# Patient Record
Sex: FEMALE | Race: WHITE | NOT HISPANIC OR LATINO | Employment: OTHER | URBAN - METROPOLITAN AREA
[De-identification: names, ages, dates, MRNs, and addresses within clinical notes are randomized per-mention and may not be internally consistent; named-entity substitution may affect disease eponyms.]

---

## 2017-01-02 ENCOUNTER — HOSPITAL ENCOUNTER (OUTPATIENT)
Dept: MAMMOGRAPHY | Facility: CLINIC | Age: 77
Discharge: HOME | End: 2017-01-02
Attending: PEDIATRICS | Admitting: PEDIATRICS
Payer: MEDICARE

## 2017-01-02 DIAGNOSIS — Z12.31 VISIT FOR SCREENING MAMMOGRAM: ICD-10-CM

## 2017-01-02 PROCEDURE — 77063 BREAST TOMOSYNTHESIS BI: CPT

## 2017-01-02 PROCEDURE — G0202 SCR MAMMO BI INCL CAD: HCPCS

## 2017-01-13 ENCOUNTER — CARE COORDINATION (OUTPATIENT)
Dept: CARE COORDINATION | Facility: CLINIC | Age: 77
End: 2017-01-13

## 2017-01-13 DIAGNOSIS — Z76.89 HEALTH CARE HOME: Primary | ICD-10-CM

## 2017-01-13 NOTE — PROGRESS NOTES
Clinic Care Coordination Contact    Chart review for care coordination update status     CCRN had sent email to [facility] on 5/23/16 asking to be notified with discharge plans - CCRN was not notified of home care discharge     Patient lost to follow up - closing care coordination at this time     Sheila Hawkins Care Coordinator RN  [facility]  [phone]

## 2017-03-20 ENCOUNTER — OFFICE VISIT (OUTPATIENT)
Dept: URGENT CARE | Facility: URGENT CARE | Age: 77
End: 2017-03-20
Payer: COMMERCIAL

## 2017-03-20 VITALS
SYSTOLIC BLOOD PRESSURE: 128 MMHG | TEMPERATURE: 98.1 F | DIASTOLIC BLOOD PRESSURE: 80 MMHG | OXYGEN SATURATION: 99 % | HEART RATE: 71 BPM

## 2017-03-20 DIAGNOSIS — R82.90 ABNORMAL URINE FINDINGS: ICD-10-CM

## 2017-03-20 DIAGNOSIS — R30.0 DYSURIA: Primary | ICD-10-CM

## 2017-03-20 LAB
ALBUMIN UR-MCNC: ABNORMAL MG/DL
APPEARANCE UR: ABNORMAL
BACTERIA #/AREA URNS HPF: ABNORMAL /HPF
BILIRUB UR QL STRIP: NEGATIVE
COLOR UR AUTO: YELLOW
GLUCOSE UR STRIP-MCNC: NEGATIVE MG/DL
HGB UR QL STRIP: ABNORMAL
KETONES UR STRIP-MCNC: NEGATIVE MG/DL
LEUKOCYTE ESTERASE UR QL STRIP: ABNORMAL
NITRATE UR QL: NEGATIVE
PH UR STRIP: 5 PH (ref 5–7)
RBC #/AREA URNS AUTO: ABNORMAL /HPF (ref 0–2)
SP GR UR STRIP: 1.02 (ref 1–1.03)
URN SPEC COLLECT METH UR: ABNORMAL
UROBILINOGEN UR STRIP-ACNC: 0.2 EU/DL (ref 0.2–1)
WBC #/AREA URNS AUTO: ABNORMAL /HPF (ref 0–2)

## 2017-03-20 PROCEDURE — 81001 URINALYSIS AUTO W/SCOPE: CPT | Performed by: PHYSICIAN ASSISTANT

## 2017-03-20 PROCEDURE — 99213 OFFICE O/P EST LOW 20 MIN: CPT

## 2017-03-20 PROCEDURE — 87086 URINE CULTURE/COLONY COUNT: CPT | Performed by: FAMILY MEDICINE

## 2017-03-20 PROCEDURE — 87088 URINE BACTERIA CULTURE: CPT | Performed by: FAMILY MEDICINE

## 2017-03-20 PROCEDURE — 87186 SC STD MICRODIL/AGAR DIL: CPT | Performed by: FAMILY MEDICINE

## 2017-03-20 RX ORDER — NITROFURANTOIN 25; 75 MG/1; MG/1
100 CAPSULE ORAL 2 TIMES DAILY
Qty: 14 CAPSULE | Refills: 0 | Status: SHIPPED | OUTPATIENT
Start: 2017-03-20 | End: 2017-04-10

## 2017-03-20 NOTE — MR AVS SNAPSHOT
After Visit Summary   3/20/2017    Gely Keene    MRN: [id number]           Patient Information     Date Of Birth          1940        Visit Information        Provider Department      3/20/2017 5:15 PM Provider, [place] Resendiz [facility]        Today's Diagnoses     Dysuria    -  1       Follow-ups after your visit        Your next 10 appointments already scheduled     May 04, 2017 10:40 AM CDT   Pre-Op physical with Hien Booth MD   [facility]    [address]  [address]  [facility] [address]   [phone]            May 09, 2017   Procedure with Eloy Eric MD   [facility] Services (--)    [address]  [facility] [address] [phone]            May 23, 2017   Pro[facility] with Eloy Eric MD   [facility]    [address]  [facility] [address] [phone]              Who to contact     If you have questions or need follow up information about today's clinic visit or your schedule please contact [facility] URGENT C[facility] directly at [phone].  Normal or non-critical lab and imaging results will be communicated to you by CCM Benchmarkhart, letter or phone within 4 business days after the clinic has received the results. If you do not hear from us within 7 days, please contact the clinic through CCM Benchmarkhart or phone. If you have a critical or abnormal lab result, we will notify you by phone as soon as possible.  Submit refill requests through LabDoor or call your pharmacy and they will forward the refill request to us. Please allow 3 business days for your refill to be completed.          Additional Information About Your Visit        CCM Benchmark[employer] Systems Information     LabDoor lets you send messages to your doctor, view your test results, renew your prescriptions, schedule appointments and more. To sign up, go to  "[email or web address] . Click on \"Log in\" on the left side of the screen, which will take you to the Welcome page. Then click on \"Sign up Now\" on the right side of the page.     You will be asked to enter the access code listed below, as well as some personal information. Please follow the directions to create your username and password.     Your access code is: [email or web address]  Expires: 2017  5:35 PM     Your access code will  in 90 days. If you need help or a new code, please call your [place] clinic or [phone].        Care EveryWhere ID     This is your Care EveryWhere ID. This could be used by other organizations to access your [place] medical records  [id number]        Your Vitals Were     Pulse Temperature Pulse Oximetry             71 98.1  F (36.7  C) (Oral) 99%          Blood Pressure from Last 3 Encounters:   17 128/80   16 112/62   16 110/70    Weight from Last 3 Encounters:   16 131 lb 8 oz (59.6 kg)   16 125 lb (56.7 kg)   16 128 lb 4.8 oz (58.2 kg)              We Performed the Following     UA reflex to Microscopic and Culture          Today's Medication Changes          These changes are accurate as of: 3/20/17  5:35 PM.  If you have any questions, ask your nurse or doctor.               Start taking these medicines.        Dose/Directions    nitrofurantoin (macrocrystal-monohydrate) 100 MG capsule   Commonly known as:  MACROBID   Used for:  Dysuria   Started by:  ProviderEmelina         Dose:  100 mg   Take 1 capsule (100 mg) by mouth 2 times daily   Quantity:  14 capsule   Refills:  0            Where to get your medicines      These medications were sent to [facility] - [address]   [address] [address]     Phone:  [phone]     nitrofurantoin (macrocrystal-monohydrate) 100 MG capsule                Primary Care Provider Office Phone # Fax #    Hien Page " MD Emmy [phone]       [facility] [address] [place] [address]        Thank you!     Thank you for choosing [facility] URGENT CARE  for your care. Our goal is always to provide you with excellent care. Hearing back from our patients is one way we can continue to improve our services. Please take a few minutes to complete the written survey that you may receive in the mail after your visit with us. Thank you!             Your Updated Medication List - Protect others around you: Learn how to safely use, store and throw away your medicines at www.disposemymeds.org.          This list is accurate as of: 3/20/17  5:35 PM.  Always use your most recent med list.                   Brand Name Dispense Instructions for use    ACETAMINOPHEN PO      Take 500 mg by mouth every 4 hours as needed for pain Daily max 3 grams       atorvastatin 20 MG tablet    LIPITOR    90 tablet    Take 1 tablet (20 mg) by mouth daily       CALCIUM CITRATE PLUS/MAGNESIUM Tabs      Take 1 tablet by mouth daily       conjugated estrogens cream    PREMARIN    30 g    Place 0.5 g vaginally three times a week       loperamide 2 MG capsule    IMODIUM    20 capsule    Take 1 capsule (2 mg) by mouth 2 times daily       metoprolol 25 MG tablet    LOPRESSOR    180 tablet    Take 0.5 tablets (12.5 mg) by mouth 2 times daily       nitrofurantoin (macrocrystal-monohydrate) 100 MG capsule    MACROBID    14 capsule    Take 1 capsule (100 mg) by mouth 2 times daily       nystatin 426563 UNIT/GM Powd    MYCOSTATIN    60 g    Apply topically 3 times daily       omeprazole 20 MG CR capsule    priLOSEC    90 capsule    Take 1 capsule (20 mg) by mouth daily

## 2017-03-20 NOTE — NURSING NOTE
"Chief Complaint   Patient presents with     Urgent Care     UTI     frequency, burning x 2 days       Initial /80 (BP Location: Right arm, Patient Position: Chair, Cuff Size: Adult Regular)  Pulse 71  Temp 98.1  F (36.7  C) (Oral)  SpO2 99% Estimated body mass index is 24.44 kg/(m^2) as calculated from the following:    Height as of 11/3/16: 5' 1.5\" (1.562 m).    Weight as of 11/3/16: 131 lb 8 oz (59.6 kg).  Medication Reconciliation: komal Cowan CMA                                3/20/2017 5:31 PM     "

## 2017-03-20 NOTE — PROGRESS NOTES
SUBJECTIVE:   Gely Keene is a 77 year old female who  presents today for a possible UTI. Symptoms of dysuria and frequency have been going on for 2day(s).  Hematuria no.  gradual onset and worseningand moderate.  There is no history of fever, chills, nausea or vomiting.  No history of vaginal or penile discharge. This patient does have a history of urinary tract infections. Patient denies None or vaginal itching     Past Medical History   Diagnosis Date     BCC (basal cell carcinoma) 10/30/2014     Esophageal reflux (GERD) 9/8/2014     HTN (hypertension) 9/8/2014     Hyperlipidemia LDL goal <160 9/8/2014     IFG (impaired fasting glucose) 9/8/2014     PONV (postoperative nausea and vomiting)      Current Outpatient Prescriptions   Medication Sig Dispense Refill     nystatin (MYCOSTATIN) 430163 UNIT/GM POWD Apply topically 3 times daily 60 g 3     metoprolol (LOPRESSOR) 25 MG tablet Take 0.5 tablets (12.5 mg) by mouth 2 times daily 180 tablet 3     atorvastatin (LIPITOR) 20 MG tablet Take 1 tablet (20 mg) by mouth daily 90 tablet 3     omeprazole (PRILOSEC) 20 MG capsule Take 1 capsule (20 mg) by mouth daily 90 capsule 3     ACETAMINOPHEN PO Take 500 mg by mouth every 4 hours as needed for pain Daily max 3 grams       loperamide (IMODIUM) 2 MG capsule Take 1 capsule (2 mg) by mouth 2 times daily 20 capsule      conjugated estrogens (PREMARIN) vaginal cream Place 0.5 g vaginally three times a week 30 g 6     Multiple Minerals-Vitamins (CALCIUM CITRATE PLUS/MAGNESIUM) TABS Take 1 tablet by mouth daily        Social History   Substance Use Topics     Smoking status: Never Smoker     Smokeless tobacco: Never Used     Alcohol use Yes      Comment: socially       ROS:   CONSTITUTIONAL:NEGATIVE for fever, chills, change in weight  INTEGUMENTARY/SKIN: NEGATIVE for worrisome rashes, moles or lesions    OBJECTIVE:  /80 (BP Location: Right arm, Patient Position: Chair, Cuff Size: Adult Regular)  Pulse 71   Temp 98.1  F (36.7  C) (Oral)  SpO2 99%  GENERAL APPEARANCE: healthy, alert and no distress  RESP: lungs clear to auscultation - no rales, rhonchi or wheezes  CV: regular rates and rhythm, normal S1 S2, no murmur noted  ABDOMEN:  soft, nontender, no HSM or masses and bowel sounds normal  BACK: No CVA tenderness  SKIN: no suspicious lesions or rashes    ASSESSMENT:   Lower, uncomplicated urinary tract infection.    PLAN:  As per ordered above  Drink plenty of fluids.  Prevention and treatment of UTI's discussed.Signs and symptoms of pyelonephritis mentioned.  Follow up with primary care physician if not improving

## 2017-03-22 LAB
BACTERIA SPEC CULT: ABNORMAL
MICRO REPORT STATUS: ABNORMAL
MICROORGANISM SPEC CULT: ABNORMAL
SPECIMEN SOURCE: ABNORMAL

## 2017-03-27 ENCOUNTER — OFFICE VISIT (OUTPATIENT)
Dept: UROLOGY | Facility: CLINIC | Age: 77
End: 2017-03-27
Payer: COMMERCIAL

## 2017-03-27 DIAGNOSIS — R30.0 DYSURIA: Primary | ICD-10-CM

## 2017-03-27 LAB
ALBUMIN UR-MCNC: NEGATIVE MG/DL
APPEARANCE UR: CLEAR
BILIRUB UR QL STRIP: NEGATIVE
COLOR UR AUTO: YELLOW
GLUCOSE UR STRIP-MCNC: NEGATIVE MG/DL
HGB UR QL STRIP: NEGATIVE
KETONES UR STRIP-MCNC: ABNORMAL MG/DL
LEUKOCYTE ESTERASE UR QL STRIP: NEGATIVE
NITRATE UR QL: NEGATIVE
PH UR STRIP: 5.5 PH (ref 5–7)
SP GR UR STRIP: 1.01 (ref 1–1.03)
URN SPEC COLLECT METH UR: ABNORMAL
UROBILINOGEN UR STRIP-ACNC: 0.2 EU/DL (ref 0.2–1)

## 2017-03-27 PROCEDURE — 99213 OFFICE O/P EST LOW 20 MIN: CPT | Performed by: UROLOGY

## 2017-03-27 PROCEDURE — 81003 URINALYSIS AUTO W/O SCOPE: CPT | Mod: QW | Performed by: UROLOGY

## 2017-03-27 NOTE — MR AVS SNAPSHOT
After Visit Summary   3/27/2017    Gely Keene    MRN: [id number]           Patient Information     Date Of Birth          1940        Visit Information        Provider Department      3/27/2017 6:30 PM Valentin Da Silva MD [facility]        Today's Diagnoses     Dysuria    -  1       Follow-ups after your visit        Follow-up notes from your care team     Return in about 2 weeks (around 4/10/2017).      Your next 10 appointments already scheduled     Apr 10, 2017  3:00 PM CDT   Return Visit with Valentin Da Silva MD   [facility] ([facility] [place])    [address]  [facility] [id number] [phone]            May 04, 2017 10:40 AM CDT   Pre-Op physical with Hien Booth MD   [facility] ([facility]    [address]  [address]  [facility] [address] [phone]            May 09, 2017   Procedure with Eloy Eric MD   [facility] (--)    [address]  [facility] [address] [phone]            May 23, 2017   Procedure with Eloy Eric MD   [facility] (--)    [address]  [facility] [address] [phone]              Who to contact     If you have questions or need follow up information about today's clinic visit or your schedule please contact [facility] directly at [phone].  Normal or non-critical lab and imaging results will be communicated to you by MyChart, letter or phone within 4 business days after the clinic has received the results. If you do not hear from us within 7 days, please contact the clinic through MyChart or phone. If you have a critical or abnormal lab result, we will notify you by phone as soon as possible.  Submit refill requests  "through [employer] or call your pharmacy and they will forward the refill request to us. Please allow 3 business days for your refill to be completed.          Additional Information About Your Visit        [employer] Information     [employer] lets you send messages to your doctor, view your test results, renew your prescriptions, schedule appointments and more. To sign up, go to [email or web address] . Click on \"Log in\" on the left side of the screen, which will take you to the Welcome page. Then click on \"Sign up Now\" on the right side of the page.     You will be asked to enter the access code listed below, as well as some personal information. Please follow the directions to create your username and password.     Your access code is: [email or web address]  Expires: 2017  5:35 PM     Your access code will  in 90 days. If you need help or a new code, please call your [place] clinic or [phone].        Care EveryWhere ID     This is your Care EveryWhere ID. This could be used by other organizations to access your [place] medical records  [id number]         Blood Pressure from Last 3 Encounters:   17 128/80   16 112/62   16 110/70    Weight from Last 3 Encounters:   16 131 lb 8 oz (59.6 kg)   16 125 lb (56.7 kg)   16 128 lb 4.8 oz (58.2 kg)              We Performed the Following     UA without Microscopic        Primary Care Provider Office Phone # Fax #    Hien Booth [phone]       [facility] [address] [place] [address]        Thank you!     Thank you for choosing [facility] FOR [facility]  for your care. Our goal is always to provide you with excellent care. Hearing back from our patients is one way we can continue to improve our services. Please take a few minutes to complete the written survey that you may receive in the mail after your visit with us. Thank you!             Your Updated " Medication List - Protect others around you: Learn how to safely use, store and throw away your medicines at www.disposemymeds.org.          This list is accurate as of: 3/27/17  6:59 PM.  Always use your most recent med list.                   Brand Name Dispense Instructions for use    ACETAMINOPHEN PO      Take 500 mg by mouth every 4 hours as needed for pain Daily max 3 grams       atorvastatin 20 MG tablet    LIPITOR    90 tablet    Take 1 tablet (20 mg) by mouth daily       CALCIUM CITRATE PLUS/MAGNESIUM Tabs      Take 1 tablet by mouth daily       conjugated estrogens cream    PREMARIN    30 g    Place 0.5 g vaginally three times a week       loperamide 2 MG capsule    IMODIUM    20 capsule    Take 1 capsule (2 mg) by mouth 2 times daily       metoprolol 25 MG tablet    LOPRESSOR    180 tablet    Take 0.5 tablets (12.5 mg) by mouth 2 times daily       nitrofurantoin (macrocrystal-monohydrate) 100 MG capsule    MACROBID    14 capsule    Take 1 capsule (100 mg) by mouth 2 times daily       nystatin [id number] UNIT/GM Powd    MYCOSTATIN    60 g    Apply topically 3 times daily       omeprazole 20 MG CR capsule    priLOSEC    90 capsule    Take 1 capsule (20 mg) by mouth daily

## 2017-03-27 NOTE — PROGRESS NOTES
F/u UTI's, urethral stenosis, atrophic vaginitis    Pt with recent UTI, rx with Macrobid, now better but still with some pressure and urgency; drinks 6 bottles of [place] per day, has iliostomy.    Has been using King Cream intermittently; tends to hold it when she gets catheter such as during recent illness.     Denies dysuria, gross hematuria, hesitancy, intermittent stream. Voids about q 90 minutes during the day, noc x 2.        Current Outpatient Prescriptions   Medication     nitrofurantoin, macrocrystal-monohydrate, (MACROBID) 100 MG capsule     nystatin (MYCOSTATIN) 313004 UNIT/GM POWD     metoprolol (LOPRESSOR) 25 MG tablet     atorvastatin (LIPITOR) 20 MG tablet     omeprazole (PRILOSEC) 20 MG capsule     ACETAMINOPHEN PO     loperamide (IMODIUM) 2 MG capsule     conjugated estrogens (PREMARIN) vaginal cream     Multiple Minerals-Vitamins (CALCIUM CITRATE PLUS/MAGNESIUM) TABS     No current facility-administered medications for this visit.        5/25/16 UC: E coli  8/29/16 UC: E coli  3/20/17 UC: E coli    (Today)  UA: negative      IMP:  1. Recent UTI, apparently resolved  2. Hx urethral stenosis, condition uncertain  3. Fair bit of [place]      PLAN:  1. Discussed situation with patient in detail.  2. Consider some moderation of [place]  3. RTC 2 wks to review progress; consider urethral dilation if still symptomatic  4. 15 minutes spent with patient, more than 50% spent in counseling and coordination of care for UTI/urethral stenosis.  5. Elsa Booth

## 2017-04-10 ENCOUNTER — OFFICE VISIT (OUTPATIENT)
Dept: UROLOGY | Facility: CLINIC | Age: 77
End: 2017-04-10
Payer: COMMERCIAL

## 2017-04-10 DIAGNOSIS — R30.0 DYSURIA: Primary | ICD-10-CM

## 2017-04-10 LAB
ALBUMIN UR-MCNC: NEGATIVE MG/DL
APPEARANCE UR: CLEAR
BILIRUB UR QL STRIP: NEGATIVE
COLOR UR AUTO: YELLOW
GLUCOSE UR STRIP-MCNC: NEGATIVE MG/DL
HGB UR QL STRIP: NEGATIVE
KETONES UR STRIP-MCNC: NEGATIVE MG/DL
LEUKOCYTE ESTERASE UR QL STRIP: ABNORMAL
NITRATE UR QL: NEGATIVE
PH UR STRIP: 6 PH (ref 5–7)
SP GR UR STRIP: 1.01 (ref 1–1.03)
URN SPEC COLLECT METH UR: ABNORMAL
UROBILINOGEN UR STRIP-ACNC: 0.2 EU/DL (ref 0.2–1)

## 2017-04-10 PROCEDURE — 99213 OFFICE O/P EST LOW 20 MIN: CPT | Performed by: UROLOGY

## 2017-04-10 PROCEDURE — 81003 URINALYSIS AUTO W/O SCOPE: CPT | Mod: QW | Performed by: UROLOGY

## 2017-04-10 NOTE — PROGRESS NOTES
"F/u UTI's, urethral stenosis, atrophic vaginitis, ileostomy    \"I'm doing really well\"    Pt reports satisfactory force of stream and denies dysuria, gross hematuria, frequency, intermittent stream, UTI's since last visit; nocturia x 3. Wears pad only for fecal soilage (mucous), not for urinary incont.     Compliant with King Cream.       Current Outpatient Prescriptions   Medication     Atorvastatin Calcium (LIPITOR PO)     nystatin (MYCOSTATIN) 633740 UNIT/GM POWD     metoprolol (LOPRESSOR) 25 MG tablet     omeprazole (PRILOSEC) 20 MG capsule     conjugated estrogens (PREMARIN) vaginal cream     Multiple Minerals-Vitamins (CALCIUM CITRATE PLUS/MAGNESIUM) TABS     ACETAMINOPHEN PO     loperamide (IMODIUM) 2 MG capsule     No current facility-administered medications for this visit.          Results for orders placed or performed in visit on 04/10/17   UA without Microscopic   Result Value Ref Range    Color Urine Yellow     Appearance Urine Clear     Glucose Urine Negative NEG mg/dL    Bilirubin Urine Negative NEG    Ketones Urine Negative NEG mg/dL    Specific Gravity Urine 1.015 1.003 - 1.035    Blood Urine Negative NEG    pH Urine 6.0 5.0 - 7.0 pH    Protein Albumin Urine Negative NEG mg/dL    Urobilinogen Urine 0.2 0.2 - 1.0 EU/dL    Nitrite Urine Negative NEG    Leukocyte Esterase Urine Trace (A) NEG    Source Midstream Urine        IMP:  1. Urethral stenosis and UTI's, stable      PLAN:  1. Discussed situation with patient in detail.  2. Continue King Cream  3. RTC 3 mos ro sooner prn  4. 15 minutes spent with patient, 100% spent in counseling and coordination of care for urethral stenosis/UTI's.  5. Copy C Emmy  "

## 2017-04-10 NOTE — MR AVS SNAPSHOT
After Visit Summary   4/10/2017    Gely Keene    MRN: [id number]           Patient Information     Date Of Birth          1940        Visit Information        Provider Department      4/10/2017 3:00 PM Valentin Da Silva MD [facility]        Today's Diagnoses     Dysuria    -  1       Follow-ups after your visit        Follow-up notes from your care team     Return in about 3 months (around 7/10/2017).      Your next 10 appointments already scheduled     May 04, 2017 10:40 AM CDT   Pre-Op physical with Hien Booth MD   [facility] [place] ([facility])    [address]  [address]  [place] [address] [phone]            May 09, 2017   Procedure with Eloy Eric MD   [facility] (--)    [address]  [place] [address] [phone]            May 23, 2017   Procedure with Eloy Eric MD   [facility] (--)    [address]  [place] [address] [phone]              Who to contact     If you have questions or need follow up information about today's clinic visit or your schedule please contact [facility] directly at [phone].  Normal or non-critical lab and imaging results will be communicated to you by MyChart, letter or phone within 4 business days after the clinic has received the results. If you do not hear from us within 7 days, please contact the clinic through mapp2linkhart or phone. If you have a critical or abnormal lab result, we will notify you by phone as soon as possible.  Submit refill requests through [employer] or call your pharmacy and they will forward the refill request to us. Please allow 3 business days for your refill to be completed.          Additional Information About Your Visit        mapp2linkharHungama [employer]vt[employer] Information     [employer] lets you send messages  "to your doctor, view your test results, renew your prescriptions, schedule appointments and more. To sign up, go to [email or web address] . Click on \"Log in\" on the left side of the screen, which will take you to the Welcome page. Then click on \"Sign up Now\" on the right side of the page.     You will be asked to enter the access code listed below, as well as some personal information. Please follow the directions to create your username and password.     Your access code is: [email or web address]  Expires: 2017  5:35 PM     Your access code will  in 90 days. If you need help or a new code, please call your [place] clinic or [phone].        Care EveryWhere ID     This is your Care EveryWhere ID. This could be used by other organizations to access your [place] medical records  [id number]         Blood Pressure from Last 3 Encounters:   17 128/80   16 112/62   16 110/70    Weight from Last 3 Encounters:   16 131 lb 8 oz (59.6 kg)   16 125 lb (56.7 kg)   16 128 lb 4.8 oz (58.2 kg)              We Performed the Following     UA without Microscopic        Primary Care Provider Office Phone # Fax #    Hien Booth [phone]       [address] [place] [address]        Thank you!     Thank you for choosing [facility] FOR [facility]  for your care. Our goal is always to provide you with excellent care. Hearing back from our patients is one way we can continue to improve our services. Please take a few minutes to complete the written survey that you may receive in the mail after your visit with us. Thank you!             Your Updated Medication List - Protect others around you: Learn how to safely use, store and throw away your medicines at www.disposemymeds.org.          This list is accurate as of: 4/10/17  4:31 PM.  Always use your most recent med list.                   Brand Name Dispense " Instructions for use    ACETAMINOPHEN PO      Take 500 mg by mouth every 4 hours as needed for pain Reported on 4/10/2017       CALCIUM CITRATE PLUS/MAGNESIUM Tabs      Take 1 tablet by mouth daily       conjugated estrogens cream    PREMARIN    30 g    Place 0.5 g vaginally three times a week       LIPITOR PO      Take 20 mg by mouth daily       loperamide 2 MG capsule    IMODIUM    20 capsule    Take 1 capsule (2 mg) by mouth 2 times daily       metoprolol 25 MG tablet    LOPRESSOR    180 tablet    Take 0.5 tablets (12.5 mg) by mouth 2 times daily       nystatin [id number] UNIT/GM Powd    MYCOSTATIN    60 g    Apply topically 3 times daily       omeprazole 20 MG CR capsule    priLOSEC    90 capsule    Take 1 capsule (20 mg) by mouth daily

## 2017-04-28 ENCOUNTER — OFFICE VISIT (OUTPATIENT)
Dept: PEDIATRICS | Facility: CLINIC | Age: 77
End: 2017-04-28
Payer: COMMERCIAL

## 2017-04-28 VITALS
BODY MASS INDEX: 26.48 KG/M2 | HEIGHT: 62 IN | HEART RATE: 71 BPM | DIASTOLIC BLOOD PRESSURE: 60 MMHG | SYSTOLIC BLOOD PRESSURE: 130 MMHG | WEIGHT: 143.9 LBS | OXYGEN SATURATION: 97 % | TEMPERATURE: 97.7 F

## 2017-04-28 DIAGNOSIS — N30.01 ACUTE CYSTITIS WITH HEMATURIA: Primary | ICD-10-CM

## 2017-04-28 LAB
ALBUMIN UR-MCNC: 100 MG/DL
APPEARANCE UR: ABNORMAL
BACTERIA #/AREA URNS HPF: ABNORMAL /HPF
BILIRUB UR QL STRIP: NEGATIVE
COLOR UR AUTO: YELLOW
GLUCOSE UR STRIP-MCNC: NEGATIVE MG/DL
HGB UR QL STRIP: ABNORMAL
KETONES UR STRIP-MCNC: NEGATIVE MG/DL
LEUKOCYTE ESTERASE UR QL STRIP: ABNORMAL
NITRATE UR QL: NEGATIVE
NON-SQ EPI CELLS #/AREA URNS LPF: ABNORMAL /LPF
PH UR STRIP: 5.5 PH (ref 5–7)
RBC #/AREA URNS AUTO: ABNORMAL /HPF (ref 0–2)
SP GR UR STRIP: 1.02 (ref 1–1.03)
URN SPEC COLLECT METH UR: ABNORMAL
UROBILINOGEN UR STRIP-ACNC: 0.2 EU/DL (ref 0.2–1)
WBC #/AREA URNS AUTO: >100 /HPF (ref 0–2)

## 2017-04-28 PROCEDURE — 99213 OFFICE O/P EST LOW 20 MIN: CPT | Performed by: PHYSICIAN ASSISTANT

## 2017-04-28 PROCEDURE — 87086 URINE CULTURE/COLONY COUNT: CPT | Performed by: PHYSICIAN ASSISTANT

## 2017-04-28 PROCEDURE — 87186 SC STD MICRODIL/AGAR DIL: CPT | Performed by: PHYSICIAN ASSISTANT

## 2017-04-28 PROCEDURE — 87088 URINE BACTERIA CULTURE: CPT | Performed by: PHYSICIAN ASSISTANT

## 2017-04-28 PROCEDURE — 81001 URINALYSIS AUTO W/SCOPE: CPT | Performed by: PHYSICIAN ASSISTANT

## 2017-04-28 RX ORDER — CHLORAL HYDRATE 500 MG
CAPSULE ORAL
COMMUNITY
Start: 2005-08-09 | End: 2020-04-28

## 2017-04-28 RX ORDER — CEFDINIR 300 MG/1
300 CAPSULE ORAL 2 TIMES DAILY
Qty: 14 CAPSULE | Refills: 0 | Status: SHIPPED | OUTPATIENT
Start: 2017-04-28 | End: 2017-08-14

## 2017-04-28 NOTE — PROGRESS NOTES
"  SUBJECTIVE:                                                    Gely Keene is a 77 year old female who presents to clinic today for the following health issues:    URINARY TRACT SYMPTOMS     Onset: this morning     Description:   Painful urination (Dysuria): YES  Blood in urine (Hematuria): no   Delay in urine (Hesitency): no  Frequency and urgency  Pressure in the lower abdomen     Intensity: severe    Progression of Symptoms:  worsening    Accompanying Signs & Symptoms:  Fever/chills: no   Flank pain no   Nausea and vomiting: no   Any vaginal symptoms: none  Abdominal/Pelvic Pain: no    History:   History of frequent UTI's: YES  History of kidney stones: no   Sexually Active: no   Possibility of pregnancy: No    Precipitating factors:   None          Therapies Tried and outcome: none     preop next week for cataract surgery      ROS:  ROS otherwise negative    OBJECTIVE:                                                    /60  Pulse 71  Temp 97.7  F (36.5  C) (Oral)  Ht 5' 1.5\" (1.562 m)  Wt 143 lb 14.4 oz (65.3 kg)  SpO2 97%  BMI 26.75 kg/m2  Body mass index is 26.75 kg/(m^2).   GENERAL: alert, no distress  RESP: lungs clear to auscultation - no rales, no rhonchi, no wheezes  CV: regular rates and rhythm, normal S1 S2, no S3 or S4 and no murmur, no click or rub  ABD: no abd tenderness    Diagnostic test results:  Results for orders placed or performed in visit on 04/28/17 (from the past 24 hour(s))   UA reflex to Microscopic and Culture   Result Value Ref Range    Color Urine Yellow     Appearance Urine Cloudy     Glucose Urine Negative NEG mg/dL    Bilirubin Urine Negative NEG    Ketones Urine Negative NEG mg/dL    Specific Gravity Urine 1.020 1.003 - 1.035    Blood Urine Large (A) NEG    pH Urine 5.5 5.0 - 7.0 pH    Protein Albumin Urine 100 (A) NEG mg/dL    Urobilinogen Urine 0.2 0.2 - 1.0 EU/dL    Nitrite Urine Negative NEG    Leukocyte Esterase Urine Moderate (A) NEG    Source " Midstream Urine    Urine Microscopic   Result Value Ref Range    WBC Urine >100 (A) 0 - 2 /HPF    RBC Urine 2-5 (A) 0 - 2 /HPF    Squamous Epithelial /LPF Urine Moderate (A) FEW /LPF    Bacteria Urine Many (A) NEG /HPF        ASSESSMENT/PLAN:                                                    (N30.01) Acute cystitis with hematuria  (primary encounter diagnosis)  Comment: begin antibiotics as directed. Recheck UA in one week to ensure resolution of hematuria.   CrCl 42.   Plan: UA reflex to Microscopic and Culture, Urine         Microscopic, Urine Culture Aerobic Bacterial,         cefdinir (OMNICEF) 300 MG capsule          See Patient Instructions    Lilian Reeves PA-C  [facility] [place]

## 2017-04-28 NOTE — MR AVS SNAPSHOT
After Visit Summary   4/28/2017    Gely Keene    MRN: [id number]           Patient Information     Date Of Birth          1940        Visit Information        Provider Department      4/28/2017 1:30 PM Lilian Reeves PA-C [facility]        Today's Diagnoses     Dysuria    -  1    Nonspecific finding on examination of urine          Care Instructions    Begin antibiotics   Increase fluid intake   Urine culture pending        Follow-ups after your visit        Your next 10 appointments already scheduled     May 04, 2017 10:40 AM CDT   Pre-Op physical with Hien Booth MD   [facility] ([facility]    [address]  [address]  [place] [address] [phone]            May 09, 2017   Procedure with Eloy Eric MD   [facility] (--)    [address]  [place] [address] [phone]            May 23, 2017   Procedure with Eloy Eric MD   [facility] (--)    [address]  [place] [address] [phone]              Who to contact     If you have questions or need follow up information about today's clinic visit or your schedule please contact [facility] directly at [phone].  Normal or non-critical lab and imaging results will be communicated to you by MyChart, letter or phone within 4 business days after the clinic has received the results. If you do not hear from us within 7 days, please contact the clinic through MyChart or phone. If you have a critical or abnormal lab result, we will notify you by phone as soon as possible.  Submit refill requests through [employer] or call your pharmacy and they will forward the refill request to us. Please allow 3 business days for your refill to be completed.          Additional Information About Your Visit        MyChart Information     [facility] lets you  "send messages to your doctor, view your test results, renew your prescriptions, schedule appointments and more. To sign up, go to [email or web address] . Click on \"Log in\" on the left side of the screen, which will take you to the Welcome page. Then click on \"Sign up Now\" on the right side of the page.     You will be asked to enter the access code listed below, as well as some personal information. Please follow the directions to create your username and password.     Your access code is: [email or web address]  Expires: 2017  5:35 PM     Your access code will  in 90 days. If you need help or a new code, please call your [facility] clinic or [phone].        Care EveryWhere ID     This is your Care EveryWhere ID. This could be used by other organizations to access your [facility] medical records  [id number]        Your Vitals Were     Pulse Temperature Height Pulse Oximetry BMI (Body Mass Index)       71 97.7  F (36.5  C) (Oral) 5' 1.5\" (1.562 m) 97% 26.75 kg/m2        Blood Pressure from Last 3 Encounters:   17 130/60   17 128/80   16 112/62    Weight from Last 3 Encounters:   17 143 lb 14.4 oz (65.3 kg)   16 131 lb 8 oz (59.6 kg)   16 125 lb (56.7 kg)              We Performed the Following     UA reflex to Microscopic and Culture     Urine Culture Aerobic Bacterial     Urine Microscopic          Today's Medication Changes          These changes are accurate as of: 17  2:12 PM.  If you have any questions, ask your nurse or doctor.               Start taking these medicines.        Dose/Directions    cefdinir 300 MG capsule   Commonly known as:  OMNICEF   Used for:  Nonspecific finding on examination of urine, Dysuria   Started by:  Lilian Reeves PA-C        Dose:  300 mg   Take 1 capsule (300 mg) by mouth 2 times daily   Quantity:  14 capsule   Refills:  0            Where to get your medicines      These medications were sent to [facility] [place] [facility] - [place] - [address]   [address], [place] [address]     Phone:  [phone]     cefdinir 300 MG capsule                Primary Care Provider Office Phone # Fax #    Hien Booth [phone]       [facility] [address] [place] [address]        Thank you!     Thank you for choosing [facility]  for your care. Our goal is always to provide you with excellent care. Hearing back from our patients is one way we can continue to improve our services. Please take a few minutes to complete the written survey that you may receive in the mail after your visit with us. Thank you!             Your Updated Medication List - Protect others around you: Learn how to safely use, store and throw away your medicines at www.disposemymeds.org.          This list is accurate as of: 4/28/17  2:12 PM.  Always use your most recent med list.                   Brand Name Dispense Instructions for use    ACETAMINOPHEN PO      Take 500 mg by mouth every 4 hours as needed for pain Reported on 4/10/2017       CALCIUM CITRATE PLUS/MAGNESIUM Tabs      Take 1 tablet by mouth daily       cefdinir 300 MG capsule    OMNICEF    14 capsule    Take 1 capsule (300 mg) by mouth 2 times daily       conjugated estrogens cream    PREMARIN    30 g    Place 0.5 g vaginally three times a week       fish oil-omega-3 fatty acids 1000 MG capsule      Indications: PN:       LIPITOR PO      Take 20 mg by mouth daily       loperamide 2 MG capsule    IMODIUM    20 capsule    Take 1 capsule (2 mg) by mouth 2 times daily       metoprolol 25 MG tablet    LOPRESSOR    180 tablet    Take 0.5 tablets (12.5 mg) by mouth 2 times daily       nystatin 980136 UNIT/GM Powd    MYCOSTATIN    60 g    Apply topically 3 times daily       omeprazole 20 MG CR capsule    priLOSEC    90 capsule    Take 1 capsule (20 mg) by mouth daily

## 2017-04-28 NOTE — NURSING NOTE
"Chief Complaint   Patient presents with     UTI       Initial /60  Pulse 71  Temp 97.7  F (36.5  C) (Oral)  Ht 5' 1.5\" (1.562 m)  Wt 143 lb 14.4 oz (65.3 kg)  SpO2 97%  BMI 26.75 kg/m2 Estimated body mass index is 26.75 kg/(m^2) as calculated from the following:    Height as of this encounter: 5' 1.5\" (1.562 m).    Weight as of this encounter: 143 lb 14.4 oz (65.3 kg).  Medication Reconciliation: complete   Megan Urbina MA// April 28, 2017 1:52 PM          "

## 2017-05-04 ENCOUNTER — OFFICE VISIT (OUTPATIENT)
Dept: PEDIATRICS | Facility: CLINIC | Age: 77
End: 2017-05-04
Payer: COMMERCIAL

## 2017-05-04 VITALS
SYSTOLIC BLOOD PRESSURE: 114 MMHG | BODY MASS INDEX: 26.5 KG/M2 | HEART RATE: 73 BPM | TEMPERATURE: 98.1 F | HEIGHT: 62 IN | OXYGEN SATURATION: 98 % | WEIGHT: 144 LBS | DIASTOLIC BLOOD PRESSURE: 70 MMHG

## 2017-05-04 DIAGNOSIS — Z01.818 PREOP GENERAL PHYSICAL EXAM: Primary | ICD-10-CM

## 2017-05-04 DIAGNOSIS — N30.01 ACUTE CYSTITIS WITH HEMATURIA: ICD-10-CM

## 2017-05-04 DIAGNOSIS — H26.9 CATARACT: ICD-10-CM

## 2017-05-04 DIAGNOSIS — B37.31 CANDIDIASIS OF VULVA AND VAGINA: ICD-10-CM

## 2017-05-04 LAB
ALBUMIN UR-MCNC: NEGATIVE MG/DL
APPEARANCE UR: CLEAR
BACTERIA #/AREA URNS HPF: ABNORMAL /HPF
BILIRUB UR QL STRIP: NEGATIVE
COLOR UR AUTO: YELLOW
GLUCOSE UR STRIP-MCNC: NEGATIVE MG/DL
HGB UR QL STRIP: NEGATIVE
KETONES UR STRIP-MCNC: NEGATIVE MG/DL
LEUKOCYTE ESTERASE UR QL STRIP: ABNORMAL
NITRATE UR QL: NEGATIVE
NON-SQ EPI CELLS #/AREA URNS LPF: ABNORMAL /LPF
PH UR STRIP: 5 PH (ref 5–7)
RBC #/AREA URNS AUTO: ABNORMAL /HPF (ref 0–2)
SP GR UR STRIP: 1.01 (ref 1–1.03)
URN SPEC COLLECT METH UR: ABNORMAL
UROBILINOGEN UR STRIP-ACNC: 0.2 EU/DL (ref 0.2–1)
WBC #/AREA URNS AUTO: ABNORMAL /HPF (ref 0–2)

## 2017-05-04 PROCEDURE — 99214 OFFICE O/P EST MOD 30 MIN: CPT | Performed by: PEDIATRICS

## 2017-05-04 PROCEDURE — 81001 URINALYSIS AUTO W/SCOPE: CPT | Performed by: PEDIATRICS

## 2017-05-04 RX ORDER — FLUCONAZOLE 150 MG/1
150 TABLET ORAL ONCE
Qty: 1 TABLET | Refills: 0 | Status: SHIPPED | OUTPATIENT
Start: 2017-05-04 | End: 2017-05-04

## 2017-05-04 NOTE — LETTER
[facility]  [address] [place] [address]  [phone]      May 4, 2017    RE:  Gely Keene                                                                                                                                                       [address] [facility] [address]            To whom it may concern:    Gely Keene is under my professional care.    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   Please feel free to contact me with any questions.      Sincerely,          Hien Booth MD  Internal Medicine - Pediatrics

## 2017-05-04 NOTE — LETTER
[facility]  [address] [address]                  [phone]   May 4, 2017    Gely Keene  [address] [facility] [address]      Dear Gely,     It was wonderful to see you in clinic this week.   You urine tests returned and show great improvement with antibiotics and clearance of the red blood cells that were seen initially.  Great news!     Please contact me with any new questions or concerns.     Take care,     Hien Booth MD   Internal Medicine - Pediatrics         Results for orders placed or performed in visit on 05/04/17   *UA reflex to Microscopic and Culture ([place] and [facility] (except [place] and [place])   Result Value Ref Range    Color Urine Yellow     Appearance Urine Clear     Glucose Urine Negative NEG mg/dL    Bilirubin Urine Negative NEG    Ketones Urine Negative NEG mg/dL    Specific Gravity Urine 1.010 1.003 - 1.035    Blood Urine Negative NEG    pH Urine 5.0 5.0 - 7.0 pH    Protein Albumin Urine Negative NEG mg/dL    Urobilinogen Urine 0.2 0.2 - 1.0 EU/dL    Nitrite Urine Negative NEG    Leukocyte Esterase Urine Trace (A) NEG    Source Midstream Urine    Urine Microscopic   Result Value Ref Range    WBC Urine 2-5 (A) 0 - 2 /HPF    RBC Urine O - 2 0 - 2 /HPF    Squamous Epithelial /LPF Urine Few FEW /LPF    Bacteria Urine Few (A) NEG /HPF

## 2017-05-04 NOTE — PATIENT INSTRUCTIONS
Hold all of your medications the morning of surgery EXCEPT your metoprolol.      Complete your course of antibiotics for your bladder infection    We'll treat your vaginal symptoms with fluconazole - 1 pill one time    Repeat urine sample to make sure the red blood cells are cleared.      Before Your Surgery      Call your surgeon if there is any change in your health. This includes signs of a cold or flu (such as a sore throat, runny nose, cough, rash or fever).    Do not smoke, drink alcohol or take over the counter medicine (unless your surgeon or primary care doctor tells you to) for the 24 hours before and after surgery.    If you take prescribed drugs: Follow your doctor s orders about which medicines to take and which to stop until after surgery.    Eating and drinking prior to surgery: follow the instructions from your surgeon    Take a shower or bath the night before surgery. Use the soap your surgeon gave you to gently clean your skin. If you do not have soap from your surgeon, use your regular soap. Do not shave or scrub the surgery site.  Wear clean pajamas and have clean sheets on your bed.

## 2017-05-04 NOTE — MR AVS SNAPSHOT
After Visit Summary   5/4/2017    Gely Keene    MRN: [id number]           Patient Information     Date Of Birth          1940        Visit Information        Provider Department      5/4/2017 10:40 AM Hien Booth MD [facility]        Today's Diagnoses     Preop general physical exam    -  1    Cataract        Acute cystitis with hematuria        Candidiasis of vulva and vagina          Care Instructions    Hold all of your medications the morning of surgery EXCEPT your metoprolol.      Complete your course of antibiotics for your bladder infection    We'll treat your vaginal symptoms with fluconazole - 1 pill one time    Repeat urine sample to make sure the red blood cells are cleared.      Before Your Surgery      Call your surgeon if there is any change in your health. This includes signs of a cold or flu (such as a sore throat, runny nose, cough, rash or fever).    Do not smoke, drink alcohol or take over the counter medicine (unless your surgeon or primary care doctor tells you to) for the 24 hours before and after surgery.    If you take prescribed drugs: Follow your doctor s orders about which medicines to take and which to stop until after surgery.    Eating and drinking prior to surgery: follow the instructions from your surgeon    Take a shower or bath the night before surgery. Use the soap your surgeon gave you to gently clean your skin. If you do not have soap from your surgeon, use your regular soap. Do not shave or scrub the surgery site.  Wear clean pajamas and have clean sheets on your bed.         Follow-ups after your visit        Your next 10 appointments already scheduled     May 09, 2017   Procedure with Eloy Eric MD   [facility] (--)    [address] [address]   [phone]            May 23, 2017   Procedure with Eloy Eric MD   [facility] (--)  "   [address]  [place] [address] [phone]              Who to contact     If you have questions or need follow up information about today's clinic visit or your schedule please contact [facility] [place] directly at [phone].  Normal or non-critical lab and imaging results will be communicated to you by MyChart, letter or phone within 4 business days after the clinic has received the results. If you do not hear from us within 7 days, please contact the clinic through MyChart or phone. If you have a critical or abnormal lab result, we will notify you by phone as soon as possible.  Submit refill requests through [employer] or call your pharmacy and they will forward the refill request to us. Please allow 3 business days for your refill to be completed.          Additional Information About Your Visit        MyChart Information     [employer] lets you send messages to your doctor, view your test results, renew your prescriptions, schedule appointments and more. To sign up, go to [email or web address] . Click on \"Log in\" on the left side of the screen, which will take you to the Welcome page. Then click on \"Sign up Now\" on the right side of the page.     You will be asked to enter the access code listed below, as well as some personal information. Please follow the directions to create your username and password.     Your access code is: [email or web address]  Expires: 2017  5:35 PM     Your access code will  in 90 days. If you need help or a new code, please call your [facility] clinic or [phone].        Care EveryWhere ID     This is your Care EveryWhere ID. This could be used by other organizations to access your [facility] medical records  [id number]        Your Vitals Were     Pulse Temperature Height Pulse Oximetry BMI (Body Mass Index)       73 98.1  F (36.7  C) (Tympanic) 5' 1.5\" (1.562 m) 98% 26.77 kg/m2        Blood Pressure from Last 3 Encounters:   17 114/70   17 " 130/60   03/20/17 128/80    Weight from Last 3 Encounters:   05/04/17 144 lb (65.3 kg)   04/28/17 143 lb 14.4 oz (65.3 kg)   11/03/16 131 lb 8 oz (59.6 kg)              We Performed the Following     *UA reflex to Microscopic and Culture ([place] and [facility] (except [place] and [place])          Today's Medication Changes          These changes are accurate as of: 5/4/17 11:25 AM.  If you have any questions, ask your nurse or doctor.               Start taking these medicines.        Dose/Directions    fluconazole 150 MG tablet   Commonly known as:  DIFLUCAN   Used for:  Candidiasis of vulva and vagina   Started by:  Hien Booth MD        Dose:  150 mg   Take 1 tablet (150 mg) by mouth once for 1 dose   Quantity:  1 tablet   Refills:  0            Where to get your medicines      These medications were sent to [facility] - [address]   [address]  [address], [place] [address]     Phone:  [phone]     fluconazole 150 MG tablet                Primary Care Provider Office Phone # Fax #    Hien Booth [phone]       [facility] [address] [place] [address]        Thank you!     Thank you for choosing [facility]  for your care. Our goal is always to provide you with excellent care. Hearing back from our patients is one way we can continue to improve our services. Please take a few minutes to complete the written survey that you may receive in the mail after your visit with us. Thank you!             Your Updated Medication List - Protect others around you: Learn how to safely use, store and throw away your medicines at www.disposemymeds.org.          This list is accurate as of: 5/4/17 11:25 AM.  Always use your most recent med list.                   Brand Name Dispense Instructions for use    ACETAMINOPHEN PO      Take 500 mg by mouth every 4 hours as needed for pain  Reported on 4/10/2017       CALCIUM CITRATE PLUS/MAGNESIUM Tabs      Take 1 tablet by mouth daily       cefdinir 300 MG capsule    OMNICEF    14 capsule    Take 1 capsule (300 mg) by mouth 2 times daily       conjugated estrogens cream    PREMARIN    30 g    Place 0.5 g vaginally three times a week       fish oil-omega-3 fatty acids 1000 MG capsule      Indications: PN:       fluconazole 150 MG tablet    DIFLUCAN    1 tablet    Take 1 tablet (150 mg) by mouth once for 1 dose       LIPITOR PO      Take 20 mg by mouth daily       loperamide 2 MG capsule    IMODIUM    20 capsule    Take 1 capsule (2 mg) by mouth 2 times daily       metoprolol 25 MG tablet    LOPRESSOR    180 tablet    Take 0.5 tablets (12.5 mg) by mouth 2 times daily       nystatin 126149 UNIT/GM Powd    MYCOSTATIN    60 g    Apply topically 3 times daily       omeprazole 20 MG CR capsule    priLOSEC    90 capsule    Take 1 capsule (20 mg) by mouth daily

## 2017-05-04 NOTE — NURSING NOTE
"Chief Complaint   Patient presents with     Pre-Op Exam       Initial /70 (BP Location: Right arm, Patient Position: Right side, Cuff Size: Adult Regular)  Pulse 73  Temp 98.1  F (36.7  C) (Tympanic)  Ht 5' 1.5\" (1.562 m)  Wt 144 lb (65.3 kg)  SpO2 98%  BMI 26.77 kg/m2 Estimated body mass index is 26.77 kg/(m^2) as calculated from the following:    Height as of this encounter: 5' 1.5\" (1.562 m).    Weight as of this encounter: 144 lb (65.3 kg).  Medication Reconciliation: complete  "

## 2017-05-04 NOTE — PROGRESS NOTES
[facility]  [address]  [address]  [place] [address]  [phone]  Dept: [phone]    PRE-OP EVALUATION:  Today's date: 2017    Gely Keene (: 1940) presents for pre-operative evaluation assessment as requested by Eloy Mendoza .  She requires evaluation and anesthesia risk assessment prior to undergoing surgery/procedure for treatment of PHACOEMULSIFICATION CLEAR CORNEA WITH STANDARD INTRAOCULAR LENS IMPLANT  .  Proposed procedure: LEFT EYE PHACOEMULSIFICATION CLEAR CORNEA WITH STANDARD INTRAOCULAR LENS IMPLANT (MAC/TOPICAL) AND RIGHT EYE PHACOEMULSIFICATION CLEAR CORNEA WITH STANDARD INTRAOCULAR LENS IMPLANT (MAC/TOPICAL)    Date of Surgery/ Procedure: 17 and 17  Time of Surgery/ Procedure: 1:10 AND 1:30  Hospital/Surgical Facility: [facility]  Fax number for surgical facility: [phone]  Primary Physician: Hien Booth  Type of Anesthesia Anticipated: Combined MAC with Topical     Patient has a Health Care Directive or Living Will:  YES pt will bring copy     1. NO - Do you have a history of heart attack, stroke, stent, bypass or surgery on an artery in the head, neck, heart or legs?  2. YES - DO YOU EVER HAVE ANY PAIN OR DISCOMFORT IN YOUR CHEST? Slight pain very rarely   3. NO - Do you have a history of  Heart Failure?  4. YES - ARE YOUR TROUBLED BY SHORTNESS OF BREATH WHEN WALKING ON THE LEVEL, UP A SLIGHT HILL OR AT NIGHT? SOB walking up stairs   5. NO - Do you currently have a cold, bronchitis or other respiratory infection?  6. NO - Do you have a cough, shortness of breath or wheezing?  7. NO - Do you sometimes get pains in the calves of your legs when you walk?  8. NO - Do you or anyone in your family have previous history of blood clots?  9. NO - Do you or does anyone in your family have a serious bleeding problem such as prolonged bleeding following surgeries or cuts?  10. YES - HAVE YOU EVER HAD  PROBLEMS WITH ANEMIA OR BEEN TOLD TO TAKE IRON PILLS? When pt was pregnant with twins   11. NO - Have you had any abnormal blood loss such as black, tarry or bloody stools, or abnormal vaginal bleeding?  12. NO - Have you ever had a blood transfusion?  13. NO - Have you or any of your relatives ever had problems with anesthesia?  14. NO - Do you have sleep apnea, excessive snoring or daytime drowsiness?  15. NO - Do you have any prosthetic heart valves?  16. NO - Do you have prosthetic joints?  17. NO - Is there any chance that you may be pregnant?      HPI:                                                      Brief HPI related to upcoming procedure: cataracts requiring surgical correction    Versed reaction in the past - vomiting - wanted to make sure this was noted.    Recent UTI - now on omnicef and symptoms are improving.   Having some vaginal irritation.      Walking and goes up and down the stairs without difficulty.  Staying active.   No side effects from her medications.    S/p total colectomy with ostomy in place after severe illness 4/16.  Doing well - current good nutritional status.      Blood pressure has been well controlled with no recent cardiac symptoms.  Tolerating medications well without side effects.    Hyperlipidemia well controlled on statin.      MEDICAL HISTORY:                                                      Patient Active Problem List    Diagnosis Date Noted     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) 11/07/2016     Priority: Medium     Health Care Home 05/23/2016     Priority: Medium              S/p total colectomy on 4/22/16 by Shana Alaniz MD 05/10/2016     Priority: Medium     Restless legs syndrome (RLS) 05/10/2016     Priority: Medium     Anxiety 05/10/2016     Priority: Medium     Gastroesophageal reflux disease, esophagitis presence not specified 11/04/2015     Priority: Medium     Hypertension goal BP (blood pressure) < 140/90 10/30/2014     Priority:  Medium     IFG (impaired fasting glucose) 10/30/2014     Priority: Medium     BCC (basal cell carcinoma) 10/30/2014     Priority: Medium     Hyperlipidemia LDL goal <160 09/08/2014     Priority: Medium      Past Medical History:   Diagnosis Date     BCC (basal cell carcinoma) 10/30/2014     Esophageal reflux (GERD) 9/8/2014     HTN (hypertension) 9/8/2014     Hyperlipidemia LDL goal <160 9/8/2014     IFG (impaired fasting glucose) 9/8/2014     PONV (postoperative nausea and vomiting)      Past Surgical History:   Procedure Laterality Date     APPENDECTOMY  1952     cholecystitis       choledocolithiasis       COLECTOMY WITH COLOSTOMY, COMBINED N/A 4/22/2016    Procedure: COMBINED COLECTOMY WITH COLOSTOMY;  Surgeon: Shana Alaniz MD;  Location:  OR     HYSTERECTOMY  1987     LAPAROSCOPIC CHOLECYSTECTOMY  2008     LAPAROTOMY EXPLORATORY N/A 4/22/2016    Procedure: LAPAROTOMY EXPLORATORY;  Surgeon: Shana Alaniz MD;  Location:  OR     Current Outpatient Prescriptions   Medication Sig Dispense Refill     fluconazole (DIFLUCAN) 150 MG tablet Take 1 tablet (150 mg) by mouth once for 1 dose 1 tablet 0     fish oil-omega-3 fatty acids 1000 MG capsule Indications: PN:         cefdinir (OMNICEF) 300 MG capsule Take 1 capsule (300 mg) by mouth 2 times daily 14 capsule 0     Atorvastatin Calcium (LIPITOR PO) Take 20 mg by mouth daily       nystatin (MYCOSTATIN) 562386 UNIT/GM POWD Apply topically 3 times daily 60 g 3     metoprolol (LOPRESSOR) 25 MG tablet Take 0.5 tablets (12.5 mg) by mouth 2 times daily 180 tablet 3     omeprazole (PRILOSEC) 20 MG capsule Take 1 capsule (20 mg) by mouth daily 90 capsule 3     ACETAMINOPHEN PO Take 500 mg by mouth every 4 hours as needed for pain Reported on 4/10/2017       loperamide (IMODIUM) 2 MG capsule Take 1 capsule (2 mg) by mouth 2 times daily 20 capsule      conjugated estrogens (PREMARIN) vaginal cream Place 0.5 g vaginally three times a week 30 g 6     Multiple  "Minerals-Vitamins (CALCIUM CITRATE PLUS/MAGNESIUM) TABS Take 1 tablet by mouth daily        OTC products: None, except as noted above    Allergies   Allergen Reactions     Bactrim [Sulfamethoxazole W/Trimethoprim] GI Disturbance     Vomiting     Codeine      Metronidazole      GI distubance     Sulfa Drugs      Sulfur      Versed [Midazolam]      vomited      Latex Allergy: NO    Social History   Substance Use Topics     Smoking status: Never Smoker     Smokeless tobacco: Never Used     Alcohol use Yes      Comment: socially     History   Drug Use No       REVIEW OF SYSTEMS:                                                     ROS: 10 point ROS neg other than the symptoms noted above in the HPI.      EXAM:                                                    /70 (BP Location: Right arm, Patient Position: Right side, Cuff Size: Adult Regular)  Pulse 73  Temp 98.1  F (36.7  C) (Tympanic)  Ht 5' 1.5\" (1.562 m)  Wt 144 lb (65.3 kg)  SpO2 98%  BMI 26.77 kg/m2    GENERAL APPEARANCE: healthy, alert and no distress     EYES: EOMI, PERRL     HENT: ear canals and TM's normal and nose and mouth without ulcers or lesions     NECK: no adenopathy, no asymmetry, masses, or scars and thyroid normal to palpation     RESP: lungs clear to auscultation - no rales, rhonchi or wheezes     CV: regular rates and rhythm, normal S1 S2, no S3 or S4 and no murmur, click or rub     ABDOMEN: soft, +BS, non-tender, non-distended, ostomy bag in place     MS: extremities normal- no gross deformities noted, no evidence of inflammation in joints     SKIN: no suspicious lesions or rashes     NEURO: Normal strength and tone, sensory exam grossly normal, mentation intact and speech normal     PSYCH: mentation appears normal. and affect normal/bright    DIAGNOSTICS:                                                    No labs or EKG required for low risk surgery (cataract, skin procedure, breast biopsy, etc)    Recent Labs   Lab Test  10/27/16   " 1019  08/21/16   1250  07/25/16   1057   05/09/16   0545   05/02/16   0530   04/21/16   0600   HGB   --   12.7  11.9   < >   --    < >   --    < >  11.4*   PLT   --   218  201   < >   --    < >   --    < >  215   INR   --    --    --    --   1.18*   --   1.32*   < >  1.15*   NA  136  131*  135   < >  135   < >  136   < >  135   POTASSIUM  4.0  4.1  4.5   < >  4.1   < >  3.5   < >  3.5   CR  1.10*  0.92  0.91   < >  0.69   < >  0.90   < >  1.29*   A1C  5.9   --    --    --    --    --    --    --   6.0    < > = values in this interval not displayed.        IMPRESSION:                                                    Reason for surgery/procedure: cataracts corrective surgery    The proposed surgical procedure is considered LOW risk.    REVISED CARDIAC RISK INDEX  The patient has the following serious cardiovascular risks for perioperative complications such as (MI, PE, VFib and 3  AV Block):  No serious cardiac risks  INTERPRETATION: 0 risks: Class I (very low risk - 0.4% complication rate)    The patient has the following additional risks for perioperative complications:  History of anesthesia reaction to versed.  Multiple drug allergies      ICD-10-CM    1. Preop general physical exam Z01.818    2. Cataract H26.9    3. Acute cystitis with hematuria N30.01 *UA reflex to Microscopic and Culture ([place] [facility] (except [place] and [place])     Urine Microscopic    Currently undergoing treatment for UTI - anticipate complete resolution by time of surgery.  Repeat UA today to evaluate for resolution of microscopic hematuria.   4. Candidiasis of vulva and vagina B37.3 fluconazole (DIFLUCAN) 150 MG tablet       RECOMMENDATIONS:                                                        Cardiovascular Risk  Performs 4 METs exercise without symptoms (Light housework (dusting, washing dishes) and Walk on level ground at 15 minutes per mile (4 miles/hour)) .   Patient is already on a Beta Blocker. Continue  Betablocker therapy after surgery, using Beta blocker order set as necessary for NPO status.      Patient is to hold all medications the morning of surgery except her metoprolol - she has been advised.    APPROVAL GIVEN to proceed with proposed procedure, without further diagnostic evaluation       Signed Electronically by: Hien Booth MD    Copy of this evaluation report is provided to requesting physician.    [place] Preop Guidelines

## 2017-05-08 NOTE — H&P (VIEW-ONLY)
[facility]  [address]  [address]  [place] [address]  [phone]  Dept: [phone]    PRE-OP EVALUATION:  Today's date: 2017    Gely Keene (: 1940) presents for pre-operative evaluation assessment as requested by Eloy Mendoza .  She requires evaluation and anesthesia risk assessment prior to undergoing surgery/procedure for treatment of PHACOEMULSIFICATION CLEAR CORNEA WITH STANDARD INTRAOCULAR LENS IMPLANT  .  Proposed procedure: LEFT EYE PHACOEMULSIFICATION CLEAR CORNEA WITH STANDARD INTRAOCULAR LENS IMPLANT (MAC/TOPICAL) AND RIGHT EYE PHACOEMULSIFICATION CLEAR CORNEA WITH STANDARD INTRAOCULAR LENS IMPLANT (MAC/TOPICAL)    Date of Surgery/ Procedure: 17 and 17  Time of Surgery/ Procedure: 1:10 AND 1:30  Hospital/Surgical Facility: [facility]  Fax number for surgical facility: [phone]  Primary Physician: Hien Booth  Type of Anesthesia Anticipated: Combined MAC with Topical     Patient has a Health Care Directive or Living Will:  YES pt will bring copy     1. NO - Do you have a history of heart attack, stroke, stent, bypass or surgery on an artery in the head, neck, heart or legs?  2. YES - DO YOU EVER HAVE ANY PAIN OR DISCOMFORT IN YOUR CHEST? Slight pain very rarely   3. NO - Do you have a history of  Heart Failure?  4. YES - ARE YOUR TROUBLED BY SHORTNESS OF BREATH WHEN WALKING ON THE LEVEL, UP A SLIGHT HILL OR AT NIGHT? SOB walking up stairs   5. NO - Do you currently have a cold, bronchitis or other respiratory infection?  6. NO - Do you have a cough, shortness of breath or wheezing?  7. NO - Do you sometimes get pains in the calves of your legs when you walk?  8. NO - Do you or anyone in your family have previous history of blood clots?  9. NO - Do you or does anyone in your family have a serious bleeding problem such as prolonged bleeding following surgeries or cuts?  10. YES - HAVE YOU EVER HAD  PROBLEMS WITH ANEMIA OR BEEN TOLD TO TAKE IRON PILLS? When pt was pregnant with twins   11. NO - Have you had any abnormal blood loss such as black, tarry or bloody stools, or abnormal vaginal bleeding?  12. NO - Have you ever had a blood transfusion?  13. NO - Have you or any of your relatives ever had problems with anesthesia?  14. NO - Do you have sleep apnea, excessive snoring or daytime drowsiness?  15. NO - Do you have any prosthetic heart valves?  16. NO - Do you have prosthetic joints?  17. NO - Is there any chance that you may be pregnant?      HPI:                                                      Brief HPI related to upcoming procedure: cataracts requiring surgical correction    Versed reaction in the past - vomiting - wanted to make sure this was noted.    Recent UTI - now on omnicef and symptoms are improving.   Having some vaginal irritation.      Walking and goes up and down the stairs without difficulty.  Staying active.   No side effects from her medications.    S/p total colectomy with ostomy in place after severe illness 4/16.  Doing well - current good nutritional status.      Blood pressure has been well controlled with no recent cardiac symptoms.  Tolerating medications well without side effects.    Hyperlipidemia well controlled on statin.      MEDICAL HISTORY:                                                      Patient Active Problem List    Diagnosis Date Noted     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) 11/07/2016     Priority: Medium     Health Care Home 05/23/2016     Priority: Medium              S/p total colectomy on 4/22/16 by Shana Alaniz MD 05/10/2016     Priority: Medium     Restless legs syndrome (RLS) 05/10/2016     Priority: Medium     Anxiety 05/10/2016     Priority: Medium     Gastroesophageal reflux disease, esophagitis presence not specified 11/04/2015     Priority: Medium     Hypertension goal BP (blood pressure) < 140/90 10/30/2014     Priority:  Medium     IFG (impaired fasting glucose) 10/30/2014     Priority: Medium     BCC (basal cell carcinoma) 10/30/2014     Priority: Medium     Hyperlipidemia LDL goal <160 09/08/2014     Priority: Medium      Past Medical History:   Diagnosis Date     BCC (basal cell carcinoma) 10/30/2014     Esophageal reflux (GERD) 9/8/2014     HTN (hypertension) 9/8/2014     Hyperlipidemia LDL goal <160 9/8/2014     IFG (impaired fasting glucose) 9/8/2014     PONV (postoperative nausea and vomiting)      Past Surgical History:   Procedure Laterality Date     APPENDECTOMY  1952     cholecystitis       choledocolithiasis       COLECTOMY WITH COLOSTOMY, COMBINED N/A 4/22/2016    Procedure: COMBINED COLECTOMY WITH COLOSTOMY;  Surgeon: Shana Alaniz MD;  Location:  OR     HYSTERECTOMY  1987     LAPAROSCOPIC CHOLECYSTECTOMY  2008     LAPAROTOMY EXPLORATORY N/A 4/22/2016    Procedure: LAPAROTOMY EXPLORATORY;  Surgeon: Shana Alaniz MD;  Location:  OR     Current Outpatient Prescriptions   Medication Sig Dispense Refill     fluconazole (DIFLUCAN) 150 MG tablet Take 1 tablet (150 mg) by mouth once for 1 dose 1 tablet 0     fish oil-omega-3 fatty acids 1000 MG capsule Indications: PN:         cefdinir (OMNICEF) 300 MG capsule Take 1 capsule (300 mg) by mouth 2 times daily 14 capsule 0     Atorvastatin Calcium (LIPITOR PO) Take 20 mg by mouth daily       nystatin (MYCOSTATIN) 013768 UNIT/GM POWD Apply topically 3 times daily 60 g 3     metoprolol (LOPRESSOR) 25 MG tablet Take 0.5 tablets (12.5 mg) by mouth 2 times daily 180 tablet 3     omeprazole (PRILOSEC) 20 MG capsule Take 1 capsule (20 mg) by mouth daily 90 capsule 3     ACETAMINOPHEN PO Take 500 mg by mouth every 4 hours as needed for pain Reported on 4/10/2017       loperamide (IMODIUM) 2 MG capsule Take 1 capsule (2 mg) by mouth 2 times daily 20 capsule      conjugated estrogens (PREMARIN) vaginal cream Place 0.5 g vaginally three times a week 30 g 6     Multiple  "Minerals-Vitamins (CALCIUM CITRATE PLUS/MAGNESIUM) TABS Take 1 tablet by mouth daily        OTC products: None, except as noted above    Allergies   Allergen Reactions     Bactrim [Sulfamethoxazole W/Trimethoprim] GI Disturbance     Vomiting     Codeine      Metronidazole      GI distubance     Sulfa Drugs      Sulfur      Versed [Midazolam]      vomited      Latex Allergy: NO    Social History   Substance Use Topics     Smoking status: Never Smoker     Smokeless tobacco: Never Used     Alcohol use Yes      Comment: socially     History   Drug Use No       REVIEW OF SYSTEMS:                                                     ROS: 10 point ROS neg other than the symptoms noted above in the HPI.      EXAM:                                                    /70 (BP Location: Right arm, Patient Position: Right side, Cuff Size: Adult Regular)  Pulse 73  Temp 98.1  F (36.7  C) (Tympanic)  Ht 5' 1.5\" (1.562 m)  Wt 144 lb (65.3 kg)  SpO2 98%  BMI 26.77 kg/m2    GENERAL APPEARANCE: healthy, alert and no distress     EYES: EOMI, PERRL     HENT: ear canals and TM's normal and nose and mouth without ulcers or lesions     NECK: no adenopathy, no asymmetry, masses, or scars and thyroid normal to palpation     RESP: lungs clear to auscultation - no rales, rhonchi or wheezes     CV: regular rates and rhythm, normal S1 S2, no S3 or S4 and no murmur, click or rub     ABDOMEN: soft, +BS, non-tender, non-distended, ostomy bag in place     MS: extremities normal- no gross deformities noted, no evidence of inflammation in joints     SKIN: no suspicious lesions or rashes     NEURO: Normal strength and tone, sensory exam grossly normal, mentation intact and speech normal     PSYCH: mentation appears normal. and affect normal/bright    DIAGNOSTICS:                                                    No labs or EKG required for low risk surgery (cataract, skin procedure, breast biopsy, etc)    Recent Labs   Lab Test  10/27/16   " 1019  08/21/16   1250  07/25/16   1057   05/09/16   0545   05/02/16   0530   04/21/16   0600   HGB   --   12.7  11.9   < >   --    < >   --    < >  11.4*   PLT   --   218  201   < >   --    < >   --    < >  215   INR   --    --    --    --   1.18*   --   1.32*   < >  1.15*   NA  136  131*  135   < >  135   < >  136   < >  135   POTASSIUM  4.0  4.1  4.5   < >  4.1   < >  3.5   < >  3.5   CR  1.10*  0.92  0.91   < >  0.69   < >  0.90   < >  1.29*   A1C  5.9   --    --    --    --    --    --    --   6.0    < > = values in this interval not displayed.        IMPRESSION:                                                    Reason for surgery/procedure: cataracts corrective surgery    The proposed surgical procedure is considered LOW risk.    REVISED CARDIAC RISK INDEX  The patient has the following serious cardiovascular risks for perioperative complications such as (MI, PE, VFib and 3  AV Block):  No serious cardiac risks  INTERPRETATION: 0 risks: Class I (very low risk - 0.4% complication rate)    The patient has the following additional risks for perioperative complications:  History of anesthesia reaction to versed.  Multiple drug allergies      ICD-10-CM    1. Preop general physical exam Z01.818    2. Cataract H26.9    3. Acute cystitis with hematuria N30.01 *UA reflex to Microscopic and Culture ([place] [facility] (except [place] and [place])     Urine Microscopic    Currently undergoing treatment for UTI - anticipate complete resolution by time of surgery.  Repeat UA today to evaluate for resolution of microscopic hematuria.   4. Candidiasis of vulva and vagina B37.3 fluconazole (DIFLUCAN) 150 MG tablet       RECOMMENDATIONS:                                                        Cardiovascular Risk  Performs 4 METs exercise without symptoms (Light housework (dusting, washing dishes) and Walk on level ground at 15 minutes per mile (4 miles/hour)) .   Patient is already on a Beta Blocker. Continue  Betablocker therapy after surgery, using Beta blocker order set as necessary for NPO status.      Patient is to hold all medications the morning of surgery except her metoprolol - she has been advised.    APPROVAL GIVEN to proceed with proposed procedure, without further diagnostic evaluation       Signed Electronically by: Hien Booth MD    Copy of this evaluation report is provided to requesting physician.    [place] Preop Guidelines

## 2017-05-09 ENCOUNTER — ANESTHESIA EVENT (OUTPATIENT)
Dept: SURGERY | Facility: CLINIC | Age: 77
End: 2017-05-09
Payer: MEDICARE

## 2017-05-09 ENCOUNTER — SURGERY (OUTPATIENT)
Age: 77
End: 2017-05-09

## 2017-05-09 ENCOUNTER — ANESTHESIA (OUTPATIENT)
Dept: SURGERY | Facility: CLINIC | Age: 77
End: 2017-05-09
Payer: MEDICARE

## 2017-05-09 ENCOUNTER — HOSPITAL ENCOUNTER (OUTPATIENT)
Facility: CLINIC | Age: 77
Discharge: HOME | End: 2017-05-09
Attending: OPHTHALMOLOGY | Admitting: OPHTHALMOLOGY
Payer: MEDICARE

## 2017-05-09 VITALS
RESPIRATION RATE: 16 BRPM | BODY MASS INDEX: 26.5 KG/M2 | HEIGHT: 62 IN | SYSTOLIC BLOOD PRESSURE: 153 MMHG | OXYGEN SATURATION: 98 % | DIASTOLIC BLOOD PRESSURE: 69 MMHG | TEMPERATURE: 97.6 F | WEIGHT: 144 LBS | HEART RATE: 67 BPM

## 2017-05-09 PROCEDURE — 71000028 ZZH EYE RECOVERY PHASE 2 EACH 15 MINS: Performed by: OPHTHALMOLOGY

## 2017-05-09 PROCEDURE — 27210794 ZZH OR GENERAL SUPPLY STERILE: Performed by: OPHTHALMOLOGY

## 2017-05-09 PROCEDURE — 40000170 ZZH STATISTIC PRE-PROCEDURE ASSESSMENT II: Performed by: OPHTHALMOLOGY

## 2017-05-09 PROCEDURE — 25000128 H RX IP 250 OP 636: Performed by: NURSE ANESTHETIST, CERTIFIED REGISTERED

## 2017-05-09 PROCEDURE — 37000008 ZZH ANESTHESIA TECHNICAL FEE, 1ST 30 MIN: Performed by: OPHTHALMOLOGY

## 2017-05-09 PROCEDURE — 25000125 ZZHC RX 250: Performed by: NURSE ANESTHETIST, CERTIFIED REGISTERED

## 2017-05-09 PROCEDURE — 25800025 ZZH RX 258: Performed by: NURSE ANESTHETIST, CERTIFIED REGISTERED

## 2017-05-09 PROCEDURE — 25000125 ZZHC RX 250: Performed by: ANESTHESIOLOGY

## 2017-05-09 PROCEDURE — V2632 POST CHMBR INTRAOCULAR LENS: HCPCS | Performed by: OPHTHALMOLOGY

## 2017-05-09 PROCEDURE — 25800025 ZZH RX 258: Performed by: ANESTHESIOLOGY

## 2017-05-09 PROCEDURE — 25000125 ZZHC RX 250: Performed by: OPHTHALMOLOGY

## 2017-05-09 PROCEDURE — 36000101 ZZH EYE SURGERY LEVEL 3 1ST 30 MIN: Performed by: OPHTHALMOLOGY

## 2017-05-09 DEVICE — EYE IMP IOL AMO PCL TECNIS ZCB00 20.5: Type: IMPLANTABLE DEVICE | Site: EYE | Status: FUNCTIONAL

## 2017-05-09 RX ORDER — PHENYLEPHRINE HYDROCHLORIDE 25 MG/ML
1 SOLUTION/ DROPS OPHTHALMIC
Status: COMPLETED | OUTPATIENT
Start: 2017-05-09 | End: 2017-05-09

## 2017-05-09 RX ORDER — PROPARACAINE HYDROCHLORIDE 5 MG/ML
1 SOLUTION/ DROPS OPHTHALMIC ONCE
Status: COMPLETED | OUTPATIENT
Start: 2017-05-09 | End: 2017-05-09

## 2017-05-09 RX ORDER — MOXIFLOXACIN 5 MG/ML
1 SOLUTION/ DROPS OPHTHALMIC
Status: COMPLETED | OUTPATIENT
Start: 2017-05-09 | End: 2017-05-09

## 2017-05-09 RX ORDER — BALANCED SALT SOLUTION 6.4; .75; .48; .3; 3.9; 1.7 MG/ML; MG/ML; MG/ML; MG/ML; MG/ML; MG/ML
SOLUTION OPHTHALMIC PRN
Status: DISCONTINUED | OUTPATIENT
Start: 2017-05-09 | End: 2017-05-09 | Stop reason: HOSPADM

## 2017-05-09 RX ORDER — SODIUM CHLORIDE, SODIUM LACTATE, POTASSIUM CHLORIDE, CALCIUM CHLORIDE 600; 310; 30; 20 MG/100ML; MG/100ML; MG/100ML; MG/100ML
500 INJECTION, SOLUTION INTRAVENOUS CONTINUOUS
Status: DISCONTINUED | OUTPATIENT
Start: 2017-05-09 | End: 2017-05-09 | Stop reason: HOSPADM

## 2017-05-09 RX ORDER — LIDOCAINE HYDROCHLORIDE 10 MG/ML
INJECTION, SOLUTION EPIDURAL; INFILTRATION; INTRACAUDAL; PERINEURAL PRN
Status: DISCONTINUED | OUTPATIENT
Start: 2017-05-09 | End: 2017-05-09 | Stop reason: HOSPADM

## 2017-05-09 RX ORDER — PREDNISOLONE ACETATE 1 %
SUSPENSION, DROPS(FINAL DOSAGE FORM)(ML) OPHTHALMIC (EYE) PRN
Status: DISCONTINUED | OUTPATIENT
Start: 2017-05-09 | End: 2017-05-09 | Stop reason: HOSPADM

## 2017-05-09 RX ORDER — PROPOFOL 10 MG/ML
INJECTION, EMULSION INTRAVENOUS PRN
Status: DISCONTINUED | OUTPATIENT
Start: 2017-05-09 | End: 2017-05-09

## 2017-05-09 RX ORDER — ONDANSETRON 2 MG/ML
INJECTION INTRAMUSCULAR; INTRAVENOUS PRN
Status: DISCONTINUED | OUTPATIENT
Start: 2017-05-09 | End: 2017-05-09

## 2017-05-09 RX ORDER — SODIUM CHLORIDE, SODIUM LACTATE, POTASSIUM CHLORIDE, CALCIUM CHLORIDE 600; 310; 30; 20 MG/100ML; MG/100ML; MG/100ML; MG/100ML
INJECTION, SOLUTION INTRAVENOUS CONTINUOUS PRN
Status: DISCONTINUED | OUTPATIENT
Start: 2017-05-09 | End: 2017-05-09

## 2017-05-09 RX ORDER — TROPICAMIDE 10 MG/ML
1 SOLUTION/ DROPS OPHTHALMIC
Status: COMPLETED | OUTPATIENT
Start: 2017-05-09 | End: 2017-05-09

## 2017-05-09 RX ORDER — DICLOFENAC SODIUM 1 MG/ML
1 SOLUTION/ DROPS OPHTHALMIC
Status: COMPLETED | OUTPATIENT
Start: 2017-05-09 | End: 2017-05-09

## 2017-05-09 RX ORDER — PROPARACAINE HYDROCHLORIDE 5 MG/ML
1 SOLUTION/ DROPS OPHTHALMIC ONCE
Status: DISCONTINUED | OUTPATIENT
Start: 2017-05-09 | End: 2017-05-09 | Stop reason: HOSPADM

## 2017-05-09 RX ADMIN — BALANCED SALT SOLUTION 15 ML: 6.4; .75; .48; .3; 3.9; 1.7 SOLUTION OPHTHALMIC at 13:16

## 2017-05-09 RX ADMIN — DICLOFENAC SODIUM 1 DROP: 1 SOLUTION/ DROPS OPHTHALMIC at 11:51

## 2017-05-09 RX ADMIN — LIDOCAINE HYDROCHLORIDE 1 ML: 10 INJECTION, SOLUTION EPIDURAL; INFILTRATION; INTRACAUDAL; PERINEURAL at 13:16

## 2017-05-09 RX ADMIN — LIDOCAINE HYDROCHLORIDE 1 ML: 10 INJECTION, SOLUTION EPIDURAL; INFILTRATION; INTRACAUDAL; PERINEURAL at 12:12

## 2017-05-09 RX ADMIN — EPINEPHRINE 500 ML: 1 INJECTION INTRAMUSCULAR; INTRAVENOUS; SUBCUTANEOUS at 13:16

## 2017-05-09 RX ADMIN — SODIUM CHLORIDE, POTASSIUM CHLORIDE, SODIUM LACTATE AND CALCIUM CHLORIDE: 600; 310; 30; 20 INJECTION, SOLUTION INTRAVENOUS at 13:05

## 2017-05-09 RX ADMIN — MOXIFLOXACIN HYDROCHLORIDE 1 DROP: 5 SOLUTION/ DROPS OPHTHALMIC at 12:03

## 2017-05-09 RX ADMIN — PHENYLEPHRINE HYDROCHLORIDE 1 DROP: 2.5 SOLUTION/ DROPS OPHTHALMIC at 12:03

## 2017-05-09 RX ADMIN — ONDANSETRON 4 MG: 2 INJECTION INTRAMUSCULAR; INTRAVENOUS at 13:23

## 2017-05-09 RX ADMIN — SODIUM CHLORIDE, POTASSIUM CHLORIDE, SODIUM LACTATE AND CALCIUM CHLORIDE 500 ML: 600; 310; 30; 20 INJECTION, SOLUTION INTRAVENOUS at 12:11

## 2017-05-09 RX ADMIN — DICLOFENAC SODIUM 1 DROP: 1 SOLUTION/ DROPS OPHTHALMIC at 12:03

## 2017-05-09 RX ADMIN — TROPICAMIDE 1 DROP: 10 SOLUTION/ DROPS OPHTHALMIC at 12:03

## 2017-05-09 RX ADMIN — DICLOFENAC SODIUM 1 DROP: 1 SOLUTION/ DROPS OPHTHALMIC at 11:56

## 2017-05-09 RX ADMIN — Medication 1 APPLICATOR: at 13:17

## 2017-05-09 RX ADMIN — MOXIFLOXACIN HYDROCHLORIDE 1 DROP: 5 SOLUTION/ DROPS OPHTHALMIC at 11:56

## 2017-05-09 RX ADMIN — TROPICAMIDE 1 DROP: 10 SOLUTION/ DROPS OPHTHALMIC at 11:51

## 2017-05-09 RX ADMIN — PHENYLEPHRINE HYDROCHLORIDE 1 DROP: 2.5 SOLUTION/ DROPS OPHTHALMIC at 11:51

## 2017-05-09 RX ADMIN — PHENYLEPHRINE HYDROCHLORIDE 1 DROP: 2.5 SOLUTION/ DROPS OPHTHALMIC at 11:56

## 2017-05-09 RX ADMIN — PROPARACAINE HYDROCHLORIDE 1 DROP: 5 SOLUTION/ DROPS OPHTHALMIC at 11:51

## 2017-05-09 RX ADMIN — PROPOFOL 20 MG: 10 INJECTION, EMULSION INTRAVENOUS at 13:09

## 2017-05-09 RX ADMIN — MOXIFLOXACIN HYDROCHLORIDE 1 DROP: 5 SOLUTION/ DROPS OPHTHALMIC at 11:51

## 2017-05-09 RX ADMIN — LIDOCAINE HYDROCHLORIDE 2 DROP: 35 GEL OPHTHALMIC at 13:16

## 2017-05-09 RX ADMIN — Medication 1 DROP: at 13:25

## 2017-05-09 RX ADMIN — PROPOFOL 10 MG: 10 INJECTION, EMULSION INTRAVENOUS at 13:14

## 2017-05-09 RX ADMIN — TROPICAMIDE 1 DROP: 10 SOLUTION/ DROPS OPHTHALMIC at 11:56

## 2017-05-09 NOTE — IP AVS SNAPSHOT
[facility]    [address]    [place] [address]    Phone:  [phone]    Fax:  [phone]                                       After Visit Summary   5/9/2017    Gely Keene    MRN: [id number]           After Visit Summary Signature Page     I have received my discharge instructions, and my questions have been answered. I have discussed any challenges I see with this plan with the nurse or doctor.    ..........................................................................................................................................  Patient/Patient Representative Signature      ..........................................................................................................................................  Patient Representative Print Name and Relationship to Patient    ..................................................               ................................................  Date                                            Time    ..........................................................................................................................................  Reviewed by Signature/Title    ...................................................              ..............................................  Date                                                            Time

## 2017-05-09 NOTE — DISCHARGE INSTRUCTIONS
[facility]  Cataract Surgery Discharge Instructions  [facility] MD JOHN Romero MD J. Hasan, MD C. Nichols, MD J. O'Neill, MD S. Schaefer, MD J. Stephens, MD        Start using drops when you arrive at home today    Vigamox (Tan top) - one drop in surgical eye 3 times  per day until gone.    Prednisolone acetate 1.0% (pink  top) - one drop in surgical eye 3 times per day until gone.    Diclofenac (Grey top) - one drop in surgical eye 3 times per day until gone.        Place shield over surgical eye at bedtime for 3 nights.      The eye will feel itchy, scratchy, and vision will be blurred, you may take Tylenol for the scratchy feeling if this is bothersome.      No eye rubbing or swimming for I week.      You may resume all prescription medications as directed by your primary doctor.      Call if increasing pain, progressively worsening vision or worsening redness of surgical eye.      On-call doctor can be reached at [phone].      [facility] Discharge  Instructions  Anesthesia ([facility])   Adult Discharge Instructions    For 24 hours after surgery    1. Get plenty of rest.  Make arrangements to have a responsible adult stay with you for at least 6 hours after you leave the hospital.  2. Do not drive or use heavy equipment for 24 hours.    3. Do not drink alcohol for 24 hours.  4. Do not sign legal documents or make important decisions for 24 hours.  5. Avoid strenuous or risky activities. You may feel lightheaded.  If so, sit for a few minutes before standing.  Have someone help you get up.   6. Conscious sedation patients may resume a regular diet..  7. Any questions of medical nature, call your physician.

## 2017-05-09 NOTE — ANESTHESIA POSTPROCEDURE EVALUATION
Patient: Gely Keene    Procedure(s):  LEFT EYE PHACOEMULSIFICATION CLEAR CORNEA WITH STANDARD INTRAOCULAR LENS IMPLANT  - Wound Class: I-Clean    Diagnosis:LEFT EYE CATARACT  Diagnosis Additional Information: No value filed.    Anesthesia Type:  MAC    Note:  Anesthesia Post Evaluation    Patient location during evaluation: PACU  Patient participation: Able to fully participate in evaluation  Level of consciousness: awake  Pain management: adequate  Airway patency: patent  Cardiovascular status: acceptable  Respiratory status: acceptable  Hydration status: acceptable  PONV: controlled     Anesthetic complications: None          Last vitals:  Vitals:    05/09/17 1212 05/09/17 1335 05/09/17 1348   BP: 142/78 164/66 153/69   Pulse: 67     Resp: 16 16 16   Temp: 36.4  C (97.6  F)     SpO2: 100% 99% 98%         Electronically Signed By: Candice Harris MD  May 9, 2017  2:25 PM

## 2017-05-09 NOTE — IP AVS SNAPSHOT
MRN:[id number]                      After Visit Summary   5/9/2017    Gely Keene    MRN: [id number]           Thank you!     Thank you for choosing [facility] for your care. Our goal is always to provide you with excellent care. Hearing back from our patients is one way we can continue to improve our services. Please take a few minutes to complete the written survey that you may receive in the mail after you visit with us. Thank you!        Patient Information     Date Of Birth          1940        About your hospital stay     You were admitted on:  May 9, 2017 You last received care in the:  [facility]    You were discharged on:  May 9, 2017       Who to Call     For medical emergencies, please call 911.  For non-urgent questions about your medical care, please call your primary care provider or clinic, [phone]  For questions related to your surgery, please call your surgery clinic        Attending Provider     Provider Eloy Mccabe MD Ophthalmology       Primary Care Provider Office Phone # Fax #    Hien Booth [phone]       [facility] [address] [place] [address] [address] [place] [address]        Your next 10 appointments already scheduled     May 23, 2017   Procedure with Eloy Eric MD   [facility] PeriOP Services (--)    [address]  [facility] [id number] [phone]              Further instructions from your care team       [facility]  Cataract Surgery Discharge Instructions  [facility] MD JOHN Romero MD J. Hasan, MD C. Nichols, MD J. O'Neill, MD S. Schaefer, MD J. Stephens, MD        Start using drops when you arrive at home today    Vigamox (Tan top) - one drop in surgical eye 3 times  per day until gone.    Prednisolone acetate 1.0% (pink  top) - one drop in  "surgical eye 3 times per day until gone.    Diclofenac (Grey top) - one drop in surgical eye 3 times per day until gone.        Place shield over surgical eye at bedtime for 3 nights.      The eye will feel itchy, scratchy, and vision will be blurred, you may take Tylenol for the scratchy feeling if this is bothersome.      No eye rubbing or swimming for I week.      You may resume all prescription medications as directed by your primary doctor.      Call if increasing pain, progressively worsening vision or worsening redness of surgical eye.      On-call doctor can be reached at [phone].      [facility] Discharge  Instructions  Anesthesia (Eye Care Center)   Adult Discharge Instructions    For 24 hours after surgery    1. Get plenty of rest.  Make arrangements to have a responsible adult stay with you for at least 6 hours after you leave the hospital.  2. Do not drive or use heavy equipment for 24 hours.    3. Do not drink alcohol for 24 hours.  4. Do not sign legal documents or make important decisions for 24 hours.  5. Avoid strenuous or risky activities. You may feel lightheaded.  If so, sit for a few minutes before standing.  Have someone help you get up.   6. Conscious sedation patients may resume a regular diet..  7. Any questions of medical nature, call your physician.    Pending Results     No orders found from 5/7/2017 to 5/10/2017.            Admission Information     Date & Time Provider Department Dept. Phone    5/9/2017 Eloy Eric MD [facility] [phone]      Your Vitals Were     Blood Pressure Pulse Temperature Respirations Height Weight    164/66 67 97.6  F (36.4  C) (Temporal) 16 1.562 m (5' 1.5\") 65.3 kg (144 lb)    Pulse Oximetry BMI (Body Mass Index)                99% 26.77 kg/m2          MyChart Information     [facility] lets you send messages to your doctor, view your test results, renew your prescriptions, schedule " "appointments and more. To sign up, go to [email or web address] . Click on \"Log in\" on the left side of the screen, which will take you to the Welcome page. Then click on \"Sign up Now\" on the right side of the page.     You will be asked to enter the access code listed below, as well as some personal information. Please follow the directions to create your username and password.     Your access code is: [email or web address]  Expires: 2017  5:35 PM     Your access code will  in 90 days. If you need help or a new code, please call your [place] clinic or [phone].        Care EveryWhere ID     This is your Care EveryWhere ID. This could be used by other organizations to access your [facility] [place] medical records  [id number]           Review of your medicines      CONTINUE these medicines which have NOT CHANGED        Dose / Directions    ACETAMINOPHEN PO        Dose:  500 mg   Take 500 mg by mouth every 4 hours as needed for pain Reported on 4/10/2017   Refills:  0       CALCIUM CITRATE PLUS/MAGNESIUM Tabs        Dose:  1 tablet   Take 1 tablet by mouth daily   Refills:  0       cefdinir 300 MG capsule   Commonly known as:  OMNICEF   Used for:  Acute cystitis with hematuria        Dose:  300 mg   Take 1 capsule (300 mg) by mouth 2 times daily   Quantity:  14 capsule   Refills:  0       conjugated estrogens cream   Commonly known as:  PREMARIN   Used for:  Atrophic vaginitis        Dose:  0.5 g   Place 0.5 g vaginally three times a week   Quantity:  30 g   Refills:  6       fish oil-omega-3 fatty acids 1000 MG capsule        Indications: PN:   Refills:  0       LIPITOR PO        Dose:  20 mg   Take 20 mg by mouth daily   Refills:  0       loperamide 2 MG capsule   Commonly known as:  IMODIUM   Used for:  Colitis        Dose:  2 mg   Take 1 capsule (2 mg) by mouth 2 times daily   Quantity:  20 capsule   Refills:  0       metoprolol 25 MG tablet   Commonly known as:  LOPRESSOR   Used for:  Sinus tachycardia        " Dose:  12.5 mg   Take 0.5 tablets (12.5 mg) by mouth 2 times daily   Quantity:  180 tablet   Refills:  3       nystatin 916586 UNIT/GM Powd   Commonly known as:  MYCOSTATIN   Used for:  Fungal infection        Apply topically 3 times daily   Quantity:  60 g   Refills:  3       omeprazole 20 MG CR capsule   Commonly known as:  priLOSEC   Used for:  Gastroesophageal reflux disease, esophagitis presence not specified        Dose:  20 mg   Take 1 capsule (20 mg) by mouth daily   Quantity:  90 capsule   Refills:  3                Protect others around you: Learn how to safely use, store and throw away your medicines at www.disposemymeds.org.             Medication List: This is a list of all your medications and when to take them. Check marks below indicate your daily home schedule. Keep this list as a reference.      Medications           Morning Afternoon Evening Bedtime As Needed    ACETAMINOPHEN PO   Take 500 mg by mouth every 4 hours as needed for pain Reported on 4/10/2017                                CALCIUM CITRATE PLUS/MAGNESIUM Tabs   Take 1 tablet by mouth daily                                cefdinir 300 MG capsule   Commonly known as:  OMNICEF   Take 1 capsule (300 mg) by mouth 2 times daily                                conjugated estrogens cream   Commonly known as:  PREMARIN   Place 0.5 g vaginally three times a week                                fish oil-omega-3 fatty acids 1000 MG capsule   Indications: PN:                                LIPITOR PO   Take 20 mg by mouth daily                                loperamide 2 MG capsule   Commonly known as:  IMODIUM   Take 1 capsule (2 mg) by mouth 2 times daily                                metoprolol 25 MG tablet   Commonly known as:  LOPRESSOR   Take 0.5 tablets (12.5 mg) by mouth 2 times daily                                nystatin [id number] UNIT/GM Powd   Commonly known as:  MYCOSTATIN   Apply topically 3 times daily                                 omeprazole 20 MG CR capsule   Commonly known as:  priLOSEC   Take 1 capsule (20 mg) by mouth daily

## 2017-05-09 NOTE — ANESTHESIA CARE TRANSFER NOTE
Patient: Gely Keene    Procedure(s):  LEFT EYE PHACOEMULSIFICATION CLEAR CORNEA WITH STANDARD INTRAOCULAR LENS IMPLANT  - Wound Class: I-Clean    Diagnosis: LEFT EYE CATARACT  Diagnosis Additional Information: No value filed.    Anesthesia Type:   MAC     Note:  Airway :Room Air  Patient transferred to:PACU  Comments: Patient awake.   Vital signs stable. Patient transferred to recovery, IV patent. Report given to RN.      Vitals: (Last set prior to Anesthesia Care Transfer)    CRNA VITALS  5/9/2017 1256 - 5/9/2017 1328      5/9/2017             Pulse: 64    Ht Rate: 64    SpO2: 100 %    Resp Rate (set): 10                Electronically Signed By: JUNIOR Ortiz CRNA  May 9, 2017  1:28 PM

## 2017-05-09 NOTE — OP NOTE
PREOPERATIVE DIAGNOSIS: Cataract, Left eye.   POSTOPERATIVE DIAGNOSIS: Cataract, Left eye.   OPERATION: Phacoemulsification with implantation of posterior chamber intraocular lens, Left eye.   ANESTHESIA: Monitored anesthesia care.   INDICATIONS FOR SURGERY: Gely Keene has noted a progressive decline in the vision of her Left eye secondary to a cataract. This has affected her ability to perform routine functions including reading. The patient has progressive cataract changes consistent with her vision and symptoms.     PROCEDURE: Informed consent was obtained from the patient preoperatively with the risks and alternatives reviewed, including the possibility of loss of vision. In the preoperative area, the patient was administered topical anesthetic consisting of 2% Xylocaine jelly. The patient was taken to the operating room. The face was prepped and draped in the usual sterile fashion. Attention was directed to the Left eye. A stab incision was made at the limbus with a 15-degree blade. Viscoat was used to replace aqueous. A keratome was used to make a limbal self-sealing incision 2.5 mm in diameter. A curvilinear capsulorrhexis was performed with the Utrata forceps. Hydrodissection was carried out. The nucleus was removed with the phacoemulsification handpiece in a four-quadrant cracking technique. The cortex was removed with the irrigation and aspiration handpiece. The posterior capsule remained intact. Provisc was used to inflate the capsular bag. A posterior chamber intraocular lens was taken from its case and inspected. It was free of defects and it was folded into the shooter. The lens was then injected into the eye by directing the leading haptic into the capsular bag. The trailing haptic was then placed in the eye with a haptic . The lens centered well. The Provisc was removed from the eye with the I&A handpiece. The eye was inflated with balanced salt solution. The wound was inspected  and found to be watertight. Topical Vigamox and Pred Forte were applied. An eye shield was placed over the eye. The patient tolerated the procedure well and left the operating area in good condition.       Implant Name Type Inv. Item Serial No.  Lot No. LRB No. Used   EYE IMP IOL PAUL PCL TECNIS ZCB00 20.5 Lens/Eye Implant EYE IMP IOL PAUL PCL TECNIS ZCB00 20.5 [id number] [employer]   Left 1       Eloy Eric M.D.

## 2017-05-09 NOTE — ANESTHESIA PREPROCEDURE EVALUATION
Anesthesia Evaluation     . Pt has had prior anesthetic.     History of anesthetic complications   - PONV        ROS/MED HX    ENT/Pulmonary: Comment: Chronic cough     (-) sleep apnea   Neurologic: Comment: RLS      Cardiovascular:     (+) Dyslipidemia, hypertension----. : . . . :. .      (-) MORRISSEY   METS/Exercise Tolerance:     Hematologic:         Musculoskeletal:         GI/Hepatic: Comment: S/p colectomy      (+) GERD Asymptomatic on medication, Inflammatory bowel disease,       Renal/Genitourinary:         Endo: Comment: IFG        Psychiatric:     (+) psychiatric history anxiety      Infectious Disease:         Malignancy:         Other:                     Physical Exam      Airway   Mallampati: II  TM distance: >3 FB  Neck ROM: full    Dental   (+) caps    Cardiovascular   Rhythm and rate: regular      Pulmonary    breath sounds clear to auscultation                    Anesthesia Plan      History & Physical Review  History and physical reviewed and following examination; no interval change.    ASA Status:  2 .        Plan for MAC     No midazolam      Postoperative Care      Consents  Anesthetic plan, risks, benefits and alternatives discussed with:  Patient..                          .  Procedure: Procedure(s):  PHACOEMULSIFICATION CLEAR CORNEA WITH STANDARD INTRAOCULAR LENS IMPLANT  Preop diagnosis: LEFT EYE CATARACT    Allergies   Allergen Reactions     Bactrim [Sulfamethoxazole W/Trimethoprim] GI Disturbance     Vomiting     Codeine      Metronidazole      GI distubance     Sulfa Drugs      Sulfur      Versed [Midazolam]      vomited     Past Medical History:   Diagnosis Date     Anxiety      BCC (basal cell carcinoma of skin)      BCC (basal cell carcinoma) 10/30/2014     Esophageal reflux (GERD) 9/8/2014     GERD (gastroesophageal reflux disease)      HTN (hypertension) 9/8/2014     HTN (hypertension)      Hyperlipidemia      Hyperlipidemia LDL goal <160 9/8/2014     IFG (impaired fasting glucose)  9/8/2014     IFG (impaired fasting glucose)      PONV (postoperative nausea and vomiting)      Past Surgical History:   Procedure Laterality Date     APPENDECTOMY  1952     cholecystitis       choledocolithiasis       COLECTOMY WITH COLOSTOMY, COMBINED N/A 4/22/2016    Procedure: COMBINED COLECTOMY WITH COLOSTOMY;  Surgeon: Shana Alaniz MD;  Location: [facility] OR     HYSTERECTOMY  1987     LAPAROSCOPIC CHOLECYSTECTOMY  2008     LAPAROTOMY EXPLORATORY N/A 4/22/2016    Procedure: LAPAROTOMY EXPLORATORY;  Surgeon: Shana Alaniz MD;  Location: [facility] OR     Prior to Admission medications    Medication Sig Start Date End Date Taking? Authorizing Provider   fish oil-omega-3 fatty acids 1000 MG capsule Indications: PN:   8/9/05   Reported, Patient   cefdinir (OMNICEF) 300 MG capsule Take 1 capsule (300 mg) by mouth 2 times daily 4/28/17   Lilian Reeves PA-C   Atorvastatin Calcium (LIPITOR PO) Take 20 mg by mouth daily    Reported, Patient   nystatin (MYCOSTATIN) 150217 UNIT/GM POWD Apply topically 3 times daily 7/25/16   Hien Booth MD   metoprolol (LOPRESSOR) 25 MG tablet Take 0.5 tablets (12.5 mg) by mouth 2 times daily 6/15/16   Hien Booth MD   omeprazole (PRILOSEC) 20 MG capsule Take 1 capsule (20 mg) by mouth daily 5/25/16   Hien Booth MD   ACETAMINOPHEN PO Take 500 mg by mouth every 4 hours as needed for pain Reported on 4/10/2017    Reported, Patient   loperamide (IMODIUM) 2 MG capsule Take 1 capsule (2 mg) by mouth 2 times daily 5/9/16   Teresa Amos MD   conjugated estrogens (PREMARIN) vaginal cream Place 0.5 g vaginally three times a week 8/26/15   Valentin Da Silva MD   Multiple Minerals-Vitamins (CALCIUM CITRATE PLUS/MAGNESIUM) TABS Take 1 tablet by mouth daily  9/8/14   Hien Booth MD     Current Facility-Administered Medications Ordered in Epic   Medication Dose Route Frequency Last Rate Last Dose     proparacaine (ALCAINE) 0.5 % ophthalmic  solution 1 drop  1 drop Ophthalmic Once         phenylephrine (MYDFRIN /IRMA-SYNEPHRINE) 2.5 % ophthalmic solution 1 drop  1 drop Ophthalmic q5 Min Prior to Surgery         tropicamide (MYDRIACYL) 1 % ophthalmic solution 1 drop  1 drop Ophthalmic q5 Min Prior to Surgery         lidocaine (AKTEN) ophthalmic gel 0.5 mL  0.5 mL Ophthalmic Once         proparacaine (ALCAINE) 0.5 % ophthalmic solution 1 drop  1 drop Ophthalmic Once         moxifloxacin (VIGAMOX) 0.5 % ophthalmic solution 1 drop  1 drop Ophthalmic q5 Min Prior to Surgery         diclofenac (VOLTAREN) 0.1 % ophthalmic solution 1 drop  1 drop Ophthalmic q5 Min Prior to Surgery         povidone-iodine 5 % ophthalmic solution 1 drop  1 drop Ophthalmic Once         lidocaine 1 % 1 mL  1 mL Other Q1H PRN         sodium chloride (PF) 0.9% PF flush 3 mL  3 mL Intracatheter Q8H         lactated ringers infusion  500 mL Intravenous Continuous         No current Epic-ordered outpatient prescriptions on file.     Wt Readings from Last 1 Encounters:   05/04/17 65.3 kg (144 lb)     Temp Readings from Last 1 Encounters:   05/04/17 36.7  C (98.1  F) (Tympanic)     BP Readings from Last 6 Encounters:   05/04/17 114/70   04/28/17 130/60   03/20/17 128/80   11/03/16 112/62   09/16/16 110/70   08/29/16 142/62     Pulse Readings from Last 4 Encounters:   05/04/17 73   04/28/17 71   03/20/17 71   11/03/16 70     Resp Readings from Last 1 Encounters:   09/16/16 14     SpO2 Readings from Last 1 Encounters:   05/04/17 98%     Recent Labs   Lab Test  10/27/16   1019  08/21/16   1250   NA  136  131*   POTASSIUM  4.0  4.1   CHLORIDE  104  95   CO2  24  26   ANIONGAP  8  10   GLC  116*  145*   BUN  16  12   CR  1.10*  0.92   GRICEL  9.0  9.4     Recent Labs   Lab Test  08/21/16   1250  07/25/16   1057   WBC  7.7  5.6   HGB  12.7  11.9   PLT  218  201     Recent Labs   Lab Test  05/09/16   0545  05/02/16   0530   INR  1.18*  1.32*      RECENT LABS:   ECG:   ECHO:   CXR:

## 2017-05-22 NOTE — H&P (VIEW-ONLY)
[facility]  [address]  [address]  [place] [address]  [phone]  Dept: [phone]    PRE-OP EVALUATION:  Today's date: 2017    Gely Keene (: 1940) presents for pre-operative evaluation assessment as requested by Eloy Mendoza .  She requires evaluation and anesthesia risk assessment prior to undergoing surgery/procedure for treatment of PHACOEMULSIFICATION CLEAR CORNEA WITH STANDARD INTRAOCULAR LENS IMPLANT  .  Proposed procedure: LEFT EYE PHACOEMULSIFICATION CLEAR CORNEA WITH STANDARD INTRAOCULAR LENS IMPLANT (MAC/TOPICAL) AND RIGHT EYE PHACOEMULSIFICATION CLEAR CORNEA WITH STANDARD INTRAOCULAR LENS IMPLANT (MAC/TOPICAL)    Date of Surgery/ Procedure: 17 and 17  Time of Surgery/ Procedure: 1:10 AND 1:30  Hospital/Surgical Facility: [facility]  Fax number for surgical facility: [phone]  Primary Physician: Hien Booth  Type of Anesthesia Anticipated: Combined MAC with Topical     Patient has a Health Care Directive or Living Will:  YES pt will bring copy     1. NO - Do you have a history of heart attack, stroke, stent, bypass or surgery on an artery in the head, neck, heart or legs?  2. YES - DO YOU EVER HAVE ANY PAIN OR DISCOMFORT IN YOUR CHEST? Slight pain very rarely   3. NO - Do you have a history of  Heart Failure?  4. YES - ARE YOUR TROUBLED BY SHORTNESS OF BREATH WHEN WALKING ON THE LEVEL, UP A SLIGHT HILL OR AT NIGHT? SOB walking up stairs   5. NO - Do you currently have a cold, bronchitis or other respiratory infection?  6. NO - Do you have a cough, shortness of breath or wheezing?  7. NO - Do you sometimes get pains in the calves of your legs when you walk?  8. NO - Do you or anyone in your family have previous history of blood clots?  9. NO - Do you or does anyone in your family have a serious bleeding problem such as prolonged bleeding following surgeries or cuts?  10. YES - HAVE YOU EVER HAD  PROBLEMS WITH ANEMIA OR BEEN TOLD TO TAKE IRON PILLS? When pt was pregnant with twins   11. NO - Have you had any abnormal blood loss such as black, tarry or bloody stools, or abnormal vaginal bleeding?  12. NO - Have you ever had a blood transfusion?  13. NO - Have you or any of your relatives ever had problems with anesthesia?  14. NO - Do you have sleep apnea, excessive snoring or daytime drowsiness?  15. NO - Do you have any prosthetic heart valves?  16. NO - Do you have prosthetic joints?  17. NO - Is there any chance that you may be pregnant?      HPI:                                                      Brief HPI related to upcoming procedure: cataracts requiring surgical correction    Versed reaction in the past - vomiting - wanted to make sure this was noted.    Recent UTI - now on omnicef and symptoms are improving.   Having some vaginal irritation.      Walking and goes up and down the stairs without difficulty.  Staying active.   No side effects from her medications.    S/p total colectomy with ostomy in place after severe illness 4/16.  Doing well - current good nutritional status.      Blood pressure has been well controlled with no recent cardiac symptoms.  Tolerating medications well without side effects.    Hyperlipidemia well controlled on statin.      MEDICAL HISTORY:                                                      Patient Active Problem List    Diagnosis Date Noted     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) 11/07/2016     Priority: Medium     Health Care Home 05/23/2016     Priority: Medium              S/p total colectomy on 4/22/16 by Shana Alaniz MD 05/10/2016     Priority: Medium     Restless legs syndrome (RLS) 05/10/2016     Priority: Medium     Anxiety 05/10/2016     Priority: Medium     Gastroesophageal reflux disease, esophagitis presence not specified 11/04/2015     Priority: Medium     Hypertension goal BP (blood pressure) < 140/90 10/30/2014     Priority:  Medium     IFG (impaired fasting glucose) 10/30/2014     Priority: Medium     BCC (basal cell carcinoma) 10/30/2014     Priority: Medium     Hyperlipidemia LDL goal <160 09/08/2014     Priority: Medium      Past Medical History:   Diagnosis Date     BCC (basal cell carcinoma) 10/30/2014     Esophageal reflux (GERD) 9/8/2014     HTN (hypertension) 9/8/2014     Hyperlipidemia LDL goal <160 9/8/2014     IFG (impaired fasting glucose) 9/8/2014     PONV (postoperative nausea and vomiting)      Past Surgical History:   Procedure Laterality Date     APPENDECTOMY  1952     cholecystitis       choledocolithiasis       COLECTOMY WITH COLOSTOMY, COMBINED N/A 4/22/2016    Procedure: COMBINED COLECTOMY WITH COLOSTOMY;  Surgeon: Shana Alaniz MD;  Location:  OR     HYSTERECTOMY  1987     LAPAROSCOPIC CHOLECYSTECTOMY  2008     LAPAROTOMY EXPLORATORY N/A 4/22/2016    Procedure: LAPAROTOMY EXPLORATORY;  Surgeon: Shana Alaniz MD;  Location:  OR     Current Outpatient Prescriptions   Medication Sig Dispense Refill     fluconazole (DIFLUCAN) 150 MG tablet Take 1 tablet (150 mg) by mouth once for 1 dose 1 tablet 0     fish oil-omega-3 fatty acids 1000 MG capsule Indications: PN:         cefdinir (OMNICEF) 300 MG capsule Take 1 capsule (300 mg) by mouth 2 times daily 14 capsule 0     Atorvastatin Calcium (LIPITOR PO) Take 20 mg by mouth daily       nystatin (MYCOSTATIN) 294907 UNIT/GM POWD Apply topically 3 times daily 60 g 3     metoprolol (LOPRESSOR) 25 MG tablet Take 0.5 tablets (12.5 mg) by mouth 2 times daily 180 tablet 3     omeprazole (PRILOSEC) 20 MG capsule Take 1 capsule (20 mg) by mouth daily 90 capsule 3     ACETAMINOPHEN PO Take 500 mg by mouth every 4 hours as needed for pain Reported on 4/10/2017       loperamide (IMODIUM) 2 MG capsule Take 1 capsule (2 mg) by mouth 2 times daily 20 capsule      conjugated estrogens (PREMARIN) vaginal cream Place 0.5 g vaginally three times a week 30 g 6     Multiple  "Minerals-Vitamins (CALCIUM CITRATE PLUS/MAGNESIUM) TABS Take 1 tablet by mouth daily        OTC products: None, except as noted above    Allergies   Allergen Reactions     Bactrim [Sulfamethoxazole W/Trimethoprim] GI Disturbance     Vomiting     Codeine      Metronidazole      GI distubance     Sulfa Drugs      Sulfur      Versed [Midazolam]      vomited      Latex Allergy: NO    Social History   Substance Use Topics     Smoking status: Never Smoker     Smokeless tobacco: Never Used     Alcohol use Yes      Comment: socially     History   Drug Use No       REVIEW OF SYSTEMS:                                                     ROS: 10 point ROS neg other than the symptoms noted above in the HPI.      EXAM:                                                    /70 (BP Location: Right arm, Patient Position: Right side, Cuff Size: Adult Regular)  Pulse 73  Temp 98.1  F (36.7  C) (Tympanic)  Ht 5' 1.5\" (1.562 m)  Wt 144 lb (65.3 kg)  SpO2 98%  BMI 26.77 kg/m2    GENERAL APPEARANCE: healthy, alert and no distress     EYES: EOMI, PERRL     HENT: ear canals and TM's normal and nose and mouth without ulcers or lesions     NECK: no adenopathy, no asymmetry, masses, or scars and thyroid normal to palpation     RESP: lungs clear to auscultation - no rales, rhonchi or wheezes     CV: regular rates and rhythm, normal S1 S2, no S3 or S4 and no murmur, click or rub     ABDOMEN: soft, +BS, non-tender, non-distended, ostomy bag in place     MS: extremities normal- no gross deformities noted, no evidence of inflammation in joints     SKIN: no suspicious lesions or rashes     NEURO: Normal strength and tone, sensory exam grossly normal, mentation intact and speech normal     PSYCH: mentation appears normal. and affect normal/bright    DIAGNOSTICS:                                                    No labs or EKG required for low risk surgery (cataract, skin procedure, breast biopsy, etc)    Recent Labs   Lab Test  10/27/16   " 1019  08/21/16   1250  07/25/16   1057   05/09/16   0545   05/02/16   0530   04/21/16   0600   HGB   --   12.7  11.9   < >   --    < >   --    < >  11.4*   PLT   --   218  201   < >   --    < >   --    < >  215   INR   --    --    --    --   1.18*   --   1.32*   < >  1.15*   NA  136  131*  135   < >  135   < >  136   < >  135   POTASSIUM  4.0  4.1  4.5   < >  4.1   < >  3.5   < >  3.5   CR  1.10*  0.92  0.91   < >  0.69   < >  0.90   < >  1.29*   A1C  5.9   --    --    --    --    --    --    --   6.0    < > = values in this interval not displayed.        IMPRESSION:                                                    Reason for surgery/procedure: cataracts corrective surgery    The proposed surgical procedure is considered LOW risk.    REVISED CARDIAC RISK INDEX  The patient has the following serious cardiovascular risks for perioperative complications such as (MI, PE, VFib and 3  AV Block):  No serious cardiac risks  INTERPRETATION: 0 risks: Class I (very low risk - 0.4% complication rate)    The patient has the following additional risks for perioperative complications:  History of anesthesia reaction to versed.  Multiple drug allergies      ICD-10-CM    1. Preop general physical exam Z01.818    2. Cataract H26.9    3. Acute cystitis with hematuria N30.01 *UA reflex to Microscopic and Culture ([place] [facility] (except [place] and [place])     Urine Microscopic    Currently undergoing treatment for UTI - anticipate complete resolution by time of surgery.  Repeat UA today to evaluate for resolution of microscopic hematuria.   4. Candidiasis of vulva and vagina B37.3 fluconazole (DIFLUCAN) 150 MG tablet       RECOMMENDATIONS:                                                        Cardiovascular Risk  Performs 4 METs exercise without symptoms (Light housework (dusting, washing dishes) and Walk on level ground at 15 minutes per mile (4 miles/hour)) .   Patient is already on a Beta Blocker. Continue  Betablocker therapy after surgery, using Beta blocker order set as necessary for NPO status.      Patient is to hold all medications the morning of surgery except her metoprolol - she has been advised.    APPROVAL GIVEN to proceed with proposed procedure, without further diagnostic evaluation       Signed Electronically by: Hien Booth MD    Copy of this evaluation report is provided to requesting physician.    [facility] Preop Guidelines

## 2017-05-23 ENCOUNTER — ANESTHESIA EVENT (OUTPATIENT)
Dept: SURGERY | Facility: CLINIC | Age: 77
End: 2017-05-23
Payer: MEDICARE

## 2017-05-23 ENCOUNTER — HOSPITAL ENCOUNTER (OUTPATIENT)
Facility: CLINIC | Age: 77
Discharge: HOME | End: 2017-05-23
Attending: OPHTHALMOLOGY | Admitting: OPHTHALMOLOGY
Payer: MEDICARE

## 2017-05-23 ENCOUNTER — ANESTHESIA (OUTPATIENT)
Dept: SURGERY | Facility: CLINIC | Age: 77
End: 2017-05-23
Payer: MEDICARE

## 2017-05-23 ENCOUNTER — SURGERY (OUTPATIENT)
Age: 77
End: 2017-05-23

## 2017-05-23 VITALS
RESPIRATION RATE: 16 BRPM | TEMPERATURE: 97.6 F | DIASTOLIC BLOOD PRESSURE: 48 MMHG | SYSTOLIC BLOOD PRESSURE: 103 MMHG | OXYGEN SATURATION: 98 %

## 2017-05-23 PROCEDURE — 25000128 H RX IP 250 OP 636: Performed by: NURSE ANESTHETIST, CERTIFIED REGISTERED

## 2017-05-23 PROCEDURE — 25000128 H RX IP 250 OP 636: Performed by: OPHTHALMOLOGY

## 2017-05-23 PROCEDURE — 27210794 ZZH OR GENERAL SUPPLY STERILE: Performed by: OPHTHALMOLOGY

## 2017-05-23 PROCEDURE — 36000101 ZZH EYE SURGERY LEVEL 3 1ST 30 MIN: Performed by: OPHTHALMOLOGY

## 2017-05-23 PROCEDURE — 25000125 ZZHC RX 250: Performed by: NURSE ANESTHETIST, CERTIFIED REGISTERED

## 2017-05-23 PROCEDURE — 40000170 ZZH STATISTIC PRE-PROCEDURE ASSESSMENT II: Performed by: OPHTHALMOLOGY

## 2017-05-23 PROCEDURE — 25000128 H RX IP 250 OP 636: Performed by: ANESTHESIOLOGY

## 2017-05-23 PROCEDURE — 37000008 ZZH ANESTHESIA TECHNICAL FEE, 1ST 30 MIN: Performed by: OPHTHALMOLOGY

## 2017-05-23 PROCEDURE — V2632 POST CHMBR INTRAOCULAR LENS: HCPCS | Performed by: OPHTHALMOLOGY

## 2017-05-23 PROCEDURE — 25000125 ZZHC RX 250: Performed by: ANESTHESIOLOGY

## 2017-05-23 PROCEDURE — 25000125 ZZHC RX 250: Performed by: OPHTHALMOLOGY

## 2017-05-23 PROCEDURE — 71000028 ZZH EYE RECOVERY PHASE 2 EACH 15 MINS: Performed by: OPHTHALMOLOGY

## 2017-05-23 DEVICE — EYE IMP IOL AMO PCL TECNIS ZCB00 21.0: Type: IMPLANTABLE DEVICE | Site: EYE | Status: FUNCTIONAL

## 2017-05-23 RX ORDER — PROPARACAINE HYDROCHLORIDE 5 MG/ML
1 SOLUTION/ DROPS OPHTHALMIC ONCE
Status: COMPLETED | OUTPATIENT
Start: 2017-05-23 | End: 2017-05-23

## 2017-05-23 RX ORDER — PREDNISOLONE ACETATE 1 %
SUSPENSION, DROPS(FINAL DOSAGE FORM)(ML) OPHTHALMIC (EYE) PRN
Status: DISCONTINUED | OUTPATIENT
Start: 2017-05-23 | End: 2017-05-23 | Stop reason: HOSPADM

## 2017-05-23 RX ORDER — PROPOFOL 10 MG/ML
INJECTION, EMULSION INTRAVENOUS PRN
Status: DISCONTINUED | OUTPATIENT
Start: 2017-05-23 | End: 2017-05-23

## 2017-05-23 RX ORDER — LIDOCAINE HYDROCHLORIDE 10 MG/ML
INJECTION, SOLUTION EPIDURAL; INFILTRATION; INTRACAUDAL; PERINEURAL PRN
Status: DISCONTINUED | OUTPATIENT
Start: 2017-05-23 | End: 2017-05-23 | Stop reason: HOSPADM

## 2017-05-23 RX ORDER — BALANCED SALT SOLUTION 6.4; .75; .48; .3; 3.9; 1.7 MG/ML; MG/ML; MG/ML; MG/ML; MG/ML; MG/ML
SOLUTION OPHTHALMIC PRN
Status: DISCONTINUED | OUTPATIENT
Start: 2017-05-23 | End: 2017-05-23 | Stop reason: HOSPADM

## 2017-05-23 RX ORDER — TROPICAMIDE 10 MG/ML
1 SOLUTION/ DROPS OPHTHALMIC
Status: COMPLETED | OUTPATIENT
Start: 2017-05-23 | End: 2017-05-23

## 2017-05-23 RX ORDER — MOXIFLOXACIN 5 MG/ML
1 SOLUTION/ DROPS OPHTHALMIC
Status: COMPLETED | OUTPATIENT
Start: 2017-05-23 | End: 2017-05-23

## 2017-05-23 RX ORDER — DICLOFENAC SODIUM 1 MG/ML
1 SOLUTION/ DROPS OPHTHALMIC
Status: COMPLETED | OUTPATIENT
Start: 2017-05-23 | End: 2017-05-23

## 2017-05-23 RX ORDER — ONDANSETRON 2 MG/ML
INJECTION INTRAMUSCULAR; INTRAVENOUS PRN
Status: DISCONTINUED | OUTPATIENT
Start: 2017-05-23 | End: 2017-05-23

## 2017-05-23 RX ORDER — PROPARACAINE HYDROCHLORIDE 5 MG/ML
1 SOLUTION/ DROPS OPHTHALMIC ONCE
Status: DISCONTINUED | OUTPATIENT
Start: 2017-05-23 | End: 2017-05-23 | Stop reason: HOSPADM

## 2017-05-23 RX ORDER — PHENYLEPHRINE HYDROCHLORIDE 25 MG/ML
1 SOLUTION/ DROPS OPHTHALMIC
Status: COMPLETED | OUTPATIENT
Start: 2017-05-23 | End: 2017-05-23

## 2017-05-23 RX ORDER — SODIUM CHLORIDE, SODIUM LACTATE, POTASSIUM CHLORIDE, CALCIUM CHLORIDE 600; 310; 30; 20 MG/100ML; MG/100ML; MG/100ML; MG/100ML
500 INJECTION, SOLUTION INTRAVENOUS CONTINUOUS
Status: DISCONTINUED | OUTPATIENT
Start: 2017-05-23 | End: 2017-05-23 | Stop reason: HOSPADM

## 2017-05-23 RX ADMIN — DICLOFENAC SODIUM 1 DROP: 1 SOLUTION/ DROPS OPHTHALMIC at 12:40

## 2017-05-23 RX ADMIN — Medication 1 APPLICATOR: at 13:33

## 2017-05-23 RX ADMIN — BALANCED SALT SOLUTION 15 ML: 6.4; .75; .48; .3; 3.9; 1.7 SOLUTION OPHTHALMIC at 13:32

## 2017-05-23 RX ADMIN — SODIUM CHLORIDE, POTASSIUM CHLORIDE, SODIUM LACTATE AND CALCIUM CHLORIDE 500 ML: 600; 310; 30; 20 INJECTION, SOLUTION INTRAVENOUS at 12:36

## 2017-05-23 RX ADMIN — PHENYLEPHRINE HYDROCHLORIDE 1 DROP: 2.5 SOLUTION/ DROPS OPHTHALMIC at 12:28

## 2017-05-23 RX ADMIN — MOXIFLOXACIN HYDROCHLORIDE 1 DROP: 5 SOLUTION/ DROPS OPHTHALMIC at 12:34

## 2017-05-23 RX ADMIN — MOXIFLOXACIN HYDROCHLORIDE 1 DROP: 5 SOLUTION/ DROPS OPHTHALMIC at 12:29

## 2017-05-23 RX ADMIN — EPINEPHRINE 500 ML: 1 INJECTION, SOLUTION, CONCENTRATE INTRAVENOUS at 13:32

## 2017-05-23 RX ADMIN — LIDOCAINE HYDROCHLORIDE 0.5 ML: 35 GEL OPHTHALMIC at 13:32

## 2017-05-23 RX ADMIN — LIDOCAINE HYDROCHLORIDE 1 ML: 10 INJECTION, SOLUTION EPIDURAL; INFILTRATION; INTRACAUDAL; PERINEURAL at 12:34

## 2017-05-23 RX ADMIN — MOXIFLOXACIN HYDROCHLORIDE 1 DROP: 5 SOLUTION/ DROPS OPHTHALMIC at 12:40

## 2017-05-23 RX ADMIN — PHENYLEPHRINE HYDROCHLORIDE 1 DROP: 2.5 SOLUTION/ DROPS OPHTHALMIC at 12:40

## 2017-05-23 RX ADMIN — PROPOFOL 20 MG: 10 INJECTION, EMULSION INTRAVENOUS at 13:28

## 2017-05-23 RX ADMIN — DEXMEDETOMIDINE HYDROCHLORIDE 4 MCG: 100 INJECTION, SOLUTION INTRAVENOUS at 13:33

## 2017-05-23 RX ADMIN — Medication 1 DROP: at 13:42

## 2017-05-23 RX ADMIN — PROPOFOL 10 MG: 10 INJECTION, EMULSION INTRAVENOUS at 13:33

## 2017-05-23 RX ADMIN — DICLOFENAC SODIUM 1 DROP: 1 SOLUTION/ DROPS OPHTHALMIC at 12:34

## 2017-05-23 RX ADMIN — LIDOCAINE HYDROCHLORIDE 1 ML: 10 INJECTION, SOLUTION EPIDURAL; INFILTRATION; INTRACAUDAL; PERINEURAL at 13:32

## 2017-05-23 RX ADMIN — DEXMEDETOMIDINE HYDROCHLORIDE 4 MCG: 100 INJECTION, SOLUTION INTRAVENOUS at 13:36

## 2017-05-23 RX ADMIN — TROPICAMIDE 1 DROP: 10 SOLUTION/ DROPS OPHTHALMIC at 12:40

## 2017-05-23 RX ADMIN — DICLOFENAC SODIUM 1 DROP: 1 SOLUTION/ DROPS OPHTHALMIC at 12:29

## 2017-05-23 RX ADMIN — DEXMEDETOMIDINE HYDROCHLORIDE 8 MCG: 100 INJECTION, SOLUTION INTRAVENOUS at 13:28

## 2017-05-23 RX ADMIN — SODIUM CHLORIDE, POTASSIUM CHLORIDE, SODIUM LACTATE AND CALCIUM CHLORIDE 500 ML: 600; 310; 30; 20 INJECTION, SOLUTION INTRAVENOUS at 12:34

## 2017-05-23 RX ADMIN — TROPICAMIDE 1 DROP: 10 SOLUTION/ DROPS OPHTHALMIC at 12:29

## 2017-05-23 RX ADMIN — ONDANSETRON 4 MG: 2 INJECTION INTRAMUSCULAR; INTRAVENOUS at 13:30

## 2017-05-23 RX ADMIN — TROPICAMIDE 1 DROP: 10 SOLUTION/ DROPS OPHTHALMIC at 12:34

## 2017-05-23 RX ADMIN — PROPARACAINE HYDROCHLORIDE 1 DROP: 5 SOLUTION/ DROPS OPHTHALMIC at 12:28

## 2017-05-23 RX ADMIN — PHENYLEPHRINE HYDROCHLORIDE 1 DROP: 2.5 SOLUTION/ DROPS OPHTHALMIC at 12:33

## 2017-05-23 NOTE — IP AVS SNAPSHOT
[facility]    [address]    [place] [address]    Phone:  [phone]    Fax:  [phone]                                       After Visit Summary   5/23/2017    Gely Keene    MRN: [id number]           After Visit Summary Signature Page     I have received my discharge instructions, and my questions have been answered. I have discussed any challenges I see with this plan with the nurse or doctor.    ..........................................................................................................................................  Patient/Patient Representative Signature      ..........................................................................................................................................  Patient Representative Print Name and Relationship to Patient    ..................................................               ................................................  Date                                            Time    ..........................................................................................................................................  Reviewed by Signature/Title    ...................................................              ..............................................  Date                                                            Time

## 2017-05-23 NOTE — IP AVS SNAPSHOT
MRN:[id number]                      After Visit Summary   5/23/2017    Gely Keene    MRN: [id number]           Thank you!     Thank you for choosing [facility] for your care. Our goal is always to provide you with excellent care. Hearing back from our patients is one way we can continue to improve our services. Please take a few minutes to complete the written survey that you may receive in the mail after you visit with us. Thank you!        Patient Information     Date Of Birth          1940        About your hospital stay     You were admitted on:  May 23, 2017 You last received care in the:  [facility]    You were discharged on:  May 23, 2017       Who to Call     For medical emergencies, please call 911.  For non-urgent questions about your medical care, please call your primary care provider or clinic, [phone]  For questions related to your surgery, please call your surgery clinic        Attending Provider     Provider Eloy Mccabe MD Ophthalmology       Primary Care Provider Office Phone # Fax #    Hien Booth [phone]       [place] [facility] [address] [place] [address]        Further instructions from your care team       [facility]  Cataract Surgery Discharge Instructions  [facility] MD JOHN Romero MD J. Hasan, MD C. Nichols, MD J. O'Neill, MD S. Schaefer, MD J. Stephens, MD        Start using drops when you arrive at home today    Vigamox  (Tan top) - one drop in surgical eye 3 times  per day until gone.    Prednisolone acetate 1.0% (pink top) - one drop in surgical eye 3 times per day until gone.    Diclofenac (Grey top) - one drop in surgical eye 3 times per day until gone.      Place shield over surgical eye at bedtime for 3 nights.      The eye will feel itchy, scratchy, and  "vision will be blurred, you may take Tylenol for the scratchy feeling if this is bothersome.      No eye rubbing or swimming for I week.      You may resume all prescription medications as directed by your primary doctor.      Call if increasing pain, progressively worsening vision or worsening redness of surgical eye.      On-call doctor can be reached at [phone].    [facility] Discharge  Instructions  Anesthesia ([facility])   Adult Discharge Instructions    For 24 hours after surgery    1. Get plenty of rest.  Make arrangements to have a responsible adult stay with you for at least 6 hours after you leave the hospital.  2. Do not drive or use heavy equipment for 24 hours.    3. Do not drink alcohol for 24 hours.  4. Do not sign legal documents or make important decisions for 24 hours.  5. Avoid strenuous or risky activities. You may feel lightheaded.  If so, sit for a few minutes before standing.  Have someone help you get up.   6. Conscious sedation patients may resume a regular diet..  7. Any questions of medical nature, call your physician.    Pending Results     No orders found from 5/21/2017 to 5/24/2017.            Admission Information     Date & Time Provider Department Dept. Phone    5/23/2017 Eloy Eric MD [facility] [place] [facility] [phone]      Your Vitals Were     Blood Pressure Temperature Respirations Pulse Oximetry          133/61 97.6  F (36.4  C) (Temporal) 16 100%        MyChart Information     [employer] lets you send messages to your doctor, view your test results, renew your prescriptions, schedule appointments and more. To sign up, go to [email or web address] . Click on \"Log in\" on the left side of the screen, which will take you to the Welcome page. Then click on \"Sign up Now\" on the right side of the page.     You will be asked to enter the access code listed below, as well as some personal information. Please follow the " directions to create your username and password.     Your access code is: [email or web address]  Expires: 2017  5:35 PM     Your access code will  in 90 days. If you need help or a new code, please call your [place] clinic or [phone].        Care EveryWhere ID     This is your Care EveryWhere ID. This could be used by other organizations to access your [place] medical records  [id number]           Review of your medicines      CONTINUE these medicines which have NOT CHANGED        Dose / Directions    ACETAMINOPHEN PO        Dose:  500 mg   Take 500 mg by mouth every 4 hours as needed for pain Reported on 4/10/2017   Refills:  0       CALCIUM CITRATE PLUS/MAGNESIUM Tabs        Dose:  1 tablet   Take 1 tablet by mouth daily   Refills:  0       cefdinir 300 MG capsule   Commonly known as:  OMNICEF   Used for:  Acute cystitis with hematuria        Dose:  300 mg   Take 1 capsule (300 mg) by mouth 2 times daily   Quantity:  14 capsule   Refills:  0       conjugated estrogens cream   Commonly known as:  PREMARIN   Used for:  Atrophic vaginitis        Dose:  0.5 g   Place 0.5 g vaginally three times a week   Quantity:  30 g   Refills:  6       fish oil-omega-3 fatty acids 1000 MG capsule        Indications: PN:   Refills:  0       LIPITOR PO        Dose:  20 mg   Take 20 mg by mouth daily   Refills:  0       loperamide 2 MG capsule   Commonly known as:  IMODIUM   Used for:  Colitis        Dose:  2 mg   Take 1 capsule (2 mg) by mouth 2 times daily   Quantity:  20 capsule   Refills:  0       metoprolol 25 MG tablet   Commonly known as:  LOPRESSOR   Used for:  Sinus tachycardia        Dose:  12.5 mg   Take 0.5 tablets (12.5 mg) by mouth 2 times daily   Quantity:  180 tablet   Refills:  3       nystatin 600764 UNIT/GM Powd   Commonly known as:  MYCOSTATIN   Used for:  Fungal infection        Apply topically 3 times daily   Quantity:  60 g   Refills:  3       omeprazole 20 MG CR capsule   Commonly known as:  priLOSEC    Used for:  Gastroesophageal reflux disease, esophagitis presence not specified        Dose:  20 mg   Take 1 capsule (20 mg) by mouth daily   Quantity:  90 capsule   Refills:  3                Protect others around you: Learn how to safely use, store and throw away your medicines at www.disposemymeds.org.             Medication List: This is a list of all your medications and when to take them. Check marks below indicate your daily home schedule. Keep this list as a reference.      Medications           Morning Afternoon Evening Bedtime As Needed    ACETAMINOPHEN PO   Take 500 mg by mouth every 4 hours as needed for pain Reported on 4/10/2017                                CALCIUM CITRATE PLUS/MAGNESIUM Tabs   Take 1 tablet by mouth daily                                cefdinir 300 MG capsule   Commonly known as:  OMNICEF   Take 1 capsule (300 mg) by mouth 2 times daily                                conjugated estrogens cream   Commonly known as:  PREMARIN   Place 0.5 g vaginally three times a week                                fish oil-omega-3 fatty acids 1000 MG capsule   Indications: PN:                                LIPITOR PO   Take 20 mg by mouth daily                                loperamide 2 MG capsule   Commonly known as:  IMODIUM   Take 1 capsule (2 mg) by mouth 2 times daily                                metoprolol 25 MG tablet   Commonly known as:  LOPRESSOR   Take 0.5 tablets (12.5 mg) by mouth 2 times daily                                nystatin 665262 UNIT/GM Powd   Commonly known as:  MYCOSTATIN   Apply topically 3 times daily                                omeprazole 20 MG CR capsule   Commonly known as:  priLOSEC   Take 1 capsule (20 mg) by mouth daily

## 2017-05-23 NOTE — DISCHARGE INSTRUCTIONS
[facility]  Cataract Surgery Discharge Instructions  [facility] MD JOHN Romero MD J. Hasan, MD C. Nichols, MD J. O'Neill, MD S. Schaefer, MD J. Stephens, MD        Start using drops when you arrive at home today    Vigamox  (Tan top) - one drop in surgical eye 3 times  per day until gone.    Prednisolone acetate 1.0% (pink top) - one drop in surgical eye 3 times per day until gone.    Diclofenac (Grey top) - one drop in surgical eye 3 times per day until gone.      Place shield over surgical eye at bedtime for 3 nights.      The eye will feel itchy, scratchy, and vision will be blurred, you may take Tylenol for the scratchy feeling if this is bothersome.      No eye rubbing or swimming for I week.      You may resume all prescription medications as directed by your primary doctor.      Call if increasing pain, progressively worsening vision or worsening redness of surgical eye.      On-call doctor can be reached at [phone].    [facility] Discharge  Instructions  Anesthesia ([facility])   Adult Discharge Instructions    For 24 hours after surgery    1. Get plenty of rest.  Make arrangements to have a responsible adult stay with you for at least 6 hours after you leave the hospital.  2. Do not drive or use heavy equipment for 24 hours.    3. Do not drink alcohol for 24 hours.  4. Do not sign legal documents or make important decisions for 24 hours.  5. Avoid strenuous or risky activities. You may feel lightheaded.  If so, sit for a few minutes before standing.  Have someone help you get up.   6. Conscious sedation patients may resume a regular diet..  7. Any questions of medical nature, call your physician.

## 2017-05-23 NOTE — ANESTHESIA PREPROCEDURE EVALUATION
EKG  09-MAY-2016 09:58:02 [facility]-R-M/S5 ROUTINE RECORD  Sinus tachycardia  Otherwise normal ECG  When compared with ECG of 25-APR-2016 06:14,  Nonspecific T wave abnormality no longer evident in Inferior leads  Nonspecific T wave abnormality no longer evident in Anterolateral leads  Anesthesia Evaluation     . Pt has had prior anesthetic.     History of anesthetic complications   - PONV        ROS/MED HX    ENT/Pulmonary:       Neurologic: Comment: Restless leg syndrome      Cardiovascular:     (+) Dyslipidemia, hypertension----. : . . . :. .       METS/Exercise Tolerance:     Hematologic:         Musculoskeletal:         GI/Hepatic:     (+) GERD Other GI/Hepatic hx of colectomy      Renal/Genitourinary: Comment: Recent UTI        Endo: Comment: IFG        Psychiatric:     (+) psychiatric history anxiety      Infectious Disease:         Malignancy:   (+) Malignancy History of Other and Skin          Other:                     Physical Exam  Normal systems: cardiovascular and pulmonary    Airway   Mallampati: II  TM distance: >3 FB  Neck ROM: full    Dental   (+) caps    Cardiovascular       Pulmonary                     Anesthesia Plan      History & Physical Review  History and physical reviewed and following examination; no interval change.    ASA Status:  2 .    NPO Status:  > 8 hours    Plan for MAC Reason for MAC:  Procedure to face, neck, head or breast  PONV prophylaxis:  Ondansetron (or other 5HT-3)  Vomiting with versed  Precedex ok      Postoperative Care  Postoperative pain management:  IV analgesics.      Consents  Anesthetic plan, risks, benefits and alternatives discussed with:  Patient..                          .

## 2017-05-23 NOTE — ANESTHESIA CARE TRANSFER NOTE
Patient: Gely Keene    Procedure(s):  RIGHT EYE PHACOEMULSIFICATION CLEAR CORNEA WITH STANDARD INTRAOCULAR LENS IMPLANT  - Wound Class: I-Clean    Diagnosis: RIGHT EYE CATARACT  Diagnosis Additional Information: No value filed.    Anesthesia Type:   MAC     Note:  Airway :Room Air  Patient transferred to:PACU  Comments: [facility] to [facility] recovery room in recliner with armrests up, spontaneous respirations, O2 saturation >92% on RA. All monitors and alarms on and functioning, clinically stable vital signs. Report given to recovery RN and questions answered. Patient alert and following verbal directions.      Vitals: (Last set prior to Anesthesia Care Transfer)    CRNA VITALS  5/23/2017 1321 - 5/23/2017 1351      5/23/2017             Resp Rate (set): 10                Electronically Signed By: JUNIOR Richter CRNA  May 23, 2017  1:51 PM

## 2017-05-23 NOTE — OP NOTE
PREOPERATIVE DIAGNOSIS: Cataract, Right eye.   POSTOPERATIVE DIAGNOSIS: Cataract, Right eye.   OPERATION: Phacoemulsification with implantation of posterior chamber intraocular lens, Right eye.   ANESTHESIA: Monitored anesthesia care.   INDICATIONS FOR SURGERY: Gely Keene has noted a progressive decline in the vision of her Right eye secondary to a cataract. This has affected her ability to perform routine functions including reading. The patient has progressive cataract changes consistent with her vision and symptoms.     PROCEDURE: Informed consent was obtained from the patient preoperatively with the risks and alternatives reviewed, including the possibility of loss of vision. In the preoperative area, the patient was administered topical anesthetic consisting of 2% Xylocaine jelly. The patient was taken to the operating room. The face was prepped and draped in the usual sterile fashion. Attention was directed to the Right eye. A stab incision was made at the limbus with a 15-degree blade. Viscoat was used to replace aqueous. A keratome was used to make a limbal self-sealing incision 2.5 mm in diameter. A curvilinear capsulorrhexis was performed with the Utrata forceps. Hydrodissection was carried out. The nucleus was removed with the phacoemulsification handpiece in a four-quadrant cracking technique. The cortex was removed with the irrigation and aspiration handpiece. The posterior capsule remained intact. Provisc was used to inflate the capsular bag. A posterior chamber intraocular lens was taken from its case and inspected. It was free of defects and it was folded into the shooter. The lens was then injected into the eye by directing the leading haptic into the capsular bag. The trailing haptic was then placed in the eye with a haptic . The lens centered well. The Provisc was removed from the eye with the I&A handpiece. The eye was inflated with balanced salt solution. The wound was  inspected and found to be watertight. Topical Vigamox and Pred Forte were applied. An eye shield was placed over the eye. The patient tolerated the procedure well and left the operating area in good condition.       Implant Name Type Inv. Item Serial No.  Lot No. LRB No. Used   EYE IMP IOL PAUL PCL TECNIS [id number] 21.0 Lens/Eye Implant EYE IMP IOL PAUL PCL TECNIS [id number] 21.0 [id number] [employer]   Right 1       Eloy Eric M.D.

## 2017-05-23 NOTE — ANESTHESIA POSTPROCEDURE EVALUATION
Patient: Gely Keene    Procedure(s):  RIGHT EYE PHACOEMULSIFICATION CLEAR CORNEA WITH STANDARD INTRAOCULAR LENS IMPLANT  - Wound Class: I-Clean    Diagnosis:RIGHT EYE CATARACT  Diagnosis Additional Information: No value filed.    Anesthesia Type:  MAC    Note:  Anesthesia Post Evaluation    Patient location during evaluation: PACU  Patient participation: Able to fully participate in evaluation  Level of consciousness: awake and alert  Pain management: adequate  Airway patency: patent  Cardiovascular status: acceptable  Respiratory status: acceptable  Hydration status: acceptable  PONV: none     Anesthetic complications: None          Last vitals:  Vitals:    05/23/17 1236 05/23/17 1352 05/23/17 1405   BP: 133/61 98/55 103/48   Resp: 16 16 16   Temp: 36.4  C (97.6  F)     SpO2: 100% 96% 98%         Electronically Signed By: Tom Westbrook MD  May 23, 2017  6:04 PM

## 2017-06-22 DIAGNOSIS — K21.9 GASTROESOPHAGEAL REFLUX DISEASE, ESOPHAGITIS PRESENCE NOT SPECIFIED: ICD-10-CM

## 2017-06-22 DIAGNOSIS — E78.5 HYPERLIPIDEMIA LDL GOAL <160: ICD-10-CM

## 2017-06-22 NOTE — TELEPHONE ENCOUNTER
omeprazole (PRILOSEC) 20 MG capsule      Last Written Prescription Date: 5/25/2016  Last Fill Quantity: 90,  # refills: 3   Last Office Visit with [facility], [facility] or [facility] prescribing provider: 5/4/2017    atorvastatin (LIPITOR) 20 MG tablet     Last Written Prescription Date: 5/25/2016  Last Fill Quantity: 90, # refills: 3  Last Office Visit with [facility], [facility] or [facility] prescribing provider: 5/4/2017       Lab Results   Component Value Date    CHOL 175 10/27/2016     Lab Results   Component Value Date    HDL 87 10/27/2016     Lab Results   Component Value Date    LDL 67 10/27/2016     Lab Results   Component Value Date    TRIG 107 10/27/2016     Lab Results   Component Value Date    CHOLHDLRATIO 2.0 10/26/2015

## 2017-06-27 RX ORDER — ATORVASTATIN CALCIUM 20 MG/1
TABLET, FILM COATED ORAL
Qty: 90 TABLET | Refills: 1 | Status: SHIPPED | OUTPATIENT
Start: 2017-06-27 | End: 2017-08-14

## 2017-06-27 NOTE — TELEPHONE ENCOUNTER
Prilosec:  Prescription approved per [facility] Refill Protocol.    Lipitor:  Routing refill request to provider for review/approval because:  Medication is reported/historical, please sign if ok.    Sandie Whitlock, EUGENE  Triage Nurse

## 2017-08-06 ENCOUNTER — HOSPITAL ENCOUNTER (EMERGENCY)
Facility: CLINIC | Age: 77
Discharge: HOME | End: 2017-08-06
Attending: NURSE PRACTITIONER | Admitting: NURSE PRACTITIONER
Payer: MEDICARE

## 2017-08-06 ENCOUNTER — APPOINTMENT (OUTPATIENT)
Dept: CT IMAGING | Facility: CLINIC | Age: 77
End: 2017-08-06
Attending: NURSE PRACTITIONER
Payer: MEDICARE

## 2017-08-06 VITALS
OXYGEN SATURATION: 97 % | HEART RATE: 75 BPM | SYSTOLIC BLOOD PRESSURE: 194 MMHG | DIASTOLIC BLOOD PRESSURE: 83 MMHG | TEMPERATURE: 98 F | RESPIRATION RATE: 18 BRPM

## 2017-08-06 DIAGNOSIS — R10.13 ABDOMINAL PAIN, EPIGASTRIC: ICD-10-CM

## 2017-08-06 DIAGNOSIS — R11.10 VOMITING, INTRACTABILITY OF VOMITING NOT SPECIFIED, PRESENCE OF NAUSEA NOT SPECIFIED, UNSPECIFIED VOMITING TYPE: ICD-10-CM

## 2017-08-06 DIAGNOSIS — Z43.2 ENCOUNTER FOR ATTENTION TO ILEOSTOMY (H): ICD-10-CM

## 2017-08-06 LAB
ALBUMIN UR-MCNC: NEGATIVE MG/DL
ANION GAP SERPL CALCULATED.3IONS-SCNC: 7 MMOL/L (ref 3–14)
APPEARANCE UR: ABNORMAL
BASOPHILS # BLD AUTO: 0.1 10E9/L (ref 0–0.2)
BASOPHILS NFR BLD AUTO: 0.7 %
BILIRUB UR QL STRIP: NEGATIVE
BUN SERPL-MCNC: 16 MG/DL (ref 7–30)
CALCIUM SERPL-MCNC: 8.9 MG/DL (ref 8.5–10.1)
CHLORIDE SERPL-SCNC: 105 MMOL/L (ref 94–109)
CO2 SERPL-SCNC: 25 MMOL/L (ref 20–32)
COLOR UR AUTO: ABNORMAL
CREAT SERPL-MCNC: 1.02 MG/DL (ref 0.52–1.04)
DIFFERENTIAL METHOD BLD: NORMAL
EOSINOPHIL # BLD AUTO: 0 10E9/L (ref 0–0.7)
EOSINOPHIL NFR BLD AUTO: 0.2 %
ERYTHROCYTE [DISTWIDTH] IN BLOOD BY AUTOMATED COUNT: 13 % (ref 10–15)
GFR SERPL CREATININE-BSD FRML MDRD: 52 ML/MIN/1.7M2
GLUCOSE SERPL-MCNC: 137 MG/DL (ref 70–99)
GLUCOSE UR STRIP-MCNC: NEGATIVE MG/DL
HCT VFR BLD AUTO: 38.9 % (ref 35–47)
HGB BLD-MCNC: 13.2 G/DL (ref 11.7–15.7)
HGB UR QL STRIP: NEGATIVE
HYALINE CASTS #/AREA URNS LPF: 1 /LPF (ref 0–2)
IMM GRANULOCYTES # BLD: 0.1 10E9/L (ref 0–0.4)
IMM GRANULOCYTES NFR BLD: 0.5 %
KETONES UR STRIP-MCNC: NEGATIVE MG/DL
LEUKOCYTE ESTERASE UR QL STRIP: NEGATIVE
LYMPHOCYTES # BLD AUTO: 1.8 10E9/L (ref 0.8–5.3)
LYMPHOCYTES NFR BLD AUTO: 19.5 %
MCH RBC QN AUTO: 30.5 PG (ref 26.5–33)
MCHC RBC AUTO-ENTMCNC: 33.9 G/DL (ref 31.5–36.5)
MCV RBC AUTO: 90 FL (ref 78–100)
MONOCYTES # BLD AUTO: 0.6 10E9/L (ref 0–1.3)
MONOCYTES NFR BLD AUTO: 6 %
MUCOUS THREADS #/AREA URNS LPF: PRESENT /LPF
NEUTROPHILS # BLD AUTO: 6.7 10E9/L (ref 1.6–8.3)
NEUTROPHILS NFR BLD AUTO: 73.1 %
NITRATE UR QL: NEGATIVE
NRBC # BLD AUTO: 0 10*3/UL
NRBC BLD AUTO-RTO: 0 /100
PH UR STRIP: 5 PH (ref 5–7)
PLATELET # BLD AUTO: 204 10E9/L (ref 150–450)
POTASSIUM SERPL-SCNC: 3.7 MMOL/L (ref 3.4–5.3)
RBC # BLD AUTO: 4.33 10E12/L (ref 3.8–5.2)
RBC #/AREA URNS AUTO: <1 /HPF (ref 0–2)
SODIUM SERPL-SCNC: 137 MMOL/L (ref 133–144)
SP GR UR STRIP: 1.02 (ref 1–1.03)
SQUAMOUS #/AREA URNS AUTO: <1 /HPF (ref 0–1)
URN SPEC COLLECT METH UR: ABNORMAL
UROBILINOGEN UR STRIP-MCNC: 0 MG/DL (ref 0–2)
WBC # BLD AUTO: 9.2 10E9/L (ref 4–11)
WBC #/AREA URNS AUTO: 0 /HPF (ref 0–2)

## 2017-08-06 PROCEDURE — 25000128 H RX IP 250 OP 636: Performed by: NURSE PRACTITIONER

## 2017-08-06 PROCEDURE — 96374 THER/PROPH/DIAG INJ IV PUSH: CPT | Mod: 59

## 2017-08-06 PROCEDURE — 85025 COMPLETE CBC W/AUTO DIFF WBC: CPT | Performed by: NURSE PRACTITIONER

## 2017-08-06 PROCEDURE — 74177 CT ABD & PELVIS W/CONTRAST: CPT

## 2017-08-06 PROCEDURE — 96361 HYDRATE IV INFUSION ADD-ON: CPT

## 2017-08-06 PROCEDURE — 96375 TX/PRO/DX INJ NEW DRUG ADDON: CPT

## 2017-08-06 PROCEDURE — 99285 EMERGENCY DEPT VISIT HI MDM: CPT | Mod: 25

## 2017-08-06 PROCEDURE — 80048 BASIC METABOLIC PNL TOTAL CA: CPT | Performed by: NURSE PRACTITIONER

## 2017-08-06 PROCEDURE — 81001 URINALYSIS AUTO W/SCOPE: CPT | Performed by: NURSE PRACTITIONER

## 2017-08-06 PROCEDURE — 96376 TX/PRO/DX INJ SAME DRUG ADON: CPT

## 2017-08-06 RX ORDER — HYDROMORPHONE HYDROCHLORIDE 1 MG/ML
0.5 INJECTION, SOLUTION INTRAMUSCULAR; INTRAVENOUS; SUBCUTANEOUS ONCE
Status: COMPLETED | OUTPATIENT
Start: 2017-08-06 | End: 2017-08-06

## 2017-08-06 RX ORDER — IOPAMIDOL 755 MG/ML
500 INJECTION, SOLUTION INTRAVASCULAR ONCE
Status: COMPLETED | OUTPATIENT
Start: 2017-08-06 | End: 2017-08-06

## 2017-08-06 RX ORDER — HYDROMORPHONE HYDROCHLORIDE 1 MG/ML
0.5 INJECTION, SOLUTION INTRAMUSCULAR; INTRAVENOUS; SUBCUTANEOUS
Status: COMPLETED | OUTPATIENT
Start: 2017-08-06 | End: 2017-08-06

## 2017-08-06 RX ORDER — METOCLOPRAMIDE HYDROCHLORIDE 5 MG/ML
10 INJECTION INTRAMUSCULAR; INTRAVENOUS ONCE
Status: COMPLETED | OUTPATIENT
Start: 2017-08-06 | End: 2017-08-06

## 2017-08-06 RX ORDER — ONDANSETRON HYDROCHLORIDE 4 MG/5ML
4 SOLUTION ORAL ONCE
Status: DISCONTINUED | OUTPATIENT
Start: 2017-08-06 | End: 2017-08-07 | Stop reason: HOSPADM

## 2017-08-06 RX ORDER — ONDANSETRON 4 MG/1
4 TABLET, ORALLY DISINTEGRATING ORAL EVERY 8 HOURS PRN
Qty: 10 TABLET | Refills: 0 | Status: SHIPPED | OUTPATIENT
Start: 2017-08-06 | End: 2017-08-09

## 2017-08-06 RX ORDER — SODIUM CHLORIDE 9 MG/ML
1000 INJECTION, SOLUTION INTRAVENOUS CONTINUOUS
Status: DISCONTINUED | OUTPATIENT
Start: 2017-08-06 | End: 2017-08-07 | Stop reason: HOSPADM

## 2017-08-06 RX ORDER — DIPHENHYDRAMINE HYDROCHLORIDE 50 MG/ML
25 INJECTION INTRAMUSCULAR; INTRAVENOUS ONCE
Status: COMPLETED | OUTPATIENT
Start: 2017-08-06 | End: 2017-08-06

## 2017-08-06 RX ADMIN — Medication 0.5 MG: at 22:02

## 2017-08-06 RX ADMIN — METOCLOPRAMIDE 10 MG: 5 INJECTION, SOLUTION INTRAMUSCULAR; INTRAVENOUS at 20:04

## 2017-08-06 RX ADMIN — DIPHENHYDRAMINE HYDROCHLORIDE 25 MG: 50 INJECTION, SOLUTION INTRAMUSCULAR; INTRAVENOUS at 20:01

## 2017-08-06 RX ADMIN — SODIUM CHLORIDE 59 ML: 9 INJECTION, SOLUTION INTRAVENOUS at 20:42

## 2017-08-06 RX ADMIN — IOPAMIDOL 72 ML: 755 INJECTION, SOLUTION INTRAVENOUS at 20:42

## 2017-08-06 RX ADMIN — SODIUM CHLORIDE 1000 ML: 9 INJECTION, SOLUTION INTRAVENOUS at 20:01

## 2017-08-06 RX ADMIN — Medication 0.5 MG: at 20:10

## 2017-08-06 NOTE — ED NOTES
Pt presents to ED with complaints of decreased output from ileostomy. Pt states she has had minimal output since 10 this morning. Pt also states she has upper abdominal pain and vomited once on the way here. ABCs intact. A&Ox3.

## 2017-08-06 NOTE — ED AVS SNAPSHOT
[facility] Emergency Department    [address]    [facility] [address]    Phone:  [phone]    Fax:  [phone]                                       Gely Keene   MRN: [id number]    Department:  [facility] Emergency Department   Date of Visit:  8/6/2017           After Visit Summary Signature Page     I have received my discharge instructions, and my questions have been answered. I have discussed any challenges I see with this plan with the nurse or doctor.    ..........................................................................................................................................  Patient/Patient Representative Signature      ..........................................................................................................................................  Patient Representative Print Name and Relationship to Patient    ..................................................               ................................................  Date                                            Time    ..........................................................................................................................................  Reviewed by Signature/Title    ...................................................              ..............................................  Date                                                            Time

## 2017-08-06 NOTE — ED AVS SNAPSHOT
[facility] Emergency Department    [address] [place] [address]    Phone:  [phone]    Fax:  [phone]                                       Gely Keene   MRN: [id number]    Department:  [facility] Emergency Department   Date of Visit:  8/6/2017           Patient Information     Date Of Birth          1940        Your diagnoses for this visit were:     Abdominal pain, epigastric     Encounter for attention to ileostomy (H)     Vomiting, intractability of vomiting not specified, presence of nausea not specified, unspecified vomiting type        You were seen by Lois Samson APRN CNP.      Follow-up Information     Follow up with Hien Booth MD In 3 days.    Specialties:  Internal Medicine, Pediatrics    Contact information:    [address] [place] [address]  [phone]          Discharge Instructions         *Abdominal Pain, Unknown Cause (Female)    The exact cause of your abdominal (stomach) pain is not certain. This does not mean that this is something to worry about, or the right tests were not done. Everyone likes to know the exact cause of the problem, but sometimes with abdominal pain, there is no clear-cut cause, and this could be a good thing. The good news is that your symptoms can be treated, and you will feel better.   Your condition does not seem serious now; however, sometimes the signs of a serious problem may take more time to appear. For this reason, it is important for you to watch for any new symptoms, problems, or worsening of your condition.  Over the next few days, the abdominal pain may come and go, or be continuous. Other common symptoms can include nausea and vomiting. Sometimes it can be difficult to tell if you feel nauseous, you may just feel bad and not associate that feeling with nausea. Constipation, diarrhea, and a fever may go along with the pain.  The pain may  continue even if treated correctly over the following days. Depending on how things go, sometimes the cause can become clear and may require further or different treatment. Additional evaluations, medications, or tests may be needed.  Home care  Your health care provider may prescribe medications for pain, symptoms, or an infection.  Follow the health care provider's instructions for taking these medications.  General care    Rest until your next exam. No strenuous activities.    Try to find positions that ease discomfort. A small pillow placed on the abdomen may help relieve pain.    Something warm on your abdomen (such as a heating pad) may help, but be careful not to burn yourself.  Diet    Do not force yourself to eat, especially if having cramps, vomiting, or diarrhea.    Water is important so you do not get dehydrated. Soup may also be good. Sports drinks may also help, especially if they are not too acidic. Make sure you don't drink sugary drinks as this can make things worse. Take liquids in small amounts. Do not guzzle them.    Caffeine sometimes makes the pain and cramping worse.    Avoid dairy products if you have vomiting or diarrhea.    Don't eat large amounts at a time. Wait a few minutes between bites.    Eat a diet low in fiber (called a low-residue diet). Foods allowed include refined breads, white rice, fruit and vegetable juices without pulp, tender meats. These foods will pass more easily through the intestine.    Avoid fried or fatty foods, dairy, alcohol and spicy foods until your symptoms go away.  Follow-up care  Follow up with your health care provider as instructed, or if your pain does not begin to improve in the next 24 hours.  When to seek medical care  Seek prompt medical care if any of the following occur:    Pain gets worse or moves to the right lower abdomen    New or worsening vomiting or diarrhea    Swelling of the abdomen    Unable to pass stool for more than three days    New  fever over 101  F (38.3 C), or rising fever    Blood in vomit or bowel movements (dark red or black color)    Jaundice (yellow color of eyes and skin)    Weakness, dizziness    Chest, arm, back, neck or jaw pain    Unexpected vaginal bleeding or missed period  Call 911  Call emergency services if any of the following occur:    Trouble breathing    Confusion    Fainting or loss of consciousness    Rapid heart rate    Seizure    [id number] [facility], [address] [place] [address], [place] [address]. All rights reserved. This information is not intended as a substitute for professional medical care. Always follow your healthcare professional's instructions.          24 Hour Appointment Hotline       To make an appointment at any [facility], call [phone] ([phone]). If you don't have a family doctor or clinic, we will help you find one. [facility] clinics are conveniently located to serve the needs of you and your family.             Review of your medicines      Our records show that you are taking the medicines listed below. If these are incorrect, please call your family doctor or clinic.        Dose / Directions Last dose taken    ACETAMINOPHEN PO   Dose:  500 mg        Take 500 mg by mouth every 4 hours as needed for pain Reported on 4/10/2017   Refills:  0        CALCIUM CITRATE PLUS/MAGNESIUM Tabs   Dose:  1 tablet        Take 1 tablet by mouth daily   Refills:  0        cefdinir 300 MG capsule   Commonly known as:  OMNICEF   Dose:  300 mg   Quantity:  14 capsule        Take 1 capsule (300 mg) by mouth 2 times daily   Refills:  0        conjugated estrogens cream   Commonly known as:  PREMARIN   Dose:  0.5 g   Quantity:  30 g        Place 0.5 g vaginally three times a week   Refills:  6        fish oil-omega-3 fatty acids 1000 MG capsule        Indications: PN:   Refills:  0        * LIPITOR PO   Dose:  20 mg        Take 20 mg by mouth daily   Refills:  0        * atorvastatin 20 MG tablet    Commonly known as:  LIPITOR   Quantity:  90 tablet        TAKE ONE TABLET BY MOUTH EVERY DAY   Refills:  1        loperamide 2 MG capsule   Commonly known as:  IMODIUM   Dose:  2 mg   Quantity:  20 capsule        Take 1 capsule (2 mg) by mouth 2 times daily   Refills:  0        metoprolol 25 MG tablet   Commonly known as:  LOPRESSOR   Dose:  12.5 mg   Quantity:  180 tablet        Take 0.5 tablets (12.5 mg) by mouth 2 times daily   Refills:  3        nystatin 546870 UNIT/GM Powd   Commonly known as:  MYCOSTATIN   Quantity:  60 g        Apply topically 3 times daily   Refills:  3        omeprazole 20 MG CR capsule   Commonly known as:  priLOSEC   Quantity:  90 capsule        TAKE ONE CAPSULE BY MOUTH EVERY DAY   Refills:  3        * Notice:  This list has 2 medication(s) that are the same as other medications prescribed for you. Read the directions carefully, and ask your doctor or other care provider to review them with you.            Procedures and tests performed during your visit     Basic metabolic panel    CBC with platelets differential    CT Abdomen Pelvis w IV contrast only (Trauma/AAA)    UA with Microscopic      Orders Needing Specimen Collection     None      Pending Results     No orders found from 8/4/2017 to 8/7/2017.            Pending Culture Results     No orders found from 8/4/2017 to 8/7/2017.            Pending Results Instructions     If you had any lab results that were not finalized at the time of your Discharge, you can call the ED Lab Result RN at [phone]. You will be contacted by this team for any positive Lab results or changes in treatment. The nurses are available 7 days a week from 10A to 6:30P.  You can leave a message 24 hours per day and they will return your call.        Test Results From Your Hospital Stay        8/6/2017  8:00 PM      Component Results     Component Value Ref Range & Units Status    WBC 9.2 4.0 - 11.0 10e9/L Final    RBC Count 4.33 3.8 - 5.2 10e12/L Final     Hemoglobin 13.2 11.7 - 15.7 g/dL Final    Hematocrit 38.9 35.0 - 47.0 % Final    MCV 90 78 - 100 fl Final    MCH 30.5 26.5 - 33.0 pg Final    MCHC 33.9 31.5 - 36.5 g/dL Final    RDW 13.0 10.0 - 15.0 % Final    Platelet Count 204 150 - 450 10e9/L Final    Diff Method Automated Method  Final    % Neutrophils 73.1 % Final    % Lymphocytes 19.5 % Final    % Monocytes 6.0 % Final    % Eosinophils 0.2 % Final    % Basophils 0.7 % Final    % Immature Granulocytes 0.5 % Final    Nucleated RBCs 0 0 /100 Final    Absolute Neutrophil 6.7 1.6 - 8.3 10e9/L Final    Absolute Lymphocytes 1.8 0.8 - 5.3 10e9/L Final    Absolute Monocytes 0.6 0.0 - 1.3 10e9/L Final    Absolute Eosinophils 0.0 0.0 - 0.7 10e9/L Final    Absolute Basophils 0.1 0.0 - 0.2 10e9/L Final    Abs Immature Granulocytes 0.1 0 - 0.4 10e9/L Final    Absolute Nucleated RBC 0.0  Final         8/6/2017  8:10 PM      Component Results     Component Value Ref Range & Units Status    Sodium 137 133 - 144 mmol/L Final    Potassium 3.7 3.4 - 5.3 mmol/L Final    Chloride 105 94 - 109 mmol/L Final    Carbon Dioxide 25 20 - 32 mmol/L Final    Anion Gap 7 3 - 14 mmol/L Final    Glucose 137 (H) 70 - 99 mg/dL Final    Urea Nitrogen 16 7 - 30 mg/dL Final    Creatinine 1.02 0.52 - 1.04 mg/dL Final    GFR Estimate 52 (L) >60 mL/min/1.7m2 Final    Non  GFR Calc    GFR Estimate If Black 63 >60 mL/min/1.7m2 Final    African American GFR Calc    Calcium 8.9 8.5 - 10.1 mg/dL Final         8/6/2017  8:15 PM      Component Results     Component Value Ref Range & Units Status    Color Urine Jacinda  Final    Appearance Urine Cloudy  Final    Glucose Urine Negative NEG mg/dL Final    Bilirubin Urine Negative NEG Final    Ketones Urine Negative NEG mg/dL Final    Specific Gravity Urine 1.018 1.003 - 1.035 Final    Blood Urine Negative NEG Final    pH Urine 5.0 5.0 - 7.0 pH Final    Protein Albumin Urine Negative NEG mg/dL Final    Urobilinogen mg/dL 0.0 0.0 - 2.0 mg/dL  Final    Nitrite Urine Negative NEG Final    Leukocyte Esterase Urine Negative NEG Final    Source Midstream Urine  Final    WBC Urine 0 0 - 2 /HPF Final    RBC Urine <1 0 - 2 /HPF Final    Squamous Epithelial /HPF Urine <1 0 - 1 /HPF Final    Mucous Urine Present (A) NEG /LPF Final    Hyaline Casts 1 0 - 2 /LPF Final         8/6/2017  8:58 PM      Narrative     CT ABDOMEN AND PELVIS WITH CONTRAST 8/6/2017 8:49 PM     HISTORY: Ileostomy, question bowel obstruction.    COMPARISON: 8/21/2016.    TECHNIQUE: Volumetric helical acquisition of CT images from the lung  bases through the symphysis pubis after the administration of 72 mL  Isovue-370  intravenous contrast. Radiation dose for this scan was  reduced using automated exposure control, adjustment of the mA and/or  kV according to patient size, or iterative reconstruction technique.    FINDINGS: No bowel obstruction demonstrated. There is postsurgical  change of a cholecystectomy. There is some prominence to the biliary  system, although this is commonly seen post cholecystectomy and may  not have clinical significance. The liver, spleen, adrenal glands,  kidneys, and pancreas demonstrate no worrisome focal lesion. No free  fluid. No free air. There are no lymph nodes that are abnormal by size  criteria. No hydronephrosis. The visualized lung bases are  unremarkable. Survey of the visualized bony structures demonstrates no  destructive bony lesions. There are moderate atherosclerotic changes  of the visualized aorta and its branches. There is no evidence of  aortic dissection or aneurysm.        Impression     IMPRESSION: No acute process demonstrated in the abdomen and pelvis.     SANJAY GROSS MD                Clinical Quality Measure: Blood Pressure Screening     Your blood pressure was checked while you were in the emergency department today. The last reading we obtained was  BP: 185/83 . Please read the guidelines below about what these numbers mean and what  "you should do about them.  If your systolic blood pressure (the top number) is less than 120 and your diastolic blood pressure (the bottom number) is less than 80, then your blood pressure is normal. There is nothing more that you need to do about it.  If your systolic blood pressure (the top number) is 120-139 or your diastolic blood pressure (the bottom number) is 80-89, your blood pressure may be higher than it should be. You should have your blood pressure rechecked within a year by a primary care provider.  If your systolic blood pressure (the top number) is 140 or greater or your diastolic blood pressure (the bottom number) is 90 or greater, you may have high blood pressure. High blood pressure is treatable, but if left untreated over time it can put you at risk for heart attack, stroke, or kidney failure. You should have your blood pressure rechecked by a primary care provider within the next 4 weeks.  If your provider in the emergency department today gave you specific instructions to follow-up with your doctor or provider even sooner than that, you should follow that instruction and not wait for up to 4 weeks for your follow-up visit.        Thank you for choosing [facility]       Thank you for choosing [facility] for your care. Our goal is always to provide you with excellent care. Hearing back from our patients is one way we can continue to improve our services. Please take a few minutes to complete the written survey that you may receive in the mail after you visit with us. Thank you!        [employer] Information     [employer] lets you send messages to your doctor, view your test results, renew your prescriptions, schedule appointments and more. To sign up, go to [email or web address] . Click on \"Log in\" on the left side of the screen, which will take you to the Welcome page. Then click on \"Sign up Now\" on the right side of the page.     You will be asked to enter the access code listed below, as well as some " personal information. Please follow the directions to create your username and password.     Your access code is: [email or web address]  Expires: 2017  9:57 PM     Your access code will  in 90 days. If you need help or a new code, please call your [facility] clinic or [phone].        Care EveryWhere ID     This is your Care EveryWhere ID. This could be used by other organizations to access your [facility] medical records  [id number]        Equal Access to Services     [facility] : Ramona ornelaso Solavern, waaxda luqadaha, qaybta kaalmada elizabet, archana wagner . So [facility] [phone].    ATENCIÓN: Si habla español, tiene a greenberg disposición servicios gratuitos de asistencia lingüística. Llame al [phone].    We comply with applicable federal civil rights laws and [place] laws. We do not discriminate on the basis of race, color, national origin, age, disability sex, sexual orientation or gender identity.            After Visit Summary       This is your record. Keep this with you and show to your community pharmacist(s) and doctor(s) at your next visit.

## 2017-08-07 ASSESSMENT — ENCOUNTER SYMPTOMS
CONSTITUTIONAL NEGATIVE: 1
PSYCHIATRIC NEGATIVE: 1
NEUROLOGICAL NEGATIVE: 1
RESPIRATORY NEGATIVE: 1
VOMITING: 1
ABDOMINAL PAIN: 1
MUSCULOSKELETAL NEGATIVE: 1
CARDIOVASCULAR NEGATIVE: 1
NAUSEA: 1

## 2017-08-07 NOTE — DISCHARGE INSTRUCTIONS
*Abdominal Pain, Unknown Cause (Female)    The exact cause of your abdominal (stomach) pain is not certain. This does not mean that this is something to worry about, or the right tests were not done. Everyone likes to know the exact cause of the problem, but sometimes with abdominal pain, there is no clear-cut cause, and this could be a good thing. The good news is that your symptoms can be treated, and you will feel better.   Your condition does not seem serious now; however, sometimes the signs of a serious problem may take more time to appear. For this reason, it is important for you to watch for any new symptoms, problems, or worsening of your condition.  Over the next few days, the abdominal pain may come and go, or be continuous. Other common symptoms can include nausea and vomiting. Sometimes it can be difficult to tell if you feel nauseous, you may just feel bad and not associate that feeling with nausea. Constipation, diarrhea, and a fever may go along with the pain.  The pain may continue even if treated correctly over the following days. Depending on how things go, sometimes the cause can become clear and may require further or different treatment. Additional evaluations, medications, or tests may be needed.  Home care  Your health care provider may prescribe medications for pain, symptoms, or an infection.  Follow the health care provider's instructions for taking these medications.  General care    Rest until your next exam. No strenuous activities.    Try to find positions that ease discomfort. A small pillow placed on the abdomen may help relieve pain.    Something warm on your abdomen (such as a heating pad) may help, but be careful not to burn yourself.  Diet    Do not force yourself to eat, especially if having cramps, vomiting, or diarrhea.    Water is important so you do not get dehydrated. Soup may also be good. Sports drinks may also help, especially if they are not too acidic. Make sure you  don't drink sugary drinks as this can make things worse. Take liquids in small amounts. Do not guzzle them.    Caffeine sometimes makes the pain and cramping worse.    Avoid dairy products if you have vomiting or diarrhea.    Don't eat large amounts at a time. Wait a few minutes between bites.    Eat a diet low in fiber (called a low-residue diet). Foods allowed include refined breads, white rice, fruit and vegetable juices without pulp, tender meats. These foods will pass more easily through the intestine.    Avoid fried or fatty foods, dairy, alcohol and spicy foods until your symptoms go away.  Follow-up care  Follow up with your health care provider as instructed, or if your pain does not begin to improve in the next 24 hours.  When to seek medical care  Seek prompt medical care if any of the following occur:    Pain gets worse or moves to the right lower abdomen    New or worsening vomiting or diarrhea    Swelling of the abdomen    Unable to pass stool for more than three days    New fever over 101  F (38.3 C), or rising fever    Blood in vomit or bowel movements (dark red or black color)    Jaundice (yellow color of eyes and skin)    Weakness, dizziness    Chest, arm, back, neck or jaw pain    Unexpected vaginal bleeding or missed period  Call 911  Call emergency services if any of the following occur:    Trouble breathing    Confusion    Fainting or loss of consciousness    Rapid heart rate    Seizure    [id number] [facility], [address] [place] [address], [place] [address]. All rights reserved. This information is not intended as a substitute for professional medical care. Always follow your healthcare professional's instructions.

## 2017-08-07 NOTE — ED PROVIDER NOTES
History     Chief Complaint:    other      HPI   Gely Keene is a 77 year old female who presents with complaints of decreased output from ileostomy. States she has had minimal output since 10 this morning. Also states she has upper abdominal pain and vomited once on the way here. Denies fever, denies chest pain or SOB.     Allergies:    Allergies   Allergen Reactions     Bactrim [Sulfamethoxazole W/Trimethoprim] GI Disturbance     Vomiting     Codeine      Metronidazole      GI distubance     Sulfa Drugs      Sulfur      Versed [Midazolam]      vomited        Medications:        ondansetron (ZOFRAN ODT) 4 MG ODT tab   omeprazole (PRILOSEC) 20 MG CR capsule   atorvastatin (LIPITOR) 20 MG tablet   fish oil-omega-3 fatty acids 1000 MG capsule   cefdinir (OMNICEF) 300 MG capsule   Atorvastatin Calcium (LIPITOR PO)   nystatin (MYCOSTATIN) 929275 UNIT/GM POWD   metoprolol (LOPRESSOR) 25 MG tablet   ACETAMINOPHEN PO   loperamide (IMODIUM) 2 MG capsule   conjugated estrogens (PREMARIN) vaginal cream   Multiple Minerals-Vitamins (CALCIUM CITRATE PLUS/MAGNESIUM) TABS       Problem List:      Patient Active Problem List    Diagnosis Date Noted     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) 11/07/2016     Priority: Medium     Health Care Home 05/23/2016     Priority: Medium              S/p total colectomy on 4/22/16 by Shana Alaniz MD 05/10/2016     Priority: Medium     Restless legs syndrome (RLS) 05/10/2016     Priority: Medium     Anxiety 05/10/2016     Priority: Medium     Gastroesophageal reflux disease, esophagitis presence not specified 11/04/2015     Priority: Medium     Hypertension goal BP (blood pressure) < 140/90 10/30/2014     Priority: Medium     IFG (impaired fasting glucose) 10/30/2014     Priority: Medium     BCC (basal cell carcinoma) 10/30/2014     Priority: Medium     Hyperlipidemia LDL goal <160 09/08/2014     Priority: Medium        Past Medical History:      Past  Medical History:   Diagnosis Date     Anxiety      BCC (basal cell carcinoma of skin)      Esophageal reflux (GERD) 9/8/2014     HTN (hypertension) 9/8/2014     Hyperlipidemia LDL goal <160 9/8/2014     IFG (impaired fasting glucose) 9/8/2014     PONV (postoperative nausea and vomiting)        Past Surgical History:      Past Surgical History:   Procedure Laterality Date     APPENDECTOMY  1952     cholecystitis       choledocolithiasis       COLECTOMY WITH COLOSTOMY, COMBINED N/A 4/22/2016    Procedure: COMBINED COLECTOMY WITH COLOSTOMY;  Surgeon: Shana Alaniz MD;  Location: [facility]     HYSTERECTOMY  1987     LAPAROSCOPIC CHOLECYSTECTOMY  2008     LAPAROTOMY EXPLORATORY N/A 4/22/2016    Procedure: LAPAROTOMY EXPLORATORY;  Surgeon: Shana Alaniz MD;  Location: [facility] OR     PHACOEMULSIFICATION CLEAR CORNEA WITH STANDARD INTRAOCULAR LENS IMPLANT Left 5/9/2017    Procedure: PHACOEMULSIFICATION CLEAR CORNEA WITH STANDARD INTRAOCULAR LENS IMPLANT;  LEFT EYE PHACOEMULSIFICATION CLEAR CORNEA WITH STANDARD INTRAOCULAR LENS IMPLANT ;  Surgeon: Eloy Eric MD;  Location: [facility]     PHACOEMULSIFICATION CLEAR CORNEA WITH STANDARD INTRAOCULAR LENS IMPLANT Right 5/23/2017    Procedure: PHACOEMULSIFICATION CLEAR CORNEA WITH STANDARD INTRAOCULAR LENS IMPLANT;  RIGHT EYE PHACOEMULSIFICATION CLEAR CORNEA WITH STANDARD INTRAOCULAR LENS IMPLANT ;  Surgeon: Eloy Eric MD;  Location: [facility]       Family History:      Family History   Problem Relation Age of Onset     Breast Cancer Daughter      Breast Cancer Mother      Breast Cancer Sister        Social History:    Marital Status:   [2]  Social History   Substance Use Topics     Smoking status: Never Smoker     Smokeless tobacco: Never Used     Alcohol use Yes      Comment: socially        Review of Systems   Constitutional: Negative.    HENT: Negative.    Respiratory: Negative.    Cardiovascular: Negative.    Gastrointestinal: Positive for  abdominal pain, nausea and vomiting.        Ileostomy bag.   Musculoskeletal: Negative.    Skin: Negative.    Neurological: Negative.    Psychiatric/Behavioral: Negative.          Physical Exam   First Vitals:  BP: (!) 216/97  Pulse: 75  Heart Rate: 75  Temp: 98  F (36.7  C)  Resp: 18  SpO2: 97 %      Physical Exam    General: Appears stated age  HENT: Atraumatic.   Eyes: No scleral injection, conjunctivitis, or drainage.    Neck: Supple with normal ROM.   Cardio: Regular rate and rhythm, no murmurs, rubs, or gallops  Pulmonary/Chest: Clear to ausculation bilaterally.    Abdomen: Ileostomy bag with moderate amount of green/brwon stool, no visible blood, normal bowel sounds  Musculoskeletal: No pedal edema, normal gait.    Lymph: No anterior or posterior lymphadenopathy.   Neuro: Alert and oriented, no focal deficits noted.   Skin: Normal color and temperature, no rashes to visible exposed skin.   Psych: Mood and affect normal.        Emergency Department Course     Imaging:  No acute process demonstrated in the abdomen and pelvis.    Emergency Department Course:    ED Course       Impression & Plan      Medical Decision Making:    Gely Pisano female presents with complaints of decreased output from ileostomy, abdominal pain and vomiting. Based clinical presentation and exam findings a CT was performed to rule out bowel obstruction. CT no acute findings. Laboratory studies are unremarkable except for elevated glucose at 137.  Patient was medicated with improved symptoms able to tolerate oral fluids. The urinalysis is negative for infection. There is stool in the ileostomy.  There is no clear etiology for patient's pain. there is no indication for further work up or admission. Patient is stable to discharge home provided return precautions.      Diagnosis:    ICD-10-CM    1. Abdominal pain, epigastric R10.13 CBC with platelets differential     Basic metabolic panel     UA with Microscopic   2. Encounter for attention  to ileostomy (H) Z43.2    3. Vomiting, intractability of vomiting not specified, presence of nausea not specified, unspecified vomiting type R11.10        Disposition:  discharged to home    Discharge Medications:  Discharge Medication List as of 8/6/2017  9:57 PM            Lois Samson  8/6/2017   [facility] EMERGENCY DEPARTMENT       Lois Samson, APRN [place]  08/07/17 0208

## 2017-08-08 ENCOUNTER — OFFICE VISIT (OUTPATIENT)
Dept: PEDIATRICS | Facility: CLINIC | Age: 77
End: 2017-08-08
Payer: COMMERCIAL

## 2017-08-08 VITALS
WEIGHT: 148 LBS | DIASTOLIC BLOOD PRESSURE: 70 MMHG | OXYGEN SATURATION: 97 % | TEMPERATURE: 98.1 F | BODY MASS INDEX: 27.51 KG/M2 | SYSTOLIC BLOOD PRESSURE: 118 MMHG | HEART RATE: 73 BPM

## 2017-08-08 DIAGNOSIS — Z93.2 STATUS POST ILEOSTOMY (H): ICD-10-CM

## 2017-08-08 DIAGNOSIS — R11.0 NAUSEA: Primary | ICD-10-CM

## 2017-08-08 PROCEDURE — 99213 OFFICE O/P EST LOW 20 MIN: CPT | Performed by: INTERNAL MEDICINE

## 2017-08-08 NOTE — MR AVS SNAPSHOT
"              After Visit Summary   2017    Gely Keene    MRN: [id number]           Patient Information     Date Of Birth          1940        Visit Information        Provider Department      2017 1:20 PM Schuyler Ortiz MD [facility]        Care Instructions    INSTRUCTIONS FOR TODAY:     next follow-up with Dr Booth in 6 months     Dr Ortiz            Follow-ups after your visit        Who to contact     If you have questions or need follow up information about today's clinic visit or your schedule please contact [facility] directly at [phone].  Normal or non-critical lab and imaging results will be communicated to you by MyChart, letter or phone within 4 business days after the clinic has received the results. If you do not hear from us within 7 days, please contact the clinic through [employer] or phone. If you have a critical or abnormal lab result, we will notify you by phone as soon as possible.  Submit refill requests through [employer] or call your pharmacy and they will forward the refill request to us. Please allow 3 business days for your refill to be completed.          Additional Information About Your Visit        MyChart Information     [employer] lets you send messages to your doctor, view your test results, renew your prescriptions, schedule appointments and more. To sign up, go to [email or web address] . Click on \"Log in\" on the left side of the screen, which will take you to the Welcome page. Then click on \"Sign up Now\" on the right side of the page.     You will be asked to enter the access code listed below, as well as some personal information. Please follow the directions to create your username and password.     Your access code is: [email or web address]  Expires: 2017  9:57 PM     Your access code will  in 90 days. If you need help or a new code, please call your [facility] or [phone].        Care EveryWhere ID     This is your " Care EveryWhere ID. This could be used by other organizations to access your [facility] medical records  [id number]        Your Vitals Were     Pulse Temperature Pulse Oximetry BMI (Body Mass Index)          73 98.1  F (36.7  C) (Oral) 97% 27.51 kg/m2         Blood Pressure from Last 3 Encounters:   08/08/17 118/70   08/06/17 194/83   05/23/17 103/48    Weight from Last 3 Encounters:   08/08/17 148 lb (67.1 kg)   05/09/17 144 lb (65.3 kg)   05/04/17 144 lb (65.3 kg)              Today, you had the following     No orders found for display       Primary Care Provider Office Phone # Fax #    Hien Booth [phone]       [facility] [address] [place] [address] [address] [place] [address]        Equal Access to Services     [facility]: Hadii aad ku hadashhanna Solavern, waaxda luqadaha, qaybta kaalmada karlyyasheila, archana wagner . So [facility] [phone].    ATENCIÓN: Si habla español, tiene a greenberg disposición servicios gratuitos de asistencia lingüística. Llame al [phone].    We comply with applicable federal civil rights laws and [place] laws. We do not discriminate on the basis of race, color, national origin, age, disability sex, sexual orientation or gender identity.            Thank you!     Thank you for choosing [facility]  for your care. Our goal is always to provide you with excellent care. Hearing back from our patients is one way we can continue to improve our services. Please take a few minutes to complete the written survey that you may receive in the mail after your visit with us. Thank you!             Your Updated Medication List - Protect others around you: Learn how to safely use, store and throw away your medicines at www.disposemymeds.org.          This list is accurate as of: 8/8/17  1:32 PM.  Always use your most recent med list.                   Brand Name Dispense Instructions for use Diagnosis    ACETAMINOPHEN PO      Take 500 mg  by mouth every 4 hours as needed for pain Reported on 4/10/2017        CALCIUM CITRATE PLUS/MAGNESIUM Tabs      Take 1 tablet by mouth daily        cefdinir 300 MG capsule    OMNICEF    14 capsule    Take 1 capsule (300 mg) by mouth 2 times daily    Acute cystitis with hematuria       conjugated estrogens cream    PREMARIN    30 g    Place 0.5 g vaginally three times a week    Atrophic vaginitis       fish oil-omega-3 fatty acids 1000 MG capsule      Indications: PN:        * LIPITOR PO      Take 20 mg by mouth daily        * atorvastatin 20 MG tablet    LIPITOR    90 tablet    TAKE ONE TABLET BY MOUTH EVERY DAY    Hyperlipidemia LDL goal <160       loperamide 2 MG capsule    IMODIUM    20 capsule    Take 1 capsule (2 mg) by mouth 2 times daily    Colitis       metoprolol 25 MG tablet    LOPRESSOR    180 tablet    Take 0.5 tablets (12.5 mg) by mouth 2 times daily    Sinus tachycardia       nystatin 061452 UNIT/GM Powd    MYCOSTATIN    60 g    Apply topically 3 times daily    Fungal infection       omeprazole 20 MG CR capsule    priLOSEC    90 capsule    TAKE ONE CAPSULE BY MOUTH EVERY DAY    Gastroesophageal reflux disease, esophagitis presence not specified       ondansetron 4 MG ODT tab    ZOFRAN ODT    10 tablet    Take 1 tablet (4 mg) by mouth every 8 hours as needed for nausea        * Notice:  This list has 2 medication(s) that are the same as other medications prescribed for you. Read the directions carefully, and ask your doctor or other care provider to review them with you.

## 2017-08-08 NOTE — PROGRESS NOTES
SUBJECTIVE:                                                    Gely Keene is a 77 year old female who presents to clinic today for the following health issues:      ED/UC Followup:    Facility:  [facility]  Date of visit: 8/6/17  Reason for visit: abdominal pain  Current Status:        77-year-old woman with a history of prior colectomy and ileostomy presents for follow-up of recent emergency room visit for abdominal discomfort and decreased output from ostomy.  Patient did have some associated nausea and vomiting prompting her to go to the emergency room.  Emergency room course-CT scan was obtained to rule out obstruction-negative.  Labwork was unremarkable apart from elevated nonfasting blood sugar, u/a negative, normal CBC.  Patient was treated with IV fluids and antiemetics and was tolerating PO at discharge.   since discharge, patient has had no further nausea abdominal pain or difficulty with the ostomy.      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)     Past Surgical History:   Procedure Laterality Date     APPENDECTOMY  1952     cholecystitis       choledocolithiasis       COLECTOMY WITH COLOSTOMY, COMBINED N/A 4/22/2016    Procedure: COMBINED COLECTOMY WITH COLOSTOMY;  Surgeon: Shana Alaniz MD;  Location: [facility] OR     HYSTERECTOMY  1987     LAPAROSCOPIC CHOLECYSTECTOMY  2008     LAPAROTOMY EXPLORATORY N/A 4/22/2016    Procedure: LAPAROTOMY EXPLORATORY;  Surgeon: Shana Alaniz MD;  Location: [facility] OR     PHACOEMULSIFICATION CLEAR CORNEA WITH STANDARD INTRAOCULAR LENS IMPLANT Left  5/9/2017    Procedure: PHACOEMULSIFICATION CLEAR CORNEA WITH STANDARD INTRAOCULAR LENS IMPLANT;  LEFT EYE PHACOEMULSIFICATION CLEAR CORNEA WITH STANDARD INTRAOCULAR LENS IMPLANT ;  Surgeon: Eloy Eric MD;  Location: [facility]     PHACOEMULSIFICATION CLEAR CORNEA WITH STANDARD INTRAOCULAR LENS IMPLANT Right 5/23/2017    Procedure: PHACOEMULSIFICATION CLEAR CORNEA WITH STANDARD INTRAOCULAR LENS IMPLANT;  RIGHT EYE PHACOEMULSIFICATION CLEAR CORNEA WITH STANDARD INTRAOCULAR LENS IMPLANT ;  Surgeon: Eloy Eric MD;  Location: [facility]       Social History   Substance Use Topics     Smoking status: Never Smoker     Smokeless tobacco: Never Used     Alcohol use Yes      Comment: socially     Family History   Problem Relation Age of Onset     Breast Cancer Daughter      Breast Cancer Mother      Breast Cancer Sister          Current Outpatient Prescriptions   Medication Sig Dispense Refill     omeprazole (PRILOSEC) 20 MG CR capsule TAKE ONE CAPSULE BY MOUTH EVERY DAY 90 capsule 3     atorvastatin (LIPITOR) 20 MG tablet TAKE ONE TABLET BY MOUTH EVERY DAY 90 tablet 1     fish oil-omega-3 fatty acids 1000 MG capsule Indications: PN:         cefdinir (OMNICEF) 300 MG capsule Take 1 capsule (300 mg) by mouth 2 times daily 14 capsule 0     Atorvastatin Calcium (LIPITOR PO) Take 20 mg by mouth daily       nystatin (MYCOSTATIN) 610439 UNIT/GM POWD Apply topically 3 times daily 60 g 3     metoprolol (LOPRESSOR) 25 MG tablet Take 0.5 tablets (12.5 mg) by mouth 2 times daily 180 tablet 3     ACETAMINOPHEN PO Take 500 mg by mouth every 4 hours as needed for pain Reported on 4/10/2017       loperamide (IMODIUM) 2 MG capsule Take 1 capsule (2 mg) by mouth 2 times daily 20 capsule      conjugated estrogens (PREMARIN) vaginal cream Place 0.5 g vaginally three times a week 30 g 6     Multiple Minerals-Vitamins (CALCIUM CITRATE PLUS/MAGNESIUM) TABS Take 1 tablet by mouth daily            OBJECTIVE:                                                     /70 (Cuff Size: Adult Regular)  Pulse 73  Temp 98.1  F (36.7  C) (Oral)  Wt 148 lb (67.1 kg)  SpO2 97%  BMI 27.51 kg/m2 Body mass index is 27.51 kg/(m^2).  GENERAL:  alert,  no distress  NECK: no tenderness, no adenopathy  RESP: lungs clear to auscultation - no rales, no rhonchi, no wheezes  CV: regular rates and rhythm, normal S1 S2, no S3 or S4 and no murmur, no click or rub -  ABDOMEN: soft, no tenderness, no  hepatosplenomegaly, no masses, normal bowel sounds     Ileostomy site normal  MS: extremities- no edema     ASSESSMENT/PLAN:                                                        ICD-10-CM    1. Nausea R11.0    2. Status post ileostomy (H) Z93.2       Emergency room evaluation for decreased ostomy output nausea and transient abdominal pain reviewed.  Symptoms have resolved at this point/  Unclear etiology-no evidence of obstruction; possible gastroenteritis although short course is atypical.        Patient does admit to some frustration with the ostomy (which is relatively new)  over the past several months and some anxiety associated with cares.  Discussed routine ostomy cares and dietary guidelines.    Schuyler Ortiz MD  [facility]

## 2017-08-08 NOTE — NURSING NOTE
"Chief Complaint   Patient presents with     Er F/u       Initial /70 (Cuff Size: Adult Regular)  Pulse 73  Temp 98.1  F (36.7  C) (Oral)  Wt 148 lb (67.1 kg)  SpO2 97%  BMI 27.51 kg/m2 Estimated body mass index is 27.51 kg/(m^2) as calculated from the following:    Height as of 5/9/17: 5' 1.5\" (1.562 m).    Weight as of this encounter: 148 lb (67.1 kg).  Medication Reconciliation: komal Childs CMA    "

## 2017-08-14 ENCOUNTER — HOSPITAL ENCOUNTER (INPATIENT)
Facility: CLINIC | Age: 77
LOS: 1 days | Discharge: HOME | DRG: 445 | End: 2017-08-15
Attending: EMERGENCY MEDICINE | Admitting: INTERNAL MEDICINE
Payer: MEDICARE

## 2017-08-14 ENCOUNTER — APPOINTMENT (OUTPATIENT)
Dept: ULTRASOUND IMAGING | Facility: CLINIC | Age: 77
DRG: 445 | End: 2017-08-14
Attending: EMERGENCY MEDICINE
Payer: MEDICARE

## 2017-08-14 ENCOUNTER — TELEPHONE (OUTPATIENT)
Dept: PEDIATRICS | Facility: CLINIC | Age: 77
End: 2017-08-14

## 2017-08-14 ENCOUNTER — APPOINTMENT (OUTPATIENT)
Dept: CT IMAGING | Facility: CLINIC | Age: 77
DRG: 445 | End: 2017-08-14
Attending: EMERGENCY MEDICINE
Payer: MEDICARE

## 2017-08-14 DIAGNOSIS — K83.09 CHOLANGITIS (H): ICD-10-CM

## 2017-08-14 LAB
ALBUMIN SERPL-MCNC: 3.6 G/DL (ref 3.4–5)
ALBUMIN UR-MCNC: NEGATIVE MG/DL
ALP SERPL-CCNC: 272 U/L (ref 40–150)
ALT SERPL W P-5'-P-CCNC: 365 U/L (ref 0–50)
ANION GAP SERPL CALCULATED.3IONS-SCNC: 7 MMOL/L (ref 3–14)
APPEARANCE UR: ABNORMAL
AST SERPL W P-5'-P-CCNC: 432 U/L (ref 0–45)
BACTERIA #/AREA URNS HPF: ABNORMAL /HPF
BASOPHILS # BLD AUTO: 0 10E9/L (ref 0–0.2)
BASOPHILS NFR BLD AUTO: 0.8 %
BILIRUB SERPL-MCNC: 2.3 MG/DL (ref 0.2–1.3)
BILIRUB UR QL STRIP: NEGATIVE
BUN SERPL-MCNC: 14 MG/DL (ref 7–30)
CALCIUM SERPL-MCNC: 9.4 MG/DL (ref 8.5–10.1)
CHLORIDE SERPL-SCNC: 103 MMOL/L (ref 94–109)
CO2 SERPL-SCNC: 25 MMOL/L (ref 20–32)
COLOR UR AUTO: ABNORMAL
CREAT SERPL-MCNC: 1.13 MG/DL (ref 0.52–1.04)
DIFFERENTIAL METHOD BLD: NORMAL
EOSINOPHIL # BLD AUTO: 0 10E9/L (ref 0–0.7)
EOSINOPHIL NFR BLD AUTO: 0.8 %
ERYTHROCYTE [DISTWIDTH] IN BLOOD BY AUTOMATED COUNT: 13 % (ref 10–15)
GFR SERPL CREATININE-BSD FRML MDRD: 46 ML/MIN/1.7M2
GLUCOSE SERPL-MCNC: 130 MG/DL (ref 70–99)
GLUCOSE UR STRIP-MCNC: NEGATIVE MG/DL
HCT VFR BLD AUTO: 38.4 % (ref 35–47)
HGB BLD-MCNC: 13 G/DL (ref 11.7–15.7)
HGB UR QL STRIP: NEGATIVE
IMM GRANULOCYTES # BLD: 0 10E9/L (ref 0–0.4)
IMM GRANULOCYTES NFR BLD: 0.2 %
KETONES UR STRIP-MCNC: NEGATIVE MG/DL
LACTATE BLD-SCNC: 0.9 MMOL/L (ref 0.7–2.1)
LEUKOCYTE ESTERASE UR QL STRIP: ABNORMAL
LIPASE SERPL-CCNC: 257 U/L (ref 73–393)
LYMPHOCYTES # BLD AUTO: 0.8 10E9/L (ref 0.8–5.3)
LYMPHOCYTES NFR BLD AUTO: 17.1 %
MCH RBC QN AUTO: 30.2 PG (ref 26.5–33)
MCHC RBC AUTO-ENTMCNC: 33.9 G/DL (ref 31.5–36.5)
MCV RBC AUTO: 89 FL (ref 78–100)
MONOCYTES # BLD AUTO: 0.6 10E9/L (ref 0–1.3)
MONOCYTES NFR BLD AUTO: 11.8 %
MUCOUS THREADS #/AREA URNS LPF: PRESENT /LPF
NEUTROPHILS # BLD AUTO: 3.4 10E9/L (ref 1.6–8.3)
NEUTROPHILS NFR BLD AUTO: 69.3 %
NITRATE UR QL: NEGATIVE
NRBC # BLD AUTO: 0 10*3/UL
NRBC BLD AUTO-RTO: 0 /100
PH UR STRIP: 5 PH (ref 5–7)
PLATELET # BLD AUTO: 184 10E9/L (ref 150–450)
POTASSIUM SERPL-SCNC: 4.2 MMOL/L (ref 3.4–5.3)
PROT SERPL-MCNC: 7.6 G/DL (ref 6.8–8.8)
RBC # BLD AUTO: 4.3 10E12/L (ref 3.8–5.2)
RBC #/AREA URNS AUTO: 3 /HPF (ref 0–2)
SODIUM SERPL-SCNC: 135 MMOL/L (ref 133–144)
SP GR UR STRIP: 1.01 (ref 1–1.03)
SQUAMOUS #/AREA URNS AUTO: 8 /HPF (ref 0–1)
URN SPEC COLLECT METH UR: ABNORMAL
UROBILINOGEN UR STRIP-MCNC: 0 MG/DL (ref 0–2)
WBC # BLD AUTO: 4.9 10E9/L (ref 4–11)
WBC #/AREA URNS AUTO: 33 /HPF (ref 0–2)

## 2017-08-14 PROCEDURE — 96375 TX/PRO/DX INJ NEW DRUG ADDON: CPT

## 2017-08-14 PROCEDURE — 85025 COMPLETE CBC W/AUTO DIFF WBC: CPT | Performed by: EMERGENCY MEDICINE

## 2017-08-14 PROCEDURE — 81001 URINALYSIS AUTO W/SCOPE: CPT | Performed by: EMERGENCY MEDICINE

## 2017-08-14 PROCEDURE — 74176 CT ABD & PELVIS W/O CONTRAST: CPT

## 2017-08-14 PROCEDURE — 99285 EMERGENCY DEPT VISIT HI MDM: CPT | Mod: 25

## 2017-08-14 PROCEDURE — 96376 TX/PRO/DX INJ SAME DRUG ADON: CPT

## 2017-08-14 PROCEDURE — 83690 ASSAY OF LIPASE: CPT | Performed by: EMERGENCY MEDICINE

## 2017-08-14 PROCEDURE — 83605 ASSAY OF LACTIC ACID: CPT | Performed by: EMERGENCY MEDICINE

## 2017-08-14 PROCEDURE — 96365 THER/PROPH/DIAG IV INF INIT: CPT | Mod: 59

## 2017-08-14 PROCEDURE — 76705 ECHO EXAM OF ABDOMEN: CPT

## 2017-08-14 PROCEDURE — 25000128 H RX IP 250 OP 636: Performed by: EMERGENCY MEDICINE

## 2017-08-14 PROCEDURE — 80053 COMPREHEN METABOLIC PANEL: CPT | Performed by: EMERGENCY MEDICINE

## 2017-08-14 RX ORDER — LIDOCAINE 40 MG/G
CREAM TOPICAL
Status: DISCONTINUED | OUTPATIENT
Start: 2017-08-14 | End: 2017-08-15

## 2017-08-14 RX ORDER — MORPHINE SULFATE 4 MG/ML
4 INJECTION, SOLUTION INTRAMUSCULAR; INTRAVENOUS
Status: DISCONTINUED | OUTPATIENT
Start: 2017-08-14 | End: 2017-08-15

## 2017-08-14 RX ORDER — ONDANSETRON 2 MG/ML
4 INJECTION INTRAMUSCULAR; INTRAVENOUS EVERY 30 MIN PRN
Status: DISCONTINUED | OUTPATIENT
Start: 2017-08-14 | End: 2017-08-15

## 2017-08-14 RX ORDER — NYSTATIN 100000 [USP'U]/G
POWDER TOPICAL PRN
COMMUNITY
End: 2019-01-28

## 2017-08-14 RX ORDER — FENTANYL CITRATE 50 UG/ML
25 INJECTION, SOLUTION INTRAMUSCULAR; INTRAVENOUS
Status: DISCONTINUED | OUTPATIENT
Start: 2017-08-14 | End: 2017-08-15

## 2017-08-14 RX ORDER — MORPHINE SULFATE 2 MG/ML
2 INJECTION, SOLUTION INTRAMUSCULAR; INTRAVENOUS
Status: COMPLETED | OUTPATIENT
Start: 2017-08-14 | End: 2017-08-14

## 2017-08-14 RX ORDER — ONDANSETRON 2 MG/ML
4 INJECTION INTRAMUSCULAR; INTRAVENOUS ONCE
Status: COMPLETED | OUTPATIENT
Start: 2017-08-14 | End: 2017-08-14

## 2017-08-14 RX ADMIN — TAZOBACTAM SODIUM AND PIPERACILLIN SODIUM 3.38 G: 375; 3 INJECTION, SOLUTION INTRAVENOUS at 23:00

## 2017-08-14 RX ADMIN — MORPHINE SULFATE 4 MG: 4 INJECTION, SOLUTION INTRAMUSCULAR; INTRAVENOUS at 19:39

## 2017-08-14 RX ADMIN — MORPHINE SULFATE 4 MG: 4 INJECTION, SOLUTION INTRAMUSCULAR; INTRAVENOUS at 21:29

## 2017-08-14 RX ADMIN — MORPHINE SULFATE 2 MG: 2 INJECTION, SOLUTION INTRAMUSCULAR; INTRAVENOUS at 19:09

## 2017-08-14 RX ADMIN — ONDANSETRON 4 MG: 2 INJECTION INTRAMUSCULAR; INTRAVENOUS at 19:09

## 2017-08-14 RX ADMIN — ONDANSETRON 4 MG: 2 INJECTION INTRAMUSCULAR; INTRAVENOUS at 21:32

## 2017-08-14 ASSESSMENT — ENCOUNTER SYMPTOMS
DYSURIA: 0
FEVER: 0
NAUSEA: 0
APPETITE CHANGE: 1
ABDOMINAL PAIN: 1
CONSTIPATION: 0
VOMITING: 0
DIARRHEA: 0
HEMATURIA: 0
BLOOD IN STOOL: 0
BACK PAIN: 1
CHILLS: 0

## 2017-08-14 NOTE — ED PROVIDER NOTES
History     Chief Complaint:  Abdominal Pain    HPI   Gely Keene is a 77 year old female, with a history of cholecystitis, GERD, cholecystectomy, appendectomy, and choledocholithiasis, who presents with her  to the ED for evaluation of abdominal pain. The patient reports her abdominal pain began last Saturday. The patient visited the ED on Sunday where her CT was unremarkable. The patient reports her pain is across the top of her abdomen and radiates to her mid back. The patient also notes having less of an appetite. The patient denies any fevers, chills, nausea, vomiting, diarrhea, constipation, blood in stool, dysuria, or hematuria. Of note, the patient reports her ileostomy has been fine since the last ED visit.       CT Abdomen Pelvis w IV Contrast, 8/6/2017  IMPRESSION: No acute process demonstrated in the abdomen and pelvis. As read by radiology     Allergies:  Bactrim  Codeine  Metronidazole  Sulfa drugs  Sulfur  Versed    Medications:    omeprazole (PRILOSEC) 20 MG CR capsule   atorvastatin (LIPITOR) 20 MG tablet   fish oil-omega-3 fatty acids 1000 MG capsule   cefdinir (OMNICEF) 300 MG capsule   Atorvastatin Calcium (LIPITOR PO)   nystatin (MYCOSTATIN) 238783 UNIT/GM POWD   metoprolol (LOPRESSOR) 25 MG tablet   ACETAMINOPHEN PO   loperamide (IMODIUM) 2 MG capsule   conjugated estrogens (PREMARIN) vaginal cream   Multiple Minerals-Vitamins (CALCIUM CITRATE PLUS/MAGNESIUM) TABS     Past Medical History:    Cholecystitis  Anxiety  BCC  GERD  Hypertension  Hyperlipidemia  RLS  Intestinal ostomy    Past Surgical History:    Bilateral intraocular lens implant   Cholecystectomy  Hysterectomy  Colectomy w/ colostomy   Appendectomy  Choledocholithiasis    Family History:    Breast cancer    Social History:  Smoking status: Never smoker  Alcohol use: Socially  Presents to ED with   Marital Status:   [2]     Review of Systems   Constitutional: Positive for appetite change. Negative  for chills and fever.   Gastrointestinal: Positive for abdominal pain. Negative for blood in stool, constipation, diarrhea, nausea and vomiting.   Genitourinary: Negative for dysuria and hematuria.   Musculoskeletal: Positive for back pain.   All other systems reviewed and are negative.    Physical Exam     Patient Vitals for the past 24 hrs:   BP Temp Temp src Pulse Heart Rate Resp SpO2   08/14/17 2345 (!) 203/76 - - - - - 94 %   08/14/17 2300 (!) 201/79 - - - - - 95 %   08/14/17 2245 (!) 201/83 - - - - - 96 %   08/14/17 2230 (!) 207/92 - - - - - 96 %   08/14/17 2130 (!) 216/102 - - - - - 100 %   08/14/17 2115 (!) 207/87 - - - - - 98 %   08/14/17 2100 (!) 202/87 - - - - - 99 %   08/14/17 1930 (!) 208/87 - - - - - 100 %   08/14/17 1816 173/81 100.1  F (37.8  C) Temporal 86 - 16 100 %      Physical Exam  Constitutional:  Appears well-developed and well-nourished. Alert. Uncomfortable, conversant. Non toxic.  HENT:   Head: Atraumatic.   Nose: Nose normal.  Mouth/Throat: Oral mucosa is clear and moist. no trismus. Pharynx normal. Tonsils symmetric. No tonsillar enlargement, erythema, or exudate.  Eyes: Conjunctivae normal. EOM normal. Pupils equal, round, and reactive to light. No scleral icterus.   Neck: Normal range of motion. Neck supple. No tracheal deviation present.   Cardiovascular: Normal rate, regular rhythm. No gallop. No friction rub. No murmur heard. Symmetric radial artery pulses   Pulmonary/Chest: Effort normal. No stridor. No respiratory distress. No wheezes. No rales. No rhonchi . No tenderness.   Abdominal: Soft. Bowel sounds normal. No distension. No mass. marked tenderness across upper abdomen, Max epigastric and right upper quadrant. Has pain that radiates through to her back but no specific CD8 tenderness.. No rebound. No guarding.   Musculoskeletal: Normal range of motion. No edema. No tenderness. No deformity   Lymph: No cervical adenopathy.   Neurological: Alert and oriented to person, place,  and time. Normal strength. CN II-VII intact. No sensory deficit. GCS eye subscore is 4. GCS verbal subscore is 5. GCS motor subscore is 6. Normal coordination   Skin: Skin is warm and dry. No rash noted. No pallor. Normal capillary refill. no evidence for shingles on exam. No jaundice so he has had  Psychiatric:  Normal mood. Normal affect.     Emergency Department Course     Imaging:  Radiographic findings were communicated with the patient who voiced understanding of the findings.    US Abdomen Limited  IMPRESSION: Extrahepatic biliary dilatation status post  Cholecystectomy. As read by Radiology.    CT Abdomen Pelvis w/o Contrast  IMPRESSION: No acute abnormality. No bowel obstruction or  Inflammation. As read by Radiology.    Laboratory:  Lactic acid: 0.9  Lipase: 257  UA: Leukocyte esterase moderate, WBC 33(H), RBC 3(H), Bacteria few, Squamous epithelial 8(H), Mucous present  CBC: o/w WNL (WBC 4.9, HGB 13.0, )   CMP: Glucose 130(H), GFR estimate 46(L), Bilirubin total 2.3(H), Alkphos 272(H), (H), (H), Creatinine 1.13(H), o/w WNL    Interventions:  1909: Zofran 4mg IV  1909: Morphine 2mg IV  1939: Morphine 4mg IV  2129: Morphine 4mg IV  2132: Zofran 4mg IV    Emergency Department Course:  Past medical records, nursing notes, and vitals reviewed.  1849: I performed an exam of the patient and obtained history, as documented above.  IV inserted and blood drawn.    The patient was sent for an abdomen ultrasound and abdomen pelvis CT while in the emergency department, findings above.    2112: I rechecked the patient. Explained findings to patient.    2220: I spoke to Dr. Juan wise of the hospitalist service who accepts the patient for admission.     2245: I rechecked the patient. Explained findings to patient.    Findings and plan explained to the Patient who consents to admission. Discussed the patient with Dr. Juan wise, who will admit the patient to a inpatient bed for further monitoring,  evaluation, and treatment.     Impression & Plan      Medical Decision Making:  Gely Keene is a 77 year old female with a complex past medical history who presents to your today with upper abdominal pain wrapping and abandoned like pattern around her upper abdomen and right upper quadrant. She has a complex past surgical history with a total colectomy last year, distant history of cholecystectomy, appendectomy, hysterectomy. She's been experiencing pain since last weekend when she was seen in the ER with negative laboratory workup and CT imaging for obstruction.     Differential here initially included retained, bile duct stone versus pancreatitis; gastritis ; peptic ulcer disease; partial small ball obstruction; ileitis; perforation; abscess. Lipase is normal but LFTs including transaminases and bilirubin are elevated. Right upper quadrant shows no direct evidence of retained common bile looks stone, but she does have a dilated common bile duct. I discussed this with the radiologist. He feels that this degree of dedication can be seen in a normal post cholecystectomy patient. He says is unchanged from last week's CT scan. However she was symptomatic with this illness at the time of the scan. He compared imaging from last year's CT scan and he does think the doctors dialing up from 9 mm to 13 mm since then. Also some nonspecific findings around the hepatic triad. 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 Will start on antibiotics now. We discussed the potential risk of recurrent C diff, however since she is status post colectomy that seems to be. Risk of untreated cholangitis clearly  outweighs the risk of c diff.    The patient had several sold of vomiting here in the ER. Concern for partial SBO. fortunately CT scan is negative.    She has hypertensive here in the ER. I suspect that's due to her acute pain. I don't think his  indication for emergent blood pressure treatment here.    Critical Care time:  none    Diagnosis:    ICD-10-CM   1. Cholangitis K83.0     Disposition: Patient admitted to a inpatient bed by Dr. Juan Trotter  8/14/2017   [facility] EMERGENCY DEPARTMENT    I, Ashely Trotter, am serving as a scribe at 6:49 PM on 8/14/2017 to document services personally performed by Olegario Barraza MD based on my observations and the provider's statements to me.        Olegario Barraza MD  08/15/17 0049

## 2017-08-14 NOTE — TELEPHONE ENCOUNTER
Patient calling for recurrence of mid upper abdominal pain described as sharp and constant.  Pain has been increasing since the weekend.  Pain radiates into her back .  Rates pain as a 9 on a scale of 1/10.  Has had normal discharge from Ileostomy.  No known fever.  No chills.  No vomiting.  Denies chest pain or shortness of breath.  Decreased appetite-ate scrambled eggs, Ensure today.  Unable to get into GI until next Monday.  Tried to call GI today, but no response.  Advised patient that she will need to go to the ER due to the degree of pain she is having.   will drive her.  Patient in agreement.  TURNER Reis RN

## 2017-08-14 NOTE — ED NOTES
Patient presents to the ED with intermittent pain to abdomen and back. Seen on 6th for similar symptoms with no abnormal findings at that time. Patient states symptoms have gotten worse since then. Has ileostomy but reports normal output.

## 2017-08-14 NOTE — IP AVS SNAPSHOT
"` ` Patient Information     Patient Name Sex     Gely Keene ([id number]) Female 1940       Room Bed    [address] [place] [address]      Patient Demographics     Address Phone E-mail Address    [address] [address] [phone] (Home) *Preferred*  NONE (Work)  [phone] (Mobile) [email or web address]      Patient Ethnicity & Race     Ethnic Group Patient Race    American White      Emergency Contact(s)     Name Relation Home Work Mobile    ALONDRA KEENE \"JEREL\" Spouse [phone] NONE [phone]    Diana Saavedra Daughter [phone] none [phone]      Documents on File        Status Date Received Description       Documents for the Patient    Affiliate Privacy placeholder   phase3    Consent for Services - Hospital/Clinic Received () 14     Consent for EHR Access Received 14     Privacy Notice - [place] Received 14     Insurance Card Received 17 Medicare    External Medication Information Consent Accepted 14     Patient ID Received () 14     [facility] Specified Other       Insurance Card Received () 14 HP    Insurance Card Received () 10/26/15 hp    Consent for Services - Hospital/Clinic Received () 10/26/15     Insurance Card Received () 11/02/15 [facility]    Consent for Services/Privacy Notice - Hospital/Clinic Received 17     Consent for Services/Privacy Notice - Hospital/Clinic Received () 16     Patient ID Received 17 mn dl ex 20    Consent for Services - Geriatrics Received 16     HIM JARAD Authorization  16 Patient - 2016    HIM JARAD Authorization  16     HIM JARAD Authorization  16 PHANI 16-19    Patient ID  ()      Insurance Card Received () 17     Insurance Card Received 17 HP    Consent for Services/Privacy Notice - Hospital/Clinic  (Deleted)      Consent for Services/Privacy Notice - " Hospital/Clinic  (Deleted)         Documents for the Encounter    CMS IM for Patient Signature         Admission Information     Attending Provider Admitting Provider Admission Type Admission Date/Time    Shoaib Preston, DO Shoaib Preston, DO Emergency 08/14/17  1817    Discharge Date Hospital Service Auth/Cert Status Service Area     General Medicine [facility]    Unit Room/Bed Admission Status        5 MEDICAL SURGICAL 0549/0549-01 Admission (Confirmed)       Admission     Complaint    Cholangitis      Hospital Account     Name Acct ID Class Status Primary Coverage    Gely Keene [id number] Inpatient Open MEDICARE - MEDICARE FOR HB SUPPLEMENT            Guarantor Account (for Hospital Account #[id number])     Name Relation to Pt Service Area Active? Acct Type    Gely Keene  FCS Yes Personal/Family    Address Phone          [address] [address] [phone](H)  NONE(O)              Coverage Information (for Hospital Account #[id number])     1. MEDICARE/MEDICARE FOR HB SUPPLEMENT     F/O Payor/Plan Precert #    MEDICARE/MEDICARE FOR HB SUPPLEMENT     Subscriber Subscriber #    Yecenia Gely L [id number]    Address Phone    ATT[employer] CLAIMS  [address]  [place] [address] [phone]          2. HE[employer]ERS/HEALTH[employer]ERS FREEDOM PLAN     F/O Payor/Plan Precert #    HE[employer]NERS/HEALTH[employer]ERS FREEDOM PLAN     Subscriber Subscriber Razia Quintanillatrent WANG [id number]    Address Phone    [address]  [place] [address] [phone]

## 2017-08-15 ENCOUNTER — ANESTHESIA EVENT (OUTPATIENT)
Dept: SURGERY | Facility: CLINIC | Age: 77
End: 2017-08-15
Payer: COMMERCIAL

## 2017-08-15 ENCOUNTER — HOSPITAL ENCOUNTER (INPATIENT)
Facility: CLINIC | Age: 77
LOS: 2 days | Discharge: HOME | DRG: 445 | End: 2017-08-17
Attending: INTERNAL MEDICINE | Admitting: INTERNAL MEDICINE
Payer: MEDICARE

## 2017-08-15 ENCOUNTER — APPOINTMENT (OUTPATIENT)
Dept: GENERAL RADIOLOGY | Facility: CLINIC | Age: 77
End: 2017-08-15
Attending: INTERNAL MEDICINE
Payer: COMMERCIAL

## 2017-08-15 ENCOUNTER — ANESTHESIA (OUTPATIENT)
Dept: SURGERY | Facility: CLINIC | Age: 77
End: 2017-08-15
Payer: COMMERCIAL

## 2017-08-15 ENCOUNTER — SURGERY (OUTPATIENT)
Age: 77
End: 2017-08-15

## 2017-08-15 ENCOUNTER — HOSPITAL ENCOUNTER (OUTPATIENT)
Facility: CLINIC | Age: 77
Discharge: OTHER INSTITUTION | End: 2017-08-15
Attending: INTERNAL MEDICINE | Admitting: INTERNAL MEDICINE
Payer: COMMERCIAL

## 2017-08-15 VITALS
HEIGHT: 62 IN | OXYGEN SATURATION: 95 % | WEIGHT: 143.3 LBS | RESPIRATION RATE: 16 BRPM | TEMPERATURE: 98.8 F | DIASTOLIC BLOOD PRESSURE: 47 MMHG | BODY MASS INDEX: 26.37 KG/M2 | HEART RATE: 78 BPM | SYSTOLIC BLOOD PRESSURE: 114 MMHG

## 2017-08-15 VITALS
DIASTOLIC BLOOD PRESSURE: 65 MMHG | OXYGEN SATURATION: 100 % | SYSTOLIC BLOOD PRESSURE: 146 MMHG | TEMPERATURE: 98.1 F | RESPIRATION RATE: 17 BRPM

## 2017-08-15 DIAGNOSIS — K83.09 CHOLANGITIS (H): Primary | ICD-10-CM

## 2017-08-15 LAB
ALBUMIN SERPL-MCNC: 3 G/DL (ref 3.4–5)
ALP SERPL-CCNC: 254 U/L (ref 40–150)
ALT SERPL W P-5'-P-CCNC: 313 U/L (ref 0–50)
ANION GAP SERPL CALCULATED.3IONS-SCNC: 5 MMOL/L (ref 3–14)
AST SERPL W P-5'-P-CCNC: 270 U/L (ref 0–45)
BILIRUB SERPL-MCNC: 4.1 MG/DL (ref 0.2–1.3)
BUN SERPL-MCNC: 17 MG/DL (ref 7–30)
CALCIUM SERPL-MCNC: 8.4 MG/DL (ref 8.5–10.1)
CHLORIDE SERPL-SCNC: 105 MMOL/L (ref 94–109)
CO2 SERPL-SCNC: 26 MMOL/L (ref 20–32)
CREAT SERPL-MCNC: 1.41 MG/DL (ref 0.52–1.04)
ERCP: NORMAL
ERYTHROCYTE [DISTWIDTH] IN BLOOD BY AUTOMATED COUNT: 13.4 % (ref 10–15)
GFR SERPL CREATININE-BSD FRML MDRD: 36 ML/MIN/1.7M2
GLUCOSE SERPL-MCNC: 136 MG/DL (ref 70–99)
HCT VFR BLD AUTO: 35.3 % (ref 35–47)
HGB BLD-MCNC: 12 G/DL (ref 11.7–15.7)
INR PPP: 1.05 (ref 0.86–1.14)
MCH RBC QN AUTO: 30.8 PG (ref 26.5–33)
MCHC RBC AUTO-ENTMCNC: 34 G/DL (ref 31.5–36.5)
MCV RBC AUTO: 91 FL (ref 78–100)
PLATELET # BLD AUTO: 166 10E9/L (ref 150–450)
POTASSIUM SERPL-SCNC: 4.2 MMOL/L (ref 3.4–5.3)
PROT SERPL-MCNC: 6.6 G/DL (ref 6.8–8.8)
RBC # BLD AUTO: 3.89 10E12/L (ref 3.8–5.2)
SODIUM SERPL-SCNC: 136 MMOL/L (ref 133–144)
WBC # BLD AUTO: 17.3 10E9/L (ref 4–11)

## 2017-08-15 PROCEDURE — 25000566 ZZH SEVOFLURANE, EA 15 MIN: Performed by: INTERNAL MEDICINE

## 2017-08-15 PROCEDURE — 27210501 ZZH INTERCOMPANY SERVICE, SUPPLY 272 OPNP

## 2017-08-15 PROCEDURE — 25000125 ZZHC RX 250: Performed by: INTERNAL MEDICINE

## 2017-08-15 PROCEDURE — C1887 CATHETER, GUIDING: HCPCS | Performed by: INTERNAL MEDICINE

## 2017-08-15 PROCEDURE — 71000013 ZZH RECOVERY PHASE 1 LEVEL 1 EA ADDTL HR: Performed by: INTERNAL MEDICINE

## 2017-08-15 PROCEDURE — 92000033 ZZH INTERCOMPANY SERVICE, OTHER 920 OPNP

## 2017-08-15 PROCEDURE — 40000170 ZZH STATISTIC PRE-PROCEDURE ASSESSMENT II: Performed by: INTERNAL MEDICINE

## 2017-08-15 PROCEDURE — 36000125 ZZH INTERCOMPANY SERVICE, OPERATIVE 360 OPNP

## 2017-08-15 PROCEDURE — 40000277 XR SURGERY CARM FLUORO LESS THAN 5 MIN W STILLS: Mod: TC

## 2017-08-15 PROCEDURE — 37000008 ZZH ANESTHESIA TECHNICAL FEE, 1ST 30 MIN: Performed by: INTERNAL MEDICINE

## 2017-08-15 PROCEDURE — 37000009 ZZH ANESTHESIA TECHNICAL FEE, EACH ADDTL 15 MIN: Performed by: INTERNAL MEDICINE

## 2017-08-15 PROCEDURE — 25000128 H RX IP 250 OP 636: Performed by: NURSE ANESTHETIST, CERTIFIED REGISTERED

## 2017-08-15 PROCEDURE — 12000000 ZZH R&B MED SURG/OB

## 2017-08-15 PROCEDURE — C1769 GUIDE WIRE: HCPCS | Performed by: INTERNAL MEDICINE

## 2017-08-15 PROCEDURE — 36000058 ZZH SURGERY LEVEL 3 EA 15 ADDTL MIN: Performed by: INTERNAL MEDICINE

## 2017-08-15 PROCEDURE — 36415 COLL VENOUS BLD VENIPUNCTURE: CPT | Performed by: INTERNAL MEDICINE

## 2017-08-15 PROCEDURE — 25000128 H RX IP 250 OP 636: Performed by: ANESTHESIOLOGY

## 2017-08-15 PROCEDURE — 85610 PROTHROMBIN TIME: CPT | Performed by: INTERNAL MEDICINE

## 2017-08-15 PROCEDURE — 25000132 ZZH RX MED GY IP 250 OP 250 PS 637: Mod: GY | Performed by: INTERNAL MEDICINE

## 2017-08-15 PROCEDURE — 80053 COMPREHEN METABOLIC PANEL: CPT | Performed by: INTERNAL MEDICINE

## 2017-08-15 PROCEDURE — 85027 COMPLETE CBC AUTOMATED: CPT | Performed by: INTERNAL MEDICINE

## 2017-08-15 PROCEDURE — 25000128 H RX IP 250 OP 636: Performed by: INTERNAL MEDICINE

## 2017-08-15 PROCEDURE — 99223 1ST HOSP IP/OBS HIGH 75: CPT | Mod: AI | Performed by: INTERNAL MEDICINE

## 2017-08-15 PROCEDURE — 27210286 ZZH BALLOON ADDITIONAL: Performed by: INTERNAL MEDICINE

## 2017-08-15 PROCEDURE — 25000109 ZZH INTERCOMPANY SERVICE, DRUGS 250 OPNP

## 2017-08-15 PROCEDURE — 36000022 ZZH INTERCOMPANY SERVICE, AMB SURG 360 OPNP

## 2017-08-15 PROCEDURE — 25000125 ZZHC RX 250: Performed by: NURSE ANESTHETIST, CERTIFIED REGISTERED

## 2017-08-15 PROCEDURE — 25000128 H RX IP 250 OP 636: Performed by: EMERGENCY MEDICINE

## 2017-08-15 PROCEDURE — 0FC98ZZ EXTIRPATION OF MATTER FROM COMMON BILE DUCT, VIA NATURAL OR ARTIFICIAL OPENING ENDOSCOPIC: ICD-10-PCS | Performed by: INTERNAL MEDICINE

## 2017-08-15 PROCEDURE — 36000056 ZZH SURGERY LEVEL 3 1ST 30 MIN: Performed by: INTERNAL MEDICINE

## 2017-08-15 PROCEDURE — 71000012 ZZH RECOVERY PHASE 1 LEVEL 1 FIRST HR: Performed by: INTERNAL MEDICINE

## 2017-08-15 RX ORDER — HYDRALAZINE HYDROCHLORIDE 20 MG/ML
10 INJECTION INTRAMUSCULAR; INTRAVENOUS EVERY 4 HOURS PRN
Status: CANCELLED | OUTPATIENT
Start: 2017-08-15

## 2017-08-15 RX ORDER — SODIUM CHLORIDE 9 MG/ML
INJECTION, SOLUTION INTRAVENOUS CONTINUOUS
Status: DISCONTINUED | OUTPATIENT
Start: 2017-08-15 | End: 2017-08-17

## 2017-08-15 RX ORDER — SODIUM CHLORIDE 9 MG/ML
INJECTION, SOLUTION INTRAVENOUS CONTINUOUS
Status: DISCONTINUED | OUTPATIENT
Start: 2017-08-15 | End: 2017-08-15 | Stop reason: HOSPADM

## 2017-08-15 RX ORDER — INDOMETHACIN 50 MG/1
100 SUPPOSITORY RECTAL
Status: DISCONTINUED | OUTPATIENT
Start: 2017-08-15 | End: 2017-08-15 | Stop reason: HOSPADM

## 2017-08-15 RX ORDER — PROCHLORPERAZINE MALEATE 5 MG
5 TABLET ORAL EVERY 6 HOURS PRN
Status: DISCONTINUED | OUTPATIENT
Start: 2017-08-15 | End: 2017-08-15 | Stop reason: HOSPADM

## 2017-08-15 RX ORDER — NALOXONE HYDROCHLORIDE 0.4 MG/ML
.1-.4 INJECTION, SOLUTION INTRAMUSCULAR; INTRAVENOUS; SUBCUTANEOUS
Status: CANCELLED | OUTPATIENT
Start: 2017-08-15

## 2017-08-15 RX ORDER — PROCHLORPERAZINE 25 MG
12.5 SUPPOSITORY, RECTAL RECTAL EVERY 12 HOURS PRN
Status: DISCONTINUED | OUTPATIENT
Start: 2017-08-15 | End: 2017-08-15 | Stop reason: HOSPADM

## 2017-08-15 RX ORDER — LANOLIN ALCOHOL/MO/W.PET/CERES
3 CREAM (GRAM) TOPICAL
Status: DISCONTINUED | OUTPATIENT
Start: 2017-08-15 | End: 2017-08-17 | Stop reason: HOSPADM

## 2017-08-15 RX ORDER — PROPOFOL 10 MG/ML
INJECTION, EMULSION INTRAVENOUS PRN
Status: DISCONTINUED | OUTPATIENT
Start: 2017-08-15 | End: 2017-08-15

## 2017-08-15 RX ORDER — NALOXONE HYDROCHLORIDE 0.4 MG/ML
.1-.4 INJECTION, SOLUTION INTRAMUSCULAR; INTRAVENOUS; SUBCUTANEOUS
Status: CANCELLED | OUTPATIENT
Start: 2017-08-15 | End: 2017-08-16

## 2017-08-15 RX ORDER — FENTANYL CITRATE 50 UG/ML
INJECTION, SOLUTION INTRAMUSCULAR; INTRAVENOUS PRN
Status: DISCONTINUED | OUTPATIENT
Start: 2017-08-15 | End: 2017-08-15

## 2017-08-15 RX ORDER — ONDANSETRON 4 MG/1
4 TABLET, ORALLY DISINTEGRATING ORAL EVERY 30 MIN PRN
Status: DISCONTINUED | OUTPATIENT
Start: 2017-08-15 | End: 2017-08-15 | Stop reason: HOSPADM

## 2017-08-15 RX ORDER — ONDANSETRON 2 MG/ML
4 INJECTION INTRAMUSCULAR; INTRAVENOUS EVERY 6 HOURS PRN
Status: CANCELLED | OUTPATIENT
Start: 2017-08-15

## 2017-08-15 RX ORDER — LIDOCAINE 40 MG/G
CREAM TOPICAL
Status: CANCELLED | OUTPATIENT
Start: 2017-08-15

## 2017-08-15 RX ORDER — MORPHINE SULFATE 2 MG/ML
2-4 INJECTION, SOLUTION INTRAMUSCULAR; INTRAVENOUS
Status: CANCELLED | OUTPATIENT
Start: 2017-08-15

## 2017-08-15 RX ORDER — MORPHINE SULFATE 2 MG/ML
2-4 INJECTION, SOLUTION INTRAMUSCULAR; INTRAVENOUS
Status: DISCONTINUED | OUTPATIENT
Start: 2017-08-15 | End: 2017-08-15 | Stop reason: HOSPADM

## 2017-08-15 RX ORDER — LIDOCAINE 40 MG/G
CREAM TOPICAL
Status: DISCONTINUED | OUTPATIENT
Start: 2017-08-15 | End: 2017-08-15 | Stop reason: HOSPADM

## 2017-08-15 RX ORDER — METOPROLOL TARTRATE 1 MG/ML
5 INJECTION, SOLUTION INTRAVENOUS EVERY 6 HOURS
Status: CANCELLED | OUTPATIENT
Start: 2017-08-15

## 2017-08-15 RX ORDER — LORAZEPAM 2 MG/ML
0.5 INJECTION INTRAMUSCULAR EVERY 4 HOURS PRN
Status: DISCONTINUED | OUTPATIENT
Start: 2017-08-15 | End: 2017-08-17 | Stop reason: HOSPADM

## 2017-08-15 RX ORDER — ONDANSETRON 4 MG/1
4 TABLET, ORALLY DISINTEGRATING ORAL EVERY 6 HOURS PRN
Status: CANCELLED | OUTPATIENT
Start: 2017-08-15

## 2017-08-15 RX ORDER — PROCHLORPERAZINE 25 MG
12.5 SUPPOSITORY, RECTAL RECTAL EVERY 12 HOURS PRN
Status: CANCELLED | OUTPATIENT
Start: 2017-08-15

## 2017-08-15 RX ORDER — NALOXONE HYDROCHLORIDE 0.4 MG/ML
.1-.4 INJECTION, SOLUTION INTRAMUSCULAR; INTRAVENOUS; SUBCUTANEOUS
Status: DISCONTINUED | OUTPATIENT
Start: 2017-08-15 | End: 2017-08-17 | Stop reason: HOSPADM

## 2017-08-15 RX ORDER — METOPROLOL TARTRATE 1 MG/ML
5 INJECTION, SOLUTION INTRAVENOUS EVERY 6 HOURS
Status: DISCONTINUED | OUTPATIENT
Start: 2017-08-15 | End: 2017-08-15 | Stop reason: HOSPADM

## 2017-08-15 RX ORDER — MORPHINE SULFATE 2 MG/ML
2-4 INJECTION, SOLUTION INTRAMUSCULAR; INTRAVENOUS
Status: DISCONTINUED | OUTPATIENT
Start: 2017-08-15 | End: 2017-08-17 | Stop reason: HOSPADM

## 2017-08-15 RX ORDER — NALOXONE HYDROCHLORIDE 0.4 MG/ML
.1-.4 INJECTION, SOLUTION INTRAMUSCULAR; INTRAVENOUS; SUBCUTANEOUS
Status: DISCONTINUED | OUTPATIENT
Start: 2017-08-15 | End: 2017-08-15 | Stop reason: HOSPADM

## 2017-08-15 RX ORDER — ONDANSETRON 2 MG/ML
INJECTION INTRAMUSCULAR; INTRAVENOUS PRN
Status: DISCONTINUED | OUTPATIENT
Start: 2017-08-15 | End: 2017-08-15

## 2017-08-15 RX ORDER — ACETAMINOPHEN 10 MG/ML
1000 INJECTION, SOLUTION INTRAVENOUS EVERY 6 HOURS PRN
Status: CANCELLED | OUTPATIENT
Start: 2017-08-15

## 2017-08-15 RX ORDER — DEXAMETHASONE SODIUM PHOSPHATE 4 MG/ML
INJECTION, SOLUTION INTRA-ARTICULAR; INTRALESIONAL; INTRAMUSCULAR; INTRAVENOUS; SOFT TISSUE PRN
Status: DISCONTINUED | OUTPATIENT
Start: 2017-08-15 | End: 2017-08-15

## 2017-08-15 RX ORDER — LIDOCAINE HYDROCHLORIDE 20 MG/ML
INJECTION, SOLUTION INFILTRATION; PERINEURAL PRN
Status: DISCONTINUED | OUTPATIENT
Start: 2017-08-15 | End: 2017-08-15

## 2017-08-15 RX ORDER — PROCHLORPERAZINE MALEATE 5 MG
5 TABLET ORAL EVERY 6 HOURS PRN
Status: DISCONTINUED | OUTPATIENT
Start: 2017-08-15 | End: 2017-08-17 | Stop reason: HOSPADM

## 2017-08-15 RX ORDER — SODIUM CHLORIDE 9 MG/ML
INJECTION, SOLUTION INTRAVENOUS CONTINUOUS
Status: CANCELLED | OUTPATIENT
Start: 2017-08-15

## 2017-08-15 RX ORDER — ONDANSETRON 4 MG/1
4 TABLET, ORALLY DISINTEGRATING ORAL EVERY 6 HOURS PRN
Status: DISCONTINUED | OUTPATIENT
Start: 2017-08-15 | End: 2017-08-17 | Stop reason: HOSPADM

## 2017-08-15 RX ORDER — ONDANSETRON 2 MG/ML
4 INJECTION INTRAMUSCULAR; INTRAVENOUS EVERY 30 MIN PRN
Status: DISCONTINUED | OUTPATIENT
Start: 2017-08-15 | End: 2017-08-15 | Stop reason: HOSPADM

## 2017-08-15 RX ORDER — INDOMETHACIN 50 MG/1
100 SUPPOSITORY RECTAL
Status: CANCELLED | OUTPATIENT
Start: 2017-08-15

## 2017-08-15 RX ORDER — LORAZEPAM 2 MG/ML
0.5 INJECTION INTRAMUSCULAR EVERY 4 HOURS PRN
Status: DISCONTINUED | OUTPATIENT
Start: 2017-08-15 | End: 2017-08-15 | Stop reason: HOSPADM

## 2017-08-15 RX ORDER — DIMENHYDRINATE 50 MG/ML
12.5 INJECTION, SOLUTION INTRAMUSCULAR; INTRAVENOUS
Status: DISCONTINUED | OUTPATIENT
Start: 2017-08-15 | End: 2017-08-15 | Stop reason: HOSPADM

## 2017-08-15 RX ORDER — PROCHLORPERAZINE MALEATE 5 MG
5 TABLET ORAL EVERY 6 HOURS PRN
Status: CANCELLED | OUTPATIENT
Start: 2017-08-15

## 2017-08-15 RX ORDER — PROCHLORPERAZINE 25 MG
12.5 SUPPOSITORY, RECTAL RECTAL EVERY 12 HOURS PRN
Status: DISCONTINUED | OUTPATIENT
Start: 2017-08-15 | End: 2017-08-17 | Stop reason: HOSPADM

## 2017-08-15 RX ORDER — DIPHENHYDRAMINE HYDROCHLORIDE 50 MG/ML
INJECTION INTRAMUSCULAR; INTRAVENOUS PRN
Status: DISCONTINUED | OUTPATIENT
Start: 2017-08-15 | End: 2017-08-15

## 2017-08-15 RX ORDER — ONDANSETRON 2 MG/ML
4 INJECTION INTRAMUSCULAR; INTRAVENOUS EVERY 6 HOURS PRN
Status: DISCONTINUED | OUTPATIENT
Start: 2017-08-15 | End: 2017-08-17 | Stop reason: HOSPADM

## 2017-08-15 RX ORDER — HYDRALAZINE HYDROCHLORIDE 20 MG/ML
10 INJECTION INTRAMUSCULAR; INTRAVENOUS EVERY 4 HOURS PRN
Status: DISCONTINUED | OUTPATIENT
Start: 2017-08-15 | End: 2017-08-15 | Stop reason: HOSPADM

## 2017-08-15 RX ORDER — FLUMAZENIL 0.1 MG/ML
0.2 INJECTION, SOLUTION INTRAVENOUS
Status: CANCELLED | OUTPATIENT
Start: 2017-08-15 | End: 2017-08-16

## 2017-08-15 RX ORDER — ONDANSETRON 2 MG/ML
4 INJECTION INTRAMUSCULAR; INTRAVENOUS EVERY 6 HOURS PRN
Status: DISCONTINUED | OUTPATIENT
Start: 2017-08-15 | End: 2017-08-15 | Stop reason: HOSPADM

## 2017-08-15 RX ORDER — ACETAMINOPHEN 10 MG/ML
1000 INJECTION, SOLUTION INTRAVENOUS EVERY 6 HOURS PRN
Status: DISCONTINUED | OUTPATIENT
Start: 2017-08-15 | End: 2017-08-17 | Stop reason: HOSPADM

## 2017-08-15 RX ORDER — SODIUM CHLORIDE, SODIUM LACTATE, POTASSIUM CHLORIDE, CALCIUM CHLORIDE 600; 310; 30; 20 MG/100ML; MG/100ML; MG/100ML; MG/100ML
INJECTION, SOLUTION INTRAVENOUS CONTINUOUS
Status: DISCONTINUED | OUTPATIENT
Start: 2017-08-15 | End: 2017-08-15 | Stop reason: HOSPADM

## 2017-08-15 RX ORDER — LORAZEPAM 2 MG/ML
0.5 INJECTION INTRAMUSCULAR EVERY 4 HOURS PRN
Status: CANCELLED | OUTPATIENT
Start: 2017-08-15

## 2017-08-15 RX ORDER — MEPERIDINE HYDROCHLORIDE 25 MG/ML
12.5 INJECTION INTRAMUSCULAR; INTRAVENOUS; SUBCUTANEOUS EVERY 5 MIN PRN
Status: DISCONTINUED | OUTPATIENT
Start: 2017-08-15 | End: 2017-08-15 | Stop reason: HOSPADM

## 2017-08-15 RX ORDER — ACETAMINOPHEN 10 MG/ML
1000 INJECTION, SOLUTION INTRAVENOUS EVERY 6 HOURS PRN
Status: DISCONTINUED | OUTPATIENT
Start: 2017-08-15 | End: 2017-08-15 | Stop reason: HOSPADM

## 2017-08-15 RX ORDER — METOPROLOL TARTRATE 1 MG/ML
5 INJECTION, SOLUTION INTRAVENOUS EVERY 6 HOURS
Status: DISCONTINUED | OUTPATIENT
Start: 2017-08-15 | End: 2017-08-15

## 2017-08-15 RX ORDER — ONDANSETRON 4 MG/1
4 TABLET, ORALLY DISINTEGRATING ORAL EVERY 6 HOURS PRN
Status: DISCONTINUED | OUTPATIENT
Start: 2017-08-15 | End: 2017-08-15 | Stop reason: HOSPADM

## 2017-08-15 RX ORDER — HYDRALAZINE HYDROCHLORIDE 20 MG/ML
10 INJECTION INTRAMUSCULAR; INTRAVENOUS EVERY 4 HOURS PRN
Status: DISCONTINUED | OUTPATIENT
Start: 2017-08-15 | End: 2017-08-17 | Stop reason: HOSPADM

## 2017-08-15 RX ORDER — FENTANYL CITRATE 0.05 MG/ML
25-50 INJECTION, SOLUTION INTRAMUSCULAR; INTRAVENOUS
Status: DISCONTINUED | OUTPATIENT
Start: 2017-08-15 | End: 2017-08-15 | Stop reason: HOSPADM

## 2017-08-15 RX ADMIN — VANCOMYCIN 125 MG: KIT at 12:23

## 2017-08-15 RX ADMIN — LIDOCAINE HYDROCHLORIDE 60 MG: 20 INJECTION, SOLUTION INFILTRATION; PERINEURAL at 15:09

## 2017-08-15 RX ADMIN — PROPOFOL 50 MG: 10 INJECTION, EMULSION INTRAVENOUS at 15:24

## 2017-08-15 RX ADMIN — LORAZEPAM 0.5 MG: 2 INJECTION INTRAMUSCULAR; INTRAVENOUS at 00:33

## 2017-08-15 RX ADMIN — TAZOBACTAM SODIUM AND PIPERACILLIN SODIUM 2.25 G: 250; 2 INJECTION, SOLUTION INTRAVENOUS at 12:23

## 2017-08-15 RX ADMIN — SODIUM CHLORIDE: 9 INJECTION, SOLUTION INTRAVENOUS at 11:00

## 2017-08-15 RX ADMIN — SODIUM CHLORIDE, POTASSIUM CHLORIDE, SODIUM LACTATE AND CALCIUM CHLORIDE: 600; 310; 30; 20 INJECTION, SOLUTION INTRAVENOUS at 15:41

## 2017-08-15 RX ADMIN — METOPROLOL TARTRATE 12.5 MG: 25 TABLET, FILM COATED ORAL at 20:44

## 2017-08-15 RX ADMIN — FENTANYL CITRATE 50 MCG: 50 INJECTION, SOLUTION INTRAMUSCULAR; INTRAVENOUS at 15:32

## 2017-08-15 RX ADMIN — SODIUM CHLORIDE, POTASSIUM CHLORIDE, SODIUM LACTATE AND CALCIUM CHLORIDE: 600; 310; 30; 20 INJECTION, SOLUTION INTRAVENOUS at 14:36

## 2017-08-15 RX ADMIN — PROCHLORPERAZINE EDISYLATE 5 MG: 5 INJECTION INTRAMUSCULAR; INTRAVENOUS at 00:04

## 2017-08-15 RX ADMIN — SODIUM CHLORIDE: 9 INJECTION, SOLUTION INTRAVENOUS at 00:37

## 2017-08-15 RX ADMIN — PANTOPRAZOLE SODIUM 40 MG: 40 INJECTION, POWDER, FOR SOLUTION INTRAVENOUS at 09:24

## 2017-08-15 RX ADMIN — TAZOBACTAM SODIUM AND PIPERACILLIN SODIUM 2.25 G: 250; 2 INJECTION, SOLUTION INTRAVENOUS at 05:20

## 2017-08-15 RX ADMIN — DIPHENHYDRAMINE HYDROCHLORIDE 12.5 MG: 50 INJECTION, SOLUTION INTRAMUSCULAR; INTRAVENOUS at 15:21

## 2017-08-15 RX ADMIN — FENTANYL CITRATE 50 MCG: 50 INJECTION, SOLUTION INTRAMUSCULAR; INTRAVENOUS at 15:20

## 2017-08-15 RX ADMIN — PROPOFOL 100 MG: 10 INJECTION, EMULSION INTRAVENOUS at 15:09

## 2017-08-15 RX ADMIN — VANCOMYCIN 125 MG: KIT at 19:21

## 2017-08-15 RX ADMIN — TAZOBACTAM SODIUM AND PIPERACILLIN SODIUM 2.25 G: 250; 2 INJECTION, SOLUTION INTRAVENOUS at 19:17

## 2017-08-15 RX ADMIN — VANCOMYCIN 125 MG: KIT at 21:54

## 2017-08-15 RX ADMIN — VANCOMYCIN 125 MG: KIT at 00:42

## 2017-08-15 RX ADMIN — ACETAMINOPHEN 1000 MG: 10 INJECTION, SOLUTION INTRAVENOUS at 05:02

## 2017-08-15 RX ADMIN — SUCCINYLCHOLINE CHLORIDE 60 MG: 20 INJECTION, SOLUTION INTRAMUSCULAR; INTRAVENOUS at 15:09

## 2017-08-15 RX ADMIN — VANCOMYCIN 125 MG: KIT at 09:25

## 2017-08-15 RX ADMIN — DEXAMETHASONE SODIUM PHOSPHATE 4 MG: 4 INJECTION, SOLUTION INTRA-ARTICULAR; INTRALESIONAL; INTRAMUSCULAR; INTRAVENOUS; SOFT TISSUE at 15:20

## 2017-08-15 RX ADMIN — ONDANSETRON 4 MG: 2 INJECTION INTRAMUSCULAR; INTRAVENOUS at 15:20

## 2017-08-15 RX ADMIN — METOPROLOL TARTRATE 5 MG: 5 INJECTION INTRAVENOUS at 00:51

## 2017-08-15 RX ADMIN — METOPROLOL TARTRATE 5 MG: 5 INJECTION INTRAVENOUS at 13:19

## 2017-08-15 RX ADMIN — SODIUM CHLORIDE: 9 INJECTION, SOLUTION INTRAVENOUS at 19:17

## 2017-08-15 RX ADMIN — FENTANYL CITRATE 25 MCG: 50 INJECTION, SOLUTION INTRAMUSCULAR; INTRAVENOUS at 00:04

## 2017-08-15 ASSESSMENT — ENCOUNTER SYMPTOMS
SEIZURES: 0
DYSRHYTHMIAS: 0

## 2017-08-15 ASSESSMENT — LIFESTYLE VARIABLES: TOBACCO_USE: 0

## 2017-08-15 ASSESSMENT — COPD QUESTIONNAIRES: COPD: 0

## 2017-08-15 NOTE — IP AVS SNAPSHOT
"[facility]NN[facility] PACU: [phone]                                              INTERAGENCY TRANSFER FORM - PHYSICIAN ORDERS   8/15/2017                    Hospital Admission Date: 8/15/2017  SYLVESTER CARVER   : 1940  Sex: Female        Attending Provider: Keturah Bergeron MD     Allergies:  Bactrim [Sulfamethoxazole W/trimethoprim], Codeine, Metronidazole, Sulfa Drugs, Sulfur, Versed [Midazolam]    Infection:  None   Service:  SURGERY    Ht:  1.562 m (5' 1.5\")   Wt:  67.1 kg (148 lb)   Admission Wt:  --    BMI:  27.51 kg/m 2   BSA:  1.71 m 2            Patient PCP Information     Provider PCP Type    Hien Booth MD General      Hospital Problems as of 8/15/2017     None      Non-Hospital Problems as of 8/15/2017              Priority Class Noted    Hyperlipidemia LDL goal <160 Medium  2014    Hypertension goal BP (blood pressure) < 140/90 Medium  10/30/2014    IFG (impaired fasting glucose) Medium  10/30/2014    BCC (basal cell carcinoma) Medium  10/30/2014    Gastroesophageal reflux disease, esophagitis presence not specified Medium  2015    S/p total colectomy on 16 by Shana Alaniz MD Medium  5/10/2016    Restless legs syndrome (RLS) Medium  5/10/2016    Anxiety Medium  5/10/2016    Health Care Home Medium  2016    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) Medium  2016    Cholangitis Medium  8/15/2017      Code Status History     Date Active Date Inactive Code Status Order ID Comments User Context    8/15/2017 12:14 AM 8/15/2017  4:37 PM Full Code [id number]  Shoaib Preston DO Inpatient    2016  9:13 AM 8/15/2017 12:14 AM Full Code [id number]  Teresa Amos MD Outpatient    2016  3:20 PM 2016  9:13 AM Full Code [id number]  Daisy Ceballos PA-C Inpatient    3/28/2016  4:24 PM 2016  6:45 PM Full Code [id number]  Teresa Amos MD Inpatient         Medication Review      CONTINUE these medications which may " have CHANGED, or have new prescriptions. If we are uncertain of the size of tablets/capsules you have at home, strength may be listed as something that might have changed.        Dose / Directions Comments    conjugated estrogens cream   Commonly known as:  PREMARIN   This may have changed:  additional instructions   Used for:  Atrophic vaginitis        Dose:  0.5 g   Place 0.5 g vaginally three times a week   Quantity:  30 g   Refills:  6          CONTINUE these medications which have NOT CHANGED        Dose / Directions Comments    ACETAMINOPHEN PO        Dose:  500 mg   Take 500 mg by mouth every 4 hours as needed for pain Reported on 4/10/2017   Refills:  0        BIOTIN PO        Dose:  1 tablet   Take 1 tablet by mouth daily dose unknown   Refills:  0        CALCIUM CITRATE PLUS/MAGNESIUM Tabs        Dose:  1 tablet   Take 1 tablet by mouth daily   Refills:  0        fish oil-omega-3 fatty acids 1000 MG capsule        Take 1 capsule daily.  Indications: PN:   Refills:  0        LIPITOR PO        Dose:  20 mg   Take 20 mg by mouth every evening   Refills:  0        MELATONIN PO        Dose:  3 mg   Take 3 mg by mouth nightly as needed   Refills:  0        metoprolol 25 MG tablet   Commonly known as:  LOPRESSOR   Used for:  Sinus tachycardia        Dose:  12.5 mg   Take 0.5 tablets (12.5 mg) by mouth 2 times daily   Quantity:  180 tablet   Refills:  3        nystatin [id number] UNIT/GM Powd   Commonly known as:  MYCOSTATIN        Apply topically as needed   Refills:  0        omeprazole 20 MG CR capsule   Commonly known as:  priLOSEC   Used for:  Gastroesophageal reflux disease, esophagitis presence not specified        TAKE ONE CAPSULE BY MOUTH EVERY DAY   Quantity:  90 capsule   Refills:  3        VITAMIN B 12 PO        Take by mouth daily Dose unknown   Refills:  0                After Care     Discharge Instructions       Resume pre procedure diet       Discharge Instructions       Restart home medications.        Discharge Instructions       Resume pre procedure diet       Discharge Instructions       Restart home medications.

## 2017-08-15 NOTE — H&P
[facility]  Hospitalist Admission Note    Name: Gely Keene      MRN: [id number]  YOB: 1940    Age: 77 year old  Date of admission: 8/14/2017  Primary care provider: Hien Booth            Assessment and Plan:   Gely Keene is a 77 year old female with a history of HTN, GERD, and severe C diff requiring colectomy who presents with abdominal pain.    1. Suspected Cholangitis.  With fever, abdominal pain, elevated LFT's, and dilated bile duct.  Continue IV Zosyn.  NPO at 02:00.  GI consult.  Pain meds PRN.  Continuous IV fluids.  Monitor LFT's.      2.  HTN.  Start IV Metoprolol 5 mg every 6 hours while NPO.  IV Hydralazine PRN.    3.  History of severe C diff requiring colectomy.  Start oral Vanco 125 mg/QID.  With previous colectomy, cannot get colitis, though ? If C diff infection could still be possible.  Hopefully, GI also comment in morning if continued oral Vanco could be discontinued.    4.  GERD.  IV Protonix while NPO.    5.  Chronic kidney disease.  Creatinine appears to be near baseline.  Continue IV fluids.  Avoid nephrotoxins.  Monitor.    6.  Hyperlipidemia.  Hold statin while NPO and with elevated LFT's.    Code status: Full  Admit to Inpatient  Prophylaxis: PCD's.            Chief Complaint:   Abdominal pain.         History of Present Illness:   Gely Keene is a 77 year old female who presents with abdominal pain.  Pain first started a little more than a week ago.  Has been progressively worsening.  Has also had nausea and vomiting.  Fevers and chills at times.  Nothing at home was making symptoms better.  Meds given in ER have helped pain a little.  Not much help to nausea so far.  Feels weak.  No change to ostomy output.  No other complaints.          Past Medical History:     Past Medical History:   Diagnosis Date     Anxiety      BCC (basal cell carcinoma of skin)      Esophageal reflux (GERD) 9/8/2014     HTN  (hypertension) 9/8/2014     Hyperlipidemia LDL goal <160 9/8/2014     IFG (impaired fasting glucose) 9/8/2014     PONV (postoperative nausea and vomiting)    Severe C diff colitis.          Past Surgical History:     Past Surgical History:   Procedure Laterality Date     APPENDECTOMY  1952     cholecystitis       choledocolithiasis       COLECTOMY WITH COLOSTOMY, COMBINED N/A 4/22/2016    Procedure: COMBINED COLECTOMY WITH COLOSTOMY;  Surgeon: Shana Alaniz MD;  Location: [facility] OR     HYSTERECTOMY  1987     LAPAROSCOPIC CHOLECYSTECTOMY  2008     LAPAROTOMY EXPLORATORY N/A 4/22/2016    Procedure: LAPAROTOMY EXPLORATORY;  Surgeon: Shana Alaniz MD;  Location: [facility] OR     PHACOEMULSIFICATION CLEAR CORNEA WITH STANDARD INTRAOCULAR LENS IMPLANT Left 5/9/2017    Procedure: PHACOEMULSIFICATION CLEAR CORNEA WITH STANDARD INTRAOCULAR LENS IMPLANT;  LEFT EYE PHACOEMULSIFICATION CLEAR CORNEA WITH STANDARD INTRAOCULAR LENS IMPLANT ;  Surgeon: Eloy Eric MD;  Location: [facility]     PHACOEMULSIFICATION CLEAR CORNEA WITH STANDARD INTRAOCULAR LENS IMPLANT Right 5/23/2017    Procedure: PHACOEMULSIFICATION CLEAR CORNEA WITH STANDARD INTRAOCULAR LENS IMPLANT;  RIGHT EYE PHACOEMULSIFICATION CLEAR CORNEA WITH STANDARD INTRAOCULAR LENS IMPLANT ;  Surgeon: Eloy Eric MD;  Location: [facility]             Social History:     Social History   Substance Use Topics     Smoking status: Never Smoker     Smokeless tobacco: Never Used     Alcohol use Yes      Comment: socially             Family History:   The family history was fully reviewed and non-contributory in this case.         Allergies:     Allergies   Allergen Reactions     Bactrim [Sulfamethoxazole W/Trimethoprim] GI Disturbance     Vomiting     Codeine      Metronidazole      GI distubance     Sulfa Drugs      Sulfur      Versed [Midazolam]      vomited             Medications:     Prior to Admission medications    Medication Sig Last Dose Taking? Auth  Provider   nystatin (MYCOSTATIN) [id number] UNIT/GM POWD Apply topically as needed Past Week at Unknown time Yes Unknown, Entered By History   MELATONIN PO Take 3 mg by mouth nightly as needed  at prn Yes Unknown, Entered By History   BIOTIN PO Take 1 tablet by mouth daily dose unknown 8/14/2017 at am Yes Unknown, Entered By History   Cyanocobalamin (VITAMIN B 12 PO) Take by mouth daily Dose unknown 8/14/2017 at am Yes Unknown, Entered By History   omeprazole (PRILOSEC) 20 MG CR capsule TAKE ONE CAPSULE BY MOUTH EVERY DAY 8/14/2017 at am Yes Hien Booth MD   fish oil-omega-3 fatty acids 1000 MG capsule Take 1 capsule daily.  Indications: PN: 8/14/2017 at am Yes Reported, Patient   Atorvastatin Calcium (LIPITOR PO) Take 20 mg by mouth every evening  8/13/2017 at pm Yes Reported, Patient   metoprolol (LOPRESSOR) 25 MG tablet Take 0.5 tablets (12.5 mg) by mouth 2 times daily 8/14/2017 at am Yes Hien Booth MD   ACETAMINOPHEN PO Take 500 mg by mouth every 4 hours as needed for pain Reported on 4/10/2017 8/14/2017 at am Yes Reported, Patient   conjugated estrogens (PREMARIN) vaginal cream Place 0.5 g vaginally three times a week  Patient taking differently: Place 0.5 g vaginally three times a week (M,W,F) Past Week at Unknown time Yes Valentin Da Silva MD   Multiple Minerals-Vitamins (CALCIUM CITRATE PLUS/MAGNESIUM) TABS Take 1 tablet by mouth daily  8/14/2017 at am Yes Hien Booth MD             Review of Systems:   A Comprehensive greater than 10 system review of systems was carried out.  Pertinent positives and negatives are noted above.  Otherwise negative for contributory information.           Physical Exam:   Blood pressure 184/74, pulse 86, temperature 100.1  F (37.8  C), temperature source Temporal, resp. rate 16, SpO2 94 %, not currently breastfeeding.  Wt Readings from Last 1 Encounters:   08/08/17 67.1 kg (148 lb)     Exam:  GENERAL: No apparent distress. Awake, alert, and fully  oriented.  HEENT: Normocephalic, atraumatic. Extraocular movements intact.  CARDIOVASCULAR: Regular rate and rhythm without murmurs or rubs. No S3.  PULMONARY: Clear to auscultation bilaterally.  ABDOMINAL: Ostomy present.  Mildly tender in right upper quadrant.  Soft, non-distended. Bowel sounds normoactive.   EXTREMITIES: No cyanosis or clubbing. No appreciable edema.  NEUROLOGICAL: CN 2-12 grossly intact, no focal neurological deficits.  DERMATOLOGICAL: No rash, ulcer, bruising, nor jaundice.          Data:       Laboratory:    Recent Labs  Lab 08/14/17  1845   WBC 4.9   HGB 13.0   HCT 38.4   MCV 89          Recent Labs  Lab 08/14/17  1845      POTASSIUM 4.2   CHLORIDE 103   CO2 25   ANIONGAP 7   *   BUN 14   CR 1.13*   GFRESTIMATED 46*   GFRESTBLACK 56*   GRICEL 9.4     No results for input(s): CULT in the last 168 hours.    Imaging:  Recent Results (from the past 24 hour(s))   US Abdomen Limited    Narrative    RIGHT UPPER QUADRANT ULTRASOUND 8/14/2017 9:59 PM    HISTORY: Right upper quadrant pain.    COMPARISON: None.    FINDINGS:    Gallbladder: Cholecystectomy.    Bile ducts: Dilated extrahepatic common bile duct measuring 1.3 cm  diameter. No intrahepatic biliary dilatation.    Liver: Normal.     Pancreas: Gas obscures the head and tail. Neck and body appear normal.    Right kidney: Normal.       Impression    IMPRESSION: Extrahepatic biliary dilatation status post  cholecystectomy.    ADAN PHILIP MD   CT Abdomen Pelvis w/o Contrast    Narrative    CT ABDOMEN PELVIS W/O CONTRAST  8/14/2017 11:12 PM      HISTORY: Abdominal pain and vomiting.    TECHNIQUE: CT abdomen and pelvis without intravenous contrast.  Radiation dose for this scan was reduced using automated exposure  control, adjustment of the mA and/or kV according to patient size, or  iterative reconstruction technique.     COMPARISON: 8/6/2017.    FINDINGS:  Abdomen: There is minimal dependent atelectasis at the lung  bases.  There are coronary artery atherosclerotic calcifications. The heart  size is normal. Evaluation of the solid abdominal organs is limited by  the lack of intravenous contrast. The liver, spleen, pancreas, adrenal  glands and kidneys are normal in appearance. The gallbladder is  absent. There is no abdominal or pelvic lymph node enlargement. There  is atherosclerotic calcification of the aorta and its branches. No  aneurysm.    Pelvis: There is a right lower quadrant ostomy. There is no bowel  obstruction or inflammation. No free intraperitoneal gas or fluid.  Multiple postoperative changes in the pelvis. Degenerative disease in  the spine.      Impression    IMPRESSION: No acute abnormality. No bowel obstruction or  inflammation.

## 2017-08-15 NOTE — ANESTHESIA CARE TRANSFER NOTE
Patient: Gely Keene    Procedure(s):  ENDOSCOPIC RETROGRADE CHOLANGIOPANCREATOGRAPHY, SPHINCTEROTOMY. STONE EXTRACTION - Wound Class: II-Clean Contaminated   - Wound Class: II-Clean Contaminated    Diagnosis: COMMON BILE DUCT STONE.  Diagnosis Additional Information: No value filed.    Anesthesia Type:   General, ETT, RSI     Note:  Airway :Face Mask  Patient transferred to:PACU  Comments: Uneventful transport to PACU   Report to RN Ene Mcghee  Exchanging well; color pink/natl  Pt responds appropriately to simple command  Pt comfortable  IV patent  Lips/teeth/dentition as preop status  Questions answered  /63  HR 96  T - calibrating  RR 16  Sat 100%      Vitals: (Last set prior to Anesthesia Care Transfer)    CRNA VITALS  8/15/2017 1518 - 8/15/2017 1554      8/15/2017             Pulse: 85    SpO2: 100 %    Resp Rate (set): 10                Electronically Signed By: JUNIOR PERKINS CRNA  August 15, 2017  3:54 PM

## 2017-08-15 NOTE — PLAN OF CARE
Problem: Cholecystitis/Cholecystectomy (Adult)  Goal: Signs and Symptoms of Listed Potential Problems Will be Absent or Manageable (Cholecystitis/Cholecystectomy)  Signs and symptoms of listed potential problems will be absent or manageable by discharge/transition of care (reference Cholecystitis/Cholecystectomy (Adult) CPG).   Outcome: No Change  Tmax 100.4, BP elevated. Scheduled metoprolol given for BP with improvement, prn tylenol IV given. C/O nausea with emesis upon arrival, prn ativan given with rest after administration. NPO with IVF infusing at 100 ml/hr. Ileostomy in place, intact, small amount of creamy output; pt independent with management. CT completed in ED, results pending. Plan for GI cx in AM. Bed alarms in use, pt not attempting to exit bed. Alert and oriented, able to make needs known. Continue to monitor.

## 2017-08-15 NOTE — IP AVS SNAPSHOT
"` [place]           [facility] PACU: [phone]                                              INTERAGENCY TRANSFER FORM - NURSING   8/15/2017                    Hospital Admission Date: 8/15/2017  SYLVESTER CARVER   : 1940  Sex: Female        Attending Provider: Keturah Bergeron MD     Allergies:  Bactrim [Sulfamethoxazole W/trimethoprim], Codeine, Metronidazole, Sulfa Drugs, Sulfur, Versed [Midazolam]    Infection:  None   Service:  SURGERY    Ht:  1.562 m (5' 1.5\")   Wt:  67.1 kg (148 lb)   Admission Wt:  --    BMI:  27.51 kg/m 2   BSA:  1.71 m 2            Patient PCP Information     Provider PCP Type    Hien Booth MD General      Current Code Status     Date Active Code Status Order ID Comments User Context       Prior      Code Status History     Date Active Date Inactive Code Status Order ID Comments User Context    8/15/2017 12:14 AM 8/15/2017  4:37 PM Full Code [id number]  Shoaib Preston DO Inpatient    2016  9:13 AM 8/15/2017 12:14 AM Full Code [id number]  Teresa Amos MD Outpatient    2016  3:20 PM 2016  9:13 AM Full Code [id number]  Daisy Ceballos PA-C Inpatient    3/28/2016  4:24 PM 2016  6:45 PM Full Code [id number]  Teresa Amos MD Inpatient      Advance Directives        Does patient have a scanned Advance Directive/ACP document in EPIC?           Yes        Hospital Problems as of 8/15/2017     None      Non-Hospital Problems as of 8/15/2017              Priority Class Noted    Hyperlipidemia LDL goal <160 Medium  2014    Hypertension goal BP (blood pressure) < 140/90 Medium  10/30/2014    IFG (impaired fasting glucose) Medium  10/30/2014    BCC (basal cell carcinoma) Medium  10/30/2014    Gastroesophageal reflux disease, esophagitis presence not specified Medium  2015    S/p total colectomy on 16 by Shana Alaniz MD Medium  5/10/2016    Restless legs syndrome (RLS) Medium  5/10/2016    Anxiety Medium  5/10/2016    " "Health Care Home Medium  5/23/2016    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) Medium  11/7/2016    Cholangitis Medium  8/15/2017      Immunizations     Name Date      Influenza (IIV3) 09/04/14     Influenza Vaccine IM 3yrs+ 4 Valent IIV4 09/14/16     Pneumococcal (PCV 13) 11/03/16     Pneumococcal 23 valent 09/18/06     Tdap (Adacel,Boostrix) 10/24/11     Zoster vaccine, live 11/09/12          END      ASSESSMENT     Discharge Profile Flowsheet     EXPECTED DISCHARGE     Patient's communication style  spoken language (English or Bilingual) 08/14/17 1814    Expected Discharge Date  -- (TBD ) 08/15/17 0754   FINAL RESOURCES      DISCHARGE NEEDS ASSESSMENT     Resources List  Home Care 05/23/16 1249    Concerns To Be Addressed  no discharge needs identified 05/06/16 1413   PAS Number  [id number] (05/06/16) 05/06/16 1413    Equipment Currently Used at Home  colostomy/ostomy supplies 05/23/16 1249   SKIN      Transportation Available  car;family or friend will provide 05/09/16 1747   Inspection of bony prominences  Full 08/15/17 1539    Equipment Used at Home  none 03/29/16 1603   Skin WDL  WDL (except ostomy in place) 08/15/17 1700    GASTROINTESTINAL (ADULT,PEDIATRIC,OB)     Skin Color/Characteristics  without discoloration 08/15/17 1700    GI WDL  ex;bowel sounds 08/15/17 1700   Skin Temperature  warm 08/15/17 1700    All Quadrants Bowel Sounds  not audible 08/15/17 1700   Skin Integrity  drain/device(s) 08/15/17 1700    Last Bowel Movement  08/15/17 08/15/17 0917   SAFETY      Passing flatus  no 08/15/17 1700   Safety WDL  WDL 08/15/17 1700    COMMUNICATION ASSESSMENT                        Assessment WDL (Within Defined Limits) Definitions           Safety WDL     Effective: 09/28/15    Row Information: <b>WDL Definition:</b> Bed in low position, wheels locked; call light in reach; upper side rails up x 2; ID band on<br> <font color=\"gray\"><i>Item=AS safety wdl>>List=AS safety " "wdl>>Version=F14</i></font>      Skin WDL     Effective: 09/28/15    Row Information: <b>WDL Definition:</b> Warm; dry; intact; elastic; without discoloration; pressure points without redness<br> <font color=\"gray\"><i>Item=AS skin wdl>>List=AS skin wdl>>Version=F14</i></font>      Vitals     Vital Signs Flowsheet     VITAL SIGNS     Preferred Pain Scale  number (Numeric Rating Pain Scale) 08/15/17 0409    Temp  98.1  F (36.7  C) 08/15/17 1654   Response to Interventions  Decrease in pain 08/15/17 0239    Temp src  -- (3M) 08/15/17 1556   HEIGHT AND WEIGHT      Resp  17 08/15/17 1654   Height  1.575 m (5' 2\") 08/15/17 0044    Pulse  78 08/15/17 1315   Height Method  Stated 08/15/17 0044    Heart Rate  70 08/15/17 1654   Weight  65 kg (143 lb 4.8 oz) 08/15/17 0044    Pulse/Heart Rate Source  Monitor 08/15/17 0826   Weight Method  Standing scale 08/15/17 0044    BP  146/65 08/15/17 1654   POSITIONING      OXYGEN THERAPY     Head of Bed (HOB)  HOB at 30 degrees 08/15/17 1700    SpO2  100 % 08/15/17 1654   Body Position  independently positioning 08/15/17 0917    O2 Device  Nasal cannula 08/15/17 1605   DAILY CARE      Oxygen Delivery  3 LPM 08/15/17 1605   Activity Type  bedrest 08/15/17 1556    PAIN/COMFORT     Activity Level of Assistance  assistance, stand-by 08/15/17 0917    Patient Currently in Pain  denies 08/15/17 1654                 Patient Lines/Drains/Airways Status    Active LINES/DRAINS/AIRWAYS     Name: Placement date: Placement time: Site: Days: Last dressing change:    Ileostomy       RLQ   12773     Peripheral IV 08/14/17 Right Upper forearm 08/14/17   1848   Upper forearm   less than 1             Patient Lines/Drains/Airways Status    Active PICC/CVC     None            Intake/Output Detail Report     Date Intake     Output   Net    Shift P.O. I.V. IV Piggyback Total Stool Blood Total       Day 08/14/17 0700 - 08/14/17 1459 -- -- -- -- -- -- -- 0    Radha 08/14/17 1500 - 08/14/17 1609 -- -- -- -- -- -- " -- 0    Noc 08/14/17 2300 - 08/15/17 0659 0 485 -- 485 -- -- -- 485    Day 08/15/17 0700 - 08/15/17 1459 -- -- -- -- -- -- -- 0    Radha 08/15/17 1500 - 08/15/17 2259 -- 1000 -- 1000 -- 0 -- 1000      Case Management/Discharge Planning     Case Management/Discharge Planning Flowsheet     LIVING ENVIRONMENT     Concerns To Be Addressed  no discharge needs identified 05/06/16 1413    Lives With  spouse 08/15/17 0211   DISCHARGE PLANNING      Living Arrangements  house 04/24/16 1518   Transportation Available  car;family or friend will provide 05/09/16 1747    COPING/STRESS     Equipment Used at Home  none 03/29/16 1603    Major Change/Loss/Stressor  none 08/15/17 0211   FINAL RESOURCES      EXPECTED DISCHARGE     Equipment Currently Used at Home  colostomy/ostomy supplies 05/23/16 1249    Expected Discharge Date  -- (TBD ) 08/15/17 0754   Resources List  Home Care 05/23/16 1249    ASSESSMENT/CONCERNS TO BE ADDRESSED     PAS Number  [id number] (05/06/16) 05/06/16 1413

## 2017-08-15 NOTE — ED NOTES
[facility]  ED Nurse Handoff Report    Gely Keene is a 77 year old female   ED Chief complaint: Abdominal Pain  . ED Diagnosis:   Final diagnoses:   Cholangitis     Allergies:   Allergies   Allergen Reactions     Bactrim [Sulfamethoxazole W/Trimethoprim] GI Disturbance     Vomiting     Codeine      Metronidazole      GI distubance     Sulfa Drugs      Sulfur      Versed [Midazolam]      vomited       Code Status: Full Code  Activity level - Baseline/Home:  Independent. Activity Level - Current:   Stand with Assist. Lift room needed: No. Bariatric: No   Needed: No   Isolation: No. Infection: Not Applicable.     Vital Signs:   Vitals:    08/14/17 2215 08/14/17 2230 08/14/17 2245 08/14/17 2300   BP: 200/83 (!) 207/92 (!) 201/83 (!) 201/79   Pulse:       Resp:       Temp:       TempSrc:       SpO2: 96% 96% 96% 95%       Cardiac Rhythm:  ,      Pain level: 0-10 Pain Scale: 9  Patient confused: No. Patient Falls Risk: Yes.   Elimination Status: Has voided   Patient Report - Initial Complaint: abd pain. Focused Assessment: abd pain   Tests Performed: lab, scan. Abnormal Results:   Labs Ordered and Resulted from Time of ED Arrival Up to the Time of Departure from the ED   COMPREHENSIVE METABOLIC PANEL - Abnormal; Notable for the following:        Result Value    Glucose 130 (*)     Creatinine 1.13 (*)     GFR Estimate 46 (*)     GFR Estimate If Black 56 (*)     Bilirubin Total 2.3 (*)     Alkaline Phosphatase 272 (*)      (*)      (*)     All other components within normal limits   ROUTINE UA WITH MICROSCOPIC - Abnormal; Notable for the following:     Leukocyte Esterase Urine Moderate (*)     WBC Urine 33 (*)     RBC Urine 3 (*)     Bacteria Urine Few (*)     Squamous Epithelial /HPF Urine 8 (*)     Mucous Urine Present (*)     All other components within normal limits   CBC WITH PLATELETS DIFFERENTIAL   LIPASE   LACTIC ACID WHOLE BLOOD   PERIPHERAL IV CATHETER     .    Treatments provided: below  Family Comments: na  OBS brochure/video discussed/provided to patient:  N/A  ED Medications:   Medications   lidocaine 1 % 1 mL (not administered)   lidocaine (LMX4) kit (not administered)   sodium chloride (PF) 0.9% PF flush 3 mL (not administered)   sodium chloride (PF) 0.9% PF flush 3 mL (not administered)   ondansetron (ZOFRAN) injection 4 mg (4 mg Intravenous Given 8/14/17 1909)   morphine (PF) injection 4 mg (4 mg Intravenous Given 8/14/17 2129)   iohexol (OMNIPAQUE) solution 25 mL (not administered)   iohexol (OMNIPAQUE) solution 25 mL (not administered)   piperacillin-tazobactam (ZOSYN) infusion 3.375 g (3.375 g Intravenous New Bag 8/14/17 2300)   iohexol (OMNIPAQUE) solution 25 mL (not administered)   morphine (PF) injection 2 mg (2 mg Intravenous Given 8/14/17 1909)   ondansetron (ZOFRAN) injection 4 mg (4 mg Intravenous Given 8/14/17 2132)     Drips infusing:  No  For the majority of the shift this patient was Green. Interventions performed were na.     Severe Sepsis OR Septic Shock Diagnosis Present: No      ED Nurse Name/Phone Number: Clark Harris,   11:04 PM    RECEIVING UNIT ED HANDOFF REVIEW    Above ED Nurse Handoff Report was reviewed: Yes  Reviewed by: Gricelda Vergara on August 14, 2017 at 11:56 PM

## 2017-08-15 NOTE — IP AVS SNAPSHOT
MRN:[id number]                      After Visit Summary   8/15[birth year]2017    Gely Keene    MRN: [id number]           Thank you!     Thank you for choosing [facility] for your care. Our goal is always to provide you with excellent care. Hearing back from our patients is one way we can continue to improve our services. Please take a few minutes to complete the written survey that you may receive in the mail after you visit with us. Thank you!        Patient Information     Date Of Birth          1940        About your hospital stay     You were admitted on:  August 15, 2017 You last received care in the:  [facility] PACU    You were discharged on:  August 15, 2017       Who to Call     For medical emergencies, please call 911.  For non-urgent questions about your medical care, please call your primary care provider or clinic, [phone]  For questions related to your surgery, please call your surgery clinic        Attending Provider     Provider Specialty    Keturah Bergeron MD Gastroenterology       Primary Care Provider Office Phone # Fax #    Hien Booth [phone]      After Care Instructions     Discharge Instructions       Resume pre procedure diet            Discharge Instructions       Restart home medications.            Discharge Instructions       Resume pre procedure diet            Discharge Instructions       Restart home medications.                  Pending Results     No orders found from 8/13/2017 to 8/16/2017.            Admission Information     Date & Time Provider Department Dept. Phone    8/15/2017 Keturah Bergeron MD [facility] PACU [phone]      Your Vitals Were     Blood Pressure Temperature Respirations Pulse Oximetry          146/65 98.1  F (36.7  C) 17 100%        MyChart Information     [employer] lets you send messages to your doctor, view your test results, renew your prescriptions, schedule  "appointments and more. To sign up, go to [email or web address] . Click on \"Log in\" on the left side of the screen, which will take you to the Welcome page. Then click on \"Sign up Now\" on the right side of the page.     You will be asked to enter the access code listed below, as well as some personal information. Please follow the directions to create your username and password.     Your access code is: [email or web address]  Expires: 2017  9:57 PM     Your access code will  in 90 days. If you need help or a new code, please call your [facility] clinic or [phone].        Care EveryWhere ID     This is your Care EveryWhere ID. This could be used by other organizations to access your [facility] medical records  [id number]        Equal Access to Services     DANIEL MONTOYA : Ramona Pineda, nikos terrell, howie mcneal, archana wagner . So [facility] [phone].    ATENCIÓN: Si habla español, tiene a greenberg disposición servicios gratuitos de asistencia lingüística. Domingo al [phone].    We comply with applicable federal civil rights laws and [place] laws. We do not discriminate on the basis of race, color, national origin, age, disability sex, sexual orientation or gender identity.               Review of your medicines      CONTINUE these medicines which may have CHANGED, or have new prescriptions. If we are uncertain of the size of tablets/capsules you have at home, strength may be listed as something that might have changed.        Dose / Directions    conjugated estrogens cream   Commonly known as:  PREMARIN   This may have changed:  additional instructions   Used for:  Atrophic vaginitis        Dose:  0.5 g   Place 0.5 g vaginally three times a week   Quantity:  30 g   Refills:  6         CONTINUE these medicines which have NOT CHANGED        Dose / Directions    ACETAMINOPHEN PO        Dose:  500 mg   Take 500 mg by mouth every 4 hours as needed for pain " Reported on 4/10/2017   Refills:  0       BIOTIN PO        Dose:  1 tablet   Take 1 tablet by mouth daily dose unknown   Refills:  0       CALCIUM CITRATE PLUS/MAGNESIUM Tabs        Dose:  1 tablet   Take 1 tablet by mouth daily   Refills:  0       fish oil-omega-3 fatty acids 1000 MG capsule        Take 1 capsule daily.  Indications: PN:   Refills:  0       LIPITOR PO        Dose:  20 mg   Take 20 mg by mouth every evening   Refills:  0       MELATONIN PO        Dose:  3 mg   Take 3 mg by mouth nightly as needed   Refills:  0       metoprolol 25 MG tablet   Commonly known as:  LOPRESSOR   Used for:  Sinus tachycardia        Dose:  12.5 mg   Take 0.5 tablets (12.5 mg) by mouth 2 times daily   Quantity:  180 tablet   Refills:  3       nystatin 300508 UNIT/GM Powd   Commonly known as:  MYCOSTATIN        Apply topically as needed   Refills:  0       omeprazole 20 MG CR capsule   Commonly known as:  priLOSEC   Used for:  Gastroesophageal reflux disease, esophagitis presence not specified        TAKE ONE CAPSULE BY MOUTH EVERY DAY   Quantity:  90 capsule   Refills:  3       VITAMIN B 12 PO        Take by mouth daily Dose unknown   Refills:  0                Protect others around you: Learn how to safely use, store and throw away your medicines at www.disposemymeds.org.             Medication List: This is a list of all your medications and when to take them. Check marks below indicate your daily home schedule. Keep this list as a reference.      Medications           Morning Afternoon Evening Bedtime As Needed    ACETAMINOPHEN PO   Take 500 mg by mouth every 4 hours as needed for pain Reported on 4/10/2017                                BIOTIN PO   Take 1 tablet by mouth daily dose unknown                                CALCIUM CITRATE PLUS/MAGNESIUM Tabs   Take 1 tablet by mouth daily                                conjugated estrogens cream   Commonly known as:  PREMARIN   Place 0.5 g vaginally three times a week                                 fish oil-omega-3 fatty acids 1000 MG capsule   Take 1 capsule daily.  Indications: PN:                                LIPITOR PO   Take 20 mg by mouth every evening                                MELATONIN PO   Take 3 mg by mouth nightly as needed                                metoprolol 25 MG tablet   Commonly known as:  LOPRESSOR   Take 0.5 tablets (12.5 mg) by mouth 2 times daily                                nystatin 027217 UNIT/GM Powd   Commonly known as:  MYCOSTATIN   Apply topically as needed                                omeprazole 20 MG CR capsule   Commonly known as:  priLOSEC   TAKE ONE CAPSULE BY MOUTH EVERY DAY                                VITAMIN B 12 PO   Take by mouth daily Dose unknown

## 2017-08-15 NOTE — ANESTHESIA POSTPROCEDURE EVALUATION
Patient: Gely Keene    Procedure(s):  ENDOSCOPIC RETROGRADE CHOLANGIOPANCREATOGRAPHY, SPHINCTEROTOMY. STONE EXTRACTION - Wound Class: II-Clean Contaminated   - Wound Class: II-Clean Contaminated    Diagnosis:COMMON BILE DUCT STONE.  Diagnosis Additional Information: No value filed.    Anesthesia Type:  General, ETT, RSI    Note:  Anesthesia Post Evaluation    Patient location during evaluation: PACU  Patient participation: Able to fully participate in evaluation  Level of consciousness: awake  Pain management: adequate  Airway patency: patent  Cardiovascular status: acceptable  Respiratory status: acceptable  Hydration status: acceptable  PONV: controlled     Anesthetic complications: None          Last vitals:  Vitals:    08/15/17 1630 08/15/17 1640 08/15/17 1650   BP: 141/67 142/65 146/65   Resp: (!) 0 10 17   Temp: 36.6  C (97.9  F) 36.6  C (97.9  F) 36.7  C (98.1  F)   SpO2: 100% 100% 100%         Electronically Signed By: Candice Harris MD  August 15, 2017  5:05 PM

## 2017-08-15 NOTE — ED NOTES
Pt back in room. Pain at 7.stated will wait for meds just prior to going to room. Advised pt to let me know if pain gets worse and I will get her pain meds. Sooner.

## 2017-08-15 NOTE — IP AVS SNAPSHOT
` [facility] PACU: [phone]            Medication Administration Report for Gely Keene as of 08/15/17 3378   Legend:    Given Hold Not Given Due Canceled Entry Other Actions    Time Time (Time) Time  Time-Action       Inactive    Active    Linked        Medications 08/09/17 08/10/17 08/11/17 08/12/17 08/13/17 08/14/17 08/15/17    dimenhyDRINATE (DRAMAMINE) injection 12.5 mg  Dose: 12.5 mg Freq: ONCE PRN Route: IV  PRN Reason: other  PRN Comment: nausea  Start: 08/15/17 1548   Admin Instructions: Dilute in 10 ml 0.9% Sodium chloride.  Please call anesthesiologist prior to administration.               fentaNYL (PF) (SUBLIMAZE) injection 25-50 mcg  Dose: 25-50 mcg Freq: EVERY 2 MIN PRN Route: IV  PRN Reason: other  PRN Comment: acute pain  Start: 08/15/17 1548   Admin Instructions: MAX cumulative dose = 250 mcg.    Use Fentanyl initially, as a short acting agent for acute pain control.  If insufficient, or a longer acting agent is needed, begin Morphine or Hydromorphone if ordered.               HYDROmorphone (DILAUDID) injection 0.3-0.5 mg  Dose: 0.3-0.5 mg Freq: EVERY 5 MIN PRN Route: IV  PRN Reason: other  PRN Comment: acute pain.  May administer if Respiratory Rate is greater than 10  Start: 08/15/17 1548   Admin Instructions: If fentanyl is also ordered, use HYDROmorphone if pain control insufficient with fentanyl or a longer acting agent is needed.   Max cumulative dose = 2 mg               indomethacin (INDOCIN) 50 MG Suppository 100 mg  Dose: 100 mg Freq: ONCE PRN Route: RE  PRN Reason: other  PRN Comment: for pain prophylaxis  Start: 08/15/17 1415   Admin Instructions: To be ordered to be available for procedure.               indomethacin (INDOCIN) 50 MG Suppository 100 mg  Dose: 100 mg Freq: ONCE PRN Route: RE  PRN Reason: other  PRN Comment: for pain prophylaxis  Start: 08/15/17 1415   Admin Instructions: To be ordered to be available for procedure.               lactated  "ringers infusion  Rate: 100 mL/hr Freq: CONTINUOUS Route: IV  Start: 08/15/17 1600   Admin Instructions: Continue until IV catheter is weaned           [ ] 1600           lactated ringers infusion  Rate: 25 mL/hr Freq: CONTINUOUS Route: IV  Start: 08/15/17 1445   Admin Instructions: IF patient NOT on dialysis.           1436 ( )-New Bag       1503 ( )-Anesthesia Volume Adjustment       1541 ( )-New Bag           lidocaine (LMX4) cream  Freq: EVERY 1 HOUR PRN Route: Top  PRN Reason: mild pain  PRN Comment: with VAD insertion or accessing implanted port,  Start: 08/15/17 1415   Admin Instructions: Do NOT give if patient has a history of allergy to any local anesthetic or any \"amparo\" product.   Apply 30 min prior to VAD insertion or port access.  MAX Dose:  2.5 gm (  of 5 gm tube)               lidocaine (LMX4) cream  Freq: EVERY 1 HOUR PRN Route: Top  PRN Reason: mild pain  PRN Comment: with VAD insertion or accessing implanted port,  Start: 08/15/17 1415   Admin Instructions: Do NOT give if patient has a history of allergy to any local anesthetic or any \"amparo\" product.   Apply 30 min prior to VAD insertion or port access.  MAX Dose:  2.5 gm (  of 5 gm tube)               lidocaine 1 % 1 mL  Dose: 1 mL Freq: EVERY 1 HOUR PRN Route: OTHER  PRN Comment: mild pain with VAD insertion or accessing implanted port.  Start: 08/15/17 1415   Admin Instructions: Do NOT give if patient has a history of allergy to any local anesthetic or any \"amparo\" product. MAX dose 1 mL subcutaneous OR intradermal in divided doses.               lidocaine 1 % 1 mL  Dose: 1 mL Freq: EVERY 1 HOUR PRN Route: OTHER  PRN Comment: mild pain with VAD insertion or accessing implanted port.  Start: 08/15/17 1415   Admin Instructions: Do NOT give if patient has a history of allergy to any local anesthetic or any \"amparo\" product. MAX dose 1 mL subcutaneous OR intradermal in divided doses.               May continue current IV fluids if patient has IV " fluids infusing.  Freq: CONTINUOUS PRN Route: XX  Start: 08/15/17 1415              meperidine (DEMEROL) injection 12.5 mg  Dose: 12.5 mg Freq: EVERY 5 MIN PRN Route: IV  PRN Reasons: chills,rigors,post anesthesia shivering  PRN Comment: post anesthesia shivering if Respiratory Rate greater than 10; please call anesthesiologist prior to administration  Start: 08/15/17 1548              ondansetron (ZOFRAN-ODT) ODT tab 4 mg  Dose: 4 mg Freq: EVERY 30 MIN PRN Route: PO  PRN Reason: nausea  Start: 08/15/17 1548   Admin Instructions: MAX total dose = 8 mg, including OR dosing. If not resolved in 15 minutes, then go to step 2 (Prochlorperazine if ordered).              Or  ondansetron (ZOFRAN) injection 4 mg  Dose: 4 mg Freq: EVERY 30 MIN PRN Route: IV  PRN Reason: nausea  Start: 08/15/17 1548   Admin Instructions: MAX total dose = 8 mg, including OR dosing. If not resolved in 15 minutes, then go to step 2 (Prochlorperazine if ordered).  Irritant.               prochlorperazine (COMPAZINE) injection 5 mg  Dose: 5 mg Freq: EVERY 6 HOURS PRN Route: IV  PRN Reasons: nausea,vomiting  Start: 08/15/17 1548   Admin Instructions: This is Step 2 of the nausea and vomiting protocol.   If nausea not resolved in 15 minutes, give Metoclopramide if ordered (step 3 of nausea and vomiting protocol)                 sodium chloride (PF) 0.9% PF flush 3 mL  Dose: 3 mL Freq: EVERY 1 MIN PRN Route: IV  PRN Reason: line flush  PRN Comment: after medication administration  Start: 08/15/17 1415   Admin Instructions: For peripheral IV flush post IV meds               sodium chloride (PF) 0.9% PF flush 3 mL  Dose: 3 mL Freq: EVERY 8 HOURS Route: IV  Start: 08/15/17 1430   Admin Instructions: And Q1H PRN, to lock peripheral IV dormant line.             [ ] 1430       [ ] 2230           sodium chloride (PF) 0.9% PF flush 3 mL  Dose: 3 mL Freq: EVERY 1 HOUR PRN Route: IV  PRN Reasons: line flush,post meds or blood draw  Start: 08/15/17 1415   Admin  Instructions: for peripheral IV flush post IV meds               sodium chloride (PF) 0.9% PF flush 3 mL  Dose: 3 mL Freq: EVERY 1 MIN PRN Route: IV  PRN Reason: line flush  PRN Comment: after medication administration  Start: 08/15/17 1415   Admin Instructions: For peripheral IV flush post IV meds               sodium chloride (PF) 0.9% PF flush 3 mL  Dose: 3 mL Freq: EVERY 8 HOURS Route: IV  Start: 08/15/17 1430   Admin Instructions: And Q1H PRN, to lock peripheral IV dormant line.             [ ] 1430       [ ] 2230           sodium chloride (PF) 0.9% PF flush 3 mL  Dose: 3 mL Freq: EVERY 1 HOUR PRN Route: IV  PRN Reasons: line flush,post meds or blood draw  Start: 08/15/17 1415   Admin Instructions: for peripheral IV flush post IV meds            Medications 08/09/17 08/10/17 08/11/17 08/12/17 08/13/17 08/14/17 08/15/17

## 2017-08-15 NOTE — CONSULTS
GASTROENTEROLOGY CONSULTATION      Gely Keene  [address] [facility] [address]  77 year old female     Admission Date/Time: 8/14/2017  Primary Care Provider: Hien Booth     We were asked to see the patient in consultation by Dr. Preston for evaluation of elevated LFTs.    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 Patient reports she initially began experiencing epigastric pain 10 days ago. She went to the ER and labs, CT scan were unremarkable, other than mild ALT elevation 52.. The pain did subside to a dull ache but 2 days ago it recurred with radiation to her back. She couldn't get comfortable trying to sleep and called Dr. Alaniz's office (Colorectal surgery). She was instructed to eat bland foods, but her pain persisted prompting her to return to the ER. She has experienced associated nausea but no vomiting. She denies fevers, chills, jaundice, scleral icterus, dark urine, or light colored stools. She does not smoke and consumes alcohol occasionally.     Labwork on admission showed elevated LFTs (Alk phos 272, , ), total bilirubin (2) with normal WBC, platelets, HGB, and lipase. Creatinine with mild elevation 1.13. RUQ US showed dilated CBD 1.3cm but otherwise unremarkable. Noncontrast CT did not show any significant findings. She was noted to have low grade fever on admission, 100.1F.    Patient had cholecystectomy in 2008. Postoperative MRCP positive for choledocholithiasis leading to ERCP with sphincterotomy and stone extraction at that time.      PAST MEDICAL HISTORY:  Patient Active Problem List    Diagnosis Date Noted     Cholangitis 08/15/2017     Priority: Medium     Altered bowel elimination due to  intestinal ostomy (H) 11/07/2016     Priority: Medium     Health Care Home 05/23/2016     Priority: Medium              S/p total colectomy on 4/22/16 by Shana Alaniz MD 05/10/2016     Priority: Medium     Restless legs syndrome (RLS) 05/10/2016     Priority: Medium     Anxiety 05/10/2016     Priority: Medium     Gastroesophageal reflux disease, esophagitis presence not specified 11/04/2015     Priority: Medium     Hypertension goal BP (blood pressure) < 140/90 10/30/2014     Priority: Medium     IFG (impaired fasting glucose) 10/30/2014     Priority: Medium     BCC (basal cell carcinoma) 10/30/2014     Priority: Medium     Hyperlipidemia LDL goal <160 09/08/2014     Priority: Medium          ROS: A comprehensive ten point review of systems was negative aside from those in mentioned in the HPI.       MEDICATIONS:      Prior to Admission medications    Medication Sig Last Dose Taking? Auth Provider   nystatin (MYCOSTATIN) 276006 UNIT/GM POWD Apply topically as needed Past Week at Unknown time Yes Unknown, Entered By History   MELATONIN PO Take 3 mg by mouth nightly as needed  at prn Yes Unknown, Entered By History   BIOTIN PO Take 1 tablet by mouth daily dose unknown 8/14/2017 at am Yes Unknown, Entered By History   Cyanocobalamin (VITAMIN B 12 PO) Take by mouth daily Dose unknown 8/14/2017 at am Yes Unknown, Entered By History   omeprazole (PRILOSEC) 20 MG CR capsule TAKE ONE CAPSULE BY MOUTH EVERY DAY 8/14/2017 at am Yes Hien Booth MD   fish oil-omega-3 fatty acids 1000 MG capsule Take 1 capsule daily.  Indications: PN: 8/14/2017 at am Yes Reported, Patient   Atorvastatin Calcium (LIPITOR PO) Take 20 mg by mouth every evening  8/13/2017 at pm Yes Reported, Patient   metoprolol (LOPRESSOR) 25 MG tablet Take 0.5 tablets (12.5 mg) by mouth 2 times daily 8/14/2017 at am Yes Hien Booth MD   ACETAMINOPHEN PO Take 500 mg by mouth every 4 hours as needed for pain Reported on 4/10/2017  "8/14/2017 at am Yes Reported, Patient   conjugated estrogens (PREMARIN) vaginal cream Place 0.5 g vaginally three times a week  Patient taking differently: Place 0.5 g vaginally three times a week (M,W,F) Past Week at Unknown time Yes Valentin Da Silva MD   Multiple Minerals-Vitamins (CALCIUM CITRATE PLUS/MAGNESIUM) TABS Take 1 tablet by mouth daily  8/14/2017 at am Yes Hien Booth MD        ALLERGIES:   Allergies   Allergen Reactions     Bactrim [Sulfamethoxazole W/Trimethoprim] GI Disturbance     Vomiting     Codeine      Metronidazole      GI distubance     Sulfa Drugs      Sulfur      Versed [Midazolam]      vomited        SOCIAL HISTORY:  Social History   Substance Use Topics     Smoking status: Never Smoker     Smokeless tobacco: Never Used     Alcohol use Yes      Comment: socially        FAMILY HISTORY:  Family History   Problem Relation Age of Onset     Breast Cancer Daughter      Breast Cancer Mother      Breast Cancer Sister         PHYSICAL EXAM:   /52 (BP Location: Left arm)  Pulse 86  Temp 98.8  F (37.1  C) (Oral)  Resp 16  Ht 1.575 m (5' 2\")  Wt 65 kg (143 lb 4.8 oz)  SpO2 95%  BMI 26.21 kg/m2     PHYSICAL EXAM:  General: alert, oriented, NAD  SKIN: no suspicious lesions, rashes, jaundice, or spider angiomas  HEAD: Normocephalic. No masses, lesions, tenderness or abnormalities  NECK: Neck supple. No adenopathy. Thyroid symmetric, normal size.  EYES: No scleral icterus  ENT: ENT exam normal, no neck nodes or sinus tenderness  RESPIRATORY: negative, Good diaphragmatic excursion. Lungs clear  CARDIOVASCULAR: negative, PMI normal. No lifts, heaves, or thrills. RRR. No murmurs, clicks gallops or rub  GASTROINTESTINAL: +BS, soft, minimally tender epigastrium, ND, no HSM, no masses/guarding/rebound  JOINT/EXTREMITIES: extremities normal- no gross deformities noted, gait normal and normal muscle tone  NEURO: Reflexes grossly normal and symmetric. Sensation grossly WNL.  PSYCH: no " abnormal anxiety/depression  LYMPH: No anterior cervical, posterior cervical, or supraclavicular adenopathy     LABS:  I reviewed the patient's new clinical lab test results.   Recent Labs   Lab Test  08/14/17 1845 08/06/17 1902 08/21/16   1250   05/09/16   0545   05/02/16   0530   04/25/16   0610   WBC  4.9  9.2  7.7   < >   --    < >   --    < >  17.3*   HGB  13.0  13.2  12.7   < >   --    < >   --    < >  10.1*   MCV  89  90  83   < >   --    < >   --    < >  86   PLT  184  204  218   < >   --    < >   --    < >  91*   INR   --    --    --    --   1.18*   --   1.32*   --   1.27*    < > = values in this interval not displayed.     Recent Labs   Lab Test  08/14/17   1845  08/06/17   1902  10/27/16   1019   NA  135  137  136   POTASSIUM  4.2  3.7  4.0   CHLORIDE  103  105  104   CO2  25  25  24   BUN  14  16  16   ANIONGAP  7  7  8   GRICEL  9.4  8.9  9.0     Recent Labs   Lab Test  08/14/17 2043 08/14/17 1845 08/06/17   1954  05/04/17   1114   10/27/16   1019   05/25/16   1152   ALBUMIN   --   3.6   --    --    --   3.7   --   3.2*   BILITOTAL   --   2.3*   --    --    --   1.2   --   0.6   ALT   --   365*   --    --    --   52*   --   30   AST   --   432*   --    --    --   44   --   34   ALKPHOS   --   272*   --    --    --   94   --   89   PROTEIN  Negative   --   Negative  Negative   < >   --    < >   --    LIPASE   --   257   --    --    --    --    --    --     < > = values in this interval not displayed.        IMAGING  I personally reviewed the patient's new imaging results.    RIGHT UPPER QUADRANT ULTRASOUND 8/14/2017 9:59 PM     HISTORY: Right upper quadrant pain.     COMPARISON: None.     FINDINGS:    Gallbladder: Cholecystectomy.     Bile ducts: Dilated extrahepatic common bile duct measuring 1.3 cm  diameter. No intrahepatic biliary dilatation.     Liver: Normal.      Pancreas: Gas obscures the head and tail. Neck and body appear normal.     Right kidney: Normal.          IMPRESSION:  Extrahepatic biliary dilatation status post  cholecystectomy.     CT ABDOMEN PELVIS W/O CONTRAST  8/14/2017 11:12 PM       HISTORY: Abdominal pain and vomiting.     TECHNIQUE: CT abdomen and pelvis without intravenous contrast.  Radiation dose for this scan was reduced using automated exposure  control, adjustment of the mA and/or kV according to patient size, or  iterative reconstruction technique.      COMPARISON: 8/6/2017.     FINDINGS:  Abdomen: There is minimal dependent atelectasis at the lung bases.  There are coronary artery atherosclerotic calcifications. The heart  size is normal. Evaluation of the solid abdominal organs is limited by  the lack of intravenous contrast. The liver, spleen, pancreas, adrenal  glands and kidneys are normal in appearance. The gallbladder is  absent. There is no abdominal or pelvic lymph node enlargement. There  is atherosclerotic calcification of the aorta and its branches. No  aneurysm.     Pelvis: There is a right lower quadrant ostomy. There is no bowel  obstruction or inflammation. No free intraperitoneal gas or fluid.  Multiple postoperative changes in the pelvis. Degenerative disease in  the spine.         IMPRESSION: No acute abnormality. No bowel obstruction or  inflammation.       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 Labwork notable for elevated LFTs, total bilirubin and RUQ US showing dilated CBD 1.3cm making choledocholithiasis the most likely etiology with possible developing cholangitis. She has been placed on IV Zosyn and prophylactic vancomycin given her history of C diff. Patient needs ERCP to clear bile duct and prevent worsening  infection. This procedure is not available at [facility] today. It is medically necessary to transfer patient to [facility] for the procedure. She will return following the procedure. Patient agreeable with plan. Discussed with nursing staff, hospitalist team, Dr. Shahid, as well as Dr. Bergeron (biliary staff). Will need INR checked as ordered prior to procedure.     In regards to prior C diff infection, it is much less likely to develop C diff infection post colectomy, but possible according to pudmed search (mainly in IBD patients). I was not able to locate specific guidelines for prophylactic antbiotics in this regard. Can consider ID opinion. For now, can continue vanco as ordered. Will defer ongoing treatment to hospital team.       Thank you for asking us to participate in the care of this patient.    Janelle Patel PA-C  [facility]

## 2017-08-15 NOTE — ANESTHESIA PREPROCEDURE EVALUATION
Procedure: Procedure(s):  ENDOSCOPIC RETROGRADE CHOLANGIOPANCREATOGRAM  Preop diagnosis: COMMON BILE DUCT STONE.    Allergies   Allergen Reactions     Bactrim [Sulfamethoxazole W/Trimethoprim] GI Disturbance     Vomiting     Codeine      Metronidazole      GI distubance     Sulfa Drugs      Sulfur      Versed [Midazolam]      vomited     Past Medical History:   Diagnosis Date     Anxiety      BCC (basal cell carcinoma of skin)      Esophageal reflux (GERD) 9/8/2014     HTN (hypertension) 9/8/2014     Hyperlipidemia LDL goal <160 9/8/2014     IFG (impaired fasting glucose) 9/8/2014     PONV (postoperative nausea and vomiting)      Past Surgical History:   Procedure Laterality Date     APPENDECTOMY  1952     cholecystitis       choledocolithiasis       COLECTOMY WITH COLOSTOMY, COMBINED N/A 4/22/2016    Procedure: COMBINED COLECTOMY WITH COLOSTOMY;  Surgeon: Shana Alaniz MD;  Location: [facility] OR     HYSTERECTOMY  1987     LAPAROSCOPIC CHOLECYSTECTOMY  2008     LAPAROTOMY EXPLORATORY N/A 4/22/2016    Procedure: LAPAROTOMY EXPLORATORY;  Surgeon: Shana Alaniz MD;  Location: [facility] OR     PHACOEMULSIFICATION CLEAR CORNEA WITH STANDARD INTRAOCULAR LENS IMPLANT Left 5/9/2017    Procedure: PHACOEMULSIFICATION CLEAR CORNEA WITH STANDARD INTRAOCULAR LENS IMPLANT;  LEFT EYE PHACOEMULSIFICATION CLEAR CORNEA WITH STANDARD INTRAOCULAR LENS IMPLANT ;  Surgeon: Eloy Eric MD;  Location: [facility]     PHACOEMULSIFICATION CLEAR CORNEA WITH STANDARD INTRAOCULAR LENS IMPLANT Right 5/23/2017    Procedure: PHACOEMULSIFICATION CLEAR CORNEA WITH STANDARD INTRAOCULAR LENS IMPLANT;  RIGHT EYE PHACOEMULSIFICATION CLEAR CORNEA WITH STANDARD INTRAOCULAR LENS IMPLANT ;  Surgeon: Eloy Eric MD;  Location: [facility]     Prior to Admission medications    Medication Sig Start Date End Date Taking? Authorizing Provider   nystatin (MYCOSTATIN) 955316 UNIT/GM POWD Apply topically as needed    Unknown, Entered By History    MELATONIN PO Take 3 mg by mouth nightly as needed    Unknown, Entered By History   BIOTIN PO Take 1 tablet by mouth daily dose unknown    Unknown, Entered By History   Cyanocobalamin (VITAMIN B 12 PO) Take by mouth daily Dose unknown    Unknown, Entered By History   omeprazole (PRILOSEC) 20 MG CR capsule TAKE ONE CAPSULE BY MOUTH EVERY DAY 6/27/17   Hien Booth MD   fish oil-omega-3 fatty acids 1000 MG capsule Take 1 capsule daily.  Indications: PN: 8/9/05   Reported, Patient   Atorvastatin Calcium (LIPITOR PO) Take 20 mg by mouth every evening     Reported, Patient   metoprolol (LOPRESSOR) 25 MG tablet Take 0.5 tablets (12.5 mg) by mouth 2 times daily 6/15/16   Hien Booth MD   ACETAMINOPHEN PO Take 500 mg by mouth every 4 hours as needed for pain Reported on 4/10/2017    Reported, Patient   conjugated estrogens (PREMARIN) vaginal cream Place 0.5 g vaginally three times a week  Patient taking differently: Place 0.5 g vaginally three times a week (M,W,F) 8/26/15   Valentin Da Silva MD   Multiple Minerals-Vitamins (CALCIUM CITRATE PLUS/MAGNESIUM) TABS Take 1 tablet by mouth daily  9/8/14   Hien Booth MD     Current Facility-Administered Medications Ordered in Epic   Medication Dose Route Frequency Last Rate Last Dose     LORazepam (ATIVAN) injection 0.5 mg  0.5 mg Intravenous Q4H PRN   0.5 mg at 08/15/17 0033     piperacillin-tazobactam (ZOSYN) infusion 2.25 g  2.25 g Intravenous Q6H 100 mL/hr at 08/15/17 1223 2.25 g at 08/15/17 1223     vancomycin (FIRST-VANCOMYCIN) solution 125 mg  125 mg Oral 4x Daily   125 mg at 08/15/17 1223     acetaminophen (OFIRMEV) infusion 1,000 mg  1,000 mg Intravenous Q6H  mL/hr at 08/15/17 0502 1,000 mg at 08/15/17 0502     naloxone (NARCAN) injection 0.1-0.4 mg  0.1-0.4 mg Intravenous Q2 Min PRN         0.9% sodium chloride infusion   Intravenous Continuous 100 mL/hr at 08/15/17 1100       morphine (PF) injection 2-4 mg  2-4 mg Intravenous Q2H  PRN         ondansetron (ZOFRAN-ODT) ODT tab 4 mg  4 mg Oral Q6H PRN        Or     ondansetron (ZOFRAN) injection 4 mg  4 mg Intravenous Q6H PRN         prochlorperazine (COMPAZINE) injection 5 mg  5 mg Intravenous Q6H PRN        Or     prochlorperazine (COMPAZINE) tablet 5 mg  5 mg Oral Q6H PRN        Or     prochlorperazine (COMPAZINE) Suppository 12.5 mg  12.5 mg Rectal Q12H PRN         metoprolol (LOPRESSOR) injection 5 mg  5 mg Intravenous Q6H   5 mg at 08/15/17 1319     hydrALAZINE (APRESOLINE) injection 10 mg  10 mg Intravenous Q4H PRN         pantoprazole (PROTONIX) 40 mg IV push injection  40 mg Intravenous QAM AC   40 mg at 08/15/17 0924     lidocaine 1 % 1 mL  1 mL Other Q1H PRN         lidocaine (LMX4) cream   Topical Q1H PRN         sodium chloride (PF) 0.9% PF flush 3 mL  3 mL Intravenous Q1H PRN         sodium chloride (PF) 0.9% PF flush 3 mL  3 mL Intravenous Q8H         sodium chloride (PF) 0.9% PF flush 3 mL  3 mL Intravenous q1 min prn         indomethacin (INDOCIN) 50 MG Suppository 100 mg  100 mg Rectal Once PRN         lidocaine (LMX4) cream   Topical Q1H PRN         lidocaine 1 % 1 mL  1 mL Other Q1H PRN         sodium chloride (PF) 0.9% PF flush 3 mL  3 mL Intravenous Q1H PRN         sodium chloride (PF) 0.9% PF flush 3 mL  3 mL Intravenous Q8H         indomethacin (INDOCIN) 50 MG Suppository 100 mg  100 mg Rectal Once PRN         May continue current IV fluids if patient has IV fluids infusing.   Does not apply Continuous PRN         sodium chloride (PF) 0.9% PF flush 3 mL  3 mL Intravenous q1 min prn         No current Epic-ordered outpatient prescriptions on file.     Wt Readings from Last 1 Encounters:   08/15/17 65 kg (143 lb 4.8 oz)     Temp Readings from Last 1 Encounters:   08/15/17 37.1  C (98.8  F) (Oral)     BP Readings from Last 6 Encounters:   08/15/17 114/47   08/08/17 118/70   08/06/17 194/83   05/23/17 103/48   05/09/17 153/69   05/04/17 114/70     Pulse Readings from Last 4  Encounters:   08/15/17 78   08/08/17 73   08/06/17 75   05/09/17 67     Resp Readings from Last 1 Encounters:   08/15/17 16     SpO2 Readings from Last 1 Encounters:   08/15/17 95%     Recent Labs   Lab Test  08/15/17   0918  08/14/17   1845   NA  136  135   POTASSIUM  4.2  4.2   CHLORIDE  105  103   CO2  26  25   ANIONGAP  5  7   GLC  136*  130*   BUN  17  14   CR  1.41*  1.13*   GRICEL  8.4*  9.4     Recent Labs   Lab Test  08/15/17   0918  08/14/17   1845   AST  270*  432*   ALT  313*  365*     Recent Labs   Lab Test  08/15/17   0918  08/14/17   1845   WBC  17.3*  4.9   HGB  12.0  13.0   PLT  166  184     Recent Labs   Lab Test  08/15/17   0918  05/09/16   0545   INR  1.05  1.18*      Recent Labs   Lab Test  04/25/16   1045  04/25/16   0610  04/22/16   0256   TROPI  0.039  0.028  0.042     RECENT LABS:     ECG: Sinus tachy, no acute abnormalities    ECHO: 2008  Interpretation Summary  Left ventricular systolic function is normal. The visual ejection fraction is   estimated at >70%. No regional wall motion abnormalities noted. The   transmitral spectral Doppler flow pattern is suggestive of impaired LV   relaxation. There is mild (1+) mitral regurgitation. No previous study is   available for comparison.    Nuc Stress Test:    II. Myocardial Perfusion Scintigraphy   1.  Myocardial perfusion using single isotope technique demonstrated   normal myocardial perfusion.   2.  Gating demonstrated normal wall motion.   3.  The ejection fraction was normal and calculated at 72%.     Anesthesia Evaluation     . Pt has had prior anesthetic.     History of anesthetic complications   - PONV        ROS/MED HX    ENT/Pulmonary:      (-) tobacco use, asthma, COPD, sleep apnea and recent URI   Neurologic: Comment: Restless leg syndrome     (-) seizures and CVA   Cardiovascular:     (+) Dyslipidemia, hypertension----. : . . . :. .      (-) angina, CAD, arrhythmias, irregular heartbeat/palpitations, valvular problems/murmurs and angina    METS/Exercise Tolerance:     Hematologic:         Musculoskeletal:         GI/Hepatic: Comment: Gallstones     (+) GERD Asymptomatic on medication, Other GI/Hepatic hx of colectomy     (-) liver disease   Renal/Genitourinary:     (+) chronic renal disease,       Endo: Comment: IFG     (-) Type II DM, thyroid disease and chronic steroid usage   Psychiatric:     (+) psychiatric history anxiety      Infectious Disease:         Malignancy:   (+) Malignancy History of Other and Skin          Other:                     Physical Exam  Normal systems: cardiovascular and pulmonary    Airway   Mallampati: II  TM distance: >3 FB  Neck ROM: full    Dental   (+) caps    Cardiovascular   Rhythm and rate: regular and normal  (-) no murmur    Pulmonary    breath sounds clear to auscultation(-) no wheezes                        Anesthesia Plan      History & Physical Review  History and physical reviewed and following examination; no interval change.    ASA Status:  2 emergent.    NPO Status:  > 8 hours    Plan for General, ETT and RSI with Propofol and Intravenous induction. Maintenance will be Balanced.    PONV prophylaxis:  Ondansetron (or other 5HT-3), Dexamethasone or Solumedrol and Other (See comment) (Propofol infusion)  Vomiting with versed      Postoperative Care  Postoperative pain management:  IV analgesics and Multi-modal analgesia.      Consents  Anesthetic plan, risks, benefits and alternatives discussed with:  Patient..

## 2017-08-15 NOTE — PLAN OF CARE
Problem: Goal Outcome Summary  Goal: Goal Outcome Summary  Outcome: Improving  Pt had no c/o nausea or pain today, up with SBA, independent with ileostomy management, seen by GI and sent to [facility] at 1300 for ERCP today, plan to return back to [facility] after procedure, patient  and daughter aware

## 2017-08-15 NOTE — PHARMACY-ADMISSION MEDICATION HISTORY
Readmit to [facility] from [facility] after procedure. Med rec completed on 8/14/17 upon admission to [facility]    Prior to Admission medications    Medication Sig Last Dose Taking? Auth Provider   nystatin (MYCOSTATIN) [id number] UNIT/GM POWD Apply topically as needed  Yes Unknown, Entered By History   MELATONIN PO Take 3 mg by mouth nightly as needed  Yes Unknown, Entered By History   BIOTIN PO Take 1 tablet by mouth daily dose unknown  Yes Unknown, Entered By History   Cyanocobalamin (VITAMIN B 12 PO) Take by mouth daily Dose unknown  Yes Unknown, Entered By History   omeprazole (PRILOSEC) 20 MG CR capsule TAKE ONE CAPSULE BY MOUTH EVERY DAY  Yes Hien Booth MD   fish oil-omega-3 fatty acids 1000 MG capsule Take 1 capsule daily.  Indications: PN:  Yes Reported, Patient   Atorvastatin Calcium (LIPITOR PO) Take 20 mg by mouth every evening   Yes Reported, Patient   metoprolol (LOPRESSOR) 25 MG tablet Take 0.5 tablets (12.5 mg) by mouth 2 times daily  Yes Hien Booth MD   ACETAMINOPHEN PO Take 500 mg by mouth every 4 hours as needed for pain Reported on 4/10/2017  Yes Reported, Patient   conjugated estrogens (PREMARIN) vaginal cream Place 0.5 g vaginally three times a week  Patient taking differently: Place 0.5 g vaginally three times a week (M,W,F)  Yes Valentin Da Silva MD   Multiple Minerals-Vitamins (CALCIUM CITRATE PLUS/MAGNESIUM) TABS Take 1 tablet by mouth daily   Yes Hien Booth MD

## 2017-08-15 NOTE — IP AVS SNAPSHOT
MRN:[id number]                      After Visit Summary   8/15[birth year]2017    Gely Keene    MRN: [id number]           Thank you!     Thank you for choosing [facility] for your care. Our goal is always to provide you with excellent care. Hearing back from our patients is one way we can continue to improve our services. Please take a few minutes to complete the written survey that you may receive in the mail after you visit. If you would like to speak to someone directly about your visit please contact Patient Relations at [phone]. Thank you!          Patient Information     Date Of Birth          1940        About your hospital stay     You were admitted on:  August 15, 2017 You last received care in the:  [address] [facility]    You were discharged on:  August 17, 2017       Who to Call     For medical emergencies, please call 911.  For non-urgent questions about your medical care, please call your primary care provider or clinic, [phone]          Attending Provider     Provider Specialty    Shoaib Preston,  Internal Medicine    Boone Shahid MD Internal Medicine       Primary Care Provider Office Phone # Fax #    Hien Booth [phone]      After Care Instructions     Activity       Your activity upon discharge: activity as tolerated and no driving while on analgesics            Diet       Follow this diet upon discharge: Orders Placed This Encounter      Advance Diet as Tolerated: Regular Diet Adult                  Follow-up Appointments     Follow-up and recommended labs and tests        Follow up with primary care provider, Hien Booth, within 7 days to evaluate medication change, to evaluate after surgery and for hospital follow- up.  The following labs/tests are recommended: LFTs in 1-2 weeks.  Monitor stool output, if profuse watery stool and/or fever will need to be re-evaluated.  The  "patient was provided with a limited supply of oral narcotic medication, as it was indicated by their medical condition.  The patient was instructed not to take the pain medication above the prescribed dose and frequency due to the potential for addiction, respiratory depression, and even death. The patient expressed understanding of these instructions and verbally contracted not to misuse the medication.                  Pending Results     No orders found from 2017 to 2017.            Statement of Approval     Ordered          17 1004  I have reviewed and agree with all the recommendations and orders detailed in this document.  EFFECTIVE NOW     Approved and electronically signed by:  Kedar Chaney DO             Admission Information     Date & Time Provider Department Dept. Phone    8/15/2017 Boone Shahid MD [place] [address] Medical Surgical [phone]      Your Vitals Were     Blood Pressure Temperature Respirations Height Weight Pulse Oximetry    163/71 (BP Location: Left arm) 98  F (36.7  C) (Oral) 16 1.575 m (5' 2\") 66.2 kg (146 lb) 96%    BMI (Body Mass Index)                   26.7 kg/m2           Appboy Information     Appboy lets you send messages to your doctor, view your test results, renew your prescriptions, schedule appointments and more. To sign up, go to [email or web address] . Click on \"Log in\" on the left side of the screen, which will take you to the Welcome page. Then click on \"Sign up Now\" on the right side of the page.     You will be asked to enter the access code listed below, as well as some personal information. Please follow the directions to create your username and password.     Your access code is: [email or web address]  Expires: 2017  9:57 PM     Your access code will  in 90 days. If you need help or a new code, please call your [facility] or [phone].        Care EveryWhere ID     This is your Care EveryWhere ID. This could be " used by other organizations to access your [facility] medical records  [id number]        Equal Access to Services     DANIEL MONTOYA : Hadii gemini Pineda, nikos terrell, claritashakila de santiagodungsheila mcneal, archana doradominajenae hoyos. So [facility] [phone].    ATENCIÓN: Si habla español, tiene a greenberg disposición servicios gratuitos de asistencia lingüística. Llame al [phone].    We comply with applicable federal civil rights laws and [place] laws. We do not discriminate on the basis of race, color, national origin, age, disability sex, sexual orientation or gender identity.               Review of your medicines      START taking        Dose / Directions    amoxicillin-clavulanate 875-125 MG per tablet   Commonly known as:  AUGMENTIN        Dose:  1 tablet   Take 1 tablet by mouth 2 times daily for 8 days   Quantity:  16 tablet   Refills:  0       oxyCODONE 5 MG IR tablet   Commonly known as:  ROXICODONE        Dose:  5 mg   Take 1 tablet (5 mg) by mouth every 6 hours as needed for pain maximum 4 tablet(s) per day   Quantity:  18 tablet   Refills:  0       vancomycin 50 MG/ML Soln   Indication:  Clostridium difficile Bacteria        Dose:  125 mg   Take 2.5 mLs (125 mg) by mouth 2 times daily for 11 days   Quantity:  55 mL   Refills:  0         CONTINUE these medicines which may have CHANGED, or have new prescriptions. If we are uncertain of the size of tablets/capsules you have at home, strength may be listed as something that might have changed.        Dose / Directions    conjugated estrogens cream   Commonly known as:  PREMARIN   This may have changed:  additional instructions   Used for:  Atrophic vaginitis        Dose:  0.5 g   Place 0.5 g vaginally three times a week   Quantity:  30 g   Refills:  6         CONTINUE these medicines which have NOT CHANGED        Dose / Directions    ACETAMINOPHEN PO        Dose:  500 mg   Take 500 mg by mouth every 4 hours as needed for pain Reported on  4/10/2017   Refills:  0       BIOTIN PO        Dose:  1 tablet   Take 1 tablet by mouth daily dose unknown   Refills:  0       CALCIUM CITRATE PLUS/MAGNESIUM Tabs        Dose:  1 tablet   Take 1 tablet by mouth daily   Refills:  0       fish oil-omega-3 fatty acids 1000 MG capsule        Take 1 capsule daily.  Indications: PN:   Refills:  0       LIPITOR PO        Dose:  20 mg   Take 20 mg by mouth every evening   Refills:  0       MELATONIN PO        Dose:  3 mg   Take 3 mg by mouth nightly as needed   Refills:  0       metoprolol 25 MG tablet   Commonly known as:  LOPRESSOR   Used for:  Sinus tachycardia        Dose:  12.5 mg   Take 0.5 tablets (12.5 mg) by mouth 2 times daily   Quantity:  180 tablet   Refills:  3       nystatin 643526 UNIT/GM Powd   Commonly known as:  MYCOSTATIN        Apply topically as needed   Refills:  0       omeprazole 20 MG CR capsule   Commonly known as:  priLOSEC   Used for:  Gastroesophageal reflux disease, esophagitis presence not specified        TAKE ONE CAPSULE BY MOUTH EVERY DAY   Quantity:  90 capsule   Refills:  3       VITAMIN B 12 PO        Take by mouth daily Dose unknown   Refills:  0            Where to get your medicines      These medications were sent to [facility] [place], [place] - [address] [address] [address]     Phone:  [phone]     amoxicillin-clavulanate 875-125 MG per tablet    vancomycin 50 MG/ML Soln         Some of these will need a paper prescription and others can be bought over the counter. Ask your nurse if you have questions.     Bring a paper prescription for each of these medications     oxyCODONE 5 MG IR tablet                Protect others around you: Learn how to safely use, store and throw away your medicines at www.disposemymeds.org.             Medication List: This is a list of all your medications and when to take them. Check marks below indicate your daily home schedule. Keep this list  as a reference.      Medications           Morning Afternoon Evening Bedtime As Needed    ACETAMINOPHEN PO   Take 500 mg by mouth every 4 hours as needed for pain Reported on 4/10/2017   Next Dose Due:  Take as needed                                    amoxicillin-clavulanate 875-125 MG per tablet   Commonly known as:  AUGMENTIN   Take 1 tablet by mouth 2 times daily for 8 days   Next Dose Due:  Start Friday 8/18 in the morning                                       BIOTIN PO   Take 1 tablet by mouth daily dose unknown   Next Dose Due:  Not taken during this admission.                                    CALCIUM CITRATE PLUS/MAGNESIUM Tabs   Take 1 tablet by mouth daily   Next Dose Due:  Not taken during this admission.                                    conjugated estrogens cream   Commonly known as:  PREMARIN   Place 0.5 g vaginally three times a week   Next Dose Due:  Not taken during this admission.                                    fish oil-omega-3 fatty acids 1000 MG capsule   Take 1 capsule daily.  Indications: PN:   Next Dose Due:  Not taken during this admission.                                    LIPITOR PO   Take 20 mg by mouth every evening   Next Dose Due:  Not taken during this admission.                                    MELATONIN PO   Take 3 mg by mouth nightly as needed   Last time this was given:  3 mg on 8/16/2017  9:54 PM   Next Dose Due:  Tonight as needed                                    metoprolol 25 MG tablet   Commonly known as:  LOPRESSOR   Take 0.5 tablets (12.5 mg) by mouth 2 times daily   Last time this was given:  12.5 mg on 8/17/2017 10:03 AM   Next Dose Due:  Tonight at 9:00pm                                   nystatin 821953 UNIT/GM Powd   Commonly known as:  MYCOSTATIN   Apply topically as needed   Next Dose Due:  Not taken during this admission.                                    omeprazole 20 MG CR capsule   Commonly known as:  priLOSEC   TAKE ONE CAPSULE BY MOUTH EVERY DAY    Next Dose Due:  Not taken during this admission.                                    oxyCODONE 5 MG IR tablet   Commonly known as:  ROXICODONE   Take 1 tablet (5 mg) by mouth every 6 hours as needed for pain maximum 4 tablet(s) per day   Next Dose Due:  As needed                                    vancomycin 50 MG/ML Soln   Take 2.5 mLs (125 mg) by mouth 2 times daily for 11 days   Last time this was given:  125 mg on 8/17/2017  1:34 PM   Next Dose Due:  Start tomorrow 8/18 in the AM                                    VITAMIN B 12 PO   Take by mouth daily Dose unknown   Next Dose Due:  Not taken during this admission.                                              More Information      About C. diff Infection  For Patients, Family and Visitors  What is C. diff (clostridium difficile)?  C. diff are germs (bacteria). These germs can live in the guts of healthy people. Antibiotic medicine can change the balance of germs in the gut, causing C. diff infection and diarrhea (dye-jitendra-VASU-luiz).  You can also get C. diff infection during a hospital stay, after surgery, or if you have a weak immune system or IBD (inflammatory bowel disease).  What are the symptoms?  If you have these symptoms, your doctor will ask for a stool (poop) sample for testing:    Diarrhea (loose, watery stools)    Belly pain, tenderness and cramping    Fever  How does it spread?  C. diff leaves your body through your stool. You can get infected when :  1. You touch a surface that has this germ, then  2. You touch food or something else that you put in your mouth.  Here's how you, your family and visitors can help prevent the infection from spreading:     Wash hands often, especially in the hospital, after using the bathroom and before you eat.    Use antibiotics only when you need them. Don't ask for them if your doctor says you have a virus.    If you take antibiotics, follow the directions. Finish all the pills, even if you feel better.    If you have  C. diff infection, try to use a separate restroom until you are well.    At home, clean countertops, sinks, faucets, bathroom doorknobs and toilets often. Use warm water with soap or cleaning products with bleach. (Don't use pure bleach. It's too strong.)    In the hospital, your care team should wear gloves and gowns. They should clean their hands before touching you and before leaving the room. If they don't, please remind them.  Hand washing  For this illness, soap and water works better than hand .    Wash hands with warm water and plenty of soap. Wash for 15 to 20 seconds.    Clean under nails, between fingers and up the wrists.    Rinse hands, letting water run down your fingers.    Dry hands well. Use a paper towel to turn off the faucet and open the door.   How is it treated?  Your doctor may change your antibiotics and give you medicine for diarrhea. Don't take other anti-diarrhea medicines. They will make things worse.  You may get extra fluids through an IV (small tube in the arm or hand).  Sometimes, the infection will come back. If you have symptoms, please call your doctor.   For informational purposes only. Not to replace the advice of your health care provider. Copyright   2014 [facility]. All rights reserved. [employer] 696383 - REV 05/16.

## 2017-08-15 NOTE — ED NOTES
I approched family about trying to help the pt be less anxious about situation due to pt also being very anxious and upset of situation.

## 2017-08-15 NOTE — IP AVS SNAPSHOT
"` `       Patient Information     Patient Name Sex     Gely Carver ([id number]) Female 1940       Room Bed    PACU Pool Room OR Beds      Patient Demographics     Address Phone E-mail Address    [address] [facility] [address] [phone] (Home) *Preferred*  NONE (Work)  [phone] (Mobile) [email or web address]      Patient Ethnicity & Race     Ethnic Group Patient Race    American White      PCP and Center    Primary Care Provider  Phone Center     Hien Booth [phone] [address] [facility] [place] [address]        Emergency Contact(s)     Name Relation Home Work Mobile    ALONDRA CARVER \"JEREL\" Spouse [phone] NONE [phone]    Diana Saavedra Daughter [phone] none [phone]      Documents on File        Status Date Received Description       Documents for the Patient    Affiliate Privacy placeholder   phase3    Consent for Services - Hospital/Clinic Received () 14     Consent for EHR Access Received 14     Privacy Notice - High View Received 14     Insurance Card Received 17 Medicare    External Medication Information Consent Accepted 14     Patient ID Received () 14     [facility] Specified Other       Insurance Card Received () 14 HP    Insurance Card Received () 10/26/15     Consent for Services - Hospital/Clinic Received () 10/26/15     Insurance Card Received () 11/02/15 [facility]    Consent for Services/Privacy Notice - Hospital/Clinic Received 17     Consent for Services/Privacy Notice - Hospital/Clinic Received () 16     Patient ID Received 17 mn dl ex 20    Consent for Services - Geriatrics Received 16     HIM JARAD Authorization  16 Patient - 2016    HIM JARAD Authorization  16     HIM JARAD Authorization  16 PHANI 16-19    Patient ID  ()      Insurance Card Received () " 01/02/17     Insurance Card Received 03/20/17     Consent for Services/Privacy Notice - Hospital/Clinic  (Deleted)      Consent for Services/Privacy Notice - Hospital/Clinic  (Deleted)         Documents for the Encounter    CMS IM for Patient Signature         Admission Information     Attending Provider Admitting Provider Admission Type Admission Date/Time    Keturah Bergeron MD Barancin, Courtney Ellen, MD Elective 08/15/17  1432    Discharge Date Hospital Service Auth/Cert Status Service Area     Surgery Incomplete [facility] SERVICES    Unit Room/Bed Admission Status        PACU PACU Pool Room/OR Beds Admission (Confirmed)       Admission     Complaint    COMMON BILE DUCT STONE.      Hospital Account     Name Acct ID Class Status Primary Coverage    GladisGely freitas [id number] Surgery Admit Open MEDICARE - MEDICARE FOR HB SUPPLEMENT            Guarantor Account (for Hospital Account #[id number])     Name Relation to Pt Service Area Active? Acct Type    Gely Keene  FCS Yes Personal/Family    Address Phone          [address] [address] [phone](H)  NONE(O)              Coverage Information (for Hospital Account #[id number])     1. MEDICARE/MEDICARE FOR HB SUPPLEMENT     F/O Payor/Plan Precert #    MEDICARE/MEDICARE FOR HB SUPPLEMENT     Subscriber Subscriber #    LilianathanGely layton [id number]    Address Phone    ATTN CLAIMS  [address]  [place] [address] [phone]          2. HEALTHPARTNERS/HEALTHPARTNERS FREEDOM PLAN     F/O Payor/Plan Precert #    HEALTHPARTNERS/HEALTHPARTNERS FREEDOM PLAN     Subscriber Subscriber Kristin    Gely Keene [id number]    Address Phone    [address]  [place] [address] [phone]

## 2017-08-15 NOTE — PHARMACY-ADMISSION MEDICATION HISTORY
Admission medication history interview status for this patient is complete. See [facility] admission navigator for allergy information, prior to admission medications and immunization status.     Medication history interview source(s):Patient and family  Medication history resources (including written lists, pill bottles, clinic record):Written list    Changes made to PTA medication list:  Added: vitamin b 12, melatonin, biotin  Deleted: imodium  Changed: nystatin to PRN from TID    Actions taken by pharmacist (provider contacted, etc):None     Additional medication history information:None    Medication reconciliation/reorder completed by provider prior to medication history? No    Do you take OTC medications (eg tylenol, ibuprofen, fish oil, eye/ear drops, etc)? Y(Y/N)    For patients on insulin therapy: N (Y/N)    Time spent in this activity: 15 min    Prior to Admission medications    Medication Sig Last Dose Taking? Auth Provider   nystatin (MYCOSTATIN) 329055 UNIT/GM POWD Apply topically as needed Past Week at Unknown time Yes Unknown, Entered By History   MELATONIN PO Take 3 mg by mouth nightly as needed  at prn Yes Unknown, Entered By History   BIOTIN PO Take 1 tablet by mouth daily dose unknown 8/14/2017 at am Yes Unknown, Entered By History   Cyanocobalamin (VITAMIN B 12 PO) Take by mouth daily Dose unknown 8/14/2017 at am Yes Unknown, Entered By History   omeprazole (PRILOSEC) 20 MG CR capsule TAKE ONE CAPSULE BY MOUTH EVERY DAY 8/14/2017 at am Yes Hien Booth MD   fish oil-omega-3 fatty acids 1000 MG capsule Take 1 capsule daily.  Indications: PN: 8/14/2017 at am Yes Reported, Patient   Atorvastatin Calcium (LIPITOR PO) Take 20 mg by mouth every evening  8/13/2017 at pm Yes Reported, Patient   metoprolol (LOPRESSOR) 25 MG tablet Take 0.5 tablets (12.5 mg) by mouth 2 times daily 8/14/2017 at am Yes Hien Booth MD   ACETAMINOPHEN PO Take 500 mg by mouth every 4 hours as needed for pain  Reported on 4/10/2017 8/14/2017 at am Yes Reported, Patient   Multiple Minerals-Vitamins (CALCIUM CITRATE PLUS/MAGNESIUM) TABS Take 1 tablet by mouth daily  8/14/2017 at am Yes Hien Booth MD   conjugated estrogens (PREMARIN) vaginal cream Place 0.5 g vaginally three times a week  Patient taking differently: Place 0.5 g vaginally three times a week (M,W,F)   Valentin Da Silva MD

## 2017-08-16 LAB
ALBUMIN SERPL-MCNC: 2.3 G/DL (ref 3.4–5)
ALP SERPL-CCNC: 197 U/L (ref 40–150)
ALT SERPL W P-5'-P-CCNC: 201 U/L (ref 0–50)
ANION GAP SERPL CALCULATED.3IONS-SCNC: 7 MMOL/L (ref 3–14)
AST SERPL W P-5'-P-CCNC: 151 U/L (ref 0–45)
BILIRUB SERPL-MCNC: 3.8 MG/DL (ref 0.2–1.3)
BUN SERPL-MCNC: 14 MG/DL (ref 7–30)
CALCIUM SERPL-MCNC: 7.9 MG/DL (ref 8.5–10.1)
CHLORIDE SERPL-SCNC: 109 MMOL/L (ref 94–109)
CO2 SERPL-SCNC: 24 MMOL/L (ref 20–32)
CREAT SERPL-MCNC: 1.11 MG/DL (ref 0.52–1.04)
ERYTHROCYTE [DISTWIDTH] IN BLOOD BY AUTOMATED COUNT: 13.3 % (ref 10–15)
GFR SERPL CREATININE-BSD FRML MDRD: 47 ML/MIN/1.7M2
GLUCOSE SERPL-MCNC: 120 MG/DL (ref 70–99)
HCT VFR BLD AUTO: 30.5 % (ref 35–47)
HGB BLD-MCNC: 10.1 G/DL (ref 11.7–15.7)
LIPASE SERPL-CCNC: 123 U/L (ref 73–393)
MCH RBC QN AUTO: 30.1 PG (ref 26.5–33)
MCHC RBC AUTO-ENTMCNC: 33.1 G/DL (ref 31.5–36.5)
MCV RBC AUTO: 91 FL (ref 78–100)
PLATELET # BLD AUTO: 152 10E9/L (ref 150–450)
POTASSIUM SERPL-SCNC: 3.8 MMOL/L (ref 3.4–5.3)
PROT SERPL-MCNC: 5.7 G/DL (ref 6.8–8.8)
RBC # BLD AUTO: 3.35 10E12/L (ref 3.8–5.2)
SODIUM SERPL-SCNC: 140 MMOL/L (ref 133–144)
WBC # BLD AUTO: 9.1 10E9/L (ref 4–11)

## 2017-08-16 PROCEDURE — 99232 SBSQ HOSP IP/OBS MODERATE 35: CPT | Performed by: INTERNAL MEDICINE

## 2017-08-16 RX ORDER — PANTOPRAZOLE SODIUM 40 MG/1
40 TABLET, DELAYED RELEASE ORAL EVERY MORNING
Status: DISCONTINUED | OUTPATIENT
Start: 2017-08-17 | End: 2017-08-17 | Stop reason: HOSPADM

## 2017-08-16 RX ADMIN — MELATONIN TAB 3 MG 3 MG: 3 TAB at 21:54

## 2017-08-16 RX ADMIN — VANCOMYCIN 125 MG: KIT at 08:53

## 2017-08-16 RX ADMIN — VANCOMYCIN 125 MG: KIT at 18:31

## 2017-08-16 RX ADMIN — VANCOMYCIN 125 MG: KIT at 14:06

## 2017-08-16 RX ADMIN — METOPROLOL TARTRATE 12.5 MG: 25 TABLET, FILM COATED ORAL at 08:53

## 2017-08-16 RX ADMIN — METOPROLOL TARTRATE 12.5 MG: 25 TABLET, FILM COATED ORAL at 21:19

## 2017-08-16 RX ADMIN — ACETAMINOPHEN 1000 MG: 10 INJECTION, SOLUTION INTRAVENOUS at 21:20

## 2017-08-16 RX ADMIN — TAZOBACTAM SODIUM AND PIPERACILLIN SODIUM 2.25 G: 250; 2 INJECTION, SOLUTION INTRAVENOUS at 11:35

## 2017-08-16 RX ADMIN — SODIUM CHLORIDE: 9 INJECTION, SOLUTION INTRAVENOUS at 14:06

## 2017-08-16 RX ADMIN — TAZOBACTAM SODIUM AND PIPERACILLIN SODIUM 2.25 G: 250; 2 INJECTION, SOLUTION INTRAVENOUS at 00:54

## 2017-08-16 RX ADMIN — SODIUM CHLORIDE: 9 INJECTION, SOLUTION INTRAVENOUS at 03:45

## 2017-08-16 RX ADMIN — TAZOBACTAM SODIUM AND PIPERACILLIN SODIUM 2.25 G: 250; 2 INJECTION, SOLUTION INTRAVENOUS at 06:19

## 2017-08-16 RX ADMIN — VANCOMYCIN 125 MG: KIT at 21:52

## 2017-08-16 RX ADMIN — TAZOBACTAM SODIUM AND PIPERACILLIN SODIUM 2.25 G: 250; 2 INJECTION, SOLUTION INTRAVENOUS at 18:32

## 2017-08-16 RX ADMIN — PANTOPRAZOLE SODIUM 40 MG: 40 INJECTION, POWDER, FOR SOLUTION INTRAVENOUS at 08:53

## 2017-08-16 NOTE — PROGRESS NOTES
"GASTROENTEROLOGY PROGRESS NOTE     SUBJECTIVE:  Abdominal pain resolved. No nausea, vomiting, fevers, chills. Tolerating clears.     OBJECTIVE:  /61 (BP Location: Left arm)  Temp 97.8  F (36.6  C) (Oral)  Resp 16  Ht 1.575 m (5' 2\")  Wt 66.2 kg (146 lb)  SpO2 96%  BMI 26.7 kg/m2  Temp (24hrs), Av.2  F (36.8  C), Min:97.5  F (36.4  C), Max:98.9  F (37.2  C)    Patient Vitals for the past 72 hrs:   Weight   08/15/17 1753 66.2 kg (146 lb)       Intake/Output Summary (Last 24 hours) at 17 09  Last data filed at 17 0621   Gross per 24 hour   Intake             2455 ml   Output                1 ml   Net             2454 ml        PHYSICAL EXAM  Gen: alert, oriented, NAD  CV: RRR  Lungs: clear  Abd: soft, NT/ND, +pink stoma RLQ, +BS           Additional Comments:  ROS, FH, SH: See initial GI consult for details.     I have reviewed the patient's new clinical lab results:     Recent Labs   Lab Test  17   0701  08/15/17   0918  17   0545   16   0530   WBC  9.1  17.3*  4.9   < >   --    < >   --    HGB  10.1*  12.0  13.0   < >   --    < >   --    MCV  91  91  89   < >   --    < >   --    PLT  152  166  184   < >   --    < >   --    INR   --   1.05   --    --   1.18*   --   1.32*    < > = values in this interval not displayed.     Recent Labs   Lab Test  17   0701  08/15/17   0917   1845   POTASSIUM  3.8  4.2  4.2   CHLORIDE  109  105  103   CO2  24  26  25   BUN  14  17  14   ANIONGAP  7  5  7     Recent Labs   Lab Test  17   0701  08/15/17   0918  17   2043  17   1845  17   1954  17   1114   ALBUMIN  2.3*  3.0*   --   3.6   --    --    BILITOTAL  3.8*  4.1*   --   2.3*   --    --    ALT  201*  313*   --   365*   --    --    AST  151*  270*   --   432*   --    --    PROTEIN   --    --   Negative   --   Negative  Negative   LIPASE  123   --    --   257   --    --         Assessment:  77 year old female with PMH " significant for diverticulitis complicated by 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 Patient had noted fever, leukocytosis, elevated LFTs and total bilirubin with dilated CBD on US consistent with choledocholithiasis and associated cholangitis.     1. Choledocholithiasis/Cholangitis. ERCP yesterday showed stenotic distal CBD likely secondary to prior sphicterotomy therefore sphincterotomy performed, stone extracted with noted pus. Duct cleared. LFTs, bilirubin and WBC all improving today. WBC normal (17.3-->9.1). Afebrile. On IV Unasyn. Clinically much improved today.    2. 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 No concern for active C diff infection at this time. Placed on prophylactic vancomycin.     Plan:  1. Ok to advance diet for lunch if continues to tolerate clears this am.   2. Dr. Bergeron recommended oral antibiotics x 7 - 10 days 2/2 noted cholangitis.   3. Can consider discharge later today vs tomorrow from GI perspective with follow up LFTs with PCP in 1-2 weeks.     Janelle Patel PA-C  [facility]

## 2017-08-16 NOTE — PLAN OF CARE
"Problem: Goal Outcome Summary  Goal: Goal Outcome Summary  VS /62  Temp 98.2  F (36.8  C) (Oral)  Resp 16  Ht 1.575 m (5' 2\")  Wt 66.2 kg (146 lb)  SpO2 95%  BMI 26.7 kg/m2  Lung sounds Clear  O2 Room air  GI/ voiding adequate amounts. BS hypoactive.  Tolerating clear liquid diet  IVF NS at 100ml/hr  Activity up with SBA  Pain Denies  Patient/family centered care Plan of care discussed with patient.  Discharge plan TBD      "

## 2017-08-16 NOTE — PROGRESS NOTES
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 She was started on IV antibiotics and admitted for further care.  Overnight her WBC janae to 17K and bilirubin janae to 4.1.  She underwent ERCP with finding of choledocholithiasis and stone removal at [facility] on 8/15/17 and then was transferred back to [facility].  Since that time her abdominal pain has improved and bilirubin/LFTs/WBC trended down.  She now is starting clear liquids with the hope of gradually advancing her diet.     1. Cholangitis due to choledocholithiasis.  With fever, abdominal pain, elevated LFT's, and dilated bile duct now s/p ERCP with stone removal.  Continue IV Zosyn, advance diet slowly today.  --GI consult appreciated  --Pain meds PRN.    --Continuous IV fluids again today though reduce to 75 cc/hr.   -- Monitor LFT's.    --Continue zosyn while here, anticipate 7-10 day total course with transition to PO abx upon discharge.       2.  HTN.  back on home metoprolol with fair control.     3.  History of severe C diff requiring colectomy.  Started on oral Vanco 125 mg/QID for now prophylactically.  With previous colectomy much lower risk for c. Dif though there are case reports of this happening.    --continue oral vanco while here prophylacticaly, ?change to probiotic upon discharge.     4.  GERD.  IV Protonix     5.  Chronic kidney disease.  Creatinine appears to be near baseline.   Avoid nephrotoxins.  Monitor.     6.  Hyperlipidemia.  Hold statin pending  "LFT's.     Code status: Full  Discharge Dispo/Date: hopefully home tomorrow if tolerating diet, LFTs improving further and clinically stable.        Interval History (Subjective):      S/p ERCP at [facility] w/ stone removal  Advanced to clears  Pain essentially resolved  LFTs improving  No new issues otherwise  Reducing IVF                  Physical Exam:      Last Vital Signs:  /61 (BP Location: Left arm)  Temp 97.8  F (36.6  C) (Oral)  Resp 16  Ht 1.575 m (5' 2\")  Wt 66.2 kg (146 lb)  SpO2 96%  BMI 26.7 kg/m2      Intake/Output Summary (Last 24 hours) at 08/16/17 1026  Last data filed at 08/16/17 0621   Gross per 24 hour   Intake             2455 ml   Output                1 ml   Net             2454 ml       General: Alert, awake, no acute distress.  HEENT: NC/AT, eyes anicteric, external occular movements intact, face symmetric.  Dentition WNL, MM moist.  Cardiac: RRR, S1, S2.  No murmurs appreciated.  Pulmonary: Normal chest rise, normal work of breathing.  Lungs CTA BL  Abdomen: soft, non-tender, non-distended.  Bowel Sounds Present.  No guarding.  Extremities: no deformities.  Warm, well perfused.  Skin: no rashes or lesions noted.  Warm and Dry.  Neuro: No focal deficits noted.  Speech clear.  Coordination and strength grossly normal.  Psych: Appropriate affect.         Medications:      All current medications were reviewed with changes reflected in problem list.         Data:      All new lab and imaging data was reviewed.   Labs:    Recent Labs  Lab 08/16/17  0701      POTASSIUM 3.8   CHLORIDE 109   CO2 24   ANIONGAP 7   *   BUN 14   CR 1.11*   GFRESTIMATED 47*   GFRESTBLACK 57*   GRICEL 7.9*       Recent Labs  Lab 08/16/17  0701   WBC 9.1   HGB 10.1*   HCT 30.5*   MCV 91          Recent Labs  Lab 08/16/17  0701 08/15/17  0918 08/14/17  1845   * 270* 432*   * 313* 365*   ALKPHOS 197* 254* 272*   BILITOTAL 3.8* 4.1* 2.3*       Recent Labs  Lab 08/15/17  0918   INR 1.05 "      Imaging:   Recent Results (from the past 48 hour(s))   US Abdomen Limited    Narrative    RIGHT UPPER QUADRANT ULTRASOUND 8/14/2017 9:59 PM    HISTORY: Right upper quadrant pain.    COMPARISON: None.    FINDINGS:    Gallbladder: Cholecystectomy.    Bile ducts: Dilated extrahepatic common bile duct measuring 1.3 cm  diameter. No intrahepatic biliary dilatation.    Liver: Normal.     Pancreas: Gas obscures the head and tail. Neck and body appear normal.    Right kidney: Normal.       Impression    IMPRESSION: Extrahepatic biliary dilatation status post  cholecystectomy.    ADAN PHILIP MD   CT Abdomen Pelvis w/o Contrast    Narrative    CT ABDOMEN PELVIS W/O CONTRAST  8/14/2017 11:12 PM      HISTORY: Abdominal pain and vomiting.    TECHNIQUE: CT abdomen and pelvis without intravenous contrast.  Radiation dose for this scan was reduced using automated exposure  control, adjustment of the mA and/or kV according to patient size, or  iterative reconstruction technique.     COMPARISON: 8/6/2017.    FINDINGS:  Abdomen: There is minimal dependent atelectasis at the lung bases.  There are coronary artery atherosclerotic calcifications. The heart  size is normal. Evaluation of the solid abdominal organs is limited by  the lack of intravenous contrast. The liver, spleen, pancreas, adrenal  glands and kidneys are normal in appearance. The gallbladder is  absent. There is no abdominal or pelvic lymph node enlargement. There  is atherosclerotic calcification of the aorta and its branches. No  aneurysm.    Pelvis: There is a right lower quadrant ostomy. There is no bowel  obstruction or inflammation. No free intraperitoneal gas or fluid.  Multiple postoperative changes in the pelvis. Degenerative disease in  the spine.      Impression    IMPRESSION: No acute abnormality. No bowel obstruction or  inflammation.    GIOVANNI GUZMAN MD   XR Surgery ANNMARIE L/T 5 Min Fluoro w Stills    Narrative    This exam was marked as  non-reportable because it will not be read by a   radiologist or a [place] non-radiologist provider.                     Boone Shahid MD.

## 2017-08-16 NOTE — PLAN OF CARE
Problem: Goal Outcome Summary  Goal: Goal Outcome Summary  Outcome: No Change  Pt remains hospitalized for: Cholangitis   Ambulatory Status:  Pt up ind.  Orientation: a/o  VS:  VSS-tachy at times   Pain:  denies  Resp: LS clear.  GI:  Good appetite, on clear liquid diet-advanced to full liquid for dinner. +BS. Stool and gas in ostomy this shift.  :  Voiding without difficulty   Tx:  PO Vanco-hx of c-diff, Zosyn   Labs:  Creat 1.11, , , Lipase 123, WBC 9.1  Consults:  GI   Disposition:  Possible d/c tomorrow

## 2017-08-16 NOTE — PROGRESS NOTES
Infection Prevention:    Enteric precautions discontinued, standard precautions sufficient. Please contact Infection Prevention with any questions/concerns at *[phone].    Cassandra Jean-Baptiste, ICP

## 2017-08-16 NOTE — PROGRESS NOTES
Pt remains admitted for cholangitis. Pt had ERCP performed at [facility] during the shift. Found some gallstones which were removed. Pt reported no pain or nausea after procedure. Incision dressing clean dry and intact.Tolerating clear liquids. Has ileostomy, pt cleans and empties herself. On IV zosyn, PO vanco. O2 2L. A/O x24. Up independent. Will continue to monitor.

## 2017-08-17 VITALS
DIASTOLIC BLOOD PRESSURE: 71 MMHG | SYSTOLIC BLOOD PRESSURE: 163 MMHG | RESPIRATION RATE: 16 BRPM | HEIGHT: 62 IN | BODY MASS INDEX: 26.87 KG/M2 | OXYGEN SATURATION: 96 % | WEIGHT: 146 LBS | TEMPERATURE: 98 F

## 2017-08-17 LAB
ALBUMIN SERPL-MCNC: 2.4 G/DL (ref 3.4–5)
ALP SERPL-CCNC: 191 U/L (ref 40–150)
ALT SERPL W P-5'-P-CCNC: 187 U/L (ref 0–50)
ANION GAP SERPL CALCULATED.3IONS-SCNC: 10 MMOL/L (ref 3–14)
AST SERPL W P-5'-P-CCNC: 151 U/L (ref 0–45)
BASOPHILS # BLD AUTO: 0 10E9/L (ref 0–0.2)
BASOPHILS NFR BLD AUTO: 0.6 %
BILIRUB SERPL-MCNC: 2.4 MG/DL (ref 0.2–1.3)
BUN SERPL-MCNC: 12 MG/DL (ref 7–30)
CALCIUM SERPL-MCNC: 7.9 MG/DL (ref 8.5–10.1)
CHLORIDE SERPL-SCNC: 110 MMOL/L (ref 94–109)
CO2 SERPL-SCNC: 21 MMOL/L (ref 20–32)
CREAT SERPL-MCNC: 1.13 MG/DL (ref 0.52–1.04)
DIFFERENTIAL METHOD BLD: ABNORMAL
EOSINOPHIL # BLD AUTO: 0.1 10E9/L (ref 0–0.7)
EOSINOPHIL NFR BLD AUTO: 1.4 %
ERYTHROCYTE [DISTWIDTH] IN BLOOD BY AUTOMATED COUNT: 13.4 % (ref 10–15)
GFR SERPL CREATININE-BSD FRML MDRD: 46 ML/MIN/1.7M2
GLUCOSE SERPL-MCNC: 92 MG/DL (ref 70–99)
HCT VFR BLD AUTO: 29.5 % (ref 35–47)
HGB BLD-MCNC: 9.8 G/DL (ref 11.7–15.7)
IMM GRANULOCYTES # BLD: 0 10E9/L (ref 0–0.4)
IMM GRANULOCYTES NFR BLD: 0.5 %
LIPASE SERPL-CCNC: 211 U/L (ref 73–393)
LYMPHOCYTES # BLD AUTO: 1.3 10E9/L (ref 0.8–5.3)
LYMPHOCYTES NFR BLD AUTO: 19.8 %
MCH RBC QN AUTO: 30.2 PG (ref 26.5–33)
MCHC RBC AUTO-ENTMCNC: 33.2 G/DL (ref 31.5–36.5)
MCV RBC AUTO: 91 FL (ref 78–100)
MONOCYTES # BLD AUTO: 0.6 10E9/L (ref 0–1.3)
MONOCYTES NFR BLD AUTO: 9.8 %
NEUTROPHILS # BLD AUTO: 4.3 10E9/L (ref 1.6–8.3)
NEUTROPHILS NFR BLD AUTO: 67.9 %
NRBC # BLD AUTO: 0 10*3/UL
NRBC BLD AUTO-RTO: 0 /100
PLATELET # BLD AUTO: 146 10E9/L (ref 150–450)
POTASSIUM SERPL-SCNC: 3.4 MMOL/L (ref 3.4–5.3)
PROT SERPL-MCNC: 5.5 G/DL (ref 6.8–8.8)
RBC # BLD AUTO: 3.25 10E12/L (ref 3.8–5.2)
SODIUM SERPL-SCNC: 141 MMOL/L (ref 133–144)
WBC # BLD AUTO: 6.4 10E9/L (ref 4–11)

## 2017-08-17 PROCEDURE — 99239 HOSP IP/OBS DSCHRG MGMT >30: CPT | Performed by: HOSPITALIST

## 2017-08-17 PROCEDURE — 85025 COMPLETE CBC W/AUTO DIFF WBC: CPT | Performed by: INTERNAL MEDICINE

## 2017-08-17 PROCEDURE — 36415 COLL VENOUS BLD VENIPUNCTURE: CPT | Performed by: INTERNAL MEDICINE

## 2017-08-17 PROCEDURE — 80053 COMPREHEN METABOLIC PANEL: CPT | Performed by: INTERNAL MEDICINE

## 2017-08-17 PROCEDURE — 83690 ASSAY OF LIPASE: CPT | Performed by: INTERNAL MEDICINE

## 2017-08-17 RX ORDER — OXYCODONE HYDROCHLORIDE 5 MG/1
5 TABLET ORAL EVERY 6 HOURS PRN
Qty: 18 TABLET | Refills: 0 | Status: SHIPPED | OUTPATIENT
Start: 2017-08-17 | End: 2017-08-29

## 2017-08-17 RX ADMIN — VANCOMYCIN 125 MG: KIT at 13:34

## 2017-08-17 RX ADMIN — PANTOPRAZOLE SODIUM 40 MG: 40 TABLET, DELAYED RELEASE ORAL at 10:02

## 2017-08-17 RX ADMIN — MORPHINE SULFATE 2 MG: 2 INJECTION, SOLUTION INTRAMUSCULAR; INTRAVENOUS at 05:53

## 2017-08-17 RX ADMIN — SODIUM CHLORIDE: 9 INJECTION, SOLUTION INTRAVENOUS at 05:48

## 2017-08-17 RX ADMIN — VANCOMYCIN 125 MG: KIT at 10:06

## 2017-08-17 RX ADMIN — TAZOBACTAM SODIUM AND PIPERACILLIN SODIUM 2.25 G: 250; 2 INJECTION, SOLUTION INTRAVENOUS at 00:12

## 2017-08-17 RX ADMIN — METOPROLOL TARTRATE 12.5 MG: 25 TABLET, FILM COATED ORAL at 10:03

## 2017-08-17 RX ADMIN — TAZOBACTAM SODIUM AND PIPERACILLIN SODIUM 2.25 G: 250; 2 INJECTION, SOLUTION INTRAVENOUS at 13:34

## 2017-08-17 RX ADMIN — TAZOBACTAM SODIUM AND PIPERACILLIN SODIUM 2.25 G: 250; 2 INJECTION, SOLUTION INTRAVENOUS at 05:48

## 2017-08-17 ASSESSMENT — PAIN DESCRIPTION - DESCRIPTORS: DESCRIPTORS: PRESSURE

## 2017-08-17 NOTE — PLAN OF CARE
Problem: Goal Outcome Summary  Goal: Goal Outcome Summary  Outcome: No Change  Pt remains admitted for cholangitis. Pt reported minimal pain during shift, IV tylenol given with relief. Pt reported no nausea with full liquid diet advancement. Ileostomy in place, pt emptied and cleaned herself. On PO vanco and IV zosyn. Up independently. Possible DC home tomorrow. Will continue to monitor.

## 2017-08-17 NOTE — PROGRESS NOTES
[facility] (chart update)    SUMMARY   77 year old female with PMH significant for diverticulitis complicated by severe C diff colitis s/p total colectomy with end ileostomy 4/2016, previous cholecystectomy and postoperative ERCP with sphincterotomy/stone extraction for choledocholithiasis presenting to the hospital on (8/15) with epigastric pain.     She was found to have fever, leukocytosis, elevated LFTs and total bilirubin with dilated CBD on US consistent with choledocholithiasis and associated cholangitis s/p ERCP     1. Choledocholithiasis/Cholangitis : ERCP (8/15) revealing stenotic distal CBD secondary to prior sphicterotomy therefore repeat sphincterotomy completed, stone extracted with noted pus. Duct cleared.   -- LFTs, bilirubin and WBC continue to improve. WBC normal on Unasyn.   -- Dr. Bergeron recommended oral antibiotics x 7 - 10 days 2/2 noted cholangitis.   -- Follow up LFTs with PCP in 1-2 weeks.      2. History of C diff s/p total colectomy end ileostomy 4/2016 : No concern for active C diff infection at this time. Placed on prophylactic vancomycin while receiving IV antibiotics. No clear evidence for benefit of probiotics in this situation although would not cause harm if patient is in favor.       Sulma Wilkins PA-C   [facility]

## 2017-08-17 NOTE — DISCHARGE SUMMARY
Hospitalist Discharge Summary    Gely Keene MRN# [id number]   YOB: 1940 Age: 77 year old     Date of Admission:  8/15/2017  Date of Discharge:  8/17/2017  Admitting Physician:  Boone Shahid MD  Discharge Physician:  Kedar Chaney  Discharging Service:  Hospitalist     Primary Provider: Hien Booth          Discharge Diagnosis:   Cholangitis  Choledocholithiasis  HTN  Hx severe C.diff requiring colectomy  CKD  HLP             Discharge Disposition:   Discharged to home           Allergies:   Allergies   Allergen Reactions     Bactrim [Sulfamethoxazole W/Trimethoprim] GI Disturbance     Vomiting     Codeine      Metronidazole      GI distubance     Sulfa Drugs      Sulfur      Versed [Midazolam]      vomited              Discharge Medications:   Current Discharge Medication List      START taking these medications    Details   vancomycin (FIRST-VANCOMYCIN) 50 MG/ML SOLN Take 2.5 mLs (125 mg) by mouth 2 times daily for 11 days  Qty: 55 mL, Refills: 0    Associated Diagnoses: Cholangitis      amoxicillin-clavulanate (AUGMENTIN) 875-125 MG per tablet Take 1 tablet by mouth 2 times daily for 8 days  Qty: 16 tablet, Refills: 0    Associated Diagnoses: Cholangitis      oxyCODONE (ROXICODONE) 5 MG IR tablet Take 1 tablet (5 mg) by mouth every 6 hours as needed for pain maximum 4 tablet(s) per day  Qty: 18 tablet, Refills: 0    Associated Diagnoses: Cholangitis         CONTINUE these medications which have NOT CHANGED    Details   nystatin (MYCOSTATIN) 181273 UNIT/GM POWD Apply topically as needed      MELATONIN PO Take 3 mg by mouth nightly as needed      BIOTIN PO Take 1 tablet by mouth daily dose unknown      Cyanocobalamin (VITAMIN B 12 PO) Take by mouth daily Dose unknown      omeprazole (PRILOSEC) 20 MG CR capsule TAKE ONE CAPSULE BY MOUTH EVERY DAY  Qty: 90 capsule, Refills: 3    Associated Diagnoses: Gastroesophageal reflux disease, esophagitis presence not  "specified      fish oil-omega-3 fatty acids 1000 MG capsule Take 1 capsule daily.  Indications: PN:      Atorvastatin Calcium (LIPITOR PO) Take 20 mg by mouth every evening       metoprolol (LOPRESSOR) 25 MG tablet Take 0.5 tablets (12.5 mg) by mouth 2 times daily  Qty: 180 tablet, Refills: 3    Associated Diagnoses: Sinus tachycardia      ACETAMINOPHEN PO Take 500 mg by mouth every 4 hours as needed for pain Reported on 4/10/2017      conjugated estrogens (PREMARIN) vaginal cream Place 0.5 g vaginally three times a week  Qty: 30 g, Refills: 6    Associated Diagnoses: Atrophic vaginitis      Multiple Minerals-Vitamins (CALCIUM CITRATE PLUS/MAGNESIUM) TABS Take 1 tablet by mouth daily                     Condition on Discharge:   Discharge condition: Stable   Discharge vitals: Blood pressure 163/71, temperature 98  F (36.7  C), temperature source Oral, resp. rate 16, height 1.575 m (5' 2\"), weight 66.2 kg (146 lb), SpO2 96 %, not currently breastfeeding.   Code status on discharge: Full Code      BASIC PHYSICAL EXAMINATION:  GENERAL: No apparent distress.  CARDIOVASCULAR: Regular rate and rhythm without murmurs.  PULMONARY: Clear to auscultation bilaterally.   GASTROINTESTINAL: Abdomen soft, non-tender.  EXTREMITIES: No edema, pulses intact.  NEUROLOGIC: No focal deficits.          History of Illness:   See detailed admission note for full details.               Procedures excluding imaging which is summarized below:   See EMR           Consultations:   None          Significant Results:   Results for orders placed or performed during the hospital encounter of 08/15/17   XR Surgery ANNMARIE L/T 5 Min Fluoro w Stills    Narrative    This exam was marked as non-reportable because it will not be read by a   radiologist or a [place] non-radiologist provider.                            Pending Results:   Unresulted Labs Ordered in the Past 30 Days of this Admission     No orders found from 6/16/2017 to 8/16/2017.             "            Discharge Instructions and Follow-Up:   Discharge instructions and follow-up:   Discharge Procedure Orders  Follow-up and recommended labs and tests    Order Comments: Follow up with primary care provider, Hien Booth, within 7 days to evaluate medication change, to evaluate after surgery and for hospital follow- up.  The following labs/tests are recommended: LFTs in 1-2 weeks.  Monitor stool output, if profuse watery stool and/or fever will need to be re-evaluated.  The patient was provided with a limited supply of oral narcotic medication, as it was indicated by their medical condition.  The patient was instructed not to take the pain medication above the prescribed dose and frequency due to the potential for addiction, respiratory depression, and even death. The patient expressed understanding of these instructions and verbally contracted not to misuse the medication.     Activity   Order Comments: Your activity upon discharge: activity as tolerated and no driving while on analgesics   Order Specific Question Answer Comments   Is discharge order? Yes      Full Code     Diet   Order Comments: Follow this diet upon discharge: Orders Placed This Encounter     Advance Diet as Tolerated: Regular Diet Adult   Order Specific Question Answer Comments   Is discharge order? Yes                Hospital Course:   Gely Keene is a 77 year old female with a history of HTN, GERD, and severe C.diff requiring colectomy currently w/ ileostomy who presented 8/14/17 with epigastric and RUQ abdominal pain found to have elevated bilirubin, fever and dilatated CBD (by ultrasound, not CT) compatible with early cholangitis. She was started on IV antibiotics and admitted for further care. Overnight her WBC janae to 17K and bilirubin janae to 4.1. She underwent ERCP revealing stenotic distal CBD secondary to prior sphicterotomy therefore repeat sphincterotomy completed, stone extracted with noted pus. Duct  subsequently cleared. Since that time her abdominal pain has improved and bilirubin/LFTs/WBC have trended down. She is now tolerating diet and ambulating. She will complete 8 more days of Augmentin (2 days of zosyn already) per GI recommendations. Although she has had previous colectomy, she will remain on ppx dosing of PO vancomycin to prevent recurrent C.diff infection. To note, she has mild epigastric pain today but is tolerating diet and ostomy output is normal. She may have a mild post intervention ileus. I advised cautious advancement of diet and judicious use of narcotics. Lipase 211 today so post ERCP pancreatitis is unlikely.    The patient was seen, examined, and counseled on this day. The hospitalization and plan of care was reviewed with the patient and daughter Diana extensively. All questions were addressed and the patient agreed to follow-up as noted above.      Total time spent in face to face contact with the patient and coordinating discharge was:  40 Minutes    Kedar Chaney DO, MPH  [facility] Hospitalist  [address] [place] [address]  Pager: [phone]  08/17/2017

## 2017-08-17 NOTE — PROGRESS NOTES
Pt to D/C to home.  Pt provided with d/c instructions, including new medications, when medications were last given, and when to take them again.  Pt also informed to f/u with Dr. Booth within 7 days.  Pt verbalized understanding of all d/c and f/u instructions.  All questions were answered at this time.  Copy of paperwork sent with pt.  Medication( Augmentin and Oxycodone, Daughter to  Vanco from Rx when available)sent with pt.   to provide transport.  All personal belongings sent with pt.     Flu Vaccine: Recieved PTA  Pneumovax: Recieved PTA

## 2017-08-17 NOTE — PLAN OF CARE
Problem: Goal Outcome Summary  Goal: Goal Outcome Summary  Outcome: No Change  Pt states did not sleep well, denied pain until this AM stating pressure feeling upper abdomen and difficulty breathing. Pt assisted to sit upright in bed and morphine given with some relief. Independent with ostomy cares.

## 2017-08-17 NOTE — PLAN OF CARE
Problem: Goal Outcome Summary  Goal: Goal Outcome Summary  Outcome: No Change  Pt remains hospitalized for: Cholangitis   Ambulatory Status:  Pt up ind.  Orientation: a/o  VS:  VSS   Pain:  pressure pain in the upper abd.-declines any interventions  Resp: LS clear.  GI:  Good appetite-regular diet. +BS. Stool and gas in ostomy this shift.  :  Voiding without difficulty   Tx:  PO Vanco-hx of c-diff, Zosyn   Labs:  Creat 1.13, ,   Consults:  GI   Disposition:  d/c this afternoon

## 2017-08-18 ENCOUNTER — TELEPHONE (OUTPATIENT)
Dept: PEDIATRICS | Facility: CLINIC | Age: 77
End: 2017-08-18

## 2017-08-18 NOTE — TELEPHONE ENCOUNTER
"Hospital/TCU/ED for chronic condition Discharge Protocol    \"Hi, my name is Ashely Monreal, a registered nurse, and I am calling from [facility].  I am calling to follow up and see how things are going for you after your recent emergency visit/hospital/TCU stay.\"    Tell me how you are doing now that you are home?\" Doing well.       Discharge Instructions    \"Let's review your discharge instructions.  What is/are the follow-up recommendations?  Pt. Response: w/ PCP    \"Has an appointment with your primary care provider been scheduled?\"   Scheduled on 8/29/17    \"When you see the provider, I would recommend that you bring your medications with you.\"    Medications    \"Tell me what changed about your medicines when you discharged?\"    Changes to chronic meds?    0-1    \"What questions do you have about your medications?\"    None     New diagnoses of heart failure, COPD, diabetes, or MI?    No                  Medication reconciliation completed? Yes  Was MTM referral placed (*Make sure to put transitions as reason for referral)?   No    Call Summary    \"What questions or concerns do you have about your recent visit and your follow-up care?\"     none    \"If you have questions or things don't continue to improve, we encourage you contact us through the main clinic number (give number).  Even if the clinic is not open, triage nurses are available 24/7 to help you.     We would like you to know that our clinic has extended hours (provide information).  We also have urgent care (provide details on closest location and hours/contact info)\"      \"Thank you for your time and take care!\"         "

## 2017-08-18 NOTE — TELEPHONE ENCOUNTER
Please contact patient for In-patient follow up.  [phone] (home) NONE (work)    Visit date: 8/17/17  Diagnosis listed:Cholangitis  Number of visits in past 12 months:2

## 2017-08-29 ENCOUNTER — OFFICE VISIT (OUTPATIENT)
Dept: PEDIATRICS | Facility: CLINIC | Age: 77
End: 2017-08-29
Payer: COMMERCIAL

## 2017-08-29 VITALS
SYSTOLIC BLOOD PRESSURE: 110 MMHG | BODY MASS INDEX: 26.13 KG/M2 | DIASTOLIC BLOOD PRESSURE: 70 MMHG | TEMPERATURE: 98.7 F | OXYGEN SATURATION: 97 % | HEIGHT: 62 IN | HEART RATE: 70 BPM | WEIGHT: 142 LBS

## 2017-08-29 DIAGNOSIS — R00.0 SINUS TACHYCARDIA: ICD-10-CM

## 2017-08-29 DIAGNOSIS — E78.5 HYPERLIPIDEMIA LDL GOAL <160: ICD-10-CM

## 2017-08-29 DIAGNOSIS — K83.09 CHOLANGITIS (H): Primary | ICD-10-CM

## 2017-08-29 DIAGNOSIS — I10 HYPERTENSION GOAL BP (BLOOD PRESSURE) < 140/90: ICD-10-CM

## 2017-08-29 DIAGNOSIS — N95.2 ATROPHIC VAGINITIS: ICD-10-CM

## 2017-08-29 DIAGNOSIS — N39.0 RECURRENT UTI: ICD-10-CM

## 2017-08-29 DIAGNOSIS — Z93.2 S/P ILEOSTOMY (H): ICD-10-CM

## 2017-08-29 PROCEDURE — 99495 TRANSJ CARE MGMT MOD F2F 14D: CPT | Performed by: PEDIATRICS

## 2017-08-29 RX ORDER — METOPROLOL TARTRATE 25 MG/1
12.5 TABLET, FILM COATED ORAL 2 TIMES DAILY
Qty: 180 TABLET | Refills: 3 | Status: SHIPPED | OUTPATIENT
Start: 2017-08-29 | End: 2017-11-06

## 2017-08-29 RX ORDER — ATORVASTATIN CALCIUM 20 MG/1
20 TABLET, FILM COATED ORAL DAILY
Qty: 90 TABLET | Refills: 3 | Status: SHIPPED | OUTPATIENT
Start: 2017-08-29 | End: 2018-11-07

## 2017-08-29 NOTE — MR AVS SNAPSHOT
After Visit Summary   8/29/2017    Gely Keene    MRN: [id number]           Patient Information     Date Of Birth          1940        Visit Information        Provider Department      8/29/2017 11:00 AM Hien Booth MD [facility]        Today's Diagnoses     Cholangitis    -  1    Recurrent UTI        Urethral stenosis        Atrophic vaginitis        Hypertension goal BP (blood pressure) < 140/90        S/P ileostomy (H)        Sinus tachycardia        Hyperlipidemia LDL goal <160          Care Instructions    Call for appt with Dr Abbasi when needed - # below    Finish your antibiotics     Medication refills sent to your pharmacy          Follow-ups after your visit        Your next 10 appointments already scheduled     Oct 31, 2017 11:00 AM CDT   LAB with EA LAB   [facility] ([facility])    [address] [place] [address]  [address]  [facility] [address] [phone]           Patient must bring picture ID. Patient should be prepared to give a urine specimen  Please do not eat 10-12 hours before your appointment if you are coming in fasting for labs on lipids, cholesterol, or glucose (sugar). Pregnant women should follow their Care Team instructions. Water with medications is okay. Do not drink coffee or other fluids. If you have concerns about taking  your medications, please ask at office or if scheduling via [employer], send a message by clicking on Secure Messaging, Message Your Care Team.            Nov 06, 2017 11:00 AM CST   PHYSICAL with Hien Booth MD   [facility] ([facility])    [address] [place] [address]  [address]  [facility] [address] [phone]              Who to contact     If you have questions or need follow up information about today's clinic visit or your schedule please contact [facility] directly at [phone].  Normal or non-critical lab and imaging results will  "be communicated to you by MyChart, letter or phone within 4 business days after the clinic has received the results. If you do not hear from us within 7 days, please contact the clinic through [facility] or phone. If you have a critical or abnormal lab result, we will notify you by phone as soon as possible.  Submit refill requests through [facility] or call your pharmacy and they will forward the refill request to us. Please allow 3 business days for your refill to be completed.          Additional Information About Your Visit        [facility] Information     [facility] lets you send messages to your doctor, view your test results, renew your prescriptions, schedule appointments and more. To sign up, go to [email or web address] . Click on \"Log in\" on the left side of the screen, which will take you to the Welcome page. Then click on \"Sign up Now\" on the right side of the page.     You will be asked to enter the access code listed below, as well as some personal information. Please follow the directions to create your username and password.     Your access code is: [email or web address]  Expires: 2017  9:57 PM     Your access code will  in 90 days. If you need help or a new code, please call your [facility] clinic or [phone].        Care EveryWhere ID     This is your Care EveryWhere ID. This could be used by other organizations to access your [facility] medical records  [id number]        Your Vitals Were     Pulse Temperature Height Pulse Oximetry BMI (Body Mass Index)       70 98.7  F (37.1  C) (Tympanic) 5' 2\" (1.575 m) 97% 25.97 kg/m2        Blood Pressure from Last 3 Encounters:   17 110/70   17 163/71   08/15/17 146/65    Weight from Last 3 Encounters:   17 142 lb (64.4 kg)   08/15/17 146 lb (66.2 kg)   08/15/17 143 lb 4.8 oz (65 kg)              Today, you had the following     No orders found for display         Today's Medication Changes          These changes are accurate as of: 17 11:41 " AM.  If you have any questions, ask your nurse or doctor.               These medicines have changed or have updated prescriptions.        Dose/Directions    conjugated estrogens cream   Commonly known as:  PREMARIN   This may have changed:  additional instructions   Used for:  Atrophic vaginitis        Dose:  0.5 g   Start taking on:  8/30/2017   Place 0.5 g vaginally three times a week   Quantity:  30 g   Refills:  6       * LIPITOR PO   This may have changed:  Another medication with the same name was added. Make sure you understand how and when to take each.   Changed by:  Valentin Da Silva MD        Dose:  20 mg   Take 20 mg by mouth every evening   Refills:  0       * atorvastatin 20 MG tablet   Commonly known as:  LIPITOR   This may have changed:  You were already taking a medication with the same name, and this prescription was added. Make sure you understand how and when to take each.   Used for:  Hyperlipidemia LDL goal <160   Changed by:  Hien Booth MD        Dose:  20 mg   Take 1 tablet (20 mg) by mouth daily   Quantity:  90 tablet   Refills:  3       * Notice:  This list has 2 medication(s) that are the same as other medications prescribed for you. Read the directions carefully, and ask your doctor or other care provider to review them with you.         Where to get your medicines      These medications were sent to [facility] - [place], [place] - [address]  [address], [place] [address]     Phone:  [phone]     atorvastatin 20 MG tablet    conjugated estrogens cream    metoprolol 25 MG tablet                Primary Care Provider Office Phone # Fax #    Hien Booth [phone] [address] [place] [address]        Equal Access to Services     [facility]: Ramona Pineda, waaxda lumariyaadaha, qaybta jonnathanalarchana alas. So [facility]  [phone].    ATENCIÓN: Si dorie bradley, tiene a greenberg disposición servicios gratuitos de asistencia lingüística. Domingo eisenberg [phone].    We comply with applicable federal civil rights laws and [place] laws. We do not discriminate on the basis of race, color, national origin, age, disability sex, sexual orientation or gender identity.            Thank you!     Thank you for choosing [facility]  for your care. Our goal is always to provide you with excellent care. Hearing back from our patients is one way we can continue to improve our services. Please take a few minutes to complete the written survey that you may receive in the mail after your visit with us. Thank you!             Your Updated Medication List - Protect others around you: Learn how to safely use, store and throw away your medicines at www.disposemymeds.org.          This list is accurate as of: 8/29/17 11:41 AM.  Always use your most recent med list.                   Brand Name Dispense Instructions for use Diagnosis    ACETAMINOPHEN PO      Take 500 mg by mouth every 4 hours as needed for pain Reported on 4/10/2017        BIOTIN PO      Take 1 tablet by mouth daily dose unknown        CALCIUM CITRATE PLUS/MAGNESIUM Tabs      Take 1 tablet by mouth daily        conjugated estrogens cream   Start taking on:  8/30/2017    PREMARIN    30 g    Place 0.5 g vaginally three times a week    Atrophic vaginitis       fish oil-omega-3 fatty acids 1000 MG capsule      Take 1 capsule daily.  Indications: PN:        * LIPITOR PO      Take 20 mg by mouth every evening        * atorvastatin 20 MG tablet    LIPITOR    90 tablet    Take 1 tablet (20 mg) by mouth daily    Hyperlipidemia LDL goal <160       MELATONIN PO      Take 3 mg by mouth nightly as needed        metoprolol 25 MG tablet    LOPRESSOR    180 tablet    Take 0.5 tablets (12.5 mg) by mouth 2 times daily    Sinus tachycardia       nystatin [id number] UNIT/GM Powd    MYCOSTATIN     Apply  topically as needed        omeprazole 20 MG CR capsule    priLOSEC    90 capsule    TAKE ONE CAPSULE BY MOUTH EVERY DAY    Gastroesophageal reflux disease, esophagitis presence not specified       VITAMIN B 12 PO      Take by mouth daily Dose unknown        * Notice:  This list has 2 medication(s) that are the same as other medications prescribed for you. Read the directions carefully, and ask your doctor or other care provider to review them with you.

## 2017-08-29 NOTE — PROGRESS NOTES
SUBJECTIVE:   Gely Keene is a 77 year old female who presents to clinic today for the following health issues:          Hospital Follow-up Visit:    Hospital/Nursing Home/IP Rehab Facility: [facility]  Date of Admission: 08/15/17  Date of Discharge: 08/17/17  Reason(s) for Admission: Cholangitis             Problems taking medications regularly:  None       Medication changes since discharge: see med list        Problems adhering to non-medication therapy:  None    Summary of hospitalization:  [facility] discharge summary reviewed  Diagnostic Tests/Treatments reviewed.  Follow up needed: me today  Other Healthcare Providers Involved in Patient s Care: PCP, urology long term           Update since discharge: improved.     Post Discharge Medication Reconciliation: discharge medications reconciled, continue medications without change.  Plan of care communicated with patient     Coding guidelines for this visit:  Type of Medical   Decision Making Face-to-Face Visit       within 7 Days of discharge Face-to-Face Visit        within 14 days of discharge   Moderate Complexity [id number]   High Complexity [id number]            Cholangitis - severe pain resolved - just finished her augmentin and is continuing on vancomycin to prevent recurrence of C diff.  No fevers.  Feels fatigued, but recovering.    Recurrent UTI, atrophic vaginitis - needs new urology referral as Dr Da Silva has moved to [facility] - is doing well with premarin cream - symptoms well controlled.    HTN - well controlled on current regimen.   Had very high blood pressures in the ER - now back to normal.    C diff history - s/p colectomy for severe c diff - no current symptoms of recurrence.    Ileostomy - no changes - stooling well.    Has had cataracts surgery very successfully without any concerns since our last visit.      Problem list and histories reviewed & adjusted, as indicated.  Additional history: as  "documented    Reviewed and updated as needed this visit by clinical staff  Tobacco  Allergies  Med Hx  Surg Hx  Fam Hx  Soc Hx      Reviewed and updated as needed this visit by Provider         ROS:  Constitutional, vision, cardiovascular, pulmonary, gi and gu systems are negative, except as otherwise noted.      OBJECTIVE:   /70 (BP Location: Right arm, Patient Position: Right side, Cuff Size: Adult Regular)  Pulse 70  Temp 98.7  F (37.1  C) (Tympanic)  Ht 5' 2\" (1.575 m)  Wt 142 lb (64.4 kg)  SpO2 97%  BMI 25.97 kg/m2  Body mass index is 25.97 kg/(m^2).  GENERAL: alert and no distress  RESP: lungs clear to auscultation - no rales, rhonchi or wheezes  CV: regular rate and rhythm, normal S1 S2, no S3 or S4, no murmur, click or rub, no peripheral edema and peripheral pulses strong  ABDOMEN: soft, nontender, pink ostomy with liquid stool in bag  NEURO: Normal strength and tone, mentation intact and speech normal  PSYCH: mentation appears normal, affect normal/bright    Diagnostic Test Results:  Reviewed from hospital stay, repeat pending    ASSESSMENT/PLAN:          ICD-10-CM    1. Cholangitis K83.0 Comprehensive metabolic panel     **CBC with platelets     Has completed antibiotics and has had resolution of symptoms - will recheck lab work for trends - watching LFTs and hemoglobin.   Patient completing course of oral vanco to prevent recurrent c diff after broad spectrum antibiotics   2. Recurrent UTI N39.0 UROLOGY ADULT REFERRAL  Needs new specialist after Dr Da Silva moved to a different location - would like to see Dr Abbasi   3. Urethral stenosis N35.9 UROLOGY ADULT REFERRAL   4. Atrophic vaginitis N95.2 conjugated estrogens (PREMARIN) cream     UROLOGY ADULT REFERRAL  Well controlled, continue current medications     5. Hypertension goal BP (blood pressure) < 140/90 I10 Well controlled, continue current medications     6. S/P ileostomy (H) Z93.2 Doing well - follow   7. Sinus tachycardia R00.0 " metoprolol (LOPRESSOR) 25 MG tablet  Well controlled, continue current medications     8. Hyperlipidemia LDL goal <160 E78.5 atorvastatin (LIPITOR) 20 MG tablet  Well controlled, continue current medications             Hien Booth MD  [facility]

## 2017-08-29 NOTE — NURSING NOTE
"Chief Complaint   Patient presents with     Hospital F/U       Initial /70 (BP Location: Right arm, Patient Position: Right side, Cuff Size: Adult Regular)  Pulse 70  Temp 98.7  F (37.1  C) (Tympanic)  Ht 5' 2\" (1.575 m)  Wt 142 lb (64.4 kg)  SpO2 97%  BMI 25.97 kg/m2 Estimated body mass index is 25.97 kg/(m^2) as calculated from the following:    Height as of this encounter: 5' 2\" (1.575 m).    Weight as of this encounter: 142 lb (64.4 kg).  Medication Reconciliation: complete  "

## 2017-08-29 NOTE — PATIENT INSTRUCTIONS
Call for appt with Dr Abbasi when needed - # below    Finish your antibiotics     Medication refills sent to your pharmacy

## 2017-09-06 DIAGNOSIS — K83.09 CHOLANGITIS (H): ICD-10-CM

## 2017-09-06 LAB
ERYTHROCYTE [DISTWIDTH] IN BLOOD BY AUTOMATED COUNT: 13.6 % (ref 10–15)
HCT VFR BLD AUTO: 37.3 % (ref 35–47)
HGB BLD-MCNC: 12.5 G/DL (ref 11.7–15.7)
MCH RBC QN AUTO: 30.9 PG (ref 26.5–33)
MCHC RBC AUTO-ENTMCNC: 33.5 G/DL (ref 31.5–36.5)
MCV RBC AUTO: 92 FL (ref 78–100)
PLATELET # BLD AUTO: 193 10E9/L (ref 150–450)
RBC # BLD AUTO: 4.05 10E12/L (ref 3.8–5.2)
WBC # BLD AUTO: 5.2 10E9/L (ref 4–11)

## 2017-09-06 PROCEDURE — 80053 COMPREHEN METABOLIC PANEL: CPT | Performed by: PEDIATRICS

## 2017-09-06 PROCEDURE — 85027 COMPLETE CBC AUTOMATED: CPT | Performed by: PEDIATRICS

## 2017-09-06 PROCEDURE — 36415 COLL VENOUS BLD VENIPUNCTURE: CPT | Performed by: PEDIATRICS

## 2017-09-07 LAB
ALBUMIN SERPL-MCNC: 3.4 G/DL (ref 3.4–5)
ALP SERPL-CCNC: 121 U/L (ref 40–150)
ALT SERPL W P-5'-P-CCNC: 42 U/L (ref 0–50)
ANION GAP SERPL CALCULATED.3IONS-SCNC: 5 MMOL/L (ref 3–14)
AST SERPL W P-5'-P-CCNC: 43 U/L (ref 0–45)
BILIRUB SERPL-MCNC: 0.9 MG/DL (ref 0.2–1.3)
BUN SERPL-MCNC: 19 MG/DL (ref 7–30)
CALCIUM SERPL-MCNC: 9.2 MG/DL (ref 8.5–10.1)
CHLORIDE SERPL-SCNC: 108 MMOL/L (ref 94–109)
CO2 SERPL-SCNC: 27 MMOL/L (ref 20–32)
CREAT SERPL-MCNC: 1.12 MG/DL (ref 0.52–1.04)
GFR SERPL CREATININE-BSD FRML MDRD: 47 ML/MIN/1.7M2
GLUCOSE SERPL-MCNC: 135 MG/DL (ref 70–99)
POTASSIUM SERPL-SCNC: 4.3 MMOL/L (ref 3.4–5.3)
PROT SERPL-MCNC: 7 G/DL (ref 6.8–8.8)
SODIUM SERPL-SCNC: 140 MMOL/L (ref 133–144)

## 2017-09-14 ENCOUNTER — TELEPHONE (OUTPATIENT)
Dept: PEDIATRICS | Facility: CLINIC | Age: 77
End: 2017-09-14

## 2017-09-14 ENCOUNTER — OFFICE VISIT (OUTPATIENT)
Dept: PEDIATRICS | Facility: CLINIC | Age: 77
End: 2017-09-14
Payer: COMMERCIAL

## 2017-09-14 VITALS
WEIGHT: 143.65 LBS | HEART RATE: 69 BPM | DIASTOLIC BLOOD PRESSURE: 66 MMHG | BODY MASS INDEX: 26.44 KG/M2 | OXYGEN SATURATION: 98 % | HEIGHT: 62 IN | TEMPERATURE: 97.6 F | SYSTOLIC BLOOD PRESSURE: 116 MMHG

## 2017-09-14 DIAGNOSIS — N30.01 ACUTE CYSTITIS WITH HEMATURIA: Primary | ICD-10-CM

## 2017-09-14 DIAGNOSIS — B37.31 CANDIDIASIS OF VULVA AND VAGINA: ICD-10-CM

## 2017-09-14 LAB
ALBUMIN UR-MCNC: >=300 MG/DL
APPEARANCE UR: ABNORMAL
BACTERIA #/AREA URNS HPF: ABNORMAL /HPF
BILIRUB UR QL STRIP: NEGATIVE
COLOR UR AUTO: YELLOW
GLUCOSE UR STRIP-MCNC: NEGATIVE MG/DL
HGB UR QL STRIP: ABNORMAL
KETONES UR STRIP-MCNC: NEGATIVE MG/DL
LEUKOCYTE ESTERASE UR QL STRIP: ABNORMAL
NITRATE UR QL: POSITIVE
NON-SQ EPI CELLS #/AREA URNS LPF: ABNORMAL /LPF
PH UR STRIP: 6 PH (ref 5–7)
RBC #/AREA URNS AUTO: ABNORMAL /HPF
SOURCE: ABNORMAL
SP GR UR STRIP: 1.02 (ref 1–1.03)
SPECIMEN SOURCE: ABNORMAL
UROBILINOGEN UR STRIP-ACNC: 0.2 EU/DL (ref 0.2–1)
WBC #/AREA URNS AUTO: >100 /HPF
WET PREP SPEC: ABNORMAL

## 2017-09-14 PROCEDURE — 87186 SC STD MICRODIL/AGAR DIL: CPT | Performed by: PHYSICIAN ASSISTANT

## 2017-09-14 PROCEDURE — 87088 URINE BACTERIA CULTURE: CPT | Performed by: PHYSICIAN ASSISTANT

## 2017-09-14 PROCEDURE — 99213 OFFICE O/P EST LOW 20 MIN: CPT | Performed by: PHYSICIAN ASSISTANT

## 2017-09-14 PROCEDURE — 87210 SMEAR WET MOUNT SALINE/INK: CPT | Performed by: PHYSICIAN ASSISTANT

## 2017-09-14 PROCEDURE — 87086 URINE CULTURE/COLONY COUNT: CPT | Performed by: PHYSICIAN ASSISTANT

## 2017-09-14 PROCEDURE — 81001 URINALYSIS AUTO W/SCOPE: CPT | Performed by: PHYSICIAN ASSISTANT

## 2017-09-14 RX ORDER — CEFDINIR 300 MG/1
300 CAPSULE ORAL 2 TIMES DAILY
Qty: 14 CAPSULE | Refills: 0 | Status: SHIPPED | OUTPATIENT
Start: 2017-09-14 | End: 2017-09-21

## 2017-09-14 RX ORDER — FLUCONAZOLE 150 MG/1
150 TABLET ORAL ONCE
Qty: 1 TABLET | Refills: 0 | Status: SHIPPED | OUTPATIENT
Start: 2017-09-14 | End: 2017-09-14

## 2017-09-14 NOTE — NURSING NOTE
"Chief Complaint   Patient presents with     UTI       Initial /66 (BP Location: Right arm, Patient Position: Chair, Cuff Size: Adult Regular)  Pulse 69  Temp 97.6  F (36.4  C) (Oral)  Ht 5' 2\" (1.575 m)  Wt 143 lb 10.4 oz (65.2 kg)  SpO2 98%  BMI 26.27 kg/m2 Estimated body mass index is 26.27 kg/(m^2) as calculated from the following:    Height as of this encounter: 5' 2\" (1.575 m).    Weight as of this encounter: 143 lb 10.4 oz (65.2 kg).  Medication Reconciliation: complete   SMA Johann    "

## 2017-09-14 NOTE — PATIENT INSTRUCTIONS
Continue activia  Add daily culturelle      Vaginal Infection: Yeast (Candidiasis)  Yeast infection occurs when yeast in the vagina increase and attacks the vaginal tissues. Yeast is a type of fungus. These infections are often caused by a type of yeast called Candida albicans. Other species of yeast can also cause infections. Factors that may make infection more likely include recent antibiotic use, douching, or increased sex. Yeast infections are more common in women who have diabetes, or are obese or pregnant, or have a weak immune system.  Symptoms of yeast infection    Clumpy or thin, white discharge, which may look like cottage cheese    No odor or minimal odor    Severe vaginal itching or burning    Burning with urination    Swelling, redness of vulva    Pain during sex  Treating yeast infection  Yeast infection is treated with a vaginal antifungal cream. In some cases, antifungal pills are prescribed instead. During treatment:    Finish all of your medicine, even if your symptoms go away.    Apply the cream before going to bed. Lie flat after applying so that it doesn't drip out.    Do not douche or use tampons.    Don't rely on a diaphragm or condoms, since the cream may weaken them.    Avoid intercourse if advised by your healthcare provider.     Should I treat a yeast infection myself?  Discuss with your healthcare provider whether you should use over-the-counter medicines to treat a yeast infection. Self-treatment may depend on whether:    You've had a yeast infection in the past.    You're at risk for STDs.  Call your healthcare provider if symptoms do not go away or come back after treatment.   Date Last Reviewed: 3/1/2017    4041-1739 [employer]. [address] [place] [address], [place] [address]. All rights reserved. This information is not intended as a substitute for professional medical care. Always follow your healthcare professional's instructions.        Understanding Urinary Tract  Infections (UTIs)  Most UTIs are caused by bacteria, although they may also be caused by viruses or fungi. Bacteria from the bowel are the most common source of infection. The infection may start because of any of the following:    Sexual activity. During sex, bacteria can travel from the penis, vagina, or rectum into the urethra.     Bacteria on the skin outside the rectum may travel into the urethra. This is more common in women since the rectum and urethra are closer to each other than in men. Wiping from front to back after using the toilet and keeping the area clean can help prevent germs from getting to the urethra.    Blockage of urine flow through the urinary tract. If urine sits too long, germs may start to grow out of control.      Parts of the urinary tract  The infection can occur in any part of the urinary tract.    The kidneys collect and store urine.    The ureters carry urine from the kidneys to the bladder.    The bladder holds urine until you are ready to let it out.    The urethra carries urine from the bladder out of the body. It is shorter in women, so bacteria can move through it more easily. The urethra is longer in men, so a UTI is less likely to reach the bladder or kidneys in men.  Date Last Reviewed: 1/1/2017 2000-2017 [employer]. [address] [place] [address], [place] [address]. All rights reserved. This information is not intended as a substitute for professional medical care. Always follow your healthcare professional's instructions.

## 2017-09-14 NOTE — MR AVS SNAPSHOT
After Visit Summary   9/14/2017    Gely Keene    MRN: [id number]           Patient Information     Date Of Birth          1940        Visit Information        Provider Department      9/14/2017 1:00 PM Edgar Robledo PA-C [facility]        Today's Diagnoses     Dysuria    -  1    Candidiasis of vulva and vagina        Acute cystitis without hematuria          Care Instructions    Continue activia  Add daily culturelle      Vaginal Infection: Yeast (Candidiasis)  Yeast infection occurs when yeast in the vagina increase and attacks the vaginal tissues. Yeast is a type of fungus. These infections are often caused by a type of yeast called Candida albicans. Other species of yeast can also cause infections. Factors that may make infection more likely include recent antibiotic use, douching, or increased sex. Yeast infections are more common in women who have diabetes, or are obese or pregnant, or have a weak immune system.  Symptoms of yeast infection    Clumpy or thin, white discharge, which may look like cottage cheese    No odor or minimal odor    Severe vaginal itching or burning    Burning with urination    Swelling, redness of vulva    Pain during sex  Treating yeast infection  Yeast infection is treated with a vaginal antifungal cream. In some cases, antifungal pills are prescribed instead. During treatment:    Finish all of your medicine, even if your symptoms go away.    Apply the cream before going to bed. Lie flat after applying so that it doesn't drip out.    Do not douche or use tampons.    Don't rely on a diaphragm or condoms, since the cream may weaken them.    Avoid intercourse if advised by your healthcare provider.     Should I treat a yeast infection myself?  Discuss with your healthcare provider whether you should use over-the-counter medicines to treat a yeast infection. Self-treatment may depend on whether:    You've had a yeast infection in  the past.    You're at risk for STDs.  Call your healthcare provider if symptoms do not go away or come back after treatment.   Date Last Reviewed: 3/1/2017    2707-5963 [employer]. [address] [place] [address]. All rights reserved. This information is not intended as a substitute for professional medical care. Always follow your healthcare professional's instructions.        Understanding Urinary Tract Infections (UTIs)  Most UTIs are caused by bacteria, although they may also be caused by viruses or fungi. Bacteria from the bowel are the most common source of infection. The infection may start because of any of the following:    Sexual activity. During sex, bacteria can travel from the penis, vagina, or rectum into the urethra.     Bacteria on the skin outside the rectum may travel into the urethra. This is more common in women since the rectum and urethra are closer to each other than in men. Wiping from front to back after using the toilet and keeping the area clean can help prevent germs from getting to the urethra.    Blockage of urine flow through the urinary tract. If urine sits too long, germs may start to grow out of control.      Parts of the urinary tract  The infection can occur in any part of the urinary tract.    The kidneys collect and store urine.    The ureters carry urine from the kidneys to the bladder.    The bladder holds urine until you are ready to let it out.    The urethra carries urine from the bladder out of the body. It is shorter in women, so bacteria can move through it more easily. The urethra is longer in men, so a UTI is less likely to reach the bladder or kidneys in men.  Date Last Reviewed: 1/1/2017 2000-2017 [employer]. [address] [place] [address]. All rights reserved. This information is not intended as a substitute for professional medical care. Always follow your healthcare professional's instructions.                 Follow-ups after your visit        Your next 10 appointments already scheduled     Oct 31, 2017 11:00 AM CDT   LAB with EA LAB   [facility] ([facility])    [address] [place] [address]  [address]  [place] [address] [phone]           Patient must bring picture ID. Patient should be prepared to give a urine specimen  Please do not eat 10-12 hours before your appointment if you are coming in fasting for labs on lipids, cholesterol, or glucose (sugar). Pregnant women should follow their Care Team instructions. Water with medications is okay. Do not drink coffee or other fluids. If you have concerns about taking  your medications, please ask at office or if scheduling via [employer], send a message by clicking on Secure Messaging, Message Your Care Team.            Nov 06, 2017 11:00 AM CST   PHYSICAL with Hien Booth MD   [facility] ([facility])    [address] [place] [address]  [address]  [place] [address] [phone]              Who to contact     If you have questions or need follow up information about today's clinic visit or your schedule please contact [facility] directly at [phone].  Normal or non-critical lab and imaging results will be communicated to you by MyChart, letter or phone within 4 business days after the clinic has received the results. If you do not hear from us within 7 days, please contact the clinic through [employer] or phone. If you have a critical or abnormal lab result, we will notify you by phone as soon as possible.  Submit refill requests through [employer] or call your pharmacy and they will forward the refill request to us. Please allow 3 business days for your refill to be completed.          Additional Information About Your Visit        Brandtone Information     [employer] lets you send messages to your doctor, view your test results, renew your prescriptions, schedule appointments and more. To sign up, go  "to [email or web address] . Click on \"Log in\" on the left side of the screen, which will take you to the Welcome page. Then click on \"Sign up Now\" on the right side of the page.     You will be asked to enter the access code listed below, as well as some personal information. Please follow the directions to create your username and password.     Your access code is: [email or web address]  Expires: 2017  9:57 PM     Your access code will  in 90 days. If you need help or a new code, please call your [facility] clinic or [phone].        Care EveryWhere ID     This is your Care EveryWhere ID. This could be used by other organizations to access your [facility] medical records  [id number]        Your Vitals Were     Pulse Temperature Height Pulse Oximetry BMI (Body Mass Index)       69 97.6  F (36.4  C) (Oral) 5' 2\" (1.575 m) 98% 26.27 kg/m2        Blood Pressure from Last 3 Encounters:   17 116/66   17 110/70   17 163/71    Weight from Last 3 Encounters:   17 143 lb 10.4 oz (65.2 kg)   17 142 lb (64.4 kg)   08/15/17 146 lb (66.2 kg)              We Performed the Following     *UA reflex to Microscopic and Culture ([place] [facility] (except [place] and [place])     Urine Culture Aerobic Bacterial     Urine Microscopic     Wet prep          Today's Medication Changes          These changes are accurate as of: 17  1:40 PM.  If you have any questions, ask your nurse or doctor.               Start taking these medicines.        Dose/Directions    cefdinir 300 MG capsule   Commonly known as:  OMNICEF   Used for:  Acute cystitis without hematuria   Started by:  Edgar Robledo PA-C        Dose:  300 mg   Take 1 capsule (300 mg) by mouth 2 times daily for 7 days   Quantity:  14 capsule   Refills:  0       fluconazole 150 MG tablet   Commonly known as:  DIFLUCAN   Used for:  Candidiasis of vulva and vagina   Started by:  Edgar Robledo PA-C        Dose:  " 150 mg   Take 1 tablet (150 mg) by mouth once for 1 dose   Quantity:  1 tablet   Refills:  0            Where to get your medicines      These medications were sent to [facility] - [address]   [address], [place] [address]     Phone:  [phone]     cefdinir 300 MG capsule    fluconazole 150 MG tablet                Primary Care Provider Office Phone # Fax #    Hine Booth [phone]       [address] [place] [address]        Equal Access to Services     [facility]: Hadii aad ku hadasho Soomaali, waaxda luqadaha, qaybta kaalmada adeegyada, waxay noain hayanthony wagner . So [facility] [phone].    ATENCIÓN: Si habla español, tiene a greenberg disposición servicios gratuitos de asistencia lingüística. LlFire[facility] [phone].    We comply with applicable federal civil rights laws and [place] laws. We do not discriminate on the basis of race, color, national origin, age, disability sex, sexual orientation or gender identity.            Thank you!     Thank you for choosing [facility]  for your care. Our goal is always to provide you with excellent care. Hearing back from our patients is one way we can continue to improve our services. Please take a few minutes to complete the written survey that you may receive in the mail after your visit with us. Thank you!             Your Updated Medication List - Protect others around you: Learn how to safely use, store and throw away your medicines at www.disposemymeds.org.          This list is accurate as of: 9/14/17  1:40 PM.  Always use your most recent med list.                   Brand Name Dispense Instructions for use Diagnosis    ACETAMINOPHEN PO      Take 500 mg by mouth every 4 hours as needed for pain Reported on 4/10/2017        BIOTIN PO      Take 1 tablet by mouth daily dose unknown        CALCIUM CITRATE PLUS/MAGNESIUM Tabs      Take 1  tablet by mouth daily        cefdinir 300 MG capsule    OMNICEF    14 capsule    Take 1 capsule (300 mg) by mouth 2 times daily for 7 days    Acute cystitis without hematuria       conjugated estrogens cream    PREMARIN    30 g    Place 0.5 g vaginally three times a week    Atrophic vaginitis       fish oil-omega-3 fatty acids 1000 MG capsule      Take 1 capsule daily.  Indications: PN:        fluconazole 150 MG tablet    DIFLUCAN    1 tablet    Take 1 tablet (150 mg) by mouth once for 1 dose    Candidiasis of vulva and vagina       * LIPITOR PO      Take 20 mg by mouth every evening        * atorvastatin 20 MG tablet    LIPITOR    90 tablet    Take 1 tablet (20 mg) by mouth daily    Hyperlipidemia LDL goal <160       MELATONIN PO      Take 3 mg by mouth nightly as needed        metoprolol 25 MG tablet    LOPRESSOR    180 tablet    Take 0.5 tablets (12.5 mg) by mouth 2 times daily    Sinus tachycardia       nystatin 163143 UNIT/GM Powd    MYCOSTATIN     Apply topically as needed        omeprazole 20 MG CR capsule    priLOSEC    90 capsule    TAKE ONE CAPSULE BY MOUTH EVERY DAY    Gastroesophageal reflux disease, esophagitis presence not specified       VITAMIN B 12 PO      Take by mouth daily Dose unknown        * Notice:  This list has 2 medication(s) that are the same as other medications prescribed for you. Read the directions carefully, and ask your doctor or other care provider to review them with you.

## 2017-09-14 NOTE — PROGRESS NOTES
"  SUBJECTIVE:   Gely Keene is a 77 year old female who presents to clinic today for the following health issues:      URINARY TRACT SYMPTOMS      Duration: yesterday    Description  dysuria, frequency, urgency and nocturia x 4    Intensity:  7/10    Accompanying signs and symptoms:  Fever/chills: no   Flank pain no   Nausea and vomiting: no   Vaginal symptoms: itching and red  Abdominal/Pelvic Pain: no     History  History of frequent UTI's: YES  History of kidney stones: no   Sexually Active: YES  Possibility of pregnancy: No    Precipitating or alleviating factors: None    Therapies tried and outcome: increase fluid intake   Outcome: no relief      ROS:  ROS negative except as listed above      OBJECTIVE:     /66 (BP Location: Right arm, Patient Position: Chair, Cuff Size: Adult Regular)  Pulse 69  Temp 97.6  F (36.4  C) (Oral)  Ht 5' 2\" (1.575 m)  Wt 143 lb 10.4 oz (65.2 kg)  SpO2 98%  BMI 26.27 kg/m2  Body mass index is 26.27 kg/(m^2).  GENERAL: healthy, alert and no distress  RESP: lungs clear to auscultation - no rales, rhonchi or wheezes  CV: regular rate and rhythm  MS: no gross musculoskeletal defects noted, no edema  BACK: no CVA tenderness    Diagnostic Test Results:  Orders placed or performed in visit on 09/14/17     fluconazole (DIFLUCAN) 150 MG tablet     cefdinir (OMNICEF) 300 MG capsule         ASSESSMENT/PLAN:     (N30.01) Acute cystitis with hematuria  (primary encounter diagnosis)  Plan: *UA reflex to Microscopic and Culture ([place] (except [place] and         [place]), Urine Microscopic, Urine Culture         Aerobic Bacterial, cefdinir (OMNICEF) 300 MG         capsule      Follow up with PCP if symptoms worsen or fail to improve  In 2-3 days    (B37.3) Candidiasis of vulva and vagina  Plan: fluconazole (DIFLUCAN) 150 MG tablet, Wet prep  Follow up with PCP if symptoms worsen or fail to improve        [facility]    Patient Instructions     Continue activia  Add daily culturelle      Vaginal Infection: Yeast (Candidiasis)  Yeast infection occurs when yeast in the vagina increase and attacks the vaginal tissues. Yeast is a type of fungus. These infections are often caused by a type of yeast called Candida albicans. Other species of yeast can also cause infections. Factors that may make infection more likely include recent antibiotic use, douching, or increased sex. Yeast infections are more common in women who have diabetes, or are obese or pregnant, or have a weak immune system.  Symptoms of yeast infection    Clumpy or thin, white discharge, which may look like cottage cheese    No odor or minimal odor    Severe vaginal itching or burning    Burning with urination    Swelling, redness of vulva    Pain during sex  Treating yeast infection  Yeast infection is treated with a vaginal antifungal cream. In some cases, antifungal pills are prescribed instead. During treatment:    Finish all of your medicine, even if your symptoms go away.    Apply the cream before going to bed. Lie flat after applying so that it doesn't drip out.    Do not douche or use tampons.    Don't rely on a diaphragm or condoms, since the cream may weaken them.    Avoid intercourse if advised by your healthcare provider.     Should I treat a yeast infection myself?  Discuss with your healthcare provider whether you should use over-the-counter medicines to treat a yeast infection. Self-treatment may depend on whether:    You've had a yeast infection in the past.    You're at risk for STDs.  Call your healthcare provider if symptoms do not go away or come back after treatment.   Date Last Reviewed: 3/1/2017    6532-5460 [employer]. [address] [place] [address], [place] [address]. All rights reserved. This information is not intended as a substitute for professional medical care. Always follow your healthcare professional's  instructions.        Understanding Urinary Tract Infections (UTIs)  Most UTIs are caused by bacteria, although they may also be caused by viruses or fungi. Bacteria from the bowel are the most common source of infection. The infection may start because of any of the following:    Sexual activity. During sex, bacteria can travel from the penis, vagina, or rectum into the urethra.     Bacteria on the skin outside the rectum may travel into the urethra. This is more common in women since the rectum and urethra are closer to each other than in men. Wiping from front to back after using the toilet and keeping the area clean can help prevent germs from getting to the urethra.    Blockage of urine flow through the urinary tract. If urine sits too long, germs may start to grow out of control.      Parts of the urinary tract  The infection can occur in any part of the urinary tract.    The kidneys collect and store urine.    The ureters carry urine from the kidneys to the bladder.    The bladder holds urine until you are ready to let it out.    The urethra carries urine from the bladder out of the body. It is shorter in women, so bacteria can move through it more easily. The urethra is longer in men, so a UTI is less likely to reach the bladder or kidneys in men.  Date Last Reviewed: 1/1/2017 2000-2017 [employer]. [address] [place] [address], [place] [address]. All rights reserved. This information is not intended as a substitute for professional medical care. Always follow your healthcare professional's instructions.

## 2017-09-14 NOTE — TELEPHONE ENCOUNTER
Patient calls.  Reporting painful and frequent urination x 1 day-started overnight.  Denies fevers, blood in urine, flank or low back pain.  Hx of frequent UTIs.    Appointment advised and scheduled for today.  Next 5 appointments (look out 90 days)     Sep 14, 2017  1:00 PM CDT   SHORT with Edgar Robledo PA-C   [facility]    [address] [place] [address]  [address]  [facility] [address]   [phone]            Nov 06, 2017 11:00 AM CST   PHYSICAL with Hien Booth MD   [facility] ([facility])    [address] [place] [address]  [address]  [facility] [address]   [phone]                  Belkis Guzmán RN  Message handled by Nurse Triage.

## 2017-09-14 NOTE — TELEPHONE ENCOUNTER
Patient calling in to speak to Darien Robledo, she was given a script by him and she has some questions for him.    Please callback.    Thanks  Scar CARRERO  Team Coodinator

## 2017-09-17 LAB
BACTERIA SPEC CULT: ABNORMAL
BACTERIA SPEC CULT: ABNORMAL
SPECIMEN SOURCE: ABNORMAL

## 2017-09-28 ENCOUNTER — OFFICE VISIT (OUTPATIENT)
Dept: URGENT CARE | Facility: URGENT CARE | Age: 77
End: 2017-09-28
Payer: COMMERCIAL

## 2017-09-28 VITALS
HEART RATE: 70 BPM | SYSTOLIC BLOOD PRESSURE: 114 MMHG | RESPIRATION RATE: 16 BRPM | TEMPERATURE: 98.1 F | OXYGEN SATURATION: 98 % | DIASTOLIC BLOOD PRESSURE: 62 MMHG | WEIGHT: 143 LBS | BODY MASS INDEX: 26.16 KG/M2

## 2017-09-28 DIAGNOSIS — N89.8 VAGINAL ITCHING: ICD-10-CM

## 2017-09-28 DIAGNOSIS — R30.0 DYSURIA: Primary | ICD-10-CM

## 2017-09-28 DIAGNOSIS — R31.9 URINARY TRACT INFECTION WITH HEMATURIA, SITE UNSPECIFIED: ICD-10-CM

## 2017-09-28 DIAGNOSIS — N39.0 URINARY TRACT INFECTION WITH HEMATURIA, SITE UNSPECIFIED: ICD-10-CM

## 2017-09-28 LAB
ALBUMIN UR-MCNC: 100 MG/DL
APPEARANCE UR: ABNORMAL
BACTERIA #/AREA URNS HPF: ABNORMAL /HPF
BILIRUB UR QL STRIP: NEGATIVE
COLOR UR AUTO: YELLOW
GLUCOSE UR STRIP-MCNC: NEGATIVE MG/DL
HGB UR QL STRIP: ABNORMAL
KETONES UR STRIP-MCNC: NEGATIVE MG/DL
LEUKOCYTE ESTERASE UR QL STRIP: ABNORMAL
NITRATE UR QL: NEGATIVE
PH UR STRIP: 6 PH (ref 5–7)
RBC #/AREA URNS AUTO: ABNORMAL /HPF
SOURCE: ABNORMAL
SP GR UR STRIP: 1.02 (ref 1–1.03)
SPECIMEN SOURCE: NORMAL
UROBILINOGEN UR STRIP-ACNC: 0.2 EU/DL (ref 0.2–1)
WBC #/AREA URNS AUTO: >100 /HPF
WET PREP SPEC: NORMAL

## 2017-09-28 PROCEDURE — 87210 SMEAR WET MOUNT SALINE/INK: CPT | Performed by: PHYSICIAN ASSISTANT

## 2017-09-28 PROCEDURE — 99213 OFFICE O/P EST LOW 20 MIN: CPT | Performed by: PHYSICIAN ASSISTANT

## 2017-09-28 PROCEDURE — 81001 URINALYSIS AUTO W/SCOPE: CPT | Performed by: PHYSICIAN ASSISTANT

## 2017-09-28 PROCEDURE — 87086 URINE CULTURE/COLONY COUNT: CPT | Performed by: PHYSICIAN ASSISTANT

## 2017-09-28 RX ORDER — CEPHALEXIN 500 MG/1
500 CAPSULE ORAL 2 TIMES DAILY
Qty: 14 CAPSULE | Refills: 0 | Status: SHIPPED | OUTPATIENT
Start: 2017-09-28 | End: 2017-10-05

## 2017-09-28 NOTE — NURSING NOTE
"Gely Keene is a 77 year old female.      Chief Complaint   Patient presents with     Urgent Care     Urinary Problem     pt was seen on the 14th for a UTI and Vaginal infection - the sx started again today of dysuria and vaginal itching - she was referred to a uroligist but has not made that appt yet       Initial /62 (BP Location: Right arm, Cuff Size: Adult Large)  Pulse 70  Temp 98.1  F (36.7  C) (Oral)  Resp 16  Wt 143 lb (64.9 kg)  SpO2 98%  BMI 26.16 kg/m2 Estimated body mass index is 26.16 kg/(m^2) as calculated from the following:    Height as of 9/14/17: 5' 2\" (1.575 m).    Weight as of this encounter: 143 lb (64.9 kg).  Medication Reconciliation: complete      Questioned patient about current smoking habits.  Pt. has never smoked.      Jennifer Hebert CMA      "

## 2017-09-28 NOTE — MR AVS SNAPSHOT
After Visit Summary   9/28/2017    Gely Keene    MRN: [id number]           Patient Information     Date Of Birth          1940        Visit Information        Provider Department      9/28/2017 4:45 PM Simi Delvalle PA-C [facility]        Today's Diagnoses     Dysuria    -  1    Vaginal itching        Urinary tract infection with hematuria, site unspecified           Follow-ups after your visit        Your next 10 appointments already scheduled     Oct 31, 2017 11:00 AM CDT   LAB with EA LAB   [facility] ([facility])    [address] [place] [address]  [address]  [facility] [address] [phone]           Patient must bring picture ID. Patient should be prepared to give a urine specimen  Please do not eat 10-12 hours before your appointment if you are coming in fasting for labs on lipids, cholesterol, or glucose (sugar). Pregnant women should follow their Care Team instructions. Water with medications is okay. Do not drink coffee or other fluids. If you have concerns about taking  your medications, please ask at office or if scheduling via [employer], send a message by clicking on Secure Messaging, Message Your Care Team.            Nov 06, 2017 11:00 AM CST   PHYSICAL with Hien Booth MD   [facility] ([facility] [place] [facility])    [address] [place] [address]  [address]  [facility] [address] [phone]              Who to contact     If you have questions or need follow up information about today's clinic visit or your schedule please contact [facility] directly at [phone].  Normal or non-critical lab and imaging results will be communicated to you by MyChart, letter or phone within 4 business days after the clinic has received the results. If you do not hear from us within 7 days, please contact the clinic through MyChart or phone. If you have a critical or abnormal lab result, we will notify  "you by phone as soon as possible.  Submit refill requests through [employer] or call your pharmacy and they will forward the refill request to us. Please allow 3 business days for your refill to be completed.          Additional Information About Your Visit        [employer] Information     [employer] lets you send messages to your doctor, view your test results, renew your prescriptions, schedule appointments and more. To sign up, go to [email or web address] . Click on \"Log in\" on the left side of the screen, which will take you to the Welcome page. Then click on \"Sign up Now\" on the right side of the page.     You will be asked to enter the access code listed below, as well as some personal information. Please follow the directions to create your username and password.     Your access code is: [email or web address]  Expires: 2017  9:57 PM     Your access code will  in 90 days. If you need help or a new code, please call your [place] clinic or [phone].        Care EveryWhere ID     This is your Care EveryWhere ID. This could be used by other organizations to access your [place] medical records  [id number]        Your Vitals Were     Pulse Temperature Respirations Pulse Oximetry BMI (Body Mass Index)       70 98.1  F (36.7  C) (Oral) 16 98% 26.16 kg/m2        Blood Pressure from Last 3 Encounters:   17 114/62   17 116/66   17 110/70    Weight from Last 3 Encounters:   17 143 lb (64.9 kg)   17 143 lb 10.4 oz (65.2 kg)   17 142 lb (64.4 kg)              We Performed the Following     UA reflex to Microscopic and Culture     Urine Culture Aerobic Bacterial     Urine Microscopic     Wet prep          Today's Medication Changes          These changes are accurate as of: 17  6:02 PM.  If you have any questions, ask your nurse or doctor.               Start taking these medicines.        Dose/Directions    cephALEXin 500 MG capsule   Commonly known as:  KEFLEX   Used for:  " Urinary tract infection with hematuria, site unspecified   Started by:  Simi Delvalle PA-C        Dose:  500 mg   Take 1 capsule (500 mg) by mouth 2 times daily for 7 days   Quantity:  14 capsule   Refills:  0            Where to get your medicines      These medications were sent to [facility] - [address]   [address] [place] [address] [address]     Phone:  [phone]     cephALEXin 500 MG capsule                Primary Care Provider Office Phone # Fax #    Hien Booth [phone]       [address] [place] [address]        Equal Access to Services     [facility] AH: Hadii aad ku hadasho Soomaali, waaxda luqadaha, qaybta kaalmada adeegyada, waxluisa wagner . So [facility] [phone].    ATENCIÓN: Si habla español, tiene a greenberg disposición servicios gratuitos de asistencia lingüística. [facility] [phone].    We comply with applicable federal civil rights laws and [place] laws. We do not discriminate on the basis of race, color, national origin, age, disability sex, sexual orientation or gender identity.            Thank you!     Thank you for choosing [facility]  for your care. Our goal is always to provide you with excellent care. Hearing back from our patients is one way we can continue to improve our services. Please take a few minutes to complete the written survey that you may receive in the mail after your visit with us. Thank you!             Your Updated Medication List - Protect others around you: Learn how to safely use, store and throw away your medicines at www.disposemymeds.org.          This list is accurate as of: 9/28/17  6:02 PM.  Always use your most recent med list.                   Brand Name Dispense Instructions for use Diagnosis    ACETAMINOPHEN PO      Take 500 mg by mouth every 4 hours as needed for pain Reported on 4/10/2017        BIOTIN PO       Take 1 tablet by mouth daily dose unknown        CALCIUM CITRATE PLUS/MAGNESIUM Tabs      Take 1 tablet by mouth daily        cephALEXin 500 MG capsule    KEFLEX    14 capsule    Take 1 capsule (500 mg) by mouth 2 times daily for 7 days    Urinary tract infection with hematuria, site unspecified       conjugated estrogens cream    PREMARIN    30 g    Place 0.5 g vaginally three times a week    Atrophic vaginitis       fish oil-omega-3 fatty acids 1000 MG capsule      Take 1 capsule daily.  Indications: PN:        * LIPITOR PO      Take 20 mg by mouth every evening        * atorvastatin 20 MG tablet    LIPITOR    90 tablet    Take 1 tablet (20 mg) by mouth daily    Hyperlipidemia LDL goal <160       MELATONIN PO      Take 3 mg by mouth nightly as needed        metoprolol 25 MG tablet    LOPRESSOR    180 tablet    Take 0.5 tablets (12.5 mg) by mouth 2 times daily    Sinus tachycardia       nystatin [id number] UNIT/GM Powd    MYCOSTATIN     Apply topically as needed        omeprazole 20 MG CR capsule    priLOSEC    90 capsule    TAKE ONE CAPSULE BY MOUTH EVERY DAY    Gastroesophageal reflux disease, esophagitis presence not specified       VITAMIN B 12 PO      Take by mouth daily Dose unknown        * Notice:  This list has 2 medication(s) that are the same as other medications prescribed for you. Read the directions carefully, and ask your doctor or other care provider to review them with you.

## 2017-09-28 NOTE — PROGRESS NOTES
"HPI:  Gely is a 76 yo female who presents for dysuria x today.  Reports \"pressure\" in bladder.  Reports frequent UTI's with most recent on 9/14/17.  She denies flank pain, fever, or abdominal pain.  Reports some vaginal itching.  Is using premarin.  Reports she has a colostomy bag.  Has referral to urology but has not yet made an appt.      Last Urine culture revealed E. Coli and Klebsiella.    ROS:  See HPI      PE:  Vitals & nursing notes reviewed. B/P: 114/62, T: 98.1, P: 70, R: 16  Constitutional:  Alert, well nourished, well-developed, NAD  Lungs:  CTA, no wheezes, rhonchi, or rales  CV:  RRR,  no murmur appreciated  No CVA tenderness BL      ASSESSMENT:    (N39.0,  R31.9) Urinary tract infection with hematuria, site unspecified  Comment: Wet prep negative.  UA micro:  WBC >100  RBC   Plan: Urine Culture Aerobic Bacterial, cephALEXin         (KEFLEX) 500 MG capsule       Increase H2O intake.  Discussed signs & sx of pyelonephritis. Inform eye surgeon that she has UTI before surgery tomorrow.   F/U with PCP if sx persist or worsen.  Make appt with urology as planned.        "

## 2017-09-29 LAB
BACTERIA SPEC CULT: NORMAL
SPECIMEN SOURCE: NORMAL

## 2017-10-11 ENCOUNTER — TELEPHONE (OUTPATIENT)
Dept: PEDIATRICS | Facility: CLINIC | Age: 77
End: 2017-10-11

## 2017-10-11 NOTE — TELEPHONE ENCOUNTER
Patient calling that she continues to have vaginal itching. States it is mostly external. States she has been applying vasaline to the area. Advised this would make yeast worse as it acts as a barrier and would keep the area warm and moist. States she has appointment with urologist for UTI and itching but not until November.  Recommended that she purchase monistat or other antifungal cream in a tube and apply to the external area to help with itching. If this does not help to call back. She will try this.   Teresa Schneider RN

## 2017-10-23 ENCOUNTER — DOCUMENTATION ONLY (OUTPATIENT)
Dept: LAB | Facility: CLINIC | Age: 77
End: 2017-10-23

## 2017-10-23 DIAGNOSIS — I10 HYPERTENSION GOAL BP (BLOOD PRESSURE) < 140/90: Primary | ICD-10-CM

## 2017-10-23 DIAGNOSIS — Z00.00 ENCOUNTER FOR ROUTINE ADULT HEALTH EXAMINATION WITHOUT ABNORMAL FINDINGS: ICD-10-CM

## 2017-10-23 DIAGNOSIS — R31.9 HEMATURIA, UNSPECIFIED TYPE: ICD-10-CM

## 2017-10-23 DIAGNOSIS — E78.5 HYPERLIPIDEMIA LDL GOAL <160: ICD-10-CM

## 2017-10-31 DIAGNOSIS — I10 HYPERTENSION GOAL BP (BLOOD PRESSURE) < 140/90: ICD-10-CM

## 2017-10-31 DIAGNOSIS — E78.5 HYPERLIPIDEMIA LDL GOAL <160: ICD-10-CM

## 2017-10-31 DIAGNOSIS — Z00.00 ENCOUNTER FOR ROUTINE ADULT HEALTH EXAMINATION WITHOUT ABNORMAL FINDINGS: ICD-10-CM

## 2017-10-31 DIAGNOSIS — R31.9 HEMATURIA, UNSPECIFIED TYPE: ICD-10-CM

## 2017-10-31 LAB
ALBUMIN SERPL-MCNC: 3.3 G/DL (ref 3.4–5)
ALBUMIN UR-MCNC: NEGATIVE MG/DL
ALP SERPL-CCNC: 86 U/L (ref 40–150)
ALT SERPL W P-5'-P-CCNC: 27 U/L (ref 0–50)
ANION GAP SERPL CALCULATED.3IONS-SCNC: 9 MMOL/L (ref 3–14)
APPEARANCE UR: CLEAR
AST SERPL W P-5'-P-CCNC: 25 U/L (ref 0–45)
BACTERIA #/AREA URNS HPF: ABNORMAL /HPF
BILIRUB SERPL-MCNC: 1 MG/DL (ref 0.2–1.3)
BILIRUB UR QL STRIP: NEGATIVE
BUN SERPL-MCNC: 14 MG/DL (ref 7–30)
CALCIUM SERPL-MCNC: 8.9 MG/DL (ref 8.5–10.1)
CHLORIDE SERPL-SCNC: 111 MMOL/L (ref 94–109)
CHOLEST SERPL-MCNC: 160 MG/DL
CO2 SERPL-SCNC: 23 MMOL/L (ref 20–32)
COLOR UR AUTO: YELLOW
CREAT SERPL-MCNC: 1.12 MG/DL (ref 0.52–1.04)
ERYTHROCYTE [DISTWIDTH] IN BLOOD BY AUTOMATED COUNT: 13.1 % (ref 10–15)
GFR SERPL CREATININE-BSD FRML MDRD: 47 ML/MIN/1.7M2
GLUCOSE SERPL-MCNC: 126 MG/DL (ref 70–99)
GLUCOSE UR STRIP-MCNC: NEGATIVE MG/DL
HCT VFR BLD AUTO: 37.8 % (ref 35–47)
HDLC SERPL-MCNC: 84 MG/DL
HGB BLD-MCNC: 12.4 G/DL (ref 11.7–15.7)
HGB UR QL STRIP: ABNORMAL
KETONES UR STRIP-MCNC: NEGATIVE MG/DL
LDLC SERPL CALC-MCNC: 53 MG/DL
LEUKOCYTE ESTERASE UR QL STRIP: ABNORMAL
MCH RBC QN AUTO: 30.7 PG (ref 26.5–33)
MCHC RBC AUTO-ENTMCNC: 32.8 G/DL (ref 31.5–36.5)
MCV RBC AUTO: 94 FL (ref 78–100)
NITRATE UR QL: NEGATIVE
NON-SQ EPI CELLS #/AREA URNS LPF: ABNORMAL /LPF
NONHDLC SERPL-MCNC: 76 MG/DL
PH UR STRIP: 5 PH (ref 5–7)
PLATELET # BLD AUTO: 168 10E9/L (ref 150–450)
POTASSIUM SERPL-SCNC: 4.1 MMOL/L (ref 3.4–5.3)
PROT SERPL-MCNC: 7.1 G/DL (ref 6.8–8.8)
RBC # BLD AUTO: 4.04 10E12/L (ref 3.8–5.2)
RBC #/AREA URNS AUTO: ABNORMAL /HPF
SODIUM SERPL-SCNC: 143 MMOL/L (ref 133–144)
SOURCE: ABNORMAL
SP GR UR STRIP: 1.02 (ref 1–1.03)
TRIGL SERPL-MCNC: 116 MG/DL
TSH SERPL DL<=0.005 MIU/L-ACNC: 1.79 MU/L (ref 0.4–4)
UROBILINOGEN UR STRIP-ACNC: 0.2 EU/DL (ref 0.2–1)
WBC # BLD AUTO: 5.5 10E9/L (ref 4–11)
WBC #/AREA URNS AUTO: ABNORMAL /HPF

## 2017-10-31 PROCEDURE — 84443 ASSAY THYROID STIM HORMONE: CPT | Performed by: PEDIATRICS

## 2017-10-31 PROCEDURE — 82043 UR ALBUMIN QUANTITATIVE: CPT | Performed by: PEDIATRICS

## 2017-10-31 PROCEDURE — 36415 COLL VENOUS BLD VENIPUNCTURE: CPT | Performed by: PEDIATRICS

## 2017-10-31 PROCEDURE — 81001 URINALYSIS AUTO W/SCOPE: CPT | Performed by: PEDIATRICS

## 2017-10-31 PROCEDURE — 85027 COMPLETE CBC AUTOMATED: CPT | Performed by: PEDIATRICS

## 2017-10-31 PROCEDURE — 80061 LIPID PANEL: CPT | Performed by: PEDIATRICS

## 2017-10-31 PROCEDURE — 80053 COMPREHEN METABOLIC PANEL: CPT | Performed by: PEDIATRICS

## 2017-11-01 LAB
CREAT UR-MCNC: 275 MG/DL
MICROALBUMIN UR-MCNC: 18 MG/L
MICROALBUMIN/CREAT UR: 6.58 MG/G CR (ref 0–25)

## 2017-11-06 ENCOUNTER — OFFICE VISIT (OUTPATIENT)
Dept: PEDIATRICS | Facility: CLINIC | Age: 77
End: 2017-11-06
Payer: COMMERCIAL

## 2017-11-06 VITALS
OXYGEN SATURATION: 99 % | BODY MASS INDEX: 26.24 KG/M2 | TEMPERATURE: 97.7 F | SYSTOLIC BLOOD PRESSURE: 110 MMHG | HEIGHT: 62 IN | HEART RATE: 68 BPM | DIASTOLIC BLOOD PRESSURE: 70 MMHG | WEIGHT: 142.6 LBS

## 2017-11-06 DIAGNOSIS — N95.2 ATROPHIC VAGINITIS: ICD-10-CM

## 2017-11-06 DIAGNOSIS — Z85.828 HISTORY OF BASAL CELL CARCINOMA: ICD-10-CM

## 2017-11-06 DIAGNOSIS — R00.0 SINUS TACHYCARDIA: ICD-10-CM

## 2017-11-06 DIAGNOSIS — I10 HYPERTENSION GOAL BP (BLOOD PRESSURE) < 140/90: ICD-10-CM

## 2017-11-06 DIAGNOSIS — B37.31 CANDIDIASIS OF VULVA AND VAGINA: ICD-10-CM

## 2017-11-06 DIAGNOSIS — Z12.31 VISIT FOR SCREENING MAMMOGRAM: ICD-10-CM

## 2017-11-06 DIAGNOSIS — K94.19 ALTERED BOWEL ELIMINATION DUE TO INTESTINAL OSTOMY (H): ICD-10-CM

## 2017-11-06 DIAGNOSIS — R73.01 IFG (IMPAIRED FASTING GLUCOSE): ICD-10-CM

## 2017-11-06 DIAGNOSIS — N18.30 CKD (CHRONIC KIDNEY DISEASE) STAGE 3, GFR 30-59 ML/MIN (H): ICD-10-CM

## 2017-11-06 DIAGNOSIS — Z00.00 ROUTINE GENERAL MEDICAL EXAMINATION AT A HEALTH CARE FACILITY: Primary | ICD-10-CM

## 2017-11-06 PROBLEM — K83.09 CHOLANGITIS (H): Status: RESOLVED | Noted: 2017-08-15 | Resolved: 2017-11-06

## 2017-11-06 LAB
SPECIMEN SOURCE: ABNORMAL
WET PREP SPEC: ABNORMAL

## 2017-11-06 PROCEDURE — 99397 PER PM REEVAL EST PAT 65+ YR: CPT | Performed by: PEDIATRICS

## 2017-11-06 PROCEDURE — 99213 OFFICE O/P EST LOW 20 MIN: CPT | Mod: 25 | Performed by: PEDIATRICS

## 2017-11-06 PROCEDURE — 87210 SMEAR WET MOUNT SALINE/INK: CPT | Performed by: PEDIATRICS

## 2017-11-06 RX ORDER — METOPROLOL SUCCINATE 25 MG/1
12.5 TABLET, EXTENDED RELEASE ORAL DAILY
Qty: 45 TABLET | Refills: 3 | Status: SHIPPED | OUTPATIENT
Start: 2017-11-06 | End: 2018-11-07

## 2017-11-06 RX ORDER — FLUCONAZOLE 150 MG/1
150 TABLET ORAL ONCE
Qty: 1 TABLET | Refills: 0 | Status: SHIPPED | OUTPATIENT
Start: 2017-11-06 | End: 2017-11-06

## 2017-11-06 NOTE — PROGRESS NOTES
SUBJECTIVE:   Gely Keene is a 77 year old female who presents for Preventive Visit.      Are you in the first 12 months of your Medicare coverage?  No    Physical   Annual:     Getting at least 3 servings of Calcium per day::  NO    Bi-annual eye exam::  Yes    Dental care twice a year::  Yes    Sleep apnea or symptoms of sleep apnea::  None    Diet::  Regular (no restrictions)    Frequency of exercise::  None    Medication side effects::  Lightheadedness    Additional concerns today::  No      COGNITIVE SCREEN  1) Repeat 3 items (Banana, Sunrise, Chair)    2) Clock draw: ABNORMAL pt chastity 11:50   3) 3 item recall: Recalls 3 objects  Results: abnormal clock, recalled 3 words       Mini-CogTM Copyright S [employer]. Licensed by the author for use in [employer]; reprinted with permission (minoo@[facility]). All rights reserved.          Reviewed and updated as needed this visit by clinical staffTobacco  Allergies  Med Hx  Surg Hx  Fam Hx  Soc Hx        Reviewed and updated as needed this visit by Provider        Social History   Substance Use Topics     Smoking status: Never Smoker     Smokeless tobacco: Never Used     Alcohol use Yes      Comment: socially       The patient does not drink >3 drinks per day nor >7 drinks per week.            Today's PHQ-2 Score: PHQ-2 ( 1999 Pfizer) 11/6/2017   Q1: Little interest or pleasure in doing things 0   Q2: Feeling down, depressed or hopeless 1   PHQ-2 Score 1   Q1: Little interest or pleasure in doing things Not at all   Q2: Feeling down, depressed or hopeless Several days   PHQ-2 Score 1       Do you feel safe in your environment - Yes    Do you have a Health Care Directive?: Yes: Patient states has Advance Directive and will bring in a copy to clinic.    Current providers sharing in care for this patient include:   Patient Care Team:  Hien Booth MD as PCP - General (Internal Medicine)  Shana Alaniz MD as MD (Colon and Rectal  Surgery)  [facility]      Hearing impairment: No    Ability to successfully perform activities of daily living: Yes, no assistance needed     Fall risk:  Fallen 2 or more times in the past year?: Yes  Any fall with injury in the past year?: Yes  Timed Up and Go Test (>13.5 is fall risk; contact physician) : 11      Home safety:  none identified      The following health maintenance items are reviewed in Epic and correct as of today:  Health Maintenance   Topic Date Due     ADVANCE DIRECTIVE PLANNING Q5 YRS  01/15/1993     INFLUENZA VACCINE (SYSTEM ASSIGNED)  09/01/2017     A1C Q1 YR  10/27/2017     FALL RISK ASSESSMENT  11/03/2017     CMP Q1 YR  10/31/2018     LIPID MONITORING Q1 YEAR  10/31/2018     TETANUS IMMUNIZATION (SYSTEM ASSIGNED)  10/24/2021     DEXA SCAN SCREENING (SYSTEM ASSIGNED)  Addressed     PNEUMOCOCCAL  Completed     Recovering from hospital stay for cholangitis this summer - doing well.    Yeast infection earlier this fall - came along with bladder infection.   Still feels like yeast infection is present.    Itching persists.      HTN - has been well controlled.  No new cardiac symptoms.  Does not dizziness intermittently.  Blood pressure has been very well controlled.  No highs.    Urine symptoms - following up with Dr Ayleen hart.  Previously followed with Dr Da Silva.  Recurrent UTIs.  No current UTI symptoms - recent repeat urine with resolution of hematuria.    Dermatology - hx of BCC - has follow up scheduled for full skin exam in the next 2 months with dermatology.    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  Hasn't been exercising and has been eating a lot of sweets recently.      HL - well controlled on statin.  No side effects noted.     Wishes to continue with mammograms.     Premarin is working well for atrophic vaginitis.        Review of Systems   ROS: 10 point ROS neg other than  "the symptoms noted above in the HPI.      OBJECTIVE:   /70 (BP Location: Right arm, Patient Position: Right side, Cuff Size: Adult Regular)  Pulse 68  Temp 97.7  F (36.5  C) (Oral)  Ht 5' 2\" (1.575 m)  Wt 142 lb 9.6 oz (64.7 kg)  SpO2 99%  BMI 26.08 kg/m2 Estimated body mass index is 26.08 kg/(m^2) as calculated from the following:    Height as of this encounter: 5' 2\" (1.575 m).    Weight as of this encounter: 142 lb 9.6 oz (64.7 kg).  Physical Exam  GENERAL: healthy, alert and no distress  EYES: Eyes grossly normal to inspection, PERRL and conjunctivae and sclerae normal  HENT: ear canals and TM's normal, nose and mouth without ulcers or lesions  NECK: no adenopathy, no asymmetry, masses, or scars and thyroid normal to palpation  RESP: lungs clear to auscultation - no rales, rhonchi or wheezes  BREAST: declined today  CV: regular rate and rhythm, normal S1 S2, no S3 or S4, no murmur, click or rub, no peripheral edema and peripheral pulses strong  ABDOMEN: soft, nontender, without hepatosplenomegaly or masses, bowel sounds normal, pink ostomy with yellow liquid stool in bag  MS: no gross musculoskeletal defects noted, no edema  SKIN: solar lentigines over hands, face, upper chest  NEURO: Normal strength and tone, mentation intact and speech normal  PSYCH: mentation appears normal, affect normal/bright    Wet prep - yeast    ASSESSMENT / PLAN:       ICD-10-CM    1. Routine general medical examination at a health care facility Z00.00 Labs reviewed from last week   2. 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 Sinus tachycardia R00.0 metoprolol (TOPROL-XL) 25 MG 24 hr tablet    Notes dizziness with twice daily dosing of lopressor - plan trial of change to toprol xl with dosing only at night.  To alert me if not improving   4. Hypertension goal BP (blood pressure) < 140/90 I10 metoprolol (TOPROL-XL) 25 MG 24 hr tablet  See above regarding " "medication change   5. IFG (impaired fasting glucose) R73.01 Hemoglobin A1c  Elevated blood sugar on most recent labs - discussed lifestyle changes and will recheck before she leaves for the winter   6. CKD (chronic kidney disease) stage 3, GFR 30-59 ml/min N18.3 **Basic metabolic panel FUTURE anytime  Recheck in 2 months   7. Candidiasis of vulva and vagina B37.3 Wet prep     fluconazole (DIFLUCAN) 150 MG tablet     fluconazole (DIFLUCAN) 150 MG tablet    Yeast infections after recent antibiotic courses for UTI - repeat treatment with diflucan     8. Atrophic vaginitis N95.2 conjugated estrogens (PREMARIN) cream  Well controlled, continue current medications     9. History of basal cell carcinoma Z85.828 Continue close dermatology follow up   10. Visit for screening mammogram Z12.31 *MA Screening Digital Bilateral       End of Life Planning:  Patient currently has an advanced directive: Yes.  Practitioner is supportive of decision.    COUNSELING:  Special attention given to:       Regular exercise       Healthy diet/nutrition       Vision screening        Estimated body mass index is 26.08 kg/(m^2) as calculated from the following:    Height as of this encounter: 5' 2\" (1.575 m).    Weight as of this encounter: 142 lb 9.6 oz (64.7 kg).  Weight management plan: Discussed healthy diet and exercise guidelines and patient will follow up in 12 months in clinic to re-evaluate.   reports that she has never smoked. She has never used smokeless tobacco.        Appropriate preventive services were discussed with this patient, including applicable screening as appropriate for cardiovascular disease, diabetes, osteopenia/osteoporosis, and glaucoma.  As appropriate for age/gender, discussed screening for colorectal cancer, prostate cancer, breast cancer, and cervical cancer. Checklist reviewing preventive services available has been given to the patient.    Reviewed patients plan of care and provided an AVS. The Intermediate " Care Plan ( asthma action plan, low back pain action plan, and migraine action plan) for Gely meets the Care Plan requirement. This Care Plan has been established and reviewed with the Patient.    Counseling Resources:  ATP IV Guidelines  Pooled Cohorts Equation Calculator  Breast Cancer Risk Calculator  FRAX Risk Assessment  ICSI Preventive Guidelines  Dietary Guidelines for Americans, 2010  USDA's MyPlate  ASA Prophylaxis  Lung CA Screening    Hien Booth MD  [facility]

## 2017-11-06 NOTE — PATIENT INSTRUCTIONS
Start using your elliptical machine - goal to do it every day - at least 5 minutes to start    Switch your lopressor (short acting metoprolol) to long acting Toprol Xl - use this at night to see if this helps with your dizziness    Wet prep today to look for a repeat yeast infection    Come back for a fasting lab visit before you leave for [place]            Preventive Health Recommendations    Female Ages 65 +    Yearly exam:     See your health care provider every year in order to  o Review health changes.   o Discuss preventive care.    o Review your medicines if your doctor has prescribed any.      You no longer need a yearly Pap test unless you've had an abnormal Pap test in the past 10 years. If you have vaginal symptoms, such as bleeding or discharge, be sure to talk with your provider about a Pap test.      Every 1 to 2 years, have a mammogram.  If you are over 69, talk with your health care provider about whether or not you want to continue having screening mammograms.      Every 10 years, have a colonoscopy. Or, have a yearly FIT test (stool test). These exams will check for colon cancer.       Have a cholesterol test every 5 years, or more often if your doctor advises it.       Have a diabetes test (fasting glucose) every three years. If you are at risk for diabetes, you should have this test more often.       At age 65, have a bone density scan (DEXA) to check for osteoporosis (brittle bone disease).    Shots:    Get a flu shot each year.    Get a tetanus shot every 10 years.    Talk to your doctor about your pneumonia vaccines. There are now two you should receive - Pneumovax (PPSV 23) and Prevnar (PCV 13).    Talk to your doctor about the shingles vaccine.    Talk to your doctor about the hepatitis B vaccine.    Nutrition:     Eat at least 5 servings of fruits and vegetables each day.      Eat whole-grain bread, whole-wheat pasta and brown rice instead of white grains and rice.      Talk to your  provider about Calcium and Vitamin D.     Lifestyle    Exercise at least 150 minutes a week (30 minutes a day, 5 days a week). This will help you control your weight and prevent disease.      Limit alcohol to one drink per day.      No smoking.       Wear sunscreen to prevent skin cancer.       See your dentist twice a year for an exam and cleaning.      See your eye doctor every 1 to 2 years to screen for conditions such as glaucoma, macular degeneration and cataracts.

## 2017-11-06 NOTE — NURSING NOTE
"Chief Complaint   Patient presents with     Wellness Visit       Initial /70 (BP Location: Right arm, Patient Position: Right side, Cuff Size: Adult Regular)  Pulse 68  Temp 97.7  F (36.5  C) (Oral)  Ht 5' 2\" (1.575 m)  Wt 142 lb 9.6 oz (64.7 kg)  SpO2 99%  BMI 26.08 kg/m2 Estimated body mass index is 26.08 kg/(m^2) as calculated from the following:    Height as of this encounter: 5' 2\" (1.575 m).    Weight as of this encounter: 142 lb 9.6 oz (64.7 kg).  Medication Reconciliation: complete  "

## 2017-11-06 NOTE — MR AVS SNAPSHOT
After Visit Summary   11/6/2017    Gely Keene    MRN: [id number]           Patient Information     Date Of Birth          1940        Visit Information        Provider Department      11/6/2017 11:00 AM Hien Booth MD [facility]        Today's Diagnoses     Routine general medical examination at a health care facility    -  1    Atrophic vaginitis        Sinus tachycardia        Hypertension goal BP (blood pressure) < 140/90        IFG (impaired fasting glucose)        CKD (chronic kidney disease) stage 3, GFR 30-59 ml/min        Candidiasis of vulva and vagina        Visit for screening mammogram          Care Instructions    Start using your elliptical machine - goal to do it every day - at least 5 minutes to start    Switch your lopressor (short acting metoprolol) to long acting Toprol Xl - use this at night to see if this helps with your dizziness    Wet prep today to look for a repeat yeast infection    Come back for a fasting lab visit before you leave for [place]            Preventive Health Recommendations    Female Ages 65 +    Yearly exam:     See your health care provider every year in order to  o Review health changes.   o Discuss preventive care.    o Review your medicines if your doctor has prescribed any.      You no longer need a yearly Pap test unless you've had an abnormal Pap test in the past 10 years. If you have vaginal symptoms, such as bleeding or discharge, be sure to talk with your provider about a Pap test.      Every 1 to 2 years, have a mammogram.  If you are over 69, talk with your health care provider about whether or not you want to continue having screening mammograms.      Every 10 years, have a colonoscopy. Or, have a yearly FIT test (stool test). These exams will check for colon cancer.       Have a cholesterol test every 5 years, or more often if your doctor advises it.       Have a diabetes test (fasting glucose) every three  years. If you are at risk for diabetes, you should have this test more often.       At age 65, have a bone density scan (DEXA) to check for osteoporosis (brittle bone disease).    Shots:    Get a flu shot each year.    Get a tetanus shot every 10 years.    Talk to your doctor about your pneumonia vaccines. There are now two you should receive - Pneumovax (PPSV 23) and Prevnar (PCV 13).    Talk to your doctor about the shingles vaccine.    Talk to your doctor about the hepatitis B vaccine.    Nutrition:     Eat at least 5 servings of fruits and vegetables each day.      Eat whole-grain bread, whole-wheat pasta and brown rice instead of white grains and rice.      Talk to your provider about Calcium and Vitamin D.     Lifestyle    Exercise at least 150 minutes a week (30 minutes a day, 5 days a week). This will help you control your weight and prevent disease.      Limit alcohol to one drink per day.      No smoking.       Wear sunscreen to prevent skin cancer.       See your dentist twice a year for an exam and cleaning.      See your eye doctor every 1 to 2 years to screen for conditions such as glaucoma, macular degeneration and cataracts.          Follow-ups after your visit        Your next 10 appointments already scheduled     Nov 15, 2017  8:40 AM CST   New Patient Visit with Nestor Abbasi MD   [facility] ([facility])    [facility] [address]  [address]  [facility] [address] [phone]              Future tests that were ordered for you today     Open Future Orders        Priority Expected Expires Ordered    *MA Screening Digital Bilateral Routine  11/6/2018 11/6/2017    Hemoglobin A1c Routine  3/6/2018 11/6/2017    **Basic metabolic panel FUTURE anytime Routine 11/6/2017 11/6/2018 11/6/2017            Who to contact     If you have questions or need follow up information about today's clinic visit or your schedule please contact  "[facility] directly at [phone].  Normal or non-critical lab and imaging results will be communicated to you by MyChart, letter or phone within 4 business days after the clinic has received the results. If you do not hear from us within 7 days, please contact the clinic through [employer] or phone. If you have a critical or abnormal lab result, we will notify you by phone as soon as possible.  Submit refill requests through [employer] or call your pharmacy and they will forward the refill request to us. Please allow 3 business days for your refill to be completed.          Additional Information About Your Visit        [employer] Information     [employer] lets you send messages to your doctor, view your test results, renew your prescriptions, schedule appointments and more. To sign up, go to [email or web address] . Click on \"Log in\" on the left side of the screen, which will take you to the Welcome page. Then click on \"Sign up Now\" on the right side of the page.     You will be asked to enter the access code listed below, as well as some personal information. Please follow the directions to create your username and password.     Your access code is: [email or web address]  Expires: 2018 11:43 AM     Your access code will  in 90 days. If you need help or a new code, please call your [place] clinic or [phone].        Care EveryWhere ID     This is your Care EveryWhere ID. This could be used by other organizations to access your [place] medical records  [id number]        Your Vitals Were     Pulse Temperature Height Pulse Oximetry BMI (Body Mass Index)       68 97.7  F (36.5  C) (Oral) 5' 2\" (1.575 m) 99% 26.08 kg/m2        Blood Pressure from Last 3 Encounters:   17 110/70   17 114/62   17 116/66    Weight from Last 3 Encounters:   17 142 lb 9.6 oz (64.7 kg)   17 143 lb (64.9 kg)   17 143 lb 10.4 oz (65.2 kg)              We Performed the Following     Wet prep     "      Today's Medication Changes          These changes are accurate as of: 11/6/17 11:43 AM.  If you have any questions, ask your nurse or doctor.               Start taking these medicines.        Dose/Directions    metoprolol 25 MG 24 hr tablet   Commonly known as:  TOPROL-XL   Used for:  Sinus tachycardia, Hypertension goal BP (blood pressure) < 140/90   Started by:  Hien Booth MD        Dose:  12.5 mg   Take 0.5 tablets (12.5 mg) by mouth daily   Quantity:  45 tablet   Refills:  3         Stop taking these medicines if you haven't already. Please contact your care team if you have questions.     metoprolol 25 MG tablet   Commonly known as:  LOPRESSOR   Stopped by:  Hien Booth MD                Where to get your medicines      [facility] [place], [place] - [address]  [address], [place] [address]     Phone:  [phone]     conjugated estrogens cream    metoprolol 25 MG 24 hr tablet                Primary Care Provider Office Phone # Fax #    Hien Booth [phone] [address] [place] [address]        Equal Access to Services     [facility] AH: Hadii aad ku hadasho Soomaali, waaxda luqadaha, qaybta kaalmada adeegyada, archana hoyos. So [facility] [phone].    ATENCIÓN: Si habla español, tiene a greenberg disposición servicios gratuitos de asistencia lingüística. [facility] of [phone].    We comply with applicable federal civil rights laws and [place] laws. We do not discriminate on the basis of race, color, national origin, age, disability, sex, sexual orientation, or gender identity.            Thank you!     Thank you for choosing [facility]  for your care. Our goal is always to provide you with excellent care. Hearing back from our patients is one way we can continue to improve our services. Please take a few minutes to  complete the written survey that you may receive in the mail after your visit with us. Thank you!             Your Updated Medication List - Protect others around you: Learn how to safely use, store and throw away your medicines at www.disposemymeds.org.          This list is accurate as of: 11/6/17 11:43 AM.  Always use your most recent med list.                   Brand Name Dispense Instructions for use Diagnosis    ACETAMINOPHEN PO      Take 500 mg by mouth every 4 hours as needed for pain Reported on 4/10/2017        atorvastatin 20 MG tablet    LIPITOR    90 tablet    Take 1 tablet (20 mg) by mouth daily    Hyperlipidemia LDL goal <160       BIOTIN PO      Take 1 tablet by mouth daily dose unknown        CALCIUM CITRATE PLUS/MAGNESIUM Tabs      Take 1 tablet by mouth daily        conjugated estrogens cream    PREMARIN    30 g    Place 0.5 g vaginally three times a week    Atrophic vaginitis       fish oil-omega-3 fatty acids 1000 MG capsule      Take 1 capsule daily.  Indications: PN:        MELATONIN PO      Take 3 mg by mouth nightly as needed        metoprolol 25 MG 24 hr tablet    TOPROL-XL    45 tablet    Take 0.5 tablets (12.5 mg) by mouth daily    Sinus tachycardia, Hypertension goal BP (blood pressure) < 140/90       nystatin 059830 UNIT/GM Powd    MYCOSTATIN     Apply topically as needed        omeprazole 20 MG CR capsule    priLOSEC    90 capsule    TAKE ONE CAPSULE BY MOUTH EVERY DAY    Gastroesophageal reflux disease, esophagitis presence not specified       VITAMIN B 12 PO      Take by mouth daily Dose unknown

## 2017-11-14 ENCOUNTER — TELEPHONE (OUTPATIENT)
Dept: PEDIATRICS | Facility: CLINIC | Age: 77
End: 2017-11-14

## 2017-11-14 PROBLEM — Z71.89 ADVANCE CARE PLANNING: Chronic | Status: ACTIVE | Noted: 2017-11-14

## 2017-11-15 ENCOUNTER — TELEPHONE (OUTPATIENT)
Dept: PEDIATRICS | Facility: CLINIC | Age: 77
End: 2017-11-15

## 2017-11-15 ENCOUNTER — OFFICE VISIT (OUTPATIENT)
Dept: UROLOGY | Facility: CLINIC | Age: 77
End: 2017-11-15
Payer: COMMERCIAL

## 2017-11-15 VITALS — WEIGHT: 145 LBS | BODY MASS INDEX: 26.68 KG/M2 | OXYGEN SATURATION: 98 % | HEIGHT: 62 IN | HEART RATE: 72 BPM

## 2017-11-15 DIAGNOSIS — N39.0 RECURRENT UTI: Primary | ICD-10-CM

## 2017-11-15 LAB
ALBUMIN UR-MCNC: NEGATIVE MG/DL
APPEARANCE UR: CLEAR
BILIRUB UR QL STRIP: NEGATIVE
COLOR UR AUTO: YELLOW
GLUCOSE UR STRIP-MCNC: NEGATIVE MG/DL
HGB UR QL STRIP: NEGATIVE
KETONES UR STRIP-MCNC: NEGATIVE MG/DL
LEUKOCYTE ESTERASE UR QL STRIP: NEGATIVE
NITRATE UR QL: NEGATIVE
PH UR STRIP: 5.5 PH (ref 5–7)
RESIDUAL VOLUME (RV) (EXTERNAL): 0
SOURCE: NORMAL
SP GR UR STRIP: 1.02 (ref 1–1.03)
UROBILINOGEN UR STRIP-ACNC: 0.2 EU/DL (ref 0.2–1)

## 2017-11-15 PROCEDURE — 99202 OFFICE O/P NEW SF 15 MIN: CPT | Mod: 25 | Performed by: UROLOGY

## 2017-11-15 PROCEDURE — 51798 US URINE CAPACITY MEASURE: CPT | Performed by: UROLOGY

## 2017-11-15 PROCEDURE — 81003 URINALYSIS AUTO W/O SCOPE: CPT | Mod: QW | Performed by: UROLOGY

## 2017-11-15 ASSESSMENT — PAIN SCALES - GENERAL: PAINLEVEL: NO PAIN (0)

## 2017-11-15 NOTE — LETTER
11/15/2017       RE: Gely Keene  [address] [facility] [address]     Dear Colleague,    Thank you for referring your patient, Gely Keene, to the [facility] [place] at [facility]. Please see a copy of my visit note below.    Gely Keene is a 77-year-old female whose been seen Felton Da Silva M.D. in the past because of recurrent bacterial cystitis. She underwent cystoscopy and a urethral dilation at her initial visit. She is currently using Premarin vaginal cream 3 times weekly. Her urinalysis today is unremarkable.  In the past she's been treated with cephalexin and Macrodantin. She's had some vaginal yeast infections treated with Diflucan.  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 Continue Premarin cream 3 times weekly. Should reduce dose of cephalexin to 250 mg every 12 hours ×3-5 days for symptoms of UTI. With symptoms, patient should have urine culture and treat according to sensitivities  Patient may want to see our female urologist, Michela Neal M.D., in the future at the [place] office  Recommend pelvic examinations with yearly physical    Sincerely,    Nestor Abbasi MD

## 2017-11-15 NOTE — PROGRESS NOTES
Gely Keene is a 77-year-old female whose been seen Felton Da Silva M.D. in the past because of recurrent bacterial cystitis. She underwent cystoscopy and a urethral dilation at her initial visit. She is currently using Premarin vaginal cream 3 times weekly. Her urinalysis today is unremarkable.  In the past she's been treated with cephalexin and Macrodantin. She's had some vaginal yeast infections treated with Diflucan.  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 Continue Premarin cream 3 times weekly. Should reduce dose of cephalexin to 250 mg every 12 hours ×3-5 days for symptoms of UTI. With symptoms, patient should have urine culture and treat according to sensitivities  Patient may want to see our female urologist, Michela Neal M.D., in the future at the [place] office  Recommend pelvic examinations with yearly physical

## 2017-11-15 NOTE — TELEPHONE ENCOUNTER
Patient calling that she is wondering if it is ok to take the 2nd Diflucan that she was given?   Continues to have symptoms. States she was seen by urology today and they were supposed to be giving PCP some recommendations. [phone] ok to reinaldo.   Teresa Schneider RN

## 2017-11-15 NOTE — NURSING NOTE
Pt states she has a yeast infection.  Pt wants to talk about premarin cream.  Pt used to see dr. Da Silva.    TURNER Truong, CMA

## 2017-11-15 NOTE — MR AVS SNAPSHOT
"              After Visit Summary   11/15/2017    Gely Keene    MRN: [id number]           Patient Information     Date Of Birth          1940        Visit Information        Provider Department      11/15/2017 8:40 AM Nestor Abbasi MD [facility]        Today's Diagnoses     Recurrent UTI    -  1       Follow-ups after your visit        Your next 10 appointments already scheduled     Jan 03, 2018 11:00 AM CST   MA SCREENING BILATERAL W/ COLE with [email or web address]   [facility] ([facility])    [address], [address]  [place] [address]   [phone]           Three-dimensional (3D) mammograms are available at [place] locations in [place], [facility], [place], [facility], [place], [place], and [place]. [facility] locations include [place] and [facility] in [place]. Benefits of 3D mammograms include: - Improved rate of cancer detection - Decreases your chance of having to go back for more tests, which means fewer: - \"False-positive\" results (This means that there is an abnormal area but it isn't cancer.) - Invasive testing procedures, such as a biopsy or surgery - Can provide clearer images of the breast if you have dense breast tissue. 3D mammography is an optional exam that anyone can have with a 2D mammogram. It doesn't replace or take the place of a 2D mammogram. 2D mammograms remain an effective screening test for all women.  Not all insurance companies cover the cost of a 3D mammogram. Check with your insurance.              Who to contact     If you have questions or need follow up information about today's clinic visit or your schedule please contact [facility] directly at [phone].  Normal or non-critical lab and imaging results will be communicated to you by MyChart, letter or phone within 4 business days after the clinic has " "received the results. If you do not hear from us within 7 days, please contact the clinic through [employer] or phone. If you have a critical or abnormal lab result, we will notify you by phone as soon as possible.  Submit refill requests through [employer] or call your pharmacy and they will forward the refill request to us. Please allow 3 business days for your refill to be completed.          Additional Information About Your Visit        [employer] Information     [employer] lets you send messages to your doctor, view your test results, renew your prescriptions, schedule appointments and more. To sign up, go to [email or web address] . Click on \"Log in\" on the left side of the screen, which will take you to the Welcome page. Then click on \"Sign up Now\" on the right side of the page.     You will be asked to enter the access code listed below, as well as some personal information. Please follow the directions to create your username and password.     Your access code is: [email or web address]  Expires: 2018 11:43 AM     Your access code will  in 90 days. If you need help or a new code, please call your [place] clinic or [phone].        Care EveryWhere ID     This is your Care EveryWhere ID. This could be used by other organizations to access your [place] medical records  [id number]        Your Vitals Were     Pulse Height Pulse Oximetry BMI (Body Mass Index)          72 1.575 m (5' 2\") 98% 26.52 kg/m2         Blood Pressure from Last 3 Encounters:   17 110/70   17 114/62   17 116/66    Weight from Last 3 Encounters:   11/15/17 65.8 kg (145 lb)   17 64.7 kg (142 lb 9.6 oz)   17 64.9 kg (143 lb)              We Performed the Following     Bladder scan     UA without Microscopic        Primary Care Provider Office Phone # Fax #    Hien Booth [phone] [address] [place] [address]        Equal Access to Services     [facility] AH: Hadii " gemini Pineda, watheodora daycindyha, qaleticiata kagisela annabellefaustino, waxluisa denzel doradominajenae wagner romain. So [facility] [phone].    ATENCIÓN: Si habla kirk, tiene a greenberg disposición servicios gratuitos de asistencia lingüística. Jorgeame al [phone].    We comply with applicable federal civil rights laws and [place] laws. We do not discriminate on the basis of race, color, national origin, age, disability, sex, sexual orientation, or gender identity.            Thank you!     Thank you for choosing [facility] [place]  for your care. Our goal is always to provide you with excellent care. Hearing back from our patients is one way we can continue to improve our services. Please take a few minutes to complete the written survey that you may receive in the mail after your visit with us. Thank you!             Your Updated Medication List - Protect others around you: Learn how to safely use, store and throw away your medicines at www.disposemymeds.org.          This list is accurate as of: 11/15/17  9:14 AM.  Always use your most recent med list.                   Brand Name Dispense Instructions for use Diagnosis    ACETAMINOPHEN PO      Take 500 mg by mouth every 4 hours as needed for pain Reported on 4/10/2017        atorvastatin 20 MG tablet    LIPITOR    90 tablet    Take 1 tablet (20 mg) by mouth daily    Hyperlipidemia LDL goal <160       BIOTIN PO      Take 1 tablet by mouth daily dose unknown        CALCIUM CITRATE PLUS/MAGNESIUM Tabs      Take 1 tablet by mouth daily        conjugated estrogens cream    PREMARIN    30 g    Place 0.5 g vaginally three times a week    Atrophic vaginitis       fish oil-omega-3 fatty acids 1000 MG capsule      Take 1 capsule daily.  Indications: PN:        MELATONIN PO      Take 3 mg by mouth nightly as needed        metoprolol 25 MG 24 hr tablet    TOPROL-XL    45 tablet    Take 0.5 tablets (12.5 mg) by mouth daily    Sinus  tachycardia, Hypertension goal BP (blood pressure) < 140/90       nystatin 149775 UNIT/GM Powd    MYCOSTATIN     Apply topically as needed        omeprazole 20 MG CR capsule    priLOSEC    90 capsule    TAKE ONE CAPSULE BY MOUTH EVERY DAY    Gastroesophageal reflux disease, esophagitis presence not specified       VITAMIN B 12 PO      Take by mouth daily Dose unknown

## 2017-11-24 ENCOUNTER — OFFICE VISIT (OUTPATIENT)
Dept: PEDIATRICS | Facility: CLINIC | Age: 77
End: 2017-11-24
Payer: COMMERCIAL

## 2017-11-24 VITALS
HEART RATE: 73 BPM | OXYGEN SATURATION: 98 % | HEIGHT: 62 IN | SYSTOLIC BLOOD PRESSURE: 106 MMHG | BODY MASS INDEX: 27.36 KG/M2 | DIASTOLIC BLOOD PRESSURE: 62 MMHG | TEMPERATURE: 97.6 F | WEIGHT: 148.7 LBS

## 2017-11-24 DIAGNOSIS — R82.90 NONSPECIFIC FINDING ON EXAMINATION OF URINE: ICD-10-CM

## 2017-11-24 DIAGNOSIS — N30.00 ACUTE CYSTITIS WITHOUT HEMATURIA: Primary | ICD-10-CM

## 2017-11-24 DIAGNOSIS — R30.0 DYSURIA: ICD-10-CM

## 2017-11-24 LAB
ALBUMIN UR-MCNC: >=300 MG/DL
APPEARANCE UR: ABNORMAL
BACTERIA #/AREA URNS HPF: ABNORMAL /HPF
BILIRUB UR QL STRIP: NEGATIVE
COLOR UR AUTO: YELLOW
GLUCOSE UR STRIP-MCNC: NEGATIVE MG/DL
HGB UR QL STRIP: ABNORMAL
KETONES UR STRIP-MCNC: ABNORMAL MG/DL
LEUKOCYTE ESTERASE UR QL STRIP: ABNORMAL
NITRATE UR QL: NEGATIVE
PH UR STRIP: 6 PH (ref 5–7)
RBC #/AREA URNS AUTO: ABNORMAL /HPF
SOURCE: ABNORMAL
SP GR UR STRIP: 1.02 (ref 1–1.03)
UROBILINOGEN UR STRIP-ACNC: 0.2 EU/DL (ref 0.2–1)
WBC #/AREA URNS AUTO: >100 /HPF

## 2017-11-24 PROCEDURE — 81001 URINALYSIS AUTO W/SCOPE: CPT | Performed by: PHYSICIAN ASSISTANT

## 2017-11-24 PROCEDURE — 87186 SC STD MICRODIL/AGAR DIL: CPT | Performed by: PHYSICIAN ASSISTANT

## 2017-11-24 PROCEDURE — 87088 URINE BACTERIA CULTURE: CPT | Performed by: PHYSICIAN ASSISTANT

## 2017-11-24 PROCEDURE — 99213 OFFICE O/P EST LOW 20 MIN: CPT | Performed by: PHYSICIAN ASSISTANT

## 2017-11-24 PROCEDURE — 87086 URINE CULTURE/COLONY COUNT: CPT | Performed by: PHYSICIAN ASSISTANT

## 2017-11-24 NOTE — PATIENT INSTRUCTIONS
Understanding Urinary Tract Infections (UTIs)  Most UTIs are caused by bacteria, although they may also be caused by viruses or fungi. Bacteria from the bowel are the most common source of infection. The infection may start because of any of the following:    Sexual activity. During sex, bacteria can travel from the penis, vagina, or rectum into the urethra.     Bacteria on the skin outside the rectum may travel into the urethra. This is more common in women since the rectum and urethra are closer to each other than in men. Wiping from front to back after using the toilet and keeping the area clean can help prevent germs from getting to the urethra.    Blockage of urine flow through the urinary tract. If urine sits too long, germs may start to grow out of control.      Parts of the urinary tract  The infection can occur in any part of the urinary tract.    The kidneys collect and store urine.    The ureters carry urine from the kidneys to the bladder.    The bladder holds urine until you are ready to let it out.    The urethra carries urine from the bladder out of the body. It is shorter in women, so bacteria can move through it more easily. The urethra is longer in men, so a UTI is less likely to reach the bladder or kidneys in men.  Date Last Reviewed: 1/1/2017 2000-2017 [employer]. [address] [place] [address], [place] [address]. All rights reserved. This information is not intended as a substitute for professional medical care. Always follow your healthcare professional's instructions.

## 2017-11-24 NOTE — NURSING NOTE
"Chief Complaint   Patient presents with     UTI       Initial /62 (Cuff Size: Adult Regular)  Pulse 73  Temp 97.6  F (36.4  C) (Tympanic)  Ht 5' 2\" (1.575 m)  Wt 148 lb 11.2 oz (67.4 kg)  SpO2 98%  BMI 27.2 kg/m2 Estimated body mass index is 27.2 kg/(m^2) as calculated from the following:    Height as of this encounter: 5' 2\" (1.575 m).    Weight as of this encounter: 148 lb 11.2 oz (67.4 kg).  Medication Reconciliation: complete   Jeimy Whelan, JUAREZ    "

## 2017-11-24 NOTE — MR AVS SNAPSHOT
After Visit Summary   11/24/2017    Gely Keene    MRN: [id number]           Patient Information     Date Of Birth          1940        Visit Information        Provider Department      11/24/2017 2:20 PM Edgar Robledo PA-C [facility]        Today's Diagnoses     Acute cystitis without hematuria    -  1    Dysuria        Nonspecific finding on examination of urine          Care Instructions      Understanding Urinary Tract Infections (UTIs)  Most UTIs are caused by bacteria, although they may also be caused by viruses or fungi. Bacteria from the bowel are the most common source of infection. The infection may start because of any of the following:    Sexual activity. During sex, bacteria can travel from the penis, vagina, or rectum into the urethra.     Bacteria on the skin outside the rectum may travel into the urethra. This is more common in women since the rectum and urethra are closer to each other than in men. Wiping from front to back after using the toilet and keeping the area clean can help prevent germs from getting to the urethra.    Blockage of urine flow through the urinary tract. If urine sits too long, germs may start to grow out of control.      Parts of the urinary tract  The infection can occur in any part of the urinary tract.    The kidneys collect and store urine.    The ureters carry urine from the kidneys to the bladder.    The bladder holds urine until you are ready to let it out.    The urethra carries urine from the bladder out of the body. It is shorter in women, so bacteria can move through it more easily. The urethra is longer in men, so a UTI is less likely to reach the bladder or kidneys in men.  Date Last Reviewed: 1/1/2017 2000-2017 [employer]. [address] [place] [address], [place] [address]. All rights reserved. This information is not intended as a substitute for professional medical care. Always follow your  "healthcare professional's instructions.                Follow-ups after your visit        Your next 10 appointments already scheduled     Jan 03, 2018 11:00 AM CST   MA SCREENING BILATERAL W/ COLE with [email or web address]   [facility] ([facility])    [address], [address]  [facility] [address]   [phone]           Three-dimensional (3D) mammograms are available at [facility] locations in [facility], [facility], [facility], [facility], and [place]. -Health locations include [place] and [facility] in [place]. Benefits of 3D mammograms include: - Improved rate of cancer detection - Decreases your chance of having to go back for more tests, which means fewer: - \"False-positive\" results (This means that there is an abnormal area but it isn't cancer.) - Invasive testing procedures, such as a biopsy or surgery - Can provide clearer images of the breast if you have dense breast tissue. 3D mammography is an optional exam that anyone can have with a 2D mammogram. It doesn't replace or take the place of a 2D mammogram. 2D mammograms remain an effective screening test for all women.  Not all insurance companies cover the cost of a 3D mammogram. Check with your insurance.              Who to contact     If you have questions or need follow up information about today's clinic visit or your schedule please contact [facility] directly at [phone].  Normal or non-critical lab and imaging results will be communicated to you by MyChart, letter or phone within 4 business days after the clinic has received the results. If you do not hear from us within 7 days, please contact the clinic through MyChart or phone. If you have a critical or abnormal lab result, we will notify you by phone as soon as possible.  Submit refill requests through [employer] or call your pharmacy and they will forward the refill request to us. Please allow 3 business days " "for your refill to be completed.          Additional Information About Your Visit        MyChart Information     App[employer]ise lets you send messages to your doctor, view your test results, renew your prescriptions, schedule appointments and more. To sign up, go to [email or web address] . Click on \"Log in\" on the left side of the screen, which will take you to the Welcome page. Then click on \"Sign up Now\" on the right side of the page.     You will be asked to enter the access code listed below, as well as some personal information. Please follow the directions to create your username and password.     Your access code is: [email or web address]  Expires: 2018 11:43 AM     Your access code will  in 90 days. If you need help or a new code, please call your [facility] clinic or [phone].        Care EveryWhere ID     This is your [facility] EveryWhere ID. This could be used by other organizations to access your [facility] medical records  [id number]        Your Vitals Were     Pulse Temperature Height Pulse Oximetry BMI (Body Mass Index)       73 97.6  F (36.4  C) (Tympanic) 5' 2\" (1.575 m) 98% 27.2 kg/m2        Blood Pressure from Last 3 Encounters:   17 106/62   17 110/70   17 114/62    Weight from Last 3 Encounters:   17 148 lb 11.2 oz (67.4 kg)   11/15/17 145 lb (65.8 kg)   17 142 lb 9.6 oz (64.7 kg)              We Performed the Following     *UA reflex to Microscopic and Culture ([place] [facility] (except [place] and [place])     Urine Culture Aerobic Bacterial     Urine Microscopic          Today's Medication Changes          These changes are accurate as of: 17  2:49 PM.  If you have any questions, ask your nurse or doctor.               Start taking these medicines.        Dose/Directions    cephalexin 250 MG capsule   Commonly known as:  KEFLEX   Used for:  Acute cystitis without hematuria   Started by:  Edgar Robledo PA-C        Dose:  250 mg   Take 1 " capsule (250 mg) by mouth 2 times daily for 7 days   Quantity:  14 capsule   Refills:  0            Where to get your medicines      These medications were sent to [facility] - [address]   [address], [place] [address]     Phone:  [phone]     cephalexin 250 MG capsule                Primary Care Provider Office Phone # Fax #    Hien Booth [phone]       [address] [place] [address]        Equal Access to Services     [facility]: Hadii aad ku hadasho Soomaali, waaxda luqadaha, qaybta kaalmada adeegyada, waxay idiin hayaan adeeg khsnow wagner . So [facility] [phone].    ATENCIÓN: Si habla español, tiene a greenberg disposición servicios gratuitos de asistencia lingüística. Llame al [phone].    We comply with applicable federal civil rights laws and [place] laws. We do not discriminate on the basis of race, color, national origin, age, disability, sex, sexual orientation, or gender identity.            Thank you!     Thank you for choosing [facility]  for your care. Our goal is always to provide you with excellent care. Hearing back from our patients is one way we can continue to improve our services. Please take a few minutes to complete the written survey that you may receive in the mail after your visit with us. Thank you!             Your Updated Medication List - Protect others around you: Learn how to safely use, store and throw away your medicines at www.disposemymeds.org.          This list is accurate as of: 11/24/17  2:49 PM.  Always use your most recent med list.                   Brand Name Dispense Instructions for use Diagnosis    ACETAMINOPHEN PO      Take 500 mg by mouth every 4 hours as needed for pain Reported on 4/10/2017        atorvastatin 20 MG tablet    LIPITOR    90 tablet    Take 1 tablet (20 mg) by mouth daily    Hyperlipidemia LDL goal <160       BIOTIN PO       Take 1 tablet by mouth daily dose unknown        CALCIUM CITRATE PLUS/MAGNESIUM Tabs      Take 1 tablet by mouth daily        cephalexin 250 MG capsule    KEFLEX    14 capsule    Take 1 capsule (250 mg) by mouth 2 times daily for 7 days    Acute cystitis without hematuria       conjugated estrogens cream    PREMARIN    30 g    Place 0.5 g vaginally three times a week    Atrophic vaginitis       fish oil-omega-3 fatty acids 1000 MG capsule      Take 1 capsule daily.  Indications: PN:        MELATONIN PO      Take 3 mg by mouth nightly as needed        metoprolol 25 MG 24 hr tablet    TOPROL-XL    45 tablet    Take 0.5 tablets (12.5 mg) by mouth daily    Sinus tachycardia, Hypertension goal BP (blood pressure) < 140/90       nystatin 290654 UNIT/GM Powd    MYCOSTATIN     Apply topically as needed        omeprazole 20 MG CR capsule    priLOSEC    90 capsule    TAKE ONE CAPSULE BY MOUTH EVERY DAY    Gastroesophageal reflux disease, esophagitis presence not specified       VITAMIN B 12 PO      Take by mouth daily Dose unknown

## 2017-11-24 NOTE — PROGRESS NOTES
"  SUBJECTIVE:   Gely Keene is a 77 year old female who presents to clinic today for the following health issues:    URINARY TRACT SYMPTOMS  Suspected UTI      Duration: since this morning (repeat episodes 5 this year)    Description  Urgency, dysuria, and frequency    Intensity:  moderate    Accompanying signs and symptoms:  Fever/chills: no   Flank pain no   Nausea and vomiting: no   Vaginal symptoms: none  Abdominal/Pelvic Pain: no     History  History of frequent UTI's: YES  History of kidney stones: Gallstones not Kidney stones  Sexually Active: YES  Possibility of pregnancy: No    Precipitating or alleviating factors: None    Therapies tried and outcome: none   Outcome: Symptoms not alleviated      ROS:  10 point ROS negative except as listed above      OBJECTIVE:     /62 (Cuff Size: Adult Regular)  Pulse 73  Temp 97.6  F (36.4  C) (Tympanic)  Ht 5' 2\" (1.575 m)  Wt 148 lb 11.2 oz (67.4 kg)  SpO2 98%  BMI 27.2 kg/m2  Body mass index is 27.2 kg/(m^2).  GENERAL: healthy, alert and no distress  RESP: lungs clear to auscultation - no rales, rhonchi or wheezes  CV: regular rate and rhythm  BACK: no CVA tenderness    Diagnostic Test Results:  Results for orders placed or performed in visit on 11/24/17 (from the past 24 hour(s))   *UA reflex to Microscopic and Culture ([place] [facility] (except [place] and [place])   Result Value Ref Range    Color Urine Yellow     Appearance Urine Cloudy     Glucose Urine Negative NEG^Negative mg/dL    Bilirubin Urine Negative NEG^Negative    Ketones Urine Trace (A) NEG^Negative mg/dL    Specific Gravity Urine 1.020 1.003 - 1.035    Blood Urine Large (A) NEG^Negative    pH Urine 6.0 5.0 - 7.0 pH    Protein Albumin Urine >=300 (A) NEG^Negative mg/dL    Urobilinogen Urine 0.2 0.2 - 1.0 EU/dL    Nitrite Urine Negative NEG^Negative    Leukocyte Esterase Urine Small (A) NEG^Negative    Source Midstream Urine    Urine Microscopic   Result Value Ref " Range    WBC Urine >100 (A) OTO2^O - 2 /HPF    RBC Urine O - 2 OTO2^O - 2 /HPF    Bacteria Urine Few (A) NEG^Negative /HPF       ASSESSMENT/PLAN:     (N30.00) Acute cystitis without hematuria  (primary encounter diagnosis)  Plan: cephALEXin (KEFLEX) 250 MG capsule  AB per Urologist  Follow up with PCP     (R30.0) Dysuria  Plan: *UA reflex to Microscopic and Culture ([place]         and [facility] (except [place] and         [place]), Urine Microscopic, Urine Culture         Aerobic Bacterial      (R82.90) Nonspecific finding on examination of urine  Plan: Urine Culture Aerobic Bacterial        Edgar Robledo PA-C  [facility]

## 2017-11-27 LAB
BACTERIA SPEC CULT: ABNORMAL
SPECIMEN SOURCE: ABNORMAL

## 2017-11-29 ENCOUNTER — TELEPHONE (OUTPATIENT)
Dept: PEDIATRICS | Facility: CLINIC | Age: 77
End: 2017-11-29

## 2017-11-29 DIAGNOSIS — Z87.42 HISTORY OF VAGINAL INFECTION: ICD-10-CM

## 2017-11-29 DIAGNOSIS — Z87.440 H/O RECURRENT URINARY TRACT INFECTION: Primary | ICD-10-CM

## 2017-11-29 NOTE — TELEPHONE ENCOUNTER
Patient calls.  Was seen by Dr. Abbasi who suggested that patient sees a female urologist, Dr. Neal.  Patient needs a pelvic exam and has hx of recurrent UTIs and vaginal infections.    Wondering if she should see urology or OB/GYN for this.  Huddled with Dr. Booth-patient should see Dr. Greer in [place] who specializes in bladder female issues.    Referral placed, patient was called and advised.  She verbalized understanding.  Belkis Guzmán RN  Message handled by Nurse Triage.

## 2017-12-04 ENCOUNTER — OFFICE VISIT (OUTPATIENT)
Dept: OBGYN | Facility: CLINIC | Age: 77
End: 2017-12-04
Payer: COMMERCIAL

## 2017-12-04 ENCOUNTER — TELEPHONE (OUTPATIENT)
Dept: OBGYN | Facility: CLINIC | Age: 77
End: 2017-12-04

## 2017-12-04 VITALS
HEART RATE: 76 BPM | SYSTOLIC BLOOD PRESSURE: 122 MMHG | BODY MASS INDEX: 26.5 KG/M2 | DIASTOLIC BLOOD PRESSURE: 60 MMHG | WEIGHT: 144 LBS | HEIGHT: 62 IN

## 2017-12-04 DIAGNOSIS — R30.0 DYSURIA: ICD-10-CM

## 2017-12-04 DIAGNOSIS — N89.8 VAGINAL ITCHING: Primary | ICD-10-CM

## 2017-12-04 DIAGNOSIS — N39.0 RECURRENT UTI: ICD-10-CM

## 2017-12-04 LAB
ALBUMIN UR-MCNC: NEGATIVE MG/DL
APPEARANCE UR: CLEAR
BILIRUB UR QL STRIP: NEGATIVE
COLOR UR AUTO: YELLOW
GLUCOSE UR STRIP-MCNC: NEGATIVE MG/DL
HGB UR QL STRIP: NEGATIVE
KETONES UR STRIP-MCNC: NEGATIVE MG/DL
LEUKOCYTE ESTERASE UR QL STRIP: ABNORMAL
NITRATE UR QL: NEGATIVE
PH UR STRIP: 5.5 PH (ref 5–7)
RBC #/AREA URNS AUTO: ABNORMAL /HPF
SOURCE: ABNORMAL
SP GR UR STRIP: 1.01 (ref 1–1.03)
UROBILINOGEN UR STRIP-ACNC: 0.2 EU/DL (ref 0.2–1)
WBC #/AREA URNS AUTO: ABNORMAL /HPF

## 2017-12-04 PROCEDURE — 87210 SMEAR WET MOUNT SALINE/INK: CPT | Performed by: OBSTETRICS & GYNECOLOGY

## 2017-12-04 PROCEDURE — 81001 URINALYSIS AUTO W/SCOPE: CPT | Performed by: OBSTETRICS & GYNECOLOGY

## 2017-12-04 PROCEDURE — 99214 OFFICE O/P EST MOD 30 MIN: CPT | Performed by: OBSTETRICS & GYNECOLOGY

## 2017-12-04 NOTE — NURSING NOTE
"Chief Complaint   Patient presents with     Consult     Referred by Dr. Booth for frequent UTI's and ongoing yeast infection.        Initial /60 (BP Location: Right arm, Patient Position: Sitting, Cuff Size: Adult Regular)  Pulse 76  Ht 5' 2\" (1.575 m)  Wt 144 lb (65.3 kg)  Breastfeeding? No  BMI 26.34 kg/m2 Estimated body mass index is 26.34 kg/(m^2) as calculated from the following:    Height as of this encounter: 5' 2\" (1.575 m).    Weight as of this encounter: 144 lb (65.3 kg).  Medication Reconciliation: complete     You can reach your [facility] Care Team any time of the day by calling [phone]. This number will put you in touch with the 24 hour nurse line if the clinic is closed.    To contact your OB/GYN Station Coordinator/Surgery Scheduler please call [phone]. This is a direct number for your care team between 8 a.m. and 4 p.m. Monday through Friday.    [facility] is open for your convenience:  Monday through Friday 8 a.m. to 6 p.m.  Closed weekends and all major holidays.    Angy House LPN      "

## 2017-12-04 NOTE — LETTER
December 8, 2017      Gely Griffithluisjane  [address] [facility] [address]        Dear ,    We are writing to inform you of your test results.    Your test results fall within the expected range(s).    Resulted Orders   Wet prep   Result Value Ref Range    Specimen Description Vagina     Wet Prep No Trichomonas seen     Wet Prep No clue cells seen     Wet Prep No yeast seen    UA with Microscopic reflex to Culture   Result Value Ref Range    Color Urine Yellow     Appearance Urine Clear     Glucose Urine Negative NEG^Negative mg/dL    Bilirubin Urine Negative NEG^Negative    Ketones Urine Negative NEG^Negative mg/dL    Specific Gravity Urine 1.015 1.003 - 1.035    pH Urine 5.5 5.0 - 7.0 pH    Protein Albumin Urine Negative NEG^Negative mg/dL    Urobilinogen Urine 0.2 0.2 - 1.0 EU/dL    Nitrite Urine Negative NEG^Negative    Blood Urine Negative NEG^Negative    Leukocyte Esterase Urine Trace (A) NEG^Negative    Source Midstream Urine     WBC Urine O - 2 OTO2^O - 2 /HPF    RBC Urine O - 2 OTO2^O - 2 /HPF       If you have any questions or concerns, please call the clinic at the number listed above.     Sincerely,    Sukhdev Greer MD

## 2017-12-04 NOTE — TELEPHONE ENCOUNTER
----- Message from Sukhdev Greer MD sent at 12/4/2017  4:40 PM CST -----  Please advise the patient of the normal urine and wet prep results

## 2017-12-04 NOTE — MR AVS SNAPSHOT
"              After Visit Summary   12/4/2017    Gely Keene    MRN: [id number]           Patient Information     Date Of Birth          1940        Visit Information        Provider Department      12/4/2017 10:30 AM Sukhdev Greer MD [facility]        Today's Diagnoses     Vaginal itching    -  1    Dysuria        Recurrent UTI          Care Instructions    You can reach your [facility] Care Team any time of the day by calling [phone]. This number will put you in touch with the 24 hour nurse line if the clinic is closed.    To contact your OB/GYN Station Coordinator/Surgery Scheduler please call [phone]. This is a direct number for your care team between 8 a.m. and 4 p.m. Monday through Friday.    [facility] is open for your convenience:  Monday through Friday 8 a.m. to 6 p.m.  Closed weekends and all major holidays.            Follow-ups after your visit        Your next 10 appointments already scheduled     Jan 03, 2018 11:00 AM CST   MA SCREENING BILATERAL W/ COLE with [email or web address]   [facility] ([facility])    [address], [address]  [facility] [id number] [phone]           Three-dimensional (3D) mammograms are available at [facility] locations in [facility], [facility], [place], [place], and [place]. [facility] locations include [place] [facility] in [place]. Benefits of 3D mammograms include: - Improved rate of cancer detection - Decreases your chance of having to go back for more tests, which means fewer: - \"False-positive\" results (This means that there is an abnormal area but it isn't cancer.) - Invasive testing procedures, such as a biopsy or surgery - Can provide clearer images of the breast if you have dense breast tissue. 3D mammography is an optional exam that anyone can have with a 2D mammogram. It doesn't replace or take the place of a 2D mammogram. 2D " "mammograms remain an effective screening test for all women.  Not all insurance companies cover the cost of a 3D mammogram. Check with your insurance.              Who to contact     If you have questions or need follow up information about today's clinic visit or your schedule please contact [facility] directly at [phone].  Normal or non-critical lab and imaging results will be communicated to you by MyChart, letter or phone within 4 business days after the clinic has received the results. If you do not hear from us within 7 days, please contact the clinic through milabenthart or phone. If you have a critical or abnormal lab result, we will notify you by phone as soon as possible.  Submit refill requests through [employer] or call your pharmacy and they will forward the refill request to us. Please allow 3 business days for your refill to be completed.          Additional Information About Your Visit        MyChart Information     [employer] lets you send messages to your doctor, view your test results, renew your prescriptions, schedule appointments and more. To sign up, go to [email or web address] . Click on \"Log in\" on the left side of the screen, which will take you to the Welcome page. Then click on \"Sign up Now\" on the right side of the page.     You will be asked to enter the access code listed below, as well as some personal information. Please follow the directions to create your username and password.     Your access code is: [email or web address]  Expires: 2018 11:43 AM     Your access code will  in 90 days. If you need help or a new code, please call your [facility] or [phone].        Care EveryWhere ID     This is your Care EveryWhere ID. This could be used by other organizations to access your [facility] medical records  [id number]        Your Vitals Were     Pulse Height Breastfeeding? BMI (Body Mass Index)          76 5' 2\" (1.575 m) No 26.34 kg/m2         Blood Pressure from " Last 3 Encounters:   12/04/17 122/60   11/24/17 106/62   11/06/17 110/70    Weight from Last 3 Encounters:   12/04/17 144 lb (65.3 kg)   11/24/17 148 lb 11.2 oz (67.4 kg)   11/15/17 145 lb (65.8 kg)              We Performed the Following     UA with Microscopic reflex to Culture     Wet prep        Primary Care Provider Office Phone # Fax #    Hien Booth [phone] [address] [place] [address]        Equal Access to Services     [facility]: Hadii aad ku hadasho Soomaali, waaxda luqadaha, qaybta kaalmada adegeorgianayasheila, archana wagner . So [facility] [phone].    ATENCIÓN: Si habla español, tiene a greenberg disposición servicios gratuitos de asistencia lingüística. [facility] [phone].    We comply with applicable federal civil rights laws and [place] laws. We do not discriminate on the basis of race, color, national origin, age, disability, sex, sexual orientation, or gender identity.            Thank you!     Thank you for choosing [facility]  for your care. Our goal is always to provide you with excellent care. Hearing back from our patients is one way we can continue to improve our services. Please take a few minutes to complete the written survey that you may receive in the mail after your visit with us. Thank you!             Your Updated Medication List - Protect others around you: Learn how to safely use, store and throw away your medicines at www.disposemymeds.org.          This list is accurate as of: 12/4/17 11:59 PM.  Always use your most recent med list.                   Brand Name Dispense Instructions for use Diagnosis    ACETAMINOPHEN PO      Take 500 mg by mouth every 4 hours as needed for pain Reported on 4/10/2017        atorvastatin 20 MG tablet    LIPITOR    90 tablet    Take 1 tablet (20 mg) by mouth daily    Hyperlipidemia LDL goal <160       BIOTIN PO      Take 1 tablet by mouth daily dose unknown         CALCIUM CITRATE PLUS/MAGNESIUM Tabs      Take 1 tablet by mouth daily        conjugated estrogens cream    PREMARIN    30 g    Place 0.5 g vaginally three times a week    Atrophic vaginitis       fish oil-omega-3 fatty acids 1000 MG capsule      Take 1 capsule daily.  Indications: PN:        MELATONIN PO      Take 3 mg by mouth nightly as needed        metoprolol 25 MG 24 hr tablet    TOPROL-XL    45 tablet    Take 0.5 tablets (12.5 mg) by mouth daily    Sinus tachycardia, Hypertension goal BP (blood pressure) < 140/90       nystatin [id number] UNIT/GM Powd    MYCOSTATIN     Apply topically as needed        omeprazole 20 MG CR capsule    priLOSEC    90 capsule    TAKE ONE CAPSULE BY MOUTH EVERY DAY    Gastroesophageal reflux disease, esophagitis presence not specified       VITAMIN B 12 PO      Take by mouth daily Dose unknown

## 2017-12-05 LAB
SPECIMEN SOURCE: NORMAL
WET PREP SPEC: NORMAL

## 2017-12-05 NOTE — PROGRESS NOTES
HPI:  Gely Keene is a 77 year old female  No LMP recorded. Patient has had a hysterectomy. Postmenopausal Using a small amount of Premarin vaginal cream3 times weekly for recurrent UTIs sexually abstinent  , who presents for evaluation of Recurrent UTIs and vaginal irritation. The patient had a previous partial colectomy in 2016. The present patient recently used oral Diflucan for presumed monilial vaginitis and notices that it doesn't always resolve her symptoms. I reviewed her past history of extensive UTIs and discussed reasons why this can occur and treatment alternatives including suppression that could be used.  At present she denies dysuria..    Past Medical History:   Diagnosis Date     Anxiety      BCC (basal cell carcinoma of skin)      Esophageal reflux (GERD) 2014     HTN (hypertension) 2014     Hyperlipidemia LDL goal <160 2014     IFG (impaired fasting glucose) 2014     Mumps      PONV (postoperative nausea and vomiting)      Past Surgical History:   Procedure Laterality Date     APPENDECTOMY       cholecystitis       choledocolithiasis       COLECTOMY WITH COLOSTOMY, COMBINED N/A 2016    Procedure: COMBINED COLECTOMY WITH COLOSTOMY;  Surgeon: Shana Alaniz MD;  Location:  OR     CYSTOSCOPY       ENDOSCOPIC RETROGRADE CHOLANGIOPANCREATOGRAM N/A 8/15/2017    Procedure: COMBINED ENDOSCOPIC RETROGRADE CHOLANGIOPANCREATOGRAPHY, SPHINCTEROTOMY;  ENDOSCOPIC RETROGRADE CHOLANGIOPANCREATOGRAPHY, SPHINCTEROTOMY. STONE EXTRACTION;  Surgeon: Keturah Bergeron MD;  Location:  OR     ENDOSCOPIC RETROGRADE CHOLANGIOPANCREATOGRAM N/A 8/15/2017    Procedure: COMBINED ENDOSCOPIC RETROGRADE CHOLANGIOPANCREATOGRAPHY, REMOVE STONE/BALLOON;;  Surgeon: Keturah Bergeron MD;  Location:  OR     HYSTERECTOMY  1987     LAPAROSCOPIC CHOLECYSTECTOMY       LAPAROTOMY EXPLORATORY N/A 2016    Procedure: LAPAROTOMY EXPLORATORY;  Surgeon: Katty  Shana Hicks MD;  Location: [facility] OR     PHACOEMULSIFICATION CLEAR CORNEA WITH STANDARD INTRAOCULAR LENS IMPLANT Left 5/9/2017    Procedure: PHACOEMULSIFICATION CLEAR CORNEA WITH STANDARD INTRAOCULAR LENS IMPLANT;  LEFT EYE PHACOEMULSIFICATION CLEAR CORNEA WITH STANDARD INTRAOCULAR LENS IMPLANT ;  Surgeon: Eloy Eric MD;  Location: [facility]     PHACOEMULSIFICATION CLEAR CORNEA WITH STANDARD INTRAOCULAR LENS IMPLANT Right 5/23/2017    Procedure: PHACOEMULSIFICATION CLEAR CORNEA WITH STANDARD INTRAOCULAR LENS IMPLANT;  RIGHT EYE PHACOEMULSIFICATION CLEAR CORNEA WITH STANDARD INTRAOCULAR LENS IMPLANT ;  Surgeon: Eloy Eric MD;  Location: [facility]     Family History   Problem Relation Age of Onset     Breast Cancer Daughter      Breast Cancer Mother      Breast Cancer Sister      Social History     Social History     Marital status:      Spouse name: N/A     Number of children: N/A     Years of education: N/A     Occupational History     Not on file.     Social History Main Topics     Smoking status: Never Smoker     Smokeless tobacco: Never Used     Alcohol use Yes      Comment: socially     Drug use: No     Sexual activity: Yes     Partners: Male     Birth control/ protection: Post-menopausal     Other Topics Concern     Parent/Sibling W/ Cabg, Mi Or Angioplasty Before 65f 55m? No     Social History Narrative    Daughter is Diana Keller    Spends 2 months most duong in [place]    Worked for the [employer]       Allergies:  Bactrim [sulfamethoxazole w/trimethoprim]; Codeine; Metronidazole; Sulfa drugs; Sulfur; and Versed [midazolam]    Current Outpatient Prescriptions   Medication Sig Dispense Refill     conjugated estrogens (PREMARIN) cream Place 0.5 g vaginally three times a week 30 g 6     metoprolol (TOPROL-XL) 25 MG 24 hr tablet Take 0.5 tablets (12.5 mg) by mouth daily 45 tablet 3     atorvastatin (LIPITOR) 20 MG tablet Take 1 tablet (20 mg) by mouth daily 90 tablet 3     nystatin  "(MYCOSTATIN) 466949 UNIT/GM POWD Apply topically as needed       MELATONIN PO Take 3 mg by mouth nightly as needed       BIOTIN PO Take 1 tablet by mouth daily dose unknown       Cyanocobalamin (VITAMIN B 12 PO) Take by mouth daily Dose unknown       omeprazole (PRILOSEC) 20 MG CR capsule TAKE ONE CAPSULE BY MOUTH EVERY DAY 90 capsule 3     fish oil-omega-3 fatty acids 1000 MG capsule Take 1 capsule daily.  Indications: PN:       ACETAMINOPHEN PO Take 500 mg by mouth every 4 hours as needed for pain Reported on 4/10/2017       Multiple Minerals-Vitamins (CALCIUM CITRATE PLUS/MAGNESIUM) TABS Take 1 tablet by mouth daily          Review Of Systems   ROS: 10 point ROS neg other than the symptoms noted above in the HPI.    Exam:  /60 (BP Location: Right arm, Patient Position: Sitting, Cuff Size: Adult Regular)  Pulse 76  Ht 5' 2\" (1.575 m)  Wt 144 lb (65.3 kg)  Breastfeeding? No  BMI 26.34 kg/m2  {Constitutional: healthy, alert and no distress  Head: Normocephalic. No masses, lesions, tenderness or abnormalities  Neck: Neck supple. No adenopathy. Thyroid symmetric, normal size,, Carotids without bruits.  ENT: NEGATIVE for ear, mouth and throat problems  Cardiovascular: negative, PMI normal. No lifts, heaves, or thrills. RRR. No murmurs, clicks gallops or rub  Respiratory: negative, Percussion normal. Good diaphragmatic excursion. Lungs clear  Gastrointestinal: Abdomen soft, non-tender. BS normal. No masses, organomegaly  Genitourinary: Normal external genitalia without lesions and Speculum normal-appearing vaginal epithelium, absent cervix, no leakage of urine with coughing or straining, with good pelvic floor support.. Bimanual exam absent uterus adnexa without masses or enlargement,  Rectovaginal exam normal sphincter tone minimal hemorrhoidal tissue    Assessment/Plan:  (L29.8) Vaginal itching  (primary encounter diagnosis)  Comment: Wet prep today was negative.  Of her recurring history of vaginitis and " urinary tract infections I would like to await and make sure she has a positive lab confirmation before empirically treating her. Discussed items including antibiotic usage that can precipitate vaginitis.  I reviewed the pluses of using vaginal estrogen to maintain vaginal tissue turgor and bacterial dickson and minimize risk of infection. I discouraged the use of them in hygiene products  Plan: Wet prep        Patient will call should she have symptoms    (R30.0) Dysuria  Comment: Urinalysis today is negative. If she does develop dysuria or symptoms to suggest a UTI and asked that she come in for a UA UC. I would treat based on sensitivities for the organism and then do a follow-up test to cure to document resolution rather than suppression. We discussed frequent timed voidings to help ensure adequate emptying. Estrogen will also be helpful in minimizing future infections.We discussed perineal hygiene. If patient does have recurring infections may consider a 3 month course of trimethoprim suppression  Plan: UA with Microscopic reflex to Culture        Plan given    (N39.0) Recurrent UTI  Comment: As above plan  Plan: Plan given      Sukhdev Greer M.D.     (Chart documentation was completed, in part, with [employer] voice-recognition software. Even though reviewed, some grammatical, spelling, and word errors may remain.)

## 2018-01-03 ENCOUNTER — HOSPITAL ENCOUNTER (OUTPATIENT)
Dept: MAMMOGRAPHY | Facility: CLINIC | Age: 78
Discharge: HOME | End: 2018-01-03
Attending: PEDIATRICS | Admitting: PEDIATRICS
Payer: MEDICARE

## 2018-01-03 DIAGNOSIS — R73.01 IFG (IMPAIRED FASTING GLUCOSE): ICD-10-CM

## 2018-01-03 DIAGNOSIS — N18.30 CKD (CHRONIC KIDNEY DISEASE) STAGE 3, GFR 30-59 ML/MIN (H): ICD-10-CM

## 2018-01-03 DIAGNOSIS — Z12.31 VISIT FOR SCREENING MAMMOGRAM: ICD-10-CM

## 2018-01-03 LAB
ANION GAP SERPL CALCULATED.3IONS-SCNC: 12 MMOL/L (ref 3–14)
BUN SERPL-MCNC: 22 MG/DL (ref 7–30)
CALCIUM SERPL-MCNC: 9.3 MG/DL (ref 8.5–10.1)
CHLORIDE SERPL-SCNC: 105 MMOL/L (ref 94–109)
CO2 SERPL-SCNC: 22 MMOL/L (ref 20–32)
CREAT SERPL-MCNC: 1.19 MG/DL (ref 0.52–1.04)
GFR SERPL CREATININE-BSD FRML MDRD: 44 ML/MIN/1.7M2
GLUCOSE SERPL-MCNC: 119 MG/DL (ref 70–99)
HBA1C MFR BLD: 6 % (ref 4.3–6)
POTASSIUM SERPL-SCNC: 3.8 MMOL/L (ref 3.4–5.3)
SODIUM SERPL-SCNC: 139 MMOL/L (ref 133–144)

## 2018-01-03 PROCEDURE — 80048 BASIC METABOLIC PNL TOTAL CA: CPT | Performed by: PEDIATRICS

## 2018-01-03 PROCEDURE — 36415 COLL VENOUS BLD VENIPUNCTURE: CPT | Performed by: PEDIATRICS

## 2018-01-03 PROCEDURE — 83036 HEMOGLOBIN GLYCOSYLATED A1C: CPT | Performed by: PEDIATRICS

## 2018-01-03 PROCEDURE — 77063 BREAST TOMOSYNTHESIS BI: CPT

## 2018-01-10 ENCOUNTER — OFFICE VISIT (OUTPATIENT)
Dept: PEDIATRICS | Facility: CLINIC | Age: 78
End: 2018-01-10
Payer: COMMERCIAL

## 2018-01-10 VITALS
DIASTOLIC BLOOD PRESSURE: 64 MMHG | TEMPERATURE: 98.9 F | SYSTOLIC BLOOD PRESSURE: 100 MMHG | BODY MASS INDEX: 26.13 KG/M2 | HEART RATE: 76 BPM | HEIGHT: 62 IN | WEIGHT: 142 LBS | OXYGEN SATURATION: 97 %

## 2018-01-10 DIAGNOSIS — N18.30 CHRONIC KIDNEY DISEASE, STAGE III (MODERATE) (H): ICD-10-CM

## 2018-01-10 DIAGNOSIS — K94.19 ALTERED BOWEL ELIMINATION DUE TO INTESTINAL OSTOMY (H): ICD-10-CM

## 2018-01-10 DIAGNOSIS — I10 HYPERTENSION GOAL BP (BLOOD PRESSURE) < 140/90: Primary | ICD-10-CM

## 2018-01-10 DIAGNOSIS — Z90.49 S/P TOTAL COLECTOMY: ICD-10-CM

## 2018-01-10 DIAGNOSIS — R73.01 IFG (IMPAIRED FASTING GLUCOSE): ICD-10-CM

## 2018-01-10 DIAGNOSIS — C44.91 BASAL CELL CARCINOMA, UNSPECIFIED SITE: ICD-10-CM

## 2018-01-10 PROCEDURE — 99214 OFFICE O/P EST MOD 30 MIN: CPT | Performed by: PEDIATRICS

## 2018-01-10 NOTE — PROGRESS NOTES
"  SUBJECTIVE:   Gely Keene is a 77 year old female who presents to clinic today for the following health issues:      Results:        Held on starting Toprol XL after our last visit - plans to start soon in place of short acting formulation.   Blood pressure has remained well controlled.  No new cardiac symptoms.    Leaving for [place] later this month - anxious to spend time there as she is able to be much more active when there - has access to a pool and uses it very often.    Dermatologist visit this week - hx of BCC - had new areas frozen on her face, anterior chest    Vaginal atrophy, recurrent UTI - has responded very well to premarin cream.  No bladder infection symptoms since starting.  Wishes to review past culture results to have those available when she is traveling.    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  Doing well - adjusting diet and comfortable with ostomy cares.        Problem list and histories reviewed & adjusted, as indicated.  Additional history: as documented      Reviewed and updated as needed this visit by clinical staff  Tobacco  Allergies  Med Hx  Surg Hx  Fam Hx  Soc Hx      Reviewed and updated as needed this visit by Provider         ROS:  Constitutional, HEENT, cardiovascular, pulmonary, gi and gu systems are negative, except as otherwise noted.      OBJECTIVE:   /64 (BP Location: Right arm, Patient Position: Right side, Cuff Size: Adult Regular)  Pulse 76  Temp 98.9  F (37.2  C) (Tympanic)  Ht 5' 1.5\" (1.562 m)  Wt 142 lb (64.4 kg)  SpO2 97%  BMI 26.4 kg/m2  Body mass index is 26.4 kg/(m^2).  GENERAL: healthy, alert and no distress  EYES: Eyes grossly normal to inspection, PERRL and conjunctivae and sclerae normal  HENT: ear canals and TM's normal, nose and mouth without ulcers or lesions  RESP: lungs clear to auscultation - no rales, rhonchi or wheezes  CV: regular rate and rhythm, normal S1 S2, no S3 or S4, no murmur, click or rub, no " peripheral edema and peripheral pulses strong  SKIN: erythematous spots left cheek, anterior chest from recent liquid nitrogen treatments  PSYCH: mentation appears normal, affect normal/bright    Diagnostic Test Results:  Recent test results reviewed in detail    ASSESSMENT/PLAN:       ICD-10-CM    1. Hypertension goal BP (blood pressure) < 140/90 I10 Well controlled, continue current medication plan - transition to toprol xl per plan at last visit   2. IFG (impaired fasting glucose) R73.01 A1C at 6% - discussed continued lifestyle measures to help control   3. Basal cell carcinoma, unspecified site C44.91 Continue to follow with Dr Almazan q 6 months, discussed sun avoidance measures when in [place]   4. 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 S/p total colectomy on 4/22/16 by Shana Alaniz MD Z90.49    6. Chronic kidney disease, stage III (moderate) N18.3 Continue to follow kidney function closely - discussed avoidance of NSAIDS, nephrotoxic medications with patient today.     Follow up q 6 months, sooner with new concerns.  Immunizations UTD    Hien Booth MD  [facility]

## 2018-01-10 NOTE — NURSING NOTE
"Chief Complaint   Patient presents with     Results       Initial /64 (BP Location: Right arm, Patient Position: Right side, Cuff Size: Adult Regular)  Pulse 76  Temp 98.9  F (37.2  C) (Tympanic)  Ht 5' 1.5\" (1.562 m)  Wt 142 lb (64.4 kg)  SpO2 97%  BMI 26.4 kg/m2 Estimated body mass index is 26.4 kg/(m^2) as calculated from the following:    Height as of this encounter: 5' 1.5\" (1.562 m).    Weight as of this encounter: 142 lb (64.4 kg).  Medication Reconciliation: complete  "

## 2018-01-10 NOTE — MR AVS SNAPSHOT
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  Normal or non-critical lab and imaging results will be communicated to you by [employer]icSishart, letter or phone within 4 business days after the clinic has received the results. If you do not hear from us within 7 days, please contact the clinic through [employer]ish[employer] or phone. If you have a critical or abnormal lab result, we will notify you by phone as soon as possible.  Submit refill requests through [facility] or call your pharmacy and they will forward the refill request to us. Please allow 3 business days for your refill to be completed.          Additional Information About Your Visit        MyChart Information     [facility] lets you send messages to your doctor, view your test results, renew your prescriptions, schedule appointments and more. To sign up, go to [email or web address] . Click on \"Log in\" on the left side of the screen, which will take you to the Welcome page. Then click on \"Sign up Now\" on the right side of the page.     You will be asked to enter the access code listed below, as well as some personal information. Please follow the directions to create your username and " "password.     Your access code is: [email or web address]  Expires: 2018 11:43 AM     Your access code will  in 90 days. If you need help or a new code, please call your [place] clinic or [phone].        Care EveryWhere ID     This is your Care EveryWhere ID. This could be used by other organizations to access your [place] medical records  [id number]        Your Vitals Were     Pulse Temperature Height Pulse Oximetry BMI (Body Mass Index)       76 98.9  F (37.2  C) (Tympanic) 5' 1.5\" (1.562 m) 97% 26.4 kg/m2        Blood Pressure from Last 3 Encounters:   01/10/18 100/64   17 122/60   17 106/62    Weight from Last 3 Encounters:   01/10/18 142 lb (64.4 kg)   17 144 lb (65.3 kg)   17 148 lb 11.2 oz (67.4 kg)              Today, you had the following     No orders found for display       Primary Care Provider Office Phone # Fax #    Hien Booth [phone] [address] [place] [address]        Equal Access to Services     [facility] AH: Hadii aad ku hadasho Soomaali, waaxda luqadaha, qaybta kaalmada adeegyada, waxay noain hayhortencian karly wagner . So [facility] [phone].    ATENCIÓN: Si habla español, tiene a greenberg disposición servicios gratuitos de asistencia lingüística. Llame al [phone].    We comply with applicable federal civil rights laws and [place] laws. We do not discriminate on the basis of race, color, national origin, age, disability, sex, sexual orientation, or gender identity.            Thank you!     Thank you for choosing [facility]  for your care. Our goal is always to provide you with excellent care. Hearing back from our patients is one way we can continue to improve our services. Please take a few minutes to complete the written survey that you may receive in the mail after your visit with us. Thank you!             Your Updated Medication List - Protect others around you: Learn how to safely use, store " and throw away your medicines at www.disposemymeds.org.          This list is accurate as of: 1/10/18  1:50 PM.  Always use your most recent med list.                   Brand Name Dispense Instructions for use Diagnosis    ACETAMINOPHEN PO      Take 500 mg by mouth every 4 hours as needed for pain Reported on 4/10/2017        atorvastatin 20 MG tablet    LIPITOR    90 tablet    Take 1 tablet (20 mg) by mouth daily    Hyperlipidemia LDL goal <160       BIOTIN PO      Take 1 tablet by mouth daily dose unknown        CALCIUM CITRATE PLUS/MAGNESIUM Tabs      Take 1 tablet by mouth daily        conjugated estrogens cream    PREMARIN    30 g    Place 0.5 g vaginally three times a week    Atrophic vaginitis       fish oil-omega-3 fatty acids 1000 MG capsule      Take 1 capsule daily.  Indications: PN:        MELATONIN PO      Take 3 mg by mouth nightly as needed        metoprolol 25 MG 24 hr tablet    TOPROL-XL    45 tablet    Take 0.5 tablets (12.5 mg) by mouth daily    Sinus tachycardia, Hypertension goal BP (blood pressure) < 140/90       nystatin [id number] UNIT/GM Powd    MYCOSTATIN     Apply topically as needed        omeprazole 20 MG CR capsule    priLOSEC    90 capsule    TAKE ONE CAPSULE BY MOUTH EVERY DAY    Gastroesophageal reflux disease, esophagitis presence not specified       VITAMIN B 12 PO      Take by mouth daily Dose unknown

## 2018-04-23 ENCOUNTER — TELEPHONE (OUTPATIENT)
Dept: OBGYN | Facility: CLINIC | Age: 78
End: 2018-04-23

## 2018-04-23 DIAGNOSIS — R82.90 NONSPECIFIC FINDING ON EXAMINATION OF URINE: Primary | ICD-10-CM

## 2018-04-23 DIAGNOSIS — R30.0 DYSURIA: Primary | ICD-10-CM

## 2018-04-23 DIAGNOSIS — R30.0 DYSURIA: ICD-10-CM

## 2018-04-23 LAB
ALBUMIN UR-MCNC: >=300 MG/DL
APPEARANCE UR: ABNORMAL
BACTERIA #/AREA URNS HPF: ABNORMAL /HPF
BILIRUB UR QL STRIP: ABNORMAL
COLOR UR AUTO: YELLOW
GLUCOSE UR STRIP-MCNC: NEGATIVE MG/DL
HGB UR QL STRIP: ABNORMAL
KETONES UR STRIP-MCNC: 15 MG/DL
LEUKOCYTE ESTERASE UR QL STRIP: ABNORMAL
NITRATE UR QL: NEGATIVE
NON-SQ EPI CELLS #/AREA URNS LPF: ABNORMAL /LPF
PH UR STRIP: 5.5 PH (ref 5–7)
RBC #/AREA URNS AUTO: ABNORMAL /HPF
SOURCE: ABNORMAL
SP GR UR STRIP: 1.02 (ref 1–1.03)
URNS CMNT MICRO: ABNORMAL
UROBILINOGEN UR STRIP-ACNC: 0.2 EU/DL (ref 0.2–1)
WBC #/AREA URNS AUTO: >100 /HPF

## 2018-04-23 PROCEDURE — 87186 SC STD MICRODIL/AGAR DIL: CPT | Performed by: OBSTETRICS & GYNECOLOGY

## 2018-04-23 PROCEDURE — 81001 URINALYSIS AUTO W/SCOPE: CPT | Performed by: OBSTETRICS & GYNECOLOGY

## 2018-04-23 PROCEDURE — 87088 URINE BACTERIA CULTURE: CPT | Performed by: OBSTETRICS & GYNECOLOGY

## 2018-04-23 PROCEDURE — 87086 URINE CULTURE/COLONY COUNT: CPT | Performed by: OBSTETRICS & GYNECOLOGY

## 2018-04-23 NOTE — TELEPHONE ENCOUNTER
----- Message from Sukhdev Greer MD sent at 4/23/2018  1:56 PM CDT -----  Great plan  I agree  Sukhdev Greer M.D.

## 2018-04-23 NOTE — TELEPHONE ENCOUNTER
Results in, please advise.      Culture pending.    Astrid BERRY R.N.  [facility]

## 2018-04-23 NOTE — TELEPHONE ENCOUNTER
Pt calls reporting UTI sx.    Urine frequency, pain with urination, no fever or lower back pain.  Pt has hx of frequent uti.  Feeling if discomfort and dryness.  Sx for 2 days.    Scheduled pt on lab schedule to leave urine.  Routed to Dr Greer as katt.  Advised if urine neg then we may have her come in for exam.    Astrid BERRY R.N.  [facility]

## 2018-04-24 RX ORDER — CIPROFLOXACIN 250 MG/1
250 TABLET, FILM COATED ORAL 2 TIMES DAILY
Qty: 6 TABLET | Refills: 0 | Status: SHIPPED | OUTPATIENT
Start: 2018-04-24 | End: 2018-11-07

## 2018-04-24 NOTE — TELEPHONE ENCOUNTER
Pt calling for urine results.   Advised that we are awaiting the urine culture results.   Pt is wondering what she can use/do in the mean time.  She is very uncomfortable.   She purchased OTC AZO and after reading the info packet she did not use it as it says to contact a doctor if over the age of 65 and if you are being treated for high BP (which the pt is).    Please advise.     OVIDIO Up RN

## 2018-04-24 NOTE — PROGRESS NOTES
Please notify the patient of the normal urine culture result.  Lactose fermenting gram-negative rods are lactobacilli which is a normal vaginal inhabitant and not a pathogen  Sukhdev Greer MD FACOG

## 2018-04-24 NOTE — TELEPHONE ENCOUNTER
Please notify the patient that in light of her intense symptoms I did send a prescription for Cipro 250 mg p.o. twice daily for 3 days to the [facility] pharmacy.  I do expect the culture results to be back soon and if there is something different I can make a change.  In light of her intense symptoms I feel warrants beginning an antibiotic at this time.  If her symptoms are not getting better we can prescribe Pyridium 200 mg p.o. 3 times daily for 2 days if needed.  Please asked the patient to let us know  Sukhdev Greer MD FACOG

## 2018-04-25 LAB
BACTERIA SPEC CULT: ABNORMAL
SPECIMEN SOURCE: ABNORMAL

## 2018-05-12 DIAGNOSIS — K21.9 GASTROESOPHAGEAL REFLUX DISEASE, ESOPHAGITIS PRESENCE NOT SPECIFIED: ICD-10-CM

## 2018-05-13 NOTE — TELEPHONE ENCOUNTER
"Requested Prescriptions   Pending Prescriptions Disp Refills     omeprazole (PRILOSEC) 20 MG CR capsule [Pharmacy Med Name: OMEPRAZOLE 20MG CPDR]  Last Written Prescription Date:  06/27/2017  Last Fill Quantity: 90 capsule,  # refills: 3   Last office visit: 1/10/2018 with prescribing provider:  Hien Booth MD    Future Office Visit:     90 capsule 3     Sig: TAKE ONE CAPSULE BY MOUTH EVERY DAY    PPI Protocol Passed    5/12/2018 11:29 AM       Passed - Not on Clopidogrel (unless Pantoprazole ordered)       Passed - No diagnosis of osteoporosis on record       Passed - Recent (12 mo) or future (30 days) visit within the authorizing provider's specialty    Patient had office visit in the last 12 months or has a visit in the next 30 days with authorizing provider or within the authorizing provider's specialty.  See \"Patient Info\" tab in inbasket, or \"Choose Columns\" in Meds & Orders section of the refill encounter.           Passed - Patient is age 18 or older       Passed - No active pregnacy on record       Passed - No positive pregnancy test in past 12 months          "

## 2018-05-15 NOTE — TELEPHONE ENCOUNTER
Prescription approved per [facility] Refill Protocol.    Jacqueline WANG RN, BSN, PHN  [facility]

## 2018-05-30 ENCOUNTER — TELEPHONE (OUTPATIENT)
Dept: PEDIATRICS | Facility: CLINIC | Age: 78
End: 2018-05-30

## 2018-05-30 NOTE — TELEPHONE ENCOUNTER
Patient calls regarding the order for her colostomy pouches.  She states that we signed a faxed order in November requesting only 8 pouches per 2 months.( so 4 per month)    Patient states that she needs 8-10 per month.      Call to [facility] in [facility] at [phone] to discuss.  They confirm that patient was getting 10 per month in the past.  They are faxing the new order to be signed by Dr. Booth for 10 pouches a month.    Call to patient-unable to LM.  Please update patient when she calls back.    Belkis Guzmán RN  Message handled by Nurse Triage.

## 2018-07-10 ENCOUNTER — TELEPHONE (OUTPATIENT)
Dept: PEDIATRICS | Facility: CLINIC | Age: 78
End: 2018-07-10

## 2018-07-10 DIAGNOSIS — R73.01 IFG (IMPAIRED FASTING GLUCOSE): ICD-10-CM

## 2018-07-10 DIAGNOSIS — I10 HYPERTENSION GOAL BP (BLOOD PRESSURE) < 140/90: Primary | ICD-10-CM

## 2018-07-10 DIAGNOSIS — E78.5 HYPERLIPIDEMIA LDL GOAL <160: ICD-10-CM

## 2018-09-18 ENCOUNTER — OFFICE VISIT (OUTPATIENT)
Dept: URGENT CARE | Facility: URGENT CARE | Age: 78
End: 2018-09-18
Payer: COMMERCIAL

## 2018-09-18 VITALS
SYSTOLIC BLOOD PRESSURE: 118 MMHG | HEART RATE: 73 BPM | TEMPERATURE: 97.9 F | DIASTOLIC BLOOD PRESSURE: 58 MMHG | OXYGEN SATURATION: 97 %

## 2018-09-18 DIAGNOSIS — N39.0 URINARY TRACT INFECTION WITHOUT HEMATURIA, SITE UNSPECIFIED: Primary | ICD-10-CM

## 2018-09-18 DIAGNOSIS — R30.0 DYSURIA: ICD-10-CM

## 2018-09-18 LAB
ALBUMIN UR-MCNC: 30 MG/DL
APPEARANCE UR: CLEAR
BACTERIA #/AREA URNS HPF: ABNORMAL /HPF
BILIRUB UR QL STRIP: NEGATIVE
COLOR UR AUTO: YELLOW
GLUCOSE UR STRIP-MCNC: NEGATIVE MG/DL
HGB UR QL STRIP: ABNORMAL
KETONES UR STRIP-MCNC: NEGATIVE MG/DL
LEUKOCYTE ESTERASE UR QL STRIP: ABNORMAL
NITRATE UR QL: NEGATIVE
NON-SQ EPI CELLS #/AREA URNS LPF: ABNORMAL /LPF
PH UR STRIP: 6 PH (ref 5–7)
RBC #/AREA URNS AUTO: ABNORMAL /HPF
SOURCE: ABNORMAL
SP GR UR STRIP: 1.01 (ref 1–1.03)
UROBILINOGEN UR STRIP-ACNC: 0.2 EU/DL (ref 0.2–1)
WBC #/AREA URNS AUTO: >100 /HPF

## 2018-09-18 PROCEDURE — 99213 OFFICE O/P EST LOW 20 MIN: CPT | Performed by: FAMILY MEDICINE

## 2018-09-18 PROCEDURE — 87086 URINE CULTURE/COLONY COUNT: CPT | Performed by: FAMILY MEDICINE

## 2018-09-18 PROCEDURE — 87186 SC STD MICRODIL/AGAR DIL: CPT | Performed by: FAMILY MEDICINE

## 2018-09-18 PROCEDURE — 81001 URINALYSIS AUTO W/SCOPE: CPT | Performed by: FAMILY MEDICINE

## 2018-09-18 PROCEDURE — 87088 URINE BACTERIA CULTURE: CPT | Performed by: FAMILY MEDICINE

## 2018-09-18 RX ORDER — CIPROFLOXACIN 250 MG/1
250 TABLET, FILM COATED ORAL 2 TIMES DAILY
Qty: 14 TABLET | Refills: 0 | Status: SHIPPED | OUTPATIENT
Start: 2018-09-18 | End: 2018-09-25

## 2018-09-18 NOTE — MR AVS SNAPSHOT
After Visit Summary   9/18/2018    Gely Keene    MRN: [id number]           Patient Information     Date Of Birth          1940        Visit Information        Provider Department      9/18/2018 2:35 PM Anita Munoz MD [facility]        Today's Diagnoses     Dysuria    -  1    Urinary tract infection without hematuria, site unspecified          Care Instructions    Take ciprofloxacin 250 mg twice daily for 7 days.  Be sure to take all 14 pills in the prescription.    Drink plenty of fluids to help flush out the infection.    Take your vitamins in the middle of the day to avoid calcium binding to the antibiotic and making it less effective at treating the infection.    Follow up with Dr. Booth or one of her partners if not any better by Friday, sooner if worsening despite treatment.  You can also follow up in urgent care if you are not better.          Follow-ups after your visit        Your next 10 appointments already scheduled     Nov 01, 2018 10:00 AM CDT   LAB with [facility] LAB   [facility]    [address] [place] [address]  [address]  [facility] [facility] [employer] [address] [phone]           Please do not eat 10-12 hours before your appointment if you are coming in fasting for labs on lipids, cholesterol, or glucose (sugar). This does not apply to pregnant women. Water, hot tea and black coffee (with nothing added) are okay. Do not drink other fluids, diet soda or chew gum.            Nov 07, 2018 10:40 AM CST   PHYSICAL with Hien Booth MD   [facility] ([facility]    [address] [place] [address]  [address]  [employer] [id number] [phone]              Who to contact     If you have questions or need follow up information about today's clinic visit or your schedule please contact [facility] directly at [phone].  Normal or non-critical lab and imaging results will be communicated to  "you by MyChart, letter or phone within 4 business days after the clinic has received the results. If you do not hear from us within 7 days, please contact the clinic through [employer] or phone. If you have a critical or abnormal lab result, we will notify you by phone as soon as possible.  Submit refill requests through Gl[employer] or call your pharmacy and they will forward the refill request to us. Please allow 3 business days for your refill to be completed.          Additional Information About Your Visit        [employer] Reader Information     Glarity lets you send messages to your doctor, view your test results, renew your prescriptions, schedule appointments and more. To sign up, go to [email or web address] . Click on \"Log in\" on the left side of the screen, which will take you to the Welcome page. Then click on \"Sign up Now\" on the right side of the page.     You will be asked to enter the access code listed below, as well as some personal information. Please follow the directions to create your username and password.     Your access code is: [email or web address]  Expires: 2018  3:39 PM     Your access code will  in 90 days. If you need help or a new code, please call your [place] clinic or [phone].        Care EveryWhere ID     This is your Care EveryWhere ID. This could be used by other organizations to access your [place] medical records  [id number]        Your Vitals Were     Pulse Temperature Pulse Oximetry             73 97.9  F (36.6  C) 97%          Blood Pressure from Last 3 Encounters:   18 118/58   01/10/18 100/64   17 122/60    Weight from Last 3 Encounters:   01/10/18 142 lb (64.4 kg)   17 144 lb (65.3 kg)   17 148 lb 11.2 oz (67.4 kg)              We Performed the Following     UA reflex to Microscopic and Culture     Urine Culture Aerobic Bacterial     Urine Microscopic          Today's Medication Changes          These changes are accurate as of 18  3:39 PM.  If " you have any questions, ask your nurse or doctor.               These medicines have changed or have updated prescriptions.        Dose/Directions    * ciprofloxacin 250 MG tablet   Commonly known as:  CIPRO   This may have changed:  Another medication with the same name was added. Make sure you understand how and when to take each.   Used for:  Dysuria        Dose:  250 mg   Take 1 tablet (250 mg) by mouth 2 times daily   Quantity:  6 tablet   Refills:  0       * ciprofloxacin 250 MG tablet   Commonly known as:  CIPRO   This may have changed:  You were already taking a medication with the same name, and this prescription was added. Make sure you understand how and when to take each.   Used for:  Urinary tract infection without hematuria, site unspecified        Dose:  250 mg   Take 1 tablet (250 mg) by mouth 2 times daily for 7 days   Quantity:  14 tablet   Refills:  0       * Notice:  This list has 2 medication(s) that are the same as other medications prescribed for you. Read the directions carefully, and ask your doctor or other care provider to review them with you.         Where to get your medicines      These medications were sent to [facility] - [address]   [address], [place] [address]     Phone:  [phone]     ciprofloxacin 250 MG tablet                Primary Care Provider Office Phone # Fax #    Hien Booth [phone]       [address] [place] [address] [address] [place] [address]        Equal Access to Services     [facility]: Hadii gemini ornelaso Solavern, waaxda luqadaha, qaybta kaalmada adeegranulfo, archana hoyos. So [facility] [phone].    ATENCIÓN: Si habla español, tiene a greenberg disposición servicios gratuitos de asistencia lingüística. Llame al [phone].    We comply with applicable federal civil rights laws and [place] laws. We do not discriminate on the basis of race, color,  national origin, age, disability, sex, sexual orientation, or gender identity.            Thank you!     Thank you for choosing [facility]  for your care. Our goal is always to provide you with excellent care. Hearing back from our patients is one way we can continue to improve our services. Please take a few minutes to complete the written survey that you may receive in the mail after your visit with us. Thank you!             Your Updated Medication List - Protect others around you: Learn how to safely use, store and throw away your medicines at www.disposemymeds.org.          This list is accurate as of 9/18/18  3:39 PM.  Always use your most recent med list.                   Brand Name Dispense Instructions for use Diagnosis    ACETAMINOPHEN PO      Take 500 mg by mouth every 4 hours as needed for pain Reported on 4/10/2017        atorvastatin 20 MG tablet    LIPITOR    90 tablet    Take 1 tablet (20 mg) by mouth daily    Hyperlipidemia LDL goal <160       BIOTIN PO      Take 1 tablet by mouth daily dose unknown        CALCIUM CITRATE PLUS/MAGNESIUM Tabs      Take 1 tablet by mouth daily        * ciprofloxacin 250 MG tablet    CIPRO    6 tablet    Take 1 tablet (250 mg) by mouth 2 times daily    Dysuria       * ciprofloxacin 250 MG tablet    CIPRO    14 tablet    Take 1 tablet (250 mg) by mouth 2 times daily for 7 days    Urinary tract infection without hematuria, site unspecified       conjugated estrogens cream    PREMARIN    30 g    Place 0.5 g vaginally three times a week    Atrophic vaginitis       fish oil-omega-3 fatty acids 1000 MG capsule      Take 1 capsule daily.  Indications: PN:        MELATONIN PO      Take 3 mg by mouth nightly as needed        metoprolol succinate 25 MG 24 hr tablet    TOPROL-XL    45 tablet    Take 0.5 tablets (12.5 mg) by mouth daily    Sinus tachycardia, Hypertension goal BP (blood pressure) < 140/90       nystatin [id number] UNIT/GM Powd    MYCOSTATIN     Apply  topically as needed        omeprazole 20 MG CR capsule    priLOSEC    90 capsule    TAKE ONE CAPSULE BY MOUTH EVERY DAY    Gastroesophageal reflux disease, esophagitis presence not specified       VITAMIN B 12 PO      Take by mouth daily Dose unknown        * Notice:  This list has 2 medication(s) that are the same as other medications prescribed for you. Read the directions carefully, and ask your doctor or other care provider to review them with you.

## 2018-09-18 NOTE — PATIENT INSTRUCTIONS
Take ciprofloxacin 250 mg twice daily for 7 days.  Be sure to take all 14 pills in the prescription.    Drink plenty of fluids to help flush out the infection.    Take your vitamins in the middle of the day to avoid calcium binding to the antibiotic and making it less effective at treating the infection.    Follow up with Dr. Booth or one of her partners if not any better by Friday, sooner if worsening despite treatment.  You can also follow up in urgent care if you are not better.

## 2018-09-21 LAB
BACTERIA SPEC CULT: ABNORMAL
BACTERIA SPEC CULT: ABNORMAL
SPECIMEN SOURCE: ABNORMAL

## 2018-11-01 DIAGNOSIS — I10 HYPERTENSION GOAL BP (BLOOD PRESSURE) < 140/90: ICD-10-CM

## 2018-11-01 DIAGNOSIS — E78.5 HYPERLIPIDEMIA LDL GOAL <160: ICD-10-CM

## 2018-11-01 DIAGNOSIS — R73.01 IFG (IMPAIRED FASTING GLUCOSE): ICD-10-CM

## 2018-11-01 LAB
ALBUMIN SERPL-MCNC: 3.5 G/DL (ref 3.4–5)
ALP SERPL-CCNC: 78 U/L (ref 40–150)
ALT SERPL W P-5'-P-CCNC: 38 U/L (ref 0–50)
ANION GAP SERPL CALCULATED.3IONS-SCNC: 12 MMOL/L (ref 3–14)
AST SERPL W P-5'-P-CCNC: 36 U/L (ref 0–45)
BILIRUB SERPL-MCNC: 1.4 MG/DL (ref 0.2–1.3)
BUN SERPL-MCNC: 15 MG/DL (ref 7–30)
CALCIUM SERPL-MCNC: 9.1 MG/DL (ref 8.5–10.1)
CHLORIDE SERPL-SCNC: 98 MMOL/L (ref 94–109)
CHOLEST SERPL-MCNC: 132 MG/DL
CO2 SERPL-SCNC: 23 MMOL/L (ref 20–32)
CREAT SERPL-MCNC: 1.14 MG/DL (ref 0.52–1.04)
GFR SERPL CREATININE-BSD FRML MDRD: 46 ML/MIN/1.7M2
GLUCOSE SERPL-MCNC: 115 MG/DL (ref 70–99)
HBA1C MFR BLD: 6 % (ref 0–5.6)
HDLC SERPL-MCNC: 78 MG/DL
LDLC SERPL CALC-MCNC: 38 MG/DL
NONHDLC SERPL-MCNC: 54 MG/DL
POTASSIUM SERPL-SCNC: 4.1 MMOL/L (ref 3.4–5.3)
PROT SERPL-MCNC: 7.1 G/DL (ref 6.8–8.8)
SODIUM SERPL-SCNC: 133 MMOL/L (ref 133–144)
TRIGL SERPL-MCNC: 81 MG/DL

## 2018-11-01 PROCEDURE — 80053 COMPREHEN METABOLIC PANEL: CPT | Performed by: PEDIATRICS

## 2018-11-01 PROCEDURE — 83036 HEMOGLOBIN GLYCOSYLATED A1C: CPT | Performed by: PEDIATRICS

## 2018-11-01 PROCEDURE — 80061 LIPID PANEL: CPT | Performed by: PEDIATRICS

## 2018-11-01 PROCEDURE — 36415 COLL VENOUS BLD VENIPUNCTURE: CPT | Performed by: PEDIATRICS

## 2018-11-01 NOTE — LETTER
[address] [address]                  [phone]   November 2, 2018    Gely Keene  [address] [address]        Dear Gely,    Please find your lab results enclosed for your review and records.  Your results are stable from last check and look great.    I look forward to seeing you on the 7th.    Warm regards,    Hien Booth MD  Internal Medicine - Pediatrics      Results for orders placed or performed in visit on 11/01/18   **A1C FUTURE anytime   Result Value Ref Range    Hemoglobin A1C 6.0 (H) 0 - 5.6 %   **Comprehensive metabolic panel FUTURE anytime   Result Value Ref Range    Sodium 133 133 - 144 mmol/L    Potassium 4.1 3.4 - 5.3 mmol/L    Chloride 98 94 - 109 mmol/L    Carbon Dioxide 23 20 - 32 mmol/L    Anion Gap 12 3 - 14 mmol/L    Glucose 115 (H) 70 - 99 mg/dL    Urea Nitrogen 15 7 - 30 mg/dL    Creatinine 1.14 (H) 0.52 - 1.04 mg/dL    GFR Estimate 46 (L) >60 mL/min/1.7m2    GFR Estimate If Black 55 (L) >60 mL/min/1.7m2    Calcium 9.1 8.5 - 10.1 mg/dL    Bilirubin Total 1.4 (H) 0.2 - 1.3 mg/dL    Albumin 3.5 3.4 - 5.0 g/dL    Protein Total 7.1 6.8 - 8.8 g/dL    Alkaline Phosphatase 78 40 - 150 U/L    ALT 38 0 - 50 U/L    AST 36 0 - 45 U/L   Lipid panel reflex to direct LDL Fasting   Result Value Ref Range    Cholesterol 132 <200 mg/dL    Triglycerides 81 <150 mg/dL    HDL Cholesterol 78 >49 mg/dL    LDL Cholesterol Calculated 38 <100 mg/dL    Non HDL Cholesterol 54 <130 mg/dL

## 2018-11-07 ENCOUNTER — OFFICE VISIT (OUTPATIENT)
Dept: PEDIATRICS | Facility: CLINIC | Age: 78
End: 2018-11-07
Payer: COMMERCIAL

## 2018-11-07 VITALS
TEMPERATURE: 98.6 F | OXYGEN SATURATION: 99 % | WEIGHT: 145.8 LBS | SYSTOLIC BLOOD PRESSURE: 128 MMHG | BODY MASS INDEX: 26.83 KG/M2 | HEART RATE: 70 BPM | HEIGHT: 62 IN | DIASTOLIC BLOOD PRESSURE: 60 MMHG

## 2018-11-07 DIAGNOSIS — M25.512 CHRONIC LEFT SHOULDER PAIN: ICD-10-CM

## 2018-11-07 DIAGNOSIS — H93.8X2 EAR FULLNESS, LEFT: ICD-10-CM

## 2018-11-07 DIAGNOSIS — G89.29 CHRONIC LEFT SHOULDER PAIN: ICD-10-CM

## 2018-11-07 DIAGNOSIS — N95.2 ATROPHIC VAGINITIS: ICD-10-CM

## 2018-11-07 DIAGNOSIS — R00.0 SINUS TACHYCARDIA: ICD-10-CM

## 2018-11-07 DIAGNOSIS — Z00.00 MEDICARE ANNUAL WELLNESS VISIT, SUBSEQUENT: Primary | ICD-10-CM

## 2018-11-07 DIAGNOSIS — I10 HYPERTENSION GOAL BP (BLOOD PRESSURE) < 140/90: ICD-10-CM

## 2018-11-07 DIAGNOSIS — K21.9 GASTROESOPHAGEAL REFLUX DISEASE, ESOPHAGITIS PRESENCE NOT SPECIFIED: ICD-10-CM

## 2018-11-07 DIAGNOSIS — E78.5 HYPERLIPIDEMIA LDL GOAL <160: ICD-10-CM

## 2018-11-07 PROCEDURE — 99397 PER PM REEVAL EST PAT 65+ YR: CPT | Performed by: PEDIATRICS

## 2018-11-07 PROCEDURE — 99213 OFFICE O/P EST LOW 20 MIN: CPT | Mod: 25 | Performed by: PEDIATRICS

## 2018-11-07 RX ORDER — METOPROLOL SUCCINATE 25 MG/1
12.5 TABLET, EXTENDED RELEASE ORAL DAILY
Qty: 45 TABLET | Refills: 3 | Status: SHIPPED | OUTPATIENT
Start: 2018-11-07 | End: 2019-02-06

## 2018-11-07 RX ORDER — ATORVASTATIN CALCIUM 20 MG/1
20 TABLET, FILM COATED ORAL DAILY
Qty: 90 TABLET | Refills: 3 | Status: SHIPPED | OUTPATIENT
Start: 2018-11-07 | End: 2019-02-06

## 2018-11-07 ASSESSMENT — ENCOUNTER SYMPTOMS
HEMATOCHEZIA: 0
COUGH: 1
EYE PAIN: 0
HEMATURIA: 0
CHILLS: 1
DIARRHEA: 0
ABDOMINAL PAIN: 0

## 2018-11-07 ASSESSMENT — ACTIVITIES OF DAILY LIVING (ADL): CURRENT_FUNCTION: NO ASSISTANCE NEEDED

## 2018-11-07 NOTE — PATIENT INSTRUCTIONS
Try weaning down on omeprazole    Otherwise, continue current medications              Preventive Health Recommendations    See your health care provider every year to    Review health changes.     Discuss preventive care.      Review your medicines if your doctor has prescribed any.      You no longer need a yearly Pap test unless you've had an abnormal Pap test in the past 10 years. If you have vaginal symptoms, such as bleeding or discharge, be sure to talk with your provider about a Pap test.      Every 1 to 2 years, have a mammogram.  If you are over 69, talk with your health care provider about whether or not you want to continue having screening mammograms.      Every 10 years, have a colonoscopy. Or, have a yearly FIT test (stool test). These exams will check for colon cancer.       Have a cholesterol test every 5 years, or more often if your doctor advises it.       Have a diabetes test (fasting glucose) every three years. If you are at risk for diabetes, you should have this test more often.       At age 65, have a bone density scan (DEXA) to check for osteoporosis (brittle bone disease).    Shots:    Get a flu shot each year.    Get a tetanus shot every 10 years.    Talk to your doctor about your pneumonia vaccines. There are now two you should receive - Pneumovax (PPSV 23) and Prevnar (PCV 13).    Talk to your pharmacist about the shingles vaccine.    Talk to your doctor about the hepatitis B vaccine.    Nutrition:     Eat at least 5 servings of fruits and vegetables each day.      Eat whole-grain bread, whole-wheat pasta and brown rice instead of white grains and rice.      Get adequate Calcium and Vitamin D.     Lifestyle    Exercise at least 150 minutes a week (30 minutes a day, 5 days a week). This will help you control your weight and prevent disease.      Limit alcohol to one drink per day.      No smoking.       Wear sunscreen to prevent skin cancer.       See your dentist twice a year for an exam  and cleaning.      See your eye doctor every 1 to 2 years to screen for conditions such as glaucoma, macular degeneration and cataracts.    Personalized Prevention Plan  You are due for the preventive services outlined below.  Your care team is available to assist you in scheduling these services.  If you have already completed any of these items, please share that information with your care team to update in your medical record.  Health Maintenance Due   Topic Date Due     Flu Vaccine (1) 09/01/2018     FALL RISK ASSESSMENT  11/06/2018     Depression Assessment 2 - yearly  11/06/2018

## 2018-11-07 NOTE — MR AVS SNAPSHOT
After Visit Summary   11/7/2018    Gely Keene    MRN: [id number]           Patient Information     Date Of Birth          1940        Visit Information        Provider Department      11/7/2018 10:40 AM Hien Booth MD [facility] [place]        Today's Diagnoses     Medicare annual wellness visit, subsequent    -  1    Hyperlipidemia LDL goal <160        Atrophic vaginitis        Sinus tachycardia        Hypertension goal BP (blood pressure) < 140/90        Gastroesophageal reflux disease, esophagitis presence not specified        Chronic left shoulder pain          Care Instructions    Try weaning down on omeprazole    Otherwise, continue current medications              Preventive Health Recommendations    See your health care provider every year to    Review health changes.     Discuss preventive care.      Review your medicines if your doctor has prescribed any.      You no longer need a yearly Pap test unless you've had an abnormal Pap test in the past 10 years. If you have vaginal symptoms, such as bleeding or discharge, be sure to talk with your provider about a Pap test.      Every 1 to 2 years, have a mammogram.  If you are over 69, talk with your health care provider about whether or not you want to continue having screening mammograms.      Every 10 years, have a colonoscopy. Or, have a yearly FIT test (stool test). These exams will check for colon cancer.       Have a cholesterol test every 5 years, or more often if your doctor advises it.       Have a diabetes test (fasting glucose) every three years. If you are at risk for diabetes, you should have this test more often.       At age 65, have a bone density scan (DEXA) to check for osteoporosis (brittle bone disease).    Shots:    Get a flu shot each year.    Get a tetanus shot every 10 years.    Talk to your doctor about your pneumonia vaccines. There are now two you should receive - Pneumovax (PPSV 23) and  Prevnar (PCV 13).    Talk to your pharmacist about the shingles vaccine.    Talk to your doctor about the hepatitis B vaccine.    Nutrition:     Eat at least 5 servings of fruits and vegetables each day.      Eat whole-grain bread, whole-wheat pasta and brown rice instead of white grains and rice.      Get adequate Calcium and Vitamin D.     Lifestyle    Exercise at least 150 minutes a week (30 minutes a day, 5 days a week). This will help you control your weight and prevent disease.      Limit alcohol to one drink per day.      No smoking.       Wear sunscreen to prevent skin cancer.       See your dentist twice a year for an exam and cleaning.      See your eye doctor every 1 to 2 years to screen for conditions such as glaucoma, macular degeneration and cataracts.    Personalized Prevention Plan  You are due for the preventive services outlined below.  Your care team is available to assist you in scheduling these services.  If you have already completed any of these items, please share that information with your care team to update in your medical record.  Health Maintenance Due   Topic Date Due     Flu Vaccine (1) 09/01/2018     FALL RISK ASSESSMENT  11/06/2018     Depression Assessment 2 - yearly  11/06/2018             Follow-ups after your visit        Follow-up notes from your care team     Return in about 1 year (around 11/7/2019) for Routine Visit.      Who to contact     If you have questions or need follow up information about today's clinic visit or your schedule please contact [facility] directly at [phone].  Normal or non-critical lab and imaging results will be communicated to you by MyChart, letter or phone within 4 business days after the clinic has received the results. If you do not hear from us within 7 days, please contact the clinic through MyChart or phone. If you have a critical or abnormal lab result, we will notify you by phone as soon as possible.  Submit refill requests  "through [employer] or call your pharmacy and they will forward the refill request to us. Please allow 3 business days for your refill to be completed.          Additional Information About Your Visit        [facility] Information     [employer] lets you send messages to your doctor, view your test results, renew your prescriptions, schedule appointments and more. To sign up, go to [email or web address] . Click on \"Log in\" on the left side of the screen, which will take you to the Welcome page. Then click on \"Sign up Now\" on the right side of the page.     You will be asked to enter the access code listed below, as well as some personal information. Please follow the directions to create your username and password.     Your access code is: [email or web address]  Expires: 2018  2:39 PM     Your access code will  in 90 days. If you need help or a new code, please call your [place] clinic or [phone].        Care EveryWhere ID     This is your Care EveryWhere ID. This could be used by other organizations to access your [place] medical records  [id number]        Your Vitals Were     Pulse Temperature Height Pulse Oximetry BMI (Body Mass Index)       70 98.6  F (37  C) (Tympanic) 5' 1.5\" (1.562 m) 99% 27.1 kg/m2        Blood Pressure from Last 3 Encounters:   18 128/60   18 118/58   01/10/18 100/64    Weight from Last 3 Encounters:   18 145 lb 12.8 oz (66.1 kg)   01/10/18 142 lb (64.4 kg)   17 144 lb (65.3 kg)              Today, you had the following     No orders found for display         Today's Medication Changes          These changes are accurate as of 18 11:40 AM.  If you have any questions, ask your nurse or doctor.               These medicines have changed or have updated prescriptions.        Dose/Directions    omeprazole 20 MG CR capsule   Commonly known as:  priLOSEC   This may have changed:  See the new instructions.   Used for:  Gastroesophageal reflux disease, esophagitis " presence not specified   Changed by:  Hien Booth MD        Dose:  20 mg   Take 1 capsule (20 mg) by mouth daily   Quantity:  90 capsule   Refills:  3            Where to get your medicines      These medications were sent to [facility] - [place], [place] - [address] [facility] [facility] [address]  [address] [facility] [facility] [address], [place] [address]     Phone:  [phone]     atorvastatin 20 MG tablet    conjugated estrogens cream    metoprolol succinate 25 MG 24 hr tablet    omeprazole 20 MG CR capsule                Primary Care Provider Office Phone # Fax #    Hien Booth [phone] [address] [place] [address]        Equal Access to Services     [facility]: Hadii gemini todd hadasho Soomaali, waaxda luqadaha, qaybta kaalmada adeegyada, archana wagner . So [facility] [phone].    ATENCIÓN: Si habla español, tiene a greenberg disposición servicios gratuitos de asistencia lingüística. Llame al [phone].    We comply with applicable federal civil rights laws and [place] laws. We do not discriminate on the basis of race, color, national origin, age, disability, sex, sexual orientation, or gender identity.            Thank you!     Thank you for choosing [facility]  for your care. Our goal is always to provide you with excellent care. Hearing back from our patients is one way we can continue to improve our services. Please take a few minutes to complete the written survey that you may receive in the mail after your visit with us. Thank you!             Your Updated Medication List - Protect others around you: Learn how to safely use, store and throw away your medicines at www.disposemymeds.org.          This list is accurate as of 11/7/18 11:40 AM.  Always use your most recent med list.                   Brand Name Dispense Instructions for use Diagnosis    ACETAMINOPHEN PO      Take 500 mg by mouth every 4 hours as needed  for pain Reported on 4/10/2017        atorvastatin 20 MG tablet    LIPITOR    90 tablet    Take 1 tablet (20 mg) by mouth daily    Hyperlipidemia LDL goal <160       BIOTIN PO      Take 1 tablet by mouth daily dose unknown        CALCIUM CITRATE PLUS/MAGNESIUM Tabs      Take 1 tablet by mouth daily        conjugated estrogens cream    PREMARIN    30 g    Place 0.5 g vaginally three times a week    Atrophic vaginitis       fish oil-omega-3 fatty acids 1000 MG capsule      Take 1 capsule daily.  Indications: PN:        MELATONIN PO      Take 3 mg by mouth nightly as needed        metoprolol succinate 25 MG 24 hr tablet    TOPROL-XL    45 tablet    Take 0.5 tablets (12.5 mg) by mouth daily    Sinus tachycardia, Hypertension goal BP (blood pressure) < 140/90       nystatin [id number] UNIT/GM Powd    MYCOSTATIN     Apply topically as needed        omeprazole 20 MG CR capsule    priLOSEC    90 capsule    Take 1 capsule (20 mg) by mouth daily    Gastroesophageal reflux disease, esophagitis presence not specified

## 2018-11-07 NOTE — PROGRESS NOTES
"SUBJECTIVE:   Gely Keene is a 78 year old female who presents for Preventive Visit.      Are you in the first 12 months of your Medicare coverage?  No    Annual Wellness Visit     In general, how would you rate your overall health?  Good    Frequency of exercise:  None    Do you usually eat at least 4 servings of fruit and vegetables a day, include whole grains    & fiber and avoid regularly eating high fat or \"junk\" foods?  No    Taking medications regularly:  Yes    Medication side effects:  Lightheadedness    Ability to successfully perform activities of daily living:  No assistance needed    Home Safety:  No safety concerns identified    Hearing Impairment:  Need to ask people to speak up or repeat themselves and difficulty understanding soft or whispered speech    In the past 6 months, have you been bothered by leaking of urine?  No    In general, how would you rate your overall mental or emotional health?  Excellent    PHQ-2 Total Score: 0    Additional concerns today:  No        Fall risk:  Fallen 2 or more times in the past year?: Yes  Any fall with injury in the past year?: No  Timed Up and Go Test (>13.5 is fall risk; contact physician) : 12    COGNITIVE SCREEN  1) Repeat 3 items (Leader, Season, Table)    2) Clock draw: NORMAL  3) 3 item recall: Recalls 1 object   Results: Normal clock - only 1 item recalled - recalled later when I came in the room    Mini-CogTM Copyright JULIO Watts. Licensed by the author for use in [facility]; reprinted with permission (mnioo@.[facility]). All rights reserved.        Reviewed and updated as needed this visit by clinical staff  Tobacco  Allergies  Meds  Med Hx  Surg Hx  Fam Hx  Soc Hx        Reviewed and updated as needed this visit by Provider        Social History   Substance Use Topics     Smoking status: Never Smoker     Smokeless tobacco: Never Used     Alcohol use Yes      Comment: socially       Alcohol Use 11/7/2018   If you drink alcohol " do you typically have greater than 3 drinks per day OR greater than 7 drinks per week? No   No flowsheet data found.          Do you feel safe in your environment - Yes    Do you have a Health Care Directive?: Yes: Advance Directive has been received and scanned.    Current providers sharing in care for this patient include:   Patient Care Team:  Hien Booth MD as PCP - General (Internal Medicine)  Shana Alaniz MD as MD (Colon and Rectal Surgery)  [facility]    The following health maintenance items are reviewed in Epic and correct as of today:  Health Maintenance   Topic Date Due     INFLUENZA VACCINE (1) 09/01/2018     FALL RISK ASSESSMENT  11/06/2018     PHQ-2 Q1 YR  11/06/2018     MAMMO Q1 YR  01/03/2019     CMP Q1 YR  11/01/2019     LIPID MONITORING Q1 YEAR  11/01/2019     A1C Q1 YR  11/01/2019     TETANUS IMMUNIZATION (SYSTEM ASSIGNED)  10/24/2021     ADVANCE DIRECTIVE PLANNING Q5 YRS  11/14/2022     DEXA SCAN SCREENING (SYSTEM ASSIGNED)  Addressed     PNEUMOCOCCAL  Completed       Intermittently feeling a bit shaky and dizzy during day - wondering if her blood sugar is dipping low - trying to remember to eat regularly and stay well hydrated.  Has been working on [employer] a lot recently for [employer] and forgets to eat when working on these.    HTN - maintains excellent control on her current medications without side effects.  Denies new cardiac symptoms.    Premarin cream going well, more rare bladder infections.  Wishes to continue current regimen.    Ear pressure, especially in the left ear.  No pain.  Little plugged.  No hearing changes.    Has had shingles vaccines    GERD - well controlled on omeprazole 20mg daily - wondering about cutting down on this medication over time    Left shoulder pain - sleeps on her side - usually on the left side.  Had frozen shoulder once before in 2004 that required steroid injection - starting to feel very  "similar to that time.  Limited left arm range of motion and pain.    Normal bone density 2013    HL - doing well on statin, no side effects noted    Hx of colectomy, now with ostomy - currently doing well - no new concerns      Review of Systems   Constitutional: Positive for chills.   HENT: Positive for ear pain.    Eyes: Negative for pain.   Respiratory: Positive for cough.    Cardiovascular: Negative for chest pain.   Gastrointestinal: Negative for abdominal pain, diarrhea and hematochezia.   Genitourinary: Negative for hematuria.      ROS: 10 point ROS neg other than the symptoms noted above in the HPI.      OBJECTIVE:   /60 (BP Location: Right arm, Patient Position: Right side, Cuff Size: Adult Large)  Pulse 70  Temp 98.6  F (37  C) (Tympanic)  Ht 5' 1.5\" (1.562 m)  Wt 145 lb 12.8 oz (66.1 kg)  SpO2 99%  BMI 27.1 kg/m2 Estimated body mass index is 27.1 kg/(m^2) as calculated from the following:    Height as of this encounter: 5' 1.5\" (1.562 m).    Weight as of this encounter: 145 lb 12.8 oz (66.1 kg).  Physical Exam  GENERAL: healthy, alert and no distress  EYES: Eyes grossly normal to inspection, PERRL and conjunctivae and sclerae normal  HENT: ear canals and TM's normal, nose and mouth without ulcers or lesions  NECK: no adenopathy, no asymmetry, masses, or scars and thyroid normal to palpation  RESP: lungs clear to auscultation - no rales, rhonchi or wheezes  CV: regular rate and rhythm, normal S1 S2, no S3 or S4, no murmur, click or rub, no peripheral edema and peripheral pulses strong  ABDOMEN: osteomy pink, yellow stool in bag, +BS, soft abdomen, no tenderness  MS: limited ROM of left shoulder due to pain, otherwise no gross musculoskeletal defects noted, no edema  SKIN: no suspicious lesions or rashes  NEURO: Normal strength and tone, mentation intact and speech normal  PSYCH: mentation appears normal, affect normal/bright    Diagnostic Test Results:  reviewed    ASSESSMENT / PLAN:       " "ICD-10-CM    1. Medicare annual wellness visit, subsequent Z00.00    2. Hyperlipidemia LDL goal <160 E78.5 atorvastatin (LIPITOR) 20 MG tablet  Well controlled, continue current medications     3. Atrophic vaginitis N95.2 conjugated estrogens (PREMARIN) cream  Well controlled, continue current medications     4. Sinus tachycardia R00.0 metoprolol succinate (TOPROL-XL) 25 MG 24 hr tablet  Well controlled, continue current medications     5. Hypertension goal BP (blood pressure) < 140/90 I10 metoprolol succinate (TOPROL-XL) 25 MG 24 hr tablet  Well controlled, continue current medications     6. Gastroesophageal reflux disease, esophagitis presence not specified K21.9 omeprazole (PRILOSEC) 20 MG CR capsule  Discussed trying to decrease dosing over time and using zantac with this process if needed   7. Chronic left shoulder pain M25.512 ORTHO  REFERRAL  Recommended orthopedics evaluation    G89.29    8. Ear fullness, left H93.8X2 Trial of flonase, to alert me if not improving       End of Life Planning:  Patient currently has an advanced directive: Yes.  Practitioner is supportive of decision.    COUNSELING:  Special attention given to:       Regular exercise       Healthy diet/nutrition       Vision screening       Osteoporosis Prevention/Bone Health    BP Readings from Last 1 Encounters:   11/07/18 128/60     Estimated body mass index is 27.1 kg/(m^2) as calculated from the following:    Height as of this encounter: 5' 1.5\" (1.562 m).    Weight as of this encounter: 145 lb 12.8 oz (66.1 kg).      Weight management plan: Discussed healthy diet and exercise guidelines and patient will follow up in 12 months in clinic to re-evaluate.     reports that she has never smoked. She has never used smokeless tobacco.      Appropriate preventive services were discussed with this patient, including applicable screening as appropriate for cardiovascular disease, diabetes, osteopenia/osteoporosis, and glaucoma.  As " appropriate for age/gender, discussed screening for colorectal cancer, prostate cancer, breast cancer, and cervical cancer. Checklist reviewing preventive services available has been given to the patient.    Reviewed patients plan of care and provided an AVS. The Intermediate Care Plan ( asthma action plan, low back pain action plan, and migraine action plan) for Gely meets the Care Plan requirement. This Care Plan has been established and reviewed with the Patient.    Counseling Resources:  ATP IV Guidelines  Pooled Cohorts Equation Calculator  Breast Cancer Risk Calculator  FRAX Risk Assessment  ICSI Preventive Guidelines  Dietary Guidelines for Americans, 2010  USDA's MyPlate  ASA Prophylaxis  Lung CA Screening    Hien Booth MD  [facility]

## 2018-11-12 ENCOUNTER — OFFICE VISIT (OUTPATIENT)
Dept: ORTHOPEDICS | Facility: CLINIC | Age: 78
End: 2018-11-12
Payer: COMMERCIAL

## 2018-11-12 ENCOUNTER — RADIANT APPOINTMENT (OUTPATIENT)
Dept: GENERAL RADIOLOGY | Facility: CLINIC | Age: 78
End: 2018-11-12
Attending: FAMILY MEDICINE
Payer: COMMERCIAL

## 2018-11-12 VITALS
WEIGHT: 145 LBS | BODY MASS INDEX: 26.68 KG/M2 | SYSTOLIC BLOOD PRESSURE: 132 MMHG | DIASTOLIC BLOOD PRESSURE: 66 MMHG | HEIGHT: 62 IN

## 2018-11-12 DIAGNOSIS — M25.512 PAIN IN JOINT OF LEFT SHOULDER: ICD-10-CM

## 2018-11-12 DIAGNOSIS — M25.512 PAIN IN JOINT OF LEFT SHOULDER: Primary | ICD-10-CM

## 2018-11-12 DIAGNOSIS — R29.898 SHOULDER WEAKNESS: ICD-10-CM

## 2018-11-12 DIAGNOSIS — M75.102 ROTATOR CUFF SYNDROME OF LEFT SHOULDER: ICD-10-CM

## 2018-11-12 PROCEDURE — 99203 OFFICE O/P NEW LOW 30 MIN: CPT | Performed by: FAMILY MEDICINE

## 2018-11-12 PROCEDURE — 73030 X-RAY EXAM OF SHOULDER: CPT | Mod: LT

## 2018-11-12 NOTE — MR AVS SNAPSHOT
After Visit Summary   11/12/2018    Gely Keene    MRN: [id number]           Patient Information     Date Of Birth          1940        Visit Information        Provider Department      11/12/2018 12:40 PM Marisa Eli, DO [facility]        Today's Diagnoses     Pain in joint of left shoulder    -  1    Rotator cuff syndrome of left shoulder        Shoulder weakness          Care Instructions    1. Pain in joint of left shoulder    2. Rotator cuff syndrome of left shoulder    3. Shoulder weakness      Activity modification as discussed - keep activity below the shoulder height and lift close to your body  Physical therapy: [facility] - [phone]  If not improved can consider MRI - given the weakness  Ok to use Tylenol at night for pain    Follow-up if not improved after 3-4 therapy sessions.    Official Walk with a Doc Chapter  Join me in the lobby of the [facility], December 1st at 1 PM for a free health talk.  Get a free T-shirt, listen and walk at your own pace!              Follow-ups after your visit        Additional Services     [facility] PT, HAND, AND CHIROPRACTIC REFERRAL       Physical Therapy, Hand Therapy and Chiropractic Care are available through:  *[facility]  *Hand Therapy (Occupational Therapy or Physical Therapy)  *[facility]    Call one number to schedule at any of the above locations: [phone].    Physical therapy, Hand therapy and/or Chiropractic care has been recommended by your physician as an excellent treatment option to reduce pain and help people return to normal activities, including sports.  Therapy and/or chiropractic care services are a great complement or alternative to expensive and invasive surgery, injections, or long-term use of prescription medications. The primary goal is to identify the underlying problem and provide you the tools to manage your  "condition on your own.     Please be aware that coverage of these services is subject to the terms and limitations of your health insurance plan.  Call member services at your health plan with any benefit or coverage questions.      Please bring the following to your appointment:  *Your personal calendar for scheduling future appointments  *Comfortable clothing                  Future tests that were ordered for you today     Open Future Orders        Priority Expected Expires Ordered    [facility], AND CH[facility]RACTIC REFERRAL Routine  11/12/2019 11/12/2018            Who to contact     If you have questions or need follow up information about today's clinic visit or your schedule please contact [facility] directly at [phone].  Normal or non-critical lab and imaging results will be communicated to you by MyChart, letter or phone within 4 business days after the clinic has received the results. If you do not hear from us within 7 days, please contact the clinic through MyChart or phone. If you have a critical or abnormal lab result, we will notify you by phone as soon as possible.  Submit refill requests through [employer] or call your pharmacy and they will forward the refill request to us. Please allow 3 business days for your refill to be completed.          Additional Information About Your Visit        Care EveryWhere ID     This is your Care EveryWhere ID. This could be used by other organizations to access your [facility] medical records  [id number]        Your Vitals Were     Height BMI (Body Mass Index)                5' 1.5\" (1.562 m) 26.95 kg/m2           Blood Pressure from Last 3 Encounters:   11/12/18 132/66   11/07/18 128/60   09/18/18 118/58    Weight from Last 3 Encounters:   11/12/18 145 lb (65.8 kg)   11/07/18 145 lb 12.8 oz (66.1 kg)   01/10/18 142 lb (64.4 kg)               Primary Care Provider Office Phone # Fax #    Hien Booth [phone]       " [address] [place] [address]        Equal Access to Services     ERJI JAN : Hadii aad ku hadwilyhanna Pineda, nikos terrell, howie mcneal, archana hoyos. So [facility] [phone].    ATENCIÓN: Si habla español, tiene a greenberg disposición servicios gratuitos de asistencia lingüística. [facility] [phone].    We comply with applicable federal civil rights laws and [place] laws. We do not discriminate on the basis of race, color, national origin, age, disability, sex, sexual orientation, or gender identity.            Thank you!     Thank you for choosing [facility]  for your care. Our goal is always to provide you with excellent care. Hearing back from our patients is one way we can continue to improve our services. Please take a few minutes to complete the written survey that you may receive in the mail after your visit with us. Thank you!             Your Updated Medication List - Protect others around you: Learn how to safely use, store and throw away your medicines at www.disposemymeds.org.          This list is accurate as of 11/12/18  1:00 PM.  Always use your most recent med list.                   Brand Name Dispense Instructions for use Diagnosis    ACETAMINOPHEN PO      Take 500 mg by mouth every 4 hours as needed for pain Reported on 4/10/2017        atorvastatin 20 MG tablet    LIPITOR    90 tablet    Take 1 tablet (20 mg) by mouth daily    Hyperlipidemia LDL goal <160       BIOTIN PO      Take 1 tablet by mouth daily dose unknown        CALCIUM CITRATE PLUS/MAGNESIUM Tabs      Take 1 tablet by mouth daily        conjugated estrogens cream    PREMARIN    30 g    Place 0.5 g vaginally three times a week    Atrophic vaginitis       fish oil-omega-3 fatty acids 1000 MG capsule      Take 1 capsule daily.  Indications: PN:        MELATONIN PO      Take 3 mg by mouth nightly as needed        metoprolol succinate 25 MG 24 hr tablet     TOPROL-XL    45 tablet    Take 0.5 tablets (12.5 mg) by mouth daily    Sinus tachycardia, Hypertension goal BP (blood pressure) < 140/90       nystatin [id number] UNIT/GM Powd    MYCOSTATIN     Apply topically as needed        omeprazole 20 MG CR capsule    priLOSEC    90 capsule    Take 1 capsule (20 mg) by mouth daily    Gastroesophageal reflux disease, esophagitis presence not specified

## 2018-11-12 NOTE — LETTER
11/12/2018         RE: Gely Keene  [address] [place] [place] [address]        Dear Colleague,    Thank you for referring your patient, Gely Keene, to the [facility]. Please see a copy of my visit note below.    ASSESSMENT & PLAN    1. Pain in joint of left shoulder    2. Rotator cuff syndrome of left shoulder    3. Shoulder weakness      Suspect weakness is more related to pain rather than large tearing especially given no history of fall  Will have a low threshold for imaging given remote history of malignancy  Activity modification as discussed - keep activity below the shoulder height and lift close to your body  Physical therapy: [facility] - [phone]  If not improved can consider MR Rodríguez to use Tylenol at night for pain    Follow-up if not improved after 3-4 therapy sessions.    -----    SUBJECTIVE  Gely Keene is a/an 78 year old Right handed female who is seen in consultation at the request of  Hien Booth M.D. for evaluation of left shoulder pain. The patient is seen by themselves.    Onset: 1 month(s) ago. Reports insidious onset without acute precipitating event.  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  Patient Social History: retired    Patient's past medical, surgical, social, and family histories were reviewed today and no changes are noted.    REVIEW OF SYSTEMS:  10 point ROS is negative other  "than symptoms noted above in HPI, Past Medical History or as stated below  Constitutional: NEGATIVE for fever, chills, change in weight  Skin: NEGATIVE for worrisome rashes, moles or lesions  GI/: NEGATIVE for bowel or bladder changes  Neuro: NEGATIVE for weakness, dizziness or paresthesias    OBJECTIVE:  /66  Ht 5' 1.5\" (1.562 m)  Wt 145 lb (65.8 kg)  BMI 26.95 kg/m2   General: healthy, alert and in no distress  HEENT: no scleral icterus or conjunctival erythema  Skin: no suspicious lesions or rash. No jaundice.  CV: regular rhythm by palpation  Resp: normal respiratory effort without conversational dyspnea   Psych: normal mood and affect  Gait: normal steady gait with appropriate coordination and balance  Neuro: normal light touch sensory exam of the bilateral upper extremities.    MSK:  LEFT SHOULDER  Inspection:    no atrophy, questionable lipoma proximal / medial deltoid, mature surgical scar over greater tuberosity/subacromial region  Palpation:    Tender about the AC joint and supraspinatus insertion. Remainder of bony and tendinous landmarks are nontender.  Active Range of Motion:     Abduction 1350, FF 1650,   Strength:    Scapular plane abduction 4+/5, painful,  ER 5/5, IR 5/5, biceps 5/5  Special Tests:    Positive: supraspinatus (empty can)    Negative: Foss' and crossed arm adduction    Independent visualization of the below image:  X-ray Left Shoulder  Mild inferior head spur with an otherwise well-preserved joint.  Mild subacromial spur.  Other acute osseous findings  Final radiology read pending    Patient's conditions were thoroughly discussed during today's visit with greater than 50% of the visit spent counseling the patient with total time spent face-to-face with the patient being 20 minutes.    Marisa Eli, DO [facility]      Again, thank you for allowing me to participate in the care of your patient.        Sincerely,        Marisa Dejesus" Alcira, DO

## 2018-11-12 NOTE — PATIENT INSTRUCTIONS
1. Pain in joint of left shoulder    2. Rotator cuff syndrome of left shoulder    3. Shoulder weakness      Activity modification as discussed - keep activity below the shoulder height and lift close to your body  Physical therapy: [facility] - [phone]  If not improved can consider MRI - given the weakness  Ok to use Tylenol at night for pain    Follow-up if not improved after 3-4 therapy sessions.    Official Walk with a Doc Chapter  Join me in the lobby of the [facility], December 1st at 1 PM for a free health talk.  Get a free T-shirt, listen and walk at your own pace!

## 2018-11-12 NOTE — PROGRESS NOTES
ASSESSMENT & PLAN    1. Pain in joint of left shoulder    2. Rotator cuff syndrome of left shoulder    3. Shoulder weakness      Suspect weakness is more related to pain rather than large tearing especially given no history of fall  Will have a low threshold for imaging given remote history of malignancy  Activity modification as discussed - keep activity below the shoulder height and lift close to your body  Physical therapy: [facility] - [phone]  If not improved can consider MR  Ok to use Tylenol at night for pain    Follow-up if not improved after 3-4 therapy sessions.    -----    SUBJECTIVE  Gely Keene is a/an 78 year old Right handed female who is seen in consultation at the request of  Hien Booth M.D. for evaluation of left shoulder pain. The patient is seen by themselves.    Onset: 1 month(s) ago. Reports insidious onset without acute precipitating event.  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  Patient Social History: retired    Patient's past medical, surgical, social, and family histories were reviewed today and no changes are noted.    REVIEW OF SYSTEMS:  10 point ROS is negative other than symptoms noted above in HPI, Past Medical History or as stated below  Constitutional: NEGATIVE for fever, chills, change in weight  Skin: NEGATIVE for worrisome rashes, moles or lesions  GI/: NEGATIVE for bowel or bladder changes  Neuro: NEGATIVE for weakness,  "dizziness or paresthesias    OBJECTIVE:  /66  Ht 5' 1.5\" (1.562 m)  Wt 145 lb (65.8 kg)  BMI 26.95 kg/m2   General: healthy, alert and in no distress  HEENT: no scleral icterus or conjunctival erythema  Skin: no suspicious lesions or rash. No jaundice.  CV: regular rhythm by palpation  Resp: normal respiratory effort without conversational dyspnea   Psych: normal mood and affect  Gait: normal steady gait with appropriate coordination and balance  Neuro: normal light touch sensory exam of the bilateral upper extremities.    MSK:  LEFT SHOULDER  Inspection:    no atrophy, questionable lipoma proximal / medial deltoid, mature surgical scar over greater tuberosity/subacromial region  Palpation:    Tender about the AC joint and supraspinatus insertion. Remainder of bony and tendinous landmarks are nontender.  Active Range of Motion:     Abduction 1350, FF 1650,   Strength:    Scapular plane abduction 4+/5, painful,  ER 5/5, IR 5/5, biceps 5/5  Special Tests:    Positive: supraspinatus (empty can)    Negative: Foss' and crossed arm adduction    Independent visualization of the below image:  X-ray Left Shoulder  Mild inferior head spur with an otherwise well-preserved joint.  Mild subacromial spur.  Other acute osseous findings  Final radiology read pending    Patient's conditions were thoroughly discussed during today's visit with greater than 50% of the visit spent counseling the patient with total time spent face-to-face with the patient being 20 minutes.    Marisa Eli, DO [facility]    "

## 2018-11-26 ENCOUNTER — THERAPY VISIT (OUTPATIENT)
Dept: PHYSICAL THERAPY | Facility: CLINIC | Age: 78
End: 2018-11-26
Payer: COMMERCIAL

## 2018-11-26 DIAGNOSIS — M25.512 CHRONIC LEFT SHOULDER PAIN: Primary | ICD-10-CM

## 2018-11-26 DIAGNOSIS — M75.102 ROTATOR CUFF SYNDROME OF LEFT SHOULDER: ICD-10-CM

## 2018-11-26 DIAGNOSIS — G89.29 CHRONIC LEFT SHOULDER PAIN: Primary | ICD-10-CM

## 2018-11-26 DIAGNOSIS — R29.898 SHOULDER WEAKNESS: ICD-10-CM

## 2018-11-26 PROCEDURE — G8984 CARRY CURRENT STATUS: HCPCS | Mod: GP | Performed by: PHYSICAL THERAPIST

## 2018-11-26 PROCEDURE — 97161 PT EVAL LOW COMPLEX 20 MIN: CPT | Mod: GP | Performed by: PHYSICAL THERAPIST

## 2018-11-26 PROCEDURE — 97110 THERAPEUTIC EXERCISES: CPT | Mod: GP | Performed by: PHYSICAL THERAPIST

## 2018-11-26 PROCEDURE — G8985 CARRY GOAL STATUS: HCPCS | Mod: GP | Performed by: PHYSICAL THERAPIST

## 2018-11-26 NOTE — PROGRESS NOTES
[facility] Initial Evaluation  Subjective:  Patient is a 78 year old female presenting with rehab left shoulder hpi. The history is provided by the patient. No  was used.   Gely Keene is a 78 year old female with a left shoulder condition.  Condition occurred with:  Unknown cause.  Condition occurred: for unknown reasons.  This is a chronic condition  Pt reports having left shoulder pain that has been present for greater than 2 months.  No known trauma.  MD appt on 11/12/18..    Patient reports pain:  Upper arm.  Radiates to:  Shoulder.  Pain is described as aching and is intermittent and reported as 6/10.  Associated symptoms:  Loss of strength and loss of motion/stiffness. Pain is worse during the day.  Symptoms are exacerbated by using arm at shoulder level, using arm behind back and using arm overhead and relieved by nothing.    Special tests:  X-ray (See EPIC).      General health as reported by patient is fair.  Pertinent medical history includes:  Cancer and high blood pressure.  Medical allergies: yes (See EPIC).  Other surgeries include:  Cancer surgery (skin on shoulder and nose).  Current medications:  High blood pressure medication and pain medication.  Current occupation is Retired  .        Barriers include:  None as reported by the patient.    Red flags:  None as reported by the patient.                        Objective:        Flexibility/Screens:   Positive screens:  ShoulderNegative screens: Cervical                              Shoulder Evaluation:  ROM:  AROM:  normal                            PROM:  normal                            Pain: active flexion, abd and combined ext/IR on left    Strength:    Flexion: Left:3/5    Pain: +    Right: 4/5      Pain:  -    Abduction:  Left: 3/5   Pain:+    Right: 4/5      Pain:-    Internal Rotation:  Left:4/5     Pain:    Right: 4/5      Pain:-  External Rotation:   Left:3/5      Pain:+   Right:4/5       Pain:-            Stability Testing:  normal      Special Tests:    Left shoulder positive for the following special tests:  Impingement  Left shoulder negative for the following special tests:  Rotator cuff tear    Right shoulder negative for the following special tests:Impingement and Rotator cuff tear  Palpation:  normal      Mobility Tests:  normal                                                 General     ROS    Assessment/Plan:    Patient is a 78 year old female with left side shoulder complaints.    Patient has the following significant findings with corresponding treatment plan.                Diagnosis 1:  L shoulder pain  Pain -  self management, education, directional preference exercise and home program  Decreased strength - therapeutic exercise and therapeutic activities  Impaired muscle performance - neuro re-education  Decreased function - therapeutic activities    Therapy Evaluation Codes:   1) History comprised of:   Personal factors that impact the plan of care:      None.    Comorbidity factors that impact the plan of care are:      Cancer and High blood pressure.     Medications impacting care: High blood pressure and Pain.  2) Examination of Body Systems comprised of:   Body structures and functions that impact the plan of care:      Shoulder.   Activity limitations that impact the plan of care are:      Dressing, Lifting and Sleeping.  3) Clinical presentation characteristics are:   Stable/Uncomplicated.  4) Decision-Making    Low complexity using standardized patient assessment instrument and/or measureable assessment of functional outcome.  Cumulative Therapy Evaluation is: Low complexity.    Previous and current functional limitations:  (See Goal Flow Sheet for this information)    Short term and Long term goals: (See Goal Flow Sheet for this information)     Communication ability:  Patient appears to be able to clearly communicate and understand verbal and written communication and follow  directions correctly.  Treatment Explanation - The following has been discussed with the patient:   RX ordered/plan of care  Anticipated outcomes  Possible risks and side effects  This patient would benefit from PT intervention to resume normal activities.   Rehab potential is good.    Frequency:  1 X week, once daily  Duration:  for 6 weeks  Discharge Plan:  Achieve all LTG.  Independent in home treatment program.  Reach maximal therapeutic benefit.    Please refer to the daily flowsheet for treatment today, total treatment time and time spent performing 1:1 timed codes.

## 2018-11-26 NOTE — MR AVS SNAPSHOT
After Visit Summary   11/26/2018    Gely Keene    MRN: [id number]           Patient Information     Date Of Birth          1940        Visit Information        Provider Department      11/26/2018 10:50 AM Israel Randhawa PT [facility]        Today's Diagnoses     Chronic left shoulder pain    -  1    Shoulder weakness        Rotator cuff syndrome of left shoulder           Follow-ups after your visit        Your next 10 appointments already scheduled     Jan 02, 2019 10:50 AM CST   SKYLA Extremity with Israel Randhawa PT   [facility]    [address]  [facility] [address] [phone]              Who to contact     If you have questions or need follow up information about today's clinic visit or your schedule please contact [facility] PH[facility] THERAP[facility] directly at [phone].  Normal or non-critical lab and imaging results will be communicated to you by MyChart, letter or phone within 4 business days after the clinic has received the results. If you do not hear from us within 7 days, please contact the clinic through MyChart or phone. If you have a critical or abnormal lab result, we will notify you by phone as soon as possible.  Submit refill requests through Re[employer] or call your pharmacy and they will forward the refill request to us. Please allow 3 business days for your refill to be completed.          Additional Information About Your Visit        Care EveryWhere ID     This is your Care EveryWhere ID. This could be used by other organizations to access your [place] medical records  [id number]         Blood Pressure from Last 3 Encounters:   11/12/18 132/66   11/07/18 128/60   09/18/18 118/58    Weight from Last 3 Encounters:   11/12/18 65.8 kg (145 lb)   11/07/18 66.1 kg (145 lb 12.8 oz)    01/10/18 64.4 kg (142 lb)              We Performed the Following     HC PT EVAL, LOW COMPLEXITY     SKYLA INITIAL EVAL REPORT     [facility] PT, HAND, AND CHIROPRACTIC REFERRAL     THERAPEUTIC EXERCISES        Primary Care Provider Office Phone # Fax #    Hien Booth [phone] [address] [place] [address]        Equal Access to Services     [facility]: Hadii aad ku hadasho Soomaali, waaxda luqadaha, qaybta kaalmada adeegyada, waxay idiin hayaan adeeg kharash la'aan ah. So [facility] [phone].    ATENCIÓN: Si habla esplo, tiene a greenberg disposición servicios gratuitos de asistencia lingüística. Llame al [phone].    We comply with applicable federal civil rights laws and [place] laws. We do not discriminate on the basis of race, color, national origin, age, disability, sex, sexual orientation, or gender identity.            Thank you!     Thank you for [facility] PH[facility]ICAL THER[facility]  for your care. Our goal is always to provide you with excellent care. Hearing back from our patients is one way we can continue to improve our services. Please take a few minutes to complete the written survey that you may receive in the mail after your visit with us. Thank you!             Your Updated Medication List - Protect others around you: Learn how to safely use, store and throw away your medicines at www.disposemymeds.org.          This list is accurate as of 11/26/18  2:11 PM.  Always use your most recent med list.                   Brand Name Dispense Instructions for use Diagnosis    ACETAMINOPHEN PO      Take 500 mg by mouth every 4 hours as needed for pain Reported on 4/10/2017        atorvastatin 20 MG tablet    LIPITOR    90 tablet    Take 1 tablet (20 mg) by mouth daily    Hyperlipidemia LDL goal <160       BIOTIN PO      Take 1 tablet by mouth daily dose unknown        CALCIUM CITRATE PLUS/MAGNESIUM Tabs      Take 1 tablet by  mouth daily        conjugated estrogens 0.625 MG/GM vaginal cream    PREMARIN    30 g    Place 0.5 g vaginally three times a week    Atrophic vaginitis       fish oil-omega-3 fatty acids 1000 MG capsule      Take 1 capsule daily.  Indications: PN:        MELATONIN PO      Take 3 mg by mouth nightly as needed        metoprolol succinate 25 MG 24 hr tablet    TOPROL-XL    45 tablet    Take 0.5 tablets (12.5 mg) by mouth daily    Sinus tachycardia, Hypertension goal BP (blood pressure) < 140/90       nystatin [id number] UNIT/GM Powd    MYCOSTATIN     Apply topically as needed        omeprazole 20 MG DR capsule    priLOSEC    90 capsule    Take 1 capsule (20 mg) by mouth daily    Gastroesophageal reflux disease, esophagitis presence not specified

## 2018-11-26 NOTE — LETTER
DEPARTMENT OF HEALTH AND HUMAN SERVICES  CENTERS FOR MEDICARE & MEDICAID SERVICES    PLAN/UPDATED PLAN OF PROGRESS FOR OUTPATIENT REHABILITATION    PATIENTS NAME:  Gely Keene   : 1940  PROVIDER NUMBER:    [id number]  HICN:  [id number]  PROVIDER NAME: [facility] [place] [facility]  MEDICAL RECORD NUMBER: [id number]   START OF CARE DATE:   19   TYPE:  PT  PRIMARY/TREATMENT DIAGNOSIS: Shoulder weakness; Rotator cuff syndrome of left shoulder; Chronic left shoulder pain  VISITS FROM START OF CARE:  Rxs Used: 1     [facility] Initial Evaluation  Subjective:  Patient is a 78 year old female presenting with rehab left shoulder hpi. The history is provided by the patient. No  was used.   Gely Keene is a 78 year old female with a left shoulder condition.  Condition occurred with:  Unknown cause.  Condition occurred: for unknown reasons.  This is a chronic condition  Pt reports having left shoulder pain that has been present for greater than 2 months.  No known trauma.  MD appt on 18.  Patient reports pain:  Upper arm.  Radiates to:  Shoulder.  Pain is described as aching and is intermittent and reported as 6/10.  Associated symptoms:  Loss of strength and loss of motion/stiffness. Pain is worse during the day.  Symptoms are exacerbated by using arm at shoulder level, using arm behind back and using arm overhead and relieved by nothing.  Special tests:  X-ray (See EPIC).  General health as reported by patient is fair.  Pertinent medical history includes:  Cancer and high blood pressure.  Medical allergies: yes (See EPIC).  Other surgeries include:  Cancer surgery (skin on shoulder and nose).  Current medications:  High blood pressure medication and pain medication.  Current occupation is Retired.  Barriers include:  None as reported by the patient.  Red flags:  None as reported by the patient.                 Objective:  Flexibility/Screens:   Positive screens:  ShoulderNegative screens: Cervical     Shoulder Evaluation:  ROM:  AROM:  normal  PROM:  normal  Pain: active flexion, abd and combined ext/IR on left  Strength:    Flexion: Left:3/5    Pain: +    Right: 4/5      Pain:  -  Abduction:  Left: 3/5   Pain:+    Right: 4/5      Pain:-  Internal Rotation:  Left:4/5     Pain:    Right: 4/5      Pain:-  External Rotation:   Left:3/5      Pain:+   Right:4/5      Pain:-    Stability Testing:  normal  Special Tests:    Left shoulder positive for the following special tests:  Impingement  Left shoulder negative for the following special tests:  Rotator cuff tear  Right shoulder negative for the following special tests:Impingement and Rotator cuff tear  Palpation:  normal  Mobility Tests:  normal      PATIENTS NAME:  Gely Keene   : 1940        Assessment/Plan:    Patient is a 78 year old female with left side shoulder complaints.    Patient has the following significant findings with corresponding treatment plan.                Diagnosis 1:  L shoulder pain  Pain -  self management, education, directional preference exercise and home program  Decreased strength - therapeutic exercise and therapeutic activities  Impaired muscle performance - neuro re-education  Decreased function - therapeutic activities    Therapy Evaluation Codes:   1) History comprised of:   Personal factors that impact the plan of care:      None.    Comorbidity factors that impact the plan of care are:      Cancer and High blood pressure.     Medications impacting care: High blood pressure and Pain.  2) Examination of Body Systems comprised of:   Body structures and functions that impact the plan of care:      Shoulder.   Activity limitations that impact the plan of care are:      Dressing, Lifting and Sleeping.  3) Clinical presentation characteristics are:   Stable/Uncomplicated.  4) Decision-Making    Low complexity using  "standardized patient assessment instrument and/or measureable assessment of functional outcome.  Cumulative Therapy Evaluation is: Low complexity.    Previous and current functional limitations:  (See Goal Flow Sheet for this information)    Short term and Long term goals: (See Goal Flow Sheet for this information)     Communication ability:  Patient appears to be able to clearly communicate and understand verbal and written communication and follow directions correctly.  Treatment Explanation - The following has been discussed with the patient:   RX ordered/plan of care  Anticipated outcomes  Possible risks and side effects  This patient would benefit from PT intervention to resume normal activities.   Rehab potential is good.    Frequency:  1 X week, once daily  Duration:  for 6 weeks  Discharge Plan:  Achieve all LTG.  Independent in home treatment program.  Reach maximal therapeutic benefit.    Caregiver Signature/Credentials _____________________________ Date ________       Treating Provider:  Israel Randhawa, PT    I have reviewed and certified the need for these services and plan of treatment while under my care.        PATIENTS NAME:  Gely Keene   : 1940          PHYSICIAN'S SIGNATURE:   _________________________________________  Date___________   Marisa Eli DO    Certification period:  Beginning of Cert date period: 19 to  End of Cert period date: 19     Functional Level Progress Report: Please see attached \"Goal Flow sheet for Functional level.\"    ____X____ Continue Services or       ________ DC Services                Service dates: From  SOC Date: 19 date to present                         "

## 2018-12-08 ENCOUNTER — OFFICE VISIT (OUTPATIENT)
Dept: URGENT CARE | Facility: URGENT CARE | Age: 78
End: 2018-12-08
Payer: COMMERCIAL

## 2018-12-08 VITALS
DIASTOLIC BLOOD PRESSURE: 62 MMHG | BODY MASS INDEX: 28.63 KG/M2 | TEMPERATURE: 97.9 F | SYSTOLIC BLOOD PRESSURE: 124 MMHG | OXYGEN SATURATION: 97 % | RESPIRATION RATE: 14 BRPM | HEART RATE: 68 BPM | WEIGHT: 154 LBS

## 2018-12-08 DIAGNOSIS — N39.0 URINARY TRACT INFECTION WITHOUT HEMATURIA, SITE UNSPECIFIED: Primary | ICD-10-CM

## 2018-12-08 DIAGNOSIS — R30.0 DYSURIA: ICD-10-CM

## 2018-12-08 LAB
ALBUMIN UR-MCNC: 100 MG/DL
APPEARANCE UR: CLEAR
BACTERIA #/AREA URNS HPF: ABNORMAL /HPF
BILIRUB UR QL STRIP: NEGATIVE
COLOR UR AUTO: YELLOW
GLUCOSE UR STRIP-MCNC: NEGATIVE MG/DL
HGB UR QL STRIP: ABNORMAL
KETONES UR STRIP-MCNC: NEGATIVE MG/DL
LEUKOCYTE ESTERASE UR QL STRIP: ABNORMAL
NITRATE UR QL: NEGATIVE
NON-SQ EPI CELLS #/AREA URNS LPF: ABNORMAL /LPF
PH UR STRIP: 5.5 PH (ref 5–7)
RBC #/AREA URNS AUTO: ABNORMAL /HPF
SOURCE: ABNORMAL
SP GR UR STRIP: 1.02 (ref 1–1.03)
UROBILINOGEN UR STRIP-ACNC: 0.2 EU/DL (ref 0.2–1)
WBC #/AREA URNS AUTO: >100 /HPF

## 2018-12-08 PROCEDURE — 87186 SC STD MICRODIL/AGAR DIL: CPT | Performed by: PHYSICIAN ASSISTANT

## 2018-12-08 PROCEDURE — 87086 URINE CULTURE/COLONY COUNT: CPT | Performed by: PHYSICIAN ASSISTANT

## 2018-12-08 PROCEDURE — 81001 URINALYSIS AUTO W/SCOPE: CPT | Performed by: PHYSICIAN ASSISTANT

## 2018-12-08 PROCEDURE — 87088 URINE BACTERIA CULTURE: CPT | Performed by: PHYSICIAN ASSISTANT

## 2018-12-08 PROCEDURE — 99213 OFFICE O/P EST LOW 20 MIN: CPT | Performed by: FAMILY MEDICINE

## 2018-12-08 RX ORDER — CEFDINIR 300 MG/1
600 CAPSULE ORAL DAILY
Qty: 14 CAPSULE | Refills: 0 | Status: SHIPPED | OUTPATIENT
Start: 2018-12-08 | End: 2019-04-27

## 2018-12-08 NOTE — MR AVS SNAPSHOT
After Visit Summary   12/8/2018    Gely Keene    MRN: [id number]           Patient Information     Date Of Birth          1940        Visit Information        Provider Department      12/8/2018 4:55 PM Brian Bell MD [facility]        Today's Diagnoses     Urinary tract infection without hematuria, site unspecified    -  1    Dysuria          Care Instructions    Take full course of antibiotic for bladder infection.  We will contact you if any changes are needed once the urine culture is finalized.      Urinary Tract Infections in Women    Urinary tract infections (UTIs) are most often caused by bacteria. These bacteria enter the urinary tract. The bacteria may come from outside the body. Or they may travel from the skin outside the rectum or vagina into the urethra. Female anatomy makes it easy for bacteria from the bowel to enter a woman s urinary tract, which is the most common source of UTI. This means women develop UTIs more often than men. Pain in or around the urinary tract is a common UTI symptom. But the only way to know for sure if you have a UTI for the healthcare provider to test your urine. The two tests that may be done are the urinalysis and urine culture.  Types of UTIs    Cystitis. A bladder infection (cystitis) is the most common UTI in women. You may have urgent or frequent urination. You may also have pain, burning when you urinate, and bloody urine.    Urethritis. This is an inflamed urethra, which is the tube that carries urine from the bladder to outside the body. You may have lower stomach or back pain. You may also have urgent or frequent urination.    Pyelonephritis. This is a kidney infection. If not treated, it can be serious and damage your kidneys. In severe cases, you may need to stay in the hospital. You may have a fever and lower back pain.  Medicines to treat a UTI  Most UTIs are treated with antibiotics. These kill the bacteria. The  length of time you need to take them depends on the type of infection. It may be as short as 3 days. If you have repeated UTIs, you may need a low-dose antibiotic for several months. Take antibiotics exactly as directed. Don t stop taking them until all of the medicine is gone. If you stop taking the antibiotic too soon, the infection may not go away. You may also develop a resistance to the antibiotic. This can make it much harder to treat.  Lifestyle changes to treat and prevent UTIs  The lifestyle changes below will help get rid of your UTI. They may also help prevent future UTIs.    Drink plenty of fluids. This includes water, juice, or other caffeine-free drinks. Fluids help flush bacteria out of your body.    Empty your bladder. Always empty your bladder when you feel the urge to urinate. And always urinate before going to sleep. Urine that stays in your bladder can lead to infection. Try to urinate before and after sex as well.    Practice good personal hygiene. Wipe yourself from front to back after using the toilet. This helps keep bacteria from getting into the urethra.    Use condoms during sex. These help prevent UTIs caused by sexually transmitted bacteria. Also don't use spermicides during sex. These can increase the risk for UTIs. Choose other forms of birth control instead. For women who tend to get UTIs after sex, a low-dose of a preventive antibiotic may be used. Be sure to discuss this option with your healthcare provider.    Follow up with your healthcare provider as directed. He or she may test to make sure the infection has cleared. If needed, more treatment may be started.  Date Last Reviewed: 1/1/2017 2000-2018 [employer]. [address] [place] [address], [place] [address]. All rights reserved. This information is not intended as a substitute for professional medical care. Always follow your healthcare professional's instructions.                Follow-ups after your visit         Your next 10 appointments already scheduled     Jan 02, 2019 10:50 AM CST   SKYLA Extremity with Israel Randhawa, PT   [facility] (SKYLA [place])    [address]  [facility] [address] [phone]              Who to contact     If you have questions or need follow up information about today's clinic visit or your schedule please contact [facility] URGENT CARE directly at [phone].  Normal or non-critical lab and imaging results will be communicated to you by MyChart, letter or phone within 4 business days after the clinic has received the results. If you do not hear from us within 7 days, please contact the clinic through MyChart or phone. If you have a critical or abnormal lab result, we will notify you by phone as soon as possible.  Submit refill requests through [employer] or call your pharmacy and they will forward the refill request to us. Please allow 3 business days for your refill to be completed.          Additional Information About Your Visit        Care EveryWhere ID     This is your Care EveryWhere ID. This could be used by other organizations to access your [place] medical records  [id number]        Your Vitals Were     Pulse Temperature Respirations Pulse Oximetry BMI (Body Mass Index)       68 97.9  F (36.6  C) (Tympanic) 14 97% 28.63 kg/m2        Blood Pressure from Last 3 Encounters:   12/08/18 124/62   11/12/18 132/66   11/07/18 128/60    Weight from Last 3 Encounters:   12/08/18 154 lb (69.9 kg)   11/12/18 145 lb (65.8 kg)   11/07/18 145 lb 12.8 oz (66.1 kg)              We Performed the Following     UA reflex to Microscopic and Culture     Urine Culture Aerobic Bacterial     Urine Microscopic          Today's Medication Changes          These changes are accurate as of 12/8/18  5:54 PM.  If you have any questions, ask your nurse or doctor.               Start taking these medicines.        Dose/Directions     cefdinir 300 MG capsule   Commonly known as:  OMNICEF   Used for:  Urinary tract infection without hematuria, site unspecified   Started by:  Brian Bell MD        Dose:  600 mg   Take 2 capsules (600 mg) by mouth daily for 7 days   Quantity:  14 capsule   Refills:  0            Where to get your medicines      These medications were sent to [id number] - [facility] [address] AT [place] [address]  [id number] [place] [address], [facility] [address]     Phone:  [phone]     cefdinir 300 MG capsule                Primary Care Provider Office Phone # Fax #    Hien Booth [phone] [address] [place] [address]        Equal Access to Services     [facility]: Hadii gemini todd hadasho Soalainaali, waaxda luqadaha, qaybta kaalmada adeegyada, waxay denzel wagner . So [facility] [phone].    ATENCIÓN: Si habla español, tiene a greenberg disposición servicios gratuitos de asistencia lingüística. [facility] [phone].    We comply with applicable federal civil rights laws and [place] laws. We do not discriminate on the basis of race, color, national origin, age, disability, sex, sexual orientation, or gender identity.            Thank you!     Thank you for choosing [facility] URGENT CARE  for your care. Our goal is always to provide you with excellent care. Hearing back from our patients is one way we can continue to improve our services. Please take a few minutes to complete the written survey that you may receive in the mail after your visit with us. Thank you!             Your Updated Medication List - Protect others around you: Learn how to safely use, store and throw away your medicines at www.disposemymeds.org.          This list is accurate as of 12/8/18  5:54 PM.  Always use your most recent med list.                   Brand Name Dispense Instructions for use Diagnosis    ACETAMINOPHEN PO      Take 500 mg by mouth  every 4 hours as needed for pain Reported on 4/10/2017        atorvastatin 20 MG tablet    LIPITOR    90 tablet    Take 1 tablet (20 mg) by mouth daily    Hyperlipidemia LDL goal <160       BIOTIN PO      Take 1 tablet by mouth daily dose unknown        CALCIUM CITRATE PLUS/MAGNESIUM Tabs      Take 1 tablet by mouth daily        cefdinir 300 MG capsule    OMNICEF    14 capsule    Take 2 capsules (600 mg) by mouth daily for 7 days    Urinary tract infection without hematuria, site unspecified       conjugated estrogens 0.625 MG/GM vaginal cream    PREMARIN    30 g    Place 0.5 g vaginally three times a week    Atrophic vaginitis       fish oil-omega-3 fatty acids 1000 MG capsule      Take 1 capsule daily.  Indications: PN:        MELATONIN PO      Take 3 mg by mouth nightly as needed        metoprolol succinate ER 25 MG 24 hr tablet    TOPROL-XL    45 tablet    Take 0.5 tablets (12.5 mg) by mouth daily    Sinus tachycardia, Hypertension goal BP (blood pressure) < 140/90       nystatin [id number] UNIT/GM external powder    MYCOSTATIN     Apply topically as needed        omeprazole 20 MG DR capsule    priLOSEC    90 capsule    Take 1 capsule (20 mg) by mouth daily    Gastroesophageal reflux disease, esophagitis presence not specified

## 2018-12-08 NOTE — PROGRESS NOTES
SUBJECTIVE:   Gely Keene is a 78 year old female who  presents today for a possible UTI. Symptoms of dysuria, urgency and frequency have been going on for this morning.  Hematuria no.  sudden onset and worseningand moderate.  There is no history of fever, chills, nausea or vomiting.  No history of vaginal discharge. This patient does have a history of urinary tract infections. Patient denies long duration, rigors, flank pain and temperature > 101 degrees F.     Prior urine culture with Klebsiella and E.coli.    Past Medical History:   Diagnosis Date     Anxiety      BCC (basal cell carcinoma of skin)      Esophageal reflux (GERD) 9/8/2014     HTN (hypertension) 9/8/2014     Hyperlipidemia LDL goal <160 9/8/2014     IFG (impaired fasting glucose) 9/8/2014     Mumps      PONV (postoperative nausea and vomiting)      Current Outpatient Prescriptions   Medication Sig Dispense Refill     ACETAMINOPHEN PO Take 500 mg by mouth every 4 hours as needed for pain Reported on 4/10/2017       atorvastatin (LIPITOR) 20 MG tablet Take 1 tablet (20 mg) by mouth daily 90 tablet 3     BIOTIN PO Take 1 tablet by mouth daily dose unknown       conjugated estrogens (PREMARIN) cream Place 0.5 g vaginally three times a week 30 g 11     fish oil-omega-3 fatty acids 1000 MG capsule Take 1 capsule daily.  Indications: PN:       MELATONIN PO Take 3 mg by mouth nightly as needed       metoprolol succinate (TOPROL-XL) 25 MG 24 hr tablet Take 0.5 tablets (12.5 mg) by mouth daily 45 tablet 3     Multiple Minerals-Vitamins (CALCIUM CITRATE PLUS/MAGNESIUM) TABS Take 1 tablet by mouth daily        nystatin (MYCOSTATIN) 945750 UNIT/GM POWD Apply topically as needed       omeprazole (PRILOSEC) 20 MG CR capsule Take 1 capsule (20 mg) by mouth daily 90 capsule 3     Social History   Substance Use Topics     Smoking status: Never Smoker     Smokeless tobacco: Never Used     Alcohol use Yes      Comment: socially       ROS:   Review of  systems otherwise negative except as stated above.    OBJECTIVE:  /62  Pulse 68  Temp 97.9  F (36.6  C) (Tympanic)  Resp 14  Wt 154 lb (69.9 kg)  SpO2 97%  BMI 28.63 kg/m2  GENERAL APPEARANCE: healthy, alert and no distress  PSYCH: mentation appears normal and affect normal/bright    Results for orders placed or performed in visit on 12/08/18   UA reflex to Microscopic and Culture   Result Value Ref Range    Color Urine Yellow     Appearance Urine Clear     Glucose Urine Negative NEG^Negative mg/dL    Bilirubin Urine Negative NEG^Negative    Ketones Urine Negative NEG^Negative mg/dL    Specific Gravity Urine 1.025 1.003 - 1.035    Blood Urine Large (A) NEG^Negative    pH Urine 5.5 5.0 - 7.0 pH    Protein Albumin Urine 100 (A) NEG^Negative mg/dL    Urobilinogen Urine 0.2 0.2 - 1.0 EU/dL    Nitrite Urine Negative NEG^Negative    Leukocyte Esterase Urine Moderate (A) NEG^Negative    Source Midstream Urine    Urine Microscopic   Result Value Ref Range    WBC Urine >100 (A) OTO5^0 - 5 /HPF    RBC Urine 10-25 (A) OTO2^O - 2 /HPF    Squamous Epithelial /LPF Urine Few FEW^Few /LPF    Bacteria Urine Moderate (A) NEG^Negative /HPF       ASSESSMENT/PLAN:   (N39.0) Urinary tract infection without hematuria, site unspecified  (primary encounter diagnosis)  Plan: cefdinir (OMNICEF) 300 MG capsule            (R30.0) Dysuria  Comment: UTI  Plan: UA reflex to Microscopic and Culture, Urine         Culture Aerobic Bacterial, Urine Microscopic            Empiric treatment for presumptive UTI with Omnicef 600 mg daily X7 days.  Will follow up on urine culture and adjust medication if needed.  Okay for pyridium or azol to help with dysuria (has at home already).  Drink plenty of fluids.  Prevention and treatment of UTI's discussed.Signs and symptoms of pyelonephritis mentioned.    Return to clinic if no resolution of symptoms.    Brian Bell MD  December 8, 2018 5:59 PM

## 2018-12-08 NOTE — PATIENT INSTRUCTIONS
Take full course of antibiotic for bladder infection.  We will contact you if any changes are needed once the urine culture is finalized.      Urinary Tract Infections in Women    Urinary tract infections (UTIs) are most often caused by bacteria. These bacteria enter the urinary tract. The bacteria may come from outside the body. Or they may travel from the skin outside the rectum or vagina into the urethra. Female anatomy makes it easy for bacteria from the bowel to enter a woman s urinary tract, which is the most common source of UTI. This means women develop UTIs more often than men. Pain in or around the urinary tract is a common UTI symptom. But the only way to know for sure if you have a UTI for the healthcare provider to test your urine. The two tests that may be done are the urinalysis and urine culture.  Types of UTIs    Cystitis. A bladder infection (cystitis) is the most common UTI in women. You may have urgent or frequent urination. You may also have pain, burning when you urinate, and bloody urine.    Urethritis. This is an inflamed urethra, which is the tube that carries urine from the bladder to outside the body. You may have lower stomach or back pain. You may also have urgent or frequent urination.    Pyelonephritis. This is a kidney infection. If not treated, it can be serious and damage your kidneys. In severe cases, you may need to stay in the hospital. You may have a fever and lower back pain.  Medicines to treat a UTI  Most UTIs are treated with antibiotics. These kill the bacteria. The length of time you need to take them depends on the type of infection. It may be as short as 3 days. If you have repeated UTIs, you may need a low-dose antibiotic for several months. Take antibiotics exactly as directed. Don t stop taking them until all of the medicine is gone. If you stop taking the antibiotic too soon, the infection may not go away. You may also develop a resistance to the antibiotic. This can  make it much harder to treat.  Lifestyle changes to treat and prevent UTIs  The lifestyle changes below will help get rid of your UTI. They may also help prevent future UTIs.    Drink plenty of fluids. This includes water, juice, or other caffeine-free drinks. Fluids help flush bacteria out of your body.    Empty your bladder. Always empty your bladder when you feel the urge to urinate. And always urinate before going to sleep. Urine that stays in your bladder can lead to infection. Try to urinate before and after sex as well.    Practice good personal hygiene. Wipe yourself from front to back after using the toilet. This helps keep bacteria from getting into the urethra.    Use condoms during sex. These help prevent UTIs caused by sexually transmitted bacteria. Also don't use spermicides during sex. These can increase the risk for UTIs. Choose other forms of birth control instead. For women who tend to get UTIs after sex, a low-dose of a preventive antibiotic may be used. Be sure to discuss this option with your healthcare provider.    Follow up with your healthcare provider as directed. He or she may test to make sure the infection has cleared. If needed, more treatment may be started.  Date Last Reviewed: 1/1/2017 2000-2018 [employer]. [address] [place] [address], [place] [address]. All rights reserved. This information is not intended as a substitute for professional medical care. Always follow your healthcare professional's instructions.

## 2018-12-10 LAB
BACTERIA SPEC CULT: ABNORMAL
SPECIMEN SOURCE: ABNORMAL

## 2019-01-02 ENCOUNTER — THERAPY VISIT (OUTPATIENT)
Dept: PHYSICAL THERAPY | Facility: CLINIC | Age: 79
End: 2019-01-02
Payer: MEDICARE

## 2019-01-02 DIAGNOSIS — G89.29 CHRONIC LEFT SHOULDER PAIN: ICD-10-CM

## 2019-01-02 DIAGNOSIS — M25.512 CHRONIC LEFT SHOULDER PAIN: ICD-10-CM

## 2019-01-02 PROCEDURE — 97110 THERAPEUTIC EXERCISES: CPT | Mod: GP | Performed by: PHYSICAL THERAPIST

## 2019-01-02 NOTE — PROGRESS NOTES
Subjective:  HPI                    Objective:  System    Physical Exam    General     ROS    Assessment/Plan:    DISCHARGE REPORT    Progress reporting period is from eval to 1/2/19.       SUBJECTIVE  Subjective changes noted by patient:  .  Subjective: Pt reports feeling confident continueing indepentently with HEP.  Poor compliance with HEP d/t  being ill.      Current pain level is Current Pain level: 5/10.     Previous pain level was   Initial Pain level: 6/10.   Changes in function:  Yes (See Goal flowsheet attached for changes in current functional level)  Adverse reaction to treatment or activity: None    OBJECTIVE  Changes noted in objective findings:  Yes,   Objective: PROM of L shoulder symetrical with R.  AROM flex 120 with PDM.  L IR strength 3/5 with pain, ER 4/5 painfree,  abd 3/5 with pain     ASSESSMENT/PLAN  Updated problem list and treatment plan: Diagnosis 1:  L shoulder pain  Pain -  self management, education, directional preference exercise and home program  Decreased strength - therapeutic exercise and therapeutic activities  Impaired muscle performance - neuro re-education  Decreased function - therapeutic activities  STG/LTGs have been met or progress has been made towards goals:  Yes (See Goal flow sheet completed today.)  Assessment of Progress: The patient's condition has potential to improve.  Self Management Plans:  Patient has been instructed in a home treatment program.  Patient is independent in a home treatment program.  I have re-evaluated this patient and find that the nature, scope, duration and intensity of the therapy is appropriate for the medical condition of the patient.      Recommendations:  This patient is ready to be discharged from therapy and continue their home treatment program.    Please refer to the daily flowsheet for treatment today, total treatment time and time spent performing 1:1 timed codes.

## 2019-01-02 NOTE — LETTER
DEPARTMENT OF HEALTH AND HUMAN SERVICES  CENTERS FOR MEDICARE & MEDICAID SERVICES    PLAN/UPDATED PLAN OF PROGRESS FOR OUTPATIENT REHABILITATION    PATIENTS NAME:  Gely Keene   : 1940  PROVIDER NUMBER:    [id number]  HICN:  [id number]  PROVIDER NAME: [facility]  MEDICAL RECORD NUMBER: [id number]   START OF CARE DATE:  SOC Date: 18   TYPE:  PT  PRIMARY/TREATMENT DIAGNOSIS: Chronic left shoulder pain  VISITS FROM START OF CARE:  Rxs Used: 2      DISCHARGE REPORT  Progress reporting period is from eval to 19.       SUBJECTIVE  Subjective changes noted by patient: Subjective: Pt reports feeling confident continueing indepentently with HEP.  Poor compliance with HEP d/t  being ill.      Current pain level is Current Pain level: 5/10.     Previous pain level was   Initial Pain level: 6/10.   Changes in function:  Yes (See Goal flowsheet attached for changes in current functional level)  Adverse reaction to treatment or activity: None    OBJECTIVE  Changes noted in objective findings:  Yes,   Objective: PROM of L shoulder symetrical with R.  AROM flex 120 with PDM.  L IR strength 3/5 with pain, ER 4/5 painfree,  abd 3/5 with pain     ASSESSMENT/PLAN  Updated problem list and treatment plan: Diagnosis 1:  L shoulder pain  Pain -  self management, education, directional preference exercise and home program  Decreased strength - therapeutic exercise and therapeutic activities  Impaired muscle performance - neuro re-education  Decreased function - therapeutic activities  STG/LTGs have been met or progress has been made towards goals:  Yes (See Goal flow sheet completed today.)  Assessment of Progress: The patient's condition has potential to improve.  Self Management Plans:  Patient has been instructed in a home treatment program.  Patient is independent in a home treatment program.  I have re-evaluated this patient and find that the  "nature, scope, duration and intensity of the therapy is appropriate for the medical condition of the patient.      Recommendations:  This patient is ready to be discharged from therapy and continue their home treatment program.    Caregiver Signature/Credentials _____________________________ Date ________       Treating Provider:   Israel Randhawa, PT              PATIENTS NAME:  Gely Keene   : 1940            I have reviewed and certified the need for these services and plan of treatment while under my care.    PHYSICIAN'S SIGNATURE:   _________________________________________  Date___________   Marisa Eli DO    Certification period:  Beginning of Cert date period: 18 to  End of Cert period date: 19     Functional Level Progress Report: Please see attached \"Goal Flow sheet for Functional level.\"    ________ Continue Services or       ____X____ DC Services                Service dates: From  SOC Date: 18 date to present                         "

## 2019-01-28 ENCOUNTER — OFFICE VISIT (OUTPATIENT)
Dept: INTERNAL MEDICINE | Facility: CLINIC | Age: 79
End: 2019-01-28
Payer: MEDICARE

## 2019-01-28 VITALS
SYSTOLIC BLOOD PRESSURE: 139 MMHG | RESPIRATION RATE: 18 BRPM | DIASTOLIC BLOOD PRESSURE: 64 MMHG | WEIGHT: 149.7 LBS | BODY MASS INDEX: 27.55 KG/M2 | OXYGEN SATURATION: 99 % | HEIGHT: 62 IN | HEART RATE: 89 BPM | TEMPERATURE: 97.8 F

## 2019-01-28 DIAGNOSIS — R82.90 NONSPECIFIC FINDING ON EXAMINATION OF URINE: ICD-10-CM

## 2019-01-28 DIAGNOSIS — N39.0 URINARY TRACT INFECTION WITHOUT HEMATURIA, SITE UNSPECIFIED: ICD-10-CM

## 2019-01-28 DIAGNOSIS — R30.0 DYSURIA: Primary | ICD-10-CM

## 2019-01-28 LAB
ALBUMIN UR-MCNC: >=300 MG/DL
APPEARANCE UR: ABNORMAL
BILIRUB UR QL STRIP: NEGATIVE
COLOR UR AUTO: YELLOW
GLUCOSE UR STRIP-MCNC: NEGATIVE MG/DL
HGB UR QL STRIP: ABNORMAL
KETONES UR STRIP-MCNC: NEGATIVE MG/DL
LEUKOCYTE ESTERASE UR QL STRIP: ABNORMAL
NITRATE UR QL: NEGATIVE
NON-SQ EPI CELLS #/AREA URNS LPF: ABNORMAL /LPF
PH UR STRIP: 6 PH (ref 5–7)
RBC #/AREA URNS AUTO: ABNORMAL /HPF
SOURCE: ABNORMAL
SP GR UR STRIP: 1.02 (ref 1–1.03)
UROBILINOGEN UR STRIP-ACNC: 0.2 EU/DL (ref 0.2–1)
WBC #/AREA URNS AUTO: >100 /HPF

## 2019-01-28 PROCEDURE — 87186 SC STD MICRODIL/AGAR DIL: CPT | Performed by: INTERNAL MEDICINE

## 2019-01-28 PROCEDURE — 99214 OFFICE O/P EST MOD 30 MIN: CPT | Performed by: INTERNAL MEDICINE

## 2019-01-28 PROCEDURE — 87086 URINE CULTURE/COLONY COUNT: CPT | Performed by: INTERNAL MEDICINE

## 2019-01-28 PROCEDURE — 81001 URINALYSIS AUTO W/SCOPE: CPT | Performed by: INTERNAL MEDICINE

## 2019-01-28 PROCEDURE — 87088 URINE BACTERIA CULTURE: CPT | Performed by: INTERNAL MEDICINE

## 2019-01-28 RX ORDER — CIPROFLOXACIN 250 MG/1
250 TABLET, FILM COATED ORAL 2 TIMES DAILY
Qty: 14 TABLET | Refills: 0 | Status: SHIPPED | OUTPATIENT
Start: 2019-01-28 | End: 2019-04-27

## 2019-01-28 ASSESSMENT — MIFFLIN-ST. JEOR: SCORE: 1089.34

## 2019-01-28 NOTE — PROGRESS NOTES
SUBJECTIVE:   Gely Keene is a 79 year old female who presents to clinic today for the following health issues:    Urinary complaints: she notes the following symptoms:  dysuria, urgency, frequency and suprapubic pain and pressure for 1 days.  Symptoms are present this morning and since she got up.  She has tried to push fluids but the symptoms continue.  She has not had any hematuria or odor to the urine.  She had 3 bladder infections in the last year, all of them Klebsiella though one had different sensitivities and the other 2.  Her last one was almost 2 months ago, the symptoms resolved completely and she did not have any recurrent symptoms until today.      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  Indications: PN:       MELATONIN PO Take 3 mg by mouth nightly as needed       metoprolol succinate (TOPROL-XL) 25 MG 24 hr tablet Take 0.5 tablets (12.5 mg) by mouth daily 45 tablet 3     Multiple Minerals-Vitamins (CALCIUM CITRATE PLUS/MAGNESIUM) TABS Take  "1 tablet by mouth daily        omeprazole (PRILOSEC) 20 MG CR capsule Take 1 capsule (20 mg) by mouth daily 90 capsule 3        Social History     Tobacco Use     Smoking status: Never Smoker     Smokeless tobacco: Never Used   Substance Use Topics     Alcohol use: Yes     Comment: socially     Drug use: No          Reviewed and updated as needed this visit by clinical staff  Tobacco  Allergies  Meds  Med Hx  Surg Hx  Fam Hx  Soc Hx      Reviewed and updated as needed this visit by Provider         ROS:  No fever, chills, nausea, vomiting, abdominal pain just suprapubic discomfort, no flank pain, hematuria, cloudy urine    OBJECTIVE:     /64 (BP Location: Left arm, Patient Position: Sitting, Cuff Size: Adult Regular)   Pulse 89   Temp 97.8  F (36.6  C) (Oral)   Resp 18   Ht 1.562 m (5' 1.5\")   Wt 67.9 kg (149 lb 11.2 oz)   SpO2 99%   BMI 27.83 kg/m    Body mass index is 27.83 kg/m .    No CVAT to percussion    UA:  Moderate LE   >100 wbc      ASSESSMENT/PLAN:             1. Urinary tract infection without hematuria, site unspecified  Symptoms and urinalysis are consistent with UTI.  Her last one was almost 2 months ago and did resolve.  She had 3 last year.  Advised fairly unlikely there would be a significant underlying problem causing the bladder infections.  For now we will treat this with Cipro pending culture and sensitivities.  Call for lack of improvement or worsening symptoms.  Also advised to call if her symptoms recur soon after treatment.  Advised she can use Azo over-the-counter to help with the dysuria for 1-2 days.  - ciprofloxacin (CIPRO) 250 MG tablet; Take 1 tablet (250 mg) by mouth 2 times daily for 7 days  Dispense: 14 tablet; Refill: 0        Ofelia Perez MD  [facility]    "

## 2019-01-28 NOTE — NURSING NOTE
"/64 (BP Location: Left arm, Patient Position: Sitting, Cuff Size: Adult Regular)   Pulse 89   Temp 97.8  F (36.6  C) (Oral)   Resp 18   Ht 1.562 m (5' 1.5\")   Wt 67.9 kg (149 lb 11.2 oz)   SpO2 99%   BMI 27.83 kg/m    Patient here for possible UTI  "

## 2019-01-30 LAB
BACTERIA SPEC CULT: ABNORMAL
SPECIMEN SOURCE: ABNORMAL

## 2019-02-06 DIAGNOSIS — K21.9 GASTROESOPHAGEAL REFLUX DISEASE, ESOPHAGITIS PRESENCE NOT SPECIFIED: ICD-10-CM

## 2019-02-06 DIAGNOSIS — I10 HYPERTENSION GOAL BP (BLOOD PRESSURE) < 140/90: ICD-10-CM

## 2019-02-06 DIAGNOSIS — E78.5 HYPERLIPIDEMIA LDL GOAL <160: ICD-10-CM

## 2019-02-06 DIAGNOSIS — R00.0 SINUS TACHYCARDIA: ICD-10-CM

## 2019-02-06 RX ORDER — METOPROLOL SUCCINATE 25 MG/1
12.5 TABLET, EXTENDED RELEASE ORAL DAILY
Qty: 45 TABLET | Refills: 3 | Status: SHIPPED | OUTPATIENT
Start: 2019-02-06 | End: 2020-02-04

## 2019-02-06 RX ORDER — ATORVASTATIN CALCIUM 20 MG/1
20 TABLET, FILM COATED ORAL DAILY
Qty: 90 TABLET | Refills: 3 | Status: SHIPPED | OUTPATIENT
Start: 2019-02-06 | End: 2020-02-04

## 2019-02-06 NOTE — TELEPHONE ENCOUNTER
Pt requesting rx's transferred to new pharmacy, per insurance change.    Rx's sent to new pharmacy.    Angelica Carbajal RN

## 2019-04-27 ENCOUNTER — OFFICE VISIT (OUTPATIENT)
Dept: URGENT CARE | Facility: URGENT CARE | Age: 79
End: 2019-04-27
Payer: MEDICARE

## 2019-04-27 VITALS
OXYGEN SATURATION: 98 % | SYSTOLIC BLOOD PRESSURE: 136 MMHG | DIASTOLIC BLOOD PRESSURE: 60 MMHG | HEART RATE: 69 BPM | WEIGHT: 149.7 LBS | BODY MASS INDEX: 27.83 KG/M2 | TEMPERATURE: 97.6 F

## 2019-04-27 DIAGNOSIS — R82.90 NONSPECIFIC FINDING ON EXAMINATION OF URINE: ICD-10-CM

## 2019-04-27 DIAGNOSIS — R30.0 DYSURIA: ICD-10-CM

## 2019-04-27 DIAGNOSIS — N30.01 ACUTE CYSTITIS WITH HEMATURIA: Primary | ICD-10-CM

## 2019-04-27 LAB
ALBUMIN UR-MCNC: 100 MG/DL
APPEARANCE UR: ABNORMAL
BACTERIA #/AREA URNS HPF: ABNORMAL /HPF
BILIRUB UR QL STRIP: NEGATIVE
COLOR UR AUTO: YELLOW
GLUCOSE UR STRIP-MCNC: NEGATIVE MG/DL
HGB UR QL STRIP: ABNORMAL
KETONES UR STRIP-MCNC: NEGATIVE MG/DL
LEUKOCYTE ESTERASE UR QL STRIP: ABNORMAL
NITRATE UR QL: NEGATIVE
NON-SQ EPI CELLS #/AREA URNS LPF: ABNORMAL /LPF
PH UR STRIP: 5.5 PH (ref 5–7)
RBC #/AREA URNS AUTO: ABNORMAL /HPF
SOURCE: ABNORMAL
SP GR UR STRIP: 1.02 (ref 1–1.03)
UROBILINOGEN UR STRIP-ACNC: 0.2 EU/DL (ref 0.2–1)
WBC #/AREA URNS AUTO: ABNORMAL /HPF

## 2019-04-27 PROCEDURE — 99213 OFFICE O/P EST LOW 20 MIN: CPT | Performed by: FAMILY MEDICINE

## 2019-04-27 PROCEDURE — 87088 URINE BACTERIA CULTURE: CPT | Performed by: FAMILY MEDICINE

## 2019-04-27 PROCEDURE — 87186 SC STD MICRODIL/AGAR DIL: CPT | Performed by: FAMILY MEDICINE

## 2019-04-27 PROCEDURE — 87086 URINE CULTURE/COLONY COUNT: CPT | Performed by: FAMILY MEDICINE

## 2019-04-27 PROCEDURE — 81001 URINALYSIS AUTO W/SCOPE: CPT | Performed by: PHYSICIAN ASSISTANT

## 2019-04-27 RX ORDER — CEPHALEXIN 500 MG/1
500 CAPSULE ORAL 3 TIMES DAILY
Qty: 21 CAPSULE | Refills: 0 | Status: SHIPPED | OUTPATIENT
Start: 2019-04-27 | End: 2019-05-04

## 2019-04-27 NOTE — PROGRESS NOTES
SUBJECTIVE:   Gely Keene is a 79 year old female who  presents today for a possible UTI. Symptoms of burning with urination, increased urinary frequency  have been going on since 8 am today.  There is no history of fever, chills, nausea or vomiting. This patient does have a history of recurrent urinary tract infections.     Past Medical History:   Diagnosis Date     Anxiety      BCC (basal cell carcinoma of skin)      Esophageal reflux (GERD) 9/8/2014     HTN (hypertension) 9/8/2014     Hyperlipidemia LDL goal <160 9/8/2014     IFG (impaired fasting glucose) 9/8/2014     Mumps      PONV (postoperative nausea and vomiting)      Current Outpatient Medications   Medication Sig Dispense Refill     ACETAMINOPHEN PO Take 500 mg by mouth every 4 hours as needed for pain Reported on 4/10/2017       atorvastatin (LIPITOR) 20 MG tablet Take 1 tablet (20 mg) by mouth daily 90 tablet 3     BIOTIN PO Take 1 tablet by mouth daily dose unknown       conjugated estrogens (PREMARIN) cream Place 0.5 g vaginally three times a week 30 g 11     fish oil-omega-3 fatty acids 1000 MG capsule Take 1 capsule daily.  Indications: PN:       MELATONIN PO Take 5 mg by mouth nightly as needed        metoprolol succinate ER (TOPROL-XL) 25 MG 24 hr tablet Take 0.5 tablets (12.5 mg) by mouth daily 45 tablet 3     Multiple Minerals-Vitamins (CALCIUM CITRATE PLUS/MAGNESIUM) TABS Take 1 tablet by mouth daily        omeprazole (PRILOSEC) 20 MG DR capsule Take 1 capsule (20 mg) by mouth daily 90 capsule 3     Social History     Tobacco Use     Smoking status: Never Smoker     Smokeless tobacco: Never Used   Substance Use Topics     Alcohol use: Yes     Comment: socially       ROS:   CONSTITUTIONAL:NEGATIVE  for fevers.   : positive for urinary frequency and dysuria    OBJECTIVE:  /60 (BP Location: Right arm, Patient Position: Chair, Cuff Size: Adult Regular)   Pulse 69   Temp 97.6  F (36.4  C) (Oral)   Wt 67.9 kg (149 lb 11.2 oz)    SpO2 98%   BMI 27.83 kg/m    GENERAL APPEARANCE: healthy, alert and no distress  BACK: No CVA tenderness    LABS:    Results for orders placed or performed in visit on 04/27/19   UA reflex to Microscopic and Culture   Result Value Ref Range    Color Urine Yellow     Appearance Urine Slightly Cloudy     Glucose Urine Negative NEG^Negative mg/dL    Bilirubin Urine Negative NEG^Negative    Ketones Urine Negative NEG^Negative mg/dL    Specific Gravity Urine 1.020 1.003 - 1.035    Blood Urine Moderate (A) NEG^Negative    pH Urine 5.5 5.0 - 7.0 pH    Protein Albumin Urine 100 (A) NEG^Negative mg/dL    Urobilinogen Urine 0.2 0.2 - 1.0 EU/dL    Nitrite Urine Negative NEG^Negative    Leukocyte Esterase Urine Large (A) NEG^Negative    Source Midstream Urine    Urine Microscopic   Result Value Ref Range    WBC Urine  (A) OTO5^0 - 5 /HPF    RBC Urine 5-10 (A) OTO2^O - 2 /HPF    Squamous Epithelial /LPF Urine Few FEW^Few /LPF    Bacteria Urine Moderate (A) NEG^Negative /HPF       ASSESSMENT:   Acute Cystitis with hematuria    PLAN:  Rx:  Cephalexin  Drink plenty of fluids.    Signs and symptoms of pyelonephritis mentioned.  follow up if any symptoms of pyelonephritis appear.    Follow up with primary care physician if not improving  The Urine culture is pending.      David Garcia MD

## 2019-04-29 ENCOUNTER — TELEPHONE (OUTPATIENT)
Dept: PEDIATRICS | Facility: CLINIC | Age: 79
End: 2019-04-29

## 2019-04-29 LAB
BACTERIA SPEC CULT: ABNORMAL
BACTERIA SPEC CULT: ABNORMAL
SPECIMEN SOURCE: ABNORMAL

## 2019-04-29 NOTE — TELEPHONE ENCOUNTER
Called patient back,  answered and repeated her questions.     Advised that Keflex is a tid dosing medication. If having dizziness, could take after food and make sure she is drinking plenty of water. Dizziness could be a potential side effect. UC not back, recommend continuing until UC is final.      will pass along message.

## 2019-04-29 NOTE — TELEPHONE ENCOUNTER
Reason for call:  Other   Patient called regarding (reason for call): call back  Additional comments: Was seen in urgent care this weekend and prescribed Keflex. Patient is wondering if she should be taking the keflex or such a high dose. States that she is feeling dizzy and believes it is due to the medication.     Phone number to reach patient:  Home number on file [phone] (home)    Best Time:  Before 12:30    Can we leave a detailed message on this number?  YES

## 2019-05-13 ENCOUNTER — OFFICE VISIT (OUTPATIENT)
Dept: URGENT CARE | Facility: URGENT CARE | Age: 79
End: 2019-05-13
Payer: MEDICARE

## 2019-05-13 ENCOUNTER — TELEPHONE (OUTPATIENT)
Dept: PEDIATRICS | Facility: CLINIC | Age: 79
End: 2019-05-13

## 2019-05-13 VITALS
OXYGEN SATURATION: 98 % | SYSTOLIC BLOOD PRESSURE: 136 MMHG | TEMPERATURE: 97.9 F | WEIGHT: 150.4 LBS | DIASTOLIC BLOOD PRESSURE: 66 MMHG | HEART RATE: 71 BPM | BODY MASS INDEX: 27.96 KG/M2

## 2019-05-13 DIAGNOSIS — I10 HYPERTENSION GOAL BP (BLOOD PRESSURE) < 140/90: ICD-10-CM

## 2019-05-13 DIAGNOSIS — R30.0 DYSURIA: ICD-10-CM

## 2019-05-13 DIAGNOSIS — N39.0 URINARY TRACT INFECTION WITHOUT HEMATURIA, SITE UNSPECIFIED: Primary | ICD-10-CM

## 2019-05-13 LAB
ALBUMIN UR-MCNC: NEGATIVE MG/DL
APPEARANCE UR: CLEAR
BACTERIA #/AREA URNS HPF: ABNORMAL /HPF
BILIRUB UR QL STRIP: NEGATIVE
COLOR UR AUTO: YELLOW
GLUCOSE UR STRIP-MCNC: NEGATIVE MG/DL
HGB UR QL STRIP: NEGATIVE
KETONES UR STRIP-MCNC: NEGATIVE MG/DL
LEUKOCYTE ESTERASE UR QL STRIP: ABNORMAL
NITRATE UR QL: NEGATIVE
NON-SQ EPI CELLS #/AREA URNS LPF: ABNORMAL /LPF
PH UR STRIP: 5 PH (ref 5–7)
RBC #/AREA URNS AUTO: ABNORMAL /HPF
SOURCE: ABNORMAL
SP GR UR STRIP: 1.01 (ref 1–1.03)
UROBILINOGEN UR STRIP-ACNC: 0.2 EU/DL (ref 0.2–1)
WBC #/AREA URNS AUTO: ABNORMAL /HPF

## 2019-05-13 PROCEDURE — 99214 OFFICE O/P EST MOD 30 MIN: CPT | Performed by: PHYSICIAN ASSISTANT

## 2019-05-13 PROCEDURE — 81001 URINALYSIS AUTO W/SCOPE: CPT | Performed by: PHYSICIAN ASSISTANT

## 2019-05-13 PROCEDURE — 87086 URINE CULTURE/COLONY COUNT: CPT | Performed by: PHYSICIAN ASSISTANT

## 2019-05-13 RX ORDER — CEPHALEXIN 500 MG/1
500 CAPSULE ORAL 2 TIMES DAILY
Qty: 10 CAPSULE | Refills: 0 | Status: SHIPPED | OUTPATIENT
Start: 2019-05-13 | End: 2019-11-11

## 2019-05-13 NOTE — TELEPHONE ENCOUNTER
Patient should come in for visit today with acute care provider or UC      Hien Booth MD  Internal Medicine - Pediatrics

## 2019-05-13 NOTE — PROGRESS NOTES
SUBJECTIVE:   Gely Keene is a 79 year old female who  presents today for a possible UTI. Symptoms of dysuria, urgency, frequency and burning have been going on for 1day(s).  Hematuria no.  gradual onset and still presentand mild.  There is no history of fever, chills, nausea or vomiting.  No history of vaginal  discharge. This patient does  have a history of urinary tract infections.  She just had a UTI a few weeks ago.  Took med as directed and sx resolved.  Feels like coming back Patient denies long duration, rigors, flank pain, temperature > 101 degrees F., Vomiting, significant nausea or diarrhea, taking Coumadin and GFR less than 30 within the last year or vaginal discharge, vaginal odor, vaginal itching and dyspareunia (pain in labia/pelvis)     Past Medical History:   Diagnosis Date     Anxiety      BCC (basal cell carcinoma of skin)      Esophageal reflux (GERD) 9/8/2014     HTN (hypertension) 9/8/2014     Hyperlipidemia LDL goal <160 9/8/2014     IFG (impaired fasting glucose) 9/8/2014     Mumps      PONV (postoperative nausea and vomiting)      Current Outpatient Medications   Medication Sig Dispense Refill     ACETAMINOPHEN PO Take 500 mg by mouth every 4 hours as needed for pain Reported on 4/10/2017       atorvastatin (LIPITOR) 20 MG tablet Take 1 tablet (20 mg) by mouth daily 90 tablet 3     BIOTIN PO Take 1 tablet by mouth daily dose unknown       conjugated estrogens (PREMARIN) cream Place 0.5 g vaginally three times a week 30 g 11     fish oil-omega-3 fatty acids 1000 MG capsule Take 1 capsule daily.  Indications: PN:       MELATONIN PO Take 5 mg by mouth nightly as needed        metoprolol succinate ER (TOPROL-XL) 25 MG 24 hr tablet Take 0.5 tablets (12.5 mg) by mouth daily 45 tablet 3     Multiple Minerals-Vitamins (CALCIUM CITRATE PLUS/MAGNESIUM) TABS Take 1 tablet by mouth daily        omeprazole (PRILOSEC) 20 MG DR capsule Take 1 capsule (20 mg) by mouth daily 90 capsule 3      Social History     Tobacco Use     Smoking status: Never Smoker     Smokeless tobacco: Never Used   Substance Use Topics     Alcohol use: Yes     Comment: socially       ROS:   Review of systems negative except as stated above.    OBJECTIVE:  /66   Pulse 71   Temp 97.9  F (36.6  C) (Oral)   Wt 68.2 kg (150 lb 6.4 oz)   SpO2 98%   BMI 27.96 kg/m    GENERAL APPEARANCE: healthy, alert and no distress  RESP: lungs clear to auscultation - no rales, rhonchi or wheezes  CV: regular rates and rhythm, normal S1 S2, no murmur noted  ABDOMEN:  soft, nontender, no HSM or masses and bowel sounds normal  BACK: No CVA tenderness  SKIN: no suspicious lesions or rashes    Results for orders placed or performed in visit on 05/13/19   UA reflex to Microscopic and Culture   Result Value Ref Range    Color Urine Yellow     Appearance Urine Clear     Glucose Urine Negative NEG^Negative mg/dL    Bilirubin Urine Negative NEG^Negative    Ketones Urine Negative NEG^Negative mg/dL    Specific Gravity Urine 1.015 1.003 - 1.035    Blood Urine Negative NEG^Negative    pH Urine 5.0 5.0 - 7.0 pH    Protein Albumin Urine Negative NEG^Negative mg/dL    Urobilinogen Urine 0.2 0.2 - 1.0 EU/dL    Nitrite Urine Negative NEG^Negative    Leukocyte Esterase Urine Trace (A) NEG^Negative    Source Midstream Urine    Urine Microscopic   Result Value Ref Range    WBC Urine 5-10 (A) OTO5^0 - 5 /HPF    RBC Urine O - 2 OTO2^O - 2 /HPF    Squamous Epithelial /LPF Urine Few FEW^Few /LPF    Bacteria Urine Few (A) NEG^Negative /HPF   Urine Culture Aerobic Bacterial   Result Value Ref Range    Specimen Description Midstream Urine     Culture Micro No growth          ASSESSMENT:   Lower, uncomplicated urinary tract infection.  HTN      PLAN:  Culture pending. Sx mild at this  Time but will treat.  Keflex as directed   Drink plenty of fluids.  Prevention and treatment of UTI's discussed.Signs and symptoms of pyelonephritis mentioned.  Follow up with  primary care physician if not improving

## 2019-05-13 NOTE — PATIENT INSTRUCTIONS
Results for orders placed or performed in visit on 05/13/19   UA reflex to Microscopic and Culture   Result Value Ref Range    Color Urine Yellow     Appearance Urine Clear     Glucose Urine Negative NEG^Negative mg/dL    Bilirubin Urine Negative NEG^Negative    Ketones Urine Negative NEG^Negative mg/dL    Specific Gravity Urine 1.015 1.003 - 1.035    Blood Urine Negative NEG^Negative    pH Urine 5.0 5.0 - 7.0 pH    Protein Albumin Urine Negative NEG^Negative mg/dL    Urobilinogen Urine 0.2 0.2 - 1.0 EU/dL    Nitrite Urine Negative NEG^Negative    Leukocyte Esterase Urine Trace (A) NEG^Negative    Source Midstream Urine    Urine Microscopic   Result Value Ref Range    WBC Urine 5-10 (A) OTO5^0 - 5 /HPF    RBC Urine O - 2 OTO2^O - 2 /HPF    Squamous Epithelial /LPF Urine Few FEW^Few /LPF    Bacteria Urine Few (A) NEG^Negative /HPF     You were diagnosed with a very mild urinary infection.  Your last culture was checked and will use Keflex    Your symptoms are mild and we will use Keflex 500 mg twice a day for 5 days.     Watch for fever, blood in urine or flank pain    Follow-up with your PCP within 5 days if your symptoms persist    Drink lots of fluids

## 2019-05-13 NOTE — TELEPHONE ENCOUNTER
Patient was called, she finished Keflex, felt better for about a week, but last night she got the burning feeling again. She started OTC Azo and took 1 pill of left over Keflex last night. Not much burning now, but patient is wondering if she needs to give another urine sample or get more treatment before her May 30th Appt with Urologist at [facility]. Provider to advise.

## 2019-05-13 NOTE — TELEPHONE ENCOUNTER
05/13/19    Pt called stating that she was in the UC on 4/27/19 for a UTI , was put on KEFLEX. Pt is still having symptoms. Pt would like Dr Booth Nurse call her @ [phone],   Can leave a message.

## 2019-05-14 LAB
BACTERIA SPEC CULT: NO GROWTH
SPECIMEN SOURCE: NORMAL

## 2019-09-27 ENCOUNTER — OFFICE VISIT (OUTPATIENT)
Dept: URGENT CARE | Facility: URGENT CARE | Age: 79
End: 2019-09-27
Payer: MEDICARE

## 2019-09-27 VITALS
DIASTOLIC BLOOD PRESSURE: 61 MMHG | WEIGHT: 146 LBS | HEART RATE: 78 BPM | OXYGEN SATURATION: 96 % | TEMPERATURE: 97.4 F | SYSTOLIC BLOOD PRESSURE: 134 MMHG | BODY MASS INDEX: 27.14 KG/M2

## 2019-09-27 DIAGNOSIS — N30.00 ACUTE CYSTITIS WITHOUT HEMATURIA: ICD-10-CM

## 2019-09-27 DIAGNOSIS — R30.0 DYSURIA: Primary | ICD-10-CM

## 2019-09-27 LAB
ALBUMIN UR-MCNC: >=300 MG/DL
APPEARANCE UR: ABNORMAL
BACTERIA #/AREA URNS HPF: ABNORMAL /HPF
BILIRUB UR QL STRIP: ABNORMAL
COLOR UR AUTO: YELLOW
GLUCOSE UR STRIP-MCNC: NEGATIVE MG/DL
HGB UR QL STRIP: ABNORMAL
KETONES UR STRIP-MCNC: ABNORMAL MG/DL
LEUKOCYTE ESTERASE UR QL STRIP: ABNORMAL
NITRATE UR QL: NEGATIVE
NON-SQ EPI CELLS #/AREA URNS LPF: ABNORMAL /LPF
PH UR STRIP: 6 PH (ref 5–7)
RBC #/AREA URNS AUTO: ABNORMAL /HPF
SOURCE: ABNORMAL
SP GR UR STRIP: 1.02 (ref 1–1.03)
UROBILINOGEN UR STRIP-ACNC: 0.2 EU/DL (ref 0.2–1)
WBC #/AREA URNS AUTO: >100 /HPF

## 2019-09-27 PROCEDURE — 87186 SC STD MICRODIL/AGAR DIL: CPT | Performed by: FAMILY MEDICINE

## 2019-09-27 PROCEDURE — 99213 OFFICE O/P EST LOW 20 MIN: CPT | Performed by: FAMILY MEDICINE

## 2019-09-27 PROCEDURE — 87086 URINE CULTURE/COLONY COUNT: CPT | Performed by: FAMILY MEDICINE

## 2019-09-27 PROCEDURE — 81001 URINALYSIS AUTO W/SCOPE: CPT | Performed by: FAMILY MEDICINE

## 2019-09-27 PROCEDURE — 87088 URINE BACTERIA CULTURE: CPT | Performed by: FAMILY MEDICINE

## 2019-09-27 RX ORDER — CEPHALEXIN 500 MG/1
500 CAPSULE ORAL 2 TIMES DAILY
Qty: 20 CAPSULE | Refills: 0 | Status: SHIPPED | OUTPATIENT
Start: 2019-09-27 | End: 2019-11-11

## 2019-09-28 NOTE — PATIENT INSTRUCTIONS
Urostat/pyridium:  Available OTC and this helps with pain from UTI.    Follow up if not better in 3 days.

## 2019-09-28 NOTE — PROGRESS NOTES
SUBJECTIVE:   Gely Keene is a 79 year old female who  presents today for a possible UTI. Symptoms of dysuria have been going on for 1day(s).  Hematuria no.  sudden onset and still presentand moderate.  There is no history of fever, chills, nausea or vomiting.  No history of vaginal discharge. This patient does have a history of urinary tract infections. Patient denies rigors, flank pain and temperature > 101 degrees F.    Past Medical History:   Diagnosis Date     Anxiety      BCC (basal cell carcinoma of skin)      Esophageal reflux (GERD) 9/8/2014     HTN (hypertension) 9/8/2014     Hyperlipidemia LDL goal <160 9/8/2014     IFG (impaired fasting glucose) 9/8/2014     Mumps      PONV (postoperative nausea and vomiting)        Social History     Tobacco Use     Smoking status: Never Smoker     Smokeless tobacco: Never Used   Substance Use Topics     Alcohol use: Yes     Comment: socially     ROS:   As above.    OBJECTIVE:  /61   Pulse 78   Temp 97.4  F (36.3  C)   Wt 66.2 kg (146 lb)   SpO2 96%   BMI 27.14 kg/m    GENERAL APPEARANCE: healthy, alert and no distress    Results for orders placed or performed in visit on 09/27/19   *UA reflex to Microscopic and Culture ([facility] (except [place] and [place])   Result Value Ref Range    Color Urine Yellow     Appearance Urine Cloudy     Glucose Urine Negative NEG^Negative mg/dL    Bilirubin Urine Small (A) NEG^Negative    Ketones Urine Trace (A) NEG^Negative mg/dL    Specific Gravity Urine 1.025 1.003 - 1.035    Blood Urine Large (A) NEG^Negative    pH Urine 6.0 5.0 - 7.0 pH    Protein Albumin Urine >=300 (A) NEG^Negative mg/dL    Urobilinogen Urine 0.2 0.2 - 1.0 EU/dL    Nitrite Urine Negative NEG^Negative    Leukocyte Esterase Urine Moderate (A) NEG^Negative    Source Midstream Urine    Urine Microscopic   Result Value Ref Range    WBC Urine >100 (A) OTO5^0 - 5 /HPF    RBC Urine 10-25 (A) OTO2^O - 2 /HPF    Squamous  Epithelial /LPF Urine Few FEW^Few /LPF    Bacteria Urine Few (A) NEG^Negative /HPF     Last culture with growth was 4/27/19 and showed Klebsiella and E coli.    ASSESSMENT:   Lower, uncomplicated urinary tract infection.    PLAN:  Cephalexin 500 mg BID x 10 days as per ordered above  Drink plenty of fluids.    Signs and symptoms of pyelonephritis mentioned.  Follow up with primary care physician if not improving.

## 2019-09-29 LAB
BACTERIA SPEC CULT: ABNORMAL
BACTERIA SPEC CULT: ABNORMAL
SPECIMEN SOURCE: ABNORMAL

## 2019-10-08 ENCOUNTER — TRANSFERRED RECORDS (OUTPATIENT)
Dept: HEALTH INFORMATION MANAGEMENT | Facility: CLINIC | Age: 79
End: 2019-10-08

## 2019-11-11 ENCOUNTER — OFFICE VISIT (OUTPATIENT)
Dept: PEDIATRICS | Facility: CLINIC | Age: 79
End: 2019-11-11
Payer: MEDICARE

## 2019-11-11 VITALS
BODY MASS INDEX: 28.11 KG/M2 | TEMPERATURE: 97.8 F | DIASTOLIC BLOOD PRESSURE: 64 MMHG | SYSTOLIC BLOOD PRESSURE: 130 MMHG | HEART RATE: 65 BPM | WEIGHT: 148.9 LBS | RESPIRATION RATE: 16 BRPM | OXYGEN SATURATION: 98 % | HEIGHT: 61 IN

## 2019-11-11 DIAGNOSIS — G47.00 INSOMNIA, UNSPECIFIED TYPE: ICD-10-CM

## 2019-11-11 DIAGNOSIS — K21.9 GASTROESOPHAGEAL REFLUX DISEASE, ESOPHAGITIS PRESENCE NOT SPECIFIED: ICD-10-CM

## 2019-11-11 DIAGNOSIS — N95.2 ATROPHIC VAGINITIS: ICD-10-CM

## 2019-11-11 DIAGNOSIS — E78.5 HYPERLIPIDEMIA LDL GOAL <160: ICD-10-CM

## 2019-11-11 DIAGNOSIS — I10 HYPERTENSION GOAL BP (BLOOD PRESSURE) < 140/90: ICD-10-CM

## 2019-11-11 DIAGNOSIS — Z12.31 VISIT FOR SCREENING MAMMOGRAM: ICD-10-CM

## 2019-11-11 DIAGNOSIS — N18.30 CHRONIC KIDNEY DISEASE, STAGE III (MODERATE) (H): ICD-10-CM

## 2019-11-11 DIAGNOSIS — Z00.00 MEDICARE ANNUAL WELLNESS VISIT, SUBSEQUENT: Primary | ICD-10-CM

## 2019-11-11 DIAGNOSIS — R73.01 IFG (IMPAIRED FASTING GLUCOSE): ICD-10-CM

## 2019-11-11 DIAGNOSIS — R00.0 SINUS TACHYCARDIA: ICD-10-CM

## 2019-11-11 DIAGNOSIS — Z90.49 S/P TOTAL COLECTOMY: ICD-10-CM

## 2019-11-11 PROCEDURE — G0439 PPPS, SUBSEQ VISIT: HCPCS | Performed by: PEDIATRICS

## 2019-11-11 RX ORDER — ATORVASTATIN CALCIUM 20 MG/1
20 TABLET, FILM COATED ORAL DAILY
Qty: 90 TABLET | Refills: 3 | Status: CANCELLED | OUTPATIENT
Start: 2019-11-11

## 2019-11-11 RX ORDER — METOPROLOL SUCCINATE 25 MG/1
12.5 TABLET, EXTENDED RELEASE ORAL DAILY
Qty: 45 TABLET | Refills: 3 | Status: CANCELLED | OUTPATIENT
Start: 2019-11-11

## 2019-11-11 SDOH — HEALTH STABILITY: MENTAL HEALTH
STRESS IS WHEN SOMEONE FEELS TENSE, NERVOUS, ANXIOUS, OR CAN'T SLEEP AT NIGHT BECAUSE THEIR MIND IS TROUBLED. HOW STRESSED ARE YOU?: ONLY A LITTLE

## 2019-11-11 SDOH — SOCIAL STABILITY: SOCIAL NETWORK: HOW OFTEN DO YOU GET TOGETHER WITH FRIENDS OR RELATIVES?: TWICE A WEEK

## 2019-11-11 SDOH — SOCIAL STABILITY: SOCIAL NETWORK
IN A TYPICAL WEEK, HOW MANY TIMES DO YOU TALK ON THE PHONE WITH FAMILY, FRIENDS, OR NEIGHBORS?: MORE THAN THREE TIMES A WEEK

## 2019-11-11 SDOH — ECONOMIC STABILITY: FOOD INSECURITY: WITHIN THE PAST 12 MONTHS, THE FOOD YOU BOUGHT JUST DIDN'T LAST AND YOU DIDN'T HAVE MONEY TO GET MORE.: NEVER TRUE

## 2019-11-11 SDOH — ECONOMIC STABILITY: TRANSPORTATION INSECURITY
IN THE PAST 12 MONTHS, HAS LACK OF TRANSPORTATION KEPT YOU FROM MEETINGS, WORK, OR FROM GETTING THINGS NEEDED FOR DAILY LIVING?: NO

## 2019-11-11 SDOH — SOCIAL STABILITY: SOCIAL NETWORK: HOW OFTEN DO YOU ATTENT MEETINGS OF THE CLUB OR ORGANIZATION YOU BELONG TO?: PATIENT DECLINED

## 2019-11-11 SDOH — HEALTH STABILITY: PHYSICAL HEALTH: ON AVERAGE, HOW MANY DAYS PER WEEK DO YOU ENGAGE IN MODERATE TO STRENUOUS EXERCISE (LIKE A BRISK WALK)?: 0 DAYS

## 2019-11-11 SDOH — ECONOMIC STABILITY: TRANSPORTATION INSECURITY
IN THE PAST 12 MONTHS, HAS THE LACK OF TRANSPORTATION KEPT YOU FROM MEDICAL APPOINTMENTS OR FROM GETTING MEDICATIONS?: NO

## 2019-11-11 SDOH — SOCIAL STABILITY: SOCIAL NETWORK: HOW OFTEN DO YOU ATTEND CHURCH OR RELIGIOUS SERVICES?: 1 TO 4 TIMES PER YEAR

## 2019-11-11 SDOH — HEALTH STABILITY: PHYSICAL HEALTH: ON AVERAGE, HOW MANY MINUTES DO YOU ENGAGE IN EXERCISE AT THIS LEVEL?: 10 MIN

## 2019-11-11 SDOH — SOCIAL STABILITY: SOCIAL NETWORK
DO YOU BELONG TO ANY CLUBS OR ORGANIZATIONS SUCH AS CHURCH GROUPS UNIONS, FRATERNAL OR ATHLETIC GROUPS, OR SCHOOL GROUPS?: NO

## 2019-11-11 SDOH — SOCIAL STABILITY: SOCIAL NETWORK: ARE YOU MARRIED, WIDOWED, DIVORCED, SEPARATED, NEVER MARRIED, OR LIVING WITH A PARTNER?: MARRIED

## 2019-11-11 SDOH — ECONOMIC STABILITY: INCOME INSECURITY: HOW HARD IS IT FOR YOU TO PAY FOR THE VERY BASICS LIKE FOOD, HOUSING, MEDICAL CARE, AND HEATING?: NOT HARD AT ALL

## 2019-11-11 SDOH — HEALTH STABILITY: MENTAL HEALTH: HOW MANY STANDARD DRINKS CONTAINING ALCOHOL DO YOU HAVE ON A TYPICAL DAY?: 1 OR 2

## 2019-11-11 SDOH — ECONOMIC STABILITY: FOOD INSECURITY: WITHIN THE PAST 12 MONTHS, YOU WORRIED THAT YOUR FOOD WOULD RUN OUT BEFORE YOU GOT MONEY TO BUY MORE.: NEVER TRUE

## 2019-11-11 SDOH — HEALTH STABILITY: MENTAL HEALTH: HOW OFTEN DO YOU HAVE A DRINK CONTAINING ALCOHOL?: MONTHLY OR LESS

## 2019-11-11 SDOH — HEALTH STABILITY: MENTAL HEALTH: HOW OFTEN DO YOU HAVE 6 OR MORE DRINKS ON ONE OCCASION?: NEVER

## 2019-11-11 ASSESSMENT — ENCOUNTER SYMPTOMS
FEVER: 0
CONSTIPATION: 0
DIZZINESS: 1
NERVOUS/ANXIOUS: 1
HEMATURIA: 0
FREQUENCY: 0
COUGH: 0
EYE PAIN: 0
ABDOMINAL PAIN: 0
HEMATOCHEZIA: 0
DIARRHEA: 0
CHILLS: 0

## 2019-11-11 ASSESSMENT — ACTIVITIES OF DAILY LIVING (ADL): CURRENT_FUNCTION: NO ASSISTANCE NEEDED

## 2019-11-11 ASSESSMENT — MIFFLIN-ST. JEOR: SCORE: 1077.79

## 2019-11-11 NOTE — PATIENT INSTRUCTIONS
Mammogram due anytime    Labs - come back for a fasting lab visit    Look for free meditation apps on your smart phone - Head Space, Evi.   Buy some earphones.     OK to take melatonin if needed.   Call for sleep evaluation if needed - referral placed today.    Keep up your activity

## 2019-11-11 NOTE — PROGRESS NOTES
"SUBJECTIVE:   Gely Keene is a 79 year old female who presents for Preventive Visit.  Are you in the first 12 months of your Medicare coverage?  No    Healthy Habits:     In general, how would you rate your overall health?  Good    Frequency of exercise:  1 day/week    Duration of exercise:  15-30 minutes    Do you usually eat at least 4 servings of fruit and vegetables a day, include whole grains    & fiber and avoid regularly eating high fat or \"junk\" foods?  No    Taking medications regularly:  Yes    Medication side effects:  Lightheadedness    Ability to successfully perform activities of daily living:  No assistance needed    Home Safety:  No safety concerns identified    Hearing Impairment:  Difficulty following a conversation in a noisy restaurant or crowded room and difficulty understanding soft or whispered speech    In the past 6 months, have you been bothered by leaking of urine?  No    In general, how would you rate your overall mental or emotional health?  Good      PHQ-2 Total Score: 1    Additional concerns today:  No       Do you feel safe in your environment? Yes  Have you ever done Advance Care Planning? (For example, a Health Directive, POLST, or a discussion with a medical provider or your loved ones about your wishes): Yes, patient states has an Advance Care Planning document and will bring a copy to the clinic.    Fall risk  Fallen 2 or more times in the past year?: No  Any fall with injury in the past year?: No    Cognitive Screening   1) Repeat 3 items (Leader, Season, Table)    2) Clock draw: NORMAL  3) 3 item recall: Recalls 2 objects   Results: NORMAL clock, 1-2 items recalled: COGNITIVE IMPAIRMENT LESS LIKELY    Mini-CogTM Copyright JULIO Watts. Licensed by the author for use in [facility]; reprinted with permission (minoo@.[facility]). All rights reserved.      Do you have sleep apnea, excessive snoring or daytime drowsiness?: no    Reviewed and updated as needed this " visit by clinical staff  Tobacco  Allergies  Meds  Med Hx  Surg Hx  Fam Hx  Soc Hx        Reviewed and updated as needed this visit by Provider  Tobacco        Social History     Tobacco Use     Smoking status: Never Smoker     Smokeless tobacco: Never Used   Substance Use Topics     Alcohol use: Yes     Frequency: Monthly or less     Drinks per session: 1 or 2     Binge frequency: Never     Comment: socially       Alcohol Use 11/11/2019   Prescreen: >3 drinks/day or >7 drinks/week? No   Prescreen: >3 drinks/day or >7 drinks/week? -             Current providers sharing in care for this patient include:   Patient Care Team:  Hien Booth MD as PCP - General (Internal Medicine)  Shana Alaniz MD as MD (Colon and Rectal Surgery)  [facility]  Hien Booth MD as Assigned PCP    The following health maintenance items are reviewed in Epic and correct as of today:  Health Maintenance   Topic Date Due     ANNUAL REVIEW OF HM ORDERS  1940     ZOSTER IMMUNIZATION (2 of 3) 01/04/2013     PHQ-2  01/01/2019     MAMMO SCREENING  01/03/2019     A1C  11/01/2019     CMP  11/01/2019     LIPID  11/01/2019     FALL RISK ASSESSMENT  11/07/2019     MEDICARE ANNUAL WELLNESS VISIT  11/07/2019     DTAP/TDAP/TD IMMUNIZATION (2 - Td) 10/24/2021     ADVANCE CARE PLANNING  11/14/2022     DEXA  Completed     INFLUENZA VACCINE  Completed     PNEUMOCOCCAL IMMUNIZATION 65+ LOW/MEDIUM RISK  Completed     IPV IMMUNIZATION  Aged Out     MENINGITIS IMMUNIZATION  Aged Out       S/p total colectomy 4/2016 - doing well with current ostomy overall    HTN/tachycardia - on toprol XL.  BP has been in good range on recent visits.  No new cardiac symptoms.    Dermatology - recently had spots removed on back and right shin.  Following with dermatology next in April.  Goes twice yearly.  Hx of skin cancer.    Vaginal atrophy - on vaginal estrogen - seeing Dr Kaplan in urogynecology  "now and is very pleased with her care there.    Had 3 bladder infections last year - hoping that vaginal estrogen will help to prevent these.    Sleep issues - not sleeping well. Has a tremendous amount on her mind - 's health and changing her ostomy. Tries not to take melatonin, but uses sometimes.      HL - on statin - tolerating well.   Due for lab work.    Anxiety - caring for a lot - 's health has been more difficult this year.  Patient not interested in anxiety treatment at this time, prefers to focus on sleep.      GERD - on low dose PPI and doing well.    DEXA scans have shown normal bone density, most recently in 2013    Review of Systems   Constitutional: Negative for chills and fever.   HENT: Negative for congestion and ear pain.    Eyes: Negative for pain.   Respiratory: Negative for cough.    Cardiovascular: Negative for chest pain.   Gastrointestinal: Negative for abdominal pain, constipation, diarrhea and hematochezia.   Genitourinary: Negative for frequency and hematuria.   Neurological: Positive for dizziness.   Psychiatric/Behavioral: The patient is nervous/anxious.          OBJECTIVE:   /64   Pulse 65   Temp 97.8  F (36.6  C) (Tympanic)   Resp 16   Ht 1.549 m (5' 1\")   Wt 67.5 kg (148 lb 14.4 oz)   SpO2 98%   BMI 28.13 kg/m   Estimated body mass index is 28.13 kg/m  as calculated from the following:    Height as of this encounter: 1.549 m (5' 1\").    Weight as of this encounter: 67.5 kg (148 lb 14.4 oz).   Wt Readings from Last 4 Encounters:   11/11/19 67.5 kg (148 lb 14.4 oz)   09/27/19 66.2 kg (146 lb)   05/13/19 68.2 kg (150 lb 6.4 oz)   04/27/19 67.9 kg (149 lb 11.2 oz)       Physical Exam  GENERAL: healthy, alert and no distress  EYES: Eyes grossly normal to inspection, PERRL and conjunctivae and sclerae normal  HENT: ear canals and TM's normal, nose and mouth without ulcers or lesions  NECK: no adenopathy, no asymmetry, masses, or scars and thyroid normal to " palpation  RESP: lungs clear to auscultation - no rales, rhonchi or wheezes  CV: regular rate and rhythm, normal S1 S2, no S3 or S4, no murmur, click or rub, no peripheral edema and peripheral pulses strong  ABDOMEN: healthy, pink ostomy site with liquid stool in bag, +BS, soft, nontender, no masses  MS: no gross musculoskeletal defects noted, no edema  SKIN: scattered pigmented nevi  NEURO: Normal strength and tone, mentation intact and speech normal  PSYCH: mentation appears normal, affect normal/bright    Diagnostic Test Results:  pending    ASSESSMENT / PLAN:       ICD-10-CM    1. Medicare annual wellness visit, subsequent Z00.00 HEMOGLOBIN A1C     COMPREHENSIVE METABOLIC PANEL     Lipid panel reflex to direct LDL Fasting   2. Atrophic vaginitis N95.2 Now following with urogynecology and seeing Dr Kaplan.  On vaginal estrogen.   3. Hyperlipidemia LDL goal <160 E78.5 COMPREHENSIVE METABOLIC PANEL     Lipid panel reflex to direct LDL Fasting  Well controlled, continue current medications     4. Sinus tachycardia R00.0 On beta blocker, doing well   5. Hypertension goal BP (blood pressure) < 140/90 I10 COMPREHENSIVE METABOLIC PANEL     Albumin Random Urine Quantitative with Creat Ratio  Well controlled, continue current medications     6. Gastroesophageal reflux disease, esophagitis presence not specified K21.9 Continue low dose PPI   7. Visit for screening mammogram Z12.31 MA Screen Bilateral w/Jeanmarie   8. Chronic kidney disease, stage III (moderate) (H) N18.3 COMPREHENSIVE METABOLIC PANEL     CBC with platelets     Albumin Random Urine Quantitative with Creat Ratio   9. S/p total colectomy on 4/22/16 by Shana Alaniz MD Z90.49 Doing well with ostomy   10. IFG (impaired fasting glucose) R73.01 HEMOGLOBIN A1C   11. Insomnia, unspecified type G47.00 SLEEP EVALUATION & MANAGEMENT REFERRAL - [facility] [place] - [facility] Locations - [phone]    Patient is interested in seeing sleep medicine, insomnia  "problematic, but doesn't want to take medications, and I wouldn't recommend pharmacologic therapy at this point.  Discussed sleep meditation, increased daytime activity.       COUNSELING:  Special attention given to:       Regular exercise       Healthy diet/nutrition       Vision screening       Bladder control       Immunizations    Discussed Shingrix, patient will check on coverage        Estimated body mass index is 28.13 kg/m  as calculated from the following:    Height as of this encounter: 1.549 m (5' 1\").    Weight as of this encounter: 67.5 kg (148 lb 14.4 oz).    Weight management plan: Discussed healthy diet and exercise guidelines     reports that she has never smoked. She has never used smokeless tobacco.      Appropriate preventive services were discussed with this patient, including applicable screening as appropriate for cardiovascular disease, diabetes, osteopenia/osteoporosis, and glaucoma.  As appropriate for age/gender, discussed screening for colorectal cancer, prostate cancer, breast cancer, and cervical cancer. Checklist reviewing preventive services available has been given to the patient.    Reviewed patients plan of care and provided an AVS. The Intermediate Care Plan ( asthma action plan, low back pain action plan, and migraine action plan) for Gely meets the Care Plan requirement. This Care Plan has been established and reviewed with the Patient.    Counseling Resources:  ATP IV Guidelines  Pooled Cohorts Equation Calculator  Breast Cancer Risk Calculator  FRAX Risk Assessment  ICSI Preventive Guidelines  Dietary Guidelines for Americans, 2010  USDA's MyPlate  ASA Prophylaxis  Lung CA Screening    Hien Booth MD  [facility]    Identified Health Risks:  "

## 2019-11-27 ENCOUNTER — HOSPITAL ENCOUNTER (EMERGENCY)
Facility: CLINIC | Age: 79
Discharge: HOME | End: 2019-11-28
Attending: EMERGENCY MEDICINE | Admitting: EMERGENCY MEDICINE
Payer: MEDICARE

## 2019-11-27 DIAGNOSIS — R10.84 ABDOMINAL PAIN, GENERALIZED: ICD-10-CM

## 2019-11-27 LAB
ALBUMIN UR-MCNC: NEGATIVE MG/DL
APPEARANCE UR: CLEAR
BASOPHILS # BLD AUTO: 0.1 10E9/L (ref 0–0.2)
BASOPHILS NFR BLD AUTO: 1.3 %
BILIRUB UR QL STRIP: NEGATIVE
COLOR UR AUTO: ABNORMAL
DIFFERENTIAL METHOD BLD: NORMAL
EOSINOPHIL # BLD AUTO: 0.1 10E9/L (ref 0–0.7)
EOSINOPHIL NFR BLD AUTO: 1.3 %
ERYTHROCYTE [DISTWIDTH] IN BLOOD BY AUTOMATED COUNT: 12.3 % (ref 10–15)
GLUCOSE UR STRIP-MCNC: NEGATIVE MG/DL
HCT VFR BLD AUTO: 37.4 % (ref 35–47)
HGB BLD-MCNC: 12.4 G/DL (ref 11.7–15.7)
HGB UR QL STRIP: NEGATIVE
IMM GRANULOCYTES # BLD: 0 10E9/L (ref 0–0.4)
IMM GRANULOCYTES NFR BLD: 0.3 %
KETONES UR STRIP-MCNC: NEGATIVE MG/DL
LACTATE BLD-SCNC: 0.9 MMOL/L (ref 0.7–2)
LEUKOCYTE ESTERASE UR QL STRIP: ABNORMAL
LYMPHOCYTES # BLD AUTO: 2.5 10E9/L (ref 0.8–5.3)
LYMPHOCYTES NFR BLD AUTO: 33.2 %
MCH RBC QN AUTO: 30.2 PG (ref 26.5–33)
MCHC RBC AUTO-ENTMCNC: 33.2 G/DL (ref 31.5–36.5)
MCV RBC AUTO: 91 FL (ref 78–100)
MONOCYTES # BLD AUTO: 0.9 10E9/L (ref 0–1.3)
MONOCYTES NFR BLD AUTO: 11.6 %
NEUTROPHILS # BLD AUTO: 4 10E9/L (ref 1.6–8.3)
NEUTROPHILS NFR BLD AUTO: 52.3 %
NITRATE UR QL: NEGATIVE
NRBC # BLD AUTO: 0 10*3/UL
NRBC BLD AUTO-RTO: 0 /100
PH UR STRIP: 5 PH (ref 5–7)
PLATELET # BLD AUTO: 212 10E9/L (ref 150–450)
RBC # BLD AUTO: 4.11 10E12/L (ref 3.8–5.2)
SOURCE: ABNORMAL
SP GR UR STRIP: 1 (ref 1–1.03)
UROBILINOGEN UR STRIP-MCNC: NORMAL MG/DL (ref 0–2)
WBC # BLD AUTO: 7.6 10E9/L (ref 4–11)

## 2019-11-27 PROCEDURE — 96374 THER/PROPH/DIAG INJ IV PUSH: CPT | Mod: 59

## 2019-11-27 PROCEDURE — 83690 ASSAY OF LIPASE: CPT | Performed by: EMERGENCY MEDICINE

## 2019-11-27 PROCEDURE — 81003 URINALYSIS AUTO W/O SCOPE: CPT | Performed by: EMERGENCY MEDICINE

## 2019-11-27 PROCEDURE — 85025 COMPLETE CBC W/AUTO DIFF WBC: CPT | Performed by: EMERGENCY MEDICINE

## 2019-11-27 PROCEDURE — 99285 EMERGENCY DEPT VISIT HI MDM: CPT | Mod: 25

## 2019-11-27 PROCEDURE — 84484 ASSAY OF TROPONIN QUANT: CPT | Performed by: EMERGENCY MEDICINE

## 2019-11-27 PROCEDURE — 80053 COMPREHEN METABOLIC PANEL: CPT | Performed by: EMERGENCY MEDICINE

## 2019-11-27 PROCEDURE — 81001 URINALYSIS AUTO W/SCOPE: CPT | Performed by: EMERGENCY MEDICINE

## 2019-11-27 PROCEDURE — 83605 ASSAY OF LACTIC ACID: CPT | Performed by: EMERGENCY MEDICINE

## 2019-11-27 PROCEDURE — 93005 ELECTROCARDIOGRAM TRACING: CPT

## 2019-11-27 RX ORDER — ONDANSETRON 2 MG/ML
4 INJECTION INTRAMUSCULAR; INTRAVENOUS ONCE
Status: DISCONTINUED | OUTPATIENT
Start: 2019-11-27 | End: 2019-11-28 | Stop reason: HOSPADM

## 2019-11-27 ASSESSMENT — ENCOUNTER SYMPTOMS
NAUSEA: 1
ABDOMINAL PAIN: 1
VOMITING: 0
SHORTNESS OF BREATH: 0

## 2019-11-27 NOTE — ED AVS SNAPSHOT
[facility]  [address]  [facility] [address]  Phone:  [phone]  Fax:  [phone]                                    Gely Keene   MRN: [id number]    Department:  [facility]   Date of Visit:  11/27/2019           After Visit Summary Signature Page    I have received my discharge instructions, and my questions have been answered. I have discussed any challenges I see with this plan with the nurse or doctor.    ..........................................................................................................................................  Patient/Patient Representative Signature      ..........................................................................................................................................  Patient Representative Print Name and Relationship to Patient    ..................................................               ................................................  Date                                   Time    ..........................................................................................................................................  Reviewed by Signature/Title    ...................................................              ..............................................  Date                                               Time          22EPIC Rev 08/18

## 2019-11-28 ENCOUNTER — APPOINTMENT (OUTPATIENT)
Dept: CT IMAGING | Facility: CLINIC | Age: 79
End: 2019-11-28
Attending: EMERGENCY MEDICINE
Payer: MEDICARE

## 2019-11-28 VITALS
RESPIRATION RATE: 18 BRPM | BODY MASS INDEX: 27.4 KG/M2 | HEART RATE: 80 BPM | OXYGEN SATURATION: 98 % | DIASTOLIC BLOOD PRESSURE: 89 MMHG | TEMPERATURE: 98.1 F | SYSTOLIC BLOOD PRESSURE: 194 MMHG | WEIGHT: 145 LBS

## 2019-11-28 LAB
ALBUMIN SERPL-MCNC: 3.7 G/DL (ref 3.4–5)
ALBUMIN UR-MCNC: NEGATIVE MG/DL
ALP SERPL-CCNC: 83 U/L (ref 40–150)
ALT SERPL W P-5'-P-CCNC: 27 U/L (ref 0–50)
ANION GAP SERPL CALCULATED.3IONS-SCNC: 7 MMOL/L (ref 3–14)
APPEARANCE UR: CLEAR
AST SERPL W P-5'-P-CCNC: 35 U/L (ref 0–45)
BACTERIA #/AREA URNS HPF: ABNORMAL /HPF
BILIRUB SERPL-MCNC: 1 MG/DL (ref 0.2–1.3)
BILIRUB UR QL STRIP: NEGATIVE
BUN SERPL-MCNC: 16 MG/DL (ref 7–30)
CALCIUM SERPL-MCNC: 9 MG/DL (ref 8.5–10.1)
CHLORIDE SERPL-SCNC: 96 MMOL/L (ref 94–109)
CO2 SERPL-SCNC: 25 MMOL/L (ref 20–32)
COLOR UR AUTO: ABNORMAL
CREAT SERPL-MCNC: 1.23 MG/DL (ref 0.52–1.04)
GFR SERPL CREATININE-BSD FRML MDRD: 41 ML/MIN/{1.73_M2}
GLUCOSE SERPL-MCNC: 109 MG/DL (ref 70–99)
GLUCOSE UR STRIP-MCNC: NEGATIVE MG/DL
HGB UR QL STRIP: NEGATIVE
INTERPRETATION ECG - MUSE: NORMAL
KETONES UR STRIP-MCNC: NEGATIVE MG/DL
LEUKOCYTE ESTERASE UR QL STRIP: ABNORMAL
LIPASE SERPL-CCNC: 194 U/L (ref 73–393)
MUCOUS THREADS #/AREA URNS LPF: PRESENT /LPF
NITRATE UR QL: NEGATIVE
PH UR STRIP: 5 PH (ref 5–7)
POTASSIUM SERPL-SCNC: 4 MMOL/L (ref 3.4–5.3)
PROT SERPL-MCNC: 7.6 G/DL (ref 6.8–8.8)
RBC #/AREA URNS AUTO: <1 /HPF (ref 0–2)
SODIUM SERPL-SCNC: 128 MMOL/L (ref 133–144)
SOURCE: ABNORMAL
SP GR UR STRIP: 1 (ref 1–1.03)
SQUAMOUS #/AREA URNS AUTO: <1 /HPF (ref 0–1)
TROPONIN I SERPL-MCNC: <0.015 UG/L (ref 0–0.04)
UROBILINOGEN UR STRIP-MCNC: NORMAL MG/DL (ref 0–2)
WBC #/AREA URNS AUTO: 3 /HPF (ref 0–5)

## 2019-11-28 PROCEDURE — 96374 THER/PROPH/DIAG INJ IV PUSH: CPT | Mod: 59

## 2019-11-28 PROCEDURE — 25000128 H RX IP 250 OP 636: Performed by: EMERGENCY MEDICINE

## 2019-11-28 PROCEDURE — 74177 CT ABD & PELVIS W/CONTRAST: CPT

## 2019-11-28 PROCEDURE — 25000125 ZZHC RX 250: Performed by: EMERGENCY MEDICINE

## 2019-11-28 RX ORDER — IOPAMIDOL 755 MG/ML
500 INJECTION, SOLUTION INTRAVASCULAR ONCE
Status: COMPLETED | OUTPATIENT
Start: 2019-11-28 | End: 2019-11-28

## 2019-11-28 RX ORDER — HYDRALAZINE HYDROCHLORIDE 20 MG/ML
10 INJECTION INTRAMUSCULAR; INTRAVENOUS ONCE
Status: COMPLETED | OUTPATIENT
Start: 2019-11-28 | End: 2019-11-28

## 2019-11-28 RX ORDER — ONDANSETRON 4 MG/1
4 TABLET, ORALLY DISINTEGRATING ORAL EVERY 8 HOURS PRN
Qty: 10 TABLET | Refills: 0 | Status: SHIPPED | OUTPATIENT
Start: 2019-11-28 | End: 2020-01-21

## 2019-11-28 RX ADMIN — IOPAMIDOL 73 ML: 755 INJECTION, SOLUTION INTRAVENOUS at 00:20

## 2019-11-28 RX ADMIN — HYDRALAZINE HYDROCHLORIDE 10 MG: 20 INJECTION INTRAMUSCULAR; INTRAVENOUS at 01:10

## 2019-11-28 RX ADMIN — SODIUM CHLORIDE 59 ML: 9 INJECTION, SOLUTION INTRAVENOUS at 00:20

## 2019-11-28 NOTE — DISCHARGE INSTRUCTIONS
Follow-up:  Please follow-up with your primary care provider in 2-3 days for re-evaluation and discussion of your visit to the emergency department today.    Home treatments:  Recommended home therapies include rest, fluids, zofran as needed, and close monitoring of symptoms.    New prescriptions:  Zofran    Return precautions:  Warning signs which should prompt you to return to the ER include worsening nausea, pain, vomiting, fevers, or any other new or troubling symptoms.  We are always happy to see you again.    Discharge Instructions  Abdominal Pain    Abdominal pain (belly pain) can be caused by many things. Your evaluation today does not show the exact cause for your pain. Your provider today has decided that it is unlikely your pain is due to a life threatening problem, or a problem requiring surgery or hospital admission. Sometimes those problems cannot be found right away, so it is very important that you follow up as directed.  Sometimes only the changes which occur over time allow the cause of your pain to be found.    Generally, every Emergency Department visit should have a follow-up clinic visit with either a primary or a specialty clinic/provider. Please follow-up as instructed by your emergency provider today. With abdominal pain, we often recommend very close follow-up, such as the following day.    ADULTS:  Return to the Emergency Department right away if:    You get an oral temperature above 102oF or as directed by your provider.  You have blood in your stools. This may be bright red or appear as black, tarry stools.    You keep vomiting (throwing up) or cannot drink liquids.  You see blood when you vomit.   You cannot have a bowel movement or you cannot pass gas.  Your stomach gets bloated or bigger.  Your skin or the whites of your eyes look yellow.  You faint.  You have bloody, frequent or painful urination (peeing).  You have new symptoms or anything that worries you.    CHILDREN:  Return to  the Emergency Department right away if your child has any of the above-listed symptoms or the following:    Pushes your hand away or screams/cries when his/her belly is touched.  You notice your child is very fussy or weak.  Your child is very tired and is too tired to eat or drink.  Your child is dehydrated.  Signs of dehydration can be:  Significant change in the amount of wet diapers/urine.  Your infant or child starts to have dry mouth and lips, or no saliva (spit) or tears.    PREGNANT WOMEN:  Return to the Emergency Department right away if you have any of the above-listed symptoms or the following:    You have bleeding, leaking fluid or passing tissue from the vagina.  You have worse pain or cramping, or pain in your shoulder or back.  You have vomiting that will not stop.  You have a temperature of 100oF or more.  Your baby is not moving as much as usual.  You faint.  You get a bad headache with or without eye problems and abdominal pain.  You have a seizure.  You have unusual discharge from your vagina and abdominal pain.    Abdominal pain is pretty common during pregnancy.  Your pain may or may not be related to your pregnancy. You should follow-up closely with your OB provider so they can evaluate you and your baby.  Until you follow-up with your regular provider, do the following:     Avoid sex and do not put anything in your vagina.  Drink clear fluids.  Only take medications approved by your provider.    MORE INFORMATION:    Appendicitis:  A possible cause of abdominal pain in any person who still has their appendix is acute appendicitis. Appendicitis is often hard to diagnose.  Testing does not always rule out early appendicitis or other causes of abdominal pain. Close follow-up with your provider and re-evaluations may be needed to figure out the reason for your abdominal pain.    Follow-up:  It is very important that you make an appointment with your clinic and go to the appointment.  If you do not  "follow-up with your primary provider, it may result in missing an important development which could result in permanent injury or disability and/or lasting pain.  If there is any problem keeping your appointment, call your provider or return to the Emergency Department.    Medications:  Take your medications as directed by your provider today.  Before using over-the-counter medications, ask your provider and make sure to take the medications as directed.  If you have any questions about medications, ask your provider.    Diet:  Resume your normal diet as much as possible, but do not eat fried, fatty or spicy foods while you have pain.  Do not drink alcohol or have caffeine.  Do not smoke tobacco.    Probiotics: If you have been given an antibiotic, you may want to also take a probiotic pill or eat yogurt with live cultures. Probiotics have \"good bacteria\" to help your intestines stay healthy. Studies have shown that probiotics help prevent diarrhea (loose stools) and other intestine problems (including C. diff infection) when you take antibiotics. You can buy these without a prescription in the pharmacy section of the store.     If you were given a prescription for medicine here today, be sure to read all of the information (including the package insert) that comes with your prescription.  This will include important information about the medicine, its side effects, and any warnings that you need to know about.  The pharmacist who fills the prescription can provide more information and answer questions you may have about the medicine.  If you have questions or concerns that the pharmacist cannot address, please call or return to the Emergency Department.       Remember that you can always come back to the Emergency Department if you are not able to see your regular provider in the amount of time listed above, if you get any new symptoms, or if there is anything that worries you.        "

## 2019-11-28 NOTE — ED PROVIDER NOTES
"  History     Chief Complaint:    Abdominal Pain      HPI   Gely Keene is a 79 year old female, s/p colectomy, s/p cholecystectomy, ileostomy present, who presents to the ED for evaluation of abdominal pain. About five hours prior to arrival, the patient states that she developed epigastric abdominal pain and tightness that has persisted throughout the day today. She notes that the pain feels like a \"twisting\" pain when she moves or bends over. She reports having pain like this several times in the past, however, it has never lasted this long before. She also complains of nausea and feeling jittery. She otherwise denies abdominal distension, vomiting, chest pain, shortness of breath, or blood ostomy output. Of note, the patient started taking Cipro yesterday for treatment of a UTI.  Her urinary symptoms are improving.    Allergies:  Bactrim  Codeine  Metronidazole  Sulfa drugs  Sulfur  Versed     Medications:    Lipitor  Metoprolol     Past Medical History:    Anxiety  BCC  GERD  HTN  HLD  RLS  CKD    Past Surgical History:    Appendectomy  Colectomy  ERCP x2  Hysterectomy  Cholecystectomy  Exploratory laparotomy  Phacoemulsification clear cornea with standard intraocular lens implant x2    Family History:    Mother: breast cancer  Daughter: breast cancer  Sister: breast cancer    Social History:  Negative for tobacco use.  Social alcohol use.   Denies drug use.   Presents with her , [place].  Marital Status:        Review of Systems   Respiratory: Negative for shortness of breath.    Cardiovascular: Negative for chest pain.   Gastrointestinal: Positive for abdominal pain and nausea. Negative for vomiting.   All other systems reviewed and are negative.      Physical Exam   First Vitals:  BP: (!) 215/91  Pulse: 72  Heart Rate: 72  Temp: 97  F (36.1  C)  Resp: 18  Weight: 65.8 kg (145 lb)  SpO2: 99 %      Physical Exam  General:              Well-nourished              Speaking in full " sentences  Eyes:              Conjunctiva without injection or scleral icterus  ENT:              Moist mucous membranes              Nares patent              Pinnae normal  Neck:              Full ROM              No stiffness appreciated  Resp:              Lungs CTAB              No crackles, wheezing or audible rubs              Good air movement  CV:                    Normal rate, regular rhythm              S1 and S2 present              No murmur, gallop or rub  GI:              BS present              Abdomen soft without distention              Ileostomy to R lower abdomen with non-bloody stool in bag              Mild tenderness to palpation across upper abdomen              No guarding or rebound tenderness  Skin:              Warm, dry, well perfused              No rashes or open wounds on exposed skin  MSK:              Moves all extremities              No focal deformities or swelling  Neuro:              Alert              Answers questions appropriately              Moves all extremities equally              Gait stable  Psych:              Anxious appearing    Emergency Department Course   ECG:  Indication: CV screen  Time: 2225  Vent. Rate 76 bpm. DC interval 146. QRS duration 84. QT/QTc 396/445. P-R-T axis 60 41 36.  Sinus rhythm with premature supraventricular complexes. Nonspecific ST abnormality. Abnormal ECG. Read time: 2300    Imaging:  Radiographic findings were communicated with the patient who voiced understanding of the findings.  CT Abdomen pelvis with contrast:   IMPRESSION:   1.  Subtotal colectomy with nonobstructing ileostomy.    2.  Cholecystectomy.    3.  The distal gastric body and antrum are not fully distended although cannot exclude mild wall thickening in this location and antritis as per radiology.    Laboratory:  CBC: WBC: 7.6, HGB: 12.4, PLT: 212  CMP: Glucose 109 (H), NA: 128 (L), Creatinine: 1.23 (H), GFR estimate: 4` (L), o/w WNL     Lipase: 194  UA with Microscopic:  leukocyte esterase: Moderate, o/w WNL  2338 Troponin: <0.015  2338 Lactic acid: 0.9    Interventions:  0110 Apresoline 10 mg IV  Please see MAR for full list of medications administered in the ED.    Emergency Department Course:  Nursing notes and vitals reviewed. (8471) I performed an exam of the patient as documented above.     The patient provided a urine sample here in the emergency department. This was sent for laboratory testing, findings above.     IV inserted. Medicine administered as documented above. Blood drawn. This was sent to the lab for further testing, results above.     The patient was sent for a abd/pelvis CT while in the emergency department, findings above.     0100 I rechecked the patient and discussed the results of her workup thus far. Repeat abdominal exam is soft.     0135 I rechecked the patient and discussed the results of her workup thus far.    Repeat BP improved to 160s/70s.    Findings and plan explained to the Patient. Patient discharged home with instructions regarding supportive care, medications, and reasons to return. The importance of close follow-up was reviewed. The patient was prescribed Zofran.     I personally reviewed the laboratory results with the Patient and answered all related questions prior to discharge.   Impression & Plan    Medical Decision Making:  Gely Griffithluisnathan very pleasant 79-year-old female with a complex PMH significant for diverticulitis status post colectomynig is a and diverting ileostomy, previous cholecystectomy, hysterectomy, who presents to the ER accompanied by her  for evaluation of upper abdominal pain.  VS on presentation notable for market hypertension, which improved during her ED course, though otherwise are unremarkable.  Patient by history describes similar episodes of pain located to the upper abdomen, often associated and exacerbated by bending over.  Typically the symptoms self resolve after a short while, though today's  episode lasted longer, prompting her presentation to the ER.  A broad differential was considered regarding patient's presenting symptoms including though not limited to, bowel obstruction, perforation, colitis, pancreatitis, choledocholithiasis, ascending cholangitis, malignancy, cardiac ischemia, pulmonary embolism, referred aortic pathology, among others.  Given patient's extensive surgical history, I did feel further advanced imaging was indicated.  Fortunately, imaging reveals no findings of acute inflammatory changes nor evidence of bowel obstruction.  By history, patient notes nausea, no vomiting, and continues to report ostomy output, arguing against complete obstruction as well.  Her symptoms are distinctly exacerbated by changes in position.  Labs demonstrate normal lipase, arguing against pancreatitis.  She is status post cholecystectomy, and LFTs do not reveal findings suspicious for an obstructive biliary process.  Labs reveal stable renal insufficiency.  Referred cardiac ischemia considered though felt to be unlikely.  Her EKG demonstrates sinus rhythm, nonspecific ST-T wave changes, though no significant changes compared with previous and her troponin is undetectable.  In the absence of pleuritic discomfort, as well as given association of pain changing with positions, doubt pulmonary embolism.  Patient additionally without tachycardia no hypoxia.  Referred aortic pathology also considered though felt to be unlikely given the above history, evaluation, and work-up.  Her urinalysis reveals a few white blood cells and moderate LE, though symptoms are improving in the setting of concurrent diagnosed UTI for which she takes Cipro.  I question if Cipro may also be causing some of her associated nausea, though she only has 3 doses remaining and I feel it would be reasonable to continue with this.  Regarding the patient's elevated blood pressure, this did improve during her ED course.  No other signs or  symptoms consistent with endorgan dysfunction requiring hospitalization.  Given the patient's above work-up, reassuring exam, I feel she is stable for discharge home with reasonable clinical certainty.  I have recommended close monitoring of symptoms, follow-up with primary care provider and Dr. Grijalva of colorectal surgery in 3 to 5 days, return immediately to the ER with recurrent pain, vomiting, fevers, decreased ostomy output, or any other concerning symptoms.  Patient felt comfortable with this plan of care and all questions were answered prior to discharge.      Diagnosis:    ICD-10-CM    1. Abdominal pain, generalized R10.84        Disposition:  discharged to home    Discharge Medications:  New Prescriptions    ONDANSETRON (ZOFRAN ODT) 4 MG ODT TAB    Take 1 tablet (4 mg) by mouth every 8 hours as needed     Scribe Disclosure:  I,  Ananda Coulter, am serving as a scribe on 11/27/2019 at 10:49 PM to personally document services performed by Dale Alvarado MD based on my observations and the provider's statements to me.        Ananda Coulter  11/27/2019   [facility] EMERGENCY DEPARTMENT       Dale Alvarado MD  11/29/19 1058

## 2019-11-29 ENCOUNTER — HOSPITAL ENCOUNTER (EMERGENCY)
Facility: CLINIC | Age: 79
Discharge: HOME | End: 2019-11-29
Attending: EMERGENCY MEDICINE | Admitting: EMERGENCY MEDICINE
Payer: MEDICARE

## 2019-11-29 ENCOUNTER — APPOINTMENT (OUTPATIENT)
Dept: CT IMAGING | Facility: CLINIC | Age: 79
End: 2019-11-29
Attending: EMERGENCY MEDICINE
Payer: MEDICARE

## 2019-11-29 VITALS
HEART RATE: 82 BPM | OXYGEN SATURATION: 98 % | SYSTOLIC BLOOD PRESSURE: 210 MMHG | TEMPERATURE: 98.1 F | DIASTOLIC BLOOD PRESSURE: 84 MMHG | RESPIRATION RATE: 18 BRPM

## 2019-11-29 DIAGNOSIS — F41.9 ANXIETY: ICD-10-CM

## 2019-11-29 DIAGNOSIS — R10.13 ABDOMINAL PAIN, EPIGASTRIC: ICD-10-CM

## 2019-11-29 LAB
ALBUMIN SERPL-MCNC: 3.6 G/DL (ref 3.4–5)
ALBUMIN UR-MCNC: NEGATIVE MG/DL
ALP SERPL-CCNC: 75 U/L (ref 40–150)
ALT SERPL W P-5'-P-CCNC: 29 U/L (ref 0–50)
ANION GAP SERPL CALCULATED.3IONS-SCNC: 7 MMOL/L (ref 3–14)
APPEARANCE UR: CLEAR
AST SERPL W P-5'-P-CCNC: 33 U/L (ref 0–45)
BASOPHILS # BLD AUTO: 0.1 10E9/L (ref 0–0.2)
BASOPHILS NFR BLD AUTO: 1 %
BILIRUB SERPL-MCNC: 1 MG/DL (ref 0.2–1.3)
BILIRUB UR QL STRIP: NEGATIVE
BUN SERPL-MCNC: 16 MG/DL (ref 7–30)
CALCIUM SERPL-MCNC: 9.5 MG/DL (ref 8.5–10.1)
CHLORIDE SERPL-SCNC: 94 MMOL/L (ref 94–109)
CO2 SERPL-SCNC: 24 MMOL/L (ref 20–32)
COLOR UR AUTO: ABNORMAL
CREAT SERPL-MCNC: 1.2 MG/DL (ref 0.52–1.04)
DIFFERENTIAL METHOD BLD: ABNORMAL
EOSINOPHIL # BLD AUTO: 0.1 10E9/L (ref 0–0.7)
EOSINOPHIL NFR BLD AUTO: 0.8 %
ERYTHROCYTE [DISTWIDTH] IN BLOOD BY AUTOMATED COUNT: 12.4 % (ref 10–15)
GFR SERPL CREATININE-BSD FRML MDRD: 42 ML/MIN/{1.73_M2}
GLUCOSE SERPL-MCNC: 119 MG/DL (ref 70–99)
GLUCOSE UR STRIP-MCNC: NEGATIVE MG/DL
HCT VFR BLD AUTO: 34.9 % (ref 35–47)
HGB BLD-MCNC: 12.1 G/DL (ref 11.7–15.7)
HGB UR QL STRIP: NEGATIVE
IMM GRANULOCYTES # BLD: 0 10E9/L (ref 0–0.4)
IMM GRANULOCYTES NFR BLD: 0.3 %
KETONES UR STRIP-MCNC: NEGATIVE MG/DL
LACTATE BLD-SCNC: 1.6 MMOL/L (ref 0.7–2)
LEUKOCYTE ESTERASE UR QL STRIP: NEGATIVE
LIPASE SERPL-CCNC: 203 U/L (ref 73–393)
LYMPHOCYTES # BLD AUTO: 1.9 10E9/L (ref 0.8–5.3)
LYMPHOCYTES NFR BLD AUTO: 32.3 %
MCH RBC QN AUTO: 30.9 PG (ref 26.5–33)
MCHC RBC AUTO-ENTMCNC: 34.7 G/DL (ref 31.5–36.5)
MCV RBC AUTO: 89 FL (ref 78–100)
MONOCYTES # BLD AUTO: 0.8 10E9/L (ref 0–1.3)
MONOCYTES NFR BLD AUTO: 12.7 %
MUCOUS THREADS #/AREA URNS LPF: PRESENT /LPF
NEUTROPHILS # BLD AUTO: 3.1 10E9/L (ref 1.6–8.3)
NEUTROPHILS NFR BLD AUTO: 52.9 %
NITRATE UR QL: NEGATIVE
NRBC # BLD AUTO: 0 10*3/UL
NRBC BLD AUTO-RTO: 0 /100
PH UR STRIP: 5 PH (ref 5–7)
PLATELET # BLD AUTO: 204 10E9/L (ref 150–450)
POTASSIUM SERPL-SCNC: 4.1 MMOL/L (ref 3.4–5.3)
PROT SERPL-MCNC: 7.2 G/DL (ref 6.8–8.8)
RBC # BLD AUTO: 3.91 10E12/L (ref 3.8–5.2)
RBC #/AREA URNS AUTO: <1 /HPF (ref 0–2)
SODIUM SERPL-SCNC: 125 MMOL/L (ref 133–144)
SOURCE: ABNORMAL
SP GR UR STRIP: 1.01 (ref 1–1.03)
SQUAMOUS #/AREA URNS AUTO: 1 /HPF (ref 0–1)
TROPONIN I SERPL-MCNC: <0.015 UG/L (ref 0–0.04)
UROBILINOGEN UR STRIP-MCNC: NORMAL MG/DL (ref 0–2)
WBC # BLD AUTO: 5.9 10E9/L (ref 4–11)
WBC #/AREA URNS AUTO: 2 /HPF (ref 0–5)

## 2019-11-29 PROCEDURE — 99285 EMERGENCY DEPT VISIT HI MDM: CPT | Mod: 25

## 2019-11-29 PROCEDURE — 96374 THER/PROPH/DIAG INJ IV PUSH: CPT | Mod: 59

## 2019-11-29 PROCEDURE — 25000128 H RX IP 250 OP 636: Performed by: EMERGENCY MEDICINE

## 2019-11-29 PROCEDURE — 80053 COMPREHEN METABOLIC PANEL: CPT | Performed by: EMERGENCY MEDICINE

## 2019-11-29 PROCEDURE — 83690 ASSAY OF LIPASE: CPT | Performed by: EMERGENCY MEDICINE

## 2019-11-29 PROCEDURE — 96361 HYDRATE IV INFUSION ADD-ON: CPT

## 2019-11-29 PROCEDURE — 83605 ASSAY OF LACTIC ACID: CPT | Performed by: EMERGENCY MEDICINE

## 2019-11-29 PROCEDURE — 81001 URINALYSIS AUTO W/SCOPE: CPT | Performed by: EMERGENCY MEDICINE

## 2019-11-29 PROCEDURE — 93005 ELECTROCARDIOGRAM TRACING: CPT

## 2019-11-29 PROCEDURE — 25000132 ZZH RX MED GY IP 250 OP 250 PS 637: Mod: GY | Performed by: EMERGENCY MEDICINE

## 2019-11-29 PROCEDURE — 71260 CT THORAX DX C+: CPT

## 2019-11-29 PROCEDURE — 96375 TX/PRO/DX INJ NEW DRUG ADDON: CPT

## 2019-11-29 PROCEDURE — 25800030 ZZH RX IP 258 OP 636: Performed by: EMERGENCY MEDICINE

## 2019-11-29 PROCEDURE — 85025 COMPLETE CBC W/AUTO DIFF WBC: CPT | Performed by: EMERGENCY MEDICINE

## 2019-11-29 PROCEDURE — 25000125 ZZHC RX 250: Performed by: EMERGENCY MEDICINE

## 2019-11-29 PROCEDURE — 84484 ASSAY OF TROPONIN QUANT: CPT | Performed by: EMERGENCY MEDICINE

## 2019-11-29 PROCEDURE — 74177 CT ABD & PELVIS W/CONTRAST: CPT

## 2019-11-29 RX ORDER — FENTANYL CITRATE 50 UG/ML
50 INJECTION, SOLUTION INTRAMUSCULAR; INTRAVENOUS EVERY 30 MIN PRN
Status: DISCONTINUED | OUTPATIENT
Start: 2019-11-29 | End: 2019-11-30 | Stop reason: HOSPADM

## 2019-11-29 RX ORDER — IOPAMIDOL 755 MG/ML
500 INJECTION, SOLUTION INTRAVASCULAR ONCE
Status: COMPLETED | OUTPATIENT
Start: 2019-11-29 | End: 2019-11-29

## 2019-11-29 RX ORDER — DICYCLOMINE HCL 20 MG
20 TABLET ORAL 4 TIMES DAILY PRN
Qty: 20 TABLET | Refills: 0 | Status: SHIPPED | OUTPATIENT
Start: 2019-11-29 | End: 2020-02-06

## 2019-11-29 RX ORDER — LORAZEPAM 1 MG/1
1 TABLET ORAL
Qty: 5 TABLET | Refills: 0 | Status: SHIPPED | OUTPATIENT
Start: 2019-11-29 | End: 2019-12-18 | Stop reason: DRUGHIGH

## 2019-11-29 RX ADMIN — IOPAMIDOL 79 ML: 755 INJECTION, SOLUTION INTRAVENOUS at 20:01

## 2019-11-29 RX ADMIN — FAMOTIDINE 20 MG: 10 INJECTION, SOLUTION INTRAVENOUS at 18:50

## 2019-11-29 RX ADMIN — SODIUM CHLORIDE 1000 ML: 9 INJECTION, SOLUTION INTRAVENOUS at 18:49

## 2019-11-29 RX ADMIN — SODIUM CHLORIDE 60 ML: 9 INJECTION, SOLUTION INTRAVENOUS at 20:01

## 2019-11-29 RX ADMIN — FENTANYL CITRATE 50 MCG: 50 INJECTION, SOLUTION INTRAMUSCULAR; INTRAVENOUS at 18:50

## 2019-11-29 RX ADMIN — LIDOCAINE HYDROCHLORIDE 30 ML: 20 SOLUTION ORAL; TOPICAL at 21:24

## 2019-11-29 ASSESSMENT — ENCOUNTER SYMPTOMS
NUMBNESS: 1
ABDOMINAL PAIN: 1
HEADACHES: 1
VOMITING: 0
LIGHT-HEADEDNESS: 1
NAUSEA: 0

## 2019-11-29 NOTE — ED AVS SNAPSHOT
[facility]  [address]  [facility] [address]  Phone:  [phone]  Fax:  [phone]                                    Gely Keene   MRN: [id number]    Department:  [facility]   Date of Visit:  11/29/2019           After Visit Summary Signature Page    I have received my discharge instructions, and my questions have been answered. I have discussed any challenges I see with this plan with the nurse or doctor.    ..........................................................................................................................................  Patient/Patient Representative Signature      ..........................................................................................................................................  Patient Representative Print Name and Relationship to Patient    ..................................................               ................................................  Date                                   Time    ..........................................................................................................................................  Reviewed by Signature/Title    ...................................................              ..............................................  Date                                               Time          22EPIC Rev 08/18

## 2019-11-29 NOTE — ED TRIAGE NOTES
Patient presents with upper right abdominal pain that started around 12 pm today. Patient had turkey sandwich with butter and pumpkin pie prior to pain. Patient has ileostomy, appendectomy, gallbladder removal, & hysterectomy. Patient seen 2 days ago for same issue.   ABC's intact.

## 2019-11-30 NOTE — ED PROVIDER NOTES
History     Chief Complaint:  Abdominal Pain    HPI   Gely Keene is a 79 year old female, status post colectomy and cholecystectomy, ileostomy present, who presents to the emergency department for evaluation of abdominal pain. Of note, the patient was seen in the ED for similar abdominal pain on 11/27 where a CT scan was performed, see results below. The patient indicates she felt better upon discharge from the ED, but she developed a similar epigastric abdominal pain and tightness around 1200 today, prompting her return to the ED. The patient further reports she feels lightheaded, tingling in her hands and feet, and has a headache. She denies nausea, vomiting, chest pain, shortness of breath, and fever. She notes she last ate today at 1130, although she's not eaten much since she was discharged on Wednesday. The patient notes she took Excedrin Migraine today at 1200 for her headache. She indicates she is worried the headache is due to high blood pressure. She notes she recently was diagnosed with a UTI and finished her course of Cipro this morning; she denies urinary symptoms.     11/27 CT Abdomen pelvis with contrast:   1.  Subtotal colectomy with nonobstructing ileostomy.  2.  Cholecystectomy.  3.  The distal gastric body and antrum are not fully distended although cannot exclude mild wall thickening in this location and antritis.  As per radiology.    Allergies:  Bactrim   Codeine  Metronidazole  Sulfa Drugs  Sulfur  Versed     Medications:    Lipitor  Toprol  Prilosec  Zofran      Past Medical History:    Anxiety  BCC  GERD  HTN  PONV  RLS  CKD stage 3    Past Surgical History:    Appendectomy  Cholecystitis   Choledocholithiasis   Colectomy with Colostomy  Cystoscopy  Endoscopic retrograde cholangiopancreatogram   Hysterectomy  Cholecystectomy   Laparotomy exploratory   Phacoemulsification clear cornea with standard IOL    Family History:    Breast cancer     Social History:  Presents with  , son, and daughter.   Never smoker.  Positive for alcohol use.    Negative for drug use.   Marital Status:   [2]     Review of Systems   Gastrointestinal: Positive for abdominal pain. Negative for nausea and vomiting.   Neurological: Positive for light-headedness, numbness and headaches.   All other systems reviewed and are negative.    Physical Exam     Patient Vitals for the past 24 hrs:   BP Temp Temp src Pulse Resp SpO2   11/29/19 2245 (!) 210/84 -- -- 82 -- --   11/29/19 2230 (!) 186/102 -- -- 88 -- --   11/29/19 2215 (!) 188/74 -- -- 77 -- --   11/29/19 2200 (!) 174/78 -- -- 79 -- 98 %   11/29/19 2130 (!) 181/80 -- -- 83 -- 97 %   11/29/19 2115 -- -- -- -- -- 98 %   11/29/19 2100 (!) 171/83 -- -- 108 -- --   11/29/19 1945 (!) 185/81 -- -- 73 -- 98 %   11/29/19 1930 (!) 175/74 -- -- 69 -- 99 %   11/29/19 1915 (!) 182/89 -- -- 79 -- 100 %   11/29/19 1900 (!) 182/86 -- -- 66 -- 99 %   11/29/19 1845 (!) 169/84 -- -- 71 -- --   11/29/19 1815 (!) 239/114 -- -- 79 -- 99 %   11/29/19 1800 (!) 220/95 -- -- 82 -- 100 %   11/29/19 1745 (!) 213/94 -- -- 75 -- 100 %   11/29/19 1732 (!) 195/90 -- -- 71 -- 100 %   11/29/19 1636 (!) 181/88 98.1  F (36.7  C) Temporal 73 18 100 %      Physical Exam  General: Alert, appears elderly, otherwise well-developed and well-nourished. Cooperative.     In moderate distress, appears uncomfortable.  HEENT:  Head:  Atraumatic  Ears:  External ears are normal  Mouth/Throat:  Oropharynx is without erythema or exudate and mucous membranes are moistdry.   Eyes:   Conjunctivae normal and EOM are normal. No scleral icterus.  Neck:   Normal range of motion. Neck supple.  CV:  Normal rate, regular rhythm, normal heart sounds and radial pulses are 2+ and symmetric.  No murmur.  Resp:  Breath sounds are clear bilaterally    Non-labored, no retractions or accessory muscle use  GI:  Abdomen is soft, no distension, mild epigastric tenderness. No rebound or guarding.  No CVA tenderness  bilaterally  MS:  Normal range of motion. No edema.    Normal strength in all 4 extremities.     Back atraumatic.    No midline cervical, thoracic, or lumbar tenderness  Skin:  Warm and dry.  No rash or lesions noted.  Neuro: Alert. Normal strength.  Sensation intact in all 4 extremities. GCS: 15  Psych:  Normal mood and affect.    Emergency Department Course     ECG:  Time: 1829  Vent. Rate 70 bpm. VT interval 152. QRS duration 84. QT/QTc 4114/447. P-R-T axis 60 43 37.  Normal sinus rhythm.  Normal ECG.  No significant change from ECG dated 11/27/19.  Read time: 1836     Imaging:  Radiographic findings were communicated with the patient and family who voiced understanding of the findings.    CT Aortic Survey w Contrast:  1.  No thoracic or abdominal aortic dissection.  2.  No acute abnormality in the chest, abdomen, or pelvis.  3.  Prior cholecystectomy. Prior colectomy. Right mid abdomen ileostomy.  As per radiology.    Laboratory:  1837 Troponin: <0.015    CBC: WBC: 5.9, HGB: 12.1, PLT: 204     CMP: Glucose 119 (H), Sodium 125 (L), Creatinine 1.20 (H), GFR Estimate 42 (L), o/w WNL      Lipase: 203    Lactic acid: 1.6    UA with micro: Mucous present, o/w negative     Interventions:  1849 NS 1L IV BOLUS  1850 Pepcid 20 mg IV   Sublimaze 50 mcg IV  2124 Xylocaine 2% 15 mL Mylanta ES/Maalox ES 15 mL GI Cocktail 30 mL PO    Emergency Department Course:  Nursing notes and vitals reviewed. 1807 I performed an exam of the patient as documented above.     IV inserted. Medicine administered as documented above. Blood drawn. This was sent to the lab for further testing, results above.    1829 EKG obtained in the ED, see results above.      The patient was sent for a CT Aortic Survey while in the emergency department, findings above.     2102 I rechecked the patient and discussed the results of her workup thus far.     2235 I rechecked the patient and discussed the results of her workup thus far.     Findings and plan  explained to the Patient and family. Patient discharged home with instructions regarding supportive care, medications, and reasons to return. The importance of close follow-up was reviewed. The patient was prescribed Bentyl and Ativan.     I personally reviewed the laboratory results with the Patient and family and answered all related questions prior to discharge.    Impression & Plan      Medical Decision Making:  Patient is a 79-year-old female with a complex past medical history of prior colectomy with current ileostomy, and prior cholecystectomy who re-presents for continued epigastric abdominal discomfort.  Patient had a full evaluation 2 days prior for a similar presentation with CT imaging that was unremarkable.  She was ultimately discharged home with plans for close outpatient follow-up.  She has now completed her Ciprofloxacin course for a suspected prior urinary tract infection.  Her urinalysis today reassuringly is negative, and she has no urinary complaints.  She was quite hypertensive and anxious on arrival here, and due to this a broad work-up was performed.  With her epigastric pain, differential included pancreatitis, GERD, gastritis, ACS, aortic dissection, PE, PTX, intra-abdominal abscess, SBO, mesenteric ischemia, diverticulitis.  She described this epigastric discomfort with radiation into the thorax/back.  EKG shows no concerning ischemic changes and troponin undetectable, low concern for ACS.  We did obtain CT imaging of the aorta, which reassuringly showed no evidence of aortic dissection and no other acute abnormalities within the chest, abdomen, or pelvis.  Patient's repeat blood work continues to remain relatively unremarkable.  There is no lipase elevation, low concern for pancreatitis.  Patient continues to have mild creatinine elevation at 1.20 consistent with very mild chronic kidney disease, similar to creatinine from 11/27.  Her sodium level is also mildly decreased at 125, although  this is quite similar to a very mild hyponatremia seen during her presentation 2 days prior, likely secondary to poor oral intake in recent three days.  CBC unremarkable.  Patient had mild improvement in her symptoms while here in the emergency department.  She had significant relief with opiate pain medication, although is visibly frustrated with unclear diagnosis to the cause of her bandlike distribution of epigastric abdominal discomfort.  With repeat CT imaging being unremarkable, we did trial oral intake as well as a GI cocktail, in case this may represent PUD/gastritis/GERD.  Patient had minimal improvement with the GI cocktail but was able to appropriately tolerate oral intake, with both food and drink.  In a shared decision-making model with family and patient we elected to trial discharge home with close outpatient follow-up with her primary care provider in 2 to 3 days for recheck.  She and family understood return to the emergency department if she has worsening or persistent abdominal pain symptoms.  I do feel there is a large anxiety component to her presentation as well and she was given a limited number of lorazepam to assist with what I suspect is anxiety.  Her blood pressure appropriately decreased while here in the emergency department after initial pain management and IV fluid rehydration.  Strict return precautions were discussed and all questions were answered prior to discharge.  The family and patient do understand that we do not have a clear etiology for her abdominal pain at this time, although there is no obvious surgical or infectious process that was identified here today.  After all questions answered and return precautions understood, patient discharged home in care of family.    Diagnosis:    ICD-10-CM   1. Abdominal pain, epigastric R10.13   2. Anxiety F41.9     Disposition:  discharged to home    Discharge Medications:  Discharge Medication List as of 11/29/2019 11:02 PM      START  taking these medications    Details   dicyclomine (BENTYL) 20 MG tablet Take 1 tablet (20 mg) by mouth 4 times daily as needed (epigastric abdominal pain, nausea), Disp-20 tablet, R-0, Local Print      LORazepam (ATIVAN) 1 MG tablet Take 1 tablet (1 mg) by mouth nightly as needed for anxiety, Disp-5 tablet, R-0, Local Print           Scribe Disclosure:  I, Carleen Wick, am serving as a scribe on 11/29/2019 at 6:07 PM to personally document services performed by Jose Francisco Harry MD based on my observations and the provider's statements to me.      Carleen Wick  11/29/2019   [facility] EMERGENCY DEPARTMENT       Jose Francisco Harry MD  11/30/19 9610

## 2019-12-01 ENCOUNTER — HOSPITAL ENCOUNTER (EMERGENCY)
Facility: CLINIC | Age: 79
Discharge: HOME | End: 2019-12-02
Attending: EMERGENCY MEDICINE | Admitting: EMERGENCY MEDICINE
Payer: MEDICARE

## 2019-12-01 DIAGNOSIS — I15.9 SECONDARY HYPERTENSION: ICD-10-CM

## 2019-12-01 DIAGNOSIS — R73.9 HYPERGLYCEMIA: ICD-10-CM

## 2019-12-01 DIAGNOSIS — R10.13 ABDOMINAL PAIN, EPIGASTRIC: ICD-10-CM

## 2019-12-01 LAB
ALBUMIN SERPL-MCNC: 3.2 G/DL (ref 3.4–5)
ALP SERPL-CCNC: 75 U/L (ref 40–150)
ALT SERPL W P-5'-P-CCNC: 27 U/L (ref 0–50)
ANION GAP SERPL CALCULATED.3IONS-SCNC: 7 MMOL/L (ref 3–14)
AST SERPL W P-5'-P-CCNC: 28 U/L (ref 0–45)
BASOPHILS # BLD AUTO: 0.1 10E9/L (ref 0–0.2)
BASOPHILS NFR BLD AUTO: 0.8 %
BILIRUB SERPL-MCNC: 0.8 MG/DL (ref 0.2–1.3)
BUN SERPL-MCNC: 13 MG/DL (ref 7–30)
CALCIUM SERPL-MCNC: 9.3 MG/DL (ref 8.5–10.1)
CHLORIDE SERPL-SCNC: 102 MMOL/L (ref 94–109)
CO2 SERPL-SCNC: 25 MMOL/L (ref 20–32)
CREAT SERPL-MCNC: 1.1 MG/DL (ref 0.52–1.04)
DIFFERENTIAL METHOD BLD: ABNORMAL
EOSINOPHIL # BLD AUTO: 0.1 10E9/L (ref 0–0.7)
EOSINOPHIL NFR BLD AUTO: 1.7 %
ERYTHROCYTE [DISTWIDTH] IN BLOOD BY AUTOMATED COUNT: 12.5 % (ref 10–15)
GFR SERPL CREATININE-BSD FRML MDRD: 47 ML/MIN/{1.73_M2}
GLUCOSE SERPL-MCNC: 160 MG/DL (ref 70–99)
HCT VFR BLD AUTO: 34.3 % (ref 35–47)
HGB BLD-MCNC: 11.9 G/DL (ref 11.7–15.7)
IMM GRANULOCYTES # BLD: 0 10E9/L (ref 0–0.4)
IMM GRANULOCYTES NFR BLD: 0.4 %
INR PPP: 1.01 (ref 0.86–1.14)
LACTATE BLD-SCNC: 1.1 MMOL/L (ref 0.7–2)
LIPASE SERPL-CCNC: 167 U/L (ref 73–393)
LYMPHOCYTES # BLD AUTO: 1.9 10E9/L (ref 0.8–5.3)
LYMPHOCYTES NFR BLD AUTO: 25.4 %
MCH RBC QN AUTO: 31.4 PG (ref 26.5–33)
MCHC RBC AUTO-ENTMCNC: 34.7 G/DL (ref 31.5–36.5)
MCV RBC AUTO: 91 FL (ref 78–100)
MONOCYTES # BLD AUTO: 1 10E9/L (ref 0–1.3)
MONOCYTES NFR BLD AUTO: 12.5 %
NEUTROPHILS # BLD AUTO: 4.5 10E9/L (ref 1.6–8.3)
NEUTROPHILS NFR BLD AUTO: 59.2 %
NRBC # BLD AUTO: 0 10*3/UL
NRBC BLD AUTO-RTO: 0 /100
PLATELET # BLD AUTO: 197 10E9/L (ref 150–450)
POTASSIUM SERPL-SCNC: 3.7 MMOL/L (ref 3.4–5.3)
PROT SERPL-MCNC: 7 G/DL (ref 6.8–8.8)
RBC # BLD AUTO: 3.79 10E12/L (ref 3.8–5.2)
SODIUM SERPL-SCNC: 134 MMOL/L (ref 133–144)
TROPONIN I SERPL-MCNC: <0.015 UG/L (ref 0–0.04)
WBC # BLD AUTO: 7.6 10E9/L (ref 4–11)

## 2019-12-01 PROCEDURE — 80053 COMPREHEN METABOLIC PANEL: CPT | Performed by: EMERGENCY MEDICINE

## 2019-12-01 PROCEDURE — 85610 PROTHROMBIN TIME: CPT | Performed by: EMERGENCY MEDICINE

## 2019-12-01 PROCEDURE — 96374 THER/PROPH/DIAG INJ IV PUSH: CPT

## 2019-12-01 PROCEDURE — 84484 ASSAY OF TROPONIN QUANT: CPT | Performed by: EMERGENCY MEDICINE

## 2019-12-01 PROCEDURE — 25000128 H RX IP 250 OP 636: Performed by: EMERGENCY MEDICINE

## 2019-12-01 PROCEDURE — 85025 COMPLETE CBC W/AUTO DIFF WBC: CPT | Performed by: EMERGENCY MEDICINE

## 2019-12-01 PROCEDURE — 93005 ELECTROCARDIOGRAM TRACING: CPT

## 2019-12-01 PROCEDURE — 83605 ASSAY OF LACTIC ACID: CPT | Performed by: EMERGENCY MEDICINE

## 2019-12-01 PROCEDURE — 25000132 ZZH RX MED GY IP 250 OP 250 PS 637: Mod: GY | Performed by: EMERGENCY MEDICINE

## 2019-12-01 PROCEDURE — 36415 COLL VENOUS BLD VENIPUNCTURE: CPT | Performed by: EMERGENCY MEDICINE

## 2019-12-01 PROCEDURE — 25000125 ZZHC RX 250: Performed by: EMERGENCY MEDICINE

## 2019-12-01 PROCEDURE — 81001 URINALYSIS AUTO W/SCOPE: CPT | Performed by: EMERGENCY MEDICINE

## 2019-12-01 PROCEDURE — 99285 EMERGENCY DEPT VISIT HI MDM: CPT | Mod: 25

## 2019-12-01 PROCEDURE — 83690 ASSAY OF LIPASE: CPT | Performed by: EMERGENCY MEDICINE

## 2019-12-01 RX ORDER — MORPHINE SULFATE 2 MG/ML
2 INJECTION, SOLUTION INTRAMUSCULAR; INTRAVENOUS ONCE
Status: DISCONTINUED | OUTPATIENT
Start: 2019-12-01 | End: 2019-12-02 | Stop reason: HOSPADM

## 2019-12-01 RX ORDER — LORAZEPAM 2 MG/ML
0.5 INJECTION INTRAMUSCULAR ONCE
Status: COMPLETED | OUTPATIENT
Start: 2019-12-01 | End: 2019-12-01

## 2019-12-01 RX ADMIN — LIDOCAINE HYDROCHLORIDE 30 ML: 20 SOLUTION ORAL; TOPICAL at 23:02

## 2019-12-01 RX ADMIN — LORAZEPAM 0.5 MG: 2 INJECTION INTRAMUSCULAR; INTRAVENOUS at 23:02

## 2019-12-01 ASSESSMENT — ENCOUNTER SYMPTOMS
HEADACHES: 1
HEMATURIA: 0
DYSURIA: 0
FREQUENCY: 0
BACK PAIN: 1

## 2019-12-01 ASSESSMENT — MIFFLIN-ST. JEOR: SCORE: 1014.74

## 2019-12-01 NOTE — ED AVS SNAPSHOT
[facility] Emergency Department  [address]  [facility] [address]  Phone:  [phone]  Fax:  [phone]                                    Gely Keene   MRN: [id number]    Department:  [facility] Emergency Department   Date of Visit:  12/1/2019           After Visit Summary Signature Page    I have received my discharge instructions, and my questions have been answered. I have discussed any challenges I see with this plan with the nurse or doctor.    ..........................................................................................................................................  Patient/Patient Representative Signature      ..........................................................................................................................................  Patient Representative Print Name and Relationship to Patient    ..................................................               ................................................  Date                                   Time    ..........................................................................................................................................  Reviewed by Signature/Title    ...................................................              ..............................................  Date                                               Time          22EPIC Rev 08/18

## 2019-12-02 ENCOUNTER — PATIENT OUTREACH (OUTPATIENT)
Dept: CARE COORDINATION | Facility: CLINIC | Age: 79
End: 2019-12-02

## 2019-12-02 ENCOUNTER — PATIENT OUTREACH (OUTPATIENT)
Dept: NURSING | Facility: CLINIC | Age: 79
End: 2019-12-02
Attending: PEDIATRICS
Payer: MEDICARE

## 2019-12-02 ENCOUNTER — TELEPHONE (OUTPATIENT)
Dept: PEDIATRICS | Facility: CLINIC | Age: 79
End: 2019-12-02

## 2019-12-02 VITALS
HEART RATE: 69 BPM | OXYGEN SATURATION: 97 % | TEMPERATURE: 99 F | DIASTOLIC BLOOD PRESSURE: 78 MMHG | BODY MASS INDEX: 25.49 KG/M2 | WEIGHT: 135 LBS | RESPIRATION RATE: 14 BRPM | SYSTOLIC BLOOD PRESSURE: 173 MMHG | HEIGHT: 61 IN

## 2019-12-02 DIAGNOSIS — Z71.89 OTHER SPECIFIED COUNSELING: ICD-10-CM

## 2019-12-02 LAB
ALBUMIN UR-MCNC: NEGATIVE MG/DL
APPEARANCE UR: CLEAR
BILIRUB UR QL STRIP: NEGATIVE
COLOR UR AUTO: ABNORMAL
GLUCOSE UR STRIP-MCNC: NEGATIVE MG/DL
HGB UR QL STRIP: NEGATIVE
INTERPRETATION ECG - MUSE: NORMAL
INTERPRETATION ECG - MUSE: NORMAL
KETONES UR STRIP-MCNC: NEGATIVE MG/DL
LEUKOCYTE ESTERASE UR QL STRIP: ABNORMAL
NITRATE UR QL: NEGATIVE
PH UR STRIP: 5 PH (ref 5–7)
RBC #/AREA URNS AUTO: <1 /HPF (ref 0–2)
SOURCE: ABNORMAL
SP GR UR STRIP: 1 (ref 1–1.03)
SQUAMOUS #/AREA URNS AUTO: <1 /HPF (ref 0–1)
UROBILINOGEN UR STRIP-MCNC: NORMAL MG/DL (ref 0–2)
WBC #/AREA URNS AUTO: 5 /HPF (ref 0–5)

## 2019-12-02 RX ORDER — GABAPENTIN 300 MG/1
300 CAPSULE ORAL 3 TIMES DAILY
Qty: 15 CAPSULE | Refills: 0 | Status: SHIPPED | OUTPATIENT
Start: 2019-12-02 | End: 2020-01-15

## 2019-12-02 ASSESSMENT — ACTIVITIES OF DAILY LIVING (ADL): DEPENDENT_IADLS:: INDEPENDENT

## 2019-12-02 NOTE — TELEPHONE ENCOUNTER
Patient offered appointment with Dr. Booth on Wednesday, per Dr. Booth pt should see colorectal surgery as first priority. Patient states she has not tried to reach out to colorectal surgery again, she took a nap after phone call this AM.    Patient states she is going to try and reach out to them first and will call back if she would like to be seen with Dr. Booth on Wednesday.

## 2019-12-02 NOTE — DISCHARGE INSTRUCTIONS
Discharge Instructions  Abdominal Pain    Abdominal pain can be caused by many things. Your evaluation today does not show the exact cause for your pain. Your doctor today has decided that it is unlikely your pain is due to a life threatening problem, or a problem requiring surgery or hospital admission. Sometimes those problems cannot be found right away, so it is very important that you follow up as directed.  Sometimes only the changes which occur over time allow the cause of your pain to be found.    Return to the Emergency Department for a recheck in 8-12 hours if your pain continues.  If your pain gets worse, changes in location, or feels different, return to the Emergency Department right away.    ADULTS:  Return to the Emergency Department right away if:    You get an oral temperature above 102oF or as directed by your doctor.  You have blood in your stools (bright red or black, tarry stools).  You keep throwing up or can t drink liquids.  You see blood when you throw up.  You can t have a bowel movement or you can t pass gas.  Your stomach gets bloated or bigger.  Your skin or the whites of your eyes look yellow.  You faint.  You have bloody, frequent or painful urination.  You have new symptoms or anything that worries you.    CHILDREN:  Return to the Emergency Department right away if your child has any of the above-listed symptoms or the following:    Pushes your hand away or screams/cries when his/her belly is touched.  You notice your child is very fussy or weak.  Your child is very tired and is too tired to eat or drink.  Your child is dehydrated.  Signs of dehydration can be:  Your infant has had no wet diapers in 4-5 hours.  Your older child has not passed urine in 6-8 hours.  Your infant or child starts to have dry mouth and lips, or no saliva or tears.    PREGNANT WOMEN:  Return to the Emergency Department right away if you have any of the above-listed symptoms or the following:    You have  bleeding, leaking fluid or passing tissue from the vagina.  You have worse pain or cramping, or pain in your shoulder or back.  You have vomiting that will not stop.  You have painful or bloody urination.  You have a temperature of 100oF or more.  Your baby is not moving as much as usual.  You faint.  You get a bad headache with or without eye problems and abdominal pain.  You have a convulsion or seizure.  You have unusual discharge from your vagina and abdominal pain.    Abdominal pain is pretty common during pregnancy.  Your pain may or may not be related to your pregnancy. You should follow-up closely with your OB doctor so they can evaluate you and your baby.  Until you follow-up with your regular doctor, do the following:     Avoid sex and do not put anything in your vagina.  Drink clear fluids.  Only take medications approved by your doctor.    MORE INFORMATION:    Appendicitis:  A possible cause of abdominal pain in any person who still has their appendix is acute appendicitis. Appendicitis is often hard to diagnose.  Testing does not always rule out early appendicitis or other causes of abdominal pain. Close follow-up with your doctor and re-evaluations may be needed to figure out the reason for your abdominal pain.    Follow-up:  It is very important that you make an appointment with your clinic and go to the appointment.  If you do not follow-up with your primary doctor, it may result in missing an important development which could result in permanent injury or disability and/or lasting pain.  If there is any problem keeping your appointment, call your doctor or return to the Emergency Department.    Medications:  Take your medications as directed by your doctor today.  Before using over-the-counter medications, ask your doctor and make sure to take the medications as directed.  If you have any questions about medications, ask your doctor.    Diet:  Resume your normal diet as much as possible, but do not  "eat fried, fatty or spicy foods while you have pain.  Do not drink alcohol or have caffeine.  Do not smoke tobacco.    Probiotics: If you have been given an antibiotic, you may want to also take a probiotic pill or eat yogurt with live cultures. Probiotics have \"good bacteria\" to help your intestines stay healthy. Studies have shown that probiotics help prevent diarrhea and other intestine problems (including C. diff infection) when you take antibiotics. You can buy these without a prescription in the pharmacy section of the store.     If you were given a prescription for medicine here today, be sure to read all of the information (including the package insert) that comes with your prescription.  This will include important information about the medicine, its side effects, and any warnings that you need to know about.  The pharmacist who fills the prescription can provide more information and answer questions you may have about the medicine.  If you have questions or concerns that the pharmacist cannot address, please call or return to the Emergency Department.         Opioid Medication Information    Pain medications are among the most commonly prescribed medicines, so we are including this information for all our patients. If you did not receive pain medication or get a prescription for pain medicine, you can ignore it.     You may have been given a prescription for an opioid (narcotic) pain medicine and/or have received a pain medicine while here in the Emergency Department. These medicines can make you drowsy or impaired. You must not drive, operate dangerous equipment, or engage in any other dangerous activities while taking these medications. If you drive while taking these medications, you could be arrested for DUI, or driving under the influence. Do not drink any alcohol while you are taking these medications.     Opioid pain medications can cause addiction. If you have a history of chemical dependency of " any type, you are at a higher risk of becoming addicted to pain medications.  Only take these prescribed medications to treat your pain when all other options have been tried. Take it for as short a time and as few doses as possible. Store your pain pills in a secure place, as they are frequently stolen and provide a dangerous opportunity for children or visitors in your house to start abusing these powerful medications. We will not replace any lost or stolen medicine.  As soon as your pain is better, you should flush all your remaining medication.     Many prescription pain medications contain Tylenol  (acetaminophen), including Vicodin , Tylenol #3 , Norco , Lortab , and Percocet .  You should not take any extra pills of Tylenol  if you are using these prescription medications or you can get very sick.  Do not ever take more than 3000 mg of acetaminophen in any 24 hour period.    All opioids tend to cause constipation. Drink plenty of water and eat foods that have a lot of fiber, such as fruits, vegetables, prune juice, apple juice and high fiber cereal.  Take a laxative if you don t move your bowels at least every other day. Miralax , Milk of Magnesia, Colace , or Senna  can be used to keep you regular.      Remember that you can always come back to the Emergency Department if you are not able to see your regular doctor in the amount of time listed above, if you get any new symptoms, or if there is anything that worries you.

## 2019-12-02 NOTE — LETTER
[facility]  [address] [place] [address]    December 2, 2019    Gely Griffithpjane  [address] [facility] [address]      Dear Gely,    I am a clinic care coordinator who works with Hien Booth MD. I wanted to thank you for spending the time to talk with me.  I wanted to provide you with my contact information so that you can call me with questions or concerns about your health care. Below is a description of clinic care coordination and how I can further assist you.     The clinic care coordinator is a registered nurse and/or  who understand the health care system. The goal of clinic care coordination is to help you manage your health and improve access to the [facility] system in the most efficient manner. The registered nurse can assist you in meeting your health care goals by providing education, coordinating services, and strengthening the communication among your providers. The  can assist you with financial, behavioral, psychosocial, chemical dependency, counseling, and/or psychiatric resources.    Please feel free to contact me with any questions or concerns. We at [facility] are focused on providing you with the highest-quality healthcare experience possible and that all starts with you.     Sincerely,     Tobi Hoffman [facility]  Clinic Care Coordinator  ROSA MARIA [facility]  [phone]  [email or web address]    Enclosed: I have enclosed a copy of the Complex Care Plan. This has helpful information and goals that we have talked about. Please keep this in an easy to access place to use as needed.

## 2019-12-02 NOTE — ED PROVIDER NOTES
"  History     Chief Complaint:  Abdominal Pain    HPI   Gely Keene is a 79 year old female who presents with abdominal pain, constant in nature, but worsening tonight with radiation to her back. Per chart review, the patient was seen in the emergency department on 11/27/19 for abdominal pain. CT was obtained, findings below. She was seen again on 11/29 for abdominal pain, CT was obtained again, findings below. She reports that her pain went away after being seen, but returned tonight. At onset of chest pain she decided to check her blood pressure which she reports was 210/89, prompting her to come in. Her pain is not exacerbated or alleviated with any particular movements. She mentions that just prior to worsening abdominal pain she felt like her hands, feet, and back were \"squeezing,\" and like they were losing circulation. She has described her pain as if something is twisting around in this area, but she has not felt that here. She also endorses a pressure like posterior headache for which she took an Excedrin Migraine, with some relief, and this is mild here. She denies any urinary symptoms. She does not endorse any alcohol usage.     [facility] Abdomen pelvis with contrast, 11/27/19  1.  Subtotal colectomy with nonobstructing ileostomy.  2.  Cholecystectomy.  3.  The distal gastric body and antrum are not fully distended although cannot exclude mild wall thickening in this location and antritis    [facility], CT Aortic Survey w Contrast, 11/29/19  1.  No thoracic or abdominal aortic dissection.  2.  No acute abnormality in the chest, abdomen, or pelvis.  3.  Prior cholecystectomy. Prior colectomy. Right mid abdomen ileostomy.    Allergies:  Bactrim  Codeine  Metronidazole  Sulfa drugs  Sulfur  Versed      Medications:    Lipitor  Metoprolol      Past Medical History:    Anxiety  BCC  GERD  HTN  HLD  RLS  CKD     Past Surgical History:    Appendectomy  Colectomy  ERCP " "x2  Hysterectomy  Cholecystectomy  Exploratory laparotomy  Phacoemulsification clear cornea with standard intraocular lens implant x2     Family History:    Breast cancer    Social History:  Smoking status: Never smoker  Alcohol use: Yes  Drug use: No  PCP: Hien Booth  Presents to the ED with her spouse  Marital Status:        Review of Systems   Cardiovascular: Positive for chest pain.   Genitourinary: Negative for dysuria, frequency, hematuria and urgency.   Musculoskeletal: Positive for back pain.   Neurological: Positive for headaches (mild).   All other systems reviewed and are negative.        Physical Exam     Patient Vitals for the past 24 hrs:   BP Pulse Heart Rate SpO2 Height Weight   12/01/19 2226 (!) 211/91 -- -- -- -- --   12/01/19 2222 -- 77 77 96 % 1.549 m (5' 1\") 61.2 kg (135 lb)       Physical Exam  General: Anxious. Fidgeting. The patient is alert, in no respiratory distress.    HENT: Mucous membranes moist.    Cardiovascular: Regular rate and rhythm. Good pulses in all four extremities. Normal capillary refill and skin turgor.     Respiratory: Lungs are clear. No nasal flaring. No retractions. No wheezing, no crackles.    Gastrointestinal: LUQ mild tenderness. RLQ stoma looks healthy, stool present. Improved feeling with unbuttoning of pants. Abdomen soft. No guarding, no rebound. No palpable hernias.     Musculoskeletal: No gross deformity.     Skin: No rashes or petechiae.     Neurologic: The patient is alert and oriented x3. GCS 15. No testable cranial nerve deficit. Follows commands with clear and appropriate speech. Gives appropriate answers. Good strength in all extremities. No gross neurologic deficit. Gross sensation intact. Pupils are round and reactive. No meningismus.     Lymphatic: No cervical adenopathy. No lower extremity swelling.    Psychiatric: The patient is non-tearful.    Emergency Department Course   ECG (22:24:25):  Rate 81 bpm. TX interval 134. QRS duration " 88. QT/QTc 388/450. P-R-T axes 78 44 40. Sinus rhythm with premature atrial complexes. Nonspecific ST. Interpreted at 2225 by Dann Magaña MD.    Laboratory:  Troponin I (2319): <0.015  CBC: WNL (WBC 7.6, HGB 11.9, )  CMP: Glucose 160 (H), Creatinine 1.10 (H), GFR 47 (L), Albumin 3.2 (L)  INR: 1.01  Lactic Acid whole blood (2325): 1.1  Lipase: 167    UA with Microscopic: Trace leukocyte esterase.    Interventions:  2302: GI Cocktail - Maalox 15 mL, Viscous Lidocaine 15 mL, 30 mL suspension PO  2302: 0.5 mg Ativan IV    Emergency Department Course:  Past medical records, nursing notes, and vitals reviewed.  2223: I performed an exam of the patient and obtained history, as documented above. Discussed CT.     EKG obtained, findings above.    IV inserted and blood drawn.    UA obtained, findings above.    0040: I rechecked the patient. Explained findings, blood pressure, and labs to patient.    Findings and plan explained to the patient. Patient discharged home with instructions regarding supportive care, medications, and reasons to return. The importance of close follow-up was reviewed. The patient was prescribed Gabapentin.    Impression & Plan    Medical Decision Making:  The patient presented with a triage complaint of chest pain however I discussed with her and she said that its all abdominal pain and this is verified on physical exam with abdominal tenderness.  The patient's 2 previous visits to the ER were reviewed and she reported that her pain had predated Friday but is been fairly constant since that time.  The patient has had 2 previous CTs the most recent aortic survey due to her elevated blood pressures.  I feel that part of the symptom is secondary due to her pain.  I did discuss repeating the CT scan though there was some report of antral thickening which I am suspicious may be an indicator behind her symptoms.  Currently her stoma looks healthy and she is having stomal output, no  increased pain with eating, and a normal lactic acid making mesenteric ischemia unlikely.  The patient does have tenderness in the left upper quadrant pointing towards a gastric type cause.  She has a previous cholecystectomy therefore gallstones are unlikely.  There was no signs of pancreatitis.  I discussed potential causes including potential adhesions obstruction amongst others.  Looking for intrathoracic causes I considered ACS however her EKG and troponin are reassuring.  She had recent CT over the weekend not indicating pneumonia.  The patient was offered pain medication here but declined.  She had a recent UTI which had been treated with Cipro, her urine currently shows only 5 white blood cells.  At this point I discussed using gabapentin on her.  Stressed that she should follow-up with colorectal as we discussed.  She should return if she develops any new or worsening symptoms.  She currently is stable not requiring narcotics and her blood pressure is improving. She is aware that she needs to follow-up regarding blood pressure management which I think partly her elevated blood pressure secondary to pain. She was discharged home in good condition.    Diagnosis:    ICD-10-CM   1. Abdominal pain, epigastric R10.13   2. Secondary hypertension I15.9   3. Hyperglycemia R73.9       Disposition: Discharged to home    Discharge Medications:  New Prescriptions    GABAPENTIN (NEURONTIN) 300 MG CAPSULE    Take 1 capsule (300 mg) by mouth 3 times daily     I, Joanna Moe, am serving as a scribe at 10:23 PM on 12/1/2019 to document services personally performed by Dann Magaña MD based on my observations and the provider's statements to me.       Joanna Moe  12/1/2019   [facility] EMERGENCY DEPARTMENT       Dann Magaña MD  12/02/19 0358

## 2019-12-02 NOTE — TELEPHONE ENCOUNTER
Called patient and she still wants appointment with Dr. Booth. Scheduled patient and closing encounter.     Kenia Schneider CMA

## 2019-12-02 NOTE — TELEPHONE ENCOUNTER
Reason for call:  Other   Patient called regarding (reason for call): appointment  Additional comments: Pt called needing to set up an ER follow up, states to have been in ER 3 times in the past week. Please contact pt to schedule and advise.    Phone number to reach patient:  Home number on file [phone] (home)    Best Time:  Any    Can we leave a detailed message on this number?  YES

## 2019-12-02 NOTE — PROGRESS NOTES
The Clinic Community Health Worker spoke with  the patient's spouse today (patient was asleep) to discuss possible Clinic Care Coordination enrollment.  The service was described to the patient and immediate needs were discussed. Patient has been having difficulty scheduling follow up appointments with PCP, Dr. Booth and her surgeon.  The patient's spouse requested a same day assessment and IDRIS Hoffman approved. Visit was scheduled.            Assessment date: 12/2 (same day)

## 2019-12-02 NOTE — PROGRESS NOTES
Clinic Care Coordination Contact    Clinic Care Coordination Contact  OUTREACH    Referral Information:       Primary Diagnosis: GI Disorders    Chief Complaint   Patient presents with     Clinic Care Coordination - Initial     Assistance Coordinating apts.          [place] Utilization:   Clinic Utilization  Difficulty keeping appointments:: No  Compliance Concerns: No  No-Show Concerns: No  No PCP office visit in Past Year: No  Utilization    Last refreshed: 12/2/2019  2:44 PM:  Hospital Admissions 0           Last refreshed: 12/2/2019  2:44 PM:  ED Visits 3           Last refreshed: 12/2/2019  2:44 PM:  No Show Count (past year) 0              Current as of: 12/2/2019  2:44 PM              Clinical Concerns:  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)     Advance care planning     Chronic kidney disease, stage III (moderate) (H)     [facility] outreached to pt on this date for scheduled phone visit.      Pt initially requested assistance navigating specialist apts, however, upon speaking with pt she stated she had the contact number to her surgeon and have already contacted them to schedule an apt as recommended by PCP.      Pt expressed feeling pain in her abdomin for some time and has presented to the ED as a result.  A number of labs and tests have been done, per pt, but nothing has been determined to be the cause.  At this time, PCP is recommending pt be assessed by surgeon who placed her ileostomy to assess for any concerns.  Pt to f/u with PCP following this apt.  As a result of the concerns and increase in pain,  pt states her blood pressure has had moments of elevation as well as increases in anxiety episodes.  CC had RNCC consult and conference in on the call to assess for any concerning red flags outside of SWCC scope.  RNCC too recommended pt f/u with surgeon to rule out any concerns with ileostomy or other GI concerns.  RNCC educated pt elevated Bps and anxiety may be being affected by episodes of pain.       Pain  Pain (GOAL):: Yes  Type: Acute (<3mo)  Location of chronic pain:: Rib area  Radiating: Yes  Location pain radiates to: around torso   Progression: Unchanged  Description of pain: Aching  Limitation of routine activities due to chronic pain:: No  Alleviating Factors: Pain Medication  Aggravating Factors: Activity  Health Maintenance Reviewed: Up to date    Medication Management:  Filled.  RNCC educated pt on new pain medication prescribed for pain-  Neurontin      Functional Status:  Dependent ADLs:: Independent  Dependent IADLs:: Independent  Bed or wheelchair confined:: No  Mobility Status: Independent  Fallen 2 or more times in the past year?: No  Any fall with injury in the past year?: No    Living Situation:  Current living arrangement:: I live in a private home with spouse  Type of residence:: Private [facility]    Diet/Exercise/Sleep:  Diet:: Regular  Inadequate nutrition (GOAL):: No  Food Insecurity: No  Tube Feeding: No  Exercise:: Currently not exercising  Inadequate activity/exercise (GOAL):: No  Significant changes in sleep pattern (GOAL): No    Transportation:  Transportation concerns (GOAL):: No  Transportation means:: Regular car     Psychosocial:  Hoahaoism or spiritual beliefs that impact treatment:: No  Mental health DX:: Yes  Mental health DX how managed:: Medication  Mental health management concern (GOAL):: No  Informal Support system:: Spouse     Financial/Insurance:   Financial/Insurance concerns (GOAL):: No     Resources and Interventions:  Community Resources: None  Supplies used  at home:: Other  Equipment Currently Used at Home: colostomy/ostomy supplies    Advance Care Plan/Directive  Advanced Care Plans/Directives on file:: No  Advanced Care Plan/Directive Status: Not Applicable    Referrals Placed: None     Goals:   Goals        General    Psychosocial (pt-stated)     Notes - Note created  12/2/2019  2:55 PM by Tobi Hoffman LSW    Goal Statement: I would like to gain a better understanding of the source of my abdominal pain.     Measure of Success: Pain will be known/managed.    Supportive Steps to Achieve: Follow up with ileostomy surgeon for assessment. Recommended follow up with PCP as needed.  Pain medications as prescribed.  Ileostomy care.    Barriers: Ileostomy in place.    Strengths: strong advocate for self.  Accepting of care coordination. Strong understanding of current medical state and closely monitors conditions, labs, and output.     Date to Achieve By: 1/1/2019  Patient expressed understanding of goal: yes.               Patient/Caregiver understanding: Pt reports understanding and denies any additional questions or concerns at this times.  CC engaged in AIDET communication during encounter.    Plan: Pt to f/u with Dr. Talbot for abdominal pain evaluation.  Follow up with PCP marilu. [facility] to check on pt in 1 week to assess for needs.  Pt to outreach to [facility] as needed.  Contact information provided to pt.      DESTINY Peterson  Clinic Care Coordinator  [facility]  [facility]  [phone]  [email or web address]

## 2019-12-02 NOTE — PROGRESS NOTES
Patient identified as high risk for readmission.  Patient meets criteria for care coordination outreach.    Jailyn Hunt RN  Care Coordination  Phone:  [phone]  Email: [email or web address]

## 2019-12-02 NOTE — LETTER
[facility] Home  Complex Care Plan  About Me:    Patient Name:  Gely Keene    YOB: 1940  Age:         79 year old   Francisco MRN:    [id number] Telephone Information:  Home Phone [phone]   Mobile [phone]       Address:  [address] [place] [address] Email address:  [email or web address]      Emergency Contact(s)    Name Relationship Lgl Grd Work Phone Home Phone Mobile Phone   1. MEHUL,* Spouse   [phone]   2. JOANNA ARORA Daughter   [phone]           Primary language:  English     needed? No   [facility] [employer] [facility]:  [phone] op. 1  Other communication barriers: None  Preferred Method of Communication:  Mail  Current living arrangement: I live in a private home with spouse  Mobility Status/ Medical Equipment: Independent    Health Maintenance  Health Maintenance Reviewed: Up to date    My Access Plan  Medical Emergency 911   Primary Clinic Line [facility] - [phone]   24 Hour Appointment Line [phone] or  [phone]) (toll-free)   24 Hour Nurse Line [phone] (toll-free)   Preferred Urgent Care [facility], [phone]   Preferred Hospital [facility]  [phone]   Preferred Pharmacy WRITTEN PRESCRIPTION REQUESTED     Behavioral Health Crisis Line The National Suicide Prevention Lifeline at 1-919.670.5932 or 911             My Care Team Members  Patient Care Team       Relationship Specialty Notifications Start End    Hien Booth MD PCP - General Internal Medicine  9/8/14     Phone: [phone] Fax: [phone] [address] [place] [address]    Shana Alaniz MD MD Colon and Rectal Surgery  5/23/16     Phone: [phone] Fax: [phone]         COLON RECTAL SURGERY [address] [place] [address]    [facility], [facility]   5/23/16      [phone]     Fax: [phone]          [address] [place] [address]    Hien Booth MD Assigned PCP   11/24/19     Phone: [phone] Fax: [phone] [address] [place] [address]    Tobi Hoffman LSW Lead Care Coordinator Primary Care -   12/2/19     Phone: [phone]                 My Care Plans  Self Management and Treatment Plan  Goals and (Comments)  Goals        General    Psychosocial (pt-stated)     Notes - Note created  12/2/2019  2:55 PM by Tobi Hoffman LSW    Goal Statement: I would like to gain a better understanding of the source of my abdominal pain.     Measure of Success: Pain will be known/managed.    Supportive Steps to Achieve: Follow up with ileostomy surgeon for assessment. Recommended follow up with PCP as needed.  Pain medications as prescribed.  Ileostomy care.    Barriers: Ileostomy in place.    Strengths: strong advocate for self.  Accepting of care coordination. Strong understanding of current medical state and closely monitors conditions, labs, and output.     Date to Achieve By: 1/1/2019  Patient expressed understanding of goal: yes.                        My Medical and Care Information  Problem List   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)     Advance care planning     Chronic kidney disease, stage III (moderate) (H)      Current Medications and Allergies:  See printed Medication Report.    Care Coordination Start Date: 12/2/2019   Frequency of Care Coordination:  Monthly   Form Last Updated: 12/02/2019

## 2019-12-02 NOTE — TELEPHONE ENCOUNTER
"ED / Discharge Outreach Protocol    Patient Contact    Attempt # 1    Was call answered?  Yes.  \"May I please speak with <patient name>\"  Is patient available?   Yes    ED/Discharge Protocol    \"Hi, my name is Umu Marion RN, a registered nurse, and I am calling on behalf of Dr. Booth's office at [place].  I am calling to follow up and see how things are going for you after your recent visit.\"    \"I see that you were in the (ER/UC/IP) on 3 times within last week.    How are you doing now that you are home?\"     Feeling weak, tired, didn't get much sleep. Home at 0200 last night, 0300 the night prior from ER.    Is patient experiencing symptoms that may require a hospital visit?  Not as much abdominal pain.     Discharge Instructions    \"Let's review your discharge instructions.  What is/are the follow-up recommendations?  Pt. Response: PCP and Colorectal Surgery     \"Were you instructed to make a follow-up appointment?\"  Pt. Response: Yes.  Has appointment been made?   No.  \"Can I help you schedule that appointment?\"     Patient recommended to follow up with Dr. Alaniz office, unable to reach their office yet, is going to keep trying. Will update Dr. Booth with pt recent visits and advise on appropriate of ER f/u appt.       \"When you see the provider, I would recommend that you bring your discharge instructions with you.    Medications    \"How many new medications are you on since your hospitalization/ED visit?\"    0-1 -gabapentin (NEURONTIN) 300 MG capsule - Has not picked up yet due to discharge time 0200.  Was also given Dicyclomine 20mg, Lorazepam and Zofran during other ER visits.   \"How many of your current medicines changed (dose, timing, name, etc.) while you were in the hospital/ED visit?\"   0-1 - none  \"Do you have questions about your medications?\"   No  \"Were you newly diagnosed with heart failure, COPD, diabetes or did you have a heart attack?\"   No  For patients on insulin: \"Did you start on " "insulin in the hospital or did you have your insulin dose changed?\"   No  Post Discharge Medication Reconciliation Status: discharge medications reconciled, continue medications without change  Was MTM referral placed (*Make sure to put transitions as reason for referral)?   No    Call Summary    \"Do you have any questions or concerns about your condition or care plan at the moment?\"    No  Triage nurse advice given: Call Dr. Alaniz's office for f/u appt.     Patient was in ER 3 in the past year.    \"If you have questions or things don't continue to improve, we encourage you contact us through the main clinic number,  [phone].  Even if the clinic is not open, triage nurses are available 24/7 to help you.     We would like you to know that our clinic has extended hours (provide information).  We also have urgent care (provide details on closest location and hours/contact info)\"    \"Thank you for your time and take care!\"  "

## 2019-12-02 NOTE — TELEPHONE ENCOUNTER
Patient called back she did get into the colon and rectal surgery office for Wednesday 1045 in [place] office   They are suggesting that she still see her pcp please call the patient back

## 2019-12-02 NOTE — ED TRIAGE NOTES
Pt arrived w/ complaint of epigastric pain wrapping all around the lower ribs and into her back. Reports coming into the ED 2 times prior this week for the pain. Reports pain as 8/10 at this time. Reports feeling pressure in feet and hands. A&O x 4.

## 2019-12-04 ENCOUNTER — TRANSFERRED RECORDS (OUTPATIENT)
Dept: HEALTH INFORMATION MANAGEMENT | Facility: CLINIC | Age: 79
End: 2019-12-04

## 2019-12-04 ENCOUNTER — OFFICE VISIT (OUTPATIENT)
Dept: PEDIATRICS | Facility: CLINIC | Age: 79
End: 2019-12-04
Payer: MEDICARE

## 2019-12-04 VITALS
DIASTOLIC BLOOD PRESSURE: 60 MMHG | BODY MASS INDEX: 26.77 KG/M2 | HEART RATE: 69 BPM | OXYGEN SATURATION: 97 % | RESPIRATION RATE: 16 BRPM | TEMPERATURE: 98.5 F | HEIGHT: 61 IN | WEIGHT: 141.8 LBS | SYSTOLIC BLOOD PRESSURE: 138 MMHG

## 2019-12-04 DIAGNOSIS — K94.19 ALTERED BOWEL ELIMINATION DUE TO INTESTINAL OSTOMY (H): ICD-10-CM

## 2019-12-04 DIAGNOSIS — R10.84 ABDOMINAL PAIN, GENERALIZED: Primary | ICD-10-CM

## 2019-12-04 DIAGNOSIS — F41.1 GAD (GENERALIZED ANXIETY DISORDER): ICD-10-CM

## 2019-12-04 DIAGNOSIS — Z90.49 S/P TOTAL COLECTOMY: ICD-10-CM

## 2019-12-04 DIAGNOSIS — I10 HYPERTENSION GOAL BP (BLOOD PRESSURE) < 140/90: ICD-10-CM

## 2019-12-04 PROCEDURE — 99214 OFFICE O/P EST MOD 30 MIN: CPT | Performed by: PEDIATRICS

## 2019-12-04 RX ORDER — LORAZEPAM 0.5 MG/1
.5-1 TABLET ORAL DAILY PRN
Qty: 10 TABLET | Refills: 0 | Status: SHIPPED | OUTPATIENT
Start: 2019-12-04 | End: 2019-12-18

## 2019-12-04 RX ORDER — SERTRALINE HYDROCHLORIDE 25 MG/1
25 TABLET, FILM COATED ORAL DAILY
Qty: 30 TABLET | Refills: 5 | Status: SHIPPED | OUTPATIENT
Start: 2019-12-04 | End: 2020-01-21

## 2019-12-04 ASSESSMENT — MIFFLIN-ST. JEOR: SCORE: 1045.58

## 2019-12-04 NOTE — PATIENT INSTRUCTIONS
Keep visit with Dr Tran in GI tomorrow - please ask him to copy me on his note    Thank you for seeing Dr Alaniz today    For anxiety - start sertraline at a low dose - take once daily.  Update me with how this is going in a few weeks - we can increase the dose if we need to.    I'll refill the ativan at a lower dose for use for severe anxiety.  Watch for sedation with this one!    Keep following your blood pressure - I will look for the value from your GI visit

## 2019-12-04 NOTE — PROGRESS NOTES
"Subjective     Gely Keene is a 79 year old female who presents to clinic today for the following health issues:    HPI   ED/UC Followup:    Facility:  [facility]   Date of visit: 11/29/2019  Reason for visit: Abdominal pain   Current Status: Patient states that she went to see her colorectal surgeon who took an ostomy out for her. Patient states that she was also told she needs to see an upper GI specialist. Would like to discuss options with PCP.       Following up multiple ER visits for extremely high bp and severe abdominal pain - no clear pathology demonstrated on extensive imaging and laboratory work up.  Concern for anxiety - sent home with dicyclomine, gabapentin, ativan.       Was evaluated by Dr Alaniz this morning - patient reports no concerns related to her ostomy and Dr Alaniz had recommended that GI be involved.    Has a visit to see GI with DR Tran at [facility] tomorrow.      Continues to have tight sensation around her epigastric region and entire stomach.  Multiple investigations in ER reviewed.    No exertional component to her pain.  Has a hard time bending over - lots of abdominal pressure. Not having changes in stool or large amount of gas from her ostomy.    Taking omeprazole chronically - takes each morning.  No hx of PUD and has not had GERD symptoms.    Anxiety has been more problematic, especially around the holidays.  Family members have encouraged her to seek out some treatment.  The 1mg of ativan that she took after ER discharge made her sleep until noon the next day.      Reviewed and updated as needed this visit by Provider         Review of Systems   ROS COMP: Constitutional, psych, cardiovascular, pulmonary, gi and gu systems are negative, except as otherwise noted.      Objective    /60 (BP Location: Right arm, Patient Position: Sitting, Cuff Size: Adult Regular)   Pulse 69   Temp 98.5  F (36.9  C) (Tympanic)   Resp 16   Ht 1.549 m (5' 1\")   Wt " 64.3 kg (141 lb 12.8 oz)   SpO2 97%   BMI 26.79 kg/m    Body mass index is 26.79 kg/m .   Wt Readings from Last 4 Encounters:   12/04/19 64.3 kg (141 lb 12.8 oz)   12/01/19 61.2 kg (135 lb)   11/27/19 65.8 kg (145 lb)   11/11/19 67.5 kg (148 lb 14.4 oz)       Physical Exam   GENERAL: alert and no distress  HENT: ear canals and TM's normal, nose and mouth without ulcers or lesions  NECK: no adenopathy, no asymmetry, masses, or scars and thyroid normal to palpation  RESP: lungs clear to auscultation - no rales, rhonchi or wheezes  CV: regular rate and rhythm, normal S1 S2, no S3 or S4, no murmur, click or rub, no peripheral edema and peripheral pulses strong  ABDOMEN: soft, ostomy pink and healthy in appearance, green stool in colostomy bag without blood or mucous present, +BS, tender to palpation over epigastric region, RUQ, and LUQ.  No rebound or guarding  PSYCH: mentation appears normal, affect normal/bright    Diagnostic Test Results:  Labs reviewed in Epic        Assessment & Plan       ICD-10-CM    1. Abdominal pain, generalized R10.84 GASTROENTEROLOGY ADULT REF CONSULT ONLY    Follow up with GI tomorrow at planned, await results of consultation.  Continue PPI and trials of medications from ER for discomfort   2. 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   4. MARK (generalized anxiety disorder) F41.1 sertraline (ZOLOFT) 25 MG tablet     LORazepam (ATIVAN) 0.5 MG tablet    Start zoloft and lower dose ativan.  New medications discussed.  Patient to call my nurse with an update in 3-4 weeks.  May need to increase zoloft dosing.   5. Hypertension goal BP (blood pressure) < 140/90 I10 bp grossly elevated in ER, likely due to pain, trending downward now.  Will have recheck again tomorrow at GI clinic and  "patient also following at home.  For now, will leave antihypertensive regimen stable, will adjust if needed if bp doesn't trend down to baseline.        BMI:   Estimated body mass index is 26.79 kg/m  as calculated from the following:    Height as of this encounter: 1.549 m (5' 1\").    Weight as of this encounter: 64.3 kg (141 lb 12.8 oz).   Weight management plan: goal to maintain current weight        Patient Instructions   Keep visit with Dr Tran in GI tomorrow - please ask him to copy me on his note    Thank you for seeing Dr Alaniz today    For anxiety - start sertraline at a low dose - take once daily.  Update me with how this is going in a few weeks - we can increase the dose if we need to.    I'll refill the ativan at a lower dose for use for severe anxiety.  Watch for sedation with this one!    Keep following your blood pressure - I will look for the value from your GI visit    Hien Booth MD  [facility]    "

## 2019-12-05 ENCOUNTER — TRANSFERRED RECORDS (OUTPATIENT)
Dept: HEALTH INFORMATION MANAGEMENT | Facility: CLINIC | Age: 79
End: 2019-12-05

## 2019-12-10 ENCOUNTER — TELEPHONE (OUTPATIENT)
Dept: PEDIATRICS | Facility: CLINIC | Age: 79
End: 2019-12-10

## 2019-12-10 NOTE — TELEPHONE ENCOUNTER
Reason for call:  Other   Patient called regarding (reason for call): call back  Additional comments: Calling to give update on new medication. Also wanted to talk about endoscopy scheduled for 01/02/2019.     Phone number to reach patient:  Home number on file [phone] (home)    Best Time:  After 3 PM today     Can we leave a detailed message on this number?  YES       Angy Car on 12/10/2019 at 11:43 AM

## 2019-12-11 NOTE — TELEPHONE ENCOUNTER
Pt called again regarding her endoscopy. She is requesting a new referral and order to have it done at [facility]. She says the other facility can not get her in until March and [facility] can do it in January.   She says this is very important, she say [facility] is hold a place for her and is requesting that this is done ASAP.     Angy Car on 12/11/2019 at 4:12 PM

## 2019-12-12 ENCOUNTER — PATIENT OUTREACH (OUTPATIENT)
Dept: CARE COORDINATION | Facility: CLINIC | Age: 79
End: 2019-12-12

## 2019-12-12 ASSESSMENT — ACTIVITIES OF DAILY LIVING (ADL): DEPENDENT_IADLS:: INDEPENDENT

## 2019-12-12 NOTE — TELEPHONE ENCOUNTER
Talked with Estrella at [facility].   She faxed the Endoscopy order to Francisco Nguyen to the fax number below.     Updated patient and daughter that they should call tomorrow to get the Endoscopy ordered.     Daughter is aware of the scheduling error on the colonoscopy. She already talked to GI Scheduling to cancel it.

## 2019-12-12 NOTE — PROGRESS NOTES
Clinic Care Coordination Contact    Follow Up Progress Note      Assessment: [facility] outreached to pt on this date for follow up.     Pt reports she did f/u with Dr. Alaniz and PCP on 12/4/2019.  GI was recommended to assess for pain and complications r/t ostomy.  Pt states she has an endoscopy scheduled for 1/2/2019 at [facility].  Pt states, however, there was some confusion as to location and whether the apt was a consult or a procedure.  Pt had outreached to triage to clarify and was awaiting a call back.  [facility] communicated with triage to find they had been trying to reach pt but unable to reach.  [facility] transferred pt to triage.      Goals addressed this encounter:   Goals Addressed                 This Visit's Progress      Psychosocial (pt-stated)   On track     Goal Statement: I would like to gain a better understanding of the source of my abdominal pain.     Measure of Success: Pain will be known/managed.    Supportive Steps to Achieve: Follow up with ileostomy surgeon for assessment. Recommended follow up with PCP as needed.  Pain medications as prescribed.  Ileostomy care.    Barriers: Ileostomy in place.    Strengths: strong advocate for self.  Accepting of care coordination. Strong understanding of current medical state and closely monitors conditions, labs, and output.     Date to Achieve By: 1/1/2019  Patient expressed understanding of goal: yes.                  Intervention/Education provided during outreach: none.     Plan: Pt to f/u with GI for endoscopy 1/2/2019.     Care Coordinator will follow up in 1 month.     DESTINY Peterson  Clinic Care Coordinator  [facility]  [facility]  [phone]  [email or web address]

## 2019-12-12 NOTE — TELEPHONE ENCOUNTER
Unable to reach patient.     TC/SC, if she calls back, please Lync me and I can talk with her.     I have a few questions - is this an Endoscopy or Colonoscopy? Dr. Booth on 12/4/19 did order any procedure, she ordered a Consult with [facility].   Unsure how the order changed to a procedure.     Will of course huddle with Dr. Booth to see need for procedure, but will need to talk to patient first.

## 2019-12-12 NOTE — TELEPHONE ENCOUNTER
Spoke with patient.     On 12/5/19 she was Dr. Soni at [facility]. He ordered an Endoscopy.   Dr. Soni advised to have it done at [facility] vs their Endoscopy center in [place] due to age.     Patient also reported the order needed to come from Dr. Booth since they do not have a person who does Endoscopy's at [facility].     Patient states she does NOT need a Colonoscopy because she had a colectomy and its no longer indicated.       Called GI [facility] Scheduling for help. They agree, that the Colonoscopy scheduled on 1/2/20 may have been done in error and needed to be an Endoscopy.  Either way, they do not have an Endoscopy order.    Any Endoscopy orders from Outside Providers can be faxed to [phone].   There shouldn't be an issue getting an order from Dr. Soni.     Left a voicemail for Estrella,  for Dr. Soni, to call back to discuss in detail.   Gave her my direct line.   Would like to clarify if patient needs both an Endoscopy & a Colonoscopy.   Would like to help get the orders straightened out with [facility].

## 2019-12-18 DIAGNOSIS — F41.1 GAD (GENERALIZED ANXIETY DISORDER): ICD-10-CM

## 2019-12-18 RX ORDER — LORAZEPAM 0.5 MG/1
.25-.5 TABLET ORAL DAILY PRN
Qty: 10 TABLET | Refills: 0 | Status: SHIPPED | OUTPATIENT
Start: 2019-12-18 | End: 2020-01-14

## 2019-12-18 NOTE — TELEPHONE ENCOUNTER
Patient reports she has 3 tabs left. Would like another refill before the Holidays and for the Holidays.     Refill request for: LORAZEPAM 0.5 MG at bedtime PRN    Last rx written: 12/4/19 # #10 w/ refill    **[place]  reviewed- last filled: 12/4  Last OV: 12/4/19  for abdominal pain    Unable to fill per standing order routed to Dr. Booth for approval.    Ashely Solis RN

## 2020-01-02 ENCOUNTER — APPOINTMENT (OUTPATIENT)
Dept: SURGERY | Facility: PHYSICIAN GROUP | Age: 80
End: 2020-01-02
Payer: MEDICARE

## 2020-01-02 ENCOUNTER — HOSPITAL ENCOUNTER (OUTPATIENT)
Facility: CLINIC | Age: 80
Discharge: HOME | End: 2020-01-02
Attending: SURGERY | Admitting: SURGERY
Payer: MEDICARE

## 2020-01-02 VITALS
HEART RATE: 58 BPM | SYSTOLIC BLOOD PRESSURE: 150 MMHG | WEIGHT: 139.1 LBS | RESPIRATION RATE: 16 BRPM | DIASTOLIC BLOOD PRESSURE: 68 MMHG | BODY MASS INDEX: 26.26 KG/M2 | HEIGHT: 61 IN | OXYGEN SATURATION: 97 %

## 2020-01-02 LAB — UPPER GI ENDOSCOPY: NORMAL

## 2020-01-02 PROCEDURE — 25000128 H RX IP 250 OP 636: Performed by: SURGERY

## 2020-01-02 PROCEDURE — 25000125 ZZHC RX 250: Performed by: SURGERY

## 2020-01-02 PROCEDURE — G0500 MOD SEDAT ENDO SERVICE >5YRS: HCPCS | Performed by: SURGERY

## 2020-01-02 PROCEDURE — 43239 EGD BIOPSY SINGLE/MULTIPLE: CPT | Performed by: SURGERY

## 2020-01-02 PROCEDURE — 88305 TISSUE EXAM BY PATHOLOGIST: CPT | Mod: 26 | Performed by: SURGERY

## 2020-01-02 PROCEDURE — 88305 TISSUE EXAM BY PATHOLOGIST: CPT | Performed by: SURGERY

## 2020-01-02 RX ORDER — NALOXONE HYDROCHLORIDE 0.4 MG/ML
.1-.4 INJECTION, SOLUTION INTRAMUSCULAR; INTRAVENOUS; SUBCUTANEOUS
Status: DISCONTINUED | OUTPATIENT
Start: 2020-01-02 | End: 2020-01-02 | Stop reason: HOSPADM

## 2020-01-02 RX ORDER — ONDANSETRON 2 MG/ML
4 INJECTION INTRAMUSCULAR; INTRAVENOUS
Status: DISCONTINUED | OUTPATIENT
Start: 2020-01-02 | End: 2020-01-02 | Stop reason: HOSPADM

## 2020-01-02 RX ORDER — LIDOCAINE 40 MG/G
CREAM TOPICAL
Status: DISCONTINUED | OUTPATIENT
Start: 2020-01-02 | End: 2020-01-02 | Stop reason: HOSPADM

## 2020-01-02 RX ORDER — FENTANYL CITRATE 50 UG/ML
INJECTION, SOLUTION INTRAMUSCULAR; INTRAVENOUS PRN
Status: DISCONTINUED | OUTPATIENT
Start: 2020-01-02 | End: 2020-01-02 | Stop reason: HOSPADM

## 2020-01-02 RX ORDER — ONDANSETRON 4 MG/1
4 TABLET, ORALLY DISINTEGRATING ORAL EVERY 6 HOURS PRN
Status: DISCONTINUED | OUTPATIENT
Start: 2020-01-02 | End: 2020-01-02 | Stop reason: HOSPADM

## 2020-01-02 RX ORDER — ONDANSETRON 2 MG/ML
INJECTION INTRAMUSCULAR; INTRAVENOUS PRN
Status: DISCONTINUED | OUTPATIENT
Start: 2020-01-02 | End: 2020-01-02 | Stop reason: HOSPADM

## 2020-01-02 RX ORDER — FLUMAZENIL 0.1 MG/ML
0.2 INJECTION, SOLUTION INTRAVENOUS
Status: DISCONTINUED | OUTPATIENT
Start: 2020-01-02 | End: 2020-01-02 | Stop reason: HOSPADM

## 2020-01-02 RX ORDER — ONDANSETRON 2 MG/ML
4 INJECTION INTRAMUSCULAR; INTRAVENOUS EVERY 6 HOURS PRN
Status: DISCONTINUED | OUTPATIENT
Start: 2020-01-02 | End: 2020-01-02 | Stop reason: HOSPADM

## 2020-01-02 ASSESSMENT — MIFFLIN-ST. JEOR: SCORE: 1033.33

## 2020-01-02 NOTE — LETTER
December 17, 2019      Gely WANG [address]pf[address]ig  [address] [facility] [address]        Thank you for choosing [facility]. You are scheduled for the following service(s).   Please be aware that coverage of these services is subject to the terms and limitations of your health insurance plan.  Call member services at your health plan with any benefit or coverage questions.    Date:   Thursday January 2, 2020      Procedure: UPPER ENDOSCOPY-EGD  Doctor: Dr Martinez           Arrival Time:  0845    *check in at Emergency/Endoscopy desk*  Procedure Time: 0915       Location:   [facility]        Endoscopy Department, First Floor (Enter through ER Doors) *         [address] [place], [place] [id number] [phone] or [phone] () to reschedule          PRE-PROCEDURE CHECKLIST    If you have diabetes, ask your regular doctor for diet and medication restrictions.  If you take any antiplatelet or anticoagulant medications (such as Coumadin, Lovenox, Plavix, etc.) and have not already discussed this, please call your primary physician for advice on holding this medication.  If you take Aspirin, you may continue to do so.  If you are or may be pregnant, please discuss the risks and benefits of this procedure with your doctor.  You must arrange for a ride for the day of your exam. If you fail to arrange transportation with a responsible adult, your procedure will need to be cancelled and rescheduled. Taxi, bus and medical transport are not acceptable unless you have a responsible adult that you know & trust with you. Please arrange for this  to be able to pick you up in our department, approximately one hour after your scheduled procedure, if they are not able to stay with you.      Canceling or rescheduling   If you must cancel or reschedule your appointment, please call [phone] as soon as possible.      Upper Endoscopy or  Esophagogastroduodenoscopy (EGD) is a test performed to evaluate symptoms of persistent abdominal pain, nausea, vomiting and difficulty swallowing. It may also be used to treat various conditions of the upper gastrointestinal tract, such as bleeding, narrowing or abnormal growths.     What happens during an upper endoscopy?  On the day of your procedure, plan to spend up to one and a half hour after your arrival at the endoscopy center. The exam itself takes about 5 to 10 minutes.    Before the exam:  - You will change into a gown.   - Your medical history and medication list will be reviewed with you, unless it has already been done over the phone.   - A nurse will insert an intravenous (IV) line into your hand or arm.  - The doctor will talk to you and give you a consent form to sign.    During the exam:  - Medicine will be given through the IV line to help you relax and feel comfortable.   - Your heart rate and oxygen levels will be monitored. If your blood pressure is low, you may be given fluids through the IV line.   - The doctor will insert a flexible, hollow tube, called an endoscope, into your mouth and will advance it slowly through the esophagus, stomach and duodenum (the first part of your small intestine).   - You may have a feeling of pressure or fullness.   - If you have difficulty swallowing, and the doctor finds a narrowing in your esophagus, it may be possible for the area to be expanded-dilated during the exam.   - If abnormal tissue is found, the doctor may remove it through the endoscope (biopsy it) for closer examination. The tissue removal is painless.    After the exam:  - Any tissue samples removed during the exam will be sent to a lab for evaluation. It may take 5 to 7 working days for you to be notified of the results  - The doctor will prepare a full report for the physician who referred you for the upper endoscopy.   - The doctor will talk with you about the initial results of your exam.    - You may feel bloated after the procedure. That is normal and should not last long.   - Your throat may feel sore for a short time.   - Following the exam, you may resume your normal diet. Avoid alcohol until the next day.   - You may resume your regular activities the day after the procedure.   - Medication given during the exam will prohibit you from driving for the rest of the day.  - A nurse will provide you with complete discharge instructions before you leave the endoscopy center. Be sure to ask the nurse for specific instructions if you take blood thinners such as Aspirin , Coumadin , Lovenox , Plavix , etc.       PREPARATION    To ensure a successful exam, please follow all instructions carefully.      The night before your exam:    STOP eating solid foods at 11:45 pm.     Clear liquids are okay to drink (examples: Gatorade , apple juice, clear broth,coffee or tea without milk or cream, etc.).     DO NOT drink red liquids or alcoholic beverages.    The day of your exam:    STOP drinking clear liquids 4 hours before your exam.     You may take your usual medications with 4 oz. of water, but it needs to be at least 4 hours prior to your procedure.    When you leave for the procedure:    Bring a list of all of your current medications, including any allergy or over-the-counter medications, unless you have already reviewed that with an Endoscopy RN over the phone.     Bring a photo ID as well as up-to-date insurance information, such as your insurance card and any referral forms that might be required by your payer.         DIRECTIONS TO THE ENDOSCOPY DEPARTMENT    From the north ([facility])  Take [address]W south, exit on [address] . Get into the left hand vicki, turn left (east), go one-half mile to [address] and turn left. Go north to the first stoplight, take a right on [address] and follow it to the Emergency entrance.    From the south ([facility])  Take [address]N  to the [address]E split and exit on [address] . On [address] , turn left (west) to [address]. Turn right (north) on [address]. Go north to the first stoplight, take a right on [address] and follow it to the Emergency entrance.    From the east via 35E ([facility])  Take [address]E south to [place] [address] exit. Turn right on [address] . Go west to [address]. Turn right (north) on [address]. Go to the first stoplight, take a right and follow on [address] to the Emergency entrance.    From the east via [address] ([facility])  Take [address] to [address]. Turn left (south) on [address] to [address]. Turn left (east) on [address] and follow it to the Emergency entrance.    From the west via [address] 13 ([place])  Take [address] east to [address]. Turn right (south) on [address] to [address]. Turn left (east) on [address] and follow it to the Emergency entrance.

## 2020-01-02 NOTE — PRE-PROCEDURE
GENERAL PRE-PROCEDURE:   Procedure:  EGD  Date/Time:  1/2/2020 9:30 AM    Written consent obtained?: Yes    Risks and benefits: Risks, benefits and alternatives were discussed    Consent given by:  Patient  Patient states understanding of procedure being performed: Yes    Patient's understanding of procedure matches consent: Yes    Procedure consent matches procedure scheduled: Yes    Expected level of sedation:  Moderate  Appropriately NPO:  Yes  ASA Class:  Class 2- mild systemic disease, no acute problems, no functional limitations  Mallampati  :  Grade 2- soft palate, base of uvula, tonsillar pillars, and portion of posterior pharyngeal wall visible  Lungs:  Lungs clear with good breath sounds bilaterally  Heart:  Normal heart sounds and rate  History & Physical reviewed:  History and physical reviewed and no updates needed  Statement of review:  I have reviewed the lab findings, diagnostic data, medications, and the plan for sedation

## 2020-01-02 NOTE — LETTER
December 12, 2019      Gely Bagleycherpfennig  [address] [facility] [place], [place] [address]      Thank you for choosing [facility]. You are scheduled for the following service(s).   Please be aware that coverage of these services is subject to the terms and limitations of your health insurance plan.  Call member services at your health plan with any benefit or coverage questions.  Date:  Thursday January 2, 2020       Procedure: COLONOSCOPY  Doctor:   Dr Martinez         Arrival Time:  0845   *check in at Emergency/Endoscopy desk*  Procedure Time:  0915    Location:   [facility]        Endoscopy Department, First Floor (Enter through ER Doors) *         [address] [place], [place] [address] [phone] or [phone] ([facility]) to reschedule        NuLYTELY  PREP  Colonoscopy is the most accurate test to detect colon polyps and colon cancer; and the only test where polyps can be removed. During this procedure, a doctor examines the lining of your large intestine and rectum through a flexible tube.         Transportation  You must arrange for a ride for the day of your procedure with a responsible adult. A taxi , Uber, etc, is not an option unless you are accompanied by a responsible adult. If you fail to arrange transportation with a responsible adult, your procedure will be cancelled and rescheduled.    Fill your enclosed prescription for NuLYTELY  at your local pharmacy. Please call our office at [phone] if you did not receive a prescription.    COLONOSCOPY PRE-PROCEDURE CHECKLIST  If you have diabetes, ask your regular doctor for diet and medication restrictions.  If you take an anticoagulant or anti-platelet medication (such as Coumadin , Lovenox , Pradaxa , Xarelto , Eliquis , etc.), please call your primary doctor for advice on holding this medication.  If you take aspirin you may continue to do so.  If you are or may be pregnant, please  discuss the risks and benefits of this procedure with your doctor.    PREPARATION FOR COLONOSCOPY    7 days before:    Discontinue fiber supplements and medications containing iron. This includes Metamucil  and Fibercon ; and multivitamins with iron.  3 days before:    Begin a low-fiber diet. A low-fiber diet helps making the cleanout more effective. For additional details on low-fiber diet, please refer to the table on the last page.  2 days before:    Continue the low-fiber diet.     Drink at least 8 glasses of water throughout the day.     Stop eating solid foods at 11:45 pm.  1. 1 day before:    In the morning: begin a clear liquid diet (liquids you can see through).     Examples of a clear liquid diet include: water, clear broth or bouillon, Gatorade, Pedialyte or Powerade, carbonated and non-carbonated soft drinks (Sprite , 7-Up , ginger ale), strained fruit juices without pulp (apple, white grape, white cranberry), Jell-O  and popsicles.     The following are not allowed on a clear liquid diet: red liquids, alcoholic beverages,  dairy products (milk, creamer, and yogurt), protein shakes,  juice with pulp and chewing tobacco.    At 4pm: drink 1 (one) 8 oz glass of NuLYTELY  solution every 15 minutes (approximately 8 glasses of 8 oz or half the bottle). Keep the solution refrigerated. Do not drink any other liquids while you are drinking the NuLYTELY  solution.    Over the course of the evening, drink an additional   liter of clear liquids and continue clear liquid diet.    Day of procedure:    6 hours before the procedure: drink 1 (one) 8 oz glass of NuLYTELY  solution every 15 minutes until the last half of the bottle (approximately 8 glasses of 8 oz) is gone. Keep the solution refrigerated. Do not drink any other liquids while you are drinking the NuLYTELY  solution. After that, you may continue the clear liquid diet until 4 hours before the procedure.      You may take all of your morning medications  including blood pressure medications, blood thinners (if you have not been instructed to stop these by our office), methadone, and anti-seizure medications with sips of water 4 hours prior to your procedure or earlier. Do not take insulin or vitamins prior to your procedure.       4 hours prior:   o STOP consuming all liquids   o Do not take anything by mouth during this time.   o Allow extra time to travel to your procedure as you may need to stop and use a restroom along the way.  You are ready for the procedure, if you followed all instructions and your stool is no longer formed, but clear or yellow liquid. If you are unsure whether your colon is clean, please call our office at [phone] before you leave for your appointment.    COLON CLEANSING TIPS: drink adequate amounts of fluids before and after your colon cleansing to prevent dehydration. Stay near a toilet because you will have diarrhea. Even if you are sitting on the toilet, continue to drink the cleansing solution every 15 minutes. If you feel nauseous or vomit, rinse your mouth with water, take a 15 to 30-minute-break and then continue drinking the solution. You will be uncomfortable until the stool has flushed from your colon (in about 2 to 4 hours). You may feel chilled.    Bring the following to your procedure:  - Insurance Card/Photo ID.   - List of current medications including over-the-counter medications and supplements.   - Your rescue inhaler if you currently use one to control asthma.    Canceling or rescheduling your appointment:   If you must cancel or reschedule your appointment, please call [phone] as soon as possible.      What happens during a colonoscopy?    Plan to spend up to two hours, starting at registration time, at the endoscopy center the day of your procedure. The colonoscopy takes an average of 15 to 30 minutes. Recovery time is about 30 minutes.    Before the exam:    You will change into a gown.    Your medical  history and medication list will be reviewed with you, unless that has been done over the phone prior to the procedure.     A nurse will insert an intravenous (IV) line into your hand or arm.    The doctor will meet with you and will give you a consent form to sign.  1.   2. During the exam:     Medicine will be given through the IV line to help you relax.     Your heart rate and oxygen levels will be monitored. If your blood pressure is low, you may be given fluids through the IV line.     The doctor will insert a flexible hollow tube, called a colonoscope, into your rectum. The scope will be advanced slowly through the large intestine (colon).    You may have a feeling of fullness or pressure.     If an abnormal tissue or a polyp is found, the doctor may remove it through the endoscope for closer examination, or biopsy. Tissue removal is painless.    After the exam:           Any tissue samples removed during the exam will be sent to a lab for evaluation. It may take 5-7 working days for you to be notified of the results.     A nurse will provide you with complete discharge instructions before you leave the endoscopy center. Be sure to ask the nurse for specific instructions if you take blood thinners such as Aspirin, Coumadin or Plavix.     The doctor will prepare a full report for you and for the physician who referred you for the procedure.     Your doctor will talk with you about the initial results of your exam.      Medication given during the exam will prohibit you from driving for the rest of the day.     Following the exam, you may resume your normal diet. Your first meal should be light, no greasy foods. Avoid alcohol until the next day.     You may resume your regular activities the day after the procedure.     LOW-FIBER DIET  Foods RECOMMENDED Foods to AVOID   Breads, Cereal, Rice and Pasta:   White bread, rolls, biscuits, croissant and nik toast.   Waffles, Yi toast and pancakes.   White rice,  noodles, pasta, macaroni and peeled cooked potatoes.   Plain crackers and saltines.   Cooked cereals: farina, cream of rice.   Cold cereals: Puffed Rice , Rice Krispies , Corn Flakes  and Special K    Breads, Cereal, Rice and Pasta:   Breads or rolls with nuts, seeds or fruit.   Whole wheat, pumpernickel, rye breads and cornbread.   Potatoes with skin, brown or wild rice, and kasha (buckwheat).     Vegetables:   Tender cooked and canned vegetables without seeds: carrots, asparagus tips, green or wax beans, pumpkin, spinach, lima beans. Vegetables:   Raw or steamed vegetables.   Vegetables with seeds.   Sauerkraut.   Winter squash, peas, broccoli, Brussel sprouts, cabbage, onions, cauliflower, baked beans, peas and corn.   Fruits:   Strained fruit juice.   Canned fruit, except pineapple.   Ripe bananas and melon. Fruits:   Prunes and prune juice.   Raw fruits.   Dried fruits: figs, dates and raisins.   Milk/Dairy:   Milk: plain or flavored.   Yogurt, custard and ice cream.   Cheese and cottage cheese Milk/Dairy:     Meat and other proteins:   ground, well-cooked tender beef, lamb, ham, veal, pork, fish, poultry and organ meats.   Eggs.   Peanut butter without nuts. Meat and other proteins:   Tough, fibrous meats with gristle.   Dry beans, peas and lentils.   Peanut butter with nuts.   Tofu.   Fats, Snack, Sweets, Condiments and Beverages:   Margarine, butter, oils, mayonnaise, sour cream and salad dressing, plain gravy.   Sugar, hard candy, clear jelly, honey and syrup.   Spices, cooked herbs, bouillon, broth and soups made with allowed vegetable, ketchup and mustard.   Coffee, tea and carbonated drinks.   Plain cakes, cookies and pretzels.   Gelatin, plain puddings, custard, ice cream, sherbet and popsicles. Fats, Snack, Sweets, Condiments and Beverages:   Nuts, seeds and coconut.   Jam, marmalade and preserves.   Pickles, olives, relish and horseradish.   All desserts containing nuts, seeds, dried fruit and  coconut; or made from whole grains or bran.   Candy made with nuts or seeds.   Popcorn.       DIRECTIONS TO THE ENDOSCOPY DEPARTMENT    From the north ([place])  Take [address] South, exit on [address] . Get into the left hand vicki, turn left (east), go one-half mile to [address] and turn left. Go north to the first stoplight, take a right on [address] and follow it to the Emergency entrance.    From the south ([facility])  Take [address]N to the [address]E split and exit on [address] . On [address] , turn left (west) to [address]. Turn right (north) on [address]. Go north to the first stoplight, take a right on [address] and follow it to the Emergency entrance.    From the east via 35E ([facility])  Take [address] south to [address]  exit. Turn right on [address]. Go west to [address]. Turn right (north) on [address]. Go to the first stoplight, take a right and follow on [address] to the Emergency entrance.    From the east via [address] ([facility])  Take [address] to [address]. Turn left (south) on [address] to [address]. Turn left (east) on [address] and follow it to the Emergency entrance.    From the west via [address] ([place], [place])  Take [address] east to [address]. Turn right (south) on [address] to [address]. Turn left (east) on [address] and follow it to the Emergency entrance.

## 2020-01-03 ENCOUNTER — HOSPITAL ENCOUNTER (EMERGENCY)
Facility: CLINIC | Age: 80
Discharge: HOME | End: 2020-01-03
Attending: INTERNAL MEDICINE | Admitting: INTERNAL MEDICINE
Payer: MEDICARE

## 2020-01-03 VITALS
TEMPERATURE: 99.5 F | DIASTOLIC BLOOD PRESSURE: 73 MMHG | RESPIRATION RATE: 18 BRPM | HEART RATE: 61 BPM | OXYGEN SATURATION: 100 % | SYSTOLIC BLOOD PRESSURE: 164 MMHG | BODY MASS INDEX: 27.49 KG/M2 | WEIGHT: 145.5 LBS

## 2020-01-03 DIAGNOSIS — R10.13 EPIGASTRIC PAIN: ICD-10-CM

## 2020-01-03 LAB
ALBUMIN SERPL-MCNC: 3.7 G/DL (ref 3.4–5)
ALBUMIN UR-MCNC: NEGATIVE MG/DL
ALP SERPL-CCNC: 91 U/L (ref 40–150)
ALT SERPL W P-5'-P-CCNC: 32 U/L (ref 0–50)
ANION GAP SERPL CALCULATED.3IONS-SCNC: 7 MMOL/L (ref 3–14)
ANISOCYTOSIS BLD QL SMEAR: SLIGHT
APPEARANCE UR: CLEAR
AST SERPL W P-5'-P-CCNC: 40 U/L (ref 0–45)
BASOPHILS # BLD AUTO: 0.1 10E9/L (ref 0–0.2)
BASOPHILS NFR BLD AUTO: 1 %
BILIRUB SERPL-MCNC: 0.9 MG/DL (ref 0.2–1.3)
BILIRUB UR QL STRIP: NEGATIVE
BUN SERPL-MCNC: 13 MG/DL (ref 7–30)
CALCIUM SERPL-MCNC: 9.4 MG/DL (ref 8.5–10.1)
CHLORIDE SERPL-SCNC: 101 MMOL/L (ref 94–109)
CO2 SERPL-SCNC: 25 MMOL/L (ref 20–32)
COLOR UR AUTO: YELLOW
COPATH REPORT: NORMAL
CREAT SERPL-MCNC: 1.15 MG/DL (ref 0.52–1.04)
DIFFERENTIAL METHOD BLD: NORMAL
ELLIPTOCYTES BLD QL SMEAR: SLIGHT
EOSINOPHIL # BLD AUTO: 0.1 10E9/L (ref 0–0.7)
EOSINOPHIL NFR BLD AUTO: 1.8 %
ERYTHROCYTE [DISTWIDTH] IN BLOOD BY AUTOMATED COUNT: 12.6 % (ref 10–15)
GFR SERPL CREATININE-BSD FRML MDRD: 44 ML/MIN/{1.73_M2}
GLUCOSE SERPL-MCNC: 133 MG/DL (ref 70–99)
GLUCOSE UR STRIP-MCNC: NEGATIVE MG/DL
HCT VFR BLD AUTO: 36.9 % (ref 35–47)
HGB BLD-MCNC: 12.3 G/DL (ref 11.7–15.7)
HGB UR QL STRIP: NEGATIVE
IMM GRANULOCYTES # BLD: 0 10E9/L (ref 0–0.4)
IMM GRANULOCYTES NFR BLD: 0.3 %
KETONES UR STRIP-MCNC: NEGATIVE MG/DL
LEUKOCYTE ESTERASE UR QL STRIP: NEGATIVE
LIPASE SERPL-CCNC: 184 U/L (ref 73–393)
LYMPHOCYTES # BLD AUTO: 2 10E9/L (ref 0.8–5.3)
LYMPHOCYTES NFR BLD AUTO: 32.5 %
MCH RBC QN AUTO: 30.4 PG (ref 26.5–33)
MCHC RBC AUTO-ENTMCNC: 33.3 G/DL (ref 31.5–36.5)
MCV RBC AUTO: 91 FL (ref 78–100)
MONOCYTES # BLD AUTO: 0.6 10E9/L (ref 0–1.3)
MONOCYTES NFR BLD AUTO: 10 %
MUCOUS THREADS #/AREA URNS LPF: PRESENT /LPF
NEUTROPHILS # BLD AUTO: 3.4 10E9/L (ref 1.6–8.3)
NEUTROPHILS NFR BLD AUTO: 54.4 %
NITRATE UR QL: NEGATIVE
NRBC # BLD AUTO: 0 10*3/UL
NRBC BLD AUTO-RTO: 0 /100
PH UR STRIP: 5 PH (ref 5–7)
PLATELET # BLD AUTO: 180 10E9/L (ref 150–450)
POTASSIUM SERPL-SCNC: 3.9 MMOL/L (ref 3.4–5.3)
PROT SERPL-MCNC: 7.3 G/DL (ref 6.8–8.8)
RBC # BLD AUTO: 4.05 10E12/L (ref 3.8–5.2)
RBC #/AREA URNS AUTO: 0 /HPF (ref 0–2)
SODIUM SERPL-SCNC: 133 MMOL/L (ref 133–144)
SOURCE: ABNORMAL
SP GR UR STRIP: 1.01 (ref 1–1.03)
SQUAMOUS #/AREA URNS AUTO: 1 /HPF (ref 0–1)
TROPONIN I SERPL-MCNC: <0.015 UG/L (ref 0–0.04)
UROBILINOGEN UR STRIP-MCNC: NORMAL MG/DL (ref 0–2)
WBC # BLD AUTO: 6.3 10E9/L (ref 4–11)
WBC #/AREA URNS AUTO: 1 /HPF (ref 0–5)

## 2020-01-03 PROCEDURE — 36415 COLL VENOUS BLD VENIPUNCTURE: CPT

## 2020-01-03 PROCEDURE — 81001 URINALYSIS AUTO W/SCOPE: CPT | Performed by: INTERNAL MEDICINE

## 2020-01-03 PROCEDURE — 80053 COMPREHEN METABOLIC PANEL: CPT | Performed by: INTERNAL MEDICINE

## 2020-01-03 PROCEDURE — 83690 ASSAY OF LIPASE: CPT | Performed by: INTERNAL MEDICINE

## 2020-01-03 PROCEDURE — 85025 COMPLETE CBC W/AUTO DIFF WBC: CPT | Performed by: INTERNAL MEDICINE

## 2020-01-03 PROCEDURE — 84484 ASSAY OF TROPONIN QUANT: CPT | Performed by: INTERNAL MEDICINE

## 2020-01-03 PROCEDURE — 93005 ELECTROCARDIOGRAM TRACING: CPT

## 2020-01-03 PROCEDURE — 99284 EMERGENCY DEPT VISIT MOD MDM: CPT

## 2020-01-03 RX ORDER — CELECOXIB 100 MG/1
100 CAPSULE ORAL 2 TIMES DAILY
Qty: 14 CAPSULE | Refills: 0 | Status: SHIPPED | OUTPATIENT
Start: 2020-01-03 | End: 2020-01-15

## 2020-01-03 RX ORDER — CELECOXIB 50 MG/1
50 CAPSULE ORAL 2 TIMES DAILY
Status: DISCONTINUED | OUTPATIENT
Start: 2020-01-03 | End: 2020-01-03

## 2020-01-03 ASSESSMENT — ENCOUNTER SYMPTOMS
ABDOMINAL PAIN: 1
APPETITE CHANGE: 0
COUGH: 0

## 2020-01-03 NOTE — ED AVS SNAPSHOT
[facility] Emergency Department  [address]  [facility] [address]  Phone:  [phone]  Fax:  [phone]                                    Gely Kenee   MRN: [id number]    Department:  [facility] Emergency Department   Date of Visit:  1/3/2020           After Visit Summary Signature Page    I have received my discharge instructions, and my questions have been answered. I have discussed any challenges I see with this plan with the nurse or doctor.    ..........................................................................................................................................  Patient/Patient Representative Signature      ..........................................................................................................................................  Patient Representative Print Name and Relationship to Patient    ..................................................               ................................................  Date                                   Time    ..........................................................................................................................................  Reviewed by Signature/Title    ...................................................              ..............................................  Date                                               Time          22EPIC Rev 08/18

## 2020-01-04 NOTE — ED PROVIDER NOTES
History     Chief Complaint:  Abdominal Pain    HPI  Gely Keene is a 79 year old female who presents today with abdominal pain. The patient states that about 5 weeks ago she was here with the same upper abdominal pain, at the base of the ribcage, which she describes as a pressure that wraps to her back. She states she had an upper endoscopy yesterday that was unremarkable. She states today the pressure was worse and started to cut off some of her breathing. She states she has seen Dr. Booth who prescribed her anxiety medications. She states her ileostomy has provided normal outputs. She denies a cough that would cause her sore ribs, issues with urination or eating.     Allergies:  Bactrim  Codeine  Metronidazole  Sulfa drugs  Sulfur  Versed      Medications:    Lipitor  Metoprolol      Past Medical History:    Anxiety  BCC  GERD  HTN  HLD  RLS  CKD  Mumps     Past Surgical History:    Appendectomy  Colectomy  ERCP x2  Hysterectomy  Cholecystectomy  Exploratory laparotomy  Phacoemulsification clear cornea with standard intraocular lens implant x2     Family History:    Breast cancer    Social History:  The patient was accompanied to the ED with daughter and .  Smoking Status: Never  Smokeless Tobacco: Never  Alcohol Use: Yes   Drug Use: No   PCP: Hien Booth   Marital Status:     Review of Systems   Constitutional: Negative for appetite change.   Respiratory: Negative for cough.    Gastrointestinal: Positive for abdominal pain.   All other systems reviewed and are negative.       Physical Exam     Patient Vitals for the past 24 hrs:   BP Temp Temp src Pulse Heart Rate Resp SpO2 Weight   01/03/20 2006 -- -- -- -- -- -- 100 % --   01/03/20 2000 -- -- -- 79 -- -- 100 % --   01/03/20 1909 (!) 192/96 99.5  F (37.5  C) Temporal -- 81 18 98 % 66 kg (145 lb 8.1 oz)       Physical Exam  Constitutional:       Comments: Pleasant and cooperative   HENT:      Right Ear: Tympanic membrane normal.       Left Ear: Tympanic membrane normal.      Mouth/Throat:      Pharynx: No posterior oropharyngeal erythema.   Eyes:      Conjunctiva/sclera: Conjunctivae normal.   Neck:      Musculoskeletal: Neck supple.   Cardiovascular:      Rate and Rhythm: Normal rate and regular rhythm.      Heart sounds: Normal heart sounds.   Pulmonary:      Effort: Pulmonary effort is normal.      Breath sounds: Normal breath sounds.   Abdominal:      General: Bowel sounds are normal. There is no distension.      Palpations: Abdomen is soft.      Tenderness: There is no abdominal tenderness. There is no guarding or rebound.   Musculoskeletal: Normal range of motion.   Skin:     General: Skin is warm and dry.   Neurological:      Mental Status: She is alert.         Emergency Department Course     ECG:  Indication: hypertensive  Time: 2021  Vent. Rate 67 bpm. DE interval 146. QRS duration 84. QT/QTc 398/420. P-R-T axis 62 22 26.  Sinus rhythm with premature atrial complexes. Otherwise normal ECG.  No significant change compared to EKG dated 12/1/19.   Read time: 2041    Laboratory:  Laboratory results were communicated with the patient who voiced understanding of the findings.    CBC: WBC: 6.3, HGB: 12.3, PLT: 180  CMP: Glucose: 133 (H), Creatinine: 1.15 (H), GFR: 44 (L), o/w WNL    1953 Troponin: <0.015  Lipase: 184    UA: Mucous: present, o/w Negative     Emergency Department Course:  Nursing notes and vitals reviewed. (7:47 PM) I performed an exam of the patient as documented above.     IV inserted, blood and Urine sent to the lab for further testing, results above.     2105 I consulted with Dr. Mcmullen, [facility], regarding the patient's history and presentation here in the emergency department.    2113 I rechecked the patient and discussed the results of their workup thus far.     Findings and plan explained to the Patient. Patient discharged home with instructions regarding supportive care, medications, and reasons to return. The  importance of close follow-up was reviewed. The patient was prescribed Celebrex.    Impression & Plan       Medical Decision Making:    Gely Keene is a 79 year old female who returns to the emergency department with ongoing pain in the epigastrium to lower chest which no one is been able to explain so far.  Again today laboratory tests are reassuring without leukocytosis, elevation of liver function tests, or other specific findings.  I reviewed her old chart in detail.  She was concerned there could be an undiagnosed pulmonary etiology but she did at one point have a CT of the chest abdomen and pelvis using dissection rule out protocol and I reassured her there was no evidence of pneumothorax, hemothorax, or other intrathoracic abnormality.  Again today there is no evidence of a cardiac etiology.  Her daughter who is a medical person wondered about costochondritis.  The patient does not have distinct tenderness at the costosternal junctions but certainly a trial of a nonsteroidal would be reasonable.  There was no evidence of gastritis or ulcer on the recent EGD.  I will start her on Celebrex pending follow-up with primary care, return if worse or new symptoms.      Diagnosis:    ICD-10-CM    1. Epigastric pain R10.13      Disposition:  Discharged to home.    Discharge Medications:  New Prescriptions    CELECOXIB (CELEBREX) 100 MG CAPSULE    Take 1 capsule (100 mg) by mouth 2 times daily for 7 days     Scribe Disclosure:  I, Giovanni Mendoza, am serving as a scribe at 7:47 PM on 1/3/2020 to document services personally performed by Lizy Cabezas MD based on my observations and the provider's statements to me.      1/3/2020   [facility] EMERGENCY DEPARTMENT       Lizy Cabezas MD  01/03/20 0316

## 2020-01-04 NOTE — ED TRIAGE NOTES
Pt arrives with LUQ abd pain starting in November. Worse today, had endoscopy yesterday. Denies N/V, hx of illeostomy, hypertensive in triage.

## 2020-01-05 LAB — INTERPRETATION ECG - MUSE: NORMAL

## 2020-01-06 ENCOUNTER — TELEPHONE (OUTPATIENT)
Dept: PEDIATRICS | Facility: CLINIC | Age: 80
End: 2020-01-06

## 2020-01-06 DIAGNOSIS — R07.9 CHEST PAIN: Primary | ICD-10-CM

## 2020-01-06 DIAGNOSIS — F41.1 GAD (GENERALIZED ANXIETY DISORDER): ICD-10-CM

## 2020-01-06 NOTE — TELEPHONE ENCOUNTER
Inquired on MRI.     Patient calling to follow up from her ER visit on 1/3/20 for epigastric pain.   This has been intermittent for 2 months now.   She would like to find some answers or treatment to help with these symptoms.     CELEBREX NOT HELPFUL  Patient tried Celebrex over the weekend (Fri & Sat) as prescribed by ER Doctor.   The medication made her drowsy and fatigued. In addition caused black stools from her ileostomy.   Her stool return to normal color once stopped.     DESCRIPTION:  Patient starts in ribcage region and wraps around to her back.     Better today. Last night had hot bath, 200 mg of ibuprofen and rest.     WORK UP HISTORY:  Has had 2 ER visits. Follow up with Dr. Booth. Abd CT & EGD.     Polyps found on EGD, biopsies done resulted in fundic gland polyps. No other findings such as gastritis.

## 2020-01-06 NOTE — TELEPHONE ENCOUNTER
Huddled with Dr. Emmy Rodríguez to continue warm packs, low dose ibuprofen and rest.     Patient agrees with plan.   Reviewed how to take ibuprofen.     Patient will follow up in 3-7 days for follow up.

## 2020-01-14 RX ORDER — LORAZEPAM 0.5 MG/1
0.25 TABLET ORAL DAILY PRN
Qty: 10 TABLET | Refills: 0 | Status: SHIPPED | OUTPATIENT
Start: 2020-01-14 | End: 2023-06-28

## 2020-01-14 RX ORDER — CYCLOBENZAPRINE HCL 5 MG
2.5-5 TABLET ORAL
Qty: 20 TABLET | Refills: 0 | Status: ON HOLD | OUTPATIENT
Start: 2020-01-14 | End: 2020-09-21

## 2020-01-14 NOTE — TELEPHONE ENCOUNTER
Patient uses Lorazepam at night and it helps her go to sleep and avoid the pain. She would like a refill.   Patient will follow up with pharmacy    LORAZEPAM 0.5mg - 1/2-1 every day prn  Last Written Prescription Date:  12/18/19  Last Fill Quantity: 10,  # refills: 0   Last office visit: 12/4/2019 with prescribing provider         Patient states the rib pain wraps around her whole mid section and starts right below her breast bone.   Describes as tender skin. Sensitive to touch.   Episodes happen during sitting and activity. More at nighttime versus daytime.  Denies heart palpitations, breathing troubles.     Has been doing stretching & ibuprofen. Both haven't helped.     Huddled with Dr. Booth -   Recommend echocardiogram. Ok for Flexeril.   Would possibly recheck CT scan over MRI, if symptoms persist.     Entered order and sent rx per verbal order.     Patient informed of plan. She agrees. She will call to set up the appointment.

## 2020-01-14 NOTE — TELEPHONE ENCOUNTER
The lorazepam is a high risk medication for her that I would prefer she use only very rarely.  She cannot combine this with the flexeril.  I would recommend using only a half dose per time and using twice weekly at the the most.   Please let her know.    Script for #10 sent to pharmacy.    Hien Booth MD  Internal Medicine - Pediatrics

## 2020-01-14 NOTE — TELEPHONE ENCOUNTER
Patient left a voicemail on my triage line stating the Ibuprofen has NOT changed symptoms.   Continues with Rib Pain.     Will discuss with Dr. Booth.

## 2020-01-15 NOTE — TELEPHONE ENCOUNTER
Informed patient of below.     She states the Lorazepam 0.5 mg is small and may not be able to break apart.     While talking to the patient, she reported she did not start Sertraline yet.   Advised her to start.

## 2020-01-20 ENCOUNTER — TELEPHONE (OUTPATIENT)
Dept: PEDIATRICS | Facility: CLINIC | Age: 80
End: 2020-01-20

## 2020-01-20 ENCOUNTER — HOSPITAL ENCOUNTER (EMERGENCY)
Facility: CLINIC | Age: 80
Discharge: HOME | End: 2020-01-20
Attending: EMERGENCY MEDICINE | Admitting: EMERGENCY MEDICINE
Payer: MEDICARE

## 2020-01-20 VITALS
OXYGEN SATURATION: 96 % | RESPIRATION RATE: 17 BRPM | SYSTOLIC BLOOD PRESSURE: 173 MMHG | DIASTOLIC BLOOD PRESSURE: 94 MMHG | TEMPERATURE: 98.1 F | HEART RATE: 73 BPM

## 2020-01-20 DIAGNOSIS — R20.2 PARESTHESIAS: ICD-10-CM

## 2020-01-20 DIAGNOSIS — E83.42 HYPOMAGNESEMIA: ICD-10-CM

## 2020-01-20 DIAGNOSIS — E87.1 HYPONATREMIA: ICD-10-CM

## 2020-01-20 DIAGNOSIS — R10.9 ABDOMINAL PAIN, UNSPECIFIED ABDOMINAL LOCATION: ICD-10-CM

## 2020-01-20 LAB
ALBUMIN SERPL-MCNC: 3.6 G/DL (ref 3.4–5)
ALP SERPL-CCNC: 77 U/L (ref 40–150)
ALT SERPL W P-5'-P-CCNC: 29 U/L (ref 0–50)
ANION GAP SERPL CALCULATED.3IONS-SCNC: 7 MMOL/L (ref 3–14)
AST SERPL W P-5'-P-CCNC: 10 U/L (ref 0–45)
BASOPHILS # BLD AUTO: 0 10E9/L (ref 0–0.2)
BASOPHILS NFR BLD AUTO: 0.5 %
BILIRUB SERPL-MCNC: 1.2 MG/DL (ref 0.2–1.3)
BUN SERPL-MCNC: 11 MG/DL (ref 7–30)
CALCIUM SERPL-MCNC: 9.1 MG/DL (ref 8.5–10.1)
CHLORIDE SERPL-SCNC: 93 MMOL/L (ref 94–109)
CO2 SERPL-SCNC: 27 MMOL/L (ref 20–32)
CREAT SERPL-MCNC: 1.02 MG/DL (ref 0.52–1.04)
DIFFERENTIAL METHOD BLD: NORMAL
EOSINOPHIL # BLD AUTO: 0 10E9/L (ref 0–0.7)
EOSINOPHIL NFR BLD AUTO: 0.2 %
ERYTHROCYTE [DISTWIDTH] IN BLOOD BY AUTOMATED COUNT: 12 % (ref 10–15)
GFR SERPL CREATININE-BSD FRML MDRD: 51 ML/MIN/{1.73_M2}
GLUCOSE SERPL-MCNC: 155 MG/DL (ref 70–99)
HCT VFR BLD AUTO: 35 % (ref 35–47)
HGB BLD-MCNC: 12 G/DL (ref 11.7–15.7)
IMM GRANULOCYTES # BLD: 0 10E9/L (ref 0–0.4)
IMM GRANULOCYTES NFR BLD: 0.5 %
LIPASE SERPL-CCNC: 155 U/L (ref 73–393)
LYMPHOCYTES # BLD AUTO: 1.2 10E9/L (ref 0.8–5.3)
LYMPHOCYTES NFR BLD AUTO: 18.8 %
MAGNESIUM SERPL-MCNC: 1.3 MG/DL (ref 1.6–2.3)
MCH RBC QN AUTO: 29.9 PG (ref 26.5–33)
MCHC RBC AUTO-ENTMCNC: 34.3 G/DL (ref 31.5–36.5)
MCV RBC AUTO: 87 FL (ref 78–100)
MONOCYTES # BLD AUTO: 0.6 10E9/L (ref 0–1.3)
MONOCYTES NFR BLD AUTO: 10 %
NEUTROPHILS # BLD AUTO: 4.5 10E9/L (ref 1.6–8.3)
NEUTROPHILS NFR BLD AUTO: 70 %
NRBC # BLD AUTO: 0 10*3/UL
NRBC BLD AUTO-RTO: 0 /100
PLATELET # BLD AUTO: 212 10E9/L (ref 150–450)
POTASSIUM SERPL-SCNC: 3.6 MMOL/L (ref 3.4–5.3)
PROT SERPL-MCNC: 6.9 G/DL (ref 6.8–8.8)
RBC # BLD AUTO: 4.01 10E12/L (ref 3.8–5.2)
SODIUM SERPL-SCNC: 127 MMOL/L (ref 133–144)
WBC # BLD AUTO: 6.4 10E9/L (ref 4–11)

## 2020-01-20 PROCEDURE — 25000132 ZZH RX MED GY IP 250 OP 250 PS 637: Mod: GY | Performed by: EMERGENCY MEDICINE

## 2020-01-20 PROCEDURE — 85025 COMPLETE CBC W/AUTO DIFF WBC: CPT | Performed by: EMERGENCY MEDICINE

## 2020-01-20 PROCEDURE — 99284 EMERGENCY DEPT VISIT MOD MDM: CPT

## 2020-01-20 PROCEDURE — 25800030 ZZH RX IP 258 OP 636: Performed by: EMERGENCY MEDICINE

## 2020-01-20 PROCEDURE — 83690 ASSAY OF LIPASE: CPT | Performed by: EMERGENCY MEDICINE

## 2020-01-20 PROCEDURE — 83735 ASSAY OF MAGNESIUM: CPT | Performed by: EMERGENCY MEDICINE

## 2020-01-20 PROCEDURE — 80053 COMPREHEN METABOLIC PANEL: CPT | Performed by: EMERGENCY MEDICINE

## 2020-01-20 PROCEDURE — 25000125 ZZHC RX 250: Performed by: EMERGENCY MEDICINE

## 2020-01-20 PROCEDURE — 93005 ELECTROCARDIOGRAM TRACING: CPT

## 2020-01-20 RX ORDER — ACETAMINOPHEN 500 MG
500 TABLET ORAL ONCE
Status: COMPLETED | OUTPATIENT
Start: 2020-01-20 | End: 2020-01-20

## 2020-01-20 RX ADMIN — FAMOTIDINE 20 MG: 10 INJECTION, SOLUTION INTRAVENOUS at 13:42

## 2020-01-20 RX ADMIN — ACETAMINOPHEN 500 MG: 500 TABLET, FILM COATED ORAL at 15:56

## 2020-01-20 RX ADMIN — Medication 2 G: at 15:00

## 2020-01-20 RX ADMIN — LIDOCAINE HYDROCHLORIDE 30 ML: 20 SOLUTION ORAL; TOPICAL at 13:42

## 2020-01-20 RX ADMIN — SODIUM CHLORIDE 500 ML: 9 INJECTION, SOLUTION INTRAVENOUS at 15:00

## 2020-01-20 ASSESSMENT — ENCOUNTER SYMPTOMS
NAUSEA: 1
COUGH: 1
NUMBNESS: 1
BACK PAIN: 1
APPETITE CHANGE: 1
FEVER: 0
DIARRHEA: 0
CONSTIPATION: 0
ABDOMINAL PAIN: 1
VOMITING: 0

## 2020-01-20 NOTE — TELEPHONE ENCOUNTER
Late note from this morning. Patient left a voicemail on my triage line.     Patient states the cyclobenzaprine hasn't been helpful for her rib pain (tenderness that wraps around her torso)   Patient spoke with her friend who has MS, patient now worried that her symptoms are related.     Scheduled office visit to be seen and re-evaluated due to ongoing symptoms and past work up.     Next 5 appointments (look out 90 days)    Jan 21, 2020 10:30 AM CST  (Arrive by 10:10 AM)  Office Visit with Jerrod Regalado MD  [facility] [address] [place] [address]  [address]  [facility] [address] [phone]

## 2020-01-20 NOTE — TELEPHONE ENCOUNTER
Patient also left a voicemail on my triage line. States she's not feeling good. Asked about an appointment today.     Called patient, left voicemail stating if feeling worse, to go to ER. No appointments here at the clinic for today.     Called Daughter, patient is going to ER.

## 2020-01-20 NOTE — ED TRIAGE NOTES
Pt presents to ED with c/o numbness/tightness that wraps around her chest. PT has emilie seen at [facility] >4 times since November for her symptoms. Pt states that she feels the symtoms have gotten worse over the last couple days and now has numbness in her feet. ABC intact. A/O x4.

## 2020-01-20 NOTE — ED AVS SNAPSHOT
[facility] Emergency Department  [address]  [facility] [address]  Phone:  [phone]  Fax:  [phone]                                    Gely Keene   MRN: [id number]    Department:  [facility] Emergency Department   Date of Visit:  1/20/2020           After Visit Summary Signature Page    I have received my discharge instructions, and my questions have been answered. I have discussed any challenges I see with this plan with the nurse or doctor.    ..........................................................................................................................................  Patient/Patient Representative Signature      ..........................................................................................................................................  Patient Representative Print Name and Relationship to Patient    ..................................................               ................................................  Date                                   Time    ..........................................................................................................................................  Reviewed by Signature/Title    ...................................................              ..............................................  Date                                               Time          22EPIC Rev 08/18

## 2020-01-20 NOTE — TELEPHONE ENCOUNTER
Daughter sent in achvr message regarding patient:    Dr Booth:     I m writing on behalf of my mother (Gely Keene home phone [phone]).     She is continuing to have pressure around her upper midsection that is radiating to her back. Sometimes the pressure is more severe than on other days. She has tingling of her hands and feet and her hands have now started to shake.       She has been to the ER numerous times for these symptoms and they continue to find nothing. They have done numerous lab tests, EKGs, CTs with all normal results.  She had an endoscopy with only findings were some polyups which have been removed. She is now scheduled for an echo at the end of this month.  She has been prescribed an anti-inflammatory  and muscle relaxers. These have been helping only with anxiety and has helped her sleep.     She has been continually told to follow up with her primary.  Which she has only been able to do by calling and talking with your nurse. SHE NEEDS TO BE PHYSICALLY SEEN BY YOU AS YOU KNOW HER BEST. We are still at a loss of what her diagnosis is.      Please find time in your schedule to see her ASAP. All of us (her and her family) really want to find out what is going on. There has been no relief for her and this has been this going on for 8+weeks.  She has seen her colorectal surgeon, a gastro doc and many different ER physicians with no answers.      This is getting very frustrating and scary.     Sincerely Dianabenitez Keller (her daughter).     Next 5 appointments (look out 90 days)    Jan 21, 2020 10:30 AM CST  (Arrive by 10:10 AM)  Office Visit with Jerrod Regalado MD  [facility] [address]  [address]  [facility] [address] [phone]

## 2020-01-20 NOTE — TELEPHONE ENCOUNTER
Diana (daughter) calling in to request a callback from [facility].  She is very concerned about her mother, and is wondering if any provider could see her mother today instead of tomorrow. Dr. Booth is out of the office.  Her mother can't speak, and has had lab work done and this has been ongoing for months.  Would like a callback from [facility].    Thanks  Scar CARRERO  Team Coodinator

## 2020-01-20 NOTE — ED PROVIDER NOTES
History     Chief Complaint:  Abdominal pain and numbness    HPI   Gely Keene is a 80 year old female with a history of CKD stage III, hypertension, hyperlipidemia, and s/p ileostomy who presents to the emergency department with her , daughter, and daughter in law for evaluation of abdominal pain and numbness. The patient reports of diffuse, epigastric pain that radiates to her back, as well as foot and leg numbness. This has been persistent since November 2019 and she has been evaluated extensively for this, 4 times in the ED in the last 2 months, as well as GI specialists, pertinent scans and procedures below. However, today she's having more pressure/numbness in her feet and legs than usual, and more severe abdominal pain. She also notes some nausea and  appetite loss. She took Bentyl today with no improvement and she takes Advil often. She is scheduled for an echocardiogram on 1/31/2020. Patient denies fever, vomiting, chest pain, constipation, diarrhea, color change in her ostomy bag, change in diet, recent travel, or recent known ill exposure. No focal weakness or other symptoms reported.      EXAM: CT ABDOMEN PELVIS W CONTRAST 11/28/2019  IMPRESSION:   1.  Subtotal colectomy with nonobstructing ileostomy.  2.  Cholecystectomy.  3.  The distal gastric body and antrum are not fully distended although cannot exclude mild wall thickening in this location and antritis.    EGD 1/2/2020  Findings:        The Z-line was regular and was found 35 cm from the incisors.        Multiple 5-9 mm pedunculated and sessile polyps with no bleeding and no        stigmata of recent bleeding were found in the gastric fundus. Biopsies        were taken with a cold forceps for histology. Estimated blood loss was        minimal.        Normal mucosa was found in the entire duodenum.        The exam was otherwise without abnormality.     Surgical pathology from stomach polyps 1/2/2020  - Negative for dysplasia  and malignancy     Allergies:  Bactrim  Codeine  Metronidazole  Sulfa drugs    Versed     Medications:    Lipitor  Biotin  Premarin  Flexeril  Ativan  Melatonin  Toprol XL  Prilosec  Zoloft   Tenormin   Lisinopril     Past Medical History:    Anxiety  BCC  GERD  Hypertension   Hyperlipidemia  Mumps   RLS  CKD stage III  Recurrent UTIs  Psychosocial stressors     Past Surgical History:    Appendectomy  Colectomy   Cholangiopancreatogram   Hysterectomy  Cholecystectomy   Hysterectomy   Phacoemulsification clear cornea with standard intraocular lens implant    Family History:    Breast cancer   CAD: brother    Social History:  Tobacco Use: Never  Alcohol Use: Social  PCP: Hien Booth  Marital Status:        Review of Systems   Constitutional: Positive for appetite change. Negative for fever.   Respiratory: Positive for cough.    Cardiovascular: Negative for chest pain.   Gastrointestinal: Positive for abdominal pain and nausea. Negative for constipation, diarrhea and vomiting.   Musculoskeletal: Positive for back pain.   Neurological: Positive for numbness.   All other systems reviewed and are negative.      Physical Exam     Patient Vitals for the past 24 hrs:   BP Temp Temp src Pulse Heart Rate Resp SpO2   01/20/20 1350 -- -- -- -- 71 17 96 %   01/20/20 1345 (!) 173/94 -- -- -- 75 20 96 %   01/20/20 1340 -- -- -- -- 70 19 --   01/20/20 1335 -- -- -- -- 71 17 --   01/20/20 1330 -- -- -- -- 71 12 --   01/20/20 1325 -- -- -- -- 73 12 --   01/20/20 1320 -- -- -- -- 75 17 --   01/20/20 1315 (!) 195/94 -- -- 73 71 14 --   01/20/20 1310 -- 98.1  F (36.7  C) Oral -- 77 22 --   01/20/20 1249 (!) 198/111 -- -- 82 76 26 98 %     Physical Exam  Nursing note and vitals reviewed.  Constitutional: Well nourished.   Eyes: Conjunctiva normal.  Pupils are equal, round, and reactive to light.   ENT: Nose normal. Mucous membranes pink and moist.    Neck: Normal range of motion.  CVS: Normal rate, regular rhythm.  Normal  heart sounds.  No murmur. 2/2 DP pulses bilaterally  Pulmonary: Lungs clear to auscultation bilaterally. No wheezes/rales/rhonchi.  GI: Abdomen soft. Nontender, nondistended. No rigidity or guarding.  Ostomy bag, pink stoma with brown-green stool (baseline per patient). No CVA tenderness  MSK: No calf tenderness or swelling. No c/t/l bony tenderness  Neuro: Alert. Follows simple commands. Moves all extremities equally and symmetrically. Sensation intact L2-S1.  Patellar/achilles reflexes 2+ bilaterally. Gait normal  Skin: Skin is warm and dry. No rash noted.   Psychiatric: Mildly anxious       Emergency Department Course   ECG:  @ 1249  Indication: Abdominal pain  Vent. Rate 75 bpm. NJ interval 140 ms. QRS duration 82 ms. QT/QTc 400/446 ms. P-R-T axis 59 39 36.   Normal sinus rhythm. Normal ECG.    Read @ 1249 by Dr. Hoffmann.    Laboratory:  CBC: WBC: 6.4, HGB: 12.0, PLT: 212  CMP: Glucose 155 (H), Sodium: 127 (L), Chloride: 93 (L), GFR: 51 (L), o/w WNL (Creatinine: 1.02)    Lipase: 155    Magnesium: 1.3 (L)    Interventions:  1342 Pepcid 20 mg IV  1342 GI Cocktail (Maalox/Mylanta and viscous Lidocaine), 30 mL suspension, PO   1500 Magnesium sulfate 2 g IV  1500 0.9% Sodium Chloride BOLUS 500 mLs IV   1556 Tylenol 500 mg tablet PO    Emergency Department Course:  1250 Nursing notes and vitals reviewed. I performed an exam of the patient as documented above.     EKG was done, interpretation as above.    IV inserted. Medicine administered as documented above. Blood drawn. This was sent to the lab for further testing, results above.    1554 I rechecked the patient and discussed the results of her workup thus far.     Findings and plan explained to the Patient. Patient discharged home with instructions regarding supportive care, medications, and reasons to return. The importance of close follow-up was reviewed. The patient was prescribed Magnesium oxide.     I personally reviewed the laboratory results with the Patient  and answered all related questions prior to discharge.     Impression & Plan    Medical Decision Making:  Patient is an 80-year-old female presenting with myriad of complaints predominately abdominal pain and paresthesias to her feet.  She states that these complaints have been an ongoing problem over the past few months.  She has no significant abdominal tenderness on exam.  I reviewed patient's recent CT scans as well as EGD results.  I discussed with family and patient at bedside no indication for emergent CT abdomen at this time.  They are comfortable deferring imaging as low suspicion for intra-abdominal catastrophe given chronicity of symptoms.  Basic labs obtained however and noted some electrolyte derangements notably hyponatremia as well as hypomagnesemia.  I do suspect this is secondary to decreased p.o. intake.  Patient received IV fluids as well as magnesium during her time in the ED.  I discussed with patient hypomagnesemia especially could cause some component of paresthesias.  She is neurologically intact and no indication for emergent neuroimaging.  There is no evidence to suggest spinal cord pathology.  I offered patient observation predominately for electrolyte replacements though she is declining at this time.  Both patient and her family had numerous questions about the etiologies of all patient's complaints.  I stated I am unsure at this point in time to explain all of her complaints and that they may be completely unrelated.  They were requesting however formal neurology evaluation as they have concerns for multiple sclerosis.  I discussed that again there is no indication for emergent neurologic work-up at this time though will provide an outpatient neurology referral though recommended close PCP follow-up 1 to 2 days for repeat evaluation and repeat BMP.  Patient and family felt comfortable with plan of care.  Patient was given GI cocktail during her time in the ED.  I discussed her  presentation may be secondary to dyspepsia given her recurrent reported Aleve.  I reviewed her recent EGD report.  I offered Carafate on disco to supplement with her Prilosec though patient declining at this time.  She will be given magnesium supplementation on dispo however and was counseled that this medication can cause loose stools.  Return to the ED for fever, worsening pain or should symptoms change.    Diagnosis:    ICD-10-CM    1. Hyponatremia E87.1    2. Hypomagnesemia E83.42    3. Paresthesias R20.2    4. Abdominal pain, unspecified abdominal location R10.9        Disposition:  Discharged to home    Discharge Medications:  New Prescriptions    MAGNESIUM OXIDE (MAG-OX) 400 (241.3 MG) MG TABLET    Take 0.5 tablets (200 mg) by mouth 2 times daily for 4 days     Scribe Disclosure:  I, Ricki Vargas, am serving as a scribe on 1/20/2020 at 12:50 PM to personally document services performed by Hayde Locke DO based on my observations and the provider's statements to me.     Ricki Vargas  1/20/2020   [facility] EMERGENCY DEPARTMENT       Hayde Hoffmann DO  01/20/20 2792

## 2020-01-21 ENCOUNTER — ANCILLARY PROCEDURE (OUTPATIENT)
Dept: GENERAL RADIOLOGY | Facility: CLINIC | Age: 80
End: 2020-01-21
Attending: INTERNAL MEDICINE
Payer: MEDICARE

## 2020-01-21 ENCOUNTER — OFFICE VISIT (OUTPATIENT)
Dept: PEDIATRICS | Facility: CLINIC | Age: 80
End: 2020-01-21
Payer: MEDICARE

## 2020-01-21 VITALS
TEMPERATURE: 98.3 F | WEIGHT: 140 LBS | DIASTOLIC BLOOD PRESSURE: 46 MMHG | BODY MASS INDEX: 25.76 KG/M2 | HEART RATE: 80 BPM | HEIGHT: 62 IN | OXYGEN SATURATION: 98 % | SYSTOLIC BLOOD PRESSURE: 140 MMHG

## 2020-01-21 DIAGNOSIS — R68.81 EARLY SATIETY: ICD-10-CM

## 2020-01-21 DIAGNOSIS — M54.50 ACUTE MIDLINE LOW BACK PAIN WITHOUT SCIATICA: ICD-10-CM

## 2020-01-21 DIAGNOSIS — R10.84 ABDOMINAL PAIN, GENERALIZED: Primary | ICD-10-CM

## 2020-01-21 DIAGNOSIS — R79.9 ABNORMAL FINDING OF BLOOD CHEMISTRY, UNSPECIFIED: ICD-10-CM

## 2020-01-21 DIAGNOSIS — R11.0 NAUSEA: ICD-10-CM

## 2020-01-21 DIAGNOSIS — M54.6 ACUTE BILATERAL THORACIC BACK PAIN: ICD-10-CM

## 2020-01-21 DIAGNOSIS — F41.1 GAD (GENERALIZED ANXIETY DISORDER): ICD-10-CM

## 2020-01-21 DIAGNOSIS — E87.1 HYPONATREMIA: ICD-10-CM

## 2020-01-21 DIAGNOSIS — E55.9 VITAMIN D DEFICIENCY: ICD-10-CM

## 2020-01-21 LAB
INTERPRETATION ECG - MUSE: NORMAL
VIT B12 SERPL-MCNC: 656 PG/ML (ref 193–986)

## 2020-01-21 PROCEDURE — 82607 VITAMIN B-12: CPT | Performed by: INTERNAL MEDICINE

## 2020-01-21 PROCEDURE — 82306 VITAMIN D 25 HYDROXY: CPT | Performed by: INTERNAL MEDICINE

## 2020-01-21 PROCEDURE — 72070 X-RAY EXAM THORAC SPINE 2VWS: CPT

## 2020-01-21 PROCEDURE — 80048 BASIC METABOLIC PNL TOTAL CA: CPT | Performed by: INTERNAL MEDICINE

## 2020-01-21 PROCEDURE — 72100 X-RAY EXAM L-S SPINE 2/3 VWS: CPT

## 2020-01-21 PROCEDURE — 84443 ASSAY THYROID STIM HORMONE: CPT | Performed by: INTERNAL MEDICINE

## 2020-01-21 PROCEDURE — 82728 ASSAY OF FERRITIN: CPT | Performed by: INTERNAL MEDICINE

## 2020-01-21 PROCEDURE — 83735 ASSAY OF MAGNESIUM: CPT | Performed by: INTERNAL MEDICINE

## 2020-01-21 PROCEDURE — 99215 OFFICE O/P EST HI 40 MIN: CPT | Performed by: INTERNAL MEDICINE

## 2020-01-21 PROCEDURE — 36415 COLL VENOUS BLD VENIPUNCTURE: CPT | Performed by: INTERNAL MEDICINE

## 2020-01-21 RX ORDER — ONDANSETRON 4 MG/1
4 TABLET, ORALLY DISINTEGRATING ORAL EVERY 8 HOURS PRN
Qty: 30 TABLET | Refills: 1 | Status: SHIPPED | OUTPATIENT
Start: 2020-01-21 | End: 2021-03-01

## 2020-01-21 ASSESSMENT — MIFFLIN-ST. JEOR: SCORE: 1040.35

## 2020-01-21 NOTE — PATIENT INSTRUCTIONS
1) Try zofran to see if helps with nausea    2) Increase zoloft (sertraline) to 50 mg per day from 25, ok to take 2 of the 25 mg, I sent in the 50 mg tablets for you    3) Labs today as we discussed, we will call with anything urgent. We are looking at vitamin levels as well.     4) They should call you to set up emptying study of the stomach, if not- Please call [facility] at [phone] to arrange your study. If this is slow or delayed, there are medications we can try to help with this.    5) No fractures on the xray today    6) Agree with heart ultrasound as scheduled.    7) Agree with neurology evaluation    8) Continue the magnesium    Jerrod Regalado MD

## 2020-01-21 NOTE — PROGRESS NOTES
Subjective     Gely Keene is a 80 year old female who presents to clinic today for the following health issues:    History of Present Illness        She eats 0-1 servings of fruits and vegetables daily.She consumes 1 sweetened beverage(s) daily.She exercises with enough effort to increase her heart rate 9 or less minutes per day.  She exercises with enough effort to increase her heart rate 3 or less days per week.   She is taking medications regularly.     Worst symptom currently is pressure in the chest area, pressure started before Thanksgiving, then got worse. Has been to the ER 5 times for this. Had     Going in for Echo in 1/31/2020.    Has ileostomy: saw gastroenterology Dr. Tran, EGD was normal with some polyps, saw Dr. Alaniz for colorectal and ostomy.      Medications tried:  - celecoxib tried for pain: made feel worse with this  - dicyclomine  - gabapentin: did not seem to help  - sertraline to help with anxiety, feels as though helping more  - lorazepam seems to be helping more-    - flexeril- has helped anxiety  - had zofran before EGD  - had GI cocktail yesterday, did not think helped much.     Pt is here today re: rib pain. Underneath breasts, radiates all the way back around torso. Pressure.     Tingling in legs and feet, tingling in arms, comes and goes, Getting pain.     Pt was in ER yesterday. Has felt nauseated for a couple days. Tremors as of lately.    Hyponatremia: Has been drinking fair amount of water during the day. Has been drinking under 64 ounces.   - drinking some Gatorade       Patient Active Problem List   Diagnosis     Hyperlipidemia LDL goal <160     Hypertension goal BP (blood pressure) < 140/90     IFG (impaired fasting glucose)     BCC (basal cell carcinoma)     Gastroesophageal reflux disease, esophagitis presence not specified     S/p total colectomy on 4/22/16 by Shana Alaniz MD     Restless legs syndrome (RLS)     Anxiety     Health Care Home      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)     Advance care planning     Chronic kidney disease, stage III (moderate) (H)     Past Surgical History:   Procedure Laterality Date     APPENDECTOMY  1952     cholecystitis       choledocolithiasis       COLECTOMY WITH COLOSTOMY, COMBINED N/A 4/22/2016    Procedure: COMBINED COLECTOMY WITH COLOSTOMY;  Surgeon: Shana Alaniz MD;  Location:  OR     CYSTOSCOPY       ENDOSCOPIC RETROGRADE CHOLANGIOPANCREATOGRAM N/A 8/15/2017    Procedure: COMBINED ENDOSCOPIC RETROGRADE CHOLANGIOPANCREATOGRAPHY, SPHINCTEROTOMY;  ENDOSCOPIC RETROGRADE CHOLANGIOPANCREATOGRAPHY, SPHINCTEROTOMY. STONE EXTRACTION;  Surgeon: Keturah Bergeron MD;  Location:  OR     ENDOSCOPIC RETROGRADE CHOLANGIOPANCREATOGRAM N/A 8/15/2017    Procedure: COMBINED ENDOSCOPIC RETROGRADE CHOLANGIOPANCREATOGRAPHY, REMOVE STONE/BALLOON;;  Surgeon: Keturah Bergeron MD;  Location:  OR     ESOPHAGOSCOPY, GASTROSCOPY, DUODENOSCOPY (EGD), COMBINED Left 1/2/2020    Procedure: ESOPHAGOGASTRODUODENOSCOPY (fv); random stomach biopsies of polyps using jumbo bx forcep;  Surgeon: Thais Martinez MD;  Location:  GI     HYSTERECTOMY  1987     LAPAROSCOPIC CHOLECYSTECTOMY  2008     LAPAROTOMY EXPLORATORY N/A 4/22/2016    Procedure: LAPAROTOMY EXPLORATORY;  Surgeon: Shana Alaniz MD;  Location:  OR     PHACOEMULSIFICATION CLEAR CORNEA WITH STANDARD INTRAOCULAR LENS IMPLANT Left 5/9/2017    Procedure: PHACOEMULSIFICATION CLEAR CORNEA WITH STANDARD INTRAOCULAR LENS IMPLANT;  LEFT EYE PHACOEMULSIFICATION CLEAR CORNEA WITH STANDARD INTRAOCULAR LENS IMPLANT ;  Surgeon: Eloy Eric MD;  Location:  EC     PHACOEMULSIFICATION CLEAR CORNEA WITH STANDARD INTRAOCULAR LENS IMPLANT Right 5/23/2017    Procedure: PHACOEMULSIFICATION CLEAR CORNEA WITH STANDARD INTRAOCULAR LENS IMPLANT;  RIGHT EYE PHACOEMULSIFICATION CLEAR CORNEA WITH STANDARD INTRAOCULAR LENS IMPLANT ;  Surgeon:  "Eloy Eric MD;  Location: [facility]       Social History     Tobacco Use     Smoking status: Never Smoker     Smokeless tobacco: Never Used   Substance Use Topics     Alcohol use: Yes     Frequency: Monthly or less     Drinks per session: 1 or 2     Binge frequency: Never     Comment: socially     Family History   Problem Relation Age of Onset     Breast Cancer Daughter      Breast Cancer Mother      Breast Cancer Sister              Reviewed and updated as needed this visit by Provider         Review of Systems   ROS COMP: Constitutional, HEENT, cardiovascular, pulmonary, GI, , musculoskeletal, neuro, skin, endocrine and psych systems are negative, except as otherwise noted.      Objective    BP (!) 140/46 (BP Location: Right arm, Patient Position: Sitting, Cuff Size: Adult Regular)   Pulse 80   Temp 98.3  F (36.8  C) (Tympanic)   Ht 1.562 m (5' 1.5\")   Wt 63.5 kg (140 lb)   SpO2 98%   BMI 26.02 kg/m    Body mass index is 26.02 kg/m .  Physical Exam   GENERAL: fatigued but in no acute distress  EYES: Eyes grossly normal to inspection, PERRL and conjunctivae and sclerae normal  HENT: ear canals and TM's normal, nose and mouth without ulcers or lesions  NECK: no adenopathy, no asymmetry, masses, or scars and thyroid normal to palpation  RESP: lungs clear to auscultation - no rales, rhonchi or wheezes  CV: regular rate and rhythm, normal S1 S2, no S3 or S4, no murmur, click or rub, no peripheral edema and peripheral pulses strong  ABDOMEN: no CVA tenderness, no masses, ostomy in lower abdomen without tenderness, mild epigastric tenderness, no rashes, no central spine tenderness  MS: no gross musculoskeletal defects noted, no edema  SKIN: no suspicious lesions or rashes  NEURO: Normal strength and tone, mentation intact and speech normal  PSYCH: mentation appears normal, affect normal/bright    Diagnostic Test Results:  Labs reviewed in Epic  No results found for any visits on 01/21/20.    "     Assessment & Plan       ICD-10-CM    1. Abdominal pain, generalized R10.84 Basic metabolic panel     Magnesium     Vitamin B12     ondansetron (ZOFRAN ODT) 4 MG ODT tab     Ferritin     Vitamin D Deficiency     NM Gastric Emptying     Erythrocyte sedimentation rate auto     TSH with free T4 reflex    Has had troponin negative during ER evaluation without changes on EKG, consider stress evaluation if no other causes noted- does have echo pending   2. Nausea R11.0 Differential includes delayed gastric emptying, will get NM gastric emptying evaluation for checking this.    3. Acute bilateral thoracic back pain M54.6    4. Abnormal finding of blood chemistry, unspecified  R79.9 Ferritin     TSH with free T4 reflex   5. Vitamin D deficiency E55.9 Vitamin D Deficiency   6. Early satiety R68.81 NM Gastric Emptying     TSH with free T4 reflex     CANCELED: NM Gastric Emptying   7. Acute midline low back pain without sciatica M54.5 XR Thoracic Spine 2 Views     XR Lumbar Spine 2/3 Views   8. MARK (generalized anxiety disorder) F41.1 sertraline (ZOLOFT) 50 MG tablet     TSH with free T4 reflex    Discussed how can contribute to symptoms, will increase zoloft today.      Hyponatremia noted on recent labs, does drink a fair amount of water, discussed how this can cause hyponatremia, will start to add in more solutes, labs from today pending.    Labs pending to evaluate for other symptoms including paresthesias,does have neurology evaluation coming up as well. Patient inquired about evaluation at [facility], discussed starting with current evaluation then can discuss this after labs return.    A total of 45 minutes was spent evaluating patient with greater than 50% of time in direct patient contact and counseling of patient of above problems.    Patient Instructions   1) Try zofran to see if helps with nausea    2) Increase zoloft (sertraline) to 50 mg per day from 25, ok to take 2 of the 25 mg, I sent in the 50 mg tablets  for you    3) Labs today as we discussed, we will call with anything urgent. We are looking at vitamin levels as well.     4) They should call you to set up emptying study of the stomach, if not- Please call [facility] at [phone] to arrange your study. If this is slow or delayed, there are medications we can try to help with this.    5) No fractures on the xray today    6) Agree with heart ultrasound as scheduled.    7) Agree with neurology evaluation    8) Continue the magnesium    MD Jerrod Prince MD  [facility]

## 2020-01-22 ENCOUNTER — APPOINTMENT (OUTPATIENT)
Dept: CT IMAGING | Facility: CLINIC | Age: 80
End: 2020-01-22
Attending: EMERGENCY MEDICINE
Payer: MEDICARE

## 2020-01-22 ENCOUNTER — TELEPHONE (OUTPATIENT)
Dept: PEDIATRICS | Facility: CLINIC | Age: 80
End: 2020-01-22

## 2020-01-22 ENCOUNTER — APPOINTMENT (OUTPATIENT)
Dept: MRI IMAGING | Facility: CLINIC | Age: 80
End: 2020-01-22
Attending: EMERGENCY MEDICINE
Payer: MEDICARE

## 2020-01-22 ENCOUNTER — HOSPITAL ENCOUNTER (EMERGENCY)
Facility: CLINIC | Age: 80
Discharge: HOME | End: 2020-01-22
Attending: EMERGENCY MEDICINE | Admitting: EMERGENCY MEDICINE
Payer: MEDICARE

## 2020-01-22 VITALS
TEMPERATURE: 97.7 F | DIASTOLIC BLOOD PRESSURE: 74 MMHG | HEART RATE: 70 BPM | SYSTOLIC BLOOD PRESSURE: 179 MMHG | RESPIRATION RATE: 16 BRPM | OXYGEN SATURATION: 97 %

## 2020-01-22 DIAGNOSIS — R20.2 PARESTHESIAS: ICD-10-CM

## 2020-01-22 DIAGNOSIS — E87.1 HYPONATREMIA: ICD-10-CM

## 2020-01-22 LAB
ALBUMIN SERPL-MCNC: 3.6 G/DL (ref 3.4–5)
ALP SERPL-CCNC: 76 U/L (ref 40–150)
ALT SERPL W P-5'-P-CCNC: 27 U/L (ref 0–50)
ANION GAP SERPL CALCULATED.3IONS-SCNC: 8 MMOL/L (ref 3–14)
ANION GAP SERPL CALCULATED.3IONS-SCNC: 8 MMOL/L (ref 3–14)
AST SERPL W P-5'-P-CCNC: 20 U/L (ref 0–45)
BILIRUB SERPL-MCNC: 1.3 MG/DL (ref 0.2–1.3)
BUN SERPL-MCNC: 12 MG/DL (ref 7–30)
BUN SERPL-MCNC: 13 MG/DL (ref 7–30)
CALCIUM SERPL-MCNC: 9.2 MG/DL (ref 8.5–10.1)
CALCIUM SERPL-MCNC: 9.4 MG/DL (ref 8.5–10.1)
CHLORIDE SERPL-SCNC: 93 MMOL/L (ref 94–109)
CHLORIDE SERPL-SCNC: 94 MMOL/L (ref 94–109)
CO2 SERPL-SCNC: 25 MMOL/L (ref 20–32)
CO2 SERPL-SCNC: 26 MMOL/L (ref 20–32)
CREAT SERPL-MCNC: 1.07 MG/DL (ref 0.52–1.04)
CREAT SERPL-MCNC: 1.16 MG/DL (ref 0.52–1.04)
DEPRECATED CALCIDIOL+CALCIFEROL SERPL-MC: 61 UG/L (ref 20–75)
ERYTHROCYTE [DISTWIDTH] IN BLOOD BY AUTOMATED COUNT: 12.3 % (ref 10–15)
FERRITIN SERPL-MCNC: 51 NG/ML (ref 8–252)
GFR SERPL CREATININE-BSD FRML MDRD: 44 ML/MIN/{1.73_M2}
GFR SERPL CREATININE-BSD FRML MDRD: 48 ML/MIN/{1.73_M2}
GLUCOSE SERPL-MCNC: 105 MG/DL (ref 70–99)
GLUCOSE SERPL-MCNC: 170 MG/DL (ref 70–99)
HCT VFR BLD AUTO: 36.1 % (ref 35–47)
HGB BLD-MCNC: 12.3 G/DL (ref 11.7–15.7)
MAGNESIUM SERPL-MCNC: 1.6 MG/DL (ref 1.6–2.3)
MAGNESIUM SERPL-MCNC: 1.9 MG/DL (ref 1.6–2.3)
MCH RBC QN AUTO: 30.2 PG (ref 26.5–33)
MCHC RBC AUTO-ENTMCNC: 34.1 G/DL (ref 31.5–36.5)
MCV RBC AUTO: 89 FL (ref 78–100)
PLATELET # BLD AUTO: 218 10E9/L (ref 150–450)
POTASSIUM SERPL-SCNC: 3.7 MMOL/L (ref 3.4–5.3)
POTASSIUM SERPL-SCNC: 3.7 MMOL/L (ref 3.4–5.3)
PROT SERPL-MCNC: 7 G/DL (ref 6.8–8.8)
RBC # BLD AUTO: 4.07 10E12/L (ref 3.8–5.2)
SODIUM SERPL-SCNC: 127 MMOL/L (ref 133–144)
SODIUM SERPL-SCNC: 127 MMOL/L (ref 133–144)
TSH SERPL DL<=0.005 MIU/L-ACNC: 1.17 MU/L (ref 0.4–4)
WBC # BLD AUTO: 5.8 10E9/L (ref 4–11)

## 2020-01-22 PROCEDURE — 25500064 ZZH RX 255 OP 636: Performed by: EMERGENCY MEDICINE

## 2020-01-22 PROCEDURE — 80053 COMPREHEN METABOLIC PANEL: CPT | Performed by: EMERGENCY MEDICINE

## 2020-01-22 PROCEDURE — 70450 CT HEAD/BRAIN W/O DYE: CPT

## 2020-01-22 PROCEDURE — 25000128 H RX IP 250 OP 636: Performed by: EMERGENCY MEDICINE

## 2020-01-22 PROCEDURE — 25000125 ZZHC RX 250: Performed by: EMERGENCY MEDICINE

## 2020-01-22 PROCEDURE — 72158 MRI LUMBAR SPINE W/O & W/DYE: CPT

## 2020-01-22 PROCEDURE — 25800030 ZZH RX IP 258 OP 636: Performed by: EMERGENCY MEDICINE

## 2020-01-22 PROCEDURE — 72157 MRI CHEST SPINE W/O & W/DYE: CPT

## 2020-01-22 PROCEDURE — 96365 THER/PROPH/DIAG IV INF INIT: CPT | Mod: 59

## 2020-01-22 PROCEDURE — 96366 THER/PROPH/DIAG IV INF ADDON: CPT

## 2020-01-22 PROCEDURE — 99285 EMERGENCY DEPT VISIT HI MDM: CPT | Mod: 25

## 2020-01-22 PROCEDURE — A9585 GADOBUTROL INJECTION: HCPCS | Performed by: EMERGENCY MEDICINE

## 2020-01-22 PROCEDURE — 85027 COMPLETE CBC AUTOMATED: CPT | Performed by: EMERGENCY MEDICINE

## 2020-01-22 PROCEDURE — 83735 ASSAY OF MAGNESIUM: CPT | Performed by: EMERGENCY MEDICINE

## 2020-01-22 PROCEDURE — 96375 TX/PRO/DX INJ NEW DRUG ADDON: CPT | Mod: 59

## 2020-01-22 RX ORDER — DIAZEPAM 10 MG/2ML
5 INJECTION, SOLUTION INTRAMUSCULAR; INTRAVENOUS ONCE
Status: COMPLETED | OUTPATIENT
Start: 2020-01-22 | End: 2020-01-22

## 2020-01-22 RX ORDER — GABAPENTIN 300 MG/1
CAPSULE ORAL
Qty: 60 CAPSULE | Refills: 0 | Status: SHIPPED | OUTPATIENT
Start: 2020-01-22 | End: 2020-02-10

## 2020-01-22 RX ORDER — HYDROCODONE BITARTRATE AND ACETAMINOPHEN 5; 325 MG/1; MG/1
1 TABLET ORAL EVERY 6 HOURS PRN
Qty: 10 TABLET | Refills: 0 | Status: SHIPPED | OUTPATIENT
Start: 2020-01-22 | End: 2020-02-17

## 2020-01-22 RX ORDER — DIAZEPAM 10 MG/2ML
5 INJECTION, SOLUTION INTRAMUSCULAR; INTRAVENOUS ONCE
Status: DISCONTINUED | OUTPATIENT
Start: 2020-01-22 | End: 2020-01-22 | Stop reason: HOSPADM

## 2020-01-22 RX ORDER — GADOBUTROL 604.72 MG/ML
7.5 INJECTION INTRAVENOUS ONCE
Status: COMPLETED | OUTPATIENT
Start: 2020-01-22 | End: 2020-01-22

## 2020-01-22 RX ADMIN — GADOBUTROL 6 ML: 604.72 INJECTION INTRAVENOUS at 12:17

## 2020-01-22 RX ADMIN — DIAZEPAM 5 MG: 5 INJECTION, SOLUTION INTRAMUSCULAR; INTRAVENOUS at 11:29

## 2020-01-22 RX ADMIN — SODIUM CHLORIDE 1000 ML: 9 INJECTION, SOLUTION INTRAVENOUS at 10:36

## 2020-01-22 RX ADMIN — Medication 2 G: at 10:36

## 2020-01-22 ASSESSMENT — ENCOUNTER SYMPTOMS
NAUSEA: 1
DYSURIA: 0
TREMORS: 1
NUMBNESS: 1
VOMITING: 0
WEAKNESS: 0

## 2020-01-22 NOTE — ED PROVIDER NOTES
History     Chief Complaint:  Numbness    The history is provided by the patient, the spouse and a relative (daughter).      Gely Keene is a 80 year old female with a history of hypertension, hyperlipidemia, anxiety, CKD stage 3, s/p ileostomy, who presents for evaluation of numbness. She notes generalized burning and numbness sensations since the end of November without any preceding illness. She has been evaluated multiple times in the emergency department and by her primary care physician without a diagnosis. Per chart review, the patient was seen here by Dr. Hoffmann 2 days ago for these symptoms, and laboratory studies revealed hyponatremia and hypomagnesemia. She was given IV fluids and magnesium here and was discharged on magnesium. She endorses taking one dose of magnesium last night. She was also referred to a neurologist, and she has an appointment on 1/27. She was then seen yesterday by Dr. Regalado at [facility] for her symptoms, was advised to stop taking Gabapentin, and recommended to have an MRI of the spine completed. She endorses symmetric numbness of the jaw, neck, back, middle of her abdomen, vagina, and lower extremity, as well as hand tremors. She notes the vaginal and lower extremities numbness was new today. She presents today out of concern that the numbness has spread to more areas of her body and increased in severity. The patient also mentions difficulty walking yesterday, but attributes this to the numbness sensation rather than weakness. She endorses nausea, but denies fever, vomiting, stool abnormalities, dysuria, urgency, vaginal discharge, or visual changes. She has been given muscle relaxants and anxiety medications in the past for her symptoms. No family history of autoimmune disorders.       Allergies:  Bactrim   Codeine  Metronidazole  Sulfa Drugs  Sulfur     Medications:    Lipitor  Biotin  Flexeril   Estradiol    Ativan  Toprol-XL  Omeprazole  Zoloft  Calcium citrate    Past Medical History:    Anxiety   BCC (basal cell carcinoma of skin)   GERD  Hypertension   Hyperlipidemia   Mumps   Restless leg syndrome   CKD, stage 3    Past Surgical History:    Appendectomy   Cholecystitis  Choledocholithiasis  Colectomy with colostomy   Endoscopic retrograde cholangiopancreatogram  EGD  Laparoscopic cholecystectomy   Laparotomy exploratory   Phacoemulsification clear cornea with standard intraocular lens implant, bilateral    Family History:    Daughter: breast cancer  Mother: breast cancer  Sister: breast cancer    Social History:  The patient was accompanied to the ED by her  and daughter.  Smoking Status: Never Smoker  Smokeless Tobacco: Never Used  Alcohol Use: Positive, socially   Drug Use: Negative   Marital Status:   [2]     Review of Systems   Eyes: Negative for visual disturbance.   Gastrointestinal: Positive for nausea. Negative for vomiting.   Genitourinary: Negative for dysuria, urgency and vaginal discharge.   Neurological: Positive for tremors and numbness. Negative for weakness.   All other systems reviewed and are negative.      Physical Exam     Patient Vitals for the past 24 hrs:   BP Temp Temp src Pulse Resp SpO2   01/22/20 1425 -- -- -- -- -- 97 %   01/22/20 1340 (!) 179/74 -- -- 70 -- 95 %   01/22/20 1115 (!) 185/78 -- -- 71 -- 98 %   01/22/20 1101 -- -- -- -- -- 96 %   01/22/20 1100 (!) 165/68 -- -- 62 -- 96 %   01/22/20 1046 -- -- -- -- -- 96 %   01/22/20 1045 (!) 180/78 -- -- 63 -- 97 %   01/22/20 1042 -- -- -- -- -- 98 %   01/22/20 1041 (!) 180/70 -- -- 65 -- --   01/22/20 0948 -- -- -- -- -- 98 %   01/22/20 0947 -- -- -- -- -- 99 %   01/22/20 0942 -- -- -- -- -- 99 %   01/22/20 0941 -- -- -- -- -- 100 %   01/22/20 0940 -- -- -- -- -- 100 %   01/22/20 0939 (!) 196/88 97.7  F (36.5  C) Oral 72 16 99 %   01/22/20 0933 (!) 196/88 -- -- 71 -- 99 %        Physical Exam    General:   Pleasant, age  appropriate female resting comfortably in the bed.  HEENT:    Oropharynx is moist, without lesions or trismus.  Eyes:    Conjunctiva normal     PERRL; EOMs intact  Neck:    Supple, no meningismus.     CV:     Regular rate and rhythm.      No murmurs, rubs or gallops.       No unilateral leg swelling.       2+ radial pulses bilateral.    PULM:    Clear to auscultation bilateral.       No respiratory distress.      Good air exchange.     No rales or wheezing.  ABD:    Soft, non-tender, non-distended.       No pulsatile masses.       No rebound, guarding or rigidity.  MSK:     No gross deformity to all four extremities.   LYMPH:   No cervical lymphadenopathy.  NEURO:   Alert & O x 3.     CN II-XII intact, speech is clear with no aphasia.       Finger to nose within normal limits.  No pronator drift.       Strength is 5/5 in all 4 extremities.  Sensation is intact.       Normal muscular tone, no tremor.     No clonus     Down-going Babinski bilateral  Skin:    Warm, dry and intact.    Psych:    Mildly anxious        Emergency Department Course     Imaging:  Radiographic findings were communicated with the patient and family who voiced understanding of the findings.    Head CT w.o contrast  IMPRESSION:  Diffuse cerebral volume loss and cerebral white matter  changes consistent with chronic small vessel ischemic disease. No  evidence for acute intracranial pathology.      Radiation dose for this scan was reduced using automated exposure  control, adjustment of the mA and/or kV according to patient size, or  iterative reconstruction technique. Reading per radiology.    MR Thoracic Spine w/o & w Contrast  IMPRESSION: Diffuse degenerative disc disease throughout the thoracic  spine. No significant spinal canal or neural foraminal narrowing at  any level of the thoracic spine. Otherwise, normal thoracic spine MRI. Reading per radiology.    MR Lumbar Spine w/o & w Contrast  IMPRESSION:  1. Diffuse degenerative changes of the  lumbar spine as detailed above.  2. Linear area of abnormal signal in the central aspect of the L5  vertebral body worrisome for a nondisplaced fracture. CT scan of the  lumbar spine may be helpful for confirmation. Reading per radiology.      Laboratory:  CBC: WBC: 5.8, HGB 12.3, PLT: 218    CMP: Glucose 170 (H), Sodium 127 (L), Creatinine 1.07 (H), GFR Estimate 48 (L), o/w WNL    Magnesium: 1.6    Interventions:  1036 Magnesium sulfate 2 g IV  1036 NS 1000 mL IV Boluws  1129 Valium 5 mg IV    Emergency Department Course   Past medical records, nursing notes, and vitals reviewed.  1001: I performed an exam of the patient and obtained history, as documented above.    IV was inserted and blood was drawn for laboratory testing, results above.     The patient was sent for a Head CT, MR Thoracic Spine, and MR Lumbar Spine while in the emergency department, results above.      Medication administered.     1418 I rechecked and updated the patient.     Findings and plan explained to the Patient. Patient discharged home with instructions regarding supportive care, medications, and reasons to return. The importance of close follow-up was reviewed. The patient was prescribed gabapentin and Norco.      I personally reviewed the laboratory results with the Patient and answered all related questions prior to discharge.     Impression & Plan        Medical Decision Makin-year-old female with a 2-month history of burning pain in the upper abdomen which has progressed to all 4 extremities re-presents to the ED for further evaluation.  She has no focal neurological deficit or localizing symptoms.  Given the duration of her symptoms, head CT undertaken to ensure there wass no evidence of chronic subdurals or large intracranial mass increasing intracranial pressure.  Head CT is unremarkable.  History is not suggestive of an acute intracranial process such as stroke given the symmetric symptoms.  Very low suspicion for spinal  cord pathology.  Patient's family members and patient were quite concerned about possibility of spinal pathology.  MRI of the thoracic and lumbar spine undertaken and are unremarkable.  No evidence of transverse myelitis and demyelinating disease or alternative pathology.  The cause of her symptoms remains elusive.  It is uncertain whether this represents peripheral neuropathy, somatoform disorder.  Patient safe for discharge home and has scheduled follow-up with neurology next week.  Return to the ED for any worsening symptoms.      Diagnosis:    ICD-10-CM    1. Paresthesias R20.2    2. Hyponatremia E87.1        Disposition:  discharged to home    Discharge Medications:  Discharge Medication List as of 1/22/2020  3:05 PM      START taking these medications    Details   gabapentin (NEURONTIN) 300 MG capsule 300 mg PO once on day one then 300 BID on day two then 300 PO TID, Disp-60 capsule, R-0, Local Print      HYDROcodone-acetaminophen (NORCO) 5-325 MG tablet Take 1 tablet by mouth every 6 hours as needed for severe pain, Disp-10 tablet, R-0, Local Print           IAnita, am serving as a scribe on 1/22/2020 at 9:59 AM to personally document services performed by Benigno Grant MD based on my observations and the provider's statements to me.      Anita Aparicio  1/22/2020   [facility] EMERGENCY DEPARTMENT       Benigno Grant MD  01/24/20 0780

## 2020-01-22 NOTE — TELEPHONE ENCOUNTER
Called patient to inform of the 1/21/20 result note from Dr. Regalado. Also, to follow up on the ER visit from today.   Daughter was also there.     Informed of result note. Patient and Daughter are aware, understand and agree with plan.     Also reviewed new medications that were prescribed by ER today and medications that were prescribed yesterday.     Reinforced:  - Patient should be taking Sertraline 50 mg everyday. Its important to take daily and not stop abruptly.  - Taking Lorazepam & Hydrocodone are not advised together due to over sedation.   - Take Magnesium 200 mg bid.   - Do not over hydrate with regular water. Eat salty foods. Average person can drink about 6 - 8 oz glasses of water.     Patient sees Neuro on 1/27 & has an Echo on 1/31.     Informed patient I will follow up with her when the remaining labs are complete.   Will also help schedule the lab appointment for next week to recheck BMP.   Will huddle with Dr. Booth next week for a follow up appointment the week of 2/3.     Patient and Daughter are aware, understand and agree with plan.

## 2020-01-22 NOTE — ED PROVIDER NOTES
"  History     Chief Complaint:  ***  No chief complaint on file.      HPI   Gely Keene is a 80 year old female who presents with ***    Allergies:  Bactrim  Codeine  Metronidazole  Sulfa Drugs    Medications:    atorvastatin   cyclobenzaprine   Estradiol   Ativan  magnesium oxide   metoprolol succinate  omeprazole   ondansetron  sertraline     Past Medical History:    Anxiety  BCC  GERD  HTN  Hyperlipidemia  IFG  Mumps  RLS  CKD stage 3    Past Surgical History:    Appendectomy  Colectomy with colostomy  Cholangiopancreatogram x2  Hysterectomy  cholecystectomy  Phacoemulsification clear cornea with standard intraocular lens implant bilateral    Family History:    Breast cancer - mother, sister    Social History:  Smoking status: never smoker  Alcohol use: yes  Drug use: no  The patient presents to the emergency department ***  PCP: Hien Booth  Marital Status:       Review of Systems  ***    Physical Exam     Patient Vitals for the past 24 hrs:   BP Temp Temp src Pulse Resp SpO2   01/22/20 0939 (!) 196/88 97.7  F (36.5  C) Oral 72 16 99 %     Physical Exam  ***    Emergency Department Course   ECG:  ***    Imaging:  {Radiographic findings?:[id number]::\" \"}  ***    Laboratory:  ***    Procedures:  ***        Interventions:  ***  {Response to meds:771553::\" \"}    Emergency Department Course:  Nursing notes and vitals reviewed. (***) I performed an exam of the patient as documented above.     IV inserted. Medicine administered as documented above. Blood drawn. This was sent to the lab for further testing, results above. ***    The patient was sent for a *** while in the emergency department, findings above.     *** I rechecked the patient and discussed the results of his***her workup thus far.     Findings and plan explained to the {PATIENT, FAMILY MEMBER, CAREGIVER:[id number]}. Patient discharged home with instructions regarding supportive care, medications, and reasons to return. The " "importance of close follow-up was reviewed. The patient was prescribed ***    I personally reviewed the laboratory results with the {PATIENT, FAMILY MEMBER, CAREGIVER:[id number]} and answered all related questions prior to discharge. ***      Impression & Plan      Medical Decision Making:  ***  Critical Care time:  {none or minutes:958495::\"none\"}    Diagnosis:  No diagnosis found.    Disposition:  {discharged to home/discharged to home with.../Admitted to...:832225}    Discharge Medications:  New Prescriptions    No medications on file       Nilton Blank  1/22/2020   [facility] EMERGENCY DEPARTMENT    "

## 2020-01-22 NOTE — ED AVS SNAPSHOT
[facility] Emergency Department  [address]  [facility] [address]  Phone:  [phone]  Fax:  [phone]                                    Gely Keene   MRN: [id number]    Department:  [facility] Emergency Department   Date of Visit:  1/22/2020           After Visit Summary Signature Page    I have received my discharge instructions, and my questions have been answered. I have discussed any challenges I see with this plan with the nurse or doctor.    ..........................................................................................................................................  Patient/Patient Representative Signature      ..........................................................................................................................................  Patient Representative Print Name and Relationship to Patient    ..................................................               ................................................  Date                                   Time    ..........................................................................................................................................  Reviewed by Signature/Title    ...................................................              ..............................................  Date                                               Time          22EPIC Rev 08/18

## 2020-01-22 NOTE — ED NOTES
"Pt complaining of left sided pain going down legs.  Pt family member states, \"Pt has had symptoms for 2 months, however tests have been normal.\"  "

## 2020-01-22 NOTE — DISCHARGE INSTRUCTIONS
Please follow-up with the neurologist as scheduled next week.    Please start the gabapentin as prescribed today.    I am starting you on hydrocodone which is a pain medication.  Do not take Flexeril or cyclobenzaprine while using this medication.

## 2020-01-22 NOTE — ED TRIAGE NOTES
Burning down neck into legs, back and today she is having burning vaginally. Symptoms started in November and have been seen numerous times with no diagnosis.

## 2020-01-23 ENCOUNTER — PATIENT OUTREACH (OUTPATIENT)
Dept: CARE COORDINATION | Facility: CLINIC | Age: 80
End: 2020-01-23

## 2020-01-23 ASSESSMENT — ACTIVITIES OF DAILY LIVING (ADL): DEPENDENT_IADLS:: INDEPENDENT

## 2020-01-23 NOTE — TELEPHONE ENCOUNTER
Informed patient of last final results.   Scheduled lab only for 1/27/2020.   Also gave her scheduling number to set up Gastric Emptying Study.

## 2020-01-23 NOTE — PROGRESS NOTES
Clinic Care Coordination Contact    Follow Up Progress Note      Assessment: [facility] outreached to pt on this date.  After chart review- Since last outreach pt has been in the ED 3 more times for pain and epigastric issues.  Pt states she continues to feel numbness in her extremities, pain and pressure around her middle and occasional nausea. Pt indicates between ED visits and last office visit 1/21/20 she has received lab work, CT and MRI. All of which, per pt report, were relatively unremarkable given her concerns.  Most significant finding was deficiency in Magnesium and low sodium. Pt and charting indicates MS was ruled out with no significant concerns found on MRI.       Pt states she has a f/u apt with neurology 1/27/2020.     Pt continues to have support from her daughter who is a nurse and has helped pt with understanding the tests, reports and findings.  Pt states she is appreciative of her family and feels very supported.       Pt reviewed medications and confirmed understanding.      Goals addressed this encounter:   Goals Addressed                 This Visit's Progress      Psychosocial (pt-stated)   On track     Goal Statement: I would like to gain a better understanding of the source of my abdominal pain.     Measure of Success: Pain will be known/managed.    Supportive Steps to Achieve: Follow up with ileostomy surgeon for assessment. Recommended follow up with PCP as needed.  Pain medications as prescribed.  Ileostomy care.    Barriers: Ileostomy in place.    Strengths: strong advocate for self.  Accepting of care coordination. Strong understanding of current medical state and closely monitors conditions, labs, and output. Support from daughter    Date to Achieve By: 6/1/2020  Patient expressed understanding of goal: yes.                  Intervention/Education provided during outreach: Care Coordination availability and contact number.     Outreach Frequency: monthly    Plan: Care Coordinator will  follow up in 1-2 weeks following neurology.      Tobi [facility]  Clinic Care Coordinator  [facility]  [phone]  [email or web address]

## 2020-01-27 ENCOUNTER — HOSPITAL ENCOUNTER (OUTPATIENT)
Dept: LAB | Facility: CLINIC | Age: 80
Discharge: HOME | End: 2020-01-27
Attending: PEDIATRICS | Admitting: INTERNAL MEDICINE
Payer: MEDICARE

## 2020-01-27 DIAGNOSIS — R51.9 GENERALIZED HEADACHE: Primary | ICD-10-CM

## 2020-01-27 DIAGNOSIS — E87.1 HYPONATREMIA: ICD-10-CM

## 2020-01-27 DIAGNOSIS — R21 RASH: ICD-10-CM

## 2020-01-27 DIAGNOSIS — M54.2 NECK PAIN: ICD-10-CM

## 2020-01-27 DIAGNOSIS — R20.2 TINGLING: ICD-10-CM

## 2020-01-27 DIAGNOSIS — R10.84 ABDOMINAL PAIN, GENERALIZED: ICD-10-CM

## 2020-01-27 LAB
ANION GAP SERPL CALCULATED.3IONS-SCNC: 3 MMOL/L (ref 3–14)
BUN SERPL-MCNC: 20 MG/DL (ref 7–30)
CALCIUM SERPL-MCNC: 9.5 MG/DL (ref 8.5–10.1)
CHLORIDE SERPL-SCNC: 97 MMOL/L (ref 94–109)
CO2 SERPL-SCNC: 28 MMOL/L (ref 20–32)
CREAT SERPL-MCNC: 1.2 MG/DL (ref 0.52–1.04)
ERYTHROCYTE [SEDIMENTATION RATE] IN BLOOD BY WESTERGREN METHOD: 17 MM/H (ref 0–30)
GFR SERPL CREATININE-BSD FRML MDRD: 42 ML/MIN/{1.73_M2}
GLUCOSE SERPL-MCNC: 105 MG/DL (ref 70–99)
POTASSIUM SERPL-SCNC: 4.3 MMOL/L (ref 3.4–5.3)
SODIUM SERPL-SCNC: 128 MMOL/L (ref 133–144)

## 2020-01-27 PROCEDURE — 86431 RHEUMATOID FACTOR QUANT: CPT | Performed by: INTERNAL MEDICINE

## 2020-01-27 PROCEDURE — 80048 BASIC METABOLIC PNL TOTAL CA: CPT | Performed by: INTERNAL MEDICINE

## 2020-01-27 PROCEDURE — 86038 ANTINUCLEAR ANTIBODIES: CPT | Performed by: INTERNAL MEDICINE

## 2020-01-27 PROCEDURE — 86039 ANTINUCLEAR ANTIBODIES (ANA): CPT | Performed by: INTERNAL MEDICINE

## 2020-01-27 PROCEDURE — 84446 ASSAY OF VITAMIN E: CPT | Performed by: INTERNAL MEDICINE

## 2020-01-27 PROCEDURE — 82525 ASSAY OF COPPER: CPT | Mod: GA | Performed by: INTERNAL MEDICINE

## 2020-01-27 PROCEDURE — 36415 COLL VENOUS BLD VENIPUNCTURE: CPT | Performed by: INTERNAL MEDICINE

## 2020-01-27 PROCEDURE — 85652 RBC SED RATE AUTOMATED: CPT | Performed by: INTERNAL MEDICINE

## 2020-01-27 PROCEDURE — 84207 ASSAY OF VITAMIN B-6: CPT | Performed by: INTERNAL MEDICINE

## 2020-01-28 LAB
ANA PAT SER IF-IMP: ABNORMAL
ANA SER QL IF: POSITIVE
ANA TITR SER IF: ABNORMAL {TITER}
RHEUMATOID FACT SER NEPH-ACNC: <20 IU/ML (ref 0–20)

## 2020-01-29 LAB — COPPER SERPL-MCNC: 139.1 UG/DL (ref 80–155)

## 2020-01-30 LAB
A-TOCOPHEROL VIT E SERPL-MCNC: 12.8 MG/L (ref 5.5–18)
BETA+GAMMA TOCOPHEROL SERPL-MCNC: 0.7 MG/L (ref 0–6)

## 2020-01-31 ENCOUNTER — HOSPITAL ENCOUNTER (OUTPATIENT)
Dept: LAB | Facility: CLINIC | Age: 80
End: 2020-01-31
Attending: PEDIATRICS
Payer: MEDICARE

## 2020-01-31 ENCOUNTER — HOSPITAL ENCOUNTER (OUTPATIENT)
Dept: CARDIOLOGY | Facility: CLINIC | Age: 80
Discharge: HOME | End: 2020-01-31
Attending: PEDIATRICS | Admitting: PEDIATRICS
Payer: MEDICARE

## 2020-01-31 DIAGNOSIS — R07.9 CHEST PAIN: ICD-10-CM

## 2020-01-31 DIAGNOSIS — M25.50 PAIN IN JOINT, MULTIPLE SITES: Primary | ICD-10-CM

## 2020-01-31 PROCEDURE — 36415 COLL VENOUS BLD VENIPUNCTURE: CPT | Performed by: PSYCHIATRY & NEUROLOGY

## 2020-01-31 PROCEDURE — 86618 LYME DISEASE ANTIBODY: CPT | Performed by: PSYCHIATRY & NEUROLOGY

## 2020-01-31 PROCEDURE — 93306 TTE W/DOPPLER COMPLETE: CPT

## 2020-01-31 PROCEDURE — 93306 TTE W/DOPPLER COMPLETE: CPT | Mod: 26 | Performed by: INTERNAL MEDICINE

## 2020-02-01 LAB — VIT B6 SERPL-MCNC: 659.4 NMOL/L (ref 20–125)

## 2020-02-03 ENCOUNTER — HOSPITAL ENCOUNTER (OUTPATIENT)
Dept: NUCLEAR MEDICINE | Facility: CLINIC | Age: 80
Setting detail: NUCLEAR MEDICINE
Discharge: HOME | End: 2020-02-03
Attending: INTERNAL MEDICINE | Admitting: INTERNAL MEDICINE
Payer: MEDICARE

## 2020-02-03 DIAGNOSIS — R10.84 ABDOMINAL PAIN, GENERALIZED: ICD-10-CM

## 2020-02-03 DIAGNOSIS — R68.81 EARLY SATIETY: ICD-10-CM

## 2020-02-03 LAB — B BURGDOR IGG+IGM SER QL: 0.02 (ref 0–0.89)

## 2020-02-03 PROCEDURE — 34300033 ZZH RX 343: Performed by: INTERNAL MEDICINE

## 2020-02-03 PROCEDURE — A9541 TC99M SULFUR COLLOID: HCPCS | Performed by: INTERNAL MEDICINE

## 2020-02-03 PROCEDURE — 78264 GASTRIC EMPTYING IMG STUDY: CPT

## 2020-02-03 RX ADMIN — Medication 2 MCI.: at 08:06

## 2020-02-04 NOTE — PROGRESS NOTES
Overview of workup:    2/3/20  Gastric Emptying  - normal    1/31/20 Echocardiogram - stable    1/22/20  CT Head  - no acute findings    1/22/20 MRI Thoracic  - DD, otherwise normal    1/22/20 MRI Lumbar  - DD, worrisome for       non-displaced fracture L5    1/2/20  EGD    - normal    Labs ordered from Dr. Srinivasan:  Component      Latest Ref Rng & Units 1/31/2020   Lyme Disease Antibodies Serum      0.00 - 0.89 0.02     Component      Latest Ref Rng & Units 1/27/2020   PAUL interpretation      NEG:Negative Positive (A)   PAUL pattern 1       HOMOGENEOUS   PAUL titer 1       >1:1,280   Vitamin E      5.5 - 18.0 mg/L 12.8   Vitamin E Gamma      0.0 - 6.0 mg/L 0.7   Rheumatoid Factor      <20 IU/mL <20   Vitamin B6      20.0 - 125.0 nmol/L 659.4 (H)   Copper      80.0 - 155.0 ug/dL 139.1     Labs from Dr. Regalado:  Component      Latest Ref Rng & Units 1/21/2020   Sodium      133 - 144 mmol/L 127 (L)   Potassium      3.4 - 5.3 mmol/L 3.7   Chloride      94 - 109 mmol/L 93 (L)   Carbon Dioxide      20 - 32 mmol/L 26   Anion Gap      3 - 14 mmol/L 8   Glucose      70 - 99 mg/dL 105 (H)   Urea Nitrogen      7 - 30 mg/dL 13   Creatinine      0.52 - 1.04 mg/dL 1.16 (H)   GFR Estimate      >60 mL/min/1.73:m2 44 (L)   GFR Estimate If Black      >60 mL/min/1.73:m2 51 (L)   Calcium      8.5 - 10.1 mg/dL 9.4   Magnesium      1.6 - 2.3 mg/dL 1.9   Vitamin B12      193 - 986 pg/mL 656   Ferritin      8 - 252 ng/mL 51   Vitamin D Deficiency screening      20 - 75 ug/L 61   TSH      0.40 - 4.00 mU/L 1.17

## 2020-02-06 ENCOUNTER — OFFICE VISIT (OUTPATIENT)
Dept: PEDIATRICS | Facility: CLINIC | Age: 80
End: 2020-02-06
Payer: MEDICARE

## 2020-02-06 VITALS
RESPIRATION RATE: 16 BRPM | HEART RATE: 69 BPM | SYSTOLIC BLOOD PRESSURE: 138 MMHG | BODY MASS INDEX: 25.76 KG/M2 | HEIGHT: 62 IN | WEIGHT: 140 LBS | TEMPERATURE: 98.7 F | DIASTOLIC BLOOD PRESSURE: 56 MMHG | OXYGEN SATURATION: 97 %

## 2020-02-06 DIAGNOSIS — R07.89 SENSATION OF CHEST TIGHTNESS: ICD-10-CM

## 2020-02-06 DIAGNOSIS — R20.2 PARESTHESIAS: Primary | ICD-10-CM

## 2020-02-06 DIAGNOSIS — R07.9 CHEST PAIN, UNSPECIFIED TYPE: ICD-10-CM

## 2020-02-06 DIAGNOSIS — R76.8 POSITIVE ANA (ANTINUCLEAR ANTIBODY): ICD-10-CM

## 2020-02-06 DIAGNOSIS — E87.1 HYPONATREMIA: ICD-10-CM

## 2020-02-06 DIAGNOSIS — E67.2 HYPERVITAMINOSIS B6: ICD-10-CM

## 2020-02-06 LAB
ALBUMIN UR-MCNC: NEGATIVE MG/DL
APPEARANCE UR: CLEAR
BACTERIA #/AREA URNS HPF: ABNORMAL /HPF
BILIRUB UR QL STRIP: NEGATIVE
COLOR UR AUTO: YELLOW
CRP SERPL-MCNC: <2.9 MG/L (ref 0–8)
ERYTHROCYTE [SEDIMENTATION RATE] IN BLOOD BY WESTERGREN METHOD: 20 MM/H (ref 0–30)
GLUCOSE UR STRIP-MCNC: NEGATIVE MG/DL
HGB UR QL STRIP: ABNORMAL
KETONES UR STRIP-MCNC: NEGATIVE MG/DL
LEUKOCYTE ESTERASE UR QL STRIP: NEGATIVE
NITRATE UR QL: NEGATIVE
NON-SQ EPI CELLS #/AREA URNS LPF: ABNORMAL /LPF
PH UR STRIP: 6 PH (ref 5–7)
RBC #/AREA URNS AUTO: ABNORMAL /HPF
SOURCE: ABNORMAL
SP GR UR STRIP: 1.01 (ref 1–1.03)
UROBILINOGEN UR STRIP-ACNC: 0.2 EU/DL (ref 0.2–1)
WBC #/AREA URNS AUTO: ABNORMAL /HPF

## 2020-02-06 PROCEDURE — 86235 NUCLEAR ANTIGEN ANTIBODY: CPT | Performed by: PEDIATRICS

## 2020-02-06 PROCEDURE — 86140 C-REACTIVE PROTEIN: CPT | Performed by: PEDIATRICS

## 2020-02-06 PROCEDURE — 86225 DNA ANTIBODY NATIVE: CPT | Performed by: PEDIATRICS

## 2020-02-06 PROCEDURE — 99215 OFFICE O/P EST HI 40 MIN: CPT | Performed by: PEDIATRICS

## 2020-02-06 PROCEDURE — 86160 COMPLEMENT ANTIGEN: CPT | Performed by: PEDIATRICS

## 2020-02-06 PROCEDURE — 36415 COLL VENOUS BLD VENIPUNCTURE: CPT | Performed by: PEDIATRICS

## 2020-02-06 PROCEDURE — 85652 RBC SED RATE AUTOMATED: CPT | Performed by: PEDIATRICS

## 2020-02-06 PROCEDURE — 81001 URINALYSIS AUTO W/SCOPE: CPT | Performed by: PEDIATRICS

## 2020-02-06 PROCEDURE — 80053 COMPREHEN METABOLIC PANEL: CPT | Performed by: PEDIATRICS

## 2020-02-06 ASSESSMENT — MIFFLIN-ST. JEOR: SCORE: 1040.35

## 2020-02-06 NOTE — TELEPHONE ENCOUNTER
Patient calling they are scheduled with Dr. Anita Kumar at [facility] for 3/20/2020.     If Dr. Booth would like patient to be seen sooner, a Doctor-to-Doctor consult would be done by calling [phone].

## 2020-02-06 NOTE — PATIENT INSTRUCTIONS
Stop any vitamins that contain B6    Please keep your visit with Dr Srinivasan next week    Call for visit with Rheumatology    Labs today    Keep moving - keep playing games, knitting, eating    Madelia back with me after you are done seeing specialists - call and talk to Ashely and she'll get you on my schedule    Expect a phone call to set up a stress test

## 2020-02-06 NOTE — PROGRESS NOTES
Subjective     Gely Keene is a 80 year old female who presents to clinic today for the following health issues:    HPI     Patient would like to discuss multiple concerns.     Patient presents today to review multiple concerns.  She is accompanied by her  and her daughter.    Patient symptoms started last fall.  She had the onset of circumferential tightness and pain in the substernal region.  This is been evaluated extensively with multiple emergency room visits, gastroenterology consultation, and visit with her colorectal surgeon.  Throughout the last several months, her ostomy output has been stable and her stoma has appeared very healthy.    Additional symptoms started in the late fall with paresthesias in the lower extremities.  The sensation of tightness around the chest has remained persistent and is not exertional in nature.  Patient has experienced increased tremulousness, particularly in her hands, which she feels actually preceded the sensation of chest tightness.  She is tried many interventions to relieve the chest tightness including those related to GI causes, as well as courses of NSAIDs with the presumption that this represented costochondritis.  None of these interventions have made any difference in his problem.    More recently, patient has experienced increased paresthesias in the sensation of swelling in a stocking glove distribution.  She had a few days without experiencing these symptoms, but then they have come back and been more severe in the last few days.  She often notices the symptoms are worse when she lies down and often experiences them with more severity during the early morning hours.  She had a sensation of paresthesias that extended all the way to her face last night.  This is new for her.  Her strength has been preserved.  She has had no problems with speech fluency or word recall.  She has had no falls.    She has had extensive work-up to date including a  recent normal gastric emptying study, EGD, and an echocardiogram that is been stable over the last several years.  Additionally, head CT performed in the emergency department and an MRI of her thoracic spine showed no acute abnormalities to explain her symptoms.  An MRI of her lumbar spine showed diffuse degenerative changes and the possibility of a nondisplaced fracture of the L5 vertebral body.  She also had thoracic form during an office visit which showed multilevel degenerative changes, but no acute abnormalities to explain her symptoms.    Lab work as ordered by Dr. Srinivasan and neurology has provided some new clues regarding possible etiologies of her symptoms.  Her antinuclear antibody titers came back with a high titer response.  Her vitamin B6 level was also noted to be exceedingly high.  She denies using any vitamin B supplements apart from what is in her daily multivitamin.  Interestingly, her sodium level has been slightly low recently also.  She had been drinking excessive amounts of water and it was postulated that she had a decreased solute load, so she has been working on drinking more Gatorade and other solute-containing solutions.    She continues to take the sertraline, which her family feels has been very helpful in allowing her to navigate this very stressful medical evaluation process.  She has very strong family support and is well cared for by her  and her children.    Patient has been taking gabapentin 300 mg 3 times daily.  Her  notes that her mental processing speed has been somewhat lower than is typical for her.  She has known in her family for having a spectacular memory.  She was given 10 tabs of Norco in 1 of her emergency room visits.  She has used approximately 4 tabs of this at night when she has become uncomfortable.  She is concerned about polypharmacy and the impact that is having on her mind.      Reviewed and updated as needed this visit by Provider      "    Review of Systems   ROS COMP: Constitutional, HEENT, cardiovascular, pulmonary, GI, , musculoskeletal, neuro, skin, endocrine and psych systems are negative, except as otherwise noted.      Objective    /56 (BP Location: Right arm, Patient Position: Sitting, Cuff Size: Adult Regular)   Pulse 69   Temp 98.7  F (37.1  C) (Tympanic)   Resp 16   Ht 1.562 m (5' 1.5\")   Wt 63.5 kg (140 lb)   SpO2 97%   BMI 26.02 kg/m    Body mass index is 26.02 kg/m .   Wt Readings from Last 4 Encounters:   02/06/20 63.5 kg (140 lb)   01/21/20 63.5 kg (140 lb)   01/03/20 66 kg (145 lb 8.1 oz)   01/02/20 63.1 kg (139 lb 1.6 oz)       Physical Exam   GENERAL: alert, no distress, elderly and fatigued  EYES: Eyes grossly normal to inspection, PERRL and conjunctivae and sclerae normal  HENT: ear canals and TM's normal, nose and mouth without ulcers or lesions  NECK: no adenopathy, no asymmetry, masses, or scars and thyroid normal to palpation  RESP: lungs clear to auscultation - no rales, rhonchi or wheezes  CV: regular rate and rhythm, normal S1 S2, no S3 or S4, no murmur, click or rub, no peripheral edema and peripheral pulses strong  ABDOMEN: Positive bowel sounds, soft, nontender to palpation, healthy pink stoma with liquid stool in ostomy bag with no evidence of blood.  MS: no gross musculoskeletal defects noted, no edema  SKIN: no suspicious lesions or rashes  NEURO: Normal strength and tone, fine intention tremor in hands bilaterally, sensory exam grossly normal, mentation intact, oriented times 3, speech normal, cranial nerves 2-12 intact and rapid alternating movements normal  PSYCH: mentation appears normal, anxious, judgement and insight intact and appearance well groomed    Diagnostic Test Results:  Labs reviewed in Epic        Assessment & Plan       ICD-10-CM    1. Paresthesias R20.2    2. Sensation of chest tightness R07.89    3. Positive PAUL (antinuclear antibody) R76.8 DNA double stranded antibodies     " Complement C3     Complement C4     DION antibody panel     RHEUMATOLOGY REFERRAL     **ESR FUTURE anytime     CRP inflammation     *UA reflex to Microscopic and Culture ([facility] (except [place] and [place])     Urine Microscopic   4. Hyponatremia E87.1 Comprehensive metabolic panel   5. Chest pain, unspecified type R07.9 NM Exercise stress test   6. 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  Notable findings to date have been an elevated vitamin B6 level and strongly positive antinuclear antibody test.  These positive results raise etiologic possibilities including vitamin B6 toxicity related neuropathy or autoimmune explanation.    As a next step in our investigations, she has a consultation planned with Dr. Srinivasan in neurology on February 10.   Pending the results of this consultation, we discussed rheumatologic consultation and the possibility of a [facility] evaluation.      Additionally, given the patient's multiple cardiac risk factors and the persistent chest tightness symptom pattern, we discussed cardiac stress imaging.    Families' questions answered to the best of my ability.    Additional lab work performed today to include repeat electrolytes and kidney function studies to monitor hyponatremia, complement values, inflammatory markers, ds DNA ab testing, and an extended nuclear antigen panel.  In addition, will check UA for active sediment.    40 minutes spent with patient, more than 50% of the time was spent in counseling and coordination of care of the above issues.    Plan to regroup after consultation visits to review findings with patient and family.  They will contact me to help get them scheduled.    Return in about 2 months (around  4/6/2020).    Hien Booth MD  [facility]

## 2020-02-07 ENCOUNTER — TELEPHONE (OUTPATIENT)
Dept: PEDIATRICS | Facility: CLINIC | Age: 80
End: 2020-02-07

## 2020-02-07 LAB
ALBUMIN SERPL-MCNC: 3.9 G/DL (ref 3.4–5)
ALP SERPL-CCNC: 93 U/L (ref 40–150)
ALT SERPL W P-5'-P-CCNC: 29 U/L (ref 0–50)
ANION GAP SERPL CALCULATED.3IONS-SCNC: 8 MMOL/L (ref 3–14)
AST SERPL W P-5'-P-CCNC: 26 U/L (ref 0–45)
BILIRUB SERPL-MCNC: 1 MG/DL (ref 0.2–1.3)
BUN SERPL-MCNC: 17 MG/DL (ref 7–30)
C3 SERPL-MCNC: 153 MG/DL (ref 81–157)
C4 SERPL-MCNC: 36 MG/DL (ref 13–39)
CALCIUM SERPL-MCNC: 9.5 MG/DL (ref 8.5–10.1)
CHLORIDE SERPL-SCNC: 104 MMOL/L (ref 94–109)
CO2 SERPL-SCNC: 23 MMOL/L (ref 20–32)
CREAT SERPL-MCNC: 1.06 MG/DL (ref 0.52–1.04)
ENA RNP IGG SER IA-ACNC: <0.2 AI (ref 0–0.9)
ENA SM IGG SER-ACNC: <0.2 AI (ref 0–0.9)
ENA SS-A IGG SER IA-ACNC: <0.2 AI (ref 0–0.9)
ENA SS-B IGG SER IA-ACNC: <0.2 AI (ref 0–0.9)
GFR SERPL CREATININE-BSD FRML MDRD: 49 ML/MIN/{1.73_M2}
GLUCOSE SERPL-MCNC: 102 MG/DL (ref 70–99)
POTASSIUM SERPL-SCNC: 3.8 MMOL/L (ref 3.4–5.3)
PROT SERPL-MCNC: 7.5 G/DL (ref 6.8–8.8)
SODIUM SERPL-SCNC: 135 MMOL/L (ref 133–144)

## 2020-02-07 NOTE — TELEPHONE ENCOUNTER
I agree with holding beta blocker the night before the stress test      Hien Booth MD  Internal Medicine - Pediatrics

## 2020-02-07 NOTE — TELEPHONE ENCOUNTER
Reason for Call:  Other call back    Detailed comments: patient is having a stress test 02/11/20 and wants to know if she should take her metoprolol that morning. Please advise    Phone Number Patient can be reached at: Home number on file [phone] (home)    Best Time: any    Can we leave a detailed message on this number? YES    Call taken on 2/7/2020 at 9:25 AM by Shiresa H. Ormond

## 2020-02-07 NOTE — TELEPHONE ENCOUNTER
Called over to Cardiopulmonary who will complete stress test.Reviewed with staff who states patient should not take her dose the day before or day of procedure.    Patient reports she takes her Metoprolol dose at night, advised not to take dose on Monday night, can resume Tuesday night dose as this will be post-procedure.    Patient verbalized understanding.

## 2020-02-07 NOTE — TELEPHONE ENCOUNTER
Will wait to hear the results of neurology consultation next week and then review the need to expedite rheumatology eval vs set up [facility] referral that family has expressed interest in pursuing.    Hien Booth MD  Internal Medicine - Pediatrics

## 2020-02-10 RX ORDER — GABAPENTIN 300 MG/1
300 CAPSULE ORAL 3 TIMES DAILY
Qty: 90 CAPSULE | Refills: 1 | Status: SHIPPED | OUTPATIENT
Start: 2020-02-10 | End: 2020-03-09

## 2020-02-10 NOTE — TELEPHONE ENCOUNTER
Gabapentin sent.    I would recommend pharmacologic stress testing - please touch base with cardiology to help change order if needed.    I'll work on getting patient early rheumatology visit later today - unless you want to call and see if they have any acute spots available and I can call if needed.   Any available provider would be good.      Hien Booth MD  Internal Medicine - Pediatrics

## 2020-02-10 NOTE — TELEPHONE ENCOUNTER
Called patient and let her know about appointment, gave phone number and new appointment time. Closing encounter at this time.     Kenia Schneider, CMA

## 2020-02-10 NOTE — TELEPHONE ENCOUNTER
Called [facility] and able to get patient an appt 2/14 at 1pm with Dr Tabares.   Please let patient know about new appointment time - same location in [place].  If she could arrive 15-20 minutes early for paperwork, that would be marvelous.    Please let Gely know.    Thanks so much!    Hien Booth MD  Internal Medicine - Pediatrics

## 2020-02-10 NOTE — TELEPHONE ENCOUNTER
Routing to Dr. Booth to advise - 1. Consult with [facility] Provider to be seen sooner? 2. If stress test should be exercise/treadmill. 3. Send refill of Gabapentin.     Patient calling. Neuro advised to go to [facility].   No new orders. Anything Neuro related was ruled out.     Patient would like to be seen sooner by [facility] if appropriate. She has an appointment with Dr. Donovan on 3/20.     Has stress test tomorrow. She was thinking she isn't going to run on the treadmill, but believes its set up for that.     Patient also needs a refill of Gabapentin. Neuro advised to continue that.     Patient will follow up with Neuro so they can fax the records to [facility]. Once appointment is confirmed.

## 2020-02-11 ENCOUNTER — PATIENT OUTREACH (OUTPATIENT)
Dept: CARE COORDINATION | Facility: CLINIC | Age: 80
End: 2020-02-11

## 2020-02-11 ENCOUNTER — HOSPITAL ENCOUNTER (OUTPATIENT)
Dept: CARDIOLOGY | Facility: CLINIC | Age: 80
Discharge: HOME | End: 2020-02-11
Attending: PEDIATRICS | Admitting: PEDIATRICS
Payer: MEDICARE

## 2020-02-11 ENCOUNTER — HOSPITAL ENCOUNTER (OUTPATIENT)
Dept: NUCLEAR MEDICINE | Facility: CLINIC | Age: 80
Setting detail: NUCLEAR MEDICINE
End: 2020-02-11
Attending: PEDIATRICS
Payer: MEDICARE

## 2020-02-11 VITALS — DIASTOLIC BLOOD PRESSURE: 72 MMHG | SYSTOLIC BLOOD PRESSURE: 140 MMHG

## 2020-02-11 DIAGNOSIS — R07.9 CHEST PAIN, UNSPECIFIED TYPE: ICD-10-CM

## 2020-02-11 LAB — DSDNA AB SER-ACNC: 1 IU/ML

## 2020-02-11 PROCEDURE — A9502 TC99M TETROFOSMIN: HCPCS | Performed by: PEDIATRICS

## 2020-02-11 PROCEDURE — 34300033 ZZH RX 343: Performed by: PEDIATRICS

## 2020-02-11 PROCEDURE — 78452 HT MUSCLE IMAGE SPECT MULT: CPT | Mod: 26 | Performed by: INTERNAL MEDICINE

## 2020-02-11 PROCEDURE — 78452 HT MUSCLE IMAGE SPECT MULT: CPT

## 2020-02-11 PROCEDURE — 93018 CV STRESS TEST I&R ONLY: CPT | Performed by: INTERNAL MEDICINE

## 2020-02-11 PROCEDURE — 25000128 H RX IP 250 OP 636

## 2020-02-11 PROCEDURE — 93016 CV STRESS TEST SUPVJ ONLY: CPT | Performed by: INTERNAL MEDICINE

## 2020-02-11 PROCEDURE — 93017 CV STRESS TEST TRACING ONLY: CPT

## 2020-02-11 RX ORDER — REGADENOSON 0.08 MG/ML
INJECTION, SOLUTION INTRAVENOUS
Status: COMPLETED
Start: 2020-02-11 | End: 2020-02-11

## 2020-02-11 RX ADMIN — TETROFOSMIN 33 MCI.: 1.38 INJECTION, POWDER, LYOPHILIZED, FOR SOLUTION INTRAVENOUS at 13:23

## 2020-02-11 RX ADMIN — REGADENOSON: 0.08 INJECTION, SOLUTION INTRAVENOUS at 13:21

## 2020-02-11 RX ADMIN — TETROFOSMIN 11 MCI.: 1.38 INJECTION, POWDER, LYOPHILIZED, FOR SOLUTION INTRAVENOUS at 11:24

## 2020-02-11 ASSESSMENT — ACTIVITIES OF DAILY LIVING (ADL): DEPENDENT_IADLS:: INDEPENDENT

## 2020-02-11 NOTE — PROGRESS NOTES
Clinic Care Coordination Contact  [facility]/Voicemail       Clinical Data: Care Coordinator Outreach  Outreach attempted x 1.  Left message on patient's voicemail with call back information and requested return call.  Plan: Care Coordinator will try to reach patient again in 10 business days.    Tobi Hoffman [facility]  Clinic Care Coordinator  [facility]  [phone]  [email or web address]

## 2020-02-12 LAB — STRESS ECHO TARGET HR: 138

## 2020-02-14 ENCOUNTER — TRANSFERRED RECORDS (OUTPATIENT)
Dept: HEALTH INFORMATION MANAGEMENT | Facility: CLINIC | Age: 80
End: 2020-02-14

## 2020-02-17 ENCOUNTER — TELEPHONE (OUTPATIENT)
Dept: PEDIATRICS | Facility: CLINIC | Age: 80
End: 2020-02-17

## 2020-02-17 NOTE — TELEPHONE ENCOUNTER
Called patient to follow up to see how she is doing on the Sertraline & update on the 2/14 Rheum appointment.     Patient states overall, she feels better on the Sertraline. We planned on waiting for Rheum follow up before making any changes on medications.     Saw Rheum, they rechecked some labs and scheduled a follow up visit in March. They leaned towards the high Vitamin B6 was causing symptoms.   Patient has stopped all supplements.     Patient wanted to call me when she hears and update from Rheum.

## 2020-02-23 ENCOUNTER — HEALTH MAINTENANCE LETTER (OUTPATIENT)
Age: 80
End: 2020-02-23

## 2020-03-03 ENCOUNTER — PATIENT OUTREACH (OUTPATIENT)
Dept: CARE COORDINATION | Facility: CLINIC | Age: 80
End: 2020-03-03

## 2020-03-03 ASSESSMENT — ACTIVITIES OF DAILY LIVING (ADL): DEPENDENT_IADLS:: INDEPENDENT

## 2020-03-03 NOTE — PROGRESS NOTES
Clinic Care Coordination Contact    Follow Up Progress Note      Assessment: [facility] outreached to pt on this date.  Pt indicates she continues to feel lethargic during the day and only occasionally experiences pain in her abdomin. Recently seen by Rheumatology to do some additional testing.  Pt states she was told she has some values which are elevated and the f/u apt with rheumatology will be 2/17/20.     Pt had no other concerns or questions at this time, but did want to share some positive in her life- pt states she is very proud of her daughters who will soon be featured on advertisements for [employer].     Goals addressed this encounter:   Goals Addressed                 This Visit's Progress      Psychosocial (pt-stated)   On track     Goal Statement: I would like to gain a better understanding of the source of my abdominal pain.     Measure of Success: Pain will be known/managed.    Supportive Steps to Achieve: Follow up with ileostomy surgeon for assessment. Recommended follow up with PCP as needed.  Pain medications as prescribed.  Ileostomy care.    Barriers: Ileostomy in place.    Strengths: strong advocate for self.  Accepting of care coordination. Strong understanding of current medical state and closely monitors conditions, labs, and output. Support from daughter    Date to Achieve By: 6/1/2020  Patient expressed understanding of goal: yes.     As of today's date 3/3/2020 goal is met at 26 - 50%.   Goal Status:  Active  Pt has a f/u apt with Rheumatology to review recent blood work and findings 3/17/2020.                    Intervention/Education provided during outreach: Care Coordination availability and contact number.     Outreach Frequency: monthly    Plan: Care Coordinator will follow up in 2-3 weeks.     DESTINY Peterson  Clinic Care Coordinator  [facility]  [phone]  [email or web address]

## 2020-03-05 DIAGNOSIS — R20.2 PARESTHESIAS: ICD-10-CM

## 2020-03-09 RX ORDER — GABAPENTIN 300 MG/1
300 CAPSULE ORAL 3 TIMES DAILY
Qty: 90 CAPSULE | Refills: 1 | Status: SHIPPED | OUTPATIENT
Start: 2020-03-09 | End: 2020-03-31

## 2020-03-09 NOTE — TELEPHONE ENCOUNTER
Unable to fill per standing order routed to Dr. Booth for approval.    Takes 300 mg tid  Requested Prescriptions   Pending Prescriptions Disp Refills     gabapentin (NEURONTIN) 300 MG capsule [Pharmacy Med Name: GABAPENTIN 300 MG CAPSULE] 90 capsule 1     Sig: TAKE 1 CAPSULE ONCE DAILY, 1 CAPSULE TWICE DAILY ON DAY 2, THEN 1 CAPSULE 3 TIMES DAILY AFTER       There is no refill protocol information for this order      Last Written Prescription Date:  2/10/20  Last Fill Quantity: 90,  # refills: 1   Last office visit: 2/6/2020 with prescribing provider:     Future Office Visit:

## 2020-03-17 ENCOUNTER — TRANSFERRED RECORDS (OUTPATIENT)
Dept: HEALTH INFORMATION MANAGEMENT | Facility: CLINIC | Age: 80
End: 2020-03-17

## 2020-03-31 DIAGNOSIS — R20.2 PARESTHESIAS: ICD-10-CM

## 2020-03-31 RX ORDER — GABAPENTIN 300 MG/1
CAPSULE ORAL
Qty: 90 CAPSULE | Refills: 3 | Status: SHIPPED | OUTPATIENT
Start: 2020-03-31 | End: 2020-04-28

## 2020-04-02 DIAGNOSIS — R20.2 PARESTHESIAS: ICD-10-CM

## 2020-04-03 RX ORDER — GABAPENTIN 300 MG/1
CAPSULE ORAL
Qty: 90 CAPSULE | Refills: 3 | OUTPATIENT
Start: 2020-04-03

## 2020-04-07 ENCOUNTER — PATIENT OUTREACH (OUTPATIENT)
Dept: CARE COORDINATION | Facility: CLINIC | Age: 80
End: 2020-04-07

## 2020-04-07 ASSESSMENT — ACTIVITIES OF DAILY LIVING (ADL): DEPENDENT_IADLS:: INDEPENDENT

## 2020-04-07 NOTE — PROGRESS NOTES
Clinic Care Coordination Contact  [facility]/Voicemail       Clinical Data: Care Coordinator Outreach  Outreach attempted x 1.  Left message on patient's voicemail with call back information and requested return call.  Plan: Care Coordinator will try to reach patient again in 10 business days.    [facility]  Clinic Care Coordinator  [facility]  [phone]  [email or web address]

## 2020-04-21 ENCOUNTER — PATIENT OUTREACH (OUTPATIENT)
Dept: CARE COORDINATION | Facility: CLINIC | Age: 80
End: 2020-04-21

## 2020-04-21 ASSESSMENT — ACTIVITIES OF DAILY LIVING (ADL): DEPENDENT_IADLS:: INDEPENDENT

## 2020-04-21 NOTE — PROGRESS NOTES
"Clinic Care Coordination Contact    Follow Up Progress Note      Assessment: CC outreached to pt on this date.  Pt indicates she continues to be in the \"unknown\".  Pt states she continues to have changes in her coloring in her hands and face as evidenced by \"red blotchy spots\".  Pt verified she has been in continuous contact with her rheumatologist who continues to take routine lab work to monitor fluctuations in values.      Pt indicates her B6 came down to under 'High' value but the remaing tests that were done continue to point toward a possible diagnosis of lupus.  Pt indicates Rheumatologist is hesitant to diagnose just yet without more definitive proof. Pt returns to f/u apt this week- Friday (4/24/20).  Pt states she is not experiencing any pain or discomfort which she is thankful for.  Abdominal pain she experienced prior has subsided.      Pt and [facility] reviewed precautions/recommendations given potential for immune-compromised diagnosis and a pandemic.  Pt confirmed she is taking proper precautions and social distancing.  Pt indicates she and her  do not go anywhere and do not have any visitors. Pt had no new concerns or questions.  Pt indicates she will outreach to clinic or [facility] as needed.  Provided [facility] contact number again.      Goals addressed this encounter:   Goals Addressed                 This Visit's Progress      Psychosocial (pt-stated)   On track     Goal Statement: I would like to gain a better understanding of the source of my abdominal pain.     Measure of Success: Pain will be known/managed.    Supportive Steps to Achieve: Follow up with ileostomy surgeon for assessment. Recommended follow up with PCP as needed.  Pain medications as prescribed.  Ileostomy care.    Barriers: Ileostomy in place.    Strengths: strong advocate for self.  Accepting of care coordination. Strong understanding of current medical state and closely monitors conditions, labs, and output. Support from daughter  "   Date to Achieve By: 6/1/2020  Patient expressed understanding of goal: yes.     As of today's date 3/3/2020 goal is met at 26 - 50%.   Goal Status:  Active  Pt has a f/u apt with Rheumatology to review recent blood work and findings 3/17/2020.     As of today's date 4/21/2020 goal is met at 26 - 50%.   Goal Status:  Active. Pt continues to be closely monitored by rheumatology and frequent blood tests to watch for changes.                     Intervention/Education provided during outreach: Care Coordination contact.       Outreach Frequency: monthly    Plan: Pt to follow-up with rheum this week for repeat lab work.  Care Coordinator will follow up in 1 month.      DESTINY Peterson  Clinic Care Coordinator  [facility]  [phone]  [email or web address]

## 2020-04-24 ENCOUNTER — TRANSFERRED RECORDS (OUTPATIENT)
Dept: HEALTH INFORMATION MANAGEMENT | Facility: CLINIC | Age: 80
End: 2020-04-24

## 2020-04-27 ENCOUNTER — TRANSFERRED RECORDS (OUTPATIENT)
Dept: HEALTH INFORMATION MANAGEMENT | Facility: CLINIC | Age: 80
End: 2020-04-27

## 2020-04-27 LAB
ALT SERPL-CCNC: 26 IU/L (ref 5–35)
AST SERPL-CCNC: 28 U/L (ref 5–34)
CREAT SERPL-MCNC: 1.24 MG/DL (ref 0.5–1.3)

## 2020-04-28 ENCOUNTER — TELEPHONE (OUTPATIENT)
Dept: PEDIATRICS | Facility: CLINIC | Age: 80
End: 2020-04-28

## 2020-04-28 DIAGNOSIS — R20.2 PARESTHESIAS: ICD-10-CM

## 2020-04-28 RX ORDER — GABAPENTIN 300 MG/1
300 CAPSULE ORAL 2 TIMES DAILY
Qty: 90 CAPSULE | Refills: 3
Start: 2020-04-28 | End: 2020-06-23

## 2020-04-28 NOTE — TELEPHONE ENCOUNTER
Patient calling, has been seeing Rheum for her symptoms.   Feels that she is not having any improvement of symptoms.   Feels that some symptoms are worsening.     Most recent visit with Rheum they advised in labs in a few weeks and a follow up office visit in 2 months.     Patient feels symptoms are not progressing to resolve. Would like Dr. Booth's advice.     Video Visit scheduled with Dr. Booth for 4/30/2020

## 2020-04-30 ENCOUNTER — VIRTUAL VISIT (OUTPATIENT)
Dept: PEDIATRICS | Facility: CLINIC | Age: 80
End: 2020-04-30
Payer: MEDICARE

## 2020-04-30 DIAGNOSIS — F41.1 GAD (GENERALIZED ANXIETY DISORDER): ICD-10-CM

## 2020-04-30 DIAGNOSIS — R07.89 SENSATION OF CHEST TIGHTNESS: ICD-10-CM

## 2020-04-30 DIAGNOSIS — E67.2 HYPERVITAMINOSIS B6: ICD-10-CM

## 2020-04-30 DIAGNOSIS — R76.8 POSITIVE DOUBLE STRANDED DNA ANTIBODY TEST: Primary | ICD-10-CM

## 2020-04-30 DIAGNOSIS — R76.8 POSITIVE ANA (ANTINUCLEAR ANTIBODY): ICD-10-CM

## 2020-04-30 DIAGNOSIS — I10 ESSENTIAL HYPERTENSION: ICD-10-CM

## 2020-04-30 PROCEDURE — 99443 ZZC PHYSICIAN TELEPHONE EVALUATION 21-30 MIN: CPT | Performed by: PEDIATRICS

## 2020-04-30 RX ORDER — CARVEDILOL 3.12 MG/1
3.12 TABLET ORAL 2 TIMES DAILY WITH MEALS
Qty: 60 TABLET | Refills: 3 | Status: SHIPPED | OUTPATIENT
Start: 2020-04-30 | End: 2020-08-18

## 2020-04-30 NOTE — PROGRESS NOTES
"Gely Keene is a 80 year old female who is being evaluated via a billable video visit.      The patient has been notified of following:     \"This video visit will be conducted via a call between you and your physician/provider. We have found that certain health care needs can be provided without the need for an in-person physical exam.  This service lets us provide the care you need with a video conversation.  If a prescription is necessary we can send it directly to your pharmacy.  If lab work is needed we can place an order for that and you can then stop by our lab to have the test done at a later time.    Video visits are billed at different rates depending on your insurance coverage.  Please reach out to your insurance provider with any questions.    If during the course of the call the physician/provider feels a video visit is not appropriate, you will not be charged for this service.\"    Patient has given verbal consent for Video visit? Yes    How would you like to obtain your AVS? [place]    Patient would like the video invitation sent by: Text to cell phone: [phone]    Will anyone else be joining your video visit? No     Visit switched to telephone visit due to technical difficulties with video visit.      Subjective     Gely Keene is a 80 year old female who presents to clinic today for the following health issues:    HPI      Telephone visit conducted today in place of in person visit due to ongoing coronavirus pandemic.    Gely has had a very difficult time over the last 6 months with a strange constellation of symptoms.  Her symptoms started abruptly in November 2019.  She has had paresthesias involving the upper and lower extremities and chest pain and tightness.  She is also exhibited rashes and and skin changes.  She is lost about 10 pounds.  Has had extensive work-up including imaging of her entire spine and has been seen in the emergency department on several " occasions.  Cardiac work-up was negative for new evidence of ischemia or any changes in cardiac function.    Interestingly, patient's PAUL screening was strongly positive.  She is been followed by Dr. Tabares in rheumatology, and additional antibody testing showed strongly positive double-stranded DNA and antihistone antibody testing.  Patient's testing in neurology also showed a toxic vitamin B6 level.  She has since stopped all of her supplements.  There was some concern about her atorvastatin contributing to her constellation of symptoms and the potential for a drug induced lupus type picture, and she has been off of this now for some time.    Unfortunately, patient's symptoms have not improved with the interventions described above.  She had thought that the gabapentin was not helping her symptoms, but upon trying to decrease the dose, she had worsening of her symptoms again.    She is having a burning sensation and tingling in her feet and her hands that turn red.  She is also been having a rash on her face.  She describes a sharp sternal pain also.  She is been trying to eat well, but her weight has continued to decline slightly.  She notes a weight today of slightly under 135 pounds.     She remains on a low-dose of sertraline for anxiety which she finds effective.  This was started after the onset of her symptoms.        Reviewed and updated as needed this visit by Provider             Diagnostic Test Results:  Labs reviewed in Epic  Most recent lab work from Dr. Tabares's office is not available for my review.        Assessment & Plan       ICD-10-CM    1. 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  Most notable findings at this time include extensive presence of autoantibodies including antihistone antibody, leading us to believe that perhaps a drug-induced lupus picture is contributing to her symptoms.    As we have  not seen improvement with stopping her statin, we discussed that our next plan will be to stop her metoprolol and transition her to carvedilol, while continuing to closely monitor her symptoms.  She will continue gabapentin now for symptom relief.    Patient will continue to have her labs followed and to be seen clinically by her rheumatology team with Dr. Tabares.   2. Positive PAUL (antinuclear antibody)  R76.8  see above   3. Essential hypertension  I10 carvedilol (COREG) 3.125 MG tablet    Stop metoprolol due to some reports of rare lupus-like syndromes induced by metoprolol.  Transition to carvedilol and follow.   4. MARK (generalized anxiety disorder)  F41.1 sertraline (ZOLOFT) 50 MG tablet  Doing well, continue current dose   5. Sensation of chest tightness  R07.89  see above of cardiac work-up that is negative   6. Hypervitaminosis B6  E67.2  now off all supplementation        For now, her main plan is to stop her metoprolol to see if that was contributing to the symptoms.  If this is not effective, it would be reasonable to consider discontinuation of omeprazole also.  Patient will be in contact with me regarding her next set of labs with rheumatology in 2 weeks.  I will also look forward to an update on her symptoms at that time.    Hien Booth MD  [facility]      Time spent in telephone conversation: 27 minutes.      Hien Booth MD

## 2020-05-14 ENCOUNTER — TRANSFERRED RECORDS (OUTPATIENT)
Dept: HEALTH INFORMATION MANAGEMENT | Facility: CLINIC | Age: 80
End: 2020-05-14

## 2020-05-14 LAB
ALT SERPL-CCNC: 27 IU/L (ref 5–35)
AST SERPL-CCNC: 26 U/L (ref 5–34)
CREAT SERPL-MCNC: 1.23 MG/DL (ref 0.5–1.3)

## 2020-05-20 ENCOUNTER — TELEPHONE (OUTPATIENT)
Dept: PEDIATRICS | Facility: CLINIC | Age: 80
End: 2020-05-20

## 2020-05-20 DIAGNOSIS — R20.2 PARESTHESIAS: ICD-10-CM

## 2020-05-20 DIAGNOSIS — R07.89 SENSATION OF CHEST TIGHTNESS: ICD-10-CM

## 2020-05-20 DIAGNOSIS — R76.8 POSITIVE ANA (ANTINUCLEAR ANTIBODY): Primary | ICD-10-CM

## 2020-05-20 DIAGNOSIS — R76.8 POSITIVE DOUBLE STRANDED DNA ANTIBODY TEST: ICD-10-CM

## 2020-05-20 RX ORDER — HYDROXYCHLOROQUINE SULFATE 200 MG/1
200 TABLET, FILM COATED ORAL 2 TIMES DAILY WITH MEALS
COMMUNITY
Start: 2020-05-20 | End: 2020-12-01 | Stop reason: ALTCHOICE

## 2020-05-20 NOTE — TELEPHONE ENCOUNTER
Patient requesting referral to [facility].   She has been working with Rheumatology, but not improving, having more symptoms and newer symptoms.   Rheum did start patient on Hydroxychloroquine, but she knows it'll take 2 months.   She doesn't need a call back if the Referral is signed.     Pending Referral for Dr. Booth to advise/sign.     TC, Records that will need to be faxed with referral are:  Fax Number: [phone]     [facility] visits - 4/30/2020 Virtual visit, 2/6/2020 office visit, 1/21/2020 office visit.      [facility] visits (see chart review)    Cardiology Tab - 2/11/2020 NM Lexiscan stress test, 1/31/2020 Echocardiogram    Imaging Tab - 2/3/2020 NM Gastric Emptying, 1/22/2020 CT Head & MR Thoracic/Lumbar Spine    Lab Tab - 2/14/2020 scanned labs, 2/6/2020 all labs, 1/31/2020 [place], 1/27/2020 all labs, 1/22/2020 all labs, 1/21/2020 all labs

## 2020-05-21 NOTE — TELEPHONE ENCOUNTER
OK for referral to [place].  Thanks for helping to facilitate this for Gely.      Hien Booth MD  Internal Medicine - Pediatrics

## 2020-05-26 ENCOUNTER — TELEPHONE (OUTPATIENT)
Dept: PEDIATRICS | Facility: CLINIC | Age: 80
End: 2020-05-26

## 2020-05-26 DIAGNOSIS — E78.5 HYPERLIPIDEMIA LDL GOAL <160: ICD-10-CM

## 2020-05-26 DIAGNOSIS — R20.2 PARESTHESIAS: ICD-10-CM

## 2020-05-26 DIAGNOSIS — R07.89 SENSATION OF CHEST TIGHTNESS: ICD-10-CM

## 2020-05-26 DIAGNOSIS — R76.8 POSITIVE ANA (ANTINUCLEAR ANTIBODY): Primary | ICD-10-CM

## 2020-05-26 NOTE — TELEPHONE ENCOUNTER
Nutrition referral signed.  I do not know the nutritionists personally, but I think when they call her to schedule, she can discuss what she is looking for and the nutrition schedulers can make recommendations.    I agree with the hydroxychloroquine as recommended by Dr Tabares.    I would be in favor of restarting her statin if she did not notice any improvement while off of it.    Thanks!    Hien Booth MD  Internal Medicine - Pediatrics

## 2020-05-26 NOTE — TELEPHONE ENCOUNTER
Faxed requested records and referral to [phone].    Nichole Gonzalez     12:05 PM May 26, 2020

## 2020-05-26 NOTE — TELEPHONE ENCOUNTER
Patient calling to inquire on Dr. Booth's thoughts on:    1. Starting the Hydroxychloroquine.   Dr. Tabares prescribed 200 mg daily for her symptoms.     2. Does she need to restart cholesterol medication? Hold for now? Or lifetime?    3. Recommendation on a Nutritionist she can schedule with.   She heard from a Nurse that talking to a Nutritionist would be helpful.

## 2020-05-26 NOTE — TELEPHONE ENCOUNTER
Informed patient of below. She agrees with the plan.     She will follow up with Dr. Tabares, Neurology & Nutritionist about the statin then decide from there.

## 2020-05-29 ENCOUNTER — PATIENT OUTREACH (OUTPATIENT)
Dept: CARE COORDINATION | Facility: CLINIC | Age: 80
End: 2020-05-29

## 2020-05-29 ASSESSMENT — ACTIVITIES OF DAILY LIVING (ADL): DEPENDENT_IADLS:: INDEPENDENT

## 2020-05-29 NOTE — PROGRESS NOTES
Clinic Care Coordination Contact    Follow Up Progress Note      Assessment: [facility] outreached to pt on this date to check on status and assess for needs.  Pt indicates she continues to experience symptoms of rashes burning of limbs. Pt indicates she requested a referral to [place] for additional work up.  Pt specialists continue to consider Lupus dx, but no dx just yet.  Pt states she has a neurology apt 6/8/20 because she has concerns this condition is affecting her head. Pt explained headaches, and sinus concerns at times, but not consistently.      Goals addressed this encounter:   Goals Addressed                 This Visit's Progress      Psychosocial (pt-stated)   On track     Goal Statement: I would like to gain a better understanding of the source of my abdominal pain.     Measure of Success: Pain will be known/managed.    Supportive Steps to Achieve: Follow up with ileostomy surgeon for assessment. Recommended follow up with PCP as needed.  Pain medications as prescribed.  Ileostomy care.    Barriers: Ileostomy in place.    Strengths: strong advocate for self.  Accepting of care coordination. Strong understanding of current medical state and closely monitors conditions, labs, and output. Support from daughter    Date to Achieve By: 6/1/2020  Patient expressed understanding of goal: yes.     As of today's date 3/3/2020 goal is met at 26 - 50%.   Goal Status:  Active  Pt has a f/u apt with Rheumatology to review recent blood work and findings 3/17/2020.     As of today's date 4/21/2020 goal is met at 26 - 50%.   Goal Status:  Active. Pt continues to be closely monitored by rheumatology and frequent blood tests to watch for changes.    As of today's date 5/29/2020 goal is met at 26 - 50%.   Goal Status:  Ongoing. Working on referral for [place].                     Intervention/Education provided during outreach: Care Coordination availability.      Outreach Frequency: monthly    Plan: Pt to continue to work with PCP  on referrals.  Care Coordinator will follow up in 1 month.     Tobi Hoffman [facility]  Clinic Care Coordinator  [facility]  [phone]  [email or web address]

## 2020-06-03 NOTE — TELEPHONE ENCOUNTER
Patient declines [facility] Referral at this time.   Is ok with following up with [facility] and continuing Hydroxychloroquine

## 2020-06-20 ENCOUNTER — OFFICE VISIT (OUTPATIENT)
Dept: URGENT CARE | Facility: URGENT CARE | Age: 80
End: 2020-06-20
Payer: MEDICARE

## 2020-06-20 VITALS
OXYGEN SATURATION: 99 % | WEIGHT: 140 LBS | BODY MASS INDEX: 26.02 KG/M2 | SYSTOLIC BLOOD PRESSURE: 127 MMHG | TEMPERATURE: 97.7 F | DIASTOLIC BLOOD PRESSURE: 60 MMHG | HEART RATE: 67 BPM

## 2020-06-20 DIAGNOSIS — Z90.49 S/P TOTAL COLECTOMY: ICD-10-CM

## 2020-06-20 DIAGNOSIS — R30.0 DYSURIA: ICD-10-CM

## 2020-06-20 DIAGNOSIS — N18.30 CHRONIC KIDNEY DISEASE, STAGE III (MODERATE) (H): ICD-10-CM

## 2020-06-20 DIAGNOSIS — N30.01 ACUTE CYSTITIS WITH HEMATURIA: Primary | ICD-10-CM

## 2020-06-20 LAB
ALBUMIN UR-MCNC: 100 MG/DL
APPEARANCE UR: ABNORMAL
BACTERIA #/AREA URNS HPF: ABNORMAL /HPF
BILIRUB UR QL STRIP: NEGATIVE
COLOR UR AUTO: YELLOW
GLUCOSE UR STRIP-MCNC: NEGATIVE MG/DL
HGB UR QL STRIP: ABNORMAL
KETONES UR STRIP-MCNC: NEGATIVE MG/DL
LEUKOCYTE ESTERASE UR QL STRIP: ABNORMAL
NITRATE UR QL: NEGATIVE
NON-SQ EPI CELLS #/AREA URNS LPF: ABNORMAL /LPF
PH UR STRIP: 6 PH (ref 5–7)
RBC #/AREA URNS AUTO: >100 /HPF
SOURCE: ABNORMAL
SP GR UR STRIP: 1.02 (ref 1–1.03)
UROBILINOGEN UR STRIP-ACNC: 0.2 EU/DL (ref 0.2–1)
WBC #/AREA URNS AUTO: >100 /HPF

## 2020-06-20 PROCEDURE — 99214 OFFICE O/P EST MOD 30 MIN: CPT | Performed by: PHYSICIAN ASSISTANT

## 2020-06-20 PROCEDURE — 87086 URINE CULTURE/COLONY COUNT: CPT | Performed by: PHYSICIAN ASSISTANT

## 2020-06-20 PROCEDURE — 81001 URINALYSIS AUTO W/SCOPE: CPT | Performed by: PHYSICIAN ASSISTANT

## 2020-06-20 PROCEDURE — 87088 URINE BACTERIA CULTURE: CPT | Performed by: PHYSICIAN ASSISTANT

## 2020-06-20 PROCEDURE — 87186 SC STD MICRODIL/AGAR DIL: CPT | Performed by: PHYSICIAN ASSISTANT

## 2020-06-20 RX ORDER — CEPHALEXIN 500 MG/1
500 CAPSULE ORAL 3 TIMES DAILY
Qty: 7 CAPSULE | Refills: 0 | Status: SHIPPED | OUTPATIENT
Start: 2020-06-20 | End: 2020-06-20 | Stop reason: DRUGHIGH

## 2020-06-20 RX ORDER — CEPHALEXIN 500 MG/1
500 CAPSULE ORAL 2 TIMES DAILY
Qty: 14 CAPSULE | Refills: 0 | Status: SHIPPED | OUTPATIENT
Start: 2020-06-20 | End: 2020-06-27

## 2020-06-20 NOTE — PATIENT INSTRUCTIONS
"  Patient Education     Bladder Infection, Female (Adult)    Urine is normally doesn't have any bacteria in it. But bacteria can get into the urinary tract from the skin around the rectum. Or they can travel in the blood from elsewhere in the body. Once they are in your urinary tract, they can cause infection in the urethra (urethritis), the bladder (cystitis), or the kidneys (pyelonephritis).  The most common place for an infection is in the bladder. This is called a bladder infection. This is one of the most common infections in women. Most bladder infections are easily treated. They are not serious unless the infection spreads to the kidney.  The phrases \"bladder infection,\" \"UTI,\" and \"cystitis\" are often used to describe the same thing. But they are not always the same. Cystitis is an inflammation of the bladder. The most common cause of cystitis is an infection.  Symptoms  The infection causes inflammation in the urethra and bladder. This causes many of the symptoms. The most common symptoms of a bladder infection are:    Pain or burning when urinating    Having to urinate more often than usual    Urgent need to urinate    Only a small amount of urine comes out    Blood in urine    Abdominal discomfort. This is usually in the lower abdomen above the pubic bone.    Cloudy urine    Strong- or bad-smelling urine    Unable to urinate (urinary retention)    Unable to hold urine in (urinary incontinence)    Fever    Loss of appetite    Confusion (in older adults)  Causes  Bladder infections are not contagious. You can't get one from someone else, from a toilet seat, or from sharing a bath.  The most common cause of bladder infections is bacteria from the bowels. The bacteria get onto the skin around the opening of the urethra. From there, they can get into the urine and travel up to the bladder, causing inflammation and infection. This usually happens because of:    Wiping improperly after urinating. Always wipe " from front to back.    Bowel incontinence    Pregnancy    Procedures such as having a catheter inserted    Older age    Not emptying your bladder. This can allow bacteria a chance to grow in your urine.    Dehydration    Constipation    Sex    Use of a diaphragm for birth control   Treatment  Bladder infections are diagnosed by a urine test. They are treated with antibiotics and usually clear up quickly without complications. Treatment helps prevent a more serious kidney infection.  Medicines  Medicines can help in the treatment of a bladder infection:    Take antibiotics until they are used up, even if you feel better. It is important to finish them to make sure the infection has cleared.    You can use acetaminophen or ibuprofen for pain, fever, or discomfort, unless another medicine was prescribed. If you have chronic liver or kidney disease, talk with your healthcare provider before using these medicines. Also talk with your provider if you've ever had a stomach ulcer or gastrointestinal bleeding, or are taking blood-thinner medicines.    If you are given phenazopydridine to reduce burning with urination, it will cause your urine to become a bright orange color. This can stain clothing.  Care and prevention  These self-care steps can help prevent future infections:    Drink plenty of fluids to prevent dehydration and flush out your bladder. Do this unless you must restrict fluids for other health reasons, or your doctor told you not to.    Proper cleaning after going to the bathroom is important. Wipe from front to back after using the toilet to prevent the spread of bacteria.    Urinate more often. Don't try to hold urine in for a long time.    Wear loose-fitting clothes and cotton underwear. Avoid tight-fitting pants.    Improve your diet and prevent constipation. Eat more fresh fruit and vegetables, and fiber, and less junk and fatty foods.    Avoid sex until your symptoms are gone.    Avoid caffeine,  alcohol, and spicy foods. These can irritate your bladder.    Urinate right after intercourse to flush out your bladder.    If you use birth control pills and have frequent bladder infections, discuss it with your doctor.  Follow-up care  Call your healthcare provider if all symptoms are not gone after 3 days of treatment. This is especially important if you have repeat infections.  If a culture was done, you will be told if your treatment needs to be changed. If directed, you can call to find out the results.  If X-rays were done, you will be told if the results will affect your treatment.  Call 911  Call 911 if any of the following occur:    Trouble breathing    Hard to wake up or confusion    Fainting or loss of consciousness    Rapid heart rate  When to seek medical advice  Call your healthcare provider right away if any of these occur:    Fever of 100.4 F (38.0 C) or higher, or as directed by your healthcare provider    Symptoms are not better by the third day of treatment    Back or belly (abdominal) pain that gets worse    Repeated vomiting, or unable to keep medicine down    Weakness or dizziness    Vaginal discharge    Pain, redness, or swelling in the outer vaginal area (labia)  Date Last Reviewed: 10/1/2016    8302-2450 [employer]. [address] [place] [address], [place] [address]. All rights reserved. This information is not intended as a substitute for professional medical care. Always follow your healthcare professional's instructions.

## 2020-06-20 NOTE — PROGRESS NOTES
"     Gely Keene is an 80 y.o. female, with a pmhx that includes HTN, Hyperlipidemia, colectomy, C. Difff and CKD, who presents to clinic today c/o acute onset dysuria and urinary urgency/frequency several hours ago. Patient states she had similar sxs approximately one week ago but thought they \"went away\". However, she had sudden return of sxs a couple of hours ago.      Last UTI approximately one year ago.  No hx of resistant infection, kidney stones, or kidney infections.      Red Flag Review: Denies any abdominal pain or flank pain. Denies any F/C/N/V/D or other acute sxs.      ROS:     CONSITUTIONAL: Denies any fever, chills or acute onset weakness  CARDIAC: Denies any CP or SOB  RESP: [place] any cough, CP or SOB    GI: Denies any abdominal pain. Denies any F/C/N/V/D.   SKIN: Denies rash   NEURO: Denies any confusion or mental status changes      Past Medical History:   Diagnosis Date     Anxiety      BCC (basal cell carcinoma of skin)      Esophageal reflux (GERD) 9/8/2014     HTN (hypertension) 9/8/2014     Hyperlipidemia LDL goal <160 9/8/2014     IFG (impaired fasting glucose) 9/8/2014     Mumps      PONV (postoperative nausea and vomiting)      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)     Advance care planning     Chronic kidney disease, stage III (moderate) (H)       Current Outpatient Medications   Medication     ACETAMINOPHEN PO     carvedilol (COREG) 3.125 MG tablet     cyclobenzaprine (FLEXERIL) 5 MG tablet     Estradiol (IMVEXXY VA)     gabapentin (NEURONTIN) 300 MG capsule     " hydroxychloroquine (PLAQUENIL) 200 MG tablet     LORazepam (ATIVAN) 0.5 MG tablet     omeprazole (PRILOSEC) 20 MG DR capsule     ondansetron (ZOFRAN ODT) 4 MG ODT tab     sertraline (ZOLOFT) 50 MG tablet     No current facility-administered medications for this visit.        Allergies   Allergen Reactions     Bactrim [Sulfamethoxazole W/Trimethoprim] GI Disturbance     Vomiting     Codeine      Metronidazole      GI distubance     Sulfa Drugs      Sulfur              OBJECTIVE:  /60   Pulse 67   Temp 97.7  F (36.5  C) (Tympanic)   Wt 63.5 kg (140 lb)   SpO2 99%   BMI 26.02 kg/m          GENERAL:  Very pleasant, comfortable and generally well appearing.  SKIN: No rashes.  Normal color.  Sclera clear.  CARDIAC:NORMAL - regular rate and rhythm without murmur., normal s1/s2 and without extra heart sounds  RESP: Normal - CTA without rales, rhonchi, or wheezing.  ABDOMEN:  Soft, non-tender, non-distended.  Positive normal bowel sounds.  No HSM or masses.  Positive, mild, suprapubic tenderness.  No CVA tenderness.  NEURO: Alert and oriented.  Normal speech and mentation.  CN II/XII grossly intact.  Gait within normal limits.      Component      Latest Ref Rng & Units 6/20/2020   Color Urine       Yellow   Appearance Urine       Cloudy   Glucose Urine      NEG:Negative mg/dL Negative   Bilirubin Urine      NEG:Negative Negative   Ketones Urine      NEG:Negative mg/dL Negative   Specific Gravity Urine      1.003 - 1.035 1.020   Blood Urine      NEG:Negative Moderate (A)   pH Urine      5.0 - 7.0 pH 6.0   Protein Albumin Urine      NEG:Negative mg/dL 100 (A)   Urobilinogen Urine      0.2 - 1.0 EU/dL 0.2   Nitrite Urine      NEG:Negative Negative   Leukocyte Esterase Urine      NEG:Negative Moderate (A)   Source       Midstream Urine   WBC Urine      OTO5:0 - 5 /HPF >100 (A)   RBC Urine      OTO2:O - 2 /HPF >100 (A)   Squamous Epithelial /LPF Urine      FEW:Few /LPF Few   Bacteria Urine      NEG:Negative /HPF Few  "(A)       ASSESSMENT/PLAN:    (N30.01) Acute cystitis with hematuria  (primary encounter diagnosis)    MDM: Cystitis with hematuria in an 80 y.o. female with a pmhx that includes HTN, Hyperlipidemia, colectomy, C. Difff and CKD. Patient has Sulfa allergy. Rx'd Keflex as patient has had good response to this in past/without  Recurrence of C. Diff and no need for renal dose adjustment  Related to CKD.     Plan: cephALEXin (KEFLEX) 500 MG capsule, cephALEXin         (KEFLEX) 500 MG capsule        1. Push plain, non-carbonated water.      2. Follow-up with your primary care provider sooner if your symptoms do not improve within 2 days of starting your antibiotic, if your symptoms worsen, if you develop fever, chills, back pain, abdominal pain, nausea, vomiting, weakness or any new symptoms.      In addition to the above, cystitis \"red flag\" signs and sxs are reviewed with pt both verbally and by way of printed educational material for home review.  Pt verbalizes understanding of and agrees to the above plan.         (N18.3) Chronic kidney disease, stage III (moderate) (H)    (R30.0) Dysuria    Plan: UA reflex to Microscopic and Culture, Urine         Microscopic, Urine Culture Aerobic Bacterial      (Z90.49) S/p total colectomy on 4/22/16 by Shana Alaniz MD                "

## 2020-06-23 DIAGNOSIS — R20.2 PARESTHESIAS: ICD-10-CM

## 2020-06-23 LAB
BACTERIA SPEC CULT: ABNORMAL
SPECIMEN SOURCE: ABNORMAL

## 2020-06-23 RX ORDER — GABAPENTIN 300 MG/1
CAPSULE ORAL
Qty: 270 CAPSULE | Refills: 1 | Status: ON HOLD | OUTPATIENT
Start: 2020-06-23 | End: 2020-09-21

## 2020-06-30 ENCOUNTER — PATIENT OUTREACH (OUTPATIENT)
Dept: CARE COORDINATION | Facility: CLINIC | Age: 80
End: 2020-06-30

## 2020-06-30 ASSESSMENT — ACTIVITIES OF DAILY LIVING (ADL): DEPENDENT_IADLS:: INDEPENDENT

## 2020-06-30 NOTE — PROGRESS NOTES
Clinic Care Coordination Contact  [facility]/Voicemail    Referral Source: PCP  Clinical Data: Care Coordinator Outreach  Outreach attempted x 1.  Left message on patient's voicemail with call back information and requested return call.  Plan:  Care Coordinator will try to reach patient again in 10 business days.    Tobi Hoffman JAMAL  Clinic Care Coordinator  [facility] [facility]  [phone]  [email or web address]

## 2020-07-14 ENCOUNTER — TRANSFERRED RECORDS (OUTPATIENT)
Dept: HEALTH INFORMATION MANAGEMENT | Facility: CLINIC | Age: 80
End: 2020-07-14

## 2020-07-14 LAB
ALT SERPL-CCNC: 21 IU/L (ref 5–35)
AST SERPL-CCNC: 28 U/L (ref 5–34)
CREAT SERPL-MCNC: 1.54 MG/DL (ref 0.5–1.3)

## 2020-07-16 ENCOUNTER — TRANSFERRED RECORDS (OUTPATIENT)
Dept: HEALTH INFORMATION MANAGEMENT | Facility: CLINIC | Age: 80
End: 2020-07-16

## 2020-07-28 ENCOUNTER — PATIENT OUTREACH (OUTPATIENT)
Dept: NURSING | Facility: CLINIC | Age: 80
End: 2020-07-28
Payer: MEDICARE

## 2020-07-28 ASSESSMENT — ACTIVITIES OF DAILY LIVING (ADL): DEPENDENT_IADLS:: INDEPENDENT

## 2020-07-28 NOTE — PROGRESS NOTES
"Clinic Care Coordination Contact    Follow Up Progress Note      Assessment: [facility] outreached to pt on this date to check on status and assess for needs.      Pt indicates she continues to exhibit symptoms of pain, and \"flare ups\" of redness and rashes however since establishing with a rheumatologist, pt has not been diagnosed with Lupus.  Pt indicates she is not surprised by this and anticipated this to be true all along.  Pt is curious how she developed it so late in life and states she has been prescribed medication to assist in symptom management.  Pt continues to have the strong support from her family- her daughters who are nurses, and her .  Pt denied needing any additional supports for her new diagnosis and states she just does her best to manage the symptoms.  Pt states she does consider her case to be 'mild\" and does not feel like her symptoms are significant at this time.  Pt continues to be cautious and take proper precautions to prevent any risk of COVID exposure.      Reviewed goal: Pt indicates she no longer suffers from the abdominal pain to the severity it was but rather only encounters it periodically and at a mild rating.  Pt agreed to completion of the goal.      Goals addressed this encounter:   Goals Addressed                 This Visit's Progress      COMPLETED: Psychosocial (pt-stated)   70%     Goal Statement: I would like to gain a better understanding of the source of my abdominal pain.     Measure of Success: Pain will be known/managed.    Supportive Steps to Achieve: Follow up with ileostomy surgeon for assessment. Recommended follow up with PCP as needed.  Pain medications as prescribed.  Ileostomy care.    Barriers: Ileostomy in place.    Strengths: strong advocate for self.  Accepting of care coordination. Strong understanding of current medical state and closely monitors conditions, labs, and output. Support from daughter    Date to Achieve By: 7/28/2020  Patient expressed " understanding of goal: yes.     As of today's date 3/3/2020 goal is met at 26 - 50%.   Goal Status:  Active  Pt has a f/u apt with Rheumatology to review recent blood work and findings 3/17/2020.     As of today's date 4/21/2020 goal is met at 26 - 50%.   Goal Status:  Active. Pt continues to be closely monitored by rheumatology and frequent blood tests to watch for changes.    As of today's date 5/29/2020 goal is met at 26 - 50%.   Goal Status:  Ongoing. Working on referral for [place].                     Intervention/Education provided during outreach: Care Coordination availability.       Outreach Frequency: 2 months.    Plan:   Status changed to maintenance. CC to outreach again in 2 months.      Tobi Hoffman, JAMAL  Clinic Care Coordinator  [facility]  [phone]  [email or web address]

## 2020-08-18 DIAGNOSIS — I10 ESSENTIAL HYPERTENSION: ICD-10-CM

## 2020-08-18 RX ORDER — CARVEDILOL 3.12 MG/1
TABLET ORAL
Qty: 180 TABLET | Refills: 1 | Status: SHIPPED | OUTPATIENT
Start: 2020-08-18 | End: 2021-03-01

## 2020-08-18 NOTE — TELEPHONE ENCOUNTER
Prescription approved per [facility] Refill Protocol.  Ronald Saha RN, BSN

## 2020-09-02 ENCOUNTER — TRANSFERRED RECORDS (OUTPATIENT)
Dept: HEALTH INFORMATION MANAGEMENT | Facility: CLINIC | Age: 80
End: 2020-09-02

## 2020-09-02 LAB
ALT SERPL-CCNC: 18 IU/L (ref 5–35)
AST SERPL-CCNC: 25 U/L (ref 5–34)
CREAT SERPL-MCNC: 1.72 MG/DL (ref 0.5–1.3)
GFR SERPL CREATININE-BSD FRML MDRD: 30.2 ML/MIN/1.73M2

## 2020-09-17 ENCOUNTER — TRANSFERRED RECORDS (OUTPATIENT)
Dept: HEALTH INFORMATION MANAGEMENT | Facility: CLINIC | Age: 80
End: 2020-09-17

## 2020-09-21 ENCOUNTER — HOSPITAL ENCOUNTER (OUTPATIENT)
Facility: CLINIC | Age: 80
Setting detail: OBSERVATION
Discharge: HOME | End: 2020-09-22
Attending: EMERGENCY MEDICINE | Admitting: INTERNAL MEDICINE
Payer: MEDICARE

## 2020-09-21 ENCOUNTER — APPOINTMENT (OUTPATIENT)
Dept: CT IMAGING | Facility: CLINIC | Age: 80
End: 2020-09-21
Attending: EMERGENCY MEDICINE
Payer: MEDICARE

## 2020-09-21 DIAGNOSIS — E87.1 HYPONATREMIA: ICD-10-CM

## 2020-09-21 LAB
ALBUMIN SERPL-MCNC: 3.5 G/DL (ref 3.4–5)
ALBUMIN UR-MCNC: NEGATIVE MG/DL
ALP SERPL-CCNC: 76 U/L (ref 40–150)
ALT SERPL W P-5'-P-CCNC: 20 U/L (ref 0–50)
ANION GAP SERPL CALCULATED.3IONS-SCNC: 6 MMOL/L (ref 3–14)
APPEARANCE UR: CLEAR
AST SERPL W P-5'-P-CCNC: 25 U/L (ref 0–45)
BASOPHILS # BLD AUTO: 0.1 10E9/L (ref 0–0.2)
BASOPHILS NFR BLD AUTO: 1 %
BILIRUB SERPL-MCNC: 0.9 MG/DL (ref 0.2–1.3)
BILIRUB UR QL STRIP: NEGATIVE
BUN SERPL-MCNC: 13 MG/DL (ref 7–30)
CALCIUM SERPL-MCNC: 8.8 MG/DL (ref 8.5–10.1)
CHLORIDE SERPL-SCNC: 91 MMOL/L (ref 94–109)
CO2 SERPL-SCNC: 25 MMOL/L (ref 20–32)
COLOR UR AUTO: NORMAL
CREAT SERPL-MCNC: 1.41 MG/DL (ref 0.52–1.04)
CREAT UR-MCNC: 25 MG/DL
CRP SERPL-MCNC: <2.9 MG/L (ref 0–8)
DIFFERENTIAL METHOD BLD: NORMAL
EOSINOPHIL # BLD AUTO: 0 10E9/L (ref 0–0.7)
EOSINOPHIL NFR BLD AUTO: 0.2 %
ERYTHROCYTE [DISTWIDTH] IN BLOOD BY AUTOMATED COUNT: 12.7 % (ref 10–15)
ERYTHROCYTE [SEDIMENTATION RATE] IN BLOOD BY WESTERGREN METHOD: 15 MM/H (ref 0–30)
FRACT EXCRET NA UR+SERPL-RTO: 0.6 %
GFR SERPL CREATININE-BSD FRML MDRD: 34 ML/MIN/{1.73_M2}
GLUCOSE SERPL-MCNC: 117 MG/DL (ref 70–99)
GLUCOSE UR STRIP-MCNC: NEGATIVE MG/DL
HCT VFR BLD AUTO: 36.2 % (ref 35–47)
HGB BLD-MCNC: 12 G/DL (ref 11.7–15.7)
HGB UR QL STRIP: NEGATIVE
IMM GRANULOCYTES # BLD: 0 10E9/L (ref 0–0.4)
IMM GRANULOCYTES NFR BLD: 0.4 %
KETONES UR STRIP-MCNC: NEGATIVE MG/DL
LEUKOCYTE ESTERASE UR QL STRIP: NEGATIVE
LIPASE SERPL-CCNC: 160 U/L (ref 73–393)
LYMPHOCYTES # BLD AUTO: 0.9 10E9/L (ref 0.8–5.3)
LYMPHOCYTES NFR BLD AUTO: 18.2 %
MCH RBC QN AUTO: 29.8 PG (ref 26.5–33)
MCHC RBC AUTO-ENTMCNC: 33.1 G/DL (ref 31.5–36.5)
MCV RBC AUTO: 90 FL (ref 78–100)
MONOCYTES # BLD AUTO: 0.5 10E9/L (ref 0–1.3)
MONOCYTES NFR BLD AUTO: 10.4 %
NEUTROPHILS # BLD AUTO: 3.3 10E9/L (ref 1.6–8.3)
NEUTROPHILS NFR BLD AUTO: 69.8 %
NITRATE UR QL: NEGATIVE
NRBC # BLD AUTO: 0 10*3/UL
NRBC BLD AUTO-RTO: 0 /100
OSMOLALITY SERPL: 263 MMOL/KG (ref 280–301)
PH UR STRIP: 5 PH (ref 5–7)
PLATELET # BLD AUTO: 201 10E9/L (ref 150–450)
POTASSIUM SERPL-SCNC: 3.8 MMOL/L (ref 3.4–5.3)
PROT SERPL-MCNC: 7 G/DL (ref 6.8–8.8)
RBC # BLD AUTO: 4.03 10E12/L (ref 3.8–5.2)
SARS-COV-2 RNA SPEC QL NAA+PROBE: NORMAL
SODIUM SERPL-SCNC: 122 MMOL/L (ref 133–144)
SODIUM UR-SCNC: 14 MMOL/L
SODIUM UR-SCNC: 14 MMOL/L
SOURCE: NORMAL
SP GR UR STRIP: 1 (ref 1–1.03)
SPECIMEN SOURCE: NORMAL
UROBILINOGEN UR STRIP-MCNC: NORMAL MG/DL (ref 0–2)
WBC # BLD AUTO: 4.8 10E9/L (ref 4–11)

## 2020-09-21 PROCEDURE — 85025 COMPLETE CBC W/AUTO DIFF WBC: CPT | Performed by: EMERGENCY MEDICINE

## 2020-09-21 PROCEDURE — 36415 COLL VENOUS BLD VENIPUNCTURE: CPT | Performed by: EMERGENCY MEDICINE

## 2020-09-21 PROCEDURE — 96375 TX/PRO/DX INJ NEW DRUG ADDON: CPT

## 2020-09-21 PROCEDURE — G0378 HOSPITAL OBSERVATION PER HR: HCPCS

## 2020-09-21 PROCEDURE — 99285 EMERGENCY DEPT VISIT HI MDM: CPT | Mod: 25

## 2020-09-21 PROCEDURE — 96361 HYDRATE IV INFUSION ADD-ON: CPT

## 2020-09-21 PROCEDURE — 25000132 ZZH RX MED GY IP 250 OP 250 PS 637: Mod: GY | Performed by: EMERGENCY MEDICINE

## 2020-09-21 PROCEDURE — 25800030 ZZH RX IP 258 OP 636: Performed by: PHYSICIAN ASSISTANT

## 2020-09-21 PROCEDURE — 83690 ASSAY OF LIPASE: CPT | Performed by: EMERGENCY MEDICINE

## 2020-09-21 PROCEDURE — 82570 ASSAY OF URINE CREATININE: CPT | Performed by: PHYSICIAN ASSISTANT

## 2020-09-21 PROCEDURE — 81003 URINALYSIS AUTO W/O SCOPE: CPT | Performed by: EMERGENCY MEDICINE

## 2020-09-21 PROCEDURE — 25800030 ZZH RX IP 258 OP 636: Performed by: EMERGENCY MEDICINE

## 2020-09-21 PROCEDURE — C9803 HOPD COVID-19 SPEC COLLECT: HCPCS

## 2020-09-21 PROCEDURE — 85652 RBC SED RATE AUTOMATED: CPT | Performed by: EMERGENCY MEDICINE

## 2020-09-21 PROCEDURE — 70450 CT HEAD/BRAIN W/O DYE: CPT

## 2020-09-21 PROCEDURE — 25000128 H RX IP 250 OP 636: Performed by: EMERGENCY MEDICINE

## 2020-09-21 PROCEDURE — 86140 C-REACTIVE PROTEIN: CPT | Performed by: EMERGENCY MEDICINE

## 2020-09-21 PROCEDURE — 84300 ASSAY OF URINE SODIUM: CPT | Performed by: PHYSICIAN ASSISTANT

## 2020-09-21 PROCEDURE — 96376 TX/PRO/DX INJ SAME DRUG ADON: CPT

## 2020-09-21 PROCEDURE — 84300 ASSAY OF URINE SODIUM: CPT | Performed by: EMERGENCY MEDICINE

## 2020-09-21 PROCEDURE — 83930 ASSAY OF BLOOD OSMOLALITY: CPT | Performed by: EMERGENCY MEDICINE

## 2020-09-21 PROCEDURE — 25000132 ZZH RX MED GY IP 250 OP 250 PS 637: Mod: GY | Performed by: PHYSICIAN ASSISTANT

## 2020-09-21 PROCEDURE — 80053 COMPREHEN METABOLIC PANEL: CPT | Performed by: EMERGENCY MEDICINE

## 2020-09-21 PROCEDURE — 96374 THER/PROPH/DIAG INJ IV PUSH: CPT

## 2020-09-21 PROCEDURE — 99220 ZZC INITIAL OBSERVATION CARE,LEVL III: CPT | Performed by: PHYSICIAN ASSISTANT

## 2020-09-21 PROCEDURE — U0003 INFECTIOUS AGENT DETECTION BY NUCLEIC ACID (DNA OR RNA); SEVERE ACUTE RESPIRATORY SYNDROME CORONAVIRUS 2 (SARS-COV-2) (CORONAVIRUS DISEASE [COVID-19]), AMPLIFIED PROBE TECHNIQUE, MAKING USE OF HIGH THROUGHPUT TECHNOLOGIES AS DESCRIBED BY CMS-2020-01-R: HCPCS | Performed by: EMERGENCY MEDICINE

## 2020-09-21 RX ORDER — LORAZEPAM 2 MG/ML
0.5 INJECTION INTRAMUSCULAR ONCE
Status: COMPLETED | OUTPATIENT
Start: 2020-09-21 | End: 2020-09-21

## 2020-09-21 RX ORDER — ONDANSETRON 2 MG/ML
4 INJECTION INTRAMUSCULAR; INTRAVENOUS EVERY 30 MIN PRN
Status: DISCONTINUED | OUTPATIENT
Start: 2020-09-21 | End: 2020-09-21

## 2020-09-21 RX ORDER — PROCHLORPERAZINE 25 MG
12.5 SUPPOSITORY, RECTAL RECTAL EVERY 12 HOURS PRN
Status: DISCONTINUED | OUTPATIENT
Start: 2020-09-21 | End: 2020-09-22 | Stop reason: HOSPADM

## 2020-09-21 RX ORDER — NALOXONE HYDROCHLORIDE 0.4 MG/ML
.1-.4 INJECTION, SOLUTION INTRAMUSCULAR; INTRAVENOUS; SUBCUTANEOUS
Status: DISCONTINUED | OUTPATIENT
Start: 2020-09-21 | End: 2020-09-22 | Stop reason: HOSPADM

## 2020-09-21 RX ORDER — GABAPENTIN 300 MG/1
300 CAPSULE ORAL 2 TIMES DAILY
Status: DISCONTINUED | OUTPATIENT
Start: 2020-09-21 | End: 2020-09-22 | Stop reason: HOSPADM

## 2020-09-21 RX ORDER — HYDROXYCHLOROQUINE SULFATE 200 MG/1
200 TABLET, FILM COATED ORAL 2 TIMES DAILY WITH MEALS
Status: DISCONTINUED | OUTPATIENT
Start: 2020-09-21 | End: 2020-09-22 | Stop reason: HOSPADM

## 2020-09-21 RX ORDER — GABAPENTIN 300 MG/1
300 CAPSULE ORAL 2 TIMES DAILY
COMMUNITY
End: 2020-10-22

## 2020-09-21 RX ORDER — ONDANSETRON 4 MG/1
4 TABLET, ORALLY DISINTEGRATING ORAL EVERY 6 HOURS PRN
Status: DISCONTINUED | OUTPATIENT
Start: 2020-09-21 | End: 2020-09-22 | Stop reason: HOSPADM

## 2020-09-21 RX ORDER — ONDANSETRON 2 MG/ML
4 INJECTION INTRAMUSCULAR; INTRAVENOUS EVERY 6 HOURS PRN
Status: DISCONTINUED | OUTPATIENT
Start: 2020-09-21 | End: 2020-09-22 | Stop reason: HOSPADM

## 2020-09-21 RX ORDER — SODIUM CHLORIDE 9 MG/ML
INJECTION, SOLUTION INTRAVENOUS CONTINUOUS
Status: DISCONTINUED | OUTPATIENT
Start: 2020-09-21 | End: 2020-09-21

## 2020-09-21 RX ORDER — LORAZEPAM 0.5 MG/1
0.25 TABLET ORAL DAILY PRN
Status: DISCONTINUED | OUTPATIENT
Start: 2020-09-21 | End: 2020-09-22 | Stop reason: HOSPADM

## 2020-09-21 RX ORDER — KETOROLAC TROMETHAMINE 15 MG/ML
15 INJECTION, SOLUTION INTRAMUSCULAR; INTRAVENOUS ONCE
Status: COMPLETED | OUTPATIENT
Start: 2020-09-21 | End: 2020-09-21

## 2020-09-21 RX ORDER — AMOXICILLIN 250 MG
2 CAPSULE ORAL 2 TIMES DAILY PRN
Status: DISCONTINUED | OUTPATIENT
Start: 2020-09-21 | End: 2020-09-22 | Stop reason: HOSPADM

## 2020-09-21 RX ORDER — AMOXICILLIN 250 MG
1 CAPSULE ORAL 2 TIMES DAILY PRN
Status: DISCONTINUED | OUTPATIENT
Start: 2020-09-21 | End: 2020-09-22 | Stop reason: HOSPADM

## 2020-09-21 RX ORDER — ACETAMINOPHEN 325 MG/1
650 TABLET ORAL EVERY 4 HOURS PRN
Status: DISCONTINUED | OUTPATIENT
Start: 2020-09-21 | End: 2020-09-22 | Stop reason: HOSPADM

## 2020-09-21 RX ORDER — ACETAMINOPHEN 650 MG/1
650 SUPPOSITORY RECTAL EVERY 4 HOURS PRN
Status: DISCONTINUED | OUTPATIENT
Start: 2020-09-21 | End: 2020-09-22 | Stop reason: HOSPADM

## 2020-09-21 RX ORDER — PROCHLORPERAZINE MALEATE 5 MG
5 TABLET ORAL EVERY 6 HOURS PRN
Status: DISCONTINUED | OUTPATIENT
Start: 2020-09-21 | End: 2020-09-22 | Stop reason: HOSPADM

## 2020-09-21 RX ORDER — ACETAMINOPHEN 500 MG
1000 TABLET ORAL ONCE
Status: DISCONTINUED | OUTPATIENT
Start: 2020-09-21 | End: 2020-09-22 | Stop reason: HOSPADM

## 2020-09-21 RX ORDER — SODIUM CHLORIDE 9 MG/ML
INJECTION, SOLUTION INTRAVENOUS CONTINUOUS
Status: DISCONTINUED | OUTPATIENT
Start: 2020-09-21 | End: 2020-09-22

## 2020-09-21 RX ORDER — ACETAMINOPHEN 500 MG
1000 TABLET ORAL DAILY PRN
COMMUNITY
End: 2021-12-19

## 2020-09-21 RX ORDER — CARVEDILOL 3.12 MG/1
3.12 TABLET ORAL 2 TIMES DAILY WITH MEALS
Status: DISCONTINUED | OUTPATIENT
Start: 2020-09-22 | End: 2020-09-22 | Stop reason: HOSPADM

## 2020-09-21 RX ADMIN — LORAZEPAM 0.5 MG: 2 INJECTION INTRAMUSCULAR; INTRAVENOUS at 14:57

## 2020-09-21 RX ADMIN — ONDANSETRON 4 MG: 2 INJECTION INTRAMUSCULAR; INTRAVENOUS at 13:41

## 2020-09-21 RX ADMIN — ACETAMINOPHEN 650 MG: 325 TABLET, FILM COATED ORAL at 23:11

## 2020-09-21 RX ADMIN — KETOROLAC TROMETHAMINE 15 MG: 15 INJECTION, SOLUTION INTRAMUSCULAR; INTRAVENOUS at 14:57

## 2020-09-21 RX ADMIN — SODIUM CHLORIDE 125 ML/HR: 9 INJECTION, SOLUTION INTRAVENOUS at 14:56

## 2020-09-21 RX ADMIN — ONDANSETRON 4 MG: 2 INJECTION INTRAMUSCULAR; INTRAVENOUS at 14:57

## 2020-09-21 RX ADMIN — ACETAMINOPHEN 650 MG: 325 TABLET, FILM COATED ORAL at 18:37

## 2020-09-21 RX ADMIN — SODIUM CHLORIDE: 9 INJECTION, SOLUTION INTRAVENOUS at 18:03

## 2020-09-21 RX ADMIN — SODIUM CHLORIDE 1000 ML: 9 INJECTION, SOLUTION INTRAVENOUS at 13:41

## 2020-09-21 ASSESSMENT — MIFFLIN-ST. JEOR: SCORE: 1046.02

## 2020-09-21 NOTE — ED TRIAGE NOTES
Pt having bodyaches,, vomiting, rash, headache.  Pt feels like she is having a complication of her lupus.

## 2020-09-21 NOTE — PLAN OF CARE
16:50 Pt arrived to [facility] from ED.  Assisted into bed and oriented to room.  Continue plan of care. OBSERVATION patient IN TIME: 16:50      22:55  Had c/o generalized body aches this shift.  Tylenol given. Alert and oriented.  Ambulates with SBA.  Lungs are clear. 95%RA. No tele. Ileostomy in place. Voiding without difficulty. PIV infusing maintenance fluid per orders. Sodium level 122 on admit.  OBS status.     PRIMARY DIAGNOSIS: ACUTE PAIN  OUTPATIENT/OBSERVATION GOALS TO BE MET BEFORE DISCHARGE:  1. Pain Status: generalized body aches    2. Return to near baseline physical activity: Yes    3. Cleared for discharge by consultants (if involved): {No    Discharge Planner Nurse   Safe discharge environment identified: Yes  Barriers to discharge: Continue to monitor sodium level.  Pain control.        Entered by: Varsha Kelley 09/21/2020 11:00 PM     Please review provider order for any additional goals.   Nurse to notify provider when observation goals have been met and patient is ready for discharge.

## 2020-09-21 NOTE — ED PROVIDER NOTES
History     Chief Complaint:    Generalized Body Aches       HPI  Gely Keene is a 80 year old year old female with a history of lupus who presents for evaluation of  Headache and nausea.    Patient is an 80-year-old female with a history of a lupus-like disorder who follows with a Dr. Tabares in [place] [facility].  Patient was recently increased on her Plaquenil.  Patient is generally felt well she has had chronic numbness or tingling in lower extremities.  The cause of which is unclear he developed a progressively worsening headache as she is felt nauseous and thrown up.  Patient felt worse today and was brought to the emergency room with her daughter who is an  here in the hospital.  No diarrhea no known fever or chills.        Allergies:  Bactrim  Codeine  Metronidazole  Sulfa drugs  Sulfur   Benzodiazepines      Medications:   Coreg  Estradiol  Gabapentin  Plaquenil  Prilosec  Zoloft      Medical History:   Anxiety  Basal cell carcinoma of skin  GERD  Hypertension  Hyperlipidemia  Mumps  Chronic kidney disease, stage 3  Altered bowel elimination due to intestinal ostomy  Restless legs syndrome  Recurrent UTI's  Prediabetes  Psychosocial stressors  Actinic keratosis      Surgical History:  Appendectomy  Cholecystitis  Choledocholithiasis  Colectomy with colostomy, combined  Cystoscopy  Endoscopic retrograde cholangiopancreatogram  EGD  Hysterectomy  Laparoscopic cholecystectomy  Laparotomy exploratory  Phacoemulsification clear cornea with standard intraocular implant, bilateral      Family History:   Breast cancer  Coronary artery disease      Social History:  Smoking Status: Negative  Smokeless Tobacco: Negative   Alcohol Use: Positive   Drug Use: Negative   Primary Physician: Hien Booth         Review of Systems  Positive for headache positive for nausea and vomiting positive for decreased oral intake negative for diarrhea negative urinary symptoms positive for the chills  all the systems negative except as above    Physical Exam     Patient Vitals for the past 24 hrs:   BP Temp Temp src Pulse Resp SpO2 Weight   09/21/20 1200 (!) 142/71 98.2  F (36.8  C) Temporal 64 16 99 % 59.9 kg (132 lb)          Physical Exam  Vitals signs reviewed.   Constitutional:       Comments: Frail and anxious   HENT:      Head: Normocephalic.      Right Ear: Tympanic membrane normal.      Left Ear: Tympanic membrane normal.      Mouth/Throat:      Mouth: Mucous membranes are dry.   Eyes:      General: No scleral icterus.     Pupils: Pupils are equal, round, and reactive to light.   Cardiovascular:      Rate and Rhythm: Normal rate and regular rhythm.   Pulmonary:      Effort: Pulmonary effort is normal.   Abdominal:      General: Abdomen is flat.      Palpations: Abdomen is soft.   Musculoskeletal: Normal range of motion.   Skin:     General: Skin is warm.      Capillary Refill: Capillary refill takes less than 2 seconds.   Neurological:      General: No focal deficit present.      Mental Status: She is alert and oriented to person, place, and time.   Psychiatric:      Comments: Anxious discusses her numbness and or tingling in her feet gets a new problem but states is been going on for 20 years         Emergency Department Course     Imaging:  Radiology results were communicated with the patient who voiced understanding of the findings.    CT-scan Head w/o contrast:  IMPRESSION:   No evidence of acute intracranial hemorrhage, mass, or herniation.      Reading per radiology     Laboratory:  Laboratory findings were communicated with the patient who voiced understanding of the findings.    COVID-19 Virus (Coronavirus), PCR NP Swab: pending      Osmolality: Pending  Sodium random urine: 14  UA with micro: Clear straw urine, o/w negative    Lipase: 160  CRP inflammation: <2.9  Erythrocyte Sed Rate: 15    CBC: WBC 4.8, HGB 12.0,   CMP:  (L), Chloride 91 (L), Glucose 117 (H), GFR 34 (L) (Creatinine  1.41 (H)) o/w WNL       Interventions:   1341 NS 1000 mL IV  1341 Zofran 4 mg IV  1456  mL/hr IV  1457 Zofran 4 mg IV  1457 Toradol 15 mg IV  1457 Ativan 0.5 mg IV    Emergency Department Course:    1236 Nursing notes and vitals reviewed.    1246 I performed an exam of the patient as documented above.     1342 IV was inserted and blood was drawn for laboratory testing, results above.    1503 The patient was sent for CT while in the emergency department, results above.     1456 A urine sample was obtained for laboratory testing as documented above.     A nares sample was obtained for laboratory testing as documented above.    1443 I consulted with Dr. Carbajal of the hospitalist services. They are in agreement to accept the patient for admission. Findings and plan explained to the Patient who consents to admission. Discussed the patient with Dr. Carbajal, who will admit the patient to a Obs bed for further monitoring, evaluation, and treatment.    Impression & Plan       Medical Decision Making:  Patient presents with a multiplicity of complaints including headache and nausea paresthesias not feeling well.  Patient recently increased Plaquenil.  With a GI upset I cannot rule out increases in medication IV hydration and antiemetics were given.  I did consider COVID doubt this without a fever but is in the differential diagnosis.  Therefore COVID symptomatic swab was sent.  Lab work returns with sodium of only 122.  This is sure a possible cause for her not feeling well.  Would recommend admission gentle hydration and reassessment serum osmolarity urine sodium and urine concentration was also sent we will recommend admission and see how she improves with hydration.  Care discussed with the hospitalist will initially admit on observation due to only minimally low sodium with a history of low sodium in the past but now profoundly lower than before if further work-up is required may patient may be moved to inpatient if  not improved after hydration and time.  Did consider lupus cerebritis but due to patient's lack of clear diagnosis of lupus and only a lupus-like illness without seizures and just headache do not feel MRI of the brain is required in the emergency room at this time.    Covid-19  Gely Keene was evaluated during a global COVID-19 pandemic, which necessitated consideration that the patient might be at risk for infection with the SARS-CoV-2 virus that causes COVID-19.   Applicable protocols for evaluation were followed during the patient's care.   COVID-19 was considered as part of the patient's evaluation. The plan for testing is:  a test was obtained during this visit.    Diagnosis:     ICD-10-CM    1. Hyponatremia  E87.1         Disposition:  Admitted to Dr. Carbajal.    Discharge Medications:  New Prescriptions    No medications on file       Scribe Disclosure:  ISimi, am serving as a scribe at 12:39 PM on 9/21/2020 to document services personally performed by Dale Palomino MD based on my observations and the provider's statements to me.     Scribe Disclosure:  Berta PINEDA, am serving as a scribe at 3:04 PM on 9/21/2020 to document services personally performed by Dale Palomino MD based on my observations and the provider's statements to me.         Dale Palomino MD  09/22/20 [id number]

## 2020-09-21 NOTE — H&P
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     ED workup reveals: mildly hypertensive otherwise VSS, sodium of 122, chloride of 91, creatinine of 1.41, negative CRP and SED rate, CBC WNL, urine sodium of 14, and CT of head negative for acute intracranial process.    #Hyponatremia: suspect hypovolemic hyponatremia with sodium of 122, chloride of 91, and mild MITCHELL in setting of nausea with episode of vomiting this past weekend. Patient likely requires increased water intake with her ileostomy in place in order to maintain hydration. Urine sodium of 14 and osmolality of 263 but no FENa ordered. Improvement in symptoms after receiving IVFs in ED.   - continue IVF with NS at 100 ml/hour  - add on FENa  - monitor I&Os  - recheck BMP in AM    #Acute on chronic kidney disease: baseline creatinine of 1.0-1.2 with creatinine of 1.41 today, suspect prerenal  - recheck BMP in AM after IVFs overnight     #COVID PUI, low suspicion: due to presented with increased weakness among other nonspecific symptoms the patient was tested as a symptomatic PUI. Presentation does not appear consistent with COVID as well patient has previously had presenting symptoms with Lupus. No recent known COVID exposure. Would take off precautions and treat as asymptomatic rule out.     #Systemic lupus erythematous: onset of symptoms in 11/2019 which included  substernal chest tightness, peripheral dysesthesias, pressure in head and across face in the malar area, and tendency to develop red arms and feet. She started seeing Dr. Tabares [facility] in 02/2020 where the patient had a strongly positive PAUL. Patient is known to have a B6 toxicity, unsure if this is contributing. 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 Currently taking Plaquenil since 05/2020 without significant improvement and this was double as of 9/18 and the patient started taking on 9/19. Suspect worsening of these symptoms that she has presented today could be exacerbated by her current hyponatremia but possibly her increased Plaquenil dose.   - resume Plaquenil and Gabapentin   - consider contacting Dr. Tabares of rheumatology in AM if no significant improvement in her symptoms with sodium correction     #HTN: stable, continue PTA Carvedilol with parameters    #GERD: continue Omeprazole    #Anxiety: resume PTA Sertraline and PRN Ativan     DVT Prophylaxis: Ambulate every shift  Code Status: Full Code, discussed with patient   Disposition: Expected discharge in 24-48 hours, will admit to observation     Primary Care Physician   Hien Booth    Chief Complaint   Generalized body aches     History obtained from discussion with ED provider, Dr. Palomino, chart review, and interview with patient and daughter Diana at bedside.     History of Present Illness   Gely Keene is a 80 year old female who presents with generalized body aches among other complaints. The patient states that she felt hot yesterday while attempting to sleep from her toes extending to her head. She notes pressure in her head, located in base of head that goes across the front of head, under eyes, and across face. She has been having worsening tinnitus, ear pressure, and decreased hearing in both ears (L>R). The patient  has been having chills, myalgias, nauseated the last two days, and had an episode of emesis on Saturday. Feeling increasing weak since being diagnosed with Lupus. Patient reports paraesthesias that she describes as tingling and hot in the tops of feet into calves and thighs as well extending into her lower back. She has been having a chest pressure sensation along her bra line with extensive outpatient workup prior to today including CT scans as well echocardiogram and lexiscan that were unremarkable. She has been feeling dizzy and lightheaded. Her medication tends to make her feel dizzy. Denies fever, cough, congestion, or loss of taste or smell. She recently had her flu shot on 9/18. She just had her Plaquenil increased by doubling on 9/18, just started taking on Saturday and has not taken any of her medications today. She has been eating and drinking per usual at home. She states that she drinks a lot of water which is out four 16 ounce glasses. No recent known COVID exposure.     Past Medical History    Past Medical History:   Diagnosis Date     Anxiety      BCC (basal cell carcinoma of skin)      Esophageal reflux (GERD) 9/8/2014     HTN (hypertension) 9/8/2014     Hyperlipidemia LDL goal <160 9/8/2014     IFG (impaired fasting glucose) 9/8/2014     Mumps      PONV (postoperative nausea and vomiting)      Past Surgical History   Past Surgical History:   Procedure Laterality Date     APPENDECTOMY  1952     cholecystitis       choledocolithiasis       COLECTOMY WITH COLOSTOMY, COMBINED N/A 4/22/2016    Procedure: COMBINED COLECTOMY WITH COLOSTOMY;  Surgeon: Shana Alaniz MD;  Location:  OR     CYSTOSCOPY       ENDOSCOPIC RETROGRADE CHOLANGIOPANCREATOGRAM N/A 8/15/2017    Procedure: COMBINED ENDOSCOPIC RETROGRADE CHOLANGIOPANCREATOGRAPHY, SPHINCTEROTOMY;  ENDOSCOPIC RETROGRADE CHOLANGIOPANCREATOGRAPHY, SPHINCTEROTOMY. STONE EXTRACTION;  Surgeon: Keturah Bergeron MD;  Location:  OR      ENDOSCOPIC RETROGRADE CHOLANGIOPANCREATOGRAM N/A 8/15/2017    Procedure: COMBINED ENDOSCOPIC RETROGRADE CHOLANGIOPANCREATOGRAPHY, REMOVE STONE/BALLOON;;  Surgeon: Keturah Bergeron MD;  Location:  OR     ESOPHAGOSCOPY, GASTROSCOPY, DUODENOSCOPY (EGD), COMBINED Left 1/2/2020    Procedure: ESOPHAGOGASTRODUODENOSCOPY (fv); random stomach biopsies of polyps using jumbo bx forcep;  Surgeon: Thais Martinez MD;  Location: [facility]     HYSTERECTOMY  1987     LAPAROSCOPIC CHOLECYSTECTOMY  2008     LAPAROTOMY EXPLORATORY N/A 4/22/2016    Procedure: LAPAROTOMY EXPLORATORY;  Surgeon: Shana Alaniz MD;  Location: [facility] OR     PHACOEMULSIFICATION CLEAR CORNEA WITH STANDARD INTRAOCULAR LENS IMPLANT Left 5/9/2017    Procedure: PHACOEMULSIFICATION CLEAR CORNEA WITH STANDARD INTRAOCULAR LENS IMPLANT;  LEFT EYE PHACOEMULSIFICATION CLEAR CORNEA WITH STANDARD INTRAOCULAR LENS IMPLANT ;  Surgeon: Eloy Eric MD;  Location:  [facility]     PHACOEMULSIFICATION CLEAR CORNEA WITH STANDARD INTRAOCULAR LENS IMPLANT Right 5/23/2017    Procedure: PHACOEMULSIFICATION CLEAR CORNEA WITH STANDARD INTRAOCULAR LENS IMPLANT;  RIGHT EYE PHACOEMULSIFICATION CLEAR CORNEA WITH STANDARD INTRAOCULAR LENS IMPLANT ;  Surgeon: Eloy Eric MD;  Location:  [facility]       Prior to Admission Medications   Prior to Admission Medications   Prescriptions Last Dose Informant Patient Reported? Taking?   Estradiol (IMVEXXY VA) 9/20/2020 at hs  Yes Yes   Sig: Place 1 Application vaginally every other day At Bedtime   LORazepam (ATIVAN) 0.5 MG tablet   No Yes   Sig: Take 0.5 tablets (0.25 mg) by mouth daily as needed for anxiety   acetaminophen (TYLENOL) 500 MG tablet   Yes Yes   Sig: Take 1,000 mg by mouth daily as needed for mild pain   carvedilol (COREG) 3.125 MG tablet 9/20/2020 at Unknown time  No Yes   Sig: TAKE 1 TABLET BY MOUTH TWICE A DAY WITH MEALS   gabapentin (NEURONTIN) 300 MG capsule 9/20/2020 at Unknown time  Yes Yes   Sig: Take  300 mg by mouth 2 times daily Dinner, Bedtime   hydroxychloroquine (PLAQUENIL) 200 MG tablet 9/20/2020 at Unknown time  Yes Yes   Sig: Take 200 mg by mouth 2 times daily (with meals)    omeprazole (PRILOSEC) 20 MG DR capsule 9/20/2020 at Unknown time  No Yes   Sig: TAKE 1 CAPSULE BY MOUTH EVERY DAY   ondansetron (ZOFRAN ODT) 4 MG ODT tab   No Yes   Sig: Take 1 tablet (4 mg) by mouth every 8 hours as needed for nausea   sertraline (ZOLOFT) 50 MG tablet 9/20/2020 at Unknown time  No Yes   Sig: Take 1 tablet (50 mg) by mouth daily      Facility-Administered Medications: None     Allergies   Allergies   Allergen Reactions     Bactrim [Sulfamethoxazole W/Trimethoprim] GI Disturbance     Vomiting     Codeine      Metronidazole      GI distubance     Sulfa Drugs      Sulfur        Social History   Social History     Tobacco Use     Smoking status: Never Smoker     Smokeless tobacco: Never Used   Substance Use Topics     Alcohol use: Yes     Frequency: Monthly or less     Drinks per session: 1 or 2     Binge frequency: Never     Comment: socially     Social History     Social History Narrative    Daughter is Diana Keller    Spends 2 months most duong in [place]    Worked for the [employer]     Family History   Family history reviewed with patient and is noncontributory.    Review of Systems   A Comprehensive greater than 10 system review of systems was carried out.  Pertinent positives and negatives are noted above.  Otherwise negative for contributory information.    Physical Exam   Temp: 97.8  F (36.6  C) Temp src: Oral BP: 121/63 Pulse: 72   Resp: 16 SpO2: 94 % O2 Device: None (Room air)    Vital Signs with Ranges  Temp:  [97.8  F (36.6  C)-98.2  F (36.8  C)] 97.8  F (36.6  C)  Pulse:  [64-72] 72  Resp:  [16] 16  BP: (121-153)/(63-72) 121/63  SpO2:  [94 %-99 %] 94 %  139 lbs 8 oz    GEN:  Alert, oriented x 3, appears comfortable, no overt distress  HEENT:  Normocephalic/atraumatic, no scleral icterus, no nasal  discharge, mouth moist.  CV:  Regular rate and rhythm, no murmur or JVD.  S1 + S2 noted, no S3 or S4.  LUNGS:  Clear to auscultation bilaterally without rales/rhonchi/wheezing/retractions.  Symmetric chest rise on inhalation noted.  ABD:  Active bowel sounds, soft, non-tender/non-distended.  No rebound/guarding/rigidity. Ileostomy in place with pink stoma.   EXT:  No edema.  No cyanosis.  No acute joint synovitis noted.  SKIN:  Dry to touch, malar rash present on face  NEURO:  Symmetric muscle strength, sensation to touch grossly intact.  Coordination symmetric on general exam.  No new focal deficits appreciated.    Data   Data reviewed today:  I personally reviewed all labs and imaging from this stay.    Results for orders placed or performed during the hospital encounter of 09/21/20   Head CT w/o contrast     Status: None    Narrative    CT SCAN OF THE HEAD WITHOUT CONTRAST   9/21/2020 3:11 PM     HISTORY: Body aches. Vomiting. Headache.    TECHNIQUE:  Axial images of the head and coronal reformations without  IV contrast material. Radiation dose for this scan was reduced using  automated exposure control, adjustment of the mA and/or kV according  to patient size, or iterative reconstruction technique.    COMPARISON: Head CT 1/22/2020.    FINDINGS: There is no evidence of intracranial hemorrhage, mass, acute  infarct or anomaly. The ventricles are normal in size, shape and  configuration. The brain parenchyma and subarachnoid spaces are  normal.     The visualized portions of the sinuses and mastoids appear normal. The  bony calvarium and bones of the skull base appear intact.       Impression    IMPRESSION:   No evidence of acute intracranial hemorrhage, mass, or  herniation.    MARGO SARGENT MD   CBC with platelets differential     Status: None   Result Value Ref Range    WBC 4.8 4.0 - 11.0 10e9/L    RBC Count 4.03 3.8 - 5.2 10e12/L    Hemoglobin 12.0 11.7 - 15.7 g/dL    Hematocrit 36.2 35.0 - 47.0 %    MCV 90  78 - 100 fl    MCH 29.8 26.5 - 33.0 pg    MCHC 33.1 31.5 - 36.5 g/dL    RDW 12.7 10.0 - 15.0 %    Platelet Count 201 150 - 450 10e9/L    Diff Method Automated Method     % Neutrophils 69.8 %    % Lymphocytes 18.2 %    % Monocytes 10.4 %    % Eosinophils 0.2 %    % Basophils 1.0 %    % Immature Granulocytes 0.4 %    Nucleated RBCs 0 0 /100    Absolute Neutrophil 3.3 1.6 - 8.3 10e9/L    Absolute Lymphocytes 0.9 0.8 - 5.3 10e9/L    Absolute Monocytes 0.5 0.0 - 1.3 10e9/L    Absolute Eosinophils 0.0 0.0 - 0.7 10e9/L    Absolute Basophils 0.1 0.0 - 0.2 10e9/L    Abs Immature Granulocytes 0.0 0 - 0.4 10e9/L    Absolute Nucleated RBC 0.0    Comprehensive metabolic panel     Status: Abnormal   Result Value Ref Range    Sodium 122 (L) 133 - 144 mmol/L    Potassium 3.8 3.4 - 5.3 mmol/L    Chloride 91 (L) 94 - 109 mmol/L    Carbon Dioxide 25 20 - 32 mmol/L    Anion Gap 6 3 - 14 mmol/L    Glucose 117 (H) 70 - 99 mg/dL    Urea Nitrogen 13 7 - 30 mg/dL    Creatinine 1.41 (H) 0.52 - 1.04 mg/dL    GFR Estimate 34 (L) >60 mL/min/[1.73_m2]    GFR Estimate If Black 40 (L) >60 mL/min/[1.73_m2]    Calcium 8.8 8.5 - 10.1 mg/dL    Bilirubin Total 0.9 0.2 - 1.3 mg/dL    Albumin 3.5 3.4 - 5.0 g/dL    Protein Total 7.0 6.8 - 8.8 g/dL    Alkaline Phosphatase 76 40 - 150 U/L    ALT 20 0 - 50 U/L    AST 25 0 - 45 U/L   Lipase     Status: None   Result Value Ref Range    Lipase 160 73 - 393 U/L   CRP inflammation     Status: None   Result Value Ref Range    CRP Inflammation <2.9 0.0 - 8.0 mg/L   Erythrocyte sedimentation rate auto     Status: None   Result Value Ref Range    Sed Rate 15 0 - 30 mm/h   Osmolality     Status: Abnormal   Result Value Ref Range    Osmolality 263 (L) 280 - 301 mmol/kg   UA reflex to Microscopic     Status: None   Result Value Ref Range    Color Urine Straw     Appearance Urine Clear     Glucose Urine Negative NEG^Negative mg/dL    Bilirubin Urine Negative NEG^Negative    Ketones Urine Negative NEG^Negative mg/dL     Specific Gravity Urine 1.004 1.003 - 1.035    Blood Urine Negative NEG^Negative    pH Urine 5.0 5.0 - 7.0 pH    Protein Albumin Urine Negative NEG^Negative mg/dL    Urobilinogen mg/dL Normal 0.0 - 2.0 mg/dL    Nitrite Urine Negative NEG^Negative    Leukocyte Esterase Urine Negative NEG^Negative    Source Midstream Urine    Sodium random urine     Status: None   Result Value Ref Range    Sodium Urine mmol/L 14 mmol/L   Symptomatic COVID-19 Virus (Coronavirus) by PCR     Status: None    Specimen: Nasopharyngeal   Result Value Ref Range    COVID-19 Virus PCR to [facility] - Source Nasopharyngeal     COVID-19 Virus PCR to [facility] - Result       Test received-See reflex to [facility] test SARS CoV2 (COVID-19) Virus RT-PCR       Gloria Ceballos PA-C

## 2020-09-21 NOTE — ED NOTES
[facility]  ED Nurse Handoff Report    Gely Keene is a 80 year old female   ED Chief complaint: Generalized Body Aches  . ED Diagnosis:   Final diagnoses:   None     Allergies:   Allergies   Allergen Reactions     Bactrim [Sulfamethoxazole W/Trimethoprim] GI Disturbance     Vomiting     Codeine      Metronidazole      GI distubance     Sulfa Drugs      Sulfur        Code Status: Full Code  Activity level - Baseline/Home:  Independent. Activity Level - Current:   Stand by Assist. Lift room needed: No. Bariatric: No   Needed: No   Isolation: No. Infection: Not Applicable.     Vital Signs:   Vitals:    09/21/20 1200   BP: (!) 142/71   Pulse: 64   Resp: 16   Temp: 98.2  F (36.8  C)   TempSrc: Temporal   SpO2: 99%   Weight: 59.9 kg (132 lb)       Cardiac Rhythm:  ,      Pain level: 0-10 Pain Scale: 8  Patient confused: No. Patient Falls Risk: Yes.   Elimination Status: Has voided   Patient Report - Initial Complaint: generalized body aches Focused Assessment:Pt having bodyaches,, vomiting, rash, headache.  Pt feels like she is having a complication of her lupus.     Tests Performed: labs, imaging Abnormal Results:   Labs Ordered and Resulted from Time of ED Arrival Up to the Time of Departure from the ED   COMPREHENSIVE METABOLIC PANEL - Abnormal; Notable for the following components:       Result Value    Sodium 122 (*)     Chloride 91 (*)     Glucose 117 (*)     Creatinine 1.41 (*)     GFR Estimate 34 (*)     GFR Estimate If Black 40 (*)     All other components within normal limits   CBC WITH PLATELETS DIFFERENTIAL   LIPASE   CRP INFLAMMATION   ERYTHROCYTE SEDIMENTATION RATE AUTO   OSMOLALITY   URINE MACROSCOPIC WITH REFLEX TO MICRO   SODIUM RANDOM URINE   COVID-19 VIRUS (CORONAVIRUS) BY PCR   PERIPHERAL IV CATHETER        Treatments provided: meds, monitoring  Family Comments: daughter at bedside  OBS brochure/video discussed/provided to patient:  N/A  ED Medications:    Medications   0.9% sodium chloride BOLUS (0 mLs Intravenous Stopped 9/21/20 1441)     Followed by   sodium chloride 0.9% infusion (125 mL/hr Intravenous New Bag 9/21/20 1456)   ondansetron (ZOFRAN) injection 4 mg (4 mg Intravenous Given 9/21/20 1457)   ketorolac (TORADOL) injection 15 mg (15 mg Intravenous Given 9/21/20 1457)   LORazepam (ATIVAN) injection 0.5 mg (0.5 mg Intravenous Given 9/21/20 1457)     Drips infusing:  No  For the majority of the shift, the patient's behavior Green. Interventions performed were n/a.    Sepsis treatment initiated: No     Patient tested for COVID 19 prior to admission: YES    ED Nurse Name/Phone Number: Nancy Aparicio RN,   3:32 PM  RECEIVING UNIT ED HANDOFF REVIEW    Above ED Nurse Handoff Report was reviewed:Yes  Reviewed by: Varsha Kelley RN on September 21, 2020 at 4:17 PM

## 2020-09-21 NOTE — PHARMACY-ADMISSION MEDICATION HISTORY
Admission medication history interview status for this patient is complete. See [facility] admission navigator for allergy information, prior to admission medications and immunization status.     Medication history interview done via telephone during Covid-19 pandemic, indicate source(s): Patient  Medication history resources (including written lists, pill bottles, clinic record):EPIC    Changes made to PTA medication list:  Added: none  Deleted: Cyclobenazprine prn  Changed:     Gabapentin - from TID to BID    Hydroxychloroquine - from daily to BID        Tylenol prn    Medication reconciliation/reorder completed by provider prior to medication history?  N      Prior to Admission medications    Medication Sig Last Dose Taking? Auth Provider   acetaminophen (TYLENOL) 500 MG tablet Take 1,000 mg by mouth daily as needed for mild pain  Yes Unknown, Entered By History   carvedilol (COREG) 3.125 MG tablet TAKE 1 TABLET BY MOUTH TWICE A DAY WITH MEALS 9/20/2020 at Unknown time Yes Hien Booth MD   Estradiol (IMVEXXY VA) Place 1 Application vaginally every other day At Bedtime 9/20/2020 at hs Yes Reported, Patient   gabapentin (NEURONTIN) 300 MG capsule Take 300 mg by mouth 2 times daily Dinner, Bedtime 9/20/2020 at Unknown time Yes Unknown, Entered By History   hydroxychloroquine (PLAQUENIL) 200 MG tablet Take 200 mg by mouth 2 times daily (with meals)  9/20/2020 at Unknown time Yes Hien Botoh MD   LORazepam (ATIVAN) 0.5 MG tablet Take 0.5 tablets (0.25 mg) by mouth daily as needed for anxiety  Yes Hien Booth MD   omeprazole (PRILOSEC) 20 MG DR capsule TAKE 1 CAPSULE BY MOUTH EVERY DAY 9/20/2020 at Unknown time Yes Hien Booth MD   ondansetron (ZOFRAN ODT) 4 MG ODT tab Take 1 tablet (4 mg) by mouth every 8 hours as needed for nausea  Yes Jerrod Regalado MD   sertraline (ZOLOFT) 50 MG tablet Take 1 tablet (50 mg) by mouth daily 9/20/2020 at Unknown time Yes Hien Booth MD

## 2020-09-22 ENCOUNTER — APPOINTMENT (OUTPATIENT)
Dept: GENERAL RADIOLOGY | Facility: CLINIC | Age: 80
End: 2020-09-22
Attending: INTERNAL MEDICINE
Payer: MEDICARE

## 2020-09-22 VITALS
DIASTOLIC BLOOD PRESSURE: 60 MMHG | SYSTOLIC BLOOD PRESSURE: 134 MMHG | BODY MASS INDEX: 25.67 KG/M2 | HEIGHT: 62 IN | TEMPERATURE: 97.7 F | WEIGHT: 139.5 LBS | RESPIRATION RATE: 18 BRPM | HEART RATE: 69 BPM | OXYGEN SATURATION: 98 %

## 2020-09-22 LAB
ANION GAP SERPL CALCULATED.3IONS-SCNC: 8 MMOL/L (ref 3–14)
BASOPHILS # BLD AUTO: 0.1 10E9/L (ref 0–0.2)
BASOPHILS NFR BLD AUTO: 1.6 %
BUN SERPL-MCNC: 13 MG/DL (ref 7–30)
CALCIUM SERPL-MCNC: 7.5 MG/DL (ref 8.5–10.1)
CHLORIDE SERPL-SCNC: 104 MMOL/L (ref 94–109)
CO2 SERPL-SCNC: 21 MMOL/L (ref 20–32)
CREAT SERPL-MCNC: 1.38 MG/DL (ref 0.52–1.04)
DIFFERENTIAL METHOD BLD: ABNORMAL
EOSINOPHIL # BLD AUTO: 0.1 10E9/L (ref 0–0.7)
EOSINOPHIL NFR BLD AUTO: 1.6 %
ERYTHROCYTE [DISTWIDTH] IN BLOOD BY AUTOMATED COUNT: 13.1 % (ref 10–15)
GFR SERPL CREATININE-BSD FRML MDRD: 35 ML/MIN/{1.73_M2}
GLUCOSE SERPL-MCNC: 95 MG/DL (ref 70–99)
HCT VFR BLD AUTO: 29.1 % (ref 35–47)
HGB BLD-MCNC: 9.8 G/DL (ref 11.7–15.7)
IMM GRANULOCYTES # BLD: 0 10E9/L (ref 0–0.4)
IMM GRANULOCYTES NFR BLD: 0.2 %
LABORATORY COMMENT REPORT: NORMAL
LYMPHOCYTES # BLD AUTO: 1.5 10E9/L (ref 0.8–5.3)
LYMPHOCYTES NFR BLD AUTO: 32.5 %
MCH RBC QN AUTO: 30.4 PG (ref 26.5–33)
MCHC RBC AUTO-ENTMCNC: 33.7 G/DL (ref 31.5–36.5)
MCV RBC AUTO: 90 FL (ref 78–100)
MONOCYTES # BLD AUTO: 0.7 10E9/L (ref 0–1.3)
MONOCYTES NFR BLD AUTO: 14.8 %
NEUTROPHILS # BLD AUTO: 2.2 10E9/L (ref 1.6–8.3)
NEUTROPHILS NFR BLD AUTO: 49.3 %
NRBC # BLD AUTO: 0 10*3/UL
NRBC BLD AUTO-RTO: 0 /100
PLATELET # BLD AUTO: 169 10E9/L (ref 150–450)
POTASSIUM SERPL-SCNC: 3.6 MMOL/L (ref 3.4–5.3)
RBC # BLD AUTO: 3.22 10E12/L (ref 3.8–5.2)
SARS-COV-2 RNA SPEC QL NAA+PROBE: NEGATIVE
SODIUM SERPL-SCNC: 133 MMOL/L (ref 133–144)
SPECIMEN SOURCE: NORMAL
WBC # BLD AUTO: 4.5 10E9/L (ref 4–11)

## 2020-09-22 PROCEDURE — 25000132 ZZH RX MED GY IP 250 OP 250 PS 637: Mod: GY | Performed by: PHYSICIAN ASSISTANT

## 2020-09-22 PROCEDURE — G0378 HOSPITAL OBSERVATION PER HR: HCPCS

## 2020-09-22 PROCEDURE — 71045 X-RAY EXAM CHEST 1 VIEW: CPT

## 2020-09-22 PROCEDURE — 25000128 H RX IP 250 OP 636: Performed by: PHYSICIAN ASSISTANT

## 2020-09-22 PROCEDURE — 84295 ASSAY OF SERUM SODIUM: CPT | Performed by: INTERNAL MEDICINE

## 2020-09-22 PROCEDURE — 85025 COMPLETE CBC W/AUTO DIFF WBC: CPT | Performed by: PHYSICIAN ASSISTANT

## 2020-09-22 PROCEDURE — 99217 ZZC OBSERVATION CARE DISCHARGE: CPT | Performed by: INTERNAL MEDICINE

## 2020-09-22 PROCEDURE — 80048 BASIC METABOLIC PNL TOTAL CA: CPT | Performed by: PHYSICIAN ASSISTANT

## 2020-09-22 PROCEDURE — 74018 RADEX ABDOMEN 1 VIEW: CPT

## 2020-09-22 PROCEDURE — 25800030 ZZH RX IP 258 OP 636: Performed by: PHYSICIAN ASSISTANT

## 2020-09-22 PROCEDURE — 36415 COLL VENOUS BLD VENIPUNCTURE: CPT | Performed by: PHYSICIAN ASSISTANT

## 2020-09-22 RX ORDER — PROCHLORPERAZINE MALEATE 5 MG
5 TABLET ORAL EVERY 8 HOURS PRN
Qty: 30 TABLET | Refills: 0 | Status: SHIPPED | OUTPATIENT
Start: 2020-09-22 | End: 2021-03-01

## 2020-09-22 RX ADMIN — OMEPRAZOLE 20 MG: 20 CAPSULE, DELAYED RELEASE ORAL at 07:05

## 2020-09-22 RX ADMIN — CARVEDILOL 3.12 MG: 3.12 TABLET, FILM COATED ORAL at 10:07

## 2020-09-22 RX ADMIN — SERTRALINE HYDROCHLORIDE 50 MG: 50 TABLET ORAL at 10:07

## 2020-09-22 RX ADMIN — SODIUM CHLORIDE: 9 INJECTION, SOLUTION INTRAVENOUS at 01:48

## 2020-09-22 RX ADMIN — ONDANSETRON 4 MG: 4 TABLET, ORALLY DISINTEGRATING ORAL at 16:38

## 2020-09-22 RX ADMIN — HYDROXYCHLOROQUINE SULFATE 200 MG: 200 TABLET, FILM COATED ORAL at 10:07

## 2020-09-22 NOTE — DISCHARGE SUMMARY
[facility]  Hospitalist Discharge Summary      Date of Admission:  9/21/2020  Date of Discharge:  9/22/2020  Discharging Provider: Shoaib Preston DO      Discharge Diagnoses   1.  Hyponatremia.  2.  Nausea.  3.  Acute kidney injury on chronic kidney disease stage III.  4.  GERD.  5.  Systemic lupus erythematosus.    Follow-ups Needed After Discharge   Follow-up Appointments     Follow-up and recommended labs and tests       Follow up with primary care provider, Hien Booth, within 7 days   for hospital follow- up.  The following labs/tests are recommended: BMP in   7 days.             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  She had initially been found to have a sodium level of 122.  She was placed in the hospital for IV fluids due to suspected dehydration.  Sodium level did improve to 133 on repeat.  IV fluids have been stopped.  She did continue having nausea.  Nausea is much improved by time of discharge, though still present.  Antiemetics have been keeping symptoms controlled.  Continue use antiemetics in the outpatient setting as needed.  Recheck metabolic panel in 1 week with primary care physician.  Does have chronic kidney disease.  Creatinine was slightly above her usual baseline at 1.4.  Avoid nephrotoxins as able.    Consultations This Hospital Stay   None    Code Status   Full Code    Time Spent on this Encounter   I spent 40 minutes with Ms. Keene and working on discharge on 9/22/2020.       Shoaib Preston DO  [facility]  Hospital  ______________________________________________________________________    Physical Exam   Vital Signs: Temp: 97.7  F (36.5  C) Temp src: Oral BP: 134/60 Pulse: 69   Resp: 18 SpO2: 98 % O2 Device: None (Room air)    Weight: 139 lbs 8 oz  Gen:  NAD, A&Ox3.  Eyes:  PERRL, sclera anicteric.  OP:  MMM, no lesions.  Neck:  Supple.  CV:  Regular, no murmurs.  Lung:  CTA b/l, normal effort.  Ab:  +BS, soft.  Skin:  Warm, dry to touch.  No rash.  Ext:  No pitting edema LE b/l.         Primary Care Physician   Hien Booth    Discharge Orders      Reason for your hospital stay    Hyponatremia (low sodium level).     Follow-up and recommended labs and tests     Follow up with primary care provider, Hien Booth, within 7 days for hospital follow- up.  The following labs/tests are recommended: BMP in 7 days.     Activity    Your activity upon discharge: activity as tolerated     Full Code     Diet    Follow this diet upon discharge: Regular         Discharge Medications   Current Discharge Medication List      START taking these medications    Details   prochlorperazine (COMPAZINE) 5 MG tablet Take 1 tablet (5 mg) by mouth every 8 hours as needed for nausea or vomiting  Qty: 30 tablet, Refills: 0    Associated Diagnoses: Hyponatremia         CONTINUE these medications which have NOT CHANGED    Details   acetaminophen (TYLENOL) 500 MG tablet Take 1,000 mg by mouth daily as needed for mild pain      carvedilol (COREG) 3.125 MG tablet TAKE 1 TABLET BY MOUTH TWICE A DAY WITH MEALS  Qty: 180 tablet, Refills: 1    Associated Diagnoses: Essential hypertension      Estradiol (IMVEXXY VA) Place 1 Application vaginally every other day At Bedtime      gabapentin (NEURONTIN) 300 MG capsule Take 300 mg by mouth 2 times daily Dinner, Bedtime      hydroxychloroquine (PLAQUENIL) 200 MG tablet Take 200 mg by mouth 2 times daily (with meals)   Qty:      Comments: Rheum started      LORazepam (ATIVAN) 0.5 MG tablet Take  0.5 tablets (0.25 mg) by mouth daily as needed for anxiety  Qty: 10 tablet, Refills: 0    Associated Diagnoses: MARK (generalized anxiety disorder)      omeprazole (PRILOSEC) 20 MG DR capsule TAKE 1 CAPSULE BY MOUTH EVERY DAY  Qty: 90 capsule, Refills: 3    Associated Diagnoses: Gastroesophageal reflux disease, esophagitis presence not specified      ondansetron (ZOFRAN ODT) 4 MG ODT tab Take 1 tablet (4 mg) by mouth every 8 hours as needed for nausea  Qty: 30 tablet, Refills: 1    Associated Diagnoses: Abdominal pain, generalized      sertraline (ZOLOFT) 50 MG tablet Take 1 tablet (50 mg) by mouth daily  Qty: 30 tablet, Refills: 5    Associated Diagnoses: MARK (generalized anxiety disorder)           Allergies   Allergies   Allergen Reactions     Bactrim [Sulfamethoxazole W/Trimethoprim] GI Disturbance     Vomiting     Codeine      Metronidazole      GI distubance     Sulfa Drugs      Sulfur

## 2020-09-22 NOTE — PLAN OF CARE
"PRIMARY DIAGNOSIS: \"GENERIC\" NURSING  OUTPATIENT/OBSERVATION GOALS TO BE MET BEFORE DISCHARGE:  1. ADLs back to baseline: No    2. Activity and level of assistance: Up with standby assistance.    3. Pain status: Pain free.    4. Return to near baseline physical activity: No     Discharge Planner Nurse   Safe discharge environment identified: No  Barriers to discharge: Yes       Entered by: Liliam Morrell 09/22/2020 4:04 AM     Please review provider order for any additional goals.   Nurse to notify provider when observation goals have been met and patient is ready for discharge.    End of Shift Note:  For VS and complete assessments, please see documentation flowsheets.    COVID status: Pending     Pertinent shift assessments: VSS. AxOx4. Reported HA 4/10 pain, relieved with PRN tylenol. SBA. Regular diet. Denies nausea. Ileostomy pouch output 100 mL. Voiding without difficulty.     Treatment Plan: Will continue with plan of care.          "

## 2020-09-22 NOTE — PLAN OF CARE
Patient adequate for discharge. IV removed. VSS. Patient A&O. Daughter transporting patient home. All medications and information about follow up appointments addressed with patient. All questions answered.

## 2020-09-22 NOTE — PLAN OF CARE
Still feels tired.  Motivated to get into chair this am and did walk in [place].  Napping this afternoon.  Ate well for breakfast. Soduim 133 and ivf stopped this am. Plan for dischg tomorrow

## 2020-09-22 NOTE — PLAN OF CARE
"PRIMARY DIAGNOSIS: \"GENERIC\" NURSING  OUTPATIENT/OBSERVATION GOALS TO BE MET BEFORE DISCHARGE:  ADLs back to baseline: No    Activity and level of assistance: Up with standby assistance.    Pain status: Improved-controlled with oral pain medications.    Return to near baseline physical activity: No     Discharge Planner Nurse   Safe discharge environment identified: No  Barriers to discharge: Yes       Entered by: Liliam Morrell 09/22/2020 3:42 AM     Please review provider order for any additional goals.   Nurse to notify provider when observation goals have been met and patient is ready for discharge.  "

## 2020-09-23 ENCOUNTER — TELEPHONE (OUTPATIENT)
Dept: PEDIATRICS | Facility: CLINIC | Age: 80
End: 2020-09-23

## 2020-09-23 NOTE — TELEPHONE ENCOUNTER
Please contact patient for In-patient follow up.  [phone] (home)     Visit date: 092220  Diagnosis listed: Hyponatremia  Number of visits in past 12 months: 0 ED / 0 IP

## 2020-09-23 NOTE — TELEPHONE ENCOUNTER
"    Hospital/TCU/ED for chronic condition Discharge Protocol    \"Hi, my name is Ashely Solis RN, a registered nurse, and I am calling from [facility].  I am calling to follow up and see how things are going for you after your recent emergency visit/hospital/TCU stay.\"    Tell me how you are doing now that you are home?\" Doing ok. Having some lower extremity swelling.   Advised on elevation and wearing her compression stockings.       Discharge Instructions    \"Let's review your discharge instructions.  What is/are the follow-up recommendations?  Pt. Response: w/ PCP next week and update BMP.     \"Has an appointment with your primary care provider been scheduled?\"   Will huddle for an appt. Patient declines appointment with another Provider.     \"When you see the provider, I would recommend that you bring your medications with you.\"    Medications    \"Tell me what changed about your medicines when you discharged?\"    Changes to chronic meds?    0-1    \"What questions do you have about your medications?\"    None     New diagnoses of heart failure, COPD, diabetes, or MI?    No              Post Discharge Medication Reconciliation Status: discharge medications reconciled, continue medications without change.    Was MTM referral placed (*Make sure to put transitions as reason for referral)?   No    Call Summary    \"What questions or concerns do you have about your recent visit and your follow-up care?\"     none    \"If you have questions or things don't continue to improve, we encourage you contact us through the main clinic number (give number).  Even if the clinic is not open, triage nurses are available 24/7 to help you.     We would like you to know that our clinic has extended hours (provide information).  We also have urgent care (provide details on closest location and hours/contact info)\"      \"Thank you for your time and take care!\"             "

## 2020-09-28 NOTE — TELEPHONE ENCOUNTER
Scheduled lab appointment on 9/29 & office visit with Dr. Booth on 10/5.     Patient agrees with plan.

## 2020-09-29 DIAGNOSIS — I10 HYPERTENSION GOAL BP (BLOOD PRESSURE) < 140/90: ICD-10-CM

## 2020-09-29 DIAGNOSIS — N18.30 CHRONIC KIDNEY DISEASE, STAGE III (MODERATE) (H): ICD-10-CM

## 2020-09-29 DIAGNOSIS — Z00.00 MEDICARE ANNUAL WELLNESS VISIT, SUBSEQUENT: ICD-10-CM

## 2020-09-29 DIAGNOSIS — R73.01 IFG (IMPAIRED FASTING GLUCOSE): ICD-10-CM

## 2020-09-29 DIAGNOSIS — E78.5 HYPERLIPIDEMIA LDL GOAL <160: ICD-10-CM

## 2020-09-29 LAB
ERYTHROCYTE [DISTWIDTH] IN BLOOD BY AUTOMATED COUNT: 13.7 % (ref 10–15)
HBA1C MFR BLD: 5.3 % (ref 0–5.6)
HCT VFR BLD AUTO: 33.4 % (ref 35–47)
HGB BLD-MCNC: 10.9 G/DL (ref 11.7–15.7)
MCH RBC QN AUTO: 29.8 PG (ref 26.5–33)
MCHC RBC AUTO-ENTMCNC: 32.6 G/DL (ref 31.5–36.5)
MCV RBC AUTO: 91 FL (ref 78–100)
PLATELET # BLD AUTO: 211 10E9/L (ref 150–450)
RBC # BLD AUTO: 3.66 10E12/L (ref 3.8–5.2)
WBC # BLD AUTO: 5.3 10E9/L (ref 4–11)

## 2020-09-29 PROCEDURE — 82043 UR ALBUMIN QUANTITATIVE: CPT | Performed by: PEDIATRICS

## 2020-09-29 PROCEDURE — 80061 LIPID PANEL: CPT | Performed by: PEDIATRICS

## 2020-09-29 PROCEDURE — 85027 COMPLETE CBC AUTOMATED: CPT | Performed by: PEDIATRICS

## 2020-09-29 PROCEDURE — 83036 HEMOGLOBIN GLYCOSYLATED A1C: CPT | Performed by: PEDIATRICS

## 2020-09-29 PROCEDURE — 36415 COLL VENOUS BLD VENIPUNCTURE: CPT | Performed by: PEDIATRICS

## 2020-09-29 PROCEDURE — 80053 COMPREHEN METABOLIC PANEL: CPT | Performed by: PEDIATRICS

## 2020-09-30 LAB
ALBUMIN SERPL-MCNC: 3.4 G/DL (ref 3.4–5)
ALP SERPL-CCNC: 85 U/L (ref 40–150)
ALT SERPL W P-5'-P-CCNC: 37 U/L (ref 0–50)
ANION GAP SERPL CALCULATED.3IONS-SCNC: 5 MMOL/L (ref 3–14)
AST SERPL W P-5'-P-CCNC: 28 U/L (ref 0–45)
BILIRUB SERPL-MCNC: 0.4 MG/DL (ref 0.2–1.3)
BUN SERPL-MCNC: 30 MG/DL (ref 7–30)
CALCIUM SERPL-MCNC: 8.7 MG/DL (ref 8.5–10.1)
CHLORIDE SERPL-SCNC: 105 MMOL/L (ref 94–109)
CHOLEST SERPL-MCNC: 246 MG/DL
CO2 SERPL-SCNC: 23 MMOL/L (ref 20–32)
CREAT SERPL-MCNC: 1.62 MG/DL (ref 0.52–1.04)
CREAT UR-MCNC: 48 MG/DL
GFR SERPL CREATININE-BSD FRML MDRD: 29 ML/MIN/{1.73_M2}
GLUCOSE SERPL-MCNC: 92 MG/DL (ref 70–99)
HDLC SERPL-MCNC: 68 MG/DL
LDLC SERPL CALC-MCNC: 145 MG/DL
MICROALBUMIN UR-MCNC: <5 MG/L
MICROALBUMIN/CREAT UR: NORMAL MG/G CR (ref 0–25)
NONHDLC SERPL-MCNC: 178 MG/DL
POTASSIUM SERPL-SCNC: 4.8 MMOL/L (ref 3.4–5.3)
PROT SERPL-MCNC: 7 G/DL (ref 6.8–8.8)
SODIUM SERPL-SCNC: 133 MMOL/L (ref 133–144)
TRIGL SERPL-MCNC: 166 MG/DL

## 2020-10-01 ENCOUNTER — TRANSFERRED RECORDS (OUTPATIENT)
Dept: HEALTH INFORMATION MANAGEMENT | Facility: CLINIC | Age: 80
End: 2020-10-01

## 2020-10-05 ENCOUNTER — OFFICE VISIT (OUTPATIENT)
Dept: PEDIATRICS | Facility: CLINIC | Age: 80
End: 2020-10-05
Payer: MEDICARE

## 2020-10-05 VITALS
RESPIRATION RATE: 16 BRPM | TEMPERATURE: 97.6 F | BODY MASS INDEX: 24.69 KG/M2 | DIASTOLIC BLOOD PRESSURE: 64 MMHG | WEIGHT: 135 LBS | SYSTOLIC BLOOD PRESSURE: 102 MMHG | HEART RATE: 73 BPM | OXYGEN SATURATION: 99 %

## 2020-10-05 DIAGNOSIS — M32.9 SYSTEMIC LUPUS ERYTHEMATOSUS, UNSPECIFIED SLE TYPE, UNSPECIFIED ORGAN INVOLVEMENT STATUS (H): ICD-10-CM

## 2020-10-05 DIAGNOSIS — E87.1 HYPONATREMIA: Primary | ICD-10-CM

## 2020-10-05 DIAGNOSIS — Z90.49 S/P TOTAL COLECTOMY: ICD-10-CM

## 2020-10-05 DIAGNOSIS — I10 HYPERTENSION GOAL BP (BLOOD PRESSURE) < 140/90: ICD-10-CM

## 2020-10-05 DIAGNOSIS — N18.31 STAGE 3A CHRONIC KIDNEY DISEASE (H): ICD-10-CM

## 2020-10-05 DIAGNOSIS — N64.4 BREAST PAIN: ICD-10-CM

## 2020-10-05 DIAGNOSIS — K94.19 ALTERED BOWEL ELIMINATION DUE TO INTESTINAL OSTOMY (H): ICD-10-CM

## 2020-10-05 DIAGNOSIS — R20.2 PARESTHESIAS: ICD-10-CM

## 2020-10-05 PROCEDURE — 99495 TRANSJ CARE MGMT MOD F2F 14D: CPT | Performed by: PEDIATRICS

## 2020-10-05 NOTE — PROGRESS NOTES
Subjective     Gely Keene is a 80 year old female who presents to clinic today for the following health issues:    \[facility]\""           Hospital Follow-up Visit:    Hospital/Nursing Home/IP Rehab Facility: [facility]  Date of Admission: 9/21/20  Date of Discharge: 9/22/20  Reason(s) for Admission: hyponatremia      Was your hospitalization related to COVID-19? No   Problems taking medications regularly:  None  Medication changes since discharge: None  Problems adhering to non-medication therapy:  None    Summary of hospitalization:  [facility] discharge summary reviewed  Diagnostic Tests/Treatments reviewed.  Follow up needed: BMP completed  Other Healthcare Providers Involved in Patient s Care:         None  Update since discharge: improved.   Post Discharge Medication Reconciliation: discharge medications reconciled and changed, per note/orders.  Plan of care communicated with patient          Day after the flu shot, get very ill - nausea/vomiting, went to the ER.  Feeling better now.    Sodium was low 122 - improved with IVF and corrected quickly - stayed normal on recheck after hospital stay    Admitted to hospital.  COVID negative.      Following with Dr Tabares for SLE - next visit in November.  Not sure if plaquenil is helping and she is getting side effects - hair loss, skin sensitivity.  She has been trying to get an eye exam, but it is not covered with her insurance.    Having a lot of neck and head pain, Still getting tingling and hot sensation throughout entire body at night.  Still has tight sensation around entire back.    Extensive work up to date.      Saw Dr Negrete in ENT - nose, throat, ear exam.   Had audiology visit, hearing down.  Recommended considering stopping plaquenil.         Following with Dr Kaplan for UTIs started on imvexi, didn't work.  Switched to estrogen cream every other night.  Improving.    Stopped all supplements, hx of B6 toxicity - ? If this is  related to her past symptoms    Transitioned to omeprazole every other day - good control    Gabapentin - takes after dinner and before bed.  Has a hard time getting up in the morning and had one episode of getting lost.    Breast symptoms x 1 month - generalized pain in both breasts and nipples.  Daughter with hx of breast cancer.  No skin changes.    Ostomy functioning well.    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  Remaining off statin for now    Anxiety - doing well on sertraline    Patient with ileostomy after subtotal colectomy for severe C diff colitis infection - addended for [employer] requests for supply orders 06/10/21      Review of Systems   Constitutional, HEENT, cardiovascular, pulmonary, gi and gu systems are negative, except as otherwise noted.      Objective    /64 (Cuff Size: Adult Regular)   Pulse 73   Temp 97.6  F (36.4  C) (Tympanic)   Resp 16   Wt 61.2 kg (135 lb)   SpO2 99%   BMI 24.69 kg/m    Body mass index is 24.69 kg/m .   Wt Readings from Last 4 Encounters:   10/05/20 61.2 kg (135 lb)   09/21/20 63.3 kg (139 lb 8 oz)   06/20/20 63.5 kg (140 lb)   02/06/20 63.5 kg (140 lb)       Physical Exam   GENERAL: healthy, alert and no distress  EYES: Eyes grossly normal to inspection, PERRL and conjunctivae and sclerae normal  HENT: normal cephalic/atraumatic, nose and mouth without ulcers or lesions, oropharynx clear and oral mucous membranes moist  NECK: no adenopathy, no asymmetry, masses, or scars and thyroid normal to palpation  RESP: lungs clear to auscultation - no rales, rhonchi or wheezes  BREAST: no masses or skin changes, diffuse breast and nipple tenderness, no nipple discharge, no axillary LAD  CV: regular rate and rhythm, normal S1 S2, no S3 or S4, no murmur, click or rub, no peripheral edema and peripheral pulses strong  ABDOMEN: soft, nontender, without hepatosplenomegaly or masses, bowel sounds  "normal and healthy pink ostomy, liquid stool in bag  MS: no gross musculoskeletal defects noted, no edema  PSYCH: mentation appears normal, affect normal/bright    Results reviewed in Epic        Assessment & Plan       ICD-10-CM    1. Hyponatremia  E87.1    2. Hypertension goal BP (blood pressure) < 140/90  I10    3. Paresthesias  R20.2    4. S/p total colectomy on 16 by Shana Alaniz MD  Z90.49    5. Stage 3a chronic kidney disease  N18.31    6. Systemic lupus erythematosus, unspecified SLE type, unspecified organ involvement status (H)  M32.9    7. 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)  K94.19    8. Breast pain  N64.4 MA Diagnostic Digital Bilateral     US Breast Bilateral Complete 4 Quadrants     Patient Instructions   Your sodium is back to normal    Keep up your sertraline at current dosing    Thank you for getting Ensure and gatorade - keep this up    You have to keep eating!  Think about high protein snacks.      Keep moving.    Drop one dose of gabapentin to see if this will help with not feeling so tired in the morning and will help keep your thinking sharp.   I recommend dropping your nighttime dose.   Send me an update with how this is going in a few weeks.  Let me know sooner if you have trouble with this.    OK to stay off atorvastatin for now    Keep following with Dr Tabares about the hydroxychloroquine - ask him about the letter for eye exam coverage and if this is difficult, let me know and I can do it.    [facility] Radiology [phone] - call to set up a visit your breast imaging                     BMI:   Estimated body mass index is 24.69 kg/m  as calculated from the following:    Height as of 20: 1.575 m (5' 2\").    Weight as of this encounter: 61.2 kg (135 lb).            See Patient Instructions    Return in about 4 weeks (around 2020), or if symptoms worsen or fail to improve.    Hien Booth MD  [facility] " [facility]

## 2020-10-05 NOTE — PATIENT INSTRUCTIONS
Your sodium is back to normal    Keep up your sertraline at current dosing    Thank you for getting Ensure and gatorade - keep this up    You have to keep eating!  Think about high protein snacks.      Keep moving.    Drop one dose of gabapentin to see if this will help with not feeling so tired in the morning and will help keep your thinking sharp.   I recommend dropping your nighttime dose.   Send me an update with how this is going in a few weeks.  Let me know sooner if you have trouble with this.    OK to stay off atorvastatin for now    Keep following with Dr Tabares about the hydroxychloroquine - ask him about the letter for eye exam coverage and if this is difficult, let me know and I can do it.    [facility] Radiology [phone] - call to set up a visit your breast imaging

## 2020-10-06 ENCOUNTER — PATIENT OUTREACH (OUTPATIENT)
Dept: NURSING | Facility: CLINIC | Age: 80
End: 2020-10-06
Payer: MEDICARE

## 2020-10-06 ENCOUNTER — PATIENT OUTREACH (OUTPATIENT)
Dept: CARE COORDINATION | Facility: CLINIC | Age: 80
End: 2020-10-06

## 2020-10-06 ASSESSMENT — ACTIVITIES OF DAILY LIVING (ADL)
DEPENDENT_IADLS:: INDEPENDENT
DEPENDENT_IADLS:: INDEPENDENT

## 2020-10-06 NOTE — PROGRESS NOTES
Clinic Care Coordination Contact    Clinic Care Coordination Contact  OUTREACH    Referral Information:  Referral Source: PCP    Primary Diagnosis: GI Disorders    Chief Complaint   Patient presents with     Clinic Care Coordination - Follow-up     Call back- Check on Maintenance         Universal Utilization:   Clinic Utilization  Difficulty keeping appointments:: No  Compliance Concerns: No  No-Show Concerns: No  No PCP office visit in Past Year: No  Utilization    Last refreshed: 10/5/2020  6:29 PM: Hospital Admissions 2           Last refreshed: 10/5/2020  6:29 PM: ED Visits 7           Last refreshed: 10/5/2020  6:29 PM: No Show Count (past year) 0              Current as of: 10/5/2020  6:29 PM              Clinical Concerns:  Current Medical Concerns:    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)     Advance care planning     Chronic kidney disease, stage III (moderate)     Hyponatremia       [facility] outreached to pt on this date to f/u on maintenance status.      Pt and SWCC reviewed recent hospitalization: Patient was hospitalized at [facility] from 9/21/2020 to 9/22/2020 with diagnosis of Hyponatremia.  2.  Nausea.  3.  Acute kidney injury on chronic kidney disease stage III.  4.  GERD.  5.  Systemic lupus erythematosus..     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  She had initially been found to have a sodium level of 122.  She was placed in the hospital for IV fluids due to suspected dehydration.  Sodium level did improve to 133 on repeat.  IV fluids have been stopped.  She did continue having nausea.  Nausea is much improved by time of discharge, though still present.  Antiemetics have been keeping symptoms controlled.  Continue use antiemetics in the outpatient setting as needed.  Recheck metabolic panel in 1 week with primary care physician.  Does have chronic kidney disease.  Creatinine was slightly above her usual baseline at 1.4.  Avoid nephrotoxins as able.       SWCC and pt reviewed current condition.  Pt reports she continues to try to manage her symptoms as it relates to the Lupus states she cannot often identify triggers and tries hard to treat them as they come.   Pt confirmed medications taken for symptom management and follow-up apt scheduled.  Recently saw PCP to review as well.  Pt reports having discussed with PCP episodes of disorientation which was attributed to gabapentin.  Pt and PCP reviewed dosage and discussed plan.  Additionally, pt reports some changes in her hearing and was recommended to see an ENT.  Pt states she did follow-up with ENT who told her she needed to \"get off hydroxychloroquine or get a hearing aide.\" Pt states this prompted her to schedule an apt with Rheumatologist to discuss alternate treatments beyond the hydroxychloroquine. Pt reports her next Rheum apt is 11/12/2020.  CC, however, encouraged pt to outreach to Rheumatology nurses to seek consult from provider on medication question and statement made from ENT.  Pt agreed stating she would call today.     Pt and SWCC reviewed mood and overall wellbeing. Pt confrimed she continues to have strong support from family and spouse and remains on sertraline for " management of any depressive symptoms.  [facility] offered to review options for counselors, but pt declined saying she did not feel it was necessary at this time but would outreach to [facility] should she feel she is in need of this support.      Pt and SWCC discussed new goal for symptom management.      Education Provided to patient: Care Coordination availability and contact.      Pain  Pain (GOAL):: Yes  Type: Acute (<3mo)  Location of chronic pain:: Rib area  Radiating: Yes  Location pain radiates to: around torso   Progression: Unchanged  Description of pain: Aching  Limitation of routine activities due to chronic pain:: No  Alleviating Factors: Pain Medication  Aggravating Factors: Activity  Health Maintenance Reviewed:    Clinical Pathway: None    Medication Management:  Medication reconciliation status: Medications reviewed and reconciled.  Continue medications without change.    Functional Status:  Dependent ADLs:: Independent  Dependent IADLs:: Independent  Bed or wheelchair confined:: No  Mobility Status: Independent    Living Situation:  Current living arrangement:: I live in a private home with spouse  Type of residence:: Private home - [facility]    Lifestyle & Psychosocial Needs:  Lifestyle     Physical activity     Days per week: 0 days     Minutes per session: 10 min     Stress: Only a little     Social Needs     Financial resource strain: Not hard at all     Food insecurity     Worry: Never true     Inability: Never true     Transportation needs     Medical: No     Non-medical: No     Diet:: Regular  Inadequate nutrition (GOAL):: No  Tube Feeding: No  Inadequate activity/exercise (GOAL):: No  Significant changes in sleep pattern (GOAL): No  Transportation means:: Regular car  Financial/Insurance concerns (GOAL):: No  Lutheran or spiritual beliefs that impact treatment:: No  Mental health DX:: Yes  Mental health DX how managed:: Medication  Mental health management concern (GOAL):: No  Informal Support system::  Spouse   Socioeconomic History     Marital status:      Spouse name: Not on file     Number of children: Not on file     Years of education: Not on file     Highest education level: 12th grade   Relationships     Social connections     Talks on phone: More than three times a week     Gets together: Twice a week     Attends Amish service: 1 to 4 times per year     Active member of club or organization: No     Attends meetings of clubs or organizations: Patient refused     Relationship status:      Intimate partner violence     Fear of current or ex partner: Not on file     Emotionally abused: Not on file     Physically abused: Not on file     Forced sexual activity: Not on file     Tobacco Use     Smoking status: Never Smoker     Smokeless tobacco: Never Used   Substance and Sexual Activity     Alcohol use: Yes     Frequency: Monthly or less     Drinks per session: 1 or 2     Binge frequency: Never     Comment: socially     Drug use: No     Sexual activity: Yes     Partners: Male     Birth control/protection: Post-menopausal          Care Coordinator has reviewed patient's Social Determinants of Health (SDoH) on this date. Upon review, changes were not  made.        Resources and Interventions:  Current Resources:      Community Resources: None  Supplies used at home:: Other  Equipment Currently Used at Home: colostomy/ostomy supplies    Advance Care Plan/Directive  Advanced Care Plans/Directives on file:: No  Advanced Care Plan/Directive Status: Not Applicable    Referrals Placed: None     Goals:   Goals        General    Medical (pt-stated)     Notes - Note created  10/6/2020 10:44 AM by Tobi Hoffman LSW    Goal Statement: I will continue to take steps to manage my symptoms over the next 6 months.    Date Goal set: 10/6/2020  Barriers: Chronic disease. New diagnosis: Lupus  Strengths: Strong support from family and spouse  Date to Achieve By: 4/1/2021  Patient expressed understanding of goal:  yes  Action steps to achieve this goal:  1. I will continue to follow up with specialists as recommended.    2. I will continue to take medications as prescribed.   3. I will continue to report new/concerning symptoms to my PCP, Care Team or specialists as needed.   4. I will contact the Lupus Foundation to review with other individuals their symptoms and experiences to further provide support and encouragement through my condition.   5. I will outreach to care coordination as needed for additional resources or supports as it relates to my condition.               Patient/Caregiver understanding: Pt reports understanding and denies any additional questions or concerns at this times. SW CC engaged in AIDET communication during encounter.      Outreach Frequency: monthly  Future Appointments              In 6 days [id number] [facility] R[facility]    In 6 days RHBCUS1 [facility]          Plan: Pt to continue to f/u with Rheumatology 11/12/20, and f/u with ENT as recommended for hearing impairment.  [facility] to follow-up with pt again in 4 weeks to check on status and assess for needs.      DESTINY Peterson  Clinic Care Coordinator  [facility]  [phone]  [email or web address]

## 2020-10-06 NOTE — LETTER
[facility]  Complex Care Plan  About Me:    Patient Name:  Gely Keene    YOB: 1940  Age:         80 year old   [place] MRN:    [id number] Telephone Information:  Home Phone [phone]   Mobile [phone]       Address:  [address] [place] [facility] [place], [place] [address] Email address:  [email or web address]      Emergency Contact(s)    Name Relationship Lgl Grd Work Phone Home Phone Mobile Phone   1. MEHUL,* Spouse   [phone]   2. JOANNA ARORA Daughter   [phone]           Primary language:  English     needed? No   [place] [facility]:  [phone] op. 1  Other communication barriers: None  Preferred Method of Communication:  Mail  Current living arrangement: I live in a private home with spouse  Mobility Status/ Medical Equipment: Independent    Health Maintenance  Health Maintenance Reviewed:      My Access Plan  Medical Emergency 911   Primary Clinic Line [facility] - [phone]   24 Hour Appointment Line [phone] or  [phone] ([phone]) (toll-free)   24 Hour Nurse Line [phone] (toll-free)   Preferred Urgent Care [facility], [phone]   Preferred Hospital [facility]  [phone]   Preferred Pharmacy WRITTEN PRESCRIPTION REQUESTED     Behavioral Health Crisis Line The National Suicide Prevention Lifeline at 1-770.119.6588 or 911             My Care Team Members  Patient Care Team       Relationship Specialty Notifications Start End    Hien Booth MD PCP - General Internal Medicine  9/8/14     Phone: [phone] Fax: [phone] [address] [place] [address]    Shana Alaniz MD MD Colon and Rectal Surgery  5/23/16     Phone: [phone] Fax: [phone]         COLON RECTAL SURGERY [address] [place] [address]    [facility]   5/23/16      [phone]     Fax: [phone]          [address] [place] [address]    Hien Booth MD Assigned PCP   11/24/19     Phone: [phone] Fax: [phone] [address] [place] [address]    Tobi Hoffman LSW Lead Care Coordinator Primary Care -   12/2/19     Phone: [phone]         Ashely Solis RN Personal Advocate & Liaison (PAL)  Admissions 12/12/19     phone: [phone] fax: [phone]    Ashlyn Rico Community Health Worker   8/25/20             My Care Plans  Self Management and Treatment Plan  Goals and (Comments)  Goals        General    Medical (pt-stated)     Notes - Note created  10/6/2020 10:44 AM by Tobi Hoffman LSW    Goal Statement: I will continue to take steps to manage my symptoms over the next 6 months.    Date Goal set: 10/6/2020  Barriers: Chronic disease. New diagnosis: Lupus  Strengths: Strong support from family and spouse  Date to Achieve By: 4/1/2021  Patient expressed understanding of goal: yes  Action steps to achieve this goal:  1. I will continue to follow up with specialists as recommended.    2. I will continue to take medications as prescribed.   3. I will continue to report new/concerning symptoms to my PCP, Care Team or specialists as needed.   4. I will contact the Lupus Foundation to review with other individuals their symptoms and experiences to further provide support and encouragement through my condition.   5. I will outreach to care coordination as needed for additional resources or supports as it relates to my condition.                       My Medical and Care Information  Problem List   Patient Active Problem List   Diagnosis     Hyperlipidemia LDL goal <160     Hypertension goal BP (blood pressure) < 140/90     IFG (impaired fasting glucose)     BCC (basal cell carcinoma)     Gastroesophageal reflux disease, esophagitis presence not specified     S/p total colectomy on 4/22/16 by Shana Alaniz,  MD     Restless legs syndrome (RLS)     Anxiety     Health Care Home     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)     Advance care planning     Chronic kidney disease, stage III (moderate)     Hyponatremia      Current Medications and Allergies:    Current Outpatient Medications   Medication     acetaminophen (TYLENOL) 500 MG tablet     carvedilol (COREG) 3.125 MG tablet     Estradiol (IMVEXXY VA)     gabapentin (NEURONTIN) 300 MG capsule     hydroxychloroquine (PLAQUENIL) 200 MG tablet     LORazepam (ATIVAN) 0.5 MG tablet     omeprazole (PRILOSEC) 20 MG DR capsule     ondansetron (ZOFRAN ODT) 4 MG ODT tab     prochlorperazine (COMPAZINE) 5 MG tablet     sertraline (ZOLOFT) 50 MG tablet     No current facility-administered medications for this visit.          Care Coordination Start Date: 12/2/2019   Frequency of Care Coordination: monthly   Form Last Updated: 10/06/2020

## 2020-10-07 ENCOUNTER — TELEPHONE (OUTPATIENT)
Dept: PEDIATRICS | Facility: CLINIC | Age: 80
End: 2020-10-07

## 2020-10-07 DIAGNOSIS — G25.81 RESTLESS LEGS SYNDROME (RLS): Primary | ICD-10-CM

## 2020-10-07 NOTE — TELEPHONE ENCOUNTER
"Huddled with Dr. Booth - Recommend staying with the plan from the 10/5/2020 visit.     Called patient and reiterated:  (copied from office visit)  \"Drop one dose of gabapentin to see if this will help with not feeling so tired in the morning and will help keep your thinking sharp.   I recommend dropping your nighttime dose.   Send me an update with how this is going in a few weeks.  Let me know sooner if you have trouble with this.\"    Patient agrees with plan.   Will follow up sooner if needed.   Otherwise I will call her in 2 weeks.   "

## 2020-10-07 NOTE — TELEPHONE ENCOUNTER
Will huddle with Dr. Booth.     Patient left a voicemail on my line stating, she called Rheum yesterday, who advised on stopping hydroxychloroquine.     Patient wondering if she should stop Gabapentin.   She is taking 1 every day.

## 2020-10-12 ENCOUNTER — HOSPITAL ENCOUNTER (OUTPATIENT)
Dept: MAMMOGRAPHY | Facility: CLINIC | Age: 80
End: 2020-10-12
Attending: PEDIATRICS
Payer: MEDICARE

## 2020-10-12 DIAGNOSIS — N64.4 BREAST PAIN: ICD-10-CM

## 2020-10-12 PROCEDURE — G0279 TOMOSYNTHESIS, MAMMO: HCPCS

## 2020-10-22 RX ORDER — GABAPENTIN 300 MG/1
300 CAPSULE ORAL AT BEDTIME
Start: 2020-10-22 | End: 2021-10-07

## 2020-10-22 NOTE — TELEPHONE ENCOUNTER
Late note from yesterday afternoon:    Spoke with patient.   Overall she is doing well on the Gabapentin 1 at bedtime. Will stay on this dose.     Is having increase in joint & muscle pain.   Rheum started on Prednisone 10/21.   She has follow up appointment with them on 10/23.     Patient will monitor symptoms and follow up prn.

## 2020-10-23 ENCOUNTER — TRANSFERRED RECORDS (OUTPATIENT)
Dept: HEALTH INFORMATION MANAGEMENT | Facility: CLINIC | Age: 80
End: 2020-10-23

## 2020-11-12 ENCOUNTER — PATIENT OUTREACH (OUTPATIENT)
Dept: CARE COORDINATION | Facility: CLINIC | Age: 80
End: 2020-11-12

## 2020-11-12 ASSESSMENT — ACTIVITIES OF DAILY LIVING (ADL): DEPENDENT_IADLS:: INDEPENDENT

## 2020-11-12 NOTE — PROGRESS NOTES
Clinic Care Coordination Contact  [facility]/Voicemail       Clinical Data: Care Coordinator Outreach  Outreach attempted x 1.  Left message on patient's voicemail with call back information and requested return call.  Plan:  Care Coordinator will try to reach patient again in 10 business days.    Tobi Hoffman [facility]  Clinic Care Coordinator  [facility]  [facility]  [phone]  [email or web address]

## 2020-11-30 ENCOUNTER — TRANSFERRED RECORDS (OUTPATIENT)
Dept: HEALTH INFORMATION MANAGEMENT | Facility: CLINIC | Age: 80
End: 2020-11-30

## 2020-12-01 DIAGNOSIS — F41.1 GAD (GENERALIZED ANXIETY DISORDER): ICD-10-CM

## 2020-12-01 RX ORDER — PREDNISONE 1 MG/1
TABLET ORAL
COMMUNITY
Start: 2020-12-01 | End: 2022-10-08

## 2020-12-01 NOTE — TELEPHONE ENCOUNTER
Patient calling. Needs Sertraline refilled. Has been doing well on this dose.   Some episodes of anxiety but manageable.     Patient saw Rheum yesterday, 11/30.    She is on a Prednisone taper off. Will go down 1 mg each week. Currently on 9 mg.    Methotrexate was increased to 10 mg weekly. Takes on Wednesdays.   Medication list updated.     Was advised to follow up with Nephrology. Has appointment 12/17.   Also with Neurology due to having headaches.     Will update Dr. Booth on above.

## 2020-12-04 ENCOUNTER — PATIENT OUTREACH (OUTPATIENT)
Dept: CARE COORDINATION | Facility: CLINIC | Age: 80
End: 2020-12-04

## 2020-12-04 NOTE — PROGRESS NOTES
Clinic Care Coordination Contact    Follow Up Progress Note   RODNEY STEINBERG contacted Gely to follow-up. Gely reports making a change in her medication for treating her lupus symptoms. She met with Dr. Tabares, Rheumatologist, on 11/30/20, to discuss progress with the change. She reports that it was one of the best meetings they've had. She still reports humming in her ears, burning sensation and her skin being dry. She hopes this will get resolved with the new medication change. She has stopped hydroxychloroquine and begun methotrexate. She stated starting with a small dose but having to increase it already. She stated that her next appointment is in 2 months. She expressed Dr. Tabares stating that she can go back to Dr. Srinivasan, neurologist, to check-in as well. She has an upcoming appointment on 12/17/20 for kidney functioning. She says some of her numbers are high and some are low. She will be getting results soon and knows to call the doctors if she has any questions. RODNEY STEINBERG asked if she has explored the Lupus Foundation. Gely reports reaching out to them and finding out that they closed the [place] chapter. Gely asked if there was a way to connect with people over the phone or online. RODNEY STEINBERG stated that the Lupus Foundation has that option as well. Gely reported that her friend's aunt was diagnosed with lupus as well and she would like to ask if they can connect. RODNEY STEINBERG validated that thought. While talking with Gely, RODNEY STEINBERG reviewed her e-mail address to send the Lupus Foundation web site for connecting with others. Gely said that would be nice. RODNEY STEINBERG e-mailed Gely the following links:    Lupus Foundation: https://www.lupus.org/    Lupus Foundation Connect:  https://www.lupus.org/resources/lupusconnect  You can join the forum and ask questions to the community. They also have some great articles posted on their.     Gely was appreciative. RODNEY STEINBERG and Gely discussed upcoming  appointments and working with the specialists to manage symptoms. Gely reports that she is continuing to do that and hopes this medication makes a difference.     Assessment: Gely was pleasant and engaged in conversation.       Goals Addressed                 This Visit's Progress      Medical (pt-stated)   50%     Goal Statement: I will continue to take steps to manage my symptoms over the next 6 months.    Date Goal set: 10/6/2020  Barriers: Chronic disease. New diagnosis: Lupus  Strengths: Strong support from family and spouse  Date to Achieve By: 4/1/2021  Patient expressed understanding of goal: yes  Action steps to achieve this goal:  1. I will continue to follow up with specialists as recommended.    2. I will continue to take medications as prescribed.   3. I will continue to report new/concerning symptoms to my PCP, Care Team or specialists as needed.   4. I will contact the Lupus Foundation to review with other individuals their symptoms and experiences to further provide support and encouragement through my condition.   5. I will outreach to care coordination as needed for additional resources or supports as it relates to my condition.                Intervention/Education provided during outreach: Lupus Foundation/Social Connect      Plan: Gely plans to follow-up with providers on her outcome with changing medications and needs. Gely plans to explore the resources from Lupus Foundation. CC IDRIS will follow-up in 1 month.    DESTINY Cohen  , Care Coordination  [facility]  [phone]

## 2020-12-18 ENCOUNTER — TRANSFERRED RECORDS (OUTPATIENT)
Dept: HEALTH INFORMATION MANAGEMENT | Facility: CLINIC | Age: 80
End: 2020-12-18

## 2020-12-28 ENCOUNTER — MEDICAL CORRESPONDENCE (OUTPATIENT)
Dept: HEALTH INFORMATION MANAGEMENT | Facility: CLINIC | Age: 80
End: 2020-12-28

## 2020-12-29 DIAGNOSIS — R80.8 OTHER PROTEINURIA: Primary | ICD-10-CM

## 2020-12-29 DIAGNOSIS — N18.32 STAGE 3B CHRONIC KIDNEY DISEASE (H): ICD-10-CM

## 2021-01-04 ENCOUNTER — TELEPHONE (OUTPATIENT)
Dept: PEDIATRICS | Facility: CLINIC | Age: 81
End: 2021-01-04

## 2021-01-04 NOTE — TELEPHONE ENCOUNTER
Patient calling to inquire if Dr. Booth would recommend that she get the covid vaccine due to her medical history.   Also inquires if Dr. Booth has any thoughts or recommendations on her rib pain flare.    Patient also seeing an increase in her rib pain. This pain has been ongoing throughout last year.   Radiates around her mid-section.   Sometimes the pain makes it hard for her to breath.   Dr. Singer did an assessment and thought it was muscle related pain.   Patient takes Tylenol Arthritis prn.   Hasn't noticed a significant improvement of pain.     Dr. Singer has ordered a renal US. Scheduled for 1/6.   Also has urine protein test with BMP.

## 2021-01-05 ENCOUNTER — PATIENT OUTREACH (OUTPATIENT)
Dept: CARE COORDINATION | Facility: CLINIC | Age: 81
End: 2021-01-05

## 2021-01-05 ASSESSMENT — ACTIVITIES OF DAILY LIVING (ADL): DEPENDENT_IADLS:: INDEPENDENT

## 2021-01-05 NOTE — PROGRESS NOTES
Clinic Care Coordination Contact    Follow Up Progress Note   IDRIS STEVENS contacted Gely to check-in. Gely reports that she had a terrible nights sleep the day before last. She expresses experiencing rib/back pain that keeps her from finding the right positions to sit or lay down. She reports contacting Dr. Booth to discuss this and Dr. Booth discussing PT/Chiropractic services. Gely reports being nervous about being around others due to COVID and gaining these services. She expresses that she does not leave her home to keep socially distant from others. She reports having to check with her insurance if these services are covered as well. IDRIS STEVENS expressed the precautions these services take; however, validated her need to be comfortable. Gely expressed knowing someone who gets these services in home and she would be interested in that option.    IDRIS STEVENS and Gely reviewed her upcoming appointments, ultrasound and labs. She expressed coordinating with her PCP, Care team and specialists frequently about her symptoms and problem solving medications. She has added tylenol arthritis per Dr. Tabares to try and alleviate the rib/back pain. She reports not connecting with her friend to form a connection with her aunt since she has lupus as well. Gely reports the holidays were busy. She expresses that her kids and their children visited them outside and planned a whole Christmas routine for her. She expressed how great that was. IDRIS STEVENS validated Gely on the natural support of her family and how she has people who deeply care for her. Gely reports her kids come over to assist with house work so she doesn't have to exert herself. IDRIS STEVENS asked Gely if she had a chance to look at the Lupus Foundation website/resource provided at last outreach. Gely reported no and that she is just contemplating reaching out to her friend's aunt who has lupus to connect. IDRIS STEVENS validated her thoughts on this and encouraged her to  do so. Gely denied any further needs.     Assessment: Gely was pleasant and engaged in conversation.        Goals Addressed                 This Visit's Progress      Medical (pt-stated)   50%     Goal Statement: I will continue to take steps to manage my symptoms over the next 6 months.    Date Goal set: 10/6/2020  Barriers: Chronic disease. New diagnosis: Lupus  Strengths: Strong support from family and spouse  Date to Achieve By: 4/1/2021  Patient expressed understanding of goal: yes  Action steps to achieve this goal:  1. I will continue to follow up with specialists as recommended.    2. I will continue to take medications as prescribed.   3. I will continue to report new/concerning symptoms to my PCP, Care Team or specialists as needed.   4. I will contact the Lupus Foundation to review with other individuals their symptoms and experiences to further provide support and encouragement through my condition.   5. I will outreach to care coordination as needed for additional resources or supports as it relates to my condition.     01/05/21: Gely continues to coordinate with her PCP, Care team and specialists. She is exploring coverage of PT/Chiropractic services with her insurance due to her rib pain (per Dr. Booth). She has upcoming labs and ultrasound. She continues to provide updates on her symptoms.                Intervention/Education provided during outreach: Follow-up     Outreach Frequency: Monthly    Plan: Gely plans to contact her friend to build a connection with her aunt who has lupus. Gely will continue to coordinate with her care team on symptoms/needs. IDRIS STEVENS will follow-up with Gely in 1 month.    DESTINY Cohen  , Care Coordination  [facility]  [phone]

## 2021-01-06 ENCOUNTER — HOSPITAL ENCOUNTER (OUTPATIENT)
Dept: LAB | Facility: CLINIC | Age: 81
End: 2021-01-06
Attending: INTERNAL MEDICINE
Payer: MEDICARE

## 2021-01-06 ENCOUNTER — HOSPITAL ENCOUNTER (OUTPATIENT)
Dept: ULTRASOUND IMAGING | Facility: CLINIC | Age: 81
End: 2021-01-06
Attending: INTERNAL MEDICINE
Payer: MEDICARE

## 2021-01-06 DIAGNOSIS — I10 ESSENTIAL HYPERTENSION: ICD-10-CM

## 2021-01-06 DIAGNOSIS — R80.8 OTHER PROTEINURIA: ICD-10-CM

## 2021-01-06 DIAGNOSIS — N18.32 CHRONIC KIDNEY DISEASE (CKD) STAGE G3B/A1, MODERATELY DECREASED GLOMERULAR FILTRATION RATE (GFR) BETWEEN 30-44 ML/MIN/1.73 SQUARE METER AND ALBUMINURIA CREATININE RATIO LESS THAN 30 MG/G (H): ICD-10-CM

## 2021-01-06 DIAGNOSIS — N18.32 STAGE 3B CHRONIC KIDNEY DISEASE (H): ICD-10-CM

## 2021-01-06 LAB
ANION GAP SERPL CALCULATED.3IONS-SCNC: 3 MMOL/L (ref 3–14)
BUN SERPL-MCNC: 26 MG/DL (ref 7–30)
CALCIUM SERPL-MCNC: 10.1 MG/DL (ref 8.5–10.1)
CHLORIDE SERPL-SCNC: 99 MMOL/L (ref 94–109)
CO2 SERPL-SCNC: 27 MMOL/L (ref 20–32)
CREAT SERPL-MCNC: 1.65 MG/DL (ref 0.52–1.04)
GFR SERPL CREATININE-BSD FRML MDRD: 28 ML/MIN/{1.73_M2}
GLUCOSE SERPL-MCNC: 133 MG/DL (ref 70–99)
POTASSIUM SERPL-SCNC: 3.9 MMOL/L (ref 3.4–5.3)
SODIUM SERPL-SCNC: 129 MMOL/L (ref 133–144)

## 2021-01-06 PROCEDURE — 82784 ASSAY IGA/IGD/IGG/IGM EACH: CPT | Performed by: INTERNAL MEDICINE

## 2021-01-06 PROCEDURE — 84166 PROTEIN E-PHORESIS/URINE/CSF: CPT | Mod: 26 | Performed by: PATHOLOGY

## 2021-01-06 PROCEDURE — 84166 PROTEIN E-PHORESIS/URINE/CSF: CPT | Mod: TC | Performed by: INTERNAL MEDICINE

## 2021-01-06 PROCEDURE — 84165 PROTEIN E-PHORESIS SERUM: CPT | Mod: 26 | Performed by: PATHOLOGY

## 2021-01-06 PROCEDURE — 80048 BASIC METABOLIC PNL TOTAL CA: CPT | Performed by: INTERNAL MEDICINE

## 2021-01-06 PROCEDURE — 84165 PROTEIN E-PHORESIS SERUM: CPT | Mod: TC | Performed by: INTERNAL MEDICINE

## 2021-01-06 PROCEDURE — 36415 COLL VENOUS BLD VENIPUNCTURE: CPT | Performed by: INTERNAL MEDICINE

## 2021-01-06 PROCEDURE — 86334 IMMUNOFIX E-PHORESIS SERUM: CPT | Mod: TC | Performed by: INTERNAL MEDICINE

## 2021-01-06 PROCEDURE — 999N001036 HC STATISTIC TOTAL PROTEIN: Performed by: INTERNAL MEDICINE

## 2021-01-06 PROCEDURE — 76770 US EXAM ABDO BACK WALL COMP: CPT

## 2021-01-06 PROCEDURE — 86334 IMMUNOFIX E-PHORESIS SERUM: CPT | Mod: 26 | Performed by: PATHOLOGY

## 2021-01-07 LAB
ALBUMIN SERPL ELPH-MCNC: 3.5 G/DL (ref 3.7–5.1)
ALPHA1 GLOB SERPL ELPH-MCNC: 0.5 G/DL (ref 0.2–0.4)
ALPHA2 GLOB SERPL ELPH-MCNC: 0.9 G/DL (ref 0.5–0.9)
B-GLOBULIN SERPL ELPH-MCNC: 0.6 G/DL (ref 0.6–1)
GAMMA GLOB SERPL ELPH-MCNC: 0.6 G/DL (ref 0.7–1.6)
M PROTEIN SERPL ELPH-MCNC: 0.1 G/DL
PROT PATTERN SERPL ELPH-IMP: ABNORMAL
PROT PATTERN UR ELPH-IMP: NORMAL

## 2021-01-11 LAB
IGA SERPL-MCNC: 110 MG/DL (ref 84–499)
IGG SERPL-MCNC: 618 MG/DL (ref 610–1616)
IGM SERPL-MCNC: 88 MG/DL (ref 35–242)
PROT PATTERN SERPL IFE-IMP: NORMAL

## 2021-01-15 ENCOUNTER — HEALTH MAINTENANCE LETTER (OUTPATIENT)
Age: 81
End: 2021-01-15

## 2021-01-25 ENCOUNTER — TRANSFERRED RECORDS (OUTPATIENT)
Dept: HEALTH INFORMATION MANAGEMENT | Facility: CLINIC | Age: 81
End: 2021-01-25

## 2021-01-29 ENCOUNTER — TRANSFERRED RECORDS (OUTPATIENT)
Dept: HEALTH INFORMATION MANAGEMENT | Facility: CLINIC | Age: 81
End: 2021-01-29

## 2021-01-29 LAB
CREAT SERPL-MCNC: 1.54 MG/DL (ref 0.6–0.88)
GFR SERPL CREATININE-BSD FRML MDRD: 31 ML/MIN/1.73M2
GLUCOSE SERPL-MCNC: 105 MG/DL (ref 65–99)
POTASSIUM SERPL-SCNC: 4.3 MMOL/L (ref 3.5–5.3)

## 2021-01-30 ENCOUNTER — IMMUNIZATION (OUTPATIENT)
Dept: NURSING | Facility: CLINIC | Age: 81
End: 2021-01-30
Payer: MEDICARE

## 2021-01-30 PROCEDURE — 0001A PR COVID VAC PFIZER DIL RECON 30 MCG/0.3 ML IM: CPT

## 2021-01-30 PROCEDURE — 91300 PR COVID VAC PFIZER DIL RECON 30 MCG/0.3 ML IM: CPT

## 2021-02-05 ENCOUNTER — PATIENT OUTREACH (OUTPATIENT)
Dept: CARE COORDINATION | Facility: CLINIC | Age: 81
End: 2021-02-05

## 2021-02-05 ASSESSMENT — ACTIVITIES OF DAILY LIVING (ADL): DEPENDENT_IADLS:: INDEPENDENT

## 2021-02-05 NOTE — PROGRESS NOTES
Clinic Care Coordination Contact    Follow Up Progress Note   IDRIS STEVENS contacted Gely to follow-up. Gely reported everything is going well. She expressed that she and her  received the first dose of the COVID vaccine. She relayed being happy about this. IDRIS STEVENS asked her how her appointments went. Gely expressed that overall her renal panel looked good except for them finding protein in her blood. She expressed that Dr. Tabares referred her to oncology for a bone scan. She expressed it was nothing to worry over based off of Dr. Tabares's review. IDRIS STEVENS validated Gely on the news. IDRIS STEVENS and Gely reviewed upcoming appointments. Gely reported having to taper one of her medications due to the COVID vaccine. She expressed Dr. Tabares discussing this with her. IDRIS STEVENS checked in on her thoughts of starting PT/Chiropractor support. Gely reported not looking into whether her insurance will cover it or not. IDRIS STEVENS discussed some of the benefits. IDRIS STEVENS and Gely discussed her concern with COVID and being in close proximity to others. IDRIS STEVENS validated the concern and briefly discussed possibly exploring it after she is fully vaccinated. Gely reports being excited to let her guard down a little with family once they are fully vaccinated. They have been social distancing and seeing their kids/grand kids from a far. IDRIS STEVENS and Gely discussed precautions and how she wants to be safe. Gely denied any further needs.     Assessment: Gely was pleasant and engaged in conversation.      Goals Addressed                 This Visit's Progress      Medical (pt-stated)   50%     Goal Statement: I will continue to take steps to manage my symptoms over the next 6 months.    Date Goal set: 10/6/2020  Barriers: Chronic disease. New diagnosis: Lupus  Strengths: Strong support from family and spouse  Date to Achieve By: 4/1/2021  Patient expressed understanding of goal: yes  Action steps to achieve this  goal:  1. I will continue to follow up with specialists as recommended.    2. I will continue to take medications as prescribed.   3. I will continue to report new/concerning symptoms to my PCP, Care Team or specialists as needed.   4. I will contact the Lupus Foundation to review with other individuals their symptoms and experiences to further provide support and encouragement through my condition.   5. I will outreach to care coordination as needed for additional resources or supports as it relates to my condition.     01/05/21: Gely continues to coordinate with her PCP, Care team and specialists. She is exploring coverage of PT/Chiropractic services with her insurance due to her rib pain (per Dr. Booth). She has upcoming labs and ultrasound. She continues to provide updates on her symptoms.                Intervention/Education provided during outreach: Follow-up     Outreach Frequency: Monthly    Plan: Gely has her upcoming bone scan appointments. She continues to coordinate with her specialists. IDRIS STEVENS will follow-up in 1 month.     DESTINY Cohen  , Care Coordination  [facility]  [facility]   [phone]

## 2021-02-08 ENCOUNTER — TELEPHONE (OUTPATIENT)
Dept: PEDIATRICS | Facility: CLINIC | Age: 81
End: 2021-02-08

## 2021-02-08 DIAGNOSIS — K21.9 GASTROESOPHAGEAL REFLUX DISEASE: Primary | ICD-10-CM

## 2021-02-08 DIAGNOSIS — F41.1 GAD (GENERALIZED ANXIETY DISORDER): ICD-10-CM

## 2021-02-08 NOTE — TELEPHONE ENCOUNTER
Routing to Dr. Booth to advise. Patient would like to see if Dr. Booth would recommend any other follow up on these symptoms.   Neuro? GI?    Patient has had ongoing symptoms >1 year of muscle type pain that wraps around her mid-section.  She treats by laying down and stretching.   Symptoms are always there.      Over the weekend new symptom of sternum pain that makes her very uncomfortable.     Denies signs and symptoms of heartburn or chest pain. Has been taking her Omeprazole.     Has recently seen Dr. Allan at [facility] for MGUS work up.   Dr. Singer referred patient there.

## 2021-02-09 NOTE — TELEPHONE ENCOUNTER
Do you know if she has had the bone surgery imaging ordered by Dr Allan yet?  This would be helpful information to have in order to make another recommendation.    Thanks!    Hien Booth MD  Internal Medicine - Pediatrics

## 2021-02-10 NOTE — TELEPHONE ENCOUNTER
Updated patient.   She agrees with plan.     Bone Study is on 2/12.     Will watch for results to update Dr. Booth when done.

## 2021-02-12 ENCOUNTER — HOSPITAL ENCOUNTER (OUTPATIENT)
Dept: GENERAL RADIOLOGY | Facility: CLINIC | Age: 81
Discharge: HOME | End: 2021-02-12
Attending: INTERNAL MEDICINE | Admitting: INTERNAL MEDICINE
Payer: MEDICARE

## 2021-02-12 DIAGNOSIS — D47.2 MONOCLONAL GAMMOPATHY OF UNDETERMINED SIGNIFICANCE: ICD-10-CM

## 2021-02-12 DIAGNOSIS — M32.9 SYSTEMIC LUPUS ERYTHEMATOSUS (H): ICD-10-CM

## 2021-02-12 PROCEDURE — 77075 RADEX OSSEOUS SURVEY COMPL: CPT

## 2021-02-16 NOTE — TELEPHONE ENCOUNTER
Bone survey reviewed - no results to explain pain.    One new possibility would be to have Gely see the pain specialists to consider a nerve block or other intervention available to them to help relieve the pain.  A solar plexus (abdominal nerve) block might be helpful.    If she is ok with pursuing this idea, ok to refer to Dr Baker at [facility] pain [facility].        Hien Booth MD  Internal Medicine - Pediatrics

## 2021-02-20 ENCOUNTER — IMMUNIZATION (OUTPATIENT)
Dept: NURSING | Facility: CLINIC | Age: 81
End: 2021-02-20
Attending: INTERNAL MEDICINE
Payer: MEDICARE

## 2021-02-20 PROCEDURE — 91300 PR COVID VAC PFIZER DIL RECON 30 MCG/0.3 ML IM: CPT

## 2021-02-20 PROCEDURE — 0002A PR COVID VAC PFIZER DIL RECON 30 MCG/0.3 ML IM: CPT

## 2021-03-01 DIAGNOSIS — F41.1 GAD (GENERALIZED ANXIETY DISORDER): ICD-10-CM

## 2021-03-01 DIAGNOSIS — R10.84 ABDOMINAL PAIN, GENERALIZED: ICD-10-CM

## 2021-03-01 DIAGNOSIS — I10 ESSENTIAL HYPERTENSION: ICD-10-CM

## 2021-03-01 RX ORDER — ONDANSETRON 4 MG/1
4 TABLET, ORALLY DISINTEGRATING ORAL EVERY 8 HOURS PRN
Qty: 30 TABLET | Refills: 1 | Status: SHIPPED | OUTPATIENT
Start: 2021-03-01 | End: 2022-10-17

## 2021-03-01 RX ORDER — CARVEDILOL 3.12 MG/1
3.12 TABLET ORAL 2 TIMES DAILY WITH MEALS
Qty: 180 TABLET | Refills: 1 | Status: SHIPPED | OUTPATIENT
Start: 2021-03-01 | End: 2021-08-20

## 2021-03-10 ENCOUNTER — PATIENT OUTREACH (OUTPATIENT)
Dept: CARE COORDINATION | Facility: CLINIC | Age: 81
End: 2021-03-10

## 2021-03-10 ASSESSMENT — ACTIVITIES OF DAILY LIVING (ADL): DEPENDENT_IADLS:: INDEPENDENT

## 2021-03-10 NOTE — PROGRESS NOTES
Clinic Care Coordination Contact  [facility]/Voicemail     Clinical Data: [facility] Outreach  Outreach attempted on 03/10/21; total outreach attempts x 1:   Left message on patient's voicemail with call back information and requested return call.   Status: Patient is on [facility] panel, status as enrolled. [facility] will outreach monthly.   Plan: [facility] to continue outreach attempts.      DESTINY Cohen  , Care Coordination  [facility]  [facility]  [facility]  [phone]

## 2021-03-25 ENCOUNTER — TRANSFERRED RECORDS (OUTPATIENT)
Dept: HEALTH INFORMATION MANAGEMENT | Facility: CLINIC | Age: 81
End: 2021-03-25

## 2021-03-25 LAB
ALT SERPL-CCNC: 12 U/L (ref 6–29)
AST SERPL-CCNC: 17 U/L (ref 10–35)
CREAT SERPL-MCNC: 1.66 MG/DL (ref 0.6–0.88)
GFR SERPL CREATININE-BSD FRML MDRD: 30 ML/MIN/1.73M2
GLUCOSE SERPL-MCNC: 115 MG/DL (ref 65–99)
POTASSIUM SERPL-SCNC: 4.4 MMOL/L (ref 3.5–5.3)

## 2021-04-22 ASSESSMENT — ACTIVITIES OF DAILY LIVING (ADL): DEPENDENT_IADLS:: INDEPENDENT

## 2021-04-22 NOTE — PROGRESS NOTES
Clinic Care Coordination Contact  [facility]/Voicemail    Referral Source: PCP  Clinical Data: Care Coordinator Outreach  Outreach attempted x 2.  Left message on patient's voicemail with call back information and requested return call.  Plan: Care Coordinator will try to reach patient again in 10 business days.    Tobi Hoffman JAMAL  Clinic Care Coordinator  [facility]  [facility]  [phone]  [email or web address]

## 2021-05-10 ENCOUNTER — PATIENT OUTREACH (OUTPATIENT)
Dept: CARE COORDINATION | Facility: CLINIC | Age: 81
End: 2021-05-10

## 2021-05-10 ASSESSMENT — ACTIVITIES OF DAILY LIVING (ADL): DEPENDENT_IADLS:: INDEPENDENT

## 2021-05-10 NOTE — PROGRESS NOTES
Clinic Care Coordination Contact    Follow Up Progress Note      Assessment: [facility] outreached to pt on this date. Pt indicates she continues to work with her specialists and follows up as recommended.  Pt states she now sees a nephrologist in addition to PCP, care team and rheumatologist.    Pt states she was told she is a stage 3 kidney disease. Pt relayed symptoms of the lupus which she feels is advancing.  Reported joint sensitivity and feeling of warmth. Pt verifies she has been connecting with the Lupus Foundation and has reviewed resources from them regularly.  Pt again denied any need for counseling or therapy. Verified continued support from family for ongoing care needs adding her daughters have been assisting with grocery shopping and laundry.     Pt denied any needs at this time and agreed to goal completion.     Goals addressed this encounter:   Goals Addressed                 This Visit's Progress      COMPLETED: Medical (pt-stated)        Goal Statement: I will continue to take steps to manage my symptoms over the next 6 months.    Date Goal set: 10/6/2020  Barriers: Chronic disease. New diagnosis: Lupus  Strengths: Strong support from family and spouse  Date to Achieve By: 5/10/2021  Patient expressed understanding of goal: yes  Action steps to achieve this goal:  1. I will continue to follow up with specialists as recommended.    2. I will continue to take medications as prescribed.   3. I will continue to report new/concerning symptoms to my PCP, Care Team or specialists as needed.   4. I will contact the Lupus Foundation to review with other individuals their symptoms and experiences to further provide support and encouragement through my condition.   5. I will outreach to care coordination as needed for additional resources or supports as it relates to my condition.     01/05/21: Gely continues to coordinate with her PCP, Care team and specialists. She is exploring coverage of PT/Chiropractic  services with her insurance due to her rib pain (per Dr. Booth). She has upcoming labs and ultrasound. She continues to provide updates on her symptoms.                Intervention/Education provided during outreach: Care Coordination availability.      Outreach Frequency: 2 months    Plan: Pt to continue to f/u with specialists as recommended.  Care Coordinator will follow up in 2 months.     Tobi Hoffman [facility]  Clinic Care Coordinator  [facility]  [facility]  [facility]  [phone]  [email or web address]

## 2021-05-26 ENCOUNTER — TRANSFERRED RECORDS (OUTPATIENT)
Dept: HEALTH INFORMATION MANAGEMENT | Facility: CLINIC | Age: 81
End: 2021-05-26

## 2021-05-26 LAB
ALT SERPL-CCNC: 13 U/L (ref 6–29)
AST SERPL-CCNC: 20 U/L (ref 10–35)
CREAT SERPL-MCNC: 1.47 MG/DL (ref 0.6–0.88)
GFR SERPL CREATININE-BSD FRML MDRD: 33 ML/MIN/1.73M2
GLUCOSE SERPL-MCNC: 110 MG/DL (ref 65–99)
POTASSIUM SERPL-SCNC: 4.6 MMOL/L (ref 3.5–5.3)

## 2021-06-01 ENCOUNTER — TELEPHONE (OUTPATIENT)
Dept: PEDIATRICS | Facility: CLINIC | Age: 81
End: 2021-06-01

## 2021-06-01 NOTE — TELEPHONE ENCOUNTER
OV notes from 10/5 printed and faxed to [facility] at [phone].    Thais Robles    June 1, 2021 at 11:21 AM

## 2021-06-01 NOTE — TELEPHONE ENCOUNTER
[facility] called. They need office visit notes faxed over discussing need for Ileostomy bags. The notes can be within the last year.     Can fax to [facility] at: [phone].     Gricelda Muñiz RN   [facility] -- Triage Nurse

## 2021-06-02 ENCOUNTER — TRANSFERRED RECORDS (OUTPATIENT)
Dept: HEALTH INFORMATION MANAGEMENT | Facility: CLINIC | Age: 81
End: 2021-06-02

## 2021-06-03 NOTE — PATIENT INSTRUCTIONS
You can reach your [facility] Care Team any time of the day by calling [phone]. This number will put you in touch with the 24 hour nurse line if the clinic is closed.    To contact your OB/GYN Station Coordinator/Surgery Scheduler please call [phone]. This is a direct number for your care team between 8 a.m. and 4 p.m. Monday through Friday.    [facility] is open for your convenience:  Monday through Friday 8 a.m. to 6 p.m.  Closed weekends and all major holidays.    
(0) No drift; leg holds 30 degree position for full 5 secs

## 2021-07-19 ENCOUNTER — PATIENT OUTREACH (OUTPATIENT)
Dept: NURSING | Facility: CLINIC | Age: 81
End: 2021-07-19
Payer: MEDICARE

## 2021-07-19 ASSESSMENT — ACTIVITIES OF DAILY LIVING (ADL): DEPENDENT_IADLS:: INDEPENDENT

## 2021-07-19 NOTE — PROGRESS NOTES
Clinic Care Coordination Contact    Assessment: Care Coordinator contacted patient for 2 month follow up.  Patient has continued to follow the plan of care and assessment is negative for any new needs or concerns.    Enrollment status: Graduated.      Plan: No further outreaches at this time.  Patient will continue to follow the plan of care.  If new needs arise a new Care Coordination referral may be placed.      Tobi Hoffman [facility]  Clinic Care Coordinator  [facility]  [facility] [place]  [phone]  [email or web address]

## 2021-07-19 NOTE — LETTER
[place] CARE COORDINATION  .pcpcadd    July 19, 2021    Gely Keene  [address]  [place] [address]    Dear Gely,  Your Care Team congratulates you on your journey to maintain wellness. This document will help guide you on your journey to maintain a healthy lifestyle.  You can use this to help you overcome any barriers you may encounter.  If you should have any questions or concerns, you can contact the members of your Care Team or contact your Primary Care Clinic for assistance.     Health Maintenance  Health Maintenance Reviewed:    Health Maintenance Due   Topic Date Due     ZOSTER IMMUNIZATION (2 of 3) 01/04/2013     MEDICARE ANNUAL WELLNESS VISIT  11/11/2020     PHQ-2  01/01/2021         My Access Plan  Medical Emergency 911   Primary Clinic Line [facility] - [phone]   24 Hour Appointment Line [phone] or  [phone]) (toll-free)   24 Hour Nurse Line [phone] (toll-free)   Preferred Urgent Care [facility] [place], [phone]   Preferred Hospital [facility]  [phone]   Preferred Pharmacy [facility] [place] - [address]     Behavioral Health Crisis Line The National Suicide Prevention Lifeline at 1-336.776.2796 or 911     My Care Team Members  Patient Care Team       Relationship Specialty Notifications Start End    Hien Booth MD PCP - General Internal Medicine  9/8/14     Phone: [phone] Fax: [phone]         [facility] [place] [address]    Shana Alaniz MD MD Colon and Rectal Surgery  5/23/16     Phone: [phone] Fax: [phone]         COLON RECTAL SURGERY [address] [facility] [address] [place] [address]    Hien Booth MD Assigned PCP   11/24/19     Phone: [phone] Fax: [phone] [address] [place] [address]    Ashely Solis, RN Personal Advocate & Liaison (PAL)   Admissions 12/12/19     phone: [phone] fax: [phone]              Goals        COMPLETED: Medical (pt-stated)       Goal Statement: I will continue to take steps to manage my symptoms over the next 6 months.    Date Goal set: 10/6/2020  Barriers: Chronic disease. New diagnosis: Lupus  Strengths: Strong support from family and spouse  Date to Achieve By: 5/10/2021  Patient expressed understanding of goal: yes  Action steps to achieve this goal:  1. I will continue to follow up with specialists as recommended.    2. I will continue to take medications as prescribed.   3. I will continue to report new/concerning symptoms to my PCP, Care Team or specialists as needed.   4. I will contact the Lupus Foundation to review with other individuals their symptoms and experiences to further provide support and encouragement through my condition.   5. I will outreach to care coordination as needed for additional resources or supports as it relates to my condition.     01/05/21: Gely continues to coordinate with her PCP, Care team and specialists. She is exploring coverage of PT/Chiropractic services with her insurance due to her rib pain (per Dr. Booth). She has upcoming labs and ultrasound. She continues to provide updates on her symptoms.            COMPLETED: Psychosocial (pt-stated)       Goal Statement: I would like to gain a better understanding of the source of my abdominal pain.     Measure of Success: Pain will be known/managed.    Supportive Steps to Achieve: Follow up with ileostomy surgeon for assessment. Recommended follow up with PCP as needed.  Pain medications as prescribed.  Ileostomy care.    Barriers: Ileostomy in place.    Strengths: strong advocate for self.  Accepting of care coordination. Strong understanding of current medical state and closely monitors conditions, labs, and output. Support from daughter    Date to Achieve By: 7/28/2020  Patient expressed understanding of goal: yes.     As of  today's date 3/3/2020 goal is met at 26 - 50%.   Goal Status:  Active  Pt has a f/u apt with Rheumatology to review recent blood work and findings 3/17/2020.     As of today's date 4/21/2020 goal is met at 26 - 50%.   Goal Status:  Active. Pt continues to be closely monitored by rheumatology and frequent blood tests to watch for changes.    As of today's date 5/29/2020 goal is met at 26 - 50%.   Goal Status:  Ongoing. Working on referral for [place].                     Advance Care Plans/Directives Type:      We notice that you do not have an Advance Directive on file. Upon completion of your Health Care Directive, please bring a copy with you to your next office visit.    It has been your Clinic Care Team's pleasure to work with you on your goals.    Regards,  Your Clinic Care Team

## 2021-08-20 DIAGNOSIS — I10 ESSENTIAL HYPERTENSION: ICD-10-CM

## 2021-08-20 RX ORDER — CARVEDILOL 3.12 MG/1
TABLET ORAL
Qty: 180 TABLET | Refills: 0 | Status: SHIPPED | OUTPATIENT
Start: 2021-08-20 | End: 2021-11-11

## 2021-08-25 LAB — ALT SERPL-CCNC: 19 U/L (ref 6–29)

## 2021-08-26 ENCOUNTER — TRANSFERRED RECORDS (OUTPATIENT)
Dept: HEALTH INFORMATION MANAGEMENT | Facility: CLINIC | Age: 81
End: 2021-08-26

## 2021-08-26 LAB
AST SERPL-CCNC: 20 U/L (ref 10–35)
CREATININE (EXTERNAL): 1.92 MG/DL (ref 0.6–88)
GFR ESTIMATED (EXTERNAL): 24 ML/MIN/1.73M2
GFR ESTIMATED (IF AFRICAN AMERICAN) (EXTERNAL): 27 ML/MIN/1.73M2
GLUCOSE (EXTERNAL): 105 MG/DL (ref 65–99)
POTASSIUM (EXTERNAL): 4 MMOL/L (ref 3.5–5.3)

## 2021-09-25 ENCOUNTER — HEALTH MAINTENANCE LETTER (OUTPATIENT)
Age: 81
End: 2021-09-25

## 2021-10-07 DIAGNOSIS — G25.81 RESTLESS LEGS SYNDROME (RLS): ICD-10-CM

## 2021-10-07 RX ORDER — GABAPENTIN 300 MG/1
300 CAPSULE ORAL AT BEDTIME
Qty: 90 CAPSULE | Refills: 3 | Status: SHIPPED | OUTPATIENT
Start: 2021-10-07 | End: 2021-12-14

## 2021-10-08 ENCOUNTER — LAB (OUTPATIENT)
Dept: LAB | Facility: CLINIC | Age: 81
End: 2021-10-08
Payer: MEDICARE

## 2021-10-08 DIAGNOSIS — N18.4 CHRONIC RENAL DISEASE, STAGE IV (H): ICD-10-CM

## 2021-10-08 LAB
ANION GAP SERPL CALCULATED.3IONS-SCNC: 7 MMOL/L (ref 3–14)
BUN SERPL-MCNC: 30 MG/DL (ref 7–30)
CALCIUM SERPL-MCNC: 8.4 MG/DL (ref 8.5–10.1)
CHLORIDE BLD-SCNC: 103 MMOL/L (ref 94–109)
CO2 SERPL-SCNC: 22 MMOL/L (ref 20–32)
CREAT SERPL-MCNC: 1.97 MG/DL (ref 0.52–1.04)
GFR SERPL CREATININE-BSD FRML MDRD: 23 ML/MIN/1.73M2
GLUCOSE BLD-MCNC: 98 MG/DL (ref 70–99)
POTASSIUM BLD-SCNC: 4.2 MMOL/L (ref 3.4–5.3)
SODIUM SERPL-SCNC: 132 MMOL/L (ref 133–144)

## 2021-10-08 PROCEDURE — 82374 ASSAY BLOOD CARBON DIOXIDE: CPT

## 2021-10-08 PROCEDURE — 36415 COLL VENOUS BLD VENIPUNCTURE: CPT

## 2021-10-20 ENCOUNTER — MEDICAL CORRESPONDENCE (OUTPATIENT)
Dept: HEALTH INFORMATION MANAGEMENT | Facility: CLINIC | Age: 81
End: 2021-10-20
Payer: MEDICARE

## 2021-10-22 ENCOUNTER — LAB (OUTPATIENT)
Dept: LAB | Facility: CLINIC | Age: 81
End: 2021-10-22
Payer: MEDICARE

## 2021-10-22 DIAGNOSIS — N18.4 CHRONIC KIDNEY DISEASE (CKD), STAGE IV (SEVERE) (H): ICD-10-CM

## 2021-10-22 LAB
ANION GAP SERPL CALCULATED.3IONS-SCNC: 8 MMOL/L (ref 3–14)
BUN SERPL-MCNC: 23 MG/DL (ref 7–30)
CALCIUM SERPL-MCNC: 8.2 MG/DL (ref 8.5–10.1)
CHLORIDE BLD-SCNC: 104 MMOL/L (ref 94–109)
CO2 SERPL-SCNC: 22 MMOL/L (ref 20–32)
CREAT SERPL-MCNC: 1.85 MG/DL (ref 0.52–1.04)
GFR SERPL CREATININE-BSD FRML MDRD: 25 ML/MIN/1.73M2
GLUCOSE BLD-MCNC: 130 MG/DL (ref 70–99)
POTASSIUM BLD-SCNC: 4.1 MMOL/L (ref 3.4–5.3)
SODIUM SERPL-SCNC: 134 MMOL/L (ref 133–144)

## 2021-10-22 PROCEDURE — 36415 COLL VENOUS BLD VENIPUNCTURE: CPT

## 2021-10-22 PROCEDURE — 80048 BASIC METABOLIC PNL TOTAL CA: CPT

## 2021-11-04 DIAGNOSIS — K21.9 GASTROESOPHAGEAL REFLUX DISEASE: ICD-10-CM

## 2021-11-05 NOTE — TELEPHONE ENCOUNTER
Prescription approved per [facility] Refill Protocol.    Medication is being filled for 1 time refill only due to:  Patient needs to be seen because it has been more than one year since last visit.    Please assist the pt schedule an appt.     Dung GRECO RN

## 2021-11-15 ENCOUNTER — OFFICE VISIT (OUTPATIENT)
Dept: URGENT CARE | Facility: URGENT CARE | Age: 81
End: 2021-11-15
Payer: MEDICARE

## 2021-11-15 VITALS
HEART RATE: 73 BPM | DIASTOLIC BLOOD PRESSURE: 56 MMHG | TEMPERATURE: 97.2 F | SYSTOLIC BLOOD PRESSURE: 121 MMHG | OXYGEN SATURATION: 98 %

## 2021-11-15 DIAGNOSIS — R30.0 DYSURIA: ICD-10-CM

## 2021-11-15 DIAGNOSIS — N39.0 URINARY TRACT INFECTION WITHOUT HEMATURIA, SITE UNSPECIFIED: Primary | ICD-10-CM

## 2021-11-15 LAB
ALBUMIN UR-MCNC: 30 MG/DL
APPEARANCE UR: ABNORMAL
BACTERIA #/AREA URNS HPF: ABNORMAL /HPF
BILIRUB UR QL STRIP: NEGATIVE
COLOR UR AUTO: YELLOW
GLUCOSE UR STRIP-MCNC: NEGATIVE MG/DL
HGB UR QL STRIP: ABNORMAL
KETONES UR STRIP-MCNC: ABNORMAL MG/DL
LEUKOCYTE ESTERASE UR QL STRIP: ABNORMAL
MUCOUS THREADS #/AREA URNS LPF: PRESENT /LPF
NITRATE UR QL: NEGATIVE
PH UR STRIP: 5.5 [PH] (ref 5–7)
RBC #/AREA URNS AUTO: ABNORMAL /HPF
SP GR UR STRIP: 1.01 (ref 1–1.03)
SQUAMOUS #/AREA URNS AUTO: ABNORMAL /LPF
UROBILINOGEN UR STRIP-ACNC: 0.2 E.U./DL
WBC #/AREA URNS AUTO: ABNORMAL /HPF

## 2021-11-15 PROCEDURE — 87186 SC STD MICRODIL/AGAR DIL: CPT | Performed by: PHYSICIAN ASSISTANT

## 2021-11-15 PROCEDURE — 87086 URINE CULTURE/COLONY COUNT: CPT | Performed by: PHYSICIAN ASSISTANT

## 2021-11-15 PROCEDURE — 81001 URINALYSIS AUTO W/SCOPE: CPT | Performed by: PHYSICIAN ASSISTANT

## 2021-11-15 PROCEDURE — 99214 OFFICE O/P EST MOD 30 MIN: CPT | Performed by: PHYSICIAN ASSISTANT

## 2021-11-15 PROCEDURE — 87088 URINE BACTERIA CULTURE: CPT | Performed by: PHYSICIAN ASSISTANT

## 2021-11-15 RX ORDER — CEPHALEXIN 500 MG/1
500 CAPSULE ORAL 2 TIMES DAILY
Qty: 14 CAPSULE | Refills: 0 | Status: SHIPPED | OUTPATIENT
Start: 2021-11-15 | End: 2021-11-22

## 2021-11-15 NOTE — PROGRESS NOTES
SUBJECTIVE:   Gely Keene is a 81 year old female who  presents today for a possible UTI. Symptoms of dysuria, frequency, burning and voiding in small amounts have been going on for 2day(s).  Hematuria no.  gradual onset and still presentand moderate.  There is no history of fever, chills, nausea or vomiting.  No history of vaginal discharge. This patient does  have a history of urinary tract infections. Patient denies long duration, rigors, flank pain, temperature > 101 degrees F., Vomiting, significant nausea or diarrhea, taking Coumadin and GFR less than 30 within the last year or vaginal discharge, vaginal odor and vaginal itching     Hx of Lupus and on Methotrexate     Past Medical History:   Diagnosis Date     Anxiety      BCC (basal cell carcinoma of skin)      Esophageal reflux (GERD) 9/8/2014     HTN (hypertension) 9/8/2014     Hyperlipidemia LDL goal <160 9/8/2014     IFG (impaired fasting glucose) 9/8/2014     Mumps      PONV (postoperative nausea and vomiting)      Current Outpatient Medications   Medication Sig Dispense Refill     acetaminophen (TYLENOL) 500 MG tablet Take 1,000 mg by mouth daily as needed for mild pain       carvedilol (COREG) 3.125 MG tablet TAKE 1 TABLET BY MOUTH TWICE A DAY WITH MEALS 180 tablet 0     Estradiol (IMVEXXY VA) Place 1 Application vaginally every other day At Bedtime       gabapentin (NEURONTIN) 300 MG capsule Take 1 capsule (300 mg) by mouth At Bedtime Dinner, Bedtime 90 capsule 3     LORazepam (ATIVAN) 0.5 MG tablet Take 0.5 tablets (0.25 mg) by mouth daily as needed for anxiety 10 tablet 0     methotrexate 2.5 MG tablet Take 4 tablets (10 mg) by mouth every 7 days Takes on wednesdays. Rx'd by Rheum       omeprazole (PRILOSEC) 20 MG DR capsule TAKE 1 CAPSULE BY MOUTH EVERY DAY 90 capsule 0     ondansetron (ZOFRAN ODT) 4 MG ODT tab Take 1 tablet (4 mg) by mouth every 8 hours as needed for nausea 30 tablet 1     predniSONE (DELTASONE) 1 MG tablet Tapering  off directed by Rheum. Decreasing by 1 mg each week until stopped.       sertraline (ZOLOFT) 50 MG tablet Take 1 tablet (50 mg) by mouth daily 90 tablet 1     Social History     Tobacco Use     Smoking status: Never Smoker     Smokeless tobacco: Never Used   Substance Use Topics     Alcohol use: Yes     Comment: socially       ROS:   Review of systems negative except as stated above.    OBJECTIVE:  /56   Pulse 73   Temp 97.2  F (36.2  C)   SpO2 98%   GENERAL APPEARANCE: healthy, alert and no distress  RESP: lungs clear to auscultation - no rales, rhonchi or wheezes  CV: regular rates and rhythm, normal S1 S2, no murmur noted  ABDOMEN:  soft, nontender, no HSM or masses and bowel sounds normal  BACK: No CVA tenderness  SKIN: no suspicious lesions or rashes    Results for orders placed or performed in visit on 11/15/21   UA macro with reflex to Microscopic and Culture - Clinc Collect     Status: Abnormal    Specimen: Urine, Clean Catch   Result Value Ref Range    Color Urine Yellow Colorless, Straw, Light Yellow, Yellow    Appearance Urine Slightly Cloudy (A) Clear    Glucose Urine Negative Negative mg/dL    Bilirubin Urine Negative Negative    Ketones Urine Trace (A) Negative mg/dL    Specific Gravity Urine 1.015 1.003 - 1.035    Blood Urine Trace (A) Negative    pH Urine 5.5 5.0 - 7.0    Protein Albumin Urine 30  (A) Negative mg/dL    Urobilinogen Urine 0.2 0.2, 1.0 E.U./dL    Nitrite Urine Negative Negative    Leukocyte Esterase Urine Large (A) Negative   Urine Microscopic Exam     Status: Abnormal   Result Value Ref Range    Bacteria Urine Moderate (A) None Seen /HPF    RBC Urine 0-2 0-2 /HPF /HPF    WBC Urine 25-50 (A) 0-5 /HPF /HPF    Squamous Epithelials Urine Few (A) None Seen /LPF    Mucus Urine Present (A) None Seen /LPF         ASSESSMENT:   Lower, uncomplicated urinary tract infection.    PLAN:  Culture pending   Keflex as directed    Drink plenty of fluids.  Prevention and treatment of UTI's  discussed.Signs and symptoms of pyelonephritis mentioned.  Follow up with primary care physician if not improving

## 2021-11-18 LAB
BACTERIA UR CULT: ABNORMAL
BACTERIA UR CULT: ABNORMAL

## 2021-11-20 ENCOUNTER — HEALTH MAINTENANCE LETTER (OUTPATIENT)
Age: 81
End: 2021-11-20

## 2021-11-29 ENCOUNTER — TRANSFERRED RECORDS (OUTPATIENT)
Dept: HEALTH INFORMATION MANAGEMENT | Facility: CLINIC | Age: 81
End: 2021-11-29
Payer: MEDICARE

## 2021-11-29 ENCOUNTER — MEDICAL CORRESPONDENCE (OUTPATIENT)
Dept: HEALTH INFORMATION MANAGEMENT | Facility: CLINIC | Age: 81
End: 2021-11-29
Payer: MEDICARE

## 2021-11-30 ENCOUNTER — LAB (OUTPATIENT)
Dept: LAB | Facility: CLINIC | Age: 81
End: 2021-11-30
Payer: MEDICARE

## 2021-11-30 DIAGNOSIS — M32.9 SYSTEMIC LUPUS ERYTHEMATOSUS (H): Primary | ICD-10-CM

## 2021-11-30 DIAGNOSIS — F41.1 GAD (GENERALIZED ANXIETY DISORDER): ICD-10-CM

## 2021-11-30 LAB
ALBUMIN SERPL-MCNC: 3.3 G/DL (ref 3.4–5)
ALBUMIN UR-MCNC: NEGATIVE MG/DL
ALP SERPL-CCNC: 101 U/L (ref 40–150)
ALT SERPL W P-5'-P-CCNC: 36 U/L (ref 0–50)
ANION GAP SERPL CALCULATED.3IONS-SCNC: 6 MMOL/L (ref 3–14)
APPEARANCE UR: ABNORMAL
AST SERPL W P-5'-P-CCNC: 25 U/L (ref 0–45)
BASOPHILS # BLD AUTO: 0.1 10E3/UL (ref 0–0.2)
BASOPHILS NFR BLD AUTO: 1 %
BILIRUB SERPL-MCNC: 0.6 MG/DL (ref 0.2–1.3)
BILIRUB UR QL STRIP: NEGATIVE
BUN SERPL-MCNC: 38 MG/DL (ref 7–30)
CALCIUM SERPL-MCNC: 8.7 MG/DL (ref 8.5–10.1)
CHLORIDE BLD-SCNC: 105 MMOL/L (ref 94–109)
CO2 SERPL-SCNC: 19 MMOL/L (ref 20–32)
COLOR UR AUTO: ABNORMAL
CREAT SERPL-MCNC: 2.18 MG/DL (ref 0.52–1.04)
CRP SERPL-MCNC: 3.9 MG/L (ref 0–8)
EOSINOPHIL # BLD AUTO: 0.1 10E3/UL (ref 0–0.7)
EOSINOPHIL NFR BLD AUTO: 2 %
ERYTHROCYTE [DISTWIDTH] IN BLOOD BY AUTOMATED COUNT: 14.3 % (ref 10–15)
ERYTHROCYTE [SEDIMENTATION RATE] IN BLOOD BY WESTERGREN METHOD: 38 MM/HR (ref 0–30)
GFR SERPL CREATININE-BSD FRML MDRD: 20 ML/MIN/1.73M2
GLUCOSE BLD-MCNC: 109 MG/DL (ref 70–99)
GLUCOSE UR STRIP-MCNC: NEGATIVE MG/DL
HCT VFR BLD AUTO: 28.4 % (ref 35–47)
HGB BLD-MCNC: 9.3 G/DL (ref 11.7–15.7)
HGB UR QL STRIP: NEGATIVE
IMM GRANULOCYTES # BLD: 0 10E3/UL
IMM GRANULOCYTES NFR BLD: 1 %
KETONES UR STRIP-MCNC: NEGATIVE MG/DL
LEUKOCYTE ESTERASE UR QL STRIP: NEGATIVE
LYMPHOCYTES # BLD AUTO: 1.3 10E3/UL (ref 0.8–5.3)
LYMPHOCYTES NFR BLD AUTO: 30 %
MCH RBC QN AUTO: 32.4 PG (ref 26.5–33)
MCHC RBC AUTO-ENTMCNC: 32.7 G/DL (ref 31.5–36.5)
MCV RBC AUTO: 99 FL (ref 78–100)
MONOCYTES # BLD AUTO: 0.4 10E3/UL (ref 0–1.3)
MONOCYTES NFR BLD AUTO: 9 %
MUCOUS THREADS #/AREA URNS LPF: PRESENT /LPF
NEUTROPHILS # BLD AUTO: 2.4 10E3/UL (ref 1.6–8.3)
NEUTROPHILS NFR BLD AUTO: 57 %
NITRATE UR QL: NEGATIVE
NRBC # BLD AUTO: 0 10E3/UL
NRBC BLD AUTO-RTO: 0 /100
PH UR STRIP: 6 [PH] (ref 5–7)
PLATELET # BLD AUTO: 158 10E3/UL (ref 150–450)
POTASSIUM BLD-SCNC: 4.7 MMOL/L (ref 3.4–5.3)
PROT SERPL-MCNC: 7 G/DL (ref 6.8–8.8)
RBC # BLD AUTO: 2.87 10E6/UL (ref 3.8–5.2)
RBC URINE: <1 /HPF
SODIUM SERPL-SCNC: 130 MMOL/L (ref 133–144)
SP GR UR STRIP: 1.01 (ref 1–1.03)
SQUAMOUS EPITHELIAL: 3 /HPF
UROBILINOGEN UR STRIP-MCNC: NORMAL MG/DL
WBC # BLD AUTO: 4.2 10E3/UL (ref 4–11)
WBC URINE: 1 /HPF

## 2021-11-30 PROCEDURE — 86235 NUCLEAR ANTIGEN ANTIBODY: CPT

## 2021-11-30 PROCEDURE — 81001 URINALYSIS AUTO W/SCOPE: CPT

## 2021-11-30 PROCEDURE — 86160 COMPLEMENT ANTIGEN: CPT

## 2021-11-30 PROCEDURE — 86140 C-REACTIVE PROTEIN: CPT

## 2021-11-30 PROCEDURE — 85652 RBC SED RATE AUTOMATED: CPT

## 2021-11-30 PROCEDURE — 36415 COLL VENOUS BLD VENIPUNCTURE: CPT

## 2021-11-30 PROCEDURE — 86225 DNA ANTIBODY NATIVE: CPT

## 2021-11-30 PROCEDURE — 80053 COMPREHEN METABOLIC PANEL: CPT

## 2021-11-30 PROCEDURE — 83516 IMMUNOASSAY NONANTIBODY: CPT

## 2021-11-30 PROCEDURE — 84999 UNLISTED CHEMISTRY PROCEDURE: CPT

## 2021-11-30 PROCEDURE — 85025 COMPLETE CBC W/AUTO DIFF WBC: CPT

## 2021-12-01 DIAGNOSIS — F41.1 GAD (GENERALIZED ANXIETY DISORDER): ICD-10-CM

## 2021-12-01 LAB
C3 SERPL-MCNC: 131 MG/DL (ref 81–157)
C4 SERPL-MCNC: 31 MG/DL (ref 13–39)
DSDNA AB SER-ACNC: 0.8 IU/ML
ENA SM IGG SER IA-ACNC: <1.6 U/ML
ENA SM IGG SER IA-ACNC: NEGATIVE
ENA SS-A AB SER IA-ACNC: <0.5 U/ML
ENA SS-A AB SER IA-ACNC: NEGATIVE
ENA SS-B IGG SER IA-ACNC: <0.6 U/ML
ENA SS-B IGG SER IA-ACNC: NEGATIVE

## 2021-12-01 NOTE — TELEPHONE ENCOUNTER
Patient only wants 30 days. Would like to decrease or stop.   Will discuss at the upcoming office visit.     Next 5 appointments (look out 90 days)    Dec 14, 2021  1:40 PM  (Arrive by 1:15 PM)  Adult Preventative Visit with Hien Booth MD  [facility] [address] [place] [address]  [address]  [facility] [id number] [phone]

## 2021-12-03 LAB
Lab: NORMAL
Lab: NORMAL
PERFORMING LABORATORY: NORMAL
PERFORMING LABORATORY: NORMAL
SPECIMEN STATUS: NORMAL
SPECIMEN STATUS: NORMAL
TEST NAME: NORMAL
TEST NAME: NORMAL

## 2021-12-07 LAB
MISCELLANEOUS TEST 1 (ARUP): ABNORMAL
MISCELLANEOUS TEST 1 (ARUP): NORMAL

## 2021-12-14 ENCOUNTER — OFFICE VISIT (OUTPATIENT)
Dept: PEDIATRICS | Facility: CLINIC | Age: 81
End: 2021-12-14
Payer: MEDICARE

## 2021-12-14 VITALS
OXYGEN SATURATION: 99 % | BODY MASS INDEX: 24.02 KG/M2 | HEIGHT: 61 IN | TEMPERATURE: 98 F | RESPIRATION RATE: 14 BRPM | SYSTOLIC BLOOD PRESSURE: 110 MMHG | WEIGHT: 127.2 LBS | HEART RATE: 68 BPM | DIASTOLIC BLOOD PRESSURE: 50 MMHG

## 2021-12-14 DIAGNOSIS — M32.9 SYSTEMIC LUPUS ERYTHEMATOSUS, UNSPECIFIED SLE TYPE, UNSPECIFIED ORGAN INVOLVEMENT STATUS (H): ICD-10-CM

## 2021-12-14 DIAGNOSIS — K94.19 ALTERED BOWEL ELIMINATION DUE TO INTESTINAL OSTOMY (H): ICD-10-CM

## 2021-12-14 DIAGNOSIS — K21.9 GASTROESOPHAGEAL REFLUX DISEASE WITHOUT ESOPHAGITIS: ICD-10-CM

## 2021-12-14 DIAGNOSIS — I10 HYPERTENSION GOAL BP (BLOOD PRESSURE) < 140/90: ICD-10-CM

## 2021-12-14 DIAGNOSIS — Z90.49 S/P TOTAL COLECTOMY: ICD-10-CM

## 2021-12-14 DIAGNOSIS — N18.4 CKD (CHRONIC KIDNEY DISEASE) STAGE 4, GFR 15-29 ML/MIN (H): ICD-10-CM

## 2021-12-14 DIAGNOSIS — R73.01 IFG (IMPAIRED FASTING GLUCOSE): ICD-10-CM

## 2021-12-14 DIAGNOSIS — Z00.00 MEDICARE ANNUAL WELLNESS VISIT, SUBSEQUENT: Primary | ICD-10-CM

## 2021-12-14 PROCEDURE — G0439 PPPS, SUBSEQ VISIT: HCPCS | Performed by: PEDIATRICS

## 2021-12-14 RX ORDER — AZATHIOPRINE 50 MG/1
50 TABLET ORAL 2 TIMES DAILY
COMMUNITY

## 2021-12-14 RX ORDER — GABAPENTIN 100 MG/1
100 CAPSULE ORAL AT BEDTIME
Qty: 90 CAPSULE | Refills: 0 | Status: SHIPPED | OUTPATIENT
Start: 2021-12-14 | End: 2023-06-28

## 2021-12-14 ASSESSMENT — ACTIVITIES OF DAILY LIVING (ADL): CURRENT_FUNCTION: NO ASSISTANCE NEEDED

## 2021-12-14 ASSESSMENT — MIFFLIN-ST. JEOR: SCORE: 969.36

## 2021-12-14 NOTE — PROGRESS NOTES
"SUBJECTIVE:   Gely Keene is a 81 year old female who presents for Preventive Visit.  Patient has been advised of split billing requirements and indicates understanding: Yes   Are you in the first 12 months of your Medicare coverage?  No    Healthy Habits:    In general, how would you rate your overall health?  Fair    Frequency of exercise:  None    Duration of exercise:  Other    Do you usually eat at least 4 servings of fruit and vegetables a day, include whole grains    & fiber and avoid regularly eating high fat or \"junk\" foods?  Yes    Taking medications regularly:  Yes    Barriers to taking medications:  Side effects    Medication side effects:  Lightheadedness    Ability to successfully perform activities of daily living:  No assistance needed    Home Safety:  No safety concerns identified    Hearing Impairment:  Difficulty following a conversation in a noisy restaurant or crowded room, feel that people are mumbling or not speaking clearly, difficulty following dialogue in the theater, difficult to understand a speaker at a public meeting or Christian service, need to ask people to speak up or repeat themselves, find that men's voices are easier to understand than woman's, difficulty understanding soft or whispered speech and difficulty understanding speech on the telephone    In the past 6 months, have you been bothered by leaking of urine?  No    In general, how would you rate your overall mental or emotional health?  Fair      PHQ-2 Total Score:    Additional concerns today:  No       Do you feel safe in your environment? Yes  Have you ever done Advance Care Planning? (For example, a Health Directive, POLST, or a discussion with a medical provider or your loved ones about your wishes): Yes, patient states has an Advance Care Planning document and will bring a copy to the clinic.     Fall risk  Fallen 2 or more times in the past year?: Yes  Any fall with injury in the past year?: " No    Cognitive Screening   1) Repeat 3 items (Leader, Season, Table)    2) Clock draw: ABNORMAL - arrow is pointed at both number   3) 3 item recall: Recalls 3 objects  Results: 3 items recalled: COGNITIVE IMPAIRMENT LESS LIKELY    Mini-CogTM Copyright S Mac. Licensed by the author for use in [facility]; reprinted with permission (minoo@[facility]). All rights reserved.          Reviewed and updated as needed this visit by clinical staff  Tobacco  Allergies  Meds   Med Hx  Surg Hx  Fam Hx  Soc Hx       Reviewed and updated as needed this visit by Provider               Social History     Tobacco Use     Smoking status: Never Smoker     Smokeless tobacco: Never Used   Substance Use Topics     Alcohol use: Yes     Comment: socially         Alcohol Use 11/11/2019   Prescreen: >3 drinks/day or >7 drinks/week? No   Prescreen: >3 drinks/day or >7 drinks/week? -         Current providers sharing in care for this patient include:   Patient Care Team:  Hien Booth MD as PCP - General (Internal Medicine)  Shana Alaniz MD as MD (Colon and Rectal Surgery)  Hien Booth MD as Assigned PCP  Ashely Solis RN as Personal Advocate & Liaison (PAL)    The following health maintenance items are reviewed in Epic and correct as of today:  Health Maintenance Due   Topic Date Due     ANNUAL REVIEW OF  ORDERS  Never done     ZOSTER IMMUNIZATION (2 of 3) 01/04/2013     PHQ-2  01/01/2021     A1C  09/29/2021     LIPID  09/29/2021     MICROALBUMIN  09/29/2021     MAMMO SCREENING  10/12/2021     DTAP/TDAP/TD IMMUNIZATION (2 - Td or Tdap) 10/24/2021         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  No recent changes in stool.    CKD - recent labs with Cr 2.19, continues to follow with Dr. Singer at [facility] for nephrology.  She has an appointment with him in the next few weeks.    Rheumatology -SLE-seen for 2nd  "opinion at [facility] recently - asked to stop methotrexate due to kidney function changes.  Azathioprine added and has follow up EMG in January.   Has a phone call with [facility] tomorrow to review results of recent evaluations.      Sensation around chest -continues to have sensation of rope around the lower part of her rib cage.  Has a CT planned at [facility] to investigate more detail.  Status post extensive cardiac and pulmonary evaluations and medical systems in the [place] over the last few years.    Mood - doesn't feel like sertraline is making a difference - weaning off    Continues to live independently in her home with her .  Her children are very helpful and clean her home and help with any needed tasks around the house.  Ordering groceries online and having them delivered.    UTI - Dr Kaplan - 12/7 - started keflex yesterday.  History of recurrent UTIs.  Continues to use vaginal estrogen.    Decreased gabapentin to 100mg daily as it was making her overly tired.  This is been a recent change, and she is still deciding how it is working for her.    Tylenol arthritis strength helps with pain, especially at night.      Mammogram Screening - Patient over age 75, has elected to discontinue screenings.  Pertinent mammograms are reviewed under the imaging tab.    Review of Systems  Constitutional, HEENT, cardiovascular, pulmonary, GI, , musculoskeletal, neuro, skin, endocrine and psych systems are negative, except as otherwise noted.    OBJECTIVE:   /50 (BP Location: Right arm, Patient Position: Sitting, Cuff Size: Adult Regular)   Pulse 68   Temp 98  F (36.7  C) (Tympanic)   Resp 14   Ht 1.549 m (5' 1\")   Wt 57.7 kg (127 lb 3.2 oz)   SpO2 99%   BMI 24.03 kg/m   Estimated body mass index is 24.03 kg/m  as calculated from the following:    Height as of this encounter: 1.549 m (5' 1\").    Weight as of this encounter: 57.7 kg (127 lb 3.2 oz).  Physical Exam  GENERAL: healthy, alert and no " distress  EYES: Eyes grossly normal to inspection, PERRL and conjunctivae and sclerae normal  HENT: ear canals and TM's normal, nose and mouth without ulcers or lesions  NECK: no adenopathy, no asymmetry, masses, or scars and thyroid normal to palpation  RESP: lungs clear to auscultation - no rales, rhonchi or wheezes  CV: regular rate and rhythm, normal S1 S2, no S3 or S4, no murmur, click or rub, no peripheral edema and peripheral pulses strong  ABDOMEN: Soft, ostomy in place with healthy-appearing stoma and liquid green stool in ostomy bag.  No abdominal masses, rebound or guarding  MS: no gross musculoskeletal defects noted, no edema  SKIN: no suspicious lesions or rashes  NEURO: Normal strength and tone, mentation intact and speech normal  PSYCH: mentation appears normal, affect normal/bright    Diagnostic Test Results:  Labs reviewed in Epic    ASSESSMENT / PLAN:       ICD-10-CM    1. Medicare annual wellness visit, subsequent  Z00.00    2. Systemic lupus erythematosus, unspecified SLE type, unspecified organ involvement status (H)  M32.9 gabapentin (NEURONTIN) 100 MG capsule    Patient will have follow-up with [facility] tomorrow regarding her lupus treatment.  She also continues to follow with Dr. Tabares.  Recently discontinued methotrexate and started azathioprine.  Greatly appreciate help from all rheumatology specialist.   3. 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 S/p total colectomy on 4/22/16 by Shana Alaniz MD  Z90.49  competent with ostomy cares and healthy stoma, continue to monitor   5. IFG (impaired fasting glucose)  R73.01  normal nonfasting blood sugar on recent labs   6. Hypertension goal BP (blood pressure) < 140/90  I10  well-controlled on current medications, may even be able to cut down on Coreg or eliminate it entirely moving forward.   7. Gastroesophageal reflux disease without esophagitis  K21.9    8. CKD (chronic kidney disease) stage 4, GFR  "15-29 ml/min (H)  N18.4  has follow-up visit with Dr. Singer scheduled in a few weeks.  Medications have been adjusted based on renal function.       COUNSELING:  Reviewed preventive health counseling, as reflected in patient instructions    Estimated body mass index is 24.03 kg/m  as calculated from the following:    Height as of this encounter: 1.549 m (5' 1\").    Weight as of this encounter: 57.7 kg (127 lb 3.2 oz).        She reports that she has never smoked. She has never used smokeless tobacco.      Appropriate preventive services were discussed with this patient, including applicable screening as appropriate for cardiovascular disease, diabetes, osteopenia/osteoporosis, and glaucoma.  As appropriate for age/gender, discussed screening for colorectal cancer, prostate cancer, breast cancer, and cervical cancer. Checklist reviewing preventive services available has been given to the patient.    Reviewed patients plan of care and provided an AVS. The Intermediate Care Plan ( asthma action plan, low back pain action plan, and migraine action plan) for Gely meets the Care Plan requirement. This Care Plan has been established and reviewed with the Patient.    Counseling Resources:  ATP IV Guidelines  Pooled Cohorts Equation Calculator  Breast Cancer Risk Calculator  Breast Cancer: Medication to Reduce Risk  FRAX Risk Assessment  ICSI Preventive Guidelines  Dietary Guidelines for Americans, 2010  PostedIn's MyPlate  ASA Prophylaxis  Lung CA Screening    Hien Booth MD  [facility]    Identified Health Risks:  "

## 2021-12-14 NOTE — PATIENT INSTRUCTIONS
Thank you for following with [facility] for rheumatology    OK to trial going off of sertraline - go to 1/2 tablet daily for a couple weeks, then stop.  Watch mood closely and let me know if you want to go back on.       Keep following with Dr Barillas for your kidneys    A million thanks for getting your COVID booster!    Keep as active as you can during the day    I love the idea to talk to a therapist - [facility] [place] - let me know if I can help    I hope that food starts tasting better with fewer medications    Ask your family members to test for COVID before they come to your house - PCR or Antigen tests      According to our records, you are due for a tetanus shot - you can get this in the pharmacy.  You can also get Shingrix (the new shingles vaccine) in the pharmacy if you want.

## 2021-12-19 ENCOUNTER — HOSPITAL ENCOUNTER (EMERGENCY)
Facility: CLINIC | Age: 81
Discharge: HOME | End: 2021-12-19
Attending: EMERGENCY MEDICINE | Admitting: EMERGENCY MEDICINE
Payer: MEDICARE

## 2021-12-19 ENCOUNTER — APPOINTMENT (OUTPATIENT)
Dept: CT IMAGING | Facility: CLINIC | Age: 81
End: 2021-12-19
Attending: EMERGENCY MEDICINE
Payer: MEDICARE

## 2021-12-19 VITALS
TEMPERATURE: 99 F | DIASTOLIC BLOOD PRESSURE: 67 MMHG | SYSTOLIC BLOOD PRESSURE: 150 MMHG | BODY MASS INDEX: 24.03 KG/M2 | HEIGHT: 61 IN | OXYGEN SATURATION: 96 % | RESPIRATION RATE: 16 BRPM | HEART RATE: 92 BPM

## 2021-12-19 DIAGNOSIS — R10.13 EPIGASTRIC PAIN: ICD-10-CM

## 2021-12-19 DIAGNOSIS — R11.0 NAUSEA: ICD-10-CM

## 2021-12-19 DIAGNOSIS — K85.90 ACUTE PANCREATITIS, UNSPECIFIED COMPLICATION STATUS, UNSPECIFIED PANCREATITIS TYPE: ICD-10-CM

## 2021-12-19 DIAGNOSIS — N18.4 CKD (CHRONIC KIDNEY DISEASE) STAGE 4, GFR 15-29 ML/MIN (H): ICD-10-CM

## 2021-12-19 LAB
ALBUMIN SERPL-MCNC: 3.3 G/DL (ref 3.4–5)
ALP SERPL-CCNC: 77 U/L (ref 40–150)
ALT SERPL W P-5'-P-CCNC: 32 U/L (ref 0–50)
ANION GAP SERPL CALCULATED.3IONS-SCNC: 9 MMOL/L (ref 3–14)
AST SERPL W P-5'-P-CCNC: 48 U/L (ref 0–45)
BASOPHILS # BLD AUTO: 0 10E3/UL (ref 0–0.2)
BASOPHILS NFR BLD AUTO: 0 %
BILIRUB SERPL-MCNC: 0.9 MG/DL (ref 0.2–1.3)
BUN SERPL-MCNC: 32 MG/DL (ref 7–30)
CALCIUM SERPL-MCNC: 7.9 MG/DL (ref 8.5–10.1)
CHLORIDE BLD-SCNC: 106 MMOL/L (ref 94–109)
CO2 SERPL-SCNC: 19 MMOL/L (ref 20–32)
CREAT SERPL-MCNC: 2.37 MG/DL (ref 0.52–1.04)
EOSINOPHIL # BLD AUTO: 0.1 10E3/UL (ref 0–0.7)
EOSINOPHIL NFR BLD AUTO: 1 %
ERYTHROCYTE [DISTWIDTH] IN BLOOD BY AUTOMATED COUNT: 13.9 % (ref 10–15)
GFR SERPL CREATININE-BSD FRML MDRD: 18 ML/MIN/1.73M2
GLUCOSE BLD-MCNC: 125 MG/DL (ref 70–99)
HCT VFR BLD AUTO: 33.4 % (ref 35–47)
HGB BLD-MCNC: 10.5 G/DL (ref 11.7–15.7)
HOLD SPECIMEN: NORMAL
IMM GRANULOCYTES # BLD: 0.2 10E3/UL
IMM GRANULOCYTES NFR BLD: 3 %
LACTATE SERPL-SCNC: 1.3 MMOL/L (ref 0.7–2)
LIPASE SERPL-CCNC: 401 U/L (ref 73–393)
LYMPHOCYTES # BLD AUTO: 0.3 10E3/UL (ref 0.8–5.3)
LYMPHOCYTES NFR BLD AUTO: 4 %
MCH RBC QN AUTO: 31.3 PG (ref 26.5–33)
MCHC RBC AUTO-ENTMCNC: 31.4 G/DL (ref 31.5–36.5)
MCV RBC AUTO: 100 FL (ref 78–100)
MONOCYTES # BLD AUTO: 0.3 10E3/UL (ref 0–1.3)
MONOCYTES NFR BLD AUTO: 4 %
NEUTROPHILS # BLD AUTO: 7.2 10E3/UL (ref 1.6–8.3)
NEUTROPHILS NFR BLD AUTO: 88 %
NRBC # BLD AUTO: 0 10E3/UL
NRBC BLD AUTO-RTO: 0 /100
PLATELET # BLD AUTO: 122 10E3/UL (ref 150–450)
POTASSIUM BLD-SCNC: 3.7 MMOL/L (ref 3.4–5.3)
PROT SERPL-MCNC: 7.1 G/DL (ref 6.8–8.8)
RBC # BLD AUTO: 3.35 10E6/UL (ref 3.8–5.2)
SODIUM SERPL-SCNC: 134 MMOL/L (ref 133–144)
WBC # BLD AUTO: 8.2 10E3/UL (ref 4–11)

## 2021-12-19 PROCEDURE — 80053 COMPREHEN METABOLIC PANEL: CPT | Performed by: EMERGENCY MEDICINE

## 2021-12-19 PROCEDURE — 36415 COLL VENOUS BLD VENIPUNCTURE: CPT | Performed by: EMERGENCY MEDICINE

## 2021-12-19 PROCEDURE — 96374 THER/PROPH/DIAG INJ IV PUSH: CPT

## 2021-12-19 PROCEDURE — 96375 TX/PRO/DX INJ NEW DRUG ADDON: CPT

## 2021-12-19 PROCEDURE — 250N000011 HC RX IP 250 OP 636: Performed by: EMERGENCY MEDICINE

## 2021-12-19 PROCEDURE — 96361 HYDRATE IV INFUSION ADD-ON: CPT

## 2021-12-19 PROCEDURE — 83690 ASSAY OF LIPASE: CPT | Performed by: EMERGENCY MEDICINE

## 2021-12-19 PROCEDURE — 99285 EMERGENCY DEPT VISIT HI MDM: CPT | Mod: 25

## 2021-12-19 PROCEDURE — 83605 ASSAY OF LACTIC ACID: CPT | Performed by: EMERGENCY MEDICINE

## 2021-12-19 PROCEDURE — 258N000003 HC RX IP 258 OP 636: Performed by: EMERGENCY MEDICINE

## 2021-12-19 PROCEDURE — 74176 CT ABD & PELVIS W/O CONTRAST: CPT | Mod: MG

## 2021-12-19 PROCEDURE — 85025 COMPLETE CBC W/AUTO DIFF WBC: CPT | Performed by: EMERGENCY MEDICINE

## 2021-12-19 RX ORDER — MORPHINE SULFATE 4 MG/ML
4 INJECTION, SOLUTION INTRAMUSCULAR; INTRAVENOUS ONCE
Status: COMPLETED | OUTPATIENT
Start: 2021-12-19 | End: 2021-12-19

## 2021-12-19 RX ORDER — ONDANSETRON 4 MG/1
4 TABLET, ORALLY DISINTEGRATING ORAL EVERY 6 HOURS PRN
Qty: 10 TABLET | Refills: 0 | Status: SHIPPED | OUTPATIENT
Start: 2021-12-19 | End: 2021-12-22

## 2021-12-19 RX ORDER — OXYCODONE HYDROCHLORIDE 5 MG/1
5 TABLET ORAL EVERY 6 HOURS PRN
Qty: 10 TABLET | Refills: 0 | Status: SHIPPED | OUTPATIENT
Start: 2021-12-19 | End: 2023-01-19

## 2021-12-19 RX ORDER — ONDANSETRON 2 MG/ML
4 INJECTION INTRAMUSCULAR; INTRAVENOUS EVERY 30 MIN PRN
Status: DISCONTINUED | OUTPATIENT
Start: 2021-12-19 | End: 2021-12-19 | Stop reason: HOSPADM

## 2021-12-19 RX ADMIN — ONDANSETRON 4 MG: 2 INJECTION INTRAMUSCULAR; INTRAVENOUS at 02:12

## 2021-12-19 RX ADMIN — SODIUM CHLORIDE 1000 ML: 9 INJECTION, SOLUTION INTRAVENOUS at 02:12

## 2021-12-19 RX ADMIN — MORPHINE SULFATE 4 MG: 4 INJECTION INTRAVENOUS at 02:12

## 2021-12-19 ASSESSMENT — ENCOUNTER SYMPTOMS
VOMITING: 1
ABDOMINAL PAIN: 1

## 2021-12-19 NOTE — ED NOTES
Bed: ED01  Expected date:   Expected time:   Means of arrival:   Comments:  A597 - 80 F flank pain fever

## 2021-12-19 NOTE — ED PROVIDER NOTES
"  History     Chief Complaint:  Abdominal Pain     HPI   Gely Keene is a 81 year old female with history of lupus, CKD, and chronic abdominal pain for the last 2 years who presents with worsening diffuse abdominal pain and vomiting for the last 3 days. En route, EMS gave 4 mg of Zofran. No other complaints at this time.  She had spicy food on Friday that worsened her pain.  No fever.  No diarrhea.  No urinary symptoms.     Review of Systems   Gastrointestinal: Positive for abdominal pain and vomiting.   All other systems reviewed and are negative.     Allergies:  Bactrim [Sulfamethoxazole W/Trimethoprim]  Codeine  Metronidazole  Sulfa Drugs  Sulfur    Medications:  Imuran  Coreg  Imvexxy  Neurontin  Ativan  Prilosec  Zofran   Deltasone    Past Medical History:     Anxiety  BCC  GERD  Hypertension  Hyperlipidemia  CKD  Diabetes mellitus  Systemic lupus erythematosus    Past Surgical History:    Total colectomy with colostomy  Appendectomy  Cystoscopy  Endoscopic retrograde cholangiopancreatogram x2  EGD combined  Hysterectomy  Cholecystectomy  Laparotomy exploratory  Phacoemulsification clear cornea with lens implant x2  Tonsillectomy  Bile duct percutaneous dilation    Family History:    Father: Alcohol abuse, heart attack  Mother: Alcohol abuse, COPD, lung cancer  Sister and daughter: Breast cancer  Brother: CAD    Social History:  The patient presents to the ED via EMS.     Physical Exam     Patient Vitals for the past 24 hrs:   BP Temp Temp src Pulse Resp SpO2 Height   12/19/21 0400 -- -- -- -- -- 96 % --   12/19/21 0200 150/67 -- -- 92 -- 96 % --   12/19/21 0100 149/67 -- -- 86 -- 96 % --   12/19/21 0059 162/70 99  F (37.2  C) Oral 86 16 96 % 1.549 m (5' 1\")     Physical Exam  Nursing note and vitals reviewed.  Constitutional: Cooperative.   HENT:   Mouth/Throat: Mucous membranes are normal.   Cardiovascular: Normal rate, regular rhythm and normal heart sounds.  No murmur.  Pulmonary/Chest: Effort " normal and breath sounds normal. No respiratory distress. No wheezes. No rales.   Abdominal: Soft. Right lower quadrant ileostomy. Bowel sounds are normal. No distension. Diffuse non-localizing abdominal tenderness. There is no rigidity and no guarding.   Neurological: Alert. Oriented x4  Skin: Skin is warm and dry.   Psychiatric: Normal mood and affect.     Emergency Department Course     Imaging:  CT Abdomen Pelvis without Contrast (stone protocol)   Final Result   IMPRESSION:    1.  Wall thickening of the gastric antrum may reflect a gastritis though follow-up endoscopy would be beneficial for further evaluation and to exclude other etiology.      2.  Question faint peripancreatic edema. Correlate with serum lipase.           Report per radiology    Laboratory:  Labs Ordered and Resulted from Time of ED Arrival to Time of ED Departure   COMPREHENSIVE METABOLIC PANEL - Abnormal       Result Value    Sodium 134      Potassium 3.7      Chloride 106      Carbon Dioxide (CO2) 19 (*)     Anion Gap 9      Urea Nitrogen 32 (*)     Creatinine 2.37 (*)     Calcium 7.9 (*)     Glucose 125 (*)     Alkaline Phosphatase 77      AST 48 (*)     ALT 32      Protein Total 7.1      Albumin 3.3 (*)     Bilirubin Total 0.9      GFR Estimate 18 (*)    CBC WITH PLATELETS AND DIFFERENTIAL - Abnormal    WBC Count 8.2      RBC Count 3.35 (*)     Hemoglobin 10.5 (*)     Hematocrit 33.4 (*)           MCH 31.3      MCHC 31.4 (*)     RDW 13.9      Platelet Count 122 (*)     % Neutrophils 88      % Lymphocytes 4      % Monocytes 4      % Eosinophils 1      % Basophils 0      % Immature Granulocytes 3      NRBCs per 100 WBC 0      Absolute Neutrophils 7.2      Absolute Lymphocytes 0.3 (*)     Absolute Monocytes 0.3      Absolute Eosinophils 0.1      Absolute Basophils 0.0      Absolute Immature Granulocytes 0.2      Absolute NRBCs 0.0     LIPASE - Abnormal    Lipase 401 (*)    LACTIC ACID WHOLE BLOOD - Normal    Lactic Acid 1.3         Reviewed:  I reviewed nursing notes, vitals, past medical history and Care Everywhere    Assessments:  0112 I obtained history and examined the patient as noted above.   0315 I rechecked the patient and explained findings.     Interventions:  0212 NS 1L IV  0212 Zofran 4 mg IV  0212 Morphine 4 mg IV    Disposition:  The patient was discharged to home.     Impression & Plan     Medical Decision Making:  Gely Keene is a 81 year old female with systemic lupus and chronic kidney disease who presents with epigastric and upper abdominal pain wrapping to her back which has been present for some time. She does note spicy foods on Friday which seemed to exacerbate her symptoms. Work-up here shows inflammation radiographically of the gastric antrum. There is mild elevation of the lipase as well as radiographic inflammation of the head of the pancreas. I suspect underlying peptic ulcer disease or gastritis. The pancreatitis is likely reactive though may have some primary component in terms of the cause of the pain given her symptoms. No surgical cause of the abdomen identified. Chronic kidney disease once again noted. She has excellent follow-up at the [facility] in terms of her renal function and lupus. She is already on a proton pump inhibitor. I have scheduled her for an outpatient upper endoscopy for a direct visualization of the stomach in consideration of biopsies, testing for H pylori, etc. She will be sent home with nausea and pain medication with recommendation for clear liquids, bland foods, and Ensure or Boost drinks for caloric nutrition as she states she has been losing weight. I did discuss with them that this occasionally will represent cancerous lesions though this is lower on the differential. Endoscopy would be sufficient for evaluation of this going forward. Questions answered with both her and her daughter and they will be discharged home.      Diagnosis:    ICD-10-CM   1. Epigastric pain -  suspect ulcer vs gastritis  R10.13   2. Pancreatitis (elevated lipase)  K85.90   3. Nausea  R11.0   4. CKD (chronic kidney disease) stage 4, GFR 15-29 ml/min (H)  N18.4       Discharge Medications:  New Prescriptions    ONDANSETRON (ZOFRAN ODT) 4 MG ODT TAB    Take 1 tablet (4 mg) by mouth every 6 hours as needed for nausea    OXYCODONE (ROXICODONE) 5 MG TABLET    Take 1 tablet (5 mg) by mouth every 6 hours as needed for pain       Scribe Disclosure:  I, Anil Miller, am serving as a scribe at 1:00 AM on 12/19/2021 to document services personally performed by Kedar Phillips MD based on my observations and the provider's statements to me.               Kedar Phillips MD  12/19/21 [id number]

## 2021-12-19 NOTE — ED TRIAGE NOTES
82 y/o female arrived via EMS c/o ABD pain x 3 days.  Pt states pain is throughout ABD but focal areas near flanks.  Pt has hx of lupus and kidney disease.  N/V present with 1 episode of emesis upon arrival.  4mg Zofran from EMS.  VSS.  A&Ox4

## 2021-12-20 DIAGNOSIS — Z11.59 ENCOUNTER FOR SCREENING FOR OTHER VIRAL DISEASES: ICD-10-CM

## 2021-12-24 ENCOUNTER — LAB (OUTPATIENT)
Dept: LAB | Facility: CLINIC | Age: 81
End: 2021-12-24
Attending: INTERNAL MEDICINE
Payer: MEDICARE

## 2021-12-24 DIAGNOSIS — Z11.59 ENCOUNTER FOR SCREENING FOR OTHER VIRAL DISEASES: ICD-10-CM

## 2021-12-24 PROCEDURE — U0005 INFEC AGEN DETEC AMPLI PROBE: HCPCS

## 2021-12-25 LAB — SARS-COV-2 RNA RESP QL NAA+PROBE: NEGATIVE

## 2021-12-28 ENCOUNTER — HOSPITAL ENCOUNTER (OUTPATIENT)
Facility: CLINIC | Age: 81
Discharge: HOME | End: 2021-12-28
Attending: INTERNAL MEDICINE | Admitting: INTERNAL MEDICINE
Payer: MEDICARE

## 2021-12-28 VITALS
WEIGHT: 124 LBS | BODY MASS INDEX: 23.41 KG/M2 | HEIGHT: 61 IN | TEMPERATURE: 97.4 F | DIASTOLIC BLOOD PRESSURE: 71 MMHG | RESPIRATION RATE: 16 BRPM | OXYGEN SATURATION: 100 % | SYSTOLIC BLOOD PRESSURE: 140 MMHG | HEART RATE: 72 BPM

## 2021-12-28 LAB — UPPER GI ENDOSCOPY: NORMAL

## 2021-12-28 PROCEDURE — 88305 TISSUE EXAM BY PATHOLOGIST: CPT | Mod: TC | Performed by: INTERNAL MEDICINE

## 2021-12-28 PROCEDURE — 43239 EGD BIOPSY SINGLE/MULTIPLE: CPT | Performed by: INTERNAL MEDICINE

## 2021-12-28 PROCEDURE — 250N000011 HC RX IP 250 OP 636: Performed by: INTERNAL MEDICINE

## 2021-12-28 PROCEDURE — 250N000009 HC RX 250: Performed by: INTERNAL MEDICINE

## 2021-12-28 PROCEDURE — 999N000099 HC STATISTIC MODERATE SEDATION < 10 MIN: Performed by: INTERNAL MEDICINE

## 2021-12-28 RX ORDER — ATROPINE SULFATE 0.4 MG/ML
0.4 AMPUL (ML) INJECTION
Status: DISCONTINUED | OUTPATIENT
Start: 2021-12-28 | End: 2021-12-28 | Stop reason: HOSPADM

## 2021-12-28 RX ORDER — FLUMAZENIL 0.1 MG/ML
0.2 INJECTION, SOLUTION INTRAVENOUS
Status: DISCONTINUED | OUTPATIENT
Start: 2021-12-28 | End: 2021-12-28 | Stop reason: HOSPADM

## 2021-12-28 RX ORDER — SIMETHICONE 40MG/0.6ML
133 SUSPENSION, DROPS(FINAL DOSAGE FORM)(ML) ORAL
Status: DISCONTINUED | OUTPATIENT
Start: 2021-12-28 | End: 2021-12-28 | Stop reason: HOSPADM

## 2021-12-28 RX ORDER — NALOXONE HYDROCHLORIDE 0.4 MG/ML
0.2 INJECTION, SOLUTION INTRAMUSCULAR; INTRAVENOUS; SUBCUTANEOUS
Status: DISCONTINUED | OUTPATIENT
Start: 2021-12-28 | End: 2021-12-28 | Stop reason: HOSPADM

## 2021-12-28 RX ORDER — EPINEPHRINE 1 MG/ML
0.1 INJECTION, SOLUTION INTRAMUSCULAR; SUBCUTANEOUS
Status: DISCONTINUED | OUTPATIENT
Start: 2021-12-28 | End: 2021-12-28 | Stop reason: HOSPADM

## 2021-12-28 RX ORDER — NALOXONE HYDROCHLORIDE 0.4 MG/ML
0.4 INJECTION, SOLUTION INTRAMUSCULAR; INTRAVENOUS; SUBCUTANEOUS
Status: DISCONTINUED | OUTPATIENT
Start: 2021-12-28 | End: 2021-12-28 | Stop reason: HOSPADM

## 2021-12-28 RX ORDER — ONDANSETRON 2 MG/ML
4 INJECTION INTRAMUSCULAR; INTRAVENOUS
Status: DISCONTINUED | OUTPATIENT
Start: 2021-12-28 | End: 2021-12-28 | Stop reason: HOSPADM

## 2021-12-28 RX ORDER — FENTANYL CITRATE 50 UG/ML
25 INJECTION, SOLUTION INTRAMUSCULAR; INTRAVENOUS
Status: DISCONTINUED | OUTPATIENT
Start: 2021-12-28 | End: 2021-12-28 | Stop reason: HOSPADM

## 2021-12-28 RX ORDER — ONDANSETRON 4 MG/1
4 TABLET, ORALLY DISINTEGRATING ORAL EVERY 6 HOURS PRN
Status: DISCONTINUED | OUTPATIENT
Start: 2021-12-28 | End: 2021-12-28 | Stop reason: HOSPADM

## 2021-12-28 RX ORDER — LIDOCAINE 40 MG/G
CREAM TOPICAL
Status: DISCONTINUED | OUTPATIENT
Start: 2021-12-28 | End: 2021-12-28 | Stop reason: HOSPADM

## 2021-12-28 RX ORDER — ONDANSETRON 2 MG/ML
4 INJECTION INTRAMUSCULAR; INTRAVENOUS EVERY 6 HOURS PRN
Status: DISCONTINUED | OUTPATIENT
Start: 2021-12-28 | End: 2021-12-28 | Stop reason: HOSPADM

## 2021-12-28 RX ORDER — FENTANYL CITRATE 50 UG/ML
25-50 INJECTION, SOLUTION INTRAMUSCULAR; INTRAVENOUS
Status: COMPLETED | OUTPATIENT
Start: 2021-12-28 | End: 2021-12-28

## 2021-12-28 RX ORDER — PROCHLORPERAZINE MALEATE 5 MG
5 TABLET ORAL EVERY 6 HOURS PRN
Status: DISCONTINUED | OUTPATIENT
Start: 2021-12-28 | End: 2021-12-28 | Stop reason: HOSPADM

## 2021-12-28 RX ADMIN — TOPICAL ANESTHETIC 0.5 ML: 200 SPRAY DENTAL; PERIODONTAL at 13:23

## 2021-12-28 RX ADMIN — MIDAZOLAM 1 MG: 1 INJECTION INTRAMUSCULAR; INTRAVENOUS at 13:22

## 2021-12-28 RX ADMIN — FENTANYL CITRATE 25 MCG: 50 INJECTION, SOLUTION INTRAMUSCULAR; INTRAVENOUS at 13:22

## 2021-12-28 ASSESSMENT — MIFFLIN-ST. JEOR: SCORE: 954.84

## 2021-12-28 NOTE — LETTER
December 20, 2021      Gely Keene  [address] [facility] [address]        Dear Gely,     Thank you for choosing [facility]. You are scheduled for the following service(s).   Please be aware that coverage of these services is subject to the terms and limitations of your health insurance plan.  Call member services at your health plan with any benefit or coverage questions.    Date:   Tuesday, December 28, 2021      Procedure: UPPER ENDOSCOPY-EGD  Doctor: Dr. Schuyler Calvillo           Arrival Time:  12:15pm *Enter and check in at the Main Hospital Entrance  Procedure Time: 1:00pm       Location:   [facility]        Endoscopy Department, First Floor *         [address] [place], [place] [address]      [phone] or [phone] () to reschedule            PRE-PROCEDURE CHECKLIST    If you have diabetes, ask your regular doctor for diet and medication restrictions.  If you take any antiplatelet or anticoagulant medications (such as Coumadin, Lovenox, Plavix, etc.) and have not already discussed this, please call your primary physician for advice on holding this medication.  If you take Aspirin, you may continue to do so.  If you are or may be pregnant, please discuss the risks and benefits of this procedure with your doctor.  You must arrange for a ride for the day of your exam. If you fail to arrange transportation with a responsible adult, your procedure will need to be cancelled and rescheduled. Taxi, bus and medical transport are not acceptable unless you have a responsible adult that you know & trust with you. Please arrange for this  to be able to pick you up in our department, approximately one hour after your scheduled procedure, if they are not able to stay with you.      Canceling or rescheduling   If you must cancel or reschedule your appointment, please call [phone] as soon as possible.      Upper Endoscopy or  Esophagogastroduodenoscopy (EGD) is a test performed to evaluate symptoms of persistent abdominal pain, nausea, vomiting and difficulty swallowing. It may also be used to treat various conditions of the upper gastrointestinal tract, such as bleeding, narrowing or abnormal growths.     What happens during an upper endoscopy?  On the day of your procedure, plan to spend up to one and a half hour after your arrival at the endoscopy center. The exam itself takes about 5 to 10 minutes.    Before the exam:  - You will change into a gown.   - Your medical history and medication list will be reviewed with you, unless it has already been done over the phone.   - A nurse will insert an intravenous (IV) line into your hand or arm.  - The doctor will talk to you and give you a consent form to sign.    During the exam:  - Medicine will be given through the IV line to help you relax and feel comfortable.   - Your heart rate and oxygen levels will be monitored. If your blood pressure is low, you may be given fluids through the IV line.     - The doctor will insert a flexible, hollow tube, called an endoscope, into your mouth and will advance it slowly through the esophagus, stomach and duodenum (the first part of your small intestine).   - You may have a feeling of pressure or fullness.   - If you have difficulty swallowing, and the doctor finds a narrowing in your esophagus, it may be possible for the area to be expanded-dilated during the exam.   - If abnormal tissue is found, the doctor may remove it through the endoscope (biopsy it) for closer examination. The tissue removal is painless.    After the exam:  - Any tissue samples removed during the exam will be sent to a lab for evaluation. It may take 5 to 7 working days for you to be notified of the results  - The doctor will prepare a full report for the physician who referred you for the upper endoscopy.   - The doctor will talk with you about the initial results of your  exam.   - You may feel bloated after the procedure. That is normal and should not last long.   - Your throat may feel sore for a short time.   - Following the exam, you may resume your normal diet. Avoid alcohol until the next day.   - You may resume your regular activities the day after the procedure.   - Medication given during the exam will prohibit you from driving for the rest of the day.  - A nurse will provide you with complete discharge instructions before you leave the endoscopy center. Be sure to ask the nurse for specific instructions if you take blood thinners such as Aspirin , Coumadin , Lovenox , Plavix , etc.       PREPARATION    To ensure a successful exam, please follow all instructions carefully.      The night before your exam:    STOP eating solid foods at 11:45 pm.     Clear liquids are okay to drink (examples: Gatorade , apple juice, clear broth,coffee or tea without milk or cream, etc.).     DO NOT drink red liquids or alcoholic beverages.    The day of your exam:    STOP drinking clear liquids 4 hours before your exam.     You may take your usual medications with 4 oz. of water, but it needs to be at least 4 hours prior to your procedure.      When you leave for the procedure:    Bring a list of all of your current medications, including any allergy or over-the-counter medications, unless you have already reviewed that with an Endoscopy RN over the phone.     Bring a photo ID as well as up-to-date insurance information, such as your insurance card and any referral forms that might be required by your payer.       DIRECTIONS TO THE ENDOSCOPY DEPARTMENT    From the north ([facility])  Take [address] South, exit on [address]. Get into the left hand vicki, turn left (east), go one-half mile to [address] and turn left. Go north to the second stoplight, take a right on [address] and follow it to the Main Hospital entrance.  From the south ([facility]  [place])  Take [address]N to the [address] split and exit on [address] . On [address] , turn left (west) to [address]. Turn right (north) on [address]. Go north to the second stoplight, take a right on [address] and follow it to the Main Hospital entrance.  From the east via 35E ([facility])  Take [address]E south to [address]  exit. Turn right on [address] . Go west to [address]. Turn right (north) on [address]. Go to the second stoplight, take a right on [address] to the Main Hospital entrance.  From the east via [address] ([facility])  Take [address] to [address]. Turn left (south) on [address] to [address], turn left (east) on [address] and follow it to the Main Hospital entrance.    From the west via [address] ([place], [place])  Take [address] east to [address]. Turn right (south) on [address] to [address], turn left (east) on [address] and follow it to the Main Hospital entrance.          Sincerely,        [facility] Endoscopy Department

## 2021-12-28 NOTE — H&P
Pre-Endoscopy History and Physical     Gely Keene MRN# [id number]   YOB: 1940 Age: 81 year old     Date of Procedure: 12/28/2021  Primary care provider: Hien Booth  Type of Endoscopy: Gastroscopy with possible biopsy, possible dilation  Reason for Procedure: pain  Type of Anesthesia Anticipated: Conscious Sedation    HPI:    Gely is a 81 year old female who will be undergoing the above procedure.      A history and physical has been performed. The patient's medications and allergies have been reviewed. The risks and benefits of the procedure and the sedation options and risks were discussed with the patient.  All questions were answered and informed consent was obtained.      She denies a personal or family history of anesthesia complications or bleeding disorders.     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)     Advance care planning     Chronic kidney disease, stage III (moderate) (H)     Hyponatremia        Past Medical History:   Diagnosis Date     Anxiety      BCC (basal cell carcinoma of skin)      CKD (chronic kidney disease)      Esophageal reflux (GERD) 09/08/2014     h/o Mumps      HTN (hypertension) 09/08/2014     Hyperlipidemia 09/08/2014     Restless legs syndrome (RLS)      Systemic lupus erythematosus         Past Surgical History:   Procedure Laterality Date     APPENDECTOMY  1952     COLECTOMY WITH COLOSTOMY, COMBINED N/A 04/22/2016    Procedure: COMBINED COLECTOMY WITH COLOSTOMY;  Surgeon: Shana Alaniz MD;  Location: [facility] OR     CYSTOSCOPY        ENDOSCOPIC RETROGRADE CHOLANGIOPANCREATOGRAM N/A 08/15/2017    Procedure: COMBINED ENDOSCOPIC RETROGRADE CHOLANGIOPANCREATOGRAPHY, SPHINCTEROTOMY;  ENDOSCOPIC RETROGRADE CHOLANGIOPANCREATOGRAPHY, SPHINCTEROTOMY. STONE EXTRACTION;  Surgeon: Keturah Bergeron MD;  Location:  OR     ENDOSCOPIC RETROGRADE CHOLANGIOPANCREATOGRAM N/A 08/15/2017    Procedure: COMBINED ENDOSCOPIC RETROGRADE CHOLANGIOPANCREATOGRAPHY, REMOVE STONE/BALLOON;;  Surgeon: Keturah Bergeron MD;  Location:  OR     ESOPHAGOSCOPY, GASTROSCOPY, DUODENOSCOPY (EGD), COMBINED Left 01/02/2020    Procedure: ESOPHAGOGASTRODUODENOSCOPY (fv); random stomach biopsies of polyps using jumbo bx forcep;  Surgeon: Thais Martinez MD;  Location:  GI     HYSTERECTOMY  1987     LAPAROSCOPIC CHOLECYSTECTOMY  2008     LAPAROTOMY EXPLORATORY N/A 04/22/2016    Procedure: LAPAROTOMY EXPLORATORY;  Surgeon: Shana Alaniz MD;  Location:  OR     PHACOEMULSIFICATION CLEAR CORNEA WITH STANDARD INTRAOCULAR LENS IMPLANT Left 05/09/2017    Procedure: PHACOEMULSIFICATION CLEAR CORNEA WITH STANDARD INTRAOCULAR LENS IMPLANT;  LEFT EYE PHACOEMULSIFICATION CLEAR CORNEA WITH STANDARD INTRAOCULAR LENS IMPLANT ;  Surgeon: Eloy Eric MD;  Location: [facility]     PHACOEMULSIFICATION CLEAR CORNEA WITH STANDARD INTRAOCULAR LENS IMPLANT Right 05/23/2017    Procedure: PHACOEMULSIFICATION CLEAR CORNEA WITH STANDARD INTRAOCULAR LENS IMPLANT;  RIGHT EYE PHACOEMULSIFICATION CLEAR CORNEA WITH STANDARD INTRAOCULAR LENS IMPLANT ;  Surgeon: Eloy Eric MD;  Location: [facility]       Social History     Tobacco Use     Smoking status: Never Smoker     Smokeless tobacco: Never Used   Substance Use Topics     Alcohol use: Yes     Comment: socially       Family History   Problem Relation Age of Onset     Breast Cancer Daughter      Breast Cancer Mother      Breast Cancer Sister        Prior to Admission medications    Medication Sig Start Date End Date Taking?  "Authorizing Provider   azaTHIOprine (IMURAN) 50 MG tablet Take 50 mg by mouth daily   Yes Reported, Patient   carvedilol (COREG) 3.125 MG tablet TAKE 1 TABLET BY MOUTH TWICE A DAY WITH MEALS 11/11/21  Yes Hien Booth MD   Estradiol (IMVEXXY VA) Place 1 Application vaginally every other day At Bedtime   Yes Reported, Patient   gabapentin (NEURONTIN) 100 MG capsule Take 1 capsule (100 mg) by mouth At Bedtime 12/14/21  Yes Hien Booth MD   LORazepam (ATIVAN) 0.5 MG tablet Take 0.5 tablets (0.25 mg) by mouth daily as needed for anxiety 1/14/20  Yes Hien Booth MD   omeprazole (PRILOSEC) 20 MG DR capsule TAKE 1 CAPSULE BY MOUTH EVERY DAY 11/5/21  Yes Hien Booth MD   ondansetron (ZOFRAN ODT) 4 MG ODT tab Take 1 tablet (4 mg) by mouth every 8 hours as needed for nausea 3/1/21  Yes Hien Booth MD   oxyCODONE (ROXICODONE) 5 MG tablet Take 1 tablet (5 mg) by mouth every 6 hours as needed for pain 12/19/21  Yes Kedar Phillips MD   predniSONE (DELTASONE) 1 MG tablet Tapering off directed by Rheum. Decreasing by 1 mg each week until stopped. 12/1/20   Hien Booth MD       Allergies   Allergen Reactions     Bactrim [Sulfamethoxazole W/Trimethoprim] GI Disturbance     Vomiting     Codeine      Metronidazole      GI distubance     Sulfa Drugs      Sulfur         REVIEW OF SYSTEMS:   5 point ROS negative except as noted above in HPI, including Gen., Resp., CV, GI &  system review.    PHYSICAL EXAM:   There were no vitals taken for this visit. Estimated body mass index is 24.03 kg/m  as calculated from the following:    Height as of 12/19/21: 1.549 m (5' 1\").    Weight as of 12/14/21: 57.7 kg (127 lb 3.2 oz).   GENERAL APPEARANCE: alert, and oriented  MENTAL STATUS: alert  AIRWAY EXAM: Mallampatti Class I (visualization of the soft palate, fauces, uvula, anterior and posterior pillars)  RESP: lungs clear to auscultation - no rales, rhonchi or wheezes  CV: regular rates and " rhythm  DIAGNOSTICS:    Not indicated    IMPRESSION   ASA Class 2 - Mild systemic disease    PLAN:   Plan for Gastroscopy with possible biopsy, possible dilation. We discussed the risks, benefits and alternatives and the patient wished to proceed.    The above has been forwarded to the consulting provider.      Signed Electronically by: Schuyler Calvillo MD  December 28, 2021

## 2021-12-28 NOTE — DISCHARGE INSTRUCTIONS
The patient has received a copy of the Provation  report the doctor has written and discharge instructions have been discussed with the patient and responsible adult.  All questions were addressed and answered prior to patient discharge.      Understanding H. pylori and Ulcers  Traditionally, ulcers, or sores in the lining of your digestive tract, were thought to be caused by too much spicy food, stress, or an anxious personality. We now know that most ulcers are probably due to infection with bacteria known as Helicobacter pylori (H. pylori).     H. pylori invade and disturb the lining of the digestive tract. Acid may weaken the area, causing an ulcer.      Common Ulcer Symptoms  Burning, cramping, or hunger-like pain in the stomach area, often one to three hours after a meal or in the middle of the night  Pain that gets better or worse with eating  Nausea or vomiting  Black, tarry, or bloody stools (which means the ulcer is bleeding)  Or you may have no symptoms.     An ulcer can form in two areas of the digestive tract; the stomach and the duodenum (where the stomach meets the small intestine).      Your Evaluation  An evaluation by your doctor can show if you have an ulcer and determine whether it was caused by H. pylori. Your doctor may ask you questions, examine you, and possibly do some tests. These may include:  A special X-ray called a barium upper gastrointestinal series, to help locate an ulcer. During the test, you drink a chalky liquid. This liquid helps the ulcer show up on the X-ray.  An endoscopic exam, done with a long tube passed through your mouth into your stomach, to give the doctor a closer look at your ulcer. You will be lightly sedated for this procedure. Your doctor can also take a tissue sample to test for H. pylori.  Blood, stool, and breath tests are also available to show whether you have H. pylori in your digestive tract.  Your Treatment  To kill H. pylori so your ulcer can heal, your  doctor will probably prescribe antibiotics. Other ulcer medications that help reduce stomach acid may also be prescribed as well. Testing after treatment is recommended to be sure the H. pylori infection is gone. Usually, killing H. pylori helps keep the ulcer from returning.    [id number] [employer] [facility], [address] [place] [address], [place] [address]. All rights reserved. This information is not intended as a substitute for professional medical care. Always follow your healthcare professional's instructions.

## 2021-12-30 LAB
PATH REPORT.COMMENTS IMP SPEC: NORMAL
PATH REPORT.COMMENTS IMP SPEC: NORMAL
PATH REPORT.FINAL DX SPEC: NORMAL
PATH REPORT.GROSS SPEC: NORMAL
PATH REPORT.MICROSCOPIC SPEC OTHER STN: NORMAL
PATH REPORT.RELEVANT HX SPEC: NORMAL
PHOTO IMAGE: NORMAL

## 2021-12-30 PROCEDURE — 88305 TISSUE EXAM BY PATHOLOGIST: CPT | Mod: 26 | Performed by: PATHOLOGY

## 2022-01-05 ENCOUNTER — MEDICAL CORRESPONDENCE (OUTPATIENT)
Dept: HEALTH INFORMATION MANAGEMENT | Facility: CLINIC | Age: 82
End: 2022-01-05
Payer: MEDICARE

## 2022-01-07 ENCOUNTER — LAB (OUTPATIENT)
Dept: LAB | Facility: CLINIC | Age: 82
End: 2022-01-07
Payer: MEDICARE

## 2022-01-07 DIAGNOSIS — N18.4 CHRONIC KIDNEY DISEASE (CKD), STAGE IV (SEVERE) (H): ICD-10-CM

## 2022-01-07 PROCEDURE — 80048 BASIC METABOLIC PNL TOTAL CA: CPT

## 2022-01-07 PROCEDURE — 36415 COLL VENOUS BLD VENIPUNCTURE: CPT

## 2022-01-08 LAB
ANION GAP SERPL CALCULATED.3IONS-SCNC: 6 MMOL/L (ref 3–14)
BUN SERPL-MCNC: 60 MG/DL (ref 7–30)
CALCIUM SERPL-MCNC: 8.9 MG/DL (ref 8.5–10.1)
CHLORIDE BLD-SCNC: 104 MMOL/L (ref 94–109)
CO2 SERPL-SCNC: 20 MMOL/L (ref 20–32)
CREAT SERPL-MCNC: 2.39 MG/DL (ref 0.52–1.04)
GFR SERPL CREATININE-BSD FRML MDRD: 20 ML/MIN/1.73M2
GLUCOSE BLD-MCNC: 121 MG/DL (ref 70–99)
POTASSIUM BLD-SCNC: 4.4 MMOL/L (ref 3.4–5.3)
SODIUM SERPL-SCNC: 130 MMOL/L (ref 133–144)

## 2022-01-27 NOTE — PROGRESS NOTES
Hospitalist update:    ERCP procedure note reviewed: CBD stone and pus removed during procedure.  Gely remains at [facility] but after return to [facility] for sips of clear liquids tonight, plan to continue IVF, antibiotics and recheck CMP/CBC/lipase in the morning.  I did update her daughter Diana via phone.    Boone Shahid MD     Information: Selecting Yes will display possible errors in your note based on the variables you have selected. This validation is only offered as a suggestion for you. PLEASE NOTE THAT THE VALIDATION TEXT WILL BE REMOVED WHEN YOU FINALIZE YOUR NOTE. IF YOU WANT TO FAX A PRELIMINARY NOTE YOU WILL NEED TO TOGGLE THIS TO 'NO' IF YOU DO NOT WANT IT IN YOUR FAXED NOTE.

## 2022-01-30 DIAGNOSIS — K21.9 GASTROESOPHAGEAL REFLUX DISEASE: ICD-10-CM

## 2022-02-01 NOTE — TELEPHONE ENCOUNTER
Prescription approved per [facility] Refill Protocol.  Ofelia Jackson RN, BSN  [facility]

## 2022-02-04 DIAGNOSIS — I10 ESSENTIAL HYPERTENSION: ICD-10-CM

## 2022-02-07 RX ORDER — CARVEDILOL 3.12 MG/1
TABLET ORAL
Qty: 180 TABLET | Refills: 3 | Status: SHIPPED | OUTPATIENT
Start: 2022-02-07 | End: 2023-02-09

## 2022-02-07 NOTE — TELEPHONE ENCOUNTER
Routing refill request to provider for review/approval because:      BP Readings from Last 3 Encounters:   12/28/21 (!) 140/71   12/19/21 (!) 150/67   12/14/21 110/50      Astrid Barajas RN

## 2022-02-16 ENCOUNTER — TRANSFERRED RECORDS (OUTPATIENT)
Dept: HEALTH INFORMATION MANAGEMENT | Facility: CLINIC | Age: 82
End: 2022-02-16
Payer: MEDICARE

## 2022-02-16 LAB
ALT SERPL-CCNC: 10 U/L (ref 6–29)
AST SERPL-CCNC: 21 U/L (ref 10–35)
CREATININE (EXTERNAL): 1.73 MG/DL (ref 0.6–0.88)
GFR ESTIMATED (EXTERNAL): 27 ML/MIN/1.73M2
GFR ESTIMATED (IF AFRICAN AMERICAN) (EXTERNAL): 31 ML/MIN/1.73M2
GLUCOSE (EXTERNAL): 114 MG/DL (ref 65–99)
POTASSIUM (EXTERNAL): 4.1 MMOL/L (ref 3.5–5.3)

## 2022-04-12 ENCOUNTER — TRANSFERRED RECORDS (OUTPATIENT)
Dept: HEALTH INFORMATION MANAGEMENT | Facility: CLINIC | Age: 82
End: 2022-04-12
Payer: MEDICARE

## 2022-04-12 LAB
ALT SERPL-CCNC: 13 U/L (ref 6–29)
AST SERPL-CCNC: 23 U/L (ref 10–35)
CREATININE (EXTERNAL): 1.8 MG/DL (ref 0.6–0.88)
GFR ESTIMATED (EXTERNAL): 25 ML/MIN/1.73M2
GFR ESTIMATED (IF AFRICAN AMERICAN) (EXTERNAL): 29 ML/MIN/1.73M2
GLUCOSE (EXTERNAL): 127 MG/DL (ref 65–99)
POTASSIUM (EXTERNAL): 4.5 MMOL/L (ref 3.5–5.3)

## 2022-06-03 DIAGNOSIS — N18.4 CKD (CHRONIC KIDNEY DISEASE) STAGE 4, GFR 15-29 ML/MIN (H): Primary | ICD-10-CM

## 2022-06-03 DIAGNOSIS — N18.9 ANEMIA OF CHRONIC RENAL FAILURE: ICD-10-CM

## 2022-06-03 DIAGNOSIS — D63.1 ANEMIA OF CHRONIC RENAL FAILURE: ICD-10-CM

## 2022-06-07 ENCOUNTER — LAB (OUTPATIENT)
Dept: LAB | Facility: CLINIC | Age: 82
End: 2022-06-07
Payer: MEDICARE

## 2022-06-07 DIAGNOSIS — D63.1 ANEMIA OF CHRONIC RENAL FAILURE: ICD-10-CM

## 2022-06-07 DIAGNOSIS — N18.4 CKD (CHRONIC KIDNEY DISEASE) STAGE 4, GFR 15-29 ML/MIN (H): ICD-10-CM

## 2022-06-07 DIAGNOSIS — N18.9 ANEMIA OF CHRONIC RENAL FAILURE: ICD-10-CM

## 2022-06-07 LAB
BASOPHILS # BLD AUTO: 0.1 10E3/UL (ref 0–0.2)
BASOPHILS NFR BLD AUTO: 1 %
CREAT UR-MCNC: 90 MG/DL
EOSINOPHIL # BLD AUTO: 0.1 10E3/UL (ref 0–0.7)
EOSINOPHIL NFR BLD AUTO: 2 %
ERYTHROCYTE [DISTWIDTH] IN BLOOD BY AUTOMATED COUNT: 13.9 % (ref 10–15)
HCT VFR BLD AUTO: 31.3 % (ref 35–47)
HGB BLD-MCNC: 10.3 G/DL (ref 11.7–15.7)
IMM GRANULOCYTES # BLD: 0 10E3/UL
IMM GRANULOCYTES NFR BLD: 0 %
LYMPHOCYTES # BLD AUTO: 1.1 10E3/UL (ref 0.8–5.3)
LYMPHOCYTES NFR BLD AUTO: 25 %
MCH RBC QN AUTO: 31.7 PG (ref 26.5–33)
MCHC RBC AUTO-ENTMCNC: 32.9 G/DL (ref 31.5–36.5)
MCV RBC AUTO: 96 FL (ref 78–100)
MONOCYTES # BLD AUTO: 0.5 10E3/UL (ref 0–1.3)
MONOCYTES NFR BLD AUTO: 13 %
NEUTROPHILS # BLD AUTO: 2.5 10E3/UL (ref 1.6–8.3)
NEUTROPHILS NFR BLD AUTO: 59 %
PLATELET # BLD AUTO: 180 10E3/UL (ref 150–450)
PROT UR-MCNC: 0.4 G/L
PROT/CREAT 24H UR: 0.44 G/G CR (ref 0–0.2)
RBC # BLD AUTO: 3.25 10E6/UL (ref 3.8–5.2)
WBC # BLD AUTO: 4.3 10E3/UL (ref 4–11)

## 2022-06-07 PROCEDURE — 36415 COLL VENOUS BLD VENIPUNCTURE: CPT

## 2022-06-07 PROCEDURE — 83550 IRON BINDING TEST: CPT

## 2022-06-07 PROCEDURE — 80069 RENAL FUNCTION PANEL: CPT

## 2022-06-07 PROCEDURE — 85025 COMPLETE CBC W/AUTO DIFF WBC: CPT

## 2022-06-07 PROCEDURE — 84156 ASSAY OF PROTEIN URINE: CPT

## 2022-06-07 PROCEDURE — 82728 ASSAY OF FERRITIN: CPT

## 2022-06-08 ENCOUNTER — TRANSFERRED RECORDS (OUTPATIENT)
Dept: HEALTH INFORMATION MANAGEMENT | Facility: CLINIC | Age: 82
End: 2022-06-08
Payer: MEDICARE

## 2022-06-08 LAB
ALBUMIN SERPL-MCNC: 3.7 G/DL (ref 3.4–5)
ANION GAP SERPL CALCULATED.3IONS-SCNC: 8 MMOL/L (ref 3–14)
BUN SERPL-MCNC: 37 MG/DL (ref 7–30)
CALCIUM SERPL-MCNC: 9.1 MG/DL (ref 8.5–10.1)
CHLORIDE BLD-SCNC: 110 MMOL/L (ref 94–109)
CO2 SERPL-SCNC: 19 MMOL/L (ref 20–32)
CREAT SERPL-MCNC: 2.12 MG/DL (ref 0.52–1.04)
FERRITIN SERPL-MCNC: 94 NG/ML (ref 8–252)
GFR SERPL CREATININE-BSD FRML MDRD: 22 ML/MIN/1.73M2
GLUCOSE BLD-MCNC: 117 MG/DL (ref 70–99)
IRON SATN MFR SERPL: 35 % (ref 15–46)
IRON SERPL-MCNC: 101 UG/DL (ref 35–180)
PHOSPHATE SERPL-MCNC: 3.9 MG/DL (ref 2.5–4.5)
POTASSIUM BLD-SCNC: 4.1 MMOL/L (ref 3.4–5.3)
SODIUM SERPL-SCNC: 137 MMOL/L (ref 133–144)
TIBC SERPL-MCNC: 286 UG/DL (ref 240–430)

## 2022-07-12 ENCOUNTER — TRANSFERRED RECORDS (OUTPATIENT)
Dept: PEDIATRICS | Facility: CLINIC | Age: 82
End: 2022-07-12

## 2022-07-12 LAB
ALT SERPL-CCNC: 17 IU/L (ref 5–35)
AST SERPL-CCNC: 31 U/L (ref 5–34)
CREATININE (EXTERNAL): 1.77 MG/DL (ref 0.5–1.3)
GFR ESTIMATED (EXTERNAL): 29 ML/MIN/1.73M2

## 2022-09-19 ENCOUNTER — MEDICAL CORRESPONDENCE (OUTPATIENT)
Dept: HEALTH INFORMATION MANAGEMENT | Facility: CLINIC | Age: 82
End: 2022-09-19

## 2022-09-26 ENCOUNTER — LAB (OUTPATIENT)
Dept: LAB | Facility: CLINIC | Age: 82
End: 2022-09-26
Payer: MEDICARE

## 2022-09-26 DIAGNOSIS — D63.1 ANEMIA IN STAGE 4 CHRONIC KIDNEY DISEASE (H): ICD-10-CM

## 2022-09-26 DIAGNOSIS — N18.4 CHRONIC KIDNEY DISEASE, STAGE IV (SEVERE) (H): ICD-10-CM

## 2022-09-26 DIAGNOSIS — N18.4 ANEMIA IN STAGE 4 CHRONIC KIDNEY DISEASE (H): ICD-10-CM

## 2022-09-26 LAB
ALBUMIN MFR UR ELPH: 16.6 MG/DL
BASOPHILS # BLD AUTO: 0.1 10E3/UL (ref 0–0.2)
BASOPHILS NFR BLD AUTO: 2 %
CREAT UR-MCNC: 87 MG/DL
EOSINOPHIL # BLD AUTO: 0.1 10E3/UL (ref 0–0.7)
EOSINOPHIL NFR BLD AUTO: 4 %
ERYTHROCYTE [DISTWIDTH] IN BLOOD BY AUTOMATED COUNT: 13.6 % (ref 10–15)
HCT VFR BLD AUTO: 31.7 % (ref 35–47)
HGB BLD-MCNC: 10.6 G/DL (ref 11.7–15.7)
IMM GRANULOCYTES # BLD: 0 10E3/UL
IMM GRANULOCYTES NFR BLD: 1 %
IRON SATN MFR SERPL: 24 % (ref 15–46)
IRON SERPL-MCNC: 79 UG/DL (ref 35–180)
LYMPHOCYTES # BLD AUTO: 0.9 10E3/UL (ref 0.8–5.3)
LYMPHOCYTES NFR BLD AUTO: 26 %
MCH RBC QN AUTO: 31.7 PG (ref 26.5–33)
MCHC RBC AUTO-ENTMCNC: 33.4 G/DL (ref 31.5–36.5)
MCV RBC AUTO: 95 FL (ref 78–100)
MONOCYTES # BLD AUTO: 0.4 10E3/UL (ref 0–1.3)
MONOCYTES NFR BLD AUTO: 13 %
NEUTROPHILS # BLD AUTO: 1.9 10E3/UL (ref 1.6–8.3)
NEUTROPHILS NFR BLD AUTO: 56 %
PLATELET # BLD AUTO: 204 10E3/UL (ref 150–450)
PROT/CREAT 24H UR: 0.19 MG/MG CR (ref 0–0.2)
PTH-INTACT SERPL-MCNC: 24 PG/ML (ref 15–65)
RBC # BLD AUTO: 3.34 10E6/UL (ref 3.8–5.2)
TIBC SERPL-MCNC: 323 UG/DL (ref 240–430)
WBC # BLD AUTO: 3.4 10E3/UL (ref 4–11)

## 2022-09-26 PROCEDURE — 82306 VITAMIN D 25 HYDROXY: CPT

## 2022-09-26 PROCEDURE — 36415 COLL VENOUS BLD VENIPUNCTURE: CPT

## 2022-09-26 PROCEDURE — 80069 RENAL FUNCTION PANEL: CPT

## 2022-09-26 PROCEDURE — 85025 COMPLETE CBC W/AUTO DIFF WBC: CPT

## 2022-09-26 PROCEDURE — 84156 ASSAY OF PROTEIN URINE: CPT

## 2022-09-26 PROCEDURE — 83970 ASSAY OF PARATHORMONE: CPT

## 2022-09-26 PROCEDURE — 82728 ASSAY OF FERRITIN: CPT

## 2022-09-26 PROCEDURE — 83550 IRON BINDING TEST: CPT

## 2022-09-27 ENCOUNTER — TRANSFERRED RECORDS (OUTPATIENT)
Dept: HEALTH INFORMATION MANAGEMENT | Facility: CLINIC | Age: 82
End: 2022-09-27

## 2022-09-27 LAB
ALBUMIN SERPL-MCNC: 3.6 G/DL (ref 3.4–5)
ANION GAP SERPL CALCULATED.3IONS-SCNC: 8 MMOL/L (ref 3–14)
BUN SERPL-MCNC: 34 MG/DL (ref 7–30)
CALCIUM SERPL-MCNC: 8.9 MG/DL (ref 8.5–10.1)
CHLORIDE BLD-SCNC: 107 MMOL/L (ref 94–109)
CO2 SERPL-SCNC: 21 MMOL/L (ref 20–32)
CREAT SERPL-MCNC: 1.86 MG/DL (ref 0.52–1.04)
DEPRECATED CALCIDIOL+CALCIFEROL SERPL-MC: 60 UG/L (ref 20–75)
FERRITIN SERPL-MCNC: 37 NG/ML (ref 8–252)
GFR SERPL CREATININE-BSD FRML MDRD: 26 ML/MIN/1.73M2
GLUCOSE BLD-MCNC: 118 MG/DL (ref 70–99)
PHOSPHATE SERPL-MCNC: 3.8 MG/DL (ref 2.5–4.5)
POTASSIUM BLD-SCNC: 4.4 MMOL/L (ref 3.4–5.3)
SODIUM SERPL-SCNC: 136 MMOL/L (ref 133–144)

## 2022-10-08 ENCOUNTER — OFFICE VISIT (OUTPATIENT)
Dept: URGENT CARE | Facility: URGENT CARE | Age: 82
End: 2022-10-08
Payer: MEDICARE

## 2022-10-08 VITALS
OXYGEN SATURATION: 99 % | SYSTOLIC BLOOD PRESSURE: 118 MMHG | TEMPERATURE: 98.2 F | DIASTOLIC BLOOD PRESSURE: 74 MMHG | HEART RATE: 68 BPM

## 2022-10-08 DIAGNOSIS — N30.01 ACUTE CYSTITIS WITH HEMATURIA: Primary | ICD-10-CM

## 2022-10-08 DIAGNOSIS — N18.9 CHRONIC KIDNEY DISEASE, UNSPECIFIED CKD STAGE: ICD-10-CM

## 2022-10-08 DIAGNOSIS — R35.0 URINARY FREQUENCY: ICD-10-CM

## 2022-10-08 LAB
ALBUMIN UR-MCNC: >=300 MG/DL
APPEARANCE UR: ABNORMAL
BACTERIA #/AREA URNS HPF: ABNORMAL /HPF
BILIRUB UR QL STRIP: NEGATIVE
COLOR UR AUTO: YELLOW
GLUCOSE UR STRIP-MCNC: NEGATIVE MG/DL
HGB UR QL STRIP: ABNORMAL
KETONES UR STRIP-MCNC: NEGATIVE MG/DL
LEUKOCYTE ESTERASE UR QL STRIP: ABNORMAL
NITRATE UR QL: NEGATIVE
PH UR STRIP: 6 [PH] (ref 5–7)
RBC #/AREA URNS AUTO: >100 /HPF
SP GR UR STRIP: 1.02 (ref 1–1.03)
SQUAMOUS #/AREA URNS AUTO: ABNORMAL /LPF
UROBILINOGEN UR STRIP-ACNC: 0.2 E.U./DL
WBC #/AREA URNS AUTO: >100 /HPF

## 2022-10-08 PROCEDURE — 87086 URINE CULTURE/COLONY COUNT: CPT | Performed by: PHYSICIAN ASSISTANT

## 2022-10-08 PROCEDURE — 87186 SC STD MICRODIL/AGAR DIL: CPT | Performed by: PHYSICIAN ASSISTANT

## 2022-10-08 PROCEDURE — 81001 URINALYSIS AUTO W/SCOPE: CPT

## 2022-10-08 PROCEDURE — 87088 URINE BACTERIA CULTURE: CPT | Performed by: PHYSICIAN ASSISTANT

## 2022-10-08 PROCEDURE — 99214 OFFICE O/P EST MOD 30 MIN: CPT | Performed by: PHYSICIAN ASSISTANT

## 2022-10-08 RX ORDER — CEFDINIR 300 MG/1
300 CAPSULE ORAL DAILY
Qty: 10 CAPSULE | Refills: 0 | Status: SHIPPED | OUTPATIENT
Start: 2022-10-08 | End: 2022-10-18

## 2022-10-08 NOTE — PATIENT INSTRUCTIONS
October 8, 2022       1. Drink extra water to flush your bladder      2. Start the antibiotics I prescribed today. Due to your kidney disease/reduced kidney function, I have reduced the amount of antibiotic medication.     I also prescribed a full 10 days of medication because you informed me you often need a longer course of antibiotics to treat your bladder infection.      3.  Take the  full course of antibiotic as prescribed.      4.  Follow-up with your primary care provider  if your symptoms do not improve after 4 doses  of antibiotic.      5.Go to the emergency room immediately if your symptoms worsen--for example if you develop fever, chills, back pain, abdominal pain, nausea, vomiting, weakness.     6. Check in with your kidney doctor's office on Monday to let them know you have an infection and discuss the medication and dosage of the medication I prescribed for you today.

## 2022-10-08 NOTE — PROGRESS NOTES
Assessment/Plan:     (N30.01) Acute cystitis with hematuria  (primary encounter diagnosis)    MDM: 82 year old female, with CKD (Most recent GFR 26 and several prior in low 20s), presenting with classic acute cystitis. UA shows >100 WBCs and >100 RBCs. Patient is sulfa allergic. She reports good response to Omnicef and Keflex in past for UTI treatment. No evidence of upper tract infection today. Renal adjustment to Omnicef. I reviewed this with IM MD in clinic today, who also agrees with decreased renal dosage.     Please see below discharge summary (which I reviewed with patient verbally and provided in printed form today for home review).         October 8, 2022       1. Drink extra water to flush your bladder      2. Start the antibiotics I prescribed today. Due to your kidney disease/reduced kidney function, I have reduced the amount of antibiotic medication.     I also prescribed a full 10 days of medication because you informed me you often need a longer course of antibiotics to treat your bladder infection.      3.  Take the  full course of antibiotic as prescribed.      4.  Follow-up with your primary care provider  if your symptoms do not improve after 4 doses  of antibiotic.      5.Go to the emergency room immediately if your symptoms worsen--for example if you develop fever, chills, back pain, abdominal pain, nausea, vomiting, weakness.     6. Check in with your kidney doctor's office on Monday to let them know you have an infection and discuss the medication and dosage of the medication I prescribed for you today.         Plan: cefdinir (OMNICEF) 300 MG capsule      (N18.9) Chronic kidney disease, unspecified CKD stage*  Plan: cefdinir (OMNICEF) 300 MG capsule      (R35.0) Urinary frequency  Plan: UA reflex to Microscopic and Culture, Urine         Microscopic Exam, Urine Culture         -----------------------------     Gely Keene is a 82 year old female, with a past medical history that  includes CKD and lupus, presenting to urgent care today for evaluation of acute onset dysuria, urinary urgency/frequency and hematuria last night. Current symptoms are reported to be similar to past UTIs.      No prior history of kidney stones or kidney infection by patient report. No gross hematuria. No dark colored urine.       ROS:     CONSITUTIONAL: No fever, chills or acute onset weakness  CARDIAC: No acute chest pain or shortness of breath   RESP: No acute cough, chest pain or shortness of breath   GI: No acute onset abdominal pain. No acute onset vomiting/diarrhea  SKIN: No systemic rash or hives.   NEURO: No acute onset confusion or mental status changes since urinary symptoms began       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)     Advance care planning     Chronic kidney disease, stage III (moderate) (H)     Hyponatremia       Current Outpatient Medications   Medication     azaTHIOprine (IMURAN) 50 MG tablet     carvedilol (COREG) 3.125 MG tablet     Estradiol (IMVEXXY VA)     gabapentin (NEURONTIN) 100 MG capsule     LORazepam (ATIVAN) 0.5 MG tablet     omeprazole (PRILOSEC) 20 MG DR capsule     ondansetron (ZOFRAN ODT) 4 MG ODT tab     oxyCODONE (ROXICODONE) 5 MG tablet     VITAMIN D PO     No current facility-administered medications for this visit.       Allergies   Allergen Reactions     Bactrim [Sulfamethoxazole W/Trimethoprim] GI Disturbance     Vomiting     Codeine      Metronidazole      GI distubance     Sulfa Drugs      Sulfur              OBJECTIVE:  /74   Pulse 68   Temp 98.2  F (36.8   C) (Oral)   SpO2 99%                GENERAL:  Very pleasant, comfortable and generally well appearing.  SKIN: No rashes.  Normal color.  Sclera clear.  CARDIAC:NORMAL - regular rate and rhythm without murmur., normal s1/s2 and without extra heart sounds  RESP: Normal - CTA without rales, rhonchi, or wheezing.  ABDOMEN:  Soft, non-tender, non-distended.  Positive normal bowel sounds.  No HSM or masses.  Positive, mild, suprapubic tenderness.  No CVA tenderness.  NEURO: Alert and oriented.  Normal speech and mentation.  CN II/XII grossly intact.  Gait within normal limits.       Component      Latest Ref Rng & Units 10/8/2022   Color Urine      Colorless, Straw, Light Yellow, Yellow Yellow   Appearance Urine      Clear Cloudy (A)   Glucose Urine      Negative mg/dL Negative   Bilirubin Urine      Negative Negative   Ketones Urine      Negative mg/dL Negative   Specific Gravity Urine      1.003 - 1.035 1.025   Blood Urine      Negative Large (A)   pH Urine      5.0 - 7.0 6.0   Protein Albumin Urine      Negative mg/dL >=300 (A)   Urobilinogen Urine      0.2, 1.0 E.U./dL 0.2   Nitrite Urine      Negative Negative   Leukocyte Esterase Urine      Negative Moderate (A)   Bacteria Urine      None Seen /HPF Many (A)   RBC Urine      0-2 /HPF /HPF >100 (A)   WBC Urine      0-5 /HPF /HPF >100 (A)   Squamous Epithelial /LPF Urine      None Seen /LPF Few (A)         REVIEW OF MOST RECENT RENAL PANEL:     Component      Latest Ref Rng & Units 9/26/2022   Sodium      133 - 144 mmol/L 136   Potassium      3.4 - 5.3 mmol/L 4.4   Chloride      94 - 109 mmol/L 107   Carbon Dioxide      20 - 32 mmol/L 21   Anion Gap      3 - 14 mmol/L 8   Urea Nitrogen      7 - 30 mg/dL 34 (H)   Creatinine      0.52 - 1.04 mg/dL 1.86 (H)   Calcium      8.5 - 10.1 mg/dL 8.9   Glucose      70 - 99 mg/dL 118 (H)   Albumin      3.4 - 5.0 g/dL 3.6   Phosphorus      2.5 - 4.5 mg/dL 3.8   GFR Estimate      >60 mL/min/1.73m2 26 (L)       Below blue Text in  "quotes is Copy/Past from UpToDate Renal Dosing:     \"Dosing: Kidney Impairment: Adult (Cefdinir)  The renal dosing recommendations are based upon the best available evidence and clinical expertise. Senior Editorial Team: Hieu Morton, PharmD, FCCP, FASN, FNKF; Alfonzo Rene, PhD, BPharm ([facility]), [employer], FSHP, FISAC; Olegario Pino MD, MS.  Altered kidney function (Ref): Oral:    CrCl ?30 mL/minute: No dosage adjustment necessary.    CrCl <30 mL/minute: 300 mg once daily.\"  "

## 2022-10-10 LAB — BACTERIA UR CULT: ABNORMAL

## 2022-10-13 ENCOUNTER — TRANSFERRED RECORDS (OUTPATIENT)
Dept: PEDIATRICS | Facility: CLINIC | Age: 82
End: 2022-10-13

## 2022-10-13 LAB
ALT SERPL-CCNC: 11 IU/L (ref 5–35)
AST SERPL-CCNC: 23 U/L (ref 5–34)
CREATININE (EXTERNAL): 1.78 MG/DL (ref 0.5–1.3)

## 2022-10-17 DIAGNOSIS — R10.84 ABDOMINAL PAIN, GENERALIZED: ICD-10-CM

## 2022-10-17 RX ORDER — ONDANSETRON 4 MG/1
4 TABLET, ORALLY DISINTEGRATING ORAL EVERY 8 HOURS PRN
Qty: 30 TABLET | Refills: 1 | Status: SHIPPED | OUTPATIENT
Start: 2022-10-17 | End: 2022-12-20

## 2022-10-19 ENCOUNTER — IMMUNIZATION (OUTPATIENT)
Dept: INTERNAL MEDICINE | Facility: CLINIC | Age: 82
End: 2022-10-19
Payer: MEDICARE

## 2022-10-19 DIAGNOSIS — Z23 NEED FOR PROPHYLACTIC VACCINATION AND INOCULATION AGAINST INFLUENZA: Primary | ICD-10-CM

## 2022-10-19 PROCEDURE — G0008 ADMIN INFLUENZA VIRUS VAC: HCPCS

## 2022-10-19 PROCEDURE — 90662 IIV NO PRSV INCREASED AG IM: CPT

## 2022-12-20 ENCOUNTER — OFFICE VISIT (OUTPATIENT)
Dept: PEDIATRICS | Facility: CLINIC | Age: 82
End: 2022-12-20
Payer: MEDICARE

## 2022-12-20 VITALS
TEMPERATURE: 98 F | HEART RATE: 67 BPM | OXYGEN SATURATION: 98 % | SYSTOLIC BLOOD PRESSURE: 116 MMHG | DIASTOLIC BLOOD PRESSURE: 60 MMHG

## 2022-12-20 VITALS
OXYGEN SATURATION: 98 % | SYSTOLIC BLOOD PRESSURE: 179 MMHG | HEART RATE: 68 BPM | TEMPERATURE: 97.7 F | DIASTOLIC BLOOD PRESSURE: 79 MMHG

## 2022-12-20 DIAGNOSIS — J10.1 INFLUENZA A: ICD-10-CM

## 2022-12-20 DIAGNOSIS — J01.00 ACUTE NON-RECURRENT MAXILLARY SINUSITIS: ICD-10-CM

## 2022-12-20 DIAGNOSIS — R05.1 ACUTE COUGH: ICD-10-CM

## 2022-12-20 DIAGNOSIS — R73.01 IFG (IMPAIRED FASTING GLUCOSE): ICD-10-CM

## 2022-12-20 DIAGNOSIS — M32.9 SYSTEMIC LUPUS ERYTHEMATOSUS, UNSPECIFIED SLE TYPE, UNSPECIFIED ORGAN INVOLVEMENT STATUS (H): ICD-10-CM

## 2022-12-20 DIAGNOSIS — R05.9 COUGH, UNSPECIFIED TYPE: ICD-10-CM

## 2022-12-20 DIAGNOSIS — R10.84 ABDOMINAL PAIN, GENERALIZED: ICD-10-CM

## 2022-12-20 DIAGNOSIS — K94.19 ALTERED BOWEL ELIMINATION DUE TO INTESTINAL OSTOMY (H): ICD-10-CM

## 2022-12-20 DIAGNOSIS — I10 HYPERTENSION GOAL BP (BLOOD PRESSURE) < 140/90: ICD-10-CM

## 2022-12-20 DIAGNOSIS — K21.9 GASTROESOPHAGEAL REFLUX DISEASE WITHOUT ESOPHAGITIS: ICD-10-CM

## 2022-12-20 DIAGNOSIS — F41.9 ANXIETY: ICD-10-CM

## 2022-12-20 DIAGNOSIS — E86.0 DEHYDRATION: Primary | ICD-10-CM

## 2022-12-20 DIAGNOSIS — N18.4 CKD STAGE 4 SECONDARY TO HYPERTENSION (H): ICD-10-CM

## 2022-12-20 DIAGNOSIS — G62.89 AXONAL SENSORIMOTOR NEUROPATHY: ICD-10-CM

## 2022-12-20 DIAGNOSIS — I12.9 CKD STAGE 4 SECONDARY TO HYPERTENSION (H): ICD-10-CM

## 2022-12-20 DIAGNOSIS — Z00.00 MEDICARE ANNUAL WELLNESS VISIT, SUBSEQUENT: Primary | ICD-10-CM

## 2022-12-20 DIAGNOSIS — Z90.49 S/P TOTAL COLECTOMY: ICD-10-CM

## 2022-12-20 DIAGNOSIS — J32.9 SINUSITIS, UNSPECIFIED CHRONICITY, UNSPECIFIED LOCATION: ICD-10-CM

## 2022-12-20 DIAGNOSIS — E86.0 DEHYDRATION: ICD-10-CM

## 2022-12-20 LAB
ALBUMIN SERPL-MCNC: 3.8 G/DL (ref 3.4–5)
ALP SERPL-CCNC: 63 U/L (ref 40–150)
ALT SERPL W P-5'-P-CCNC: 18 U/L (ref 0–50)
ANION GAP SERPL CALCULATED.3IONS-SCNC: 15 MMOL/L (ref 3–14)
AST SERPL W P-5'-P-CCNC: 35 U/L (ref 0–45)
BASOPHILS # BLD AUTO: 0 10E3/UL (ref 0–0.2)
BASOPHILS NFR BLD AUTO: 1 %
BILIRUB SERPL-MCNC: 0.9 MG/DL (ref 0.2–1.3)
BUN SERPL-MCNC: 38 MG/DL (ref 7–30)
CALCIUM SERPL-MCNC: 10 MG/DL (ref 8.5–10.1)
CHLORIDE BLD-SCNC: 103 MMOL/L (ref 94–109)
CHOLEST SERPL-MCNC: 319 MG/DL
CO2 SERPL-SCNC: 23 MMOL/L (ref 20–32)
CREAT SERPL-MCNC: 2.3 MG/DL (ref 0.52–1.04)
EOSINOPHIL # BLD AUTO: 0 10E3/UL (ref 0–0.7)
EOSINOPHIL NFR BLD AUTO: 1 %
ERYTHROCYTE [DISTWIDTH] IN BLOOD BY AUTOMATED COUNT: 13.8 % (ref 10–15)
FLUAV AG SPEC QL IA: POSITIVE
FLUBV AG SPEC QL IA: NEGATIVE
GFR SERPL CREATININE-BSD FRML MDRD: 20 ML/MIN/1.73M2
GLUCOSE BLD-MCNC: 141 MG/DL (ref 70–99)
HBA1C MFR BLD: 6 % (ref 0–5.6)
HCT VFR BLD AUTO: 34.8 % (ref 35–47)
HDLC SERPL-MCNC: 45 MG/DL
HGB BLD-MCNC: 11.7 G/DL (ref 11.7–15.7)
LDLC SERPL CALC-MCNC: 239 MG/DL
LYMPHOCYTES # BLD AUTO: 0.7 10E3/UL (ref 0.8–5.3)
LYMPHOCYTES NFR BLD AUTO: 22 %
MCH RBC QN AUTO: 32 PG (ref 26.5–33)
MCHC RBC AUTO-ENTMCNC: 33.6 G/DL (ref 31.5–36.5)
MCV RBC AUTO: 95 FL (ref 78–100)
MONOCYTES # BLD AUTO: 0.5 10E3/UL (ref 0–1.3)
MONOCYTES NFR BLD AUTO: 16 %
NEUTROPHILS # BLD AUTO: 2 10E3/UL (ref 1.6–8.3)
NEUTROPHILS NFR BLD AUTO: 60 %
NONHDLC SERPL-MCNC: 274 MG/DL
PLATELET # BLD AUTO: 174 10E3/UL (ref 150–450)
POTASSIUM BLD-SCNC: 4 MMOL/L (ref 3.4–5.3)
PROT SERPL-MCNC: 7.5 G/DL (ref 6.8–8.8)
RBC # BLD AUTO: 3.66 10E6/UL (ref 3.8–5.2)
SARS-COV-2 RNA RESP QL NAA+PROBE: NEGATIVE
SODIUM SERPL-SCNC: 141 MMOL/L (ref 133–144)
TRIGL SERPL-MCNC: 174 MG/DL
WBC # BLD AUTO: 3.3 10E3/UL (ref 4–11)

## 2022-12-20 PROCEDURE — 99207 REFERRAL TO ACUTE AND DIAGNOSTIC SERVICES: CPT | Performed by: PEDIATRICS

## 2022-12-20 PROCEDURE — 80053 COMPREHEN METABOLIC PANEL: CPT | Performed by: PEDIATRICS

## 2022-12-20 PROCEDURE — G0439 PPPS, SUBSEQ VISIT: HCPCS | Performed by: PEDIATRICS

## 2022-12-20 PROCEDURE — U0005 INFEC AGEN DETEC AMPLI PROBE: HCPCS | Performed by: PEDIATRICS

## 2022-12-20 PROCEDURE — 83036 HEMOGLOBIN GLYCOSYLATED A1C: CPT | Performed by: PEDIATRICS

## 2022-12-20 PROCEDURE — 87804 INFLUENZA ASSAY W/OPTIC: CPT | Performed by: PEDIATRICS

## 2022-12-20 PROCEDURE — 99215 OFFICE O/P EST HI 40 MIN: CPT | Mod: 25 | Performed by: FAMILY MEDICINE

## 2022-12-20 PROCEDURE — 96375 TX/PRO/DX INJ NEW DRUG ADDON: CPT | Performed by: FAMILY MEDICINE

## 2022-12-20 PROCEDURE — 80061 LIPID PANEL: CPT | Performed by: PEDIATRICS

## 2022-12-20 PROCEDURE — U0003 INFECTIOUS AGENT DETECTION BY NUCLEIC ACID (DNA OR RNA); SEVERE ACUTE RESPIRATORY SYNDROME CORONAVIRUS 2 (SARS-COV-2) (CORONAVIRUS DISEASE [COVID-19]), AMPLIFIED PROBE TECHNIQUE, MAKING USE OF HIGH THROUGHPUT TECHNOLOGIES AS DESCRIBED BY CMS-2020-01-R: HCPCS | Performed by: PEDIATRICS

## 2022-12-20 PROCEDURE — 96374 THER/PROPH/DIAG INJ IV PUSH: CPT | Performed by: FAMILY MEDICINE

## 2022-12-20 PROCEDURE — 85025 COMPLETE CBC W/AUTO DIFF WBC: CPT | Performed by: PEDIATRICS

## 2022-12-20 PROCEDURE — 99214 OFFICE O/P EST MOD 30 MIN: CPT | Mod: 25 | Performed by: PEDIATRICS

## 2022-12-20 PROCEDURE — 36415 COLL VENOUS BLD VENIPUNCTURE: CPT | Performed by: PEDIATRICS

## 2022-12-20 PROCEDURE — 96361 HYDRATE IV INFUSION ADD-ON: CPT | Performed by: FAMILY MEDICINE

## 2022-12-20 RX ORDER — DULOXETIN HYDROCHLORIDE 30 MG/1
30 CAPSULE, DELAYED RELEASE ORAL DAILY
Qty: 30 CAPSULE | Refills: 5 | Status: SHIPPED | OUTPATIENT
Start: 2022-12-20 | End: 2023-05-26

## 2022-12-20 RX ORDER — ALBUTEROL SULFATE 90 UG/1
2 AEROSOL, METERED RESPIRATORY (INHALATION) EVERY 6 HOURS PRN
Qty: 18 G | Refills: 0 | Status: SHIPPED | OUTPATIENT
Start: 2022-12-20 | End: 2024-03-18

## 2022-12-20 RX ORDER — ONDANSETRON 4 MG/1
4 TABLET, ORALLY DISINTEGRATING ORAL EVERY 8 HOURS PRN
Qty: 30 TABLET | Refills: 1 | Status: SHIPPED | OUTPATIENT
Start: 2022-12-20 | End: 2023-01-19

## 2022-12-20 RX ORDER — ONDANSETRON 2 MG/ML
4 INJECTION INTRAMUSCULAR; INTRAVENOUS ONCE
Status: COMPLETED | OUTPATIENT
Start: 2022-12-20 | End: 2022-12-20

## 2022-12-20 RX ORDER — CEFDINIR 300 MG/1
600 CAPSULE ORAL DAILY
Qty: 20 CAPSULE | Refills: 0 | Status: SHIPPED | OUTPATIENT
Start: 2022-12-20 | End: 2022-12-30

## 2022-12-20 RX ORDER — CEFTRIAXONE 1 G/1
500 INJECTION, POWDER, FOR SOLUTION INTRAMUSCULAR; INTRAVENOUS ONCE
Status: COMPLETED | OUTPATIENT
Start: 2022-12-20 | End: 2022-12-20

## 2022-12-20 RX ORDER — BENZONATATE 100 MG/1
100 CAPSULE ORAL 3 TIMES DAILY PRN
Qty: 21 CAPSULE | Refills: 1 | Status: SHIPPED | OUTPATIENT
Start: 2022-12-20 | End: 2023-01-19

## 2022-12-20 RX ORDER — ACETAMINOPHEN 500 MG
500 TABLET ORAL ONCE
Status: COMPLETED | OUTPATIENT
Start: 2022-12-20 | End: 2022-12-20

## 2022-12-20 RX ADMIN — Medication 500 MG: at 14:03

## 2022-12-20 RX ADMIN — Medication 1000 ML: at 14:40

## 2022-12-20 RX ADMIN — ONDANSETRON 4 MG: 2 INJECTION INTRAMUSCULAR; INTRAVENOUS at 13:07

## 2022-12-20 RX ADMIN — CEFTRIAXONE 500 MG: 1 INJECTION, POWDER, FOR SOLUTION INTRAMUSCULAR; INTRAVENOUS at 13:13

## 2022-12-20 RX ADMIN — Medication 1000 ML: at 13:06

## 2022-12-20 SDOH — ECONOMIC STABILITY: FOOD INSECURITY: WITHIN THE PAST 12 MONTHS, YOU WORRIED THAT YOUR FOOD WOULD RUN OUT BEFORE YOU GOT MONEY TO BUY MORE.: NEVER TRUE

## 2022-12-20 SDOH — HEALTH STABILITY: PHYSICAL HEALTH: ON AVERAGE, HOW MANY MINUTES DO YOU ENGAGE IN EXERCISE AT THIS LEVEL?: 0 MIN

## 2022-12-20 SDOH — ECONOMIC STABILITY: INCOME INSECURITY: HOW HARD IS IT FOR YOU TO PAY FOR THE VERY BASICS LIKE FOOD, HOUSING, MEDICAL CARE, AND HEATING?: NOT VERY HARD

## 2022-12-20 SDOH — ECONOMIC STABILITY: INCOME INSECURITY: IN THE LAST 12 MONTHS, WAS THERE A TIME WHEN YOU WERE NOT ABLE TO PAY THE MORTGAGE OR RENT ON TIME?: NO

## 2022-12-20 SDOH — ECONOMIC STABILITY: FOOD INSECURITY: WITHIN THE PAST 12 MONTHS, THE FOOD YOU BOUGHT JUST DIDN'T LAST AND YOU DIDN'T HAVE MONEY TO GET MORE.: NEVER TRUE

## 2022-12-20 SDOH — HEALTH STABILITY: PHYSICAL HEALTH: ON AVERAGE, HOW MANY DAYS PER WEEK DO YOU ENGAGE IN MODERATE TO STRENUOUS EXERCISE (LIKE A BRISK WALK)?: 0 DAYS

## 2022-12-20 ASSESSMENT — SOCIAL DETERMINANTS OF HEALTH (SDOH)
DO YOU BELONG TO ANY CLUBS OR ORGANIZATIONS SUCH AS CHURCH GROUPS UNIONS, FRATERNAL OR ATHLETIC GROUPS, OR SCHOOL GROUPS?: NO
HOW OFTEN DO YOU GET TOGETHER WITH FRIENDS OR RELATIVES?: TWICE A WEEK
HOW OFTEN DO YOU ATTEND CHURCH OR RELIGIOUS SERVICES?: 1 TO 4 TIMES PER YEAR
IN A TYPICAL WEEK, HOW MANY TIMES DO YOU TALK ON THE PHONE WITH FAMILY, FRIENDS, OR NEIGHBORS?: MORE THAN THREE TIMES A WEEK

## 2022-12-20 ASSESSMENT — LIFESTYLE VARIABLES
HOW MANY STANDARD DRINKS CONTAINING ALCOHOL DO YOU HAVE ON A TYPICAL DAY: 1 OR 2
SKIP TO QUESTIONS 9-10: 1
HOW OFTEN DO YOU HAVE SIX OR MORE DRINKS ON ONE OCCASION: NEVER
AUDIT-C TOTAL SCORE: 1
HOW OFTEN DO YOU HAVE A DRINK CONTAINING ALCOHOL: MONTHLY OR LESS

## 2022-12-20 ASSESSMENT — ENCOUNTER SYMPTOMS
NERVOUS/ANXIOUS: 1
PALPITATIONS: 0
FEVER: 0
ARTHRALGIAS: 1
DIARRHEA: 1
DYSURIA: 0
WEAKNESS: 1
MYALGIAS: 1
DIZZINESS: 1
JOINT SWELLING: 0
BREAST MASS: 0
ABDOMINAL PAIN: 0
SORE THROAT: 0
SHORTNESS OF BREATH: 1
NAUSEA: 1
HEMATURIA: 0
HEMATOCHEZIA: 0
FREQUENCY: 0
HEARTBURN: 0
EYE PAIN: 1
PARESTHESIAS: 1
CHILLS: 1
HEADACHES: 1
COUGH: 1
CONSTIPATION: 0

## 2022-12-20 ASSESSMENT — ACTIVITIES OF DAILY LIVING (ADL)
CURRENT_FUNCTION: PREPARING MEALS REQUIRES ASSISTANCE
CURRENT_FUNCTION: SHOPPING REQUIRES ASSISTANCE
CURRENT_FUNCTION: HOUSEWORK REQUIRES ASSISTANCE

## 2022-12-20 ASSESSMENT — PAIN SCALES - GENERAL: PAINLEVEL: MILD PAIN (2)

## 2022-12-20 NOTE — PROGRESS NOTES
{PROVIDER CHARTING PREFERENCE:975610}    Subjective   Gely is a 82 year old{ACCOMPANIED BY STATEMENT (Optional):228941}, presenting for the following health issues:  No chief complaint on file.      HPI     Concern - Cough for about one week  Onset: One week  Description: Started with a cough, has gotten increasingly worse; weak, nausea and vomiting, afebrile  Intensity: severe  Progression of Symptoms:  worsening  Previous history of similar problem: None  Precipitating factors:        Worsened by: Movement, eating  Alleviating factors:        Improved by: Lying still  Therapies tried and outcome: Mucinex            Review of Systems   {ROS COMP (Optional):744303}      Objective    There were no vitals taken for this visit.  There is no height or weight on file to calculate BMI.  Physical Exam   {Exam List (Optional):068491}    {Diagnostic Test Results (Optional):418697}    {AMBULATORY ATTESTATION (Optional):411648}

## 2022-12-20 NOTE — PROGRESS NOTES
"SUBJECTIVE:   Gely is a 82 year old who presents for Preventive Visit.    Patient has been advised of split billing requirements and indicates understanding: Yes     Are you in the first 12 months of your Medicare coverage?  No    Healthy Habits:     In general, how would you rate your overall health?  Fair    Frequency of exercise:  1 day/week    Duration of exercise:  30-45 minutes    Do you usually eat at least 4 servings of fruit and vegetables a day, include whole grains    & fiber and avoid regularly eating high fat or \"junk\" foods?  No    Taking medications regularly:  Yes    Medication side effects:  Muscle aches, Lightheadedness and Other    Ability to successfully perform activities of daily living:  Shopping requires assistance, preparing meals requires assistance and housework requires assistance    Home Safety:  No safety concerns identified    Hearing Impairment:  No hearing concerns    In the past 6 months, have you been bothered by leaking of urine?  No    In general, how would you rate your overall mental or emotional health?  Good      PHQ-2 Total Score: 2    Additional concerns today:  Yes    \"Frog\" cough   Vomiting when trying to eat  Headaches  Body aches  Rib pain from coughing   Legs are also bothering her  No fever    Have you ever done Advance Care Planning? (For example, a Health Directive, POLST, or a discussion with a medical provider or your loved ones about your wishes): Yes, advance care planning is on file.      Fall risk  Fallen 2 or more times in the past year?: No  Any fall with injury in the past year?: No    Cognitive Screening   1) Repeat 3 items (Leader, Season, Table)    2) Clock draw: NORMAL  3) 3 item recall: Recalls 3 objects  Results: 3 items recalled: COGNITIVE IMPAIRMENT LESS LIKELY, normal clock    Mini-CogTM Copyright JULIO Watts. Licensed by the author for use in [facility]; reprinted with permission (minoo@.[facility]). All rights reserved.      Do you have " "sleep apnea, excessive snoring or daytime drowsiness?: no     Social History     Tobacco Use     Smoking status: Never     Smokeless tobacco: Never   Substance Use Topics     Alcohol use: Yes     Comment: socially     If you drink alcohol do you typically have >3 drinks per day or >7 drinks per week? No    Alcohol Use 12/20/2022   Prescreen: >3 drinks/day or >7 drinks/week? No   Prescreen: >3 drinks/day or >7 drinks/week? -       Current providers sharing in care for this patient include:   Patient Care Team:  Hien Booth MD as PCP - General (Internal Medicine)  Shana Alaniz MD as MD (Colon and Rectal Surgery)  Hien Booth MD as Assigned PCP  Ashely Solis RN as Personal Advocate & Liaison (PAL)    The following health maintenance items are reviewed in Epic and correct as of today:  Health Maintenance   Topic Date Due     ANNUAL REVIEW OF HM ORDERS  Never done     ZOSTER IMMUNIZATION (1 of 2) 01/04/2013     DTAP/TDAP/TD IMMUNIZATION (2 - Td or Tdap) 10/24/2021     COVID-19 Vaccine (5 - Booster for Pfizer series) 09/23/2022     CMP  12/19/2022     MEDICARE ANNUAL WELLNESS VISIT  12/14/2022     BMP  09/26/2023     MICROALBUMIN  09/26/2023     HEMOGLOBIN  09/26/2023     FALL RISK ASSESSMENT  12/20/2023     ADVANCE CARE PLANNING  12/20/2027     DEXA  12/05/2028     PHQ-2 (once per calendar year)  Completed     INFLUENZA VACCINE  Completed     Pneumococcal Vaccine: 65+ Years  Completed     URINALYSIS  Completed     IPV IMMUNIZATION  Aged Out     MENINGITIS IMMUNIZATION  Aged Out     A1C  Discontinued     LIPID  Discontinued     MAMMO SCREENING  Discontinued     \"Frog\" cough   Vomiting when trying to eat  Headaches  Body aches  Rib pain from coughing   Legs are also bothering her  No fever    Acute concerns - about a week of symptoms -  ill before her with similar symptoms and was COVID negative.   Having body aches, headaches, cough, congestion, facial pain/pressure.  No " difficulty breathing or fever.  Feeling dizzy with standing.  Vomiting up most of what she eats/drinks.       SLE - on immunosuppression - follows with Dr Tabares - patient reports that gabapentin not helping for her leg pain.       CKD - stage IV - following with Dr Singer.     HTN - usually controlled, acutely ill today    Neuropathy - axonal neuropathy diagnosed through [facility] visit.  On gabapentin.  Leg pain is really bothersome for her at night    Hearing aids - new since our last visit for SNHL - feels that they are functioning well    Melanoma - diagnosed since our last visit, surgically removed from anterior chest wall.  Healing well.  No acute skin concerns.    Ileostomy - has been doing well with stoma cares and hasn't noticed large change in output with acute illness.  S/p total colectomy for severe illness related to c diff colitis in 2016.    GERD - on PPI        Referral to Acute and Diagnostic Services    The [facility] has been contacted at [phone] ([place]) to confirm patient acceptance. The transition to Acute & Diagnostic Services Clinic has been discussed with patient, and she agrees with next level of care.  Patient understands that evaluation/treatment at [facility] typically takes significantly longer than in clinic/urgent care (>2 hours).          Special issues:  None        Referral placed: Yes  Patient has transportation arranged and will travel to the [facility] without delay: Yes  Patient aware not to eat or drink. Yes    The following provider has assessed this patient for intervention at [facility], and directed the patient for referral: Hien Booth MD    Mammogram Screening - Patient over age 75, has elected to discontinue screenings.  Pertinent mammograms are reviewed under the imaging tab.    Review of Systems   Constitutional: Positive for chills. Negative for fever.   HENT: Positive for congestion and hearing loss. Negative for ear  "pain and sore throat.    Eyes: Positive for pain. Negative for visual disturbance.   Respiratory: Positive for cough and shortness of breath.    Cardiovascular: Positive for chest pain. Negative for palpitations and peripheral edema.   Gastrointestinal: Positive for diarrhea and nausea. Negative for abdominal pain, constipation, heartburn and hematochezia.   Breasts:  Negative for tenderness, breast mass and discharge.   Genitourinary: Negative for dysuria, frequency, genital sores, hematuria, pelvic pain, urgency, vaginal bleeding and vaginal discharge.   Musculoskeletal: Positive for arthralgias and myalgias. Negative for joint swelling.   Skin: Positive for rash.   Neurological: Positive for dizziness, weakness, headaches and paresthesias.   Psychiatric/Behavioral: Positive for mood changes. The patient is nervous/anxious.      Constitutional, HEENT, cardiovascular, pulmonary, GI, , musculoskeletal, neuro, skin, endocrine and psych systems are negative, except as otherwise noted.    OBJECTIVE:   /60 (BP Location: Right arm, Patient Position: Sitting, Cuff Size: Adult Regular)   Pulse 67   Temp 98  F (36.7  C) (Tympanic)   SpO2 98%  Estimated body mass index is 23.43 kg/m  as calculated from the following:    Height as of 12/28/21: 1.549 m (5' 1\").    Weight as of 12/28/21: 56.2 kg (124 lb).  Physical Exam  GENERAL: alert, frail, elderly and fatigued, hypovolemic  EYES: Eyes grossly normal to inspection, PERRL and conjunctivae and sclerae normal  HENT: normal cephalic/atraumatic, both ears: hearing aids in place, nose and mouth without ulcers or lesions, oropharynx clear, oral mucous membranes moist and sinuses: maxillary tenderness on both sides  NECK: no adenopathy, no asymmetry, masses, or scars and thyroid normal to palpation  RESP: no accessory muscle use, upper airway rhonchi clear with cough, no rales, scattered end expiratory wheezes posteriorly  CV: regular rate and rhythm, normal S1 S2, no S3 " or S4, no murmur, click or rub, no peripheral edema and peripheral pulses strong  ABDOMEN: ileostomy in place, healthy appearing stoma, liquid stool without blood or mucous in pouch, belly soft, +BS  MS: no gross musculoskeletal defects noted, no edema  SKIN: well healed past surgical excision site for melanoma anterior chest, skin dry and tenting  NEURO: mentation intact, speech normal and cranial nerves 2-12 intact  PSYCH: mentation appears normal, fatigued, judgement and insight intact and appearance well groomed    Diagnostic Test Results:  Labs reviewed in [facility]  New labs pending    ASSESSMENT / PLAN:       ICD-10-CM    1. Medicare annual wellness visit, subsequent  Z00.00 Lipid panel reflex to direct LDL Fasting     Comprehensive metabolic panel (BMP + Alb, Alk Phos, ALT, AST, Total. Bili, TP)     Hemoglobin A1c      2. Hypertension goal BP (blood pressure) < 140/90  I10 Lipid panel reflex to direct LDL Fasting     Comprehensive metabolic panel (BMP + Alb, Alk Phos, ALT, AST, Total. Bili, TP)    Adequately controlled typically, acutely ill today.  Will continue to monitor, continue coreg      3. S/p total colectomy on 4/22/16 by Shana Alaniz MD  Z90.49 Doing well with ostomy cares, continue to monitor      4. 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)  K94.19 See above - patient aware of increased hydration needs s/p colectomy      5. CKD stage 4 secondary to hypertension (H)  I12.9 Lipid panel reflex to direct LDL Fasting    N18.4 Comprehensive metabolic panel (BMP + Alb, Alk Phos, ALT, AST, Total. Bili, TP)    Greatly appreciate care from Dr Singer - patient following with him every 3-4 months      6. Influenza A  J10.1 New diagnosis today - out of the window for tamiflu.  Significant dehydration - sent to [facility] for IV fluids      7. Systemic lupus erythematosus, unspecified SLE type, unspecified organ involvement status (H)  M32.9 DULoxetine (CYMBALTA) 30 MG capsule    With associated leg  pain that is very bothersome at night.  Plan to start duloxetine and follow in combination with the gabapentin that she is taking at bedtime at present.       Appreciate care and assistance from Dr Tabares and his team.      8. Anxiety  F41.9 DULoxetine (CYMBALTA) 30 MG capsule    Suspect a component of anxiety and depression is also contributing to symptoms - start SNRI      9. IFG (impaired fasting glucose)  R73.01 Hemoglobin A1c  Repeat labs today      10. Gastroesophageal reflux disease without esophagitis  K21.9 Continue PPI      11. Axonal sensorimotor neuropathy  G62.89 DULoxetine (CYMBALTA) 30 MG capsule  See above, also continue gabapentin      12. Acute cough  R05.1 Symptomatic COVID-19 Virus (Coronavirus) by PCR Nose     Influenza A/B antigen     Symptomatic COVID-19 Virus (Coronavirus) by PCR Nose      13. Abdominal pain, generalized  R10.84 ondansetron (ZOFRAN ODT) 4 MG ODT tab     CBC with platelets and differential    Likely secondary to influenza - repeat labs, treat symptomatically with zofran      14. Acute non-recurrent maxillary sinusitis  J01.00 cefdinir (OMNICEF) 300 MG capsule    Tolerated this antibiotic well recently for UTI, will utilize again      15. Cough, unspecified type  R05.9 Symptomatic COVID-19 Virus (Coronavirus) by PCR Nose     Influenza A/B antigen     Symptomatic COVID-19 Virus (Coronavirus) by PCR Nose     albuterol (PROAIR HFA/PROVENTIL HFA/VENTOLIN HFA) 108 (90 Base) MCG/ACT inhaler    Start inhaler - mild wheeze today      16. Dehydration  E86.0 Comprehensive metabolic panel (BMP + Alb, Alk Phos, ALT, AST, Total. Bili, TP)     Referral to Acute and Diagnostic Services (Day of diagnostic / First order acute)    Greatly appreciate care from [facility] - plan for IV fluids, ceftriaxone, IV zofran today.              COUNSELING:  Reviewed preventive health counseling, as reflected in patient instructions        She reports that she has never smoked. She has never used smokeless  tobacco.      Appropriate preventive services were discussed with this patient, including applicable screening as appropriate for cardiovascular disease, diabetes, osteopenia/osteoporosis, and glaucoma.  As appropriate for age/gender, discussed screening for colorectal cancer, prostate cancer, breast cancer, and cervical cancer. Checklist reviewing preventive services available has been given to the patient.    Reviewed patients plan of care and provided an AVS. The Complex Care Plan (for patients with higher acuity and needing more deliberate coordination of services) for Gely meets the Care Plan requirement. This Care Plan has been established and reviewed with the Patient and daughter.      Hien Booth MD  [facility]    Identified Health Risks:

## 2022-12-20 NOTE — PATIENT INSTRUCTIONS
Labs today    Start antibiotic (omnicef) and albuterol inhaler.  Use the inhaler every 2 hours.    Start cymbalta (duloxetine) once you feel better    Use zofran before you try to eat and drink    Go to [facility] for IV fluids today    Please keep me posted with how you are feeling!        Patient Education   Personalized Prevention Plan  You are due for the preventive services outlined below.  Your care team is available to assist you in scheduling these services.  If you have already completed any of these items, please share that information with your care team to update in your medical record.  Health Maintenance Due   Topic Date Due    ANNUAL REVIEW OF HM ORDERS  Never done    Zoster (Shingles) Vaccine (1 of 2) 01/04/2013    Diptheria Tetanus Pertussis (DTAP/TDAP/TD) Vaccine (2 - Td or Tdap) 10/24/2021    COVID-19 Vaccine (5 - Booster for Pfizer series) 09/23/2022    Comprehensive Metabolic Panel  12/19/2022    Annual Wellness Visit  12/14/2022

## 2022-12-20 NOTE — PROGRESS NOTES
Assessment & Plan     Dehydration  - Referral to Acute and Diagnostic Services (Day of diagnostic / First order acute)  - sodium chloride (PF) 0.9% PF flush 3 mL  - IV access  - ondansetron (ZOFRAN) injection 4 mg  - 0.9% sodium chloride BOLUS  - 0.9% sodium chloride BOLUS    Influenza A  - 0.9% sodium chloride BOLUS  - acetaminophen (TYLENOL) tablet 500 mg    Sinusitis, unspecified chronicity, unspecified location  - cefTRIAXone (ROCEPHIN) in sterile water for IV administration 500 mg  - 0.9% sodium chloride BOLUS     IV Rocephin to treat sinusitis as discussed with PCP, 500mg given after considering renal function ( ~CrCl 15-20) .   IV zofran and fluids were also provided today ( 2L of NS)     Blood work suggestive of acute on chronic kidney disease presumed to be secondary to dehydration from recent influenza A illness. Given duration of symptoms she is unlikely to benefit from tamiflu therapy    Normal mentation at this time.     Tessalon/DM recommended for cough. Suspicion for pneumonia is low with her stable oxygen level and absence of fevers. She is to continue oral cefdinir to manage for a sinus infection as prescribed today by PCP    If appetite and/or symptoms haven't improved in next 2 days return to be evaluated    Nestor Shepard MD   [facility]    Aracelis Hong is a 82 year old female who presents to clinic today for the following health issues:  Chief Complaint   Patient presents with     Breathing Problem     Gastrointestinal Problem     HPI    Patient is referred to the [facility] from her primary care clinic for evaluation of dehydration. Patient diagnosed with influenza A today her cough continues but has not had any fevers.     Decreased oral intake of food/liquids during this period of illness    She is accompanied by her daughter today    Patient Active Problem List    Diagnosis Date Noted     Axonal sensorimotor neuropathy 12/20/2022     Priority: Medium      Hyponatremia 09/21/2020     Priority: Medium     Chronic kidney disease, stage III (moderate) (H) 01/10/2018     Priority: Medium     Advance care planning 11/14/2017     Priority: Medium     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) 11/07/2016     Priority: Medium     Health Care Home 05/23/2016     Priority: Medium              S/p total colectomy on 4/22/16 by Shana Alaniz MD 05/10/2016     Priority: Medium     Restless legs syndrome (RLS) 05/10/2016     Priority: Medium     Anxiety 05/10/2016     Priority: Medium     Gastroesophageal reflux disease, esophagitis presence not specified 11/04/2015     Priority: Medium     [facility] Regulatory Load OCT 2020       Hypertension goal BP (blood pressure) < 140/90 10/30/2014     Priority: Medium     IFG (impaired fasting glucose) 10/30/2014     Priority: Medium     BCC (basal cell carcinoma) 10/30/2014     Priority: Medium     Hyperlipidemia LDL goal <160 09/08/2014     Priority: Medium       Current Outpatient Medications   Medication     benzonatate (TESSALON) 100 MG capsule     albuterol (PROAIR HFA/PROVENTIL HFA/VENTOLIN HFA) 108 (90 Base) MCG/ACT inhaler     azaTHIOprine (IMURAN) 50 MG tablet     carvedilol (COREG) 3.125 MG tablet     cefdinir (OMNICEF) 300 MG capsule     DULoxetine (CYMBALTA) 30 MG capsule     Estradiol (IMVEXXY VA)     gabapentin (NEURONTIN) 100 MG capsule     LORazepam (ATIVAN) 0.5 MG tablet     omeprazole (PRILOSEC) 20 MG DR capsule     ondansetron (ZOFRAN ODT) 4 MG ODT tab     oxyCODONE (ROXICODONE) 5 MG tablet     VITAMIN D PO     Current Facility-Administered Medications   Medication     0.9% sodium chloride BOLUS     sodium chloride (PF) 0.9% PF flush 3 mL           Objective    BP (!) 147/83 (BP Location: Right arm, Patient Position: Supine, Cuff Size: Adult Regular)   Pulse 68   Temp 97.7  F (36.5  C) (Oral)   SpO2 98%   Physical Exam   GEN:normal mentation  CV: HDS  Pulm: non-labored    Results for orders placed or  performed in visit on 12/20/22   Comprehensive metabolic panel (BMP + Alb, Alk Phos, ALT, AST, Total. Bili, TP)     Status: Abnormal   Result Value Ref Range    Sodium 141 133 - 144 mmol/L    Potassium 4.0 3.4 - 5.3 mmol/L    Chloride 103 94 - 109 mmol/L    Carbon Dioxide (CO2) 23 20 - 32 mmol/L    Anion Gap 15 (H) 3 - 14 mmol/L    Urea Nitrogen 38 (H) 7 - 30 mg/dL    Creatinine 2.30 (H) 0.52 - 1.04 mg/dL    Calcium 10.0 8.5 - 10.1 mg/dL    Glucose 141 (H) 70 - 99 mg/dL    Alkaline Phosphatase 63 40 - 150 U/L    AST 35 0 - 45 U/L    ALT 18 0 - 50 U/L    Protein Total 7.5 6.8 - 8.8 g/dL    Albumin 3.8 3.4 - 5.0 g/dL    Bilirubin Total 0.9 0.2 - 1.3 mg/dL    GFR Estimate 20 (L) >60 mL/min/1.73m2   Hemoglobin A1c     Status: Abnormal   Result Value Ref Range    Hemoglobin A1C 6.0 (H) 0.0 - 5.6 %   CBC with platelets and differential     Status: Abnormal   Result Value Ref Range    WBC Count 3.3 (L) 4.0 - 11.0 10e3/uL    RBC Count 3.66 (L) 3.80 - 5.20 10e6/uL    Hemoglobin 11.7 11.7 - 15.7 g/dL    Hematocrit 34.8 (L) 35.0 - 47.0 %    MCV 95 78 - 100 fL    MCH 32.0 26.5 - 33.0 pg    MCHC 33.6 31.5 - 36.5 g/dL    RDW 13.8 10.0 - 15.0 %    Platelet Count 174 150 - 450 10e3/uL    % Neutrophils 60 %    % Lymphocytes 22 %    % Monocytes 16 %    % Eosinophils 1 %    % Basophils 1 %    Absolute Neutrophils 2.0 1.6 - 8.3 10e3/uL    Absolute Lymphocytes 0.7 (L) 0.8 - 5.3 10e3/uL    Absolute Monocytes 0.5 0.0 - 1.3 10e3/uL    Absolute Eosinophils 0.0 0.0 - 0.7 10e3/uL    Absolute Basophils 0.0 0.0 - 0.2 10e3/uL   Influenza A/B antigen     Status: Abnormal    Specimen: Nose; Swab   Result Value Ref Range    Influenza A antigen Positive (A) Negative    Influenza B antigen Negative Negative    Narrative    Test results must be correlated with clinical data. If necessary, results should be confirmed by a molecular assay or viral culture.   CBC with platelets and differential     Status: Abnormal    Narrative    The following orders  were created for panel order CBC with platelets and differential.  Procedure                               Abnormality         Status                     ---------                               -----------         ------                     CBC with platelets and d...[[id number]]  Abnormal            Final result                 Please view results for these tests on the individual orders.                     The use of [email or web address] services may have been used to construct the content in this note; any grammatical or spelling errors are non-intentional. Please contact the author of this note directly if you are in need of any clarification.

## 2022-12-20 NOTE — PATIENT INSTRUCTIONS
Goal is 60-80 ounces of fluid a day to keep hydrated      If symptoms not improving over next 2 days please seek medical attention

## 2022-12-21 ENCOUNTER — TELEPHONE (OUTPATIENT)
Dept: PEDIATRICS | Facility: CLINIC | Age: 82
End: 2022-12-21

## 2022-12-21 NOTE — TELEPHONE ENCOUNTER
Left voicemail asking patient to call me back directly.     Wanting to follow up to see how she is doing.   Also wanted to review labs and help set up a 4 week follow up appointment with Dr. Booth.

## 2022-12-22 NOTE — TELEPHONE ENCOUNTER
Spoke with patient.   Overall she is feeling better.   Less dizzy and fatigued. More strength.   Still having the cough.   She will continue to monitor her symptoms and follow up prn.     We set up a follow up visit on 1/19/2023.     Next 5 appointments (look out 90 days)    Jan 19, 2023  2:30 PM  (Arrive by 2:10 PM)  Provider Visit with Hien Booth MD  [facility] ([facility] ) [address] [place] [address]  [address]  [facility] [address] [phone]

## 2023-01-12 ENCOUNTER — TRANSFERRED RECORDS (OUTPATIENT)
Dept: HEALTH INFORMATION MANAGEMENT | Facility: CLINIC | Age: 83
End: 2023-01-12
Payer: MEDICARE

## 2023-01-12 LAB
ALT SERPL-CCNC: 16 IU/L (ref 5–35)
AST SERPL-CCNC: 42 U/L (ref 5–34)
CREATININE (EXTERNAL): 1.57 MG/DL (ref 0.5–1.3)

## 2023-01-19 ENCOUNTER — OFFICE VISIT (OUTPATIENT)
Dept: PEDIATRICS | Facility: CLINIC | Age: 83
End: 2023-01-19
Payer: MEDICARE

## 2023-01-19 VITALS
DIASTOLIC BLOOD PRESSURE: 42 MMHG | BODY MASS INDEX: 24.19 KG/M2 | HEIGHT: 61 IN | TEMPERATURE: 97.8 F | SYSTOLIC BLOOD PRESSURE: 138 MMHG | OXYGEN SATURATION: 99 % | WEIGHT: 128.1 LBS | RESPIRATION RATE: 16 BRPM | HEART RATE: 65 BPM

## 2023-01-19 DIAGNOSIS — R09.82 POST-NASAL DRAINAGE: ICD-10-CM

## 2023-01-19 DIAGNOSIS — G62.89 AXONAL SENSORIMOTOR NEUROPATHY: Primary | ICD-10-CM

## 2023-01-19 DIAGNOSIS — R10.84 ABDOMINAL PAIN, GENERALIZED: ICD-10-CM

## 2023-01-19 DIAGNOSIS — I12.9 CKD STAGE 4 SECONDARY TO HYPERTENSION (H): ICD-10-CM

## 2023-01-19 DIAGNOSIS — M32.9 SYSTEMIC LUPUS ERYTHEMATOSUS, UNSPECIFIED SLE TYPE, UNSPECIFIED ORGAN INVOLVEMENT STATUS (H): ICD-10-CM

## 2023-01-19 DIAGNOSIS — E78.5 HYPERLIPIDEMIA LDL GOAL <160: ICD-10-CM

## 2023-01-19 DIAGNOSIS — K94.19 ALTERED BOWEL ELIMINATION DUE TO INTESTINAL OSTOMY (H): ICD-10-CM

## 2023-01-19 DIAGNOSIS — F41.9 ANXIETY: ICD-10-CM

## 2023-01-19 DIAGNOSIS — R11.0 NAUSEA: ICD-10-CM

## 2023-01-19 DIAGNOSIS — N18.4 CKD STAGE 4 SECONDARY TO HYPERTENSION (H): ICD-10-CM

## 2023-01-19 PROCEDURE — 99214 OFFICE O/P EST MOD 30 MIN: CPT | Performed by: PEDIATRICS

## 2023-01-19 RX ORDER — ONDANSETRON 4 MG/1
4 TABLET, ORALLY DISINTEGRATING ORAL EVERY 8 HOURS PRN
Qty: 30 TABLET | Refills: 1 | Status: ON HOLD | OUTPATIENT
Start: 2023-01-19 | End: 2023-06-30

## 2023-01-19 RX ORDER — FLUTICASONE PROPIONATE 50 MCG
1 SPRAY, SUSPENSION (ML) NASAL DAILY
Qty: 9.9 ML | Refills: 3 | Status: ON HOLD | OUTPATIENT
Start: 2023-01-19 | End: 2023-06-30

## 2023-01-19 ASSESSMENT — PAIN SCALES - GENERAL: PAINLEVEL: NO PAIN (0)

## 2023-01-19 NOTE — PATIENT INSTRUCTIONS
Cymblata (duloxetine) - start this medication daily for the next 2 weeks and then let me know how it is going - keep a journal    Zofran refill sent to the pharmacy - use as needed    Start using flonase - take 1 spray in each nostril daily    When your cymbalta is going well, then let me know, and we will restart your cholesterol medication.    Call to radha Lund in middle of February    Order headache ice wrap from [facility]

## 2023-01-25 ENCOUNTER — OFFICE VISIT (OUTPATIENT)
Dept: URGENT CARE | Facility: URGENT CARE | Age: 83
End: 2023-01-25
Payer: MEDICARE

## 2023-01-25 VITALS
OXYGEN SATURATION: 98 % | RESPIRATION RATE: 20 BRPM | SYSTOLIC BLOOD PRESSURE: 117 MMHG | HEART RATE: 80 BPM | TEMPERATURE: 98 F | DIASTOLIC BLOOD PRESSURE: 70 MMHG

## 2023-01-25 DIAGNOSIS — N30.00 ACUTE CYSTITIS WITHOUT HEMATURIA: Primary | ICD-10-CM

## 2023-01-25 LAB
BACTERIA #/AREA URNS HPF: ABNORMAL /HPF
RBC #/AREA URNS AUTO: ABNORMAL /HPF
SQUAMOUS #/AREA URNS AUTO: ABNORMAL /LPF
WBC #/AREA URNS AUTO: >100 /HPF

## 2023-01-25 PROCEDURE — 81015 MICROSCOPIC EXAM OF URINE: CPT | Performed by: PHYSICIAN ASSISTANT

## 2023-01-25 PROCEDURE — 99214 OFFICE O/P EST MOD 30 MIN: CPT | Performed by: PHYSICIAN ASSISTANT

## 2023-01-25 PROCEDURE — 87086 URINE CULTURE/COLONY COUNT: CPT | Performed by: PHYSICIAN ASSISTANT

## 2023-01-25 PROCEDURE — 87088 URINE BACTERIA CULTURE: CPT | Performed by: PHYSICIAN ASSISTANT

## 2023-01-25 PROCEDURE — 87186 SC STD MICRODIL/AGAR DIL: CPT | Performed by: PHYSICIAN ASSISTANT

## 2023-01-25 RX ORDER — CEFDINIR 300 MG/1
300 CAPSULE ORAL 2 TIMES DAILY
Qty: 14 CAPSULE | Refills: 0 | Status: SHIPPED | OUTPATIENT
Start: 2023-01-25 | End: 2023-02-01

## 2023-01-25 NOTE — PROGRESS NOTES
Assessment & Plan     1. Acute cystitis without hematuria  UA is grossly positive with greater than 100 WBC, no evidence of pyelonephritis or urosepsis  Stage 4 CKD  Urine culture is pending. Last urine culture from 10/22 was susceptible to cephalosporins.   Treatment with medication as below  Fluids  - Urine Microscopic  - Urine Culture  - cefdinir (OMNICEF) 300 MG capsule; Take 1 capsule (300 mg) by mouth 2 times daily for 7 days  Dispense: 14 capsule; Refill: 0          Return in about 2 days (around 1/27/2023), or if symptoms worsen or fail to improve.    Diagnosis and treatment plan was reviewed with patient and/or family.   We went over any labs or imaging. Discussed worsening symptoms or little to no relief despite treatment plan to follow-up with PCP or return to clinic.  Patient verbalizes understanding. All questions were addressed and answered.     Anita Chou PA-C  [facility]    CHIEF COMPLAINT:   Chief Complaint   Patient presents with     UTI     Poss UTI burning sensation      Subjective     Gely is a 83 year old female who presents to clinic today for evaluation of UTI. Symptoms are burning, frequency and urgency. Started yesterday. Denies having fever, chills, flank pain, nausea, vomiting, hematuria or weakness.  Vaginal discharge, itching or pain are not present.     Past Medical History:   Diagnosis Date     Anxiety      BCC (basal cell carcinoma of skin)      CKD (chronic kidney disease)      Esophageal reflux (GERD) 09/08/2014     h/o Mumps      HTN (hypertension) 09/08/2014     Hyperlipidemia 09/08/2014     Restless legs syndrome (RLS)      Systemic lupus erythematosus      Past Surgical History:   Procedure Laterality Date     APPENDECTOMY  1952     COLECTOMY WITH COLOSTOMY, COMBINED N/A 04/22/2016    Procedure: COMBINED COLECTOMY WITH COLOSTOMY;  Surgeon: Shana Alaniz MD;  Location: [facility] OR     CYSTOSCOPY       ENDOSCOPIC RETROGRADE  CHOLANGIOPANCREATOGRAM N/A 08/15/2017    Procedure: COMBINED ENDOSCOPIC RETROGRADE CHOLANGIOPANCREATOGRAPHY, SPHINCTEROTOMY;  ENDOSCOPIC RETROGRADE CHOLANGIOPANCREATOGRAPHY, SPHINCTEROTOMY. STONE EXTRACTION;  Surgeon: Keturah Bergeron MD;  Location:  OR     ENDOSCOPIC RETROGRADE CHOLANGIOPANCREATOGRAM N/A 08/15/2017    Procedure: COMBINED ENDOSCOPIC RETROGRADE CHOLANGIOPANCREATOGRAPHY, REMOVE STONE/BALLOON;;  Surgeon: Keturah Bergeron MD;  Location:  OR     ESOPHAGOSCOPY, GASTROSCOPY, DUODENOSCOPY (EGD), COMBINED Left 01/02/2020    Procedure: ESOPHAGOGASTRODUODENOSCOPY (fv); random stomach biopsies of polyps using jumbo bx forcep;  Surgeon: Thais Martinez MD;  Location:  GI     ESOPHAGOSCOPY, GASTROSCOPY, DUODENOSCOPY (EGD), COMBINED N/A 12/28/2021    Procedure: ESOPHAGOGASTRODUODENOSCOPY, WITH BIOPSies using biopsy forceps  ;  Surgeon: Schuyler Calvillo MD;  Location:  GI     HYSTERECTOMY  1987     LAPAROSCOPIC CHOLECYSTECTOMY  2008     LAPAROTOMY EXPLORATORY N/A 04/22/2016    Procedure: LAPAROTOMY EXPLORATORY;  Surgeon: Shana Alaniz MD;  Location:  OR     PHACOEMULSIFICATION CLEAR CORNEA WITH STANDARD INTRAOCULAR LENS IMPLANT Left 05/09/2017    Procedure: PHACOEMULSIFICATION CLEAR CORNEA WITH STANDARD INTRAOCULAR LENS IMPLANT;  LEFT EYE PHACOEMULSIFICATION CLEAR CORNEA WITH STANDARD INTRAOCULAR LENS IMPLANT ;  Surgeon: Eloy Eric MD;  Location: [facility]     PHACOEMULSIFICATION CLEAR CORNEA WITH STANDARD INTRAOCULAR LENS IMPLANT Right 05/23/2017    Procedure: PHACOEMULSIFICATION CLEAR CORNEA WITH STANDARD INTRAOCULAR LENS IMPLANT;  RIGHT EYE PHACOEMULSIFICATION CLEAR CORNEA WITH STANDARD INTRAOCULAR LENS IMPLANT ;  Surgeon: Eloy Eric MD;  Location: [facility]     Social History     Tobacco Use     Smoking status: Never     Smokeless tobacco: Never   Substance Use Topics     Alcohol use: Yes     Comment: socially     Current Outpatient Medications    Medication     albuterol (PROAIR HFA/PROVENTIL HFA/VENTOLIN HFA) 108 (90 Base) MCG/ACT inhaler     azaTHIOprine (IMURAN) 50 MG tablet     carvedilol (COREG) 3.125 MG tablet     cefdinir (OMNICEF) 300 MG capsule     Estradiol (IMVEXXY VA)     fluticasone (FLONASE) 50 MCG/ACT nasal spray     gabapentin (NEURONTIN) 100 MG capsule     LORazepam (ATIVAN) 0.5 MG tablet     omeprazole (PRILOSEC) 20 MG DR capsule     ondansetron (ZOFRAN ODT) 4 MG ODT tab     VITAMIN D PO     DULoxetine (CYMBALTA) 30 MG capsule     No current facility-administered medications for this visit.     Allergies   Allergen Reactions     Bactrim [Sulfamethoxazole W/Trimethoprim] GI Disturbance     Vomiting     Codeine      Metronidazole      GI distubance     Sulfa Drugs      Sulfur        10 point ROS of systems were all negative except for pertinent positives noted in my HPI.      Exam:   /70   Pulse 80   Temp 98  F (36.7  C)   Resp 20   SpO2 98%   Constitutional: healthy, alert and no distress  ENT: MMM  Cardiovascular: RRR  Respiratory: CTA bilaterally, no rhonchi or rales  Gastrointestinal: soft and nontender  Back: No CVA tenderness B/L  Skin: no rashes      Results for orders placed or performed in visit on 01/25/23   Urine Microscopic     Status: Abnormal   Result Value Ref Range    Bacteria Urine Moderate (A) None Seen /HPF    RBC Urine 25-50 (A) 0-2 /HPF /HPF    WBC Urine >100 (A) 0-5 /HPF /HPF    Squamous Epithelials Urine Moderate (A) None Seen /LPF

## 2023-01-26 LAB — BACTERIA UR CULT: ABNORMAL

## 2023-02-09 DIAGNOSIS — K21.9 GASTROESOPHAGEAL REFLUX DISEASE: ICD-10-CM

## 2023-02-09 DIAGNOSIS — I10 ESSENTIAL HYPERTENSION: ICD-10-CM

## 2023-02-09 RX ORDER — CARVEDILOL 3.12 MG/1
TABLET ORAL
Qty: 180 TABLET | Refills: 3 | Status: ON HOLD | OUTPATIENT
Start: 2023-02-09 | End: 2023-11-09

## 2023-02-09 NOTE — TELEPHONE ENCOUNTER
Prescription approved per [facility] Refill Protocol.  Ofelia Jackson RN, BSN  [facility]

## 2023-03-15 DIAGNOSIS — R09.82 POST-NASAL DRAINAGE: ICD-10-CM

## 2023-03-16 ENCOUNTER — TRANSFERRED RECORDS (OUTPATIENT)
Dept: HEALTH INFORMATION MANAGEMENT | Facility: CLINIC | Age: 83
End: 2023-03-16

## 2023-03-16 RX ORDER — FLUTICASONE PROPIONATE 50 MCG
SPRAY, SUSPENSION (ML) NASAL
Qty: 16 ML | Refills: 3 | OUTPATIENT
Start: 2023-03-16

## 2023-04-26 ENCOUNTER — TRANSFERRED RECORDS (OUTPATIENT)
Dept: HEALTH INFORMATION MANAGEMENT | Facility: CLINIC | Age: 83
End: 2023-04-26
Payer: MEDICARE

## 2023-04-26 LAB
ALT SERPL-CCNC: 9 IU/L (ref 5–35)
AST SERPL-CCNC: 24 U/L (ref 5–34)
CREATININE (EXTERNAL): 1.84 MG/DL (ref 0.5–1.3)

## 2023-05-25 DIAGNOSIS — M32.9 SYSTEMIC LUPUS ERYTHEMATOSUS, UNSPECIFIED SLE TYPE, UNSPECIFIED ORGAN INVOLVEMENT STATUS (H): ICD-10-CM

## 2023-05-25 DIAGNOSIS — G62.89 AXONAL SENSORIMOTOR NEUROPATHY: ICD-10-CM

## 2023-05-25 DIAGNOSIS — R05.9 COUGH, UNSPECIFIED TYPE: ICD-10-CM

## 2023-05-25 DIAGNOSIS — F41.9 ANXIETY: ICD-10-CM

## 2023-05-26 RX ORDER — DULOXETIN HYDROCHLORIDE 30 MG/1
CAPSULE, DELAYED RELEASE ORAL
Qty: 90 CAPSULE | Refills: 2 | Status: SHIPPED | OUTPATIENT
Start: 2023-05-26 | End: 2023-07-21

## 2023-05-26 RX ORDER — ALBUTEROL SULFATE 90 UG/1
2 AEROSOL, METERED RESPIRATORY (INHALATION) EVERY 6 HOURS PRN
OUTPATIENT
Start: 2023-05-26

## 2023-05-26 NOTE — TELEPHONE ENCOUNTER
Prescription approved per [facility] Refill Protocol.    Albuterol inhaler was used for acute cough. Denied.     Left voicemail updating the patient.

## 2023-06-23 ENCOUNTER — OFFICE VISIT (OUTPATIENT)
Dept: URGENT CARE | Facility: URGENT CARE | Age: 83
End: 2023-06-23
Payer: MEDICARE

## 2023-06-23 VITALS
HEART RATE: 61 BPM | RESPIRATION RATE: 16 BRPM | SYSTOLIC BLOOD PRESSURE: 180 MMHG | DIASTOLIC BLOOD PRESSURE: 65 MMHG | OXYGEN SATURATION: 100 % | TEMPERATURE: 97.9 F

## 2023-06-23 DIAGNOSIS — N30.00 ACUTE CYSTITIS WITHOUT HEMATURIA: Primary | ICD-10-CM

## 2023-06-23 PROCEDURE — 99213 OFFICE O/P EST LOW 20 MIN: CPT | Performed by: FAMILY MEDICINE

## 2023-06-23 RX ORDER — ESTRADIOL 0.1 MG/G
CREAM VAGINAL
COMMUNITY
Start: 2023-05-23

## 2023-06-23 RX ORDER — CIPROFLOXACIN 250 MG/1
1 TABLET, FILM COATED ORAL
Status: ON HOLD | COMMUNITY
Start: 2023-05-23 | End: 2023-06-30

## 2023-06-23 RX ORDER — ACETAMINOPHEN 500 MG
500-1000 TABLET ORAL EVERY 6 HOURS PRN
COMMUNITY
End: 2024-03-18

## 2023-06-23 RX ORDER — CIPROFLOXACIN 250 MG/1
250 TABLET, FILM COATED ORAL 2 TIMES DAILY
Qty: 6 TABLET | Refills: 0 | Status: SHIPPED | OUTPATIENT
Start: 2023-06-23 | End: 2023-06-26

## 2023-06-23 RX ORDER — CEPHALEXIN 500 MG/1
CAPSULE ORAL
COMMUNITY
Start: 2022-08-09 | End: 2023-06-28

## 2023-06-23 RX ORDER — GABAPENTIN 300 MG/1
300 CAPSULE ORAL AT BEDTIME
Status: ON HOLD | COMMUNITY
Start: 2023-03-20 | End: 2023-11-09

## 2023-06-23 RX ORDER — FOLIC ACID 1 MG/1
1 TABLET ORAL 2 TIMES DAILY
COMMUNITY
End: 2023-11-06

## 2023-06-23 NOTE — PATIENT INSTRUCTIONS
Follow up if any fevers/vomiting/kidney pain appears.      Follow up with your primary care provider if not better in 3 days.

## 2023-06-23 NOTE — PROGRESS NOTES
There is another note for the June 23, 2023, clinic encounter at the [facility].  The patient's chart is undergoing a merge and won't allow me to close the chart until something is written in this space and signed.      David Garcia MD

## 2023-06-27 ENCOUNTER — TRANSFERRED RECORDS (OUTPATIENT)
Dept: HEALTH INFORMATION MANAGEMENT | Facility: CLINIC | Age: 83
End: 2023-06-27
Payer: MEDICARE

## 2023-06-28 ENCOUNTER — APPOINTMENT (OUTPATIENT)
Dept: CT IMAGING | Facility: CLINIC | Age: 83
End: 2023-06-28
Attending: EMERGENCY MEDICINE
Payer: MEDICARE

## 2023-06-28 ENCOUNTER — NURSE TRIAGE (OUTPATIENT)
Dept: PEDIATRICS | Facility: CLINIC | Age: 83
End: 2023-06-28
Payer: MEDICARE

## 2023-06-28 ENCOUNTER — HOSPITAL ENCOUNTER (OUTPATIENT)
Facility: CLINIC | Age: 83
Setting detail: OBSERVATION
Discharge: HOME | End: 2023-06-30
Attending: EMERGENCY MEDICINE | Admitting: INTERNAL MEDICINE
Payer: MEDICARE

## 2023-06-28 DIAGNOSIS — K92.1 MELENA: ICD-10-CM

## 2023-06-28 DIAGNOSIS — E87.1 HYPONATREMIA: ICD-10-CM

## 2023-06-28 DIAGNOSIS — R09.82 POST-NASAL DRAINAGE: ICD-10-CM

## 2023-06-28 DIAGNOSIS — R10.84 ABDOMINAL PAIN, GENERALIZED: ICD-10-CM

## 2023-06-28 DIAGNOSIS — R10.13 EPIGASTRIC PAIN: ICD-10-CM

## 2023-06-28 DIAGNOSIS — N18.9 CHRONIC KIDNEY DISEASE, UNSPECIFIED CKD STAGE: ICD-10-CM

## 2023-06-28 DIAGNOSIS — R11.2 NAUSEA AND VOMITING, UNSPECIFIED VOMITING TYPE: ICD-10-CM

## 2023-06-28 LAB
ABO/RH(D): NORMAL
ALBUMIN SERPL BCG-MCNC: 3.9 G/DL (ref 3.5–5.2)
ALBUMIN UR-MCNC: 10 MG/DL
ALP SERPL-CCNC: 67 U/L (ref 35–104)
ALT SERPL W P-5'-P-CCNC: <5 U/L (ref 0–50)
ANION GAP SERPL CALCULATED.3IONS-SCNC: 7 MMOL/L (ref 7–15)
ANTIBODY SCREEN: NEGATIVE
APPEARANCE UR: CLEAR
AST SERPL W P-5'-P-CCNC: 24 U/L (ref 0–45)
BACTERIA #/AREA URNS HPF: ABNORMAL /HPF
BASOPHILS # BLD AUTO: 0 10E3/UL (ref 0–0.2)
BASOPHILS NFR BLD AUTO: 1 %
BILIRUB SERPL-MCNC: 0.7 MG/DL
BILIRUB UR QL STRIP: NEGATIVE
BUN SERPL-MCNC: 19.9 MG/DL (ref 8–23)
CALCIUM SERPL-MCNC: 8.8 MG/DL (ref 8.8–10.2)
CHLORIDE SERPL-SCNC: 92 MMOL/L (ref 98–107)
COLOR UR AUTO: ABNORMAL
CREAT BLD-MCNC: 2 MG/DL (ref 0.5–1)
CREAT SERPL-MCNC: 1.71 MG/DL (ref 0.51–0.95)
DEPRECATED HCO3 PLAS-SCNC: 25 MMOL/L (ref 22–29)
EOSINOPHIL # BLD AUTO: 0.1 10E3/UL (ref 0–0.7)
EOSINOPHIL NFR BLD AUTO: 2 %
ERYTHROCYTE [DISTWIDTH] IN BLOOD BY AUTOMATED COUNT: 13.4 % (ref 10–15)
GFR SERPL CREATININE-BSD FRML MDRD: 24 ML/MIN/1.73M2
GFR SERPL CREATININE-BSD FRML MDRD: 29 ML/MIN/1.73M2
GLUCOSE SERPL-MCNC: 141 MG/DL (ref 70–99)
GLUCOSE UR STRIP-MCNC: NEGATIVE MG/DL
HCO3 BLDV-SCNC: 26 MMOL/L (ref 21–28)
HCT VFR BLD AUTO: 33.2 % (ref 35–47)
HEMOCCULT STL QL: POSITIVE
HGB BLD-MCNC: 11 G/DL (ref 11.7–15.7)
HGB BLD-MCNC: 11.3 G/DL (ref 11.7–15.7)
HGB UR QL STRIP: NEGATIVE
HOLD SPECIMEN: NORMAL
IMM GRANULOCYTES # BLD: 0 10E3/UL
IMM GRANULOCYTES NFR BLD: 0 %
KETONES UR STRIP-MCNC: NEGATIVE MG/DL
LACTATE BLD-SCNC: 0.9 MMOL/L
LEUKOCYTE ESTERASE UR QL STRIP: NEGATIVE
LIPASE SERPL-CCNC: 71 U/L (ref 13–60)
LYMPHOCYTES # BLD AUTO: 0.6 10E3/UL (ref 0.8–5.3)
LYMPHOCYTES NFR BLD AUTO: 26 %
MAGNESIUM SERPL-MCNC: 1.4 MG/DL (ref 1.7–2.3)
MAGNESIUM SERPL-MCNC: 2.1 MG/DL (ref 1.7–2.3)
MCH RBC QN AUTO: 31.7 PG (ref 26.5–33)
MCHC RBC AUTO-ENTMCNC: 34 G/DL (ref 31.5–36.5)
MCV RBC AUTO: 93 FL (ref 78–100)
MONOCYTES # BLD AUTO: 0.4 10E3/UL (ref 0–1.3)
MONOCYTES NFR BLD AUTO: 18 %
MUCOUS THREADS #/AREA URNS LPF: PRESENT /LPF
NEUTROPHILS # BLD AUTO: 1.2 10E3/UL (ref 1.6–8.3)
NEUTROPHILS NFR BLD AUTO: 53 %
NITRATE UR QL: NEGATIVE
NRBC # BLD AUTO: 0 10E3/UL
NRBC BLD AUTO-RTO: 0 /100
PCO2 BLDV: 48 MM HG (ref 40–50)
PH BLDV: 7.34 [PH] (ref 7.32–7.43)
PH UR STRIP: 5 [PH] (ref 5–7)
PLATELET # BLD AUTO: 155 10E3/UL (ref 150–450)
PO2 BLDV: 26 MM HG (ref 25–47)
POTASSIUM SERPL-SCNC: 4.7 MMOL/L (ref 3.4–5.3)
PROT SERPL-MCNC: 6.6 G/DL (ref 6.4–8.3)
RBC # BLD AUTO: 3.56 10E6/UL (ref 3.8–5.2)
RBC URINE: <1 /HPF
SAO2 % BLDV: 43 % (ref 94–100)
SODIUM SERPL-SCNC: 124 MMOL/L (ref 136–145)
SODIUM SERPL-SCNC: 129 MMOL/L (ref 136–145)
SODIUM SERPL-SCNC: 130 MMOL/L (ref 136–145)
SP GR UR STRIP: 1.01 (ref 1–1.03)
SPECIMEN EXPIRATION DATE: NORMAL
SQUAMOUS EPITHELIAL: 6 /HPF
UROBILINOGEN UR STRIP-MCNC: NORMAL MG/DL
WBC # BLD AUTO: 2.2 10E3/UL (ref 4–11)
WBC URINE: 1 /HPF

## 2023-06-28 PROCEDURE — 82803 BLOOD GASES ANY COMBINATION: CPT

## 2023-06-28 PROCEDURE — 99285 EMERGENCY DEPT VISIT HI MDM: CPT | Mod: 25

## 2023-06-28 PROCEDURE — 258N000003 HC RX IP 258 OP 636: Performed by: EMERGENCY MEDICINE

## 2023-06-28 PROCEDURE — C9113 INJ PANTOPRAZOLE SODIUM, VIA: HCPCS | Mod: JZ | Performed by: EMERGENCY MEDICINE

## 2023-06-28 PROCEDURE — 96376 TX/PRO/DX INJ SAME DRUG ADON: CPT

## 2023-06-28 PROCEDURE — 84295 ASSAY OF SERUM SODIUM: CPT | Mod: 91

## 2023-06-28 PROCEDURE — 83735 ASSAY OF MAGNESIUM: CPT | Performed by: EMERGENCY MEDICINE

## 2023-06-28 PROCEDURE — 96361 HYDRATE IV INFUSION ADD-ON: CPT

## 2023-06-28 PROCEDURE — 96374 THER/PROPH/DIAG INJ IV PUSH: CPT

## 2023-06-28 PROCEDURE — 258N000003 HC RX IP 258 OP 636

## 2023-06-28 PROCEDURE — 36415 COLL VENOUS BLD VENIPUNCTURE: CPT | Performed by: EMERGENCY MEDICINE

## 2023-06-28 PROCEDURE — G1010 CDSM STANSON: HCPCS

## 2023-06-28 PROCEDURE — 250N000012 HC RX MED GY IP 250 OP 636 PS 637

## 2023-06-28 PROCEDURE — 96375 TX/PRO/DX INJ NEW DRUG ADDON: CPT

## 2023-06-28 PROCEDURE — 250N000011 HC RX IP 250 OP 636: Mod: JZ

## 2023-06-28 PROCEDURE — 83690 ASSAY OF LIPASE: CPT | Performed by: EMERGENCY MEDICINE

## 2023-06-28 PROCEDURE — 81001 URINALYSIS AUTO W/SCOPE: CPT | Performed by: EMERGENCY MEDICINE

## 2023-06-28 PROCEDURE — 83735 ASSAY OF MAGNESIUM: CPT | Mod: 91

## 2023-06-28 PROCEDURE — C9113 INJ PANTOPRAZOLE SODIUM, VIA: HCPCS | Mod: JZ

## 2023-06-28 PROCEDURE — 250N000011 HC RX IP 250 OP 636: Mod: JZ | Performed by: EMERGENCY MEDICINE

## 2023-06-28 PROCEDURE — 36415 COLL VENOUS BLD VENIPUNCTURE: CPT

## 2023-06-28 PROCEDURE — 82565 ASSAY OF CREATININE: CPT

## 2023-06-28 PROCEDURE — 99222 1ST HOSP IP/OBS MODERATE 55: CPT | Mod: AI

## 2023-06-28 PROCEDURE — 85018 HEMOGLOBIN: CPT | Mod: 91

## 2023-06-28 PROCEDURE — 85025 COMPLETE CBC W/AUTO DIFF WBC: CPT | Performed by: EMERGENCY MEDICINE

## 2023-06-28 PROCEDURE — 86850 RBC ANTIBODY SCREEN: CPT | Performed by: EMERGENCY MEDICINE

## 2023-06-28 PROCEDURE — 93005 ELECTROCARDIOGRAM TRACING: CPT

## 2023-06-28 PROCEDURE — 83605 ASSAY OF LACTIC ACID: CPT

## 2023-06-28 PROCEDURE — G0378 HOSPITAL OBSERVATION PER HR: HCPCS

## 2023-06-28 PROCEDURE — 82272 OCCULT BLD FECES 1-3 TESTS: CPT | Performed by: EMERGENCY MEDICINE

## 2023-06-28 PROCEDURE — 250N000013 HC RX MED GY IP 250 OP 250 PS 637

## 2023-06-28 PROCEDURE — 80053 COMPREHEN METABOLIC PANEL: CPT | Performed by: EMERGENCY MEDICINE

## 2023-06-28 PROCEDURE — 86901 BLOOD TYPING SEROLOGIC RH(D): CPT | Performed by: EMERGENCY MEDICINE

## 2023-06-28 RX ORDER — AMOXICILLIN 250 MG
1 CAPSULE ORAL 2 TIMES DAILY PRN
Status: DISCONTINUED | OUTPATIENT
Start: 2023-06-28 | End: 2023-06-30 | Stop reason: HOSPADM

## 2023-06-28 RX ORDER — CARVEDILOL 3.12 MG/1
3.12 TABLET ORAL 2 TIMES DAILY WITH MEALS
Status: DISCONTINUED | OUTPATIENT
Start: 2023-06-28 | End: 2023-06-30 | Stop reason: HOSPADM

## 2023-06-28 RX ORDER — DULOXETIN HYDROCHLORIDE 30 MG/1
30 CAPSULE, DELAYED RELEASE ORAL AT BEDTIME
Status: DISCONTINUED | OUTPATIENT
Start: 2023-06-28 | End: 2023-06-30 | Stop reason: HOSPADM

## 2023-06-28 RX ORDER — SODIUM CHLORIDE 9 MG/ML
INJECTION, SOLUTION INTRAVENOUS CONTINUOUS
Status: DISCONTINUED | OUTPATIENT
Start: 2023-06-28 | End: 2023-06-28

## 2023-06-28 RX ORDER — ALBUTEROL SULFATE 90 UG/1
2 AEROSOL, METERED RESPIRATORY (INHALATION) EVERY 6 HOURS PRN
Status: DISCONTINUED | OUTPATIENT
Start: 2023-06-28 | End: 2023-06-30 | Stop reason: HOSPADM

## 2023-06-28 RX ORDER — ONDANSETRON 4 MG/1
4 TABLET, ORALLY DISINTEGRATING ORAL EVERY 6 HOURS PRN
Status: DISCONTINUED | OUTPATIENT
Start: 2023-06-28 | End: 2023-06-30 | Stop reason: HOSPADM

## 2023-06-28 RX ORDER — LIDOCAINE 40 MG/G
CREAM TOPICAL
Status: DISCONTINUED | OUTPATIENT
Start: 2023-06-28 | End: 2023-06-30 | Stop reason: HOSPADM

## 2023-06-28 RX ORDER — ACETAMINOPHEN 325 MG/1
650 TABLET ORAL EVERY 6 HOURS PRN
Status: DISCONTINUED | OUTPATIENT
Start: 2023-06-28 | End: 2023-06-30 | Stop reason: HOSPADM

## 2023-06-28 RX ORDER — ONDANSETRON 2 MG/ML
4 INJECTION INTRAMUSCULAR; INTRAVENOUS
Status: COMPLETED | OUTPATIENT
Start: 2023-06-28 | End: 2023-06-28

## 2023-06-28 RX ORDER — MAGNESIUM SULFATE HEPTAHYDRATE 40 MG/ML
2 INJECTION, SOLUTION INTRAVENOUS ONCE
Status: COMPLETED | OUTPATIENT
Start: 2023-06-28 | End: 2023-06-28

## 2023-06-28 RX ORDER — AZATHIOPRINE 50 MG/1
50 TABLET ORAL 2 TIMES DAILY
Status: DISCONTINUED | OUTPATIENT
Start: 2023-06-28 | End: 2023-06-30 | Stop reason: HOSPADM

## 2023-06-28 RX ORDER — ACETAMINOPHEN 650 MG/1
650 SUPPOSITORY RECTAL EVERY 6 HOURS PRN
Status: DISCONTINUED | OUTPATIENT
Start: 2023-06-28 | End: 2023-06-30 | Stop reason: HOSPADM

## 2023-06-28 RX ORDER — GABAPENTIN 300 MG/1
300 CAPSULE ORAL AT BEDTIME
Status: DISCONTINUED | OUTPATIENT
Start: 2023-06-28 | End: 2023-06-30 | Stop reason: HOSPADM

## 2023-06-28 RX ORDER — ONDANSETRON 2 MG/ML
4 INJECTION INTRAMUSCULAR; INTRAVENOUS EVERY 6 HOURS PRN
Status: DISCONTINUED | OUTPATIENT
Start: 2023-06-28 | End: 2023-06-30 | Stop reason: HOSPADM

## 2023-06-28 RX ORDER — ERGOCALCIFEROL (VITAMIN D2) 10 MCG
800 TABLET ORAL DAILY
COMMUNITY
End: 2024-08-28

## 2023-06-28 RX ORDER — AMOXICILLIN 250 MG
2 CAPSULE ORAL 2 TIMES DAILY PRN
Status: DISCONTINUED | OUTPATIENT
Start: 2023-06-28 | End: 2023-06-30 | Stop reason: HOSPADM

## 2023-06-28 RX ORDER — SODIUM CHLORIDE 9 MG/ML
INJECTION, SOLUTION INTRAVENOUS CONTINUOUS
Status: DISCONTINUED | OUTPATIENT
Start: 2023-06-29 | End: 2023-06-30

## 2023-06-28 RX ADMIN — PANTOPRAZOLE SODIUM 40 MG: 40 INJECTION, POWDER, FOR SOLUTION INTRAVENOUS at 12:30

## 2023-06-28 RX ADMIN — SODIUM CHLORIDE 1000 ML: 9 INJECTION, SOLUTION INTRAVENOUS at 12:30

## 2023-06-28 RX ADMIN — ONDANSETRON 4 MG: 2 INJECTION INTRAMUSCULAR; INTRAVENOUS at 12:30

## 2023-06-28 RX ADMIN — PANTOPRAZOLE SODIUM 40 MG: 40 INJECTION, POWDER, FOR SOLUTION INTRAVENOUS at 22:53

## 2023-06-28 RX ADMIN — SODIUM CHLORIDE: 9 INJECTION, SOLUTION INTRAVENOUS at 23:54

## 2023-06-28 RX ADMIN — DULOXETINE HYDROCHLORIDE 30 MG: 30 CAPSULE, DELAYED RELEASE ORAL at 21:38

## 2023-06-28 RX ADMIN — GABAPENTIN 300 MG: 300 CAPSULE ORAL at 21:38

## 2023-06-28 RX ADMIN — CARVEDILOL 3.12 MG: 3.12 TABLET, FILM COATED ORAL at 19:58

## 2023-06-28 RX ADMIN — AZATHIOPRINE 50 MG: 50 TABLET ORAL at 21:38

## 2023-06-28 RX ADMIN — ACETAMINOPHEN 650 MG: 325 TABLET, FILM COATED ORAL at 19:58

## 2023-06-28 RX ADMIN — SODIUM CHLORIDE, PRESERVATIVE FREE: 5 INJECTION INTRAVENOUS at 16:56

## 2023-06-28 RX ADMIN — MAGNESIUM SULFATE HEPTAHYDRATE 2 G: 2 INJECTION, SOLUTION INTRAVENOUS at 16:55

## 2023-06-28 ASSESSMENT — ACTIVITIES OF DAILY LIVING (ADL)
ADLS_ACUITY_SCORE: 33
ADLS_ACUITY_SCORE: 35
ADLS_ACUITY_SCORE: 33
ADLS_ACUITY_SCORE: 37
ADLS_ACUITY_SCORE: 35
ADLS_ACUITY_SCORE: 33

## 2023-06-28 NOTE — TELEPHONE ENCOUNTER
Patient calling to report fatigue, vomiting x 3, black stool in her ileostomy today and last night.   Patient is drinking water today.   Not able to eat. Only a few crackers.     Due to severity of symptoms and potential for GI bleed, advised on ER assessment.     Patient agrees to go.     I called the [facility] to see if it would be appropriate to rule out GI bleed.   Due to likely needing EGD, ER likely best next step.

## 2023-06-28 NOTE — H&P
[facility]    History and Physical - Hospitalist Service       Date of Admission:  6/28/2023    Assessment & Plan      Gely Keene is a 83 year old female with PMH CKD stage 4, HTN, Systemic lupus erythematous, hx severe c-diff requiring total colectomy, GERD who presents with nausea, bilious vomiting x 5, epigastric pain, and black ileostomy output.    On Sunday she noted black liquid ileostomy output that she thought was due to her diet.  She has continued to have daily black stools.  On 6/27 she developed nausea and had 5 bilious emesis episodes over the course of 24 hours.  Has slight epigastric tenderness but states she has had this for years and doctors are unsure what is causing the pain. Denies fever, chills, chest pain, shortness of breath, new cough. Does not take iron supplements. No frequent alcohol use or NSAIDs. No aspirin use.     In the ED, afebrile, hypertensive 193/76, heart rate 68, respirations 18, oxygen 98% on room air.  BMP notable for sodium 124, chloride 92, creatinine 1.71, magnesium 1.4.  Lipase 71.  Lactic acid 1.9.  CBC notable for white count of 2.2, hemoglobin 11.3.  EKG shows sinus rhythm.  Occult blood positive.  UA notable for protein, few bacteria.  CT abdomen shows prior any near total colectomy with likely small bowel diverticulitis, no evidence of perforation or associated bowel obstruction.    Black ileostomy output   Epigastric tenderness  Nausea, bilious emesis   - GI consult  - Npo at midnight incase needs EGD  - serial hemoglobins  - transfuse if Hgb < 7   - IV Protonix     Hyponatremia   Suspect this is hypovolemic given the vomiting episodes and poor oral intake in the last 24 hours.   - Repeat sodium now after 1 Liter NS in the ED  - NS 75 ml/hr while NPO  - if not improving check urine sodium and osm    Hypomagnesemia   - RN replacement protocol     Diverticulitis of small bowel on imaging  Noted on CT imaging but physical exam does  not correlate location of tenderness. Given hx of severe c-diff in 2020 requiring colectomy will hold on antibiotics.   - serial abdominal exams  - Notify provider of any fevers or changes in abdominal exams, then should start antibiotics     CKD stage 4   - creatinine range last year 1.86-2.39    Mild lipase elevation  - doubt there is any pancreatitis. Melanotic stool makes me more suspicious for possible upper GI bleed. No pancreatitis seen on imaging.     HTN  - resume pta coreg     SLE  - continue pta azathioprine       Diet:  Regular diet   DVT Prophylaxis: Pneumatic Compression Devices  Chan Catheter: Not present  Lines: None     Cardiac Monitoring: None  Code Status:   Full code     Clinically Significant Risk Factors Present on Admission         # Hyponatremia: Lowest Na = 124 mmol/L in last 2 days, will monitor as appropriate    # Hypomagnesemia: Lowest Mg = 1.4 mg/dL in last 2 days, will replace as needed       # Hypertension: Noted on problem list               Disposition Plan likely 1-2 days pending improvement of hyponatremia and recommendations from GI     The patient's care was discussed with the Patient and Patient's Family.    AMERICO Burleson PA-C  Hospitalist Service  [facility]  Securely message with [employer] (more info)  Text page via [facility] Paging/Directory     ______________________________________________________________________    Chief Complaint   Nausea, vomiting, black ileostomy output, upper abdominal pain     History is obtained from the patient    History of Present Illness   Gely Keene is a 83 year old female with PMH CKD stage 4, HTN, Systemic lupus erythematous, s/p total colectomy, GERD who presents with nausea, vomiting x 5, epigastric pain, and black ileostomy output.    On Sunday she noted black liquid ileostomy output that she thought was due to her diet.  She has continued to have daily black stools.  On 6/27 she developed nausea and had 5  emesis episodes over the course of 24 hours.  Has slight epigastric tenderness but states she has had this for years and doctors are unsure what is causing the pain. Denies fever, chills, chest pain, shortness of breath, new cough. Does not take iron supplements. No frequent alcohol use or NSAIDs. No aspirin use.     In the ED, afebrile, hypertensive 193/76, heart rate 68, respirations 18, oxygen 98% on room air.  BMP notable for sodium 124, chloride 92, creatinine 1.71, magnesium 1.4.  Lipase 71.  Lactic acid 1.9.  CBC notable for white count of 2.2, hemoglobin 11.3.  EKG shows sinus rhythm.  Occult blood positive.  UA notable for protein, few bacteria.  CT abdomen shows prior any near total colectomy with likely small bowel diverticulitis, no evidence of perforation or associated bowel obstruction.    Past Medical History    Past Medical History:   Diagnosis Date     Anxiety      BCC (basal cell carcinoma of skin)      CKD (chronic kidney disease)      Esophageal reflux (GERD) 09/08/2014     h/o Mumps      HTN (hypertension) 09/08/2014     Hyperlipidemia 09/08/2014     Restless legs syndrome (RLS)      Systemic lupus erythematosus        Past Surgical History   Past Surgical History:   Procedure Laterality Date     APPENDECTOMY  1952     COLECTOMY WITH COLOSTOMY, COMBINED N/A 04/22/2016    Procedure: COMBINED COLECTOMY WITH COLOSTOMY;  Surgeon: Shana Alaniz MD;  Location:  OR     CYSTOSCOPY       ENDOSCOPIC RETROGRADE CHOLANGIOPANCREATOGRAM N/A 08/15/2017    Procedure: COMBINED ENDOSCOPIC RETROGRADE CHOLANGIOPANCREATOGRAPHY, SPHINCTEROTOMY;  ENDOSCOPIC RETROGRADE CHOLANGIOPANCREATOGRAPHY, SPHINCTEROTOMY. STONE EXTRACTION;  Surgeon: Keturah Bergeron MD;  Location:  OR     ENDOSCOPIC RETROGRADE CHOLANGIOPANCREATOGRAM N/A 08/15/2017    Procedure: COMBINED ENDOSCOPIC RETROGRADE CHOLANGIOPANCREATOGRAPHY, REMOVE STONE/BALLOON;;  Surgeon: Keturah Bergeron MD;  Location:  OR      ESOPHAGOSCOPY, GASTROSCOPY, DUODENOSCOPY (EGD), COMBINED Left 01/02/2020    Procedure: ESOPHAGOGASTRODUODENOSCOPY (fv); random stomach biopsies of polyps using jumbo bx forcep;  Surgeon: Thais Martinez MD;  Location: [facility]     ESOPHAGOSCOPY, GASTROSCOPY, DUODENOSCOPY (EGD), COMBINED N/A 12/28/2021    Procedure: ESOPHAGOGASTRODUODENOSCOPY, WITH BIOPSies using biopsy forceps  ;  Surgeon: Schuyler Calvillo MD;  Location: [facility]     HYSTERECTOMY  1987     LAPAROSCOPIC CHOLECYSTECTOMY  2008     LAPAROTOMY EXPLORATORY N/A 04/22/2016    Procedure: LAPAROTOMY EXPLORATORY;  Surgeon: Shana Alaniz MD;  Location: [facility] OR     PHACOEMULSIFICATION CLEAR CORNEA WITH STANDARD INTRAOCULAR LENS IMPLANT Left 05/09/2017    Procedure: PHACOEMULSIFICATION CLEAR CORNEA WITH STANDARD INTRAOCULAR LENS IMPLANT;  LEFT EYE PHACOEMULSIFICATION CLEAR CORNEA WITH STANDARD INTRAOCULAR LENS IMPLANT ;  Surgeon: Eloy Eric MD;  Location:  [facility]     PHACOEMULSIFICATION CLEAR CORNEA WITH STANDARD INTRAOCULAR LENS IMPLANT Right 05/23/2017    Procedure: PHACOEMULSIFICATION CLEAR CORNEA WITH STANDARD INTRAOCULAR LENS IMPLANT;  RIGHT EYE PHACOEMULSIFICATION CLEAR CORNEA WITH STANDARD INTRAOCULAR LENS IMPLANT ;  Surgeon: Eloy Eric MD;  Location:  [facility]       Prior to Admission Medications   Prior to Admission Medications   Prescriptions Last Dose Informant Patient Reported? Taking?   DULoxetine (CYMBALTA) 30 MG capsule 6/27/2023 at PM  No Yes   Sig: TAKE 1 CAPSULE BY MOUTH EVERY DAY   Patient taking differently: Take 30 mg by mouth At Bedtime   Vitamin D, Cholecalciferol, 10 MCG (400 UNIT) TABS 6/27/2023  Yes Yes   Sig: Take 800 Units by mouth daily   acetaminophen (TYLENOL) 500 MG tablet Past Month  Yes Yes   Sig: Take 500-1,000 mg by mouth every 6 hours as needed (pain)   albuterol (PROAIR HFA/PROVENTIL HFA/VENTOLIN HFA) 108 (90 Base) MCG/ACT inhaler Past Month  No Yes   Sig: Inhale 2 puffs into the lungs every 6 hours as  needed for shortness of breath, wheezing or cough   azaTHIOprine (IMURAN) 50 MG tablet 6/27/2023  Yes Yes   Sig: Take 50 mg by mouth 2 times daily   carvedilol (COREG) 3.125 MG tablet 6/27/2023  No Yes   Sig: TAKE 1 TABLET BY MOUTH TWICE A DAY WITH MEALS   ciprofloxacin (CIPRO) 250 MG tablet Not Taking  Yes No   Sig: Take 1 tablet by mouth 2 times daily   Patient not taking: Reported on 6/28/2023   estradiol (ESTRACE) 0.1 MG/GM vaginal cream Past Week  Yes Yes   Sig: Place vaginally three times a week M-W-F   fluticasone (FLONASE) 50 MCG/ACT nasal spray Past Week  No Yes   Sig: Spray 1 spray into both nostrils daily   Patient taking differently: Spray 1 spray into both nostrils daily as needed for rhinitis or allergies   folic acid (FOLVITE) 1 MG tablet 6/27/2023  Yes Yes   Sig: Take 1 mg by mouth 2 times daily   gabapentin (NEURONTIN) 300 MG capsule 6/27/2023  Yes Yes   Sig: Take 300 mg by mouth At Bedtime   omeprazole (PRILOSEC) 20 MG DR capsule 6/27/2023 at am  No Yes   Sig: TAKE 1 CAPSULE BY MOUTH EVERY DAY   ondansetron (ZOFRAN ODT) 4 MG ODT tab Past Month  No Yes   Sig: Take 1 tablet (4 mg) by mouth every 8 hours as needed for nausea      Facility-Administered Medications: None        Social History   I have reviewed this patient's social history and updated it with pertinent information if needed.  Social History     Tobacco Use     Smoking status: Never     Passive exposure: Never     Smokeless tobacco: Never   Vaping Use     Vaping Use: Never used   Substance Use Topics     Alcohol use: Yes     Comment: socially     Drug use: No       Allergies   Allergies   Allergen Reactions     Codeine GI Disturbance     Metronidazole      GI distubance     Sulfacetamide      Sulfamethoxazole-Trimethoprim GI Disturbance     Vomiting     Sulfur         Physical Exam   Vital Signs: Temp: 98.1  F (36.7  C) Temp src: Oral BP: (!) 193/76 Pulse: 70   Resp: 18 SpO2: 98 % O2 Device: None (Room air)    Weight: 129 lbs 6.56  oz    GENERAL:  Alert, Comfortable, No acute distress. Laying in bed.   PSYCH: pleasant, oriented.  HEENT:  Normocephalic,No scleral icterus or conjunctival injection, slightly hard of hearing  NECK:  Supple  HEART:  Normal S1, S2 with no murmur, RRR  LUNGS:  Normal Respiratory effort. Clear to auscultation bilaterally with no wheezing, rales or ronchi.  ABDOMEN:  Soft, slight epigastric tenderness, non distended. No peritoneal signs. Ileostomy.   EXTREMITIES:  No pedal edema,  No cyanosis.   SKIN:  Warm, dry to touch.   NEUROLOGIC: Speech clear, alert & orientated x 4, no focal deficits.     Medical Decision Making       MANAGEMENT DISCUSSED with the following over the past 24 hours: patient, family, ED provider    NOTE(S)/MEDICAL RECORDS REVIEWED over the past 24 hours: labs, imaging, progress notes      Data     I have personally reviewed the following data over the past 24 hrs:    2.2 (L)  \   11.3 (L)   / 155     124 (L) 92 (L) 19.9 /  141 (H)   4.7 25 2.0 (H) \       ALT: <5 AST: 24 AP: 67 TBILI: 0.7   ALB: 3.9 TOT PROTEIN: 6.6 LIPASE: 71 (H)       Procal: N/A CRP: N/A Lactic Acid: 0.9         Imaging results reviewed over the past 24 hrs:   Recent Results (from the past 24 hour(s))   CT Abdomen Pelvis w/o Contrast    Narrative    CT ABDOMEN PELVIS W/O CONTRAST 6/28/2023 1:17 PM    CLINICAL HISTORY: abd pain, ileostomy, reduced output, vomiting  TECHNIQUE: CT scan of the abdomen and pelvis was performed without IV  contrast. Multiplanar reformats were obtained. Dose reduction  techniques were used.  CONTRAST: None.    COMPARISON: CT 12/19/2021.    FINDINGS:   LOWER CHEST: Unremarkable.    HEPATOBILIARY: Cholecystectomy. No evidence of biliary obstruction.    PANCREAS: Normal.    SPLEEN: Normal.    ADRENAL GLANDS: Normal.    KIDNEYS/BLADDER: No nephroureterolithiasis or hydronephrosis. Urinary  bladder is unremarkable.    BOWEL: Prior near-total colectomy with right lower quadrant end  ileostomy again noted.  Extensive diverticulosis of the small bowel in  the dependent pelvis with new surrounding fat stranding and associated  mesenteric edema. No evidence of perforation or bowel obstruction.    LYMPH NODES: No suspicious lymphadenopathy.    VASCULATURE: Moderate calcified atherosclerosis.    PELVIC ORGANS: Hysterectomy.    OTHER: None.    MUSCULOSKELETAL: No acute bony abnormality.      Impression    IMPRESSION:   1.  Prior near-total colectomy with likely small bowel diverticulitis  in the pelvis near the midline. No evidence of perforation or  associated bowel obstruction.    SAPPHIRE PLATA MD         SYSTEM ID:  [id number]

## 2023-06-28 NOTE — ED PROVIDER NOTES
History     Chief Complaint:  Abdominal Pain       HPI   Gely Keene is a 83 year old female who presents emergency department concerns for abdominal pain with nausea and vomiting.  She notes her symptoms started yesterday.  She is at the doctor's office for routine checkup and afterward noticed nausea.  Later in the night she developed vomiting.  She has had 4 episodes total.  Not black or bloody.  Bilious on the most recent.  She notes last night when the vomiting started she also had abdominal pain, but it seemed to start after the vomiting.  She notes there was some black output in her ileostomy sac with reduced output in general.  She has had less urination than normal.  She notes she has chronic intermittent lower extremity swelling, but has been persistent from yesterday today, when normally it goes away in the morning.  She notes she has had chronic upper abdominal pain for years with no clear cause.      Independent Historian:   None - Patient Only    Review of External Notes:   I reviewed outpatient visit from earlier today.  Reviewed outpatient labs.      Medications:    acetaminophen 500 MG CAPS  albuterol (PROAIR HFA/PROVENTIL HFA/VENTOLIN HFA) 108 (90 Base) MCG/ACT inhaler  azaTHIOprine (IMURAN) 50 MG tablet  carvedilol (COREG) 3.125 MG tablet  cephALEXin (KEFLEX) 500 MG capsule  ciprofloxacin (CIPRO) 250 MG tablet  DULoxetine (CYMBALTA) 30 MG capsule  estradiol (ESTRACE) 0.1 MG/GM vaginal cream  Estradiol (IMVEXXY VA)  fluticasone (FLONASE) 50 MCG/ACT nasal spray  folic acid (FOLVITE) 1 MG tablet  gabapentin (NEURONTIN) 100 MG capsule  gabapentin (NEURONTIN) 300 MG capsule  LORazepam (ATIVAN) 0.5 MG tablet  omeprazole (PRILOSEC) 20 MG DR capsule  ondansetron (ZOFRAN ODT) 4 MG ODT tab  VITAMIN D PO        Past Medical History:    Past Medical History:   Diagnosis Date     Anxiety      BCC (basal cell carcinoma of skin)      CKD (chronic kidney disease)      Esophageal reflux (GERD)  09/08/2014     h/o Mumps      HTN (hypertension) 09/08/2014     Hyperlipidemia 09/08/2014     Restless legs syndrome (RLS)      Systemic lupus erythematosus        Past Surgical History:    Past Surgical History:   Procedure Laterality Date     APPENDECTOMY  1952     COLECTOMY WITH COLOSTOMY, COMBINED N/A 04/22/2016    Procedure: COMBINED COLECTOMY WITH COLOSTOMY;  Surgeon: Shana Alaniz MD;  Location:  OR     CYSTOSCOPY       ENDOSCOPIC RETROGRADE CHOLANGIOPANCREATOGRAM N/A 08/15/2017    Procedure: COMBINED ENDOSCOPIC RETROGRADE CHOLANGIOPANCREATOGRAPHY, SPHINCTEROTOMY;  ENDOSCOPIC RETROGRADE CHOLANGIOPANCREATOGRAPHY, SPHINCTEROTOMY. STONE EXTRACTION;  Surgeon: Keturah Bergeron MD;  Location:  OR     ENDOSCOPIC RETROGRADE CHOLANGIOPANCREATOGRAM N/A 08/15/2017    Procedure: COMBINED ENDOSCOPIC RETROGRADE CHOLANGIOPANCREATOGRAPHY, REMOVE STONE/BALLOON;;  Surgeon: Keturah Bergeron MD;  Location:  OR     ESOPHAGOSCOPY, GASTROSCOPY, DUODENOSCOPY (EGD), COMBINED Left 01/02/2020    Procedure: ESOPHAGOGASTRODUODENOSCOPY (fv); random stomach biopsies of polyps using jumbo bx forcep;  Surgeon: Thais Martinez MD;  Location:  GI     ESOPHAGOSCOPY, GASTROSCOPY, DUODENOSCOPY (EGD), COMBINED N/A 12/28/2021    Procedure: ESOPHAGOGASTRODUODENOSCOPY, WITH BIOPSies using biopsy forceps  ;  Surgeon: Schuyler Calvillo MD;  Location:  GI     HYSTERECTOMY  1987     LAPAROSCOPIC CHOLECYSTECTOMY  2008     LAPAROTOMY EXPLORATORY N/A 04/22/2016    Procedure: LAPAROTOMY EXPLORATORY;  Surgeon: Shana Alaniz MD;  Location:  OR     PHACOEMULSIFICATION CLEAR CORNEA WITH STANDARD INTRAOCULAR LENS IMPLANT Left 05/09/2017    Procedure: PHACOEMULSIFICATION CLEAR CORNEA WITH STANDARD INTRAOCULAR LENS IMPLANT;  LEFT EYE PHACOEMULSIFICATION CLEAR CORNEA WITH STANDARD INTRAOCULAR LENS IMPLANT ;  Surgeon: Eloy Eric MD;  [facility]     PHACOEMULSIFICATION CLEAR CORNEA WITH STANDARD  INTRAOCULAR LENS IMPLANT Right 05/23/2017    Procedure: PHACOEMULSIFICATION CLEAR CORNEA WITH STANDARD INTRAOCULAR LENS IMPLANT;  RIGHT EYE PHACOEMULSIFICATION CLEAR CORNEA WITH STANDARD INTRAOCULAR LENS IMPLANT ;  Surgeon: Eloy Eric MD;  Location: [facility]        Physical Exam     Patient Vitals for the past 24 hrs:   BP Temp Temp src Pulse Resp SpO2 Weight   06/28/23 1131 (!) 194/86 98.1  F (36.7  C) Oral 64 18 98 % 58.7 kg (129 lb 6.6 oz)        Physical Exam  General: Elderly female sitting upright  Eyes: PERRL, Conjunctive within normal limits  ENT: Moist mucous membranes, oropharynx clear.   CV: Normal S1S2, no murmur, rub or gallop. Regular rate and rhythm  Resp: Clear to auscultation bilaterally, no wheezes, rales or rhonchi. Normal respiratory effort.  GI: Abdomen is soft and nondistended.  Epigastric tenderness to palpation.  No palpable masses. No rebound or guarding.  Ileostomy in place.  There is a small amount of liquidy greenish stool in the ileostomy pouch.  MSK: No edema. Nontender. Normal active range of motion.  Skin: Warm and dry.  Scattered chronic appearing skin changes over the bilateral extremities.   Neuro: Alert and oriented. Responds appropriately to all questions and commands. No focal findings appreciated. Normal muscle tone.  Psych: Appropriate mood and affect.    Emergency Department Course   ECG    ECG results from 06/28/23   EKG 12-lead, tracing only     Value    Systolic Blood Pressure     Diastolic Blood Pressure     Ventricular Rate 62    Atrial Rate 62    AZ Interval 144    QRS Duration 88        QTc 454    P Axis 80    R AXIS 32    T Axis 44    Interpretation ECG      Sinus rhythm  Normal ECG  When compared with ECG of 11-FEB-2020 14:01,  No significant change was found         Imaging:  CT Abdomen Pelvis w/o Contrast   Final Result   IMPRESSION:    1.  Prior near-total colectomy with likely small bowel diverticulitis   in the pelvis near the midline. No  evidence of perforation or   associated bowel obstruction.      SAPPHIRE PLATA MD            SYSTEM ID:  [id number]         Report per radiology    Laboratory:  Labs Ordered and Resulted from Time of ED Arrival to Time of ED Departure   COMPREHENSIVE METABOLIC PANEL - Abnormal       Result Value    Sodium 124 (*)     Potassium 4.7      Chloride 92 (*)     Carbon Dioxide (CO2) 25      Anion Gap 7      Urea Nitrogen 19.9      Creatinine 1.71 (*)     Calcium 8.8      Glucose 141 (*)     Alkaline Phosphatase 67      AST 24      ALT <5      Protein Total 6.6      Albumin 3.9      Bilirubin Total 0.7      GFR Estimate 29 (*)    CBC WITH PLATELETS AND DIFFERENTIAL - Abnormal    WBC Count 2.2 (*)     RBC Count 3.56 (*)     Hemoglobin 11.3 (*)     Hematocrit 33.2 (*)     MCV 93      MCH 31.7      MCHC 34.0      RDW 13.4      Platelet Count 155      % Neutrophils 53      % Lymphocytes 26      % Monocytes 18      % Eosinophils 2      % Basophils 1      % Immature Granulocytes 0      NRBCs per 100 WBC 0      Absolute Neutrophils 1.2 (*)     Absolute Lymphocytes 0.6 (*)     Absolute Monocytes 0.4      Absolute Eosinophils 0.1      Absolute Basophils 0.0      Absolute Immature Granulocytes 0.0      Absolute NRBCs 0.0     ISTAT CREATININE POCT - Abnormal    Creatinine POCT 2.0 (*)     GFR, ESTIMATED POCT 24 (*)    MAGNESIUM - Abnormal    Magnesium 1.4 (*)    ISTAT GASES LACTATE VENOUS POCT - Abnormal    Lactic Acid POCT 0.9      Bicarbonate Venous POCT 26      O2 Sat, Venous POCT 43 (*)     pCO2 Venous POCT 48      pH Venous POCT 7.34      pO2 Venous POCT 26     OCCULT BLOOD STOOL - Abnormal    Occult Blood Positive (*)    LIPASE - Abnormal    Lipase 71 (*)    ROUTINE UA WITH MICROSCOPIC REFLEX TO CULTURE - Abnormal    Color Urine Light Yellow      Appearance Urine Clear      Glucose Urine Negative      Bilirubin Urine Negative      Ketones Urine Negative      Specific Gravity Urine 1.011      Blood Urine Negative      pH Urine  5.0      Protein Albumin Urine 10 (*)     Urobilinogen Urine Normal      Nitrite Urine Negative      Leukocyte Esterase Urine Negative      Bacteria Urine Few (*)     Mucus Urine Present (*)     RBC Urine <1      WBC Urine 1      Squamous Epithelials Urine 6 (*)    SODIUM - Abnormal    Sodium 129 (*)    HEMOGLOBIN   TYPE AND SCREEN, ADULT    ABO/RH(D) B POS      Antibody Screen Negative      SPECIMEN EXPIRATION DATE [id number]     ABO/RH TYPE AND SCREEN        Emergency Department Course & Assessments:    Interventions:  Medications   pantoprazole (PROTONIX) IV push injection 40 mg (has no administration in time range)   ondansetron (ZOFRAN) injection 4 mg (has no administration in time range)   0.9% sodium chloride BOLUS (has no administration in time range)        Assessments:  I performed an exam of the patient and obtained history, as documented above.  I reassessed the patient.  She denies any new concerns.    Independent Interpretation (X-rays, CTs, rhythm strip):  None    Consultations/Discussion of Management or Tests:  I discussed admission with JOSEFINA Holder for the hospitalist service who accepted for Dr. Carbajal    Social Determinants of Health affecting care:   None    Disposition:  The patient was admitted to the hospital under the care of Dr. Carbajal.     Impression & Plan      Medical Decision Making:  Gely Keene is a 83-year-old female with ileostomy and history of chronic kidney disease who presents emergency department with nausea vomiting and decreased output of black stool from her ileostomy pouch.  In her pouch on assessment here was greenish stool but it did come back Hemoccult positive.  She had no black or bloody emesis noted.  She is noted to be hyponatremic with slightly low hemoglobin in the setting of her chronic kidney disease.  There is no onset of clinically significant active bleeding that would require transfusion or immediate GI consult.  She may have gastritis or an ulcer  that is causing some bleeding.  She had no further vomiting here in the emergency department.  Her pain has been chronic in nature, acutely exacerbated likely secondary to vomiting.  There is no evidence of acute abnormalities such as bowel obstruction or perforation on CT scan.  She was stable in the emergency department.  She will be admitted for ongoing care.  All questions were answered prior to admission.      Diagnosis:    ICD-10-CM    1. Hyponatremia  E87.1       2. Nausea and vomiting, unspecified vomiting type  R11.2       3. Epigastric pain  R10.13       4. Melena  K92.1       5. Chronic kidney disease, unspecified CKD stage  N18.9          6/28/2023   Maricarmen Marcelo MD Jonkman, Tracy Dianne, MD  06/28/23 1809

## 2023-06-28 NOTE — PROGRESS NOTES
GI chart check:  Briefly discussed case with Dr. Carbajal, patient going to observation unit, hemodynamically stable, can be seen tomorrow.  I note her hgb is at her baseline, however this is before hydration.  Has black ostomy output. Clear liquids are okay today, NPO at midnight in case we do EGD tomorrow.      Nice Leah Cespedes MD  [facility]

## 2023-06-28 NOTE — PROGRESS NOTES
ROOM # 206-2    Living Situation (if not independent, order SW consult): Home  Facility name:  : [facility]    Activity level at baseline: Independent  Activity level on admit: SBA      Patient registered to observation; given Patient Bill of Rights; given the opportunity to ask questions about observation status and their plan of care.  Patient has been oriented to the observation room, bathroom and call light is in place.    Discussed discharge goals and expectations with patient/family.

## 2023-06-28 NOTE — ED TRIAGE NOTES
"Pt arrives to the ED due to having epigastric abdominal pain that she states has been going on a \"long time\" but got worse yesterday. Pt has ileostomy and states that over the past 2 days has had dark black stool in bag. Pt states also having vomiting since yesterday. No appetite. States feeling dehydrated. C/o some dizziness. Pt denies being on blood thinners.    "

## 2023-06-28 NOTE — ED NOTES
[facility]  ED Nurse Handoff Report    ED Chief complaint: Abdominal Pain  . ED Diagnosis:   Final diagnoses:   None       Allergies:   Allergies   Allergen Reactions     Codeine GI Disturbance     Elemental Sulfur      Metronidazole      GI distubance     Sulfa Antibiotics      Sulfacetamide      Sulfamethoxazole-Trimethoprim GI Disturbance     Vomiting     Sulfur        Code Status: Full Code    Activity level - Baseline/Home:  independent.  Activity Level - Current:   in bed.   Lift room needed: No.   Bariatric: No   Needed: No   Isolation: No.   Infection: Not Applicable.     Respiratory status: Room air    Vital Signs (within 30 minutes):   Vitals:    06/28/23 1131 06/28/23 1230 06/28/23 1300   BP: (!) 194/86 (!) 204/75 (!) 207/79   Pulse: 64 60 66   Resp: 18     Temp: 98.1  F (36.7  C)     TempSrc: Oral     SpO2: 98% 98% 98%   Weight: 58.7 kg (129 lb 6.6 oz)         Cardiac Rhythm:  ,      Pain level:    Patient confused: No.   Patient Falls Risk: bed/chair alarm on and nonskid shoes/slippers when out of bed.   Elimination Status: Has voided     Patient Report - Initial Complaint: ABD Pain.   Focused Assessment: Per Provider Note  Abdominal Pain        HPI   Gely Keene is a 83 year old female who presents emergency department concerns for abdominal pain with nausea and vomiting.  She notes her symptoms started yesterday.  She is at the doctor's office for routine checkup and afterward noticed nausea.  Later in the night she developed vomiting.  She has had 4 episodes total.  Not black or bloody.  Bilious on the most recent.  She notes last night when the vomiting started she also had abdominal pain, but it seemed to start after the vomiting.  She notes there was some black output in her ileostomy sac with reduced output in general.  She has had less urination than normal.  She notes she has chronic intermittent lower extremity swelling, but has been persistent  from yesterday today, when normally it goes away in the morning.  She notes she has had chronic upper abdominal pain for years with no clear cause.    Abnormal Results:   Labs Ordered and Resulted from Time of ED Arrival to Time of ED Departure   COMPREHENSIVE METABOLIC PANEL - Abnormal       Result Value    Sodium 124 (*)     Potassium 4.7      Chloride 92 (*)     Carbon Dioxide (CO2) 25      Anion Gap 7      Urea Nitrogen 19.9      Creatinine 1.71 (*)     Calcium 8.8      Glucose 141 (*)     Alkaline Phosphatase 67      AST 24      ALT <5      Protein Total 6.6      Albumin 3.9      Bilirubin Total 0.7      GFR Estimate 29 (*)    CBC WITH PLATELETS AND DIFFERENTIAL - Abnormal    WBC Count 2.2 (*)     RBC Count 3.56 (*)     Hemoglobin 11.3 (*)     Hematocrit 33.2 (*)     MCV 93      MCH 31.7      MCHC 34.0      RDW 13.4      Platelet Count 155      % Neutrophils 53      % Lymphocytes 26      % Monocytes 18      % Eosinophils 2      % Basophils 1      % Immature Granulocytes 0      NRBCs per 100 WBC 0      Absolute Neutrophils 1.2 (*)     Absolute Lymphocytes 0.6 (*)     Absolute Monocytes 0.4      Absolute Eosinophils 0.1      Absolute Basophils 0.0      Absolute Immature Granulocytes 0.0      Absolute NRBCs 0.0     ISTAT CREATININE POCT - Abnormal    Creatinine POCT 2.0 (*)     GFR, ESTIMATED POCT 24 (*)    MAGNESIUM - Abnormal    Magnesium 1.4 (*)    ISTAT GASES LACTATE VENOUS POCT - Abnormal    Lactic Acid POCT 0.9      Bicarbonate Venous POCT 26      O2 Sat, Venous POCT 43 (*)     pCO2 Venous POCT 48      pH Venous POCT 7.34      pO2 Venous POCT 26     OCCULT BLOOD STOOL - Abnormal    Occult Blood Positive (*)    LIPASE - Abnormal    Lipase 71 (*)    ROUTINE UA WITH MICROSCOPIC REFLEX TO CULTURE   TYPE AND SCREEN, ADULT    ABO/RH(D) B POS      Antibody Screen Negative      SPECIMEN EXPIRATION DATE [id number]     ABO/RH TYPE AND SCREEN        CT Abdomen Pelvis w/o Contrast   Final Result   IMPRESSION:    1.   Prior near-total colectomy with likely small bowel diverticulitis   in the pelvis near the midline. No evidence of perforation or   associated bowel obstruction.      SAPPHIRE PLATA MD            SYSTEM ID:  [id number]          Treatments provided: See MAR  Family Comments:   OBS brochure/video discussed/provided to patient:  No  ED Medications:   Medications   pantoprazole (PROTONIX) IV push injection 40 mg (40 mg Intravenous $Given 6/28/23 1230)   ondansetron (ZOFRAN) injection 4 mg (4 mg Intravenous $Given 6/28/23 1230)   0.9% sodium chloride BOLUS (1,000 mLs Intravenous $New Bag 6/28/23 1230)       Drips infusing:  No  For the majority of the shift this patient was Green.   Interventions performed were NA.    Sepsis treatment initiated: No    Cares/treatment/interventions/medications to be completed following ED care: NA    ED Nurse Name: Wilma Gonsalez RN  1:47 PM    RECEIVING UNIT ED HANDOFF REVIEW    Above ED Nurse Handoff Report was reviewed: Yes  Reviewed by: Nataliya Olsen RN on June 28, 2023 at 5:12 PM

## 2023-06-28 NOTE — PHARMACY-ADMISSION MEDICATION HISTORY
Pharmacist Admission Medication History    Admission medication history is complete. The information provided in this note is only as accurate as the sources available at the time of the update.    Medication reconciliation/reorder completed by provider prior to medication history? No    Information Source(s): Patient and CareEverywhere/SureScripts via in-person    Pertinent Information: -    Changes made to PTA medication list:  Added: None  Deleted: Imvexxy, keflex, ativan, gabapentin 100 mg, cipro (marked as not taking- last dose 6/27)   Changed: vit D dose, folic acid from every day to bid, azathioprine changed from daily to BID     Medication Affordability:  Not including over the counter (OTC) medications, was there a time in the past 3 months when you did not take your medications as prescribed because of cost?: No    Allergies reviewed with patient and updates made in EHR: yes    Medication History Completed By: Ninfa Huggins [facility] 6/28/2023 1:48 PM    Prior to Admission medications    Medication Sig Last Dose Taking? Auth Provider Long Term End Date   acetaminophen (TYLENOL) 500 MG tablet Take 500-1,000 mg by mouth every 6 hours as needed (pain) Past Month Yes Reported, Patient     albuterol (PROAIR HFA/PROVENTIL HFA/VENTOLIN HFA) 108 (90 Base) MCG/ACT inhaler Inhale 2 puffs into the lungs every 6 hours as needed for shortness of breath, wheezing or cough Past Month Yes Hien Booth MD Yes    azaTHIOprine (IMURAN) 50 MG tablet Take 50 mg by mouth 2 times daily 6/27/2023 Yes Reported, Patient Yes    carvedilol (COREG) 3.125 MG tablet TAKE 1 TABLET BY MOUTH TWICE A DAY WITH MEALS 6/27/2023 Yes Hien Booth MD Yes    DULoxetine (CYMBALTA) 30 MG capsule TAKE 1 CAPSULE BY MOUTH EVERY DAY  Patient taking differently: Take 30 mg by mouth At Bedtime 6/27/2023 at PM Yes Hien Booth MD Yes    estradiol (ESTRACE) 0.1 MG/GM vaginal cream Place vaginally three times a week M-W-F Past Week  Yes Reported, Patient     fluticasone (FLONASE) 50 MCG/ACT nasal spray Spray 1 spray into both nostrils daily  Patient taking differently: Spray 1 spray into both nostrils daily as needed for rhinitis or allergies Past Week Yes Hien Booth MD     folic acid (FOLVITE) 1 MG tablet Take 1 mg by mouth 2 times daily 6/27/2023 Yes Reported, Patient     gabapentin (NEURONTIN) 300 MG capsule Take 300 mg by mouth At Bedtime 6/27/2023 Yes Reported, Patient Yes    omeprazole (PRILOSEC) 20 MG DR capsule TAKE 1 CAPSULE BY MOUTH EVERY DAY 6/27/2023 at am Yes Hien Booth MD     ondansetron (ZOFRAN ODT) 4 MG ODT tab Take 1 tablet (4 mg) by mouth every 8 hours as needed for nausea Past Month Yes Hien Booth MD     Vitamin D, Cholecalciferol, 10 MCG (400 UNIT) TABS Take 800 Units by mouth daily 6/27/2023 Yes Unknown, Entered By History     ciprofloxacin (CIPRO) 250 MG tablet Take 1 tablet by mouth 2 times daily  Patient not taking: Reported on 6/28/2023 Not Taking  Reported, Patient

## 2023-06-29 LAB
ANION GAP SERPL CALCULATED.3IONS-SCNC: 11 MMOL/L (ref 7–15)
ATRIAL RATE - MUSE: 62 BPM
BUN SERPL-MCNC: 16.9 MG/DL (ref 8–23)
CALCIUM SERPL-MCNC: 8 MG/DL (ref 8.8–10.2)
CHLORIDE SERPL-SCNC: 98 MMOL/L (ref 98–107)
CREAT SERPL-MCNC: 1.71 MG/DL (ref 0.51–0.95)
DEPRECATED HCO3 PLAS-SCNC: 21 MMOL/L (ref 22–29)
DIASTOLIC BLOOD PRESSURE - MUSE: NORMAL MMHG
ERYTHROCYTE [DISTWIDTH] IN BLOOD BY AUTOMATED COUNT: 13.6 % (ref 10–15)
GFR SERPL CREATININE-BSD FRML MDRD: 29 ML/MIN/1.73M2
GLUCOSE SERPL-MCNC: 95 MG/DL (ref 70–99)
HCT VFR BLD AUTO: 28.5 % (ref 35–47)
HGB BLD-MCNC: 10.5 G/DL (ref 11.7–15.7)
HGB BLD-MCNC: 9.6 G/DL (ref 11.7–15.7)
INTERPRETATION ECG - MUSE: NORMAL
MCH RBC QN AUTO: 31.6 PG (ref 26.5–33)
MCHC RBC AUTO-ENTMCNC: 33.7 G/DL (ref 31.5–36.5)
MCV RBC AUTO: 94 FL (ref 78–100)
P AXIS - MUSE: 80 DEGREES
PLATELET # BLD AUTO: 132 10E3/UL (ref 150–450)
POTASSIUM SERPL-SCNC: 3.8 MMOL/L (ref 3.4–5.3)
PR INTERVAL - MUSE: 144 MS
QRS DURATION - MUSE: 88 MS
QT - MUSE: 448 MS
QTC - MUSE: 454 MS
R AXIS - MUSE: 32 DEGREES
RBC # BLD AUTO: 3.04 10E6/UL (ref 3.8–5.2)
SODIUM SERPL-SCNC: 130 MMOL/L (ref 136–145)
SYSTOLIC BLOOD PRESSURE - MUSE: NORMAL MMHG
T AXIS - MUSE: 44 DEGREES
UPPER GI ENDOSCOPY: NORMAL
VENTRICULAR RATE- MUSE: 62 BPM
WBC # BLD AUTO: 2.4 10E3/UL (ref 4–11)

## 2023-06-29 PROCEDURE — G0378 HOSPITAL OBSERVATION PER HR: HCPCS

## 2023-06-29 PROCEDURE — 250N000009 HC RX 250: Performed by: INTERNAL MEDICINE

## 2023-06-29 PROCEDURE — 250N000011 HC RX IP 250 OP 636: Performed by: INTERNAL MEDICINE

## 2023-06-29 PROCEDURE — 80048 BASIC METABOLIC PNL TOTAL CA: CPT

## 2023-06-29 PROCEDURE — 250N000011 HC RX IP 250 OP 636: Mod: JZ

## 2023-06-29 PROCEDURE — 250N000012 HC RX MED GY IP 250 OP 636 PS 637

## 2023-06-29 PROCEDURE — C9113 INJ PANTOPRAZOLE SODIUM, VIA: HCPCS | Mod: JZ

## 2023-06-29 PROCEDURE — 85027 COMPLETE CBC AUTOMATED: CPT

## 2023-06-29 PROCEDURE — 36415 COLL VENOUS BLD VENIPUNCTURE: CPT

## 2023-06-29 PROCEDURE — 250N000013 HC RX MED GY IP 250 OP 250 PS 637

## 2023-06-29 PROCEDURE — 43235 EGD DIAGNOSTIC BRUSH WASH: CPT | Performed by: INTERNAL MEDICINE

## 2023-06-29 PROCEDURE — 96376 TX/PRO/DX INJ SAME DRUG ADON: CPT | Mod: XU

## 2023-06-29 PROCEDURE — 999N000099 HC STATISTIC MODERATE SEDATION < 10 MIN: Performed by: INTERNAL MEDICINE

## 2023-06-29 RX ORDER — ATROPINE SULFATE 0.1 MG/ML
1 INJECTION INTRAVENOUS
Status: DISCONTINUED | OUTPATIENT
Start: 2023-06-29 | End: 2023-06-29 | Stop reason: HOSPADM

## 2023-06-29 RX ORDER — FLUMAZENIL 0.1 MG/ML
0.2 INJECTION, SOLUTION INTRAVENOUS
Status: ACTIVE | OUTPATIENT
Start: 2023-06-29 | End: 2023-06-30

## 2023-06-29 RX ORDER — SIMETHICONE 40MG/0.6ML
133 SUSPENSION, DROPS(FINAL DOSAGE FORM)(ML) ORAL
Status: DISCONTINUED | OUTPATIENT
Start: 2023-06-29 | End: 2023-06-29 | Stop reason: HOSPADM

## 2023-06-29 RX ORDER — EPINEPHRINE 1 MG/ML
0.1 INJECTION, SOLUTION INTRAMUSCULAR; SUBCUTANEOUS
Status: DISCONTINUED | OUTPATIENT
Start: 2023-06-29 | End: 2023-06-29 | Stop reason: HOSPADM

## 2023-06-29 RX ORDER — DIPHENHYDRAMINE HYDROCHLORIDE 50 MG/ML
25-50 INJECTION INTRAMUSCULAR; INTRAVENOUS
Status: DISCONTINUED | OUTPATIENT
Start: 2023-06-29 | End: 2023-06-29 | Stop reason: HOSPADM

## 2023-06-29 RX ORDER — NALOXONE HYDROCHLORIDE 0.4 MG/ML
0.2 INJECTION, SOLUTION INTRAMUSCULAR; INTRAVENOUS; SUBCUTANEOUS
Status: DISCONTINUED | OUTPATIENT
Start: 2023-06-29 | End: 2023-06-29 | Stop reason: HOSPADM

## 2023-06-29 RX ORDER — NALOXONE HYDROCHLORIDE 0.4 MG/ML
0.4 INJECTION, SOLUTION INTRAMUSCULAR; INTRAVENOUS; SUBCUTANEOUS
Status: DISCONTINUED | OUTPATIENT
Start: 2023-06-29 | End: 2023-06-29 | Stop reason: HOSPADM

## 2023-06-29 RX ORDER — LIDOCAINE 40 MG/G
CREAM TOPICAL
Status: DISCONTINUED | OUTPATIENT
Start: 2023-06-29 | End: 2023-06-29 | Stop reason: HOSPADM

## 2023-06-29 RX ORDER — PANTOPRAZOLE SODIUM 40 MG/1
40 TABLET, DELAYED RELEASE ORAL DAILY
Status: DISCONTINUED | OUTPATIENT
Start: 2023-06-30 | End: 2023-06-30 | Stop reason: HOSPADM

## 2023-06-29 RX ORDER — FLUMAZENIL 0.1 MG/ML
0.2 INJECTION, SOLUTION INTRAVENOUS
Status: DISCONTINUED | OUTPATIENT
Start: 2023-06-29 | End: 2023-06-29 | Stop reason: HOSPADM

## 2023-06-29 RX ORDER — FENTANYL CITRATE 50 UG/ML
50-100 INJECTION, SOLUTION INTRAMUSCULAR; INTRAVENOUS EVERY 5 MIN PRN
Status: DISCONTINUED | OUTPATIENT
Start: 2023-06-29 | End: 2023-06-29 | Stop reason: HOSPADM

## 2023-06-29 RX ADMIN — TOPICAL ANESTHETIC 0.5 ML: 200 SPRAY DENTAL; PERIODONTAL at 12:44

## 2023-06-29 RX ADMIN — FENTANYL CITRATE 25 MCG: 50 INJECTION, SOLUTION INTRAMUSCULAR; INTRAVENOUS at 12:46

## 2023-06-29 RX ADMIN — MIDAZOLAM 1 MG: 1 INJECTION INTRAMUSCULAR; INTRAVENOUS at 12:41

## 2023-06-29 RX ADMIN — CARVEDILOL 3.12 MG: 3.12 TABLET, FILM COATED ORAL at 07:59

## 2023-06-29 RX ADMIN — DULOXETINE HYDROCHLORIDE 30 MG: 30 CAPSULE, DELAYED RELEASE ORAL at 22:05

## 2023-06-29 RX ADMIN — AZATHIOPRINE 50 MG: 50 TABLET ORAL at 07:59

## 2023-06-29 RX ADMIN — ACETAMINOPHEN 650 MG: 325 TABLET, FILM COATED ORAL at 16:52

## 2023-06-29 RX ADMIN — CARVEDILOL 3.12 MG: 3.12 TABLET, FILM COATED ORAL at 17:51

## 2023-06-29 RX ADMIN — PANTOPRAZOLE SODIUM 40 MG: 40 INJECTION, POWDER, FOR SOLUTION INTRAVENOUS at 11:16

## 2023-06-29 RX ADMIN — AZATHIOPRINE 50 MG: 50 TABLET ORAL at 20:23

## 2023-06-29 RX ADMIN — GABAPENTIN 300 MG: 300 CAPSULE ORAL at 22:05

## 2023-06-29 ASSESSMENT — ACTIVITIES OF DAILY LIVING (ADL)
ADLS_ACUITY_SCORE: 33

## 2023-06-29 NOTE — PLAN OF CARE
PRIMARY DIAGNOSIS: Abdominal Pain  OUTPATIENT/OBSERVATION GOALS TO BE MET BEFORE DISCHARGE:  1. Pain Status: Yes, 3/10 headache    2. Return to near baseline physical activity: No    3. Cleared for discharge by consultants (if involved): No    Discharge Planner Nurse   Safe discharge environment identified: No  Barriers to discharge: Yes       Entered by: Saira Mcdaniel RN 06/29/2023   Patient is A & O x 4 but intermittently forgetful. Lung sounds CTA & bowel sounds active. Ileostomy in place with liquid dark green output. Pt is able to mange the ileostomy. Pt is a SBA, c/o headache, Tylenol prn given at 1650, now pt denies pain. Son at bedside. PIV to MIGDALIA infusing NS @ 75 ml/hr. Pt on Mg protocol, currently Mag is 2.1, recheck tomorrow AM. GI consults is following.  BP (!) 151/58 (BP Location: Right arm, Cuff Size: Adult Regular)   Pulse 64   Temp 98.2  F (36.8  C) (Oral)   Resp 18   Wt 58.7 kg (129 lb 6.6 oz)   SpO2 98%   BMI 24.45 kg/m    Please review provider order for any additional goals.   Nurse to notify provider when observation goals have been met and patient is ready for discharge.

## 2023-06-29 NOTE — PLAN OF CARE
Goal Outcome Evaluation:           PRIMARY DIAGNOSIS: GENERALIZED WEAKNESS    OUTPATIENT/OBSERVATION GOALS TO BE MET BEFORE DISCHARGE  1. Orthostatic performed: N/A    2. Tolerating PO medications: Yes, .NPO after midnight    3. Return to near baseline physical activity: Yes    4. Cleared for discharge by consultants (if involved): No    Discharge Planner Nurse   Safe discharge environment identified: Yes  Barriers to discharge: Yes       Entered by: Gricelda Lerner RN 06/29/2023   BP (!) 147/53 (BP Location: Left arm)   Pulse 69   Temp 98.2  F (36.8  C) (Oral)   Resp 16   Wt 58.7 kg (129 lb 6.6 oz)   SpO2 95%   BMI 24.45 kg/m     Patient A & O x 4. Verbalizes pain 2-3/10 and manageable. On tele  Reading SR-60 s.Pt. has been NPO since midnight for possible EGD today. NS infusing @ 75ml/hr. Ileostomy to RLQ with dark  greenish output. On magnesium protocol.     Please review provider order for any additional goals.   Nurse to notify provider when observation goals have been met and patient is ready for discharge.

## 2023-06-29 NOTE — PROCEDURES
PRE-PROCEDURE H&P    CHIEF COMPLAINT / REASON FOR PROCEDURE:  melena    PERTINENT HISTORY :    Past Medical History:   Diagnosis Date     Anxiety      BCC (basal cell carcinoma of skin)      CKD (chronic kidney disease)      Esophageal reflux (GERD) 09/08/2014     h/o Mumps      HTN (hypertension) 09/08/2014     Hyperlipidemia 09/08/2014     Restless legs syndrome (RLS)      Systemic lupus erythematosus       Past Surgical History:   Procedure Laterality Date     APPENDECTOMY  1952     COLECTOMY WITH COLOSTOMY, COMBINED N/A 04/22/2016    Procedure: COMBINED COLECTOMY WITH COLOSTOMY;  Surgeon: Shana Alaniz MD;  Location:  OR     CYSTOSCOPY       ENDOSCOPIC RETROGRADE CHOLANGIOPANCREATOGRAM N/A 08/15/2017    Procedure: COMBINED ENDOSCOPIC RETROGRADE CHOLANGIOPANCREATOGRAPHY, SPHINCTEROTOMY;  ENDOSCOPIC RETROGRADE CHOLANGIOPANCREATOGRAPHY, SPHINCTEROTOMY. STONE EXTRACTION;  Surgeon: Keturah Bergeron MD;  Location:  OR     ENDOSCOPIC RETROGRADE CHOLANGIOPANCREATOGRAM N/A 08/15/2017    Procedure: COMBINED ENDOSCOPIC RETROGRADE CHOLANGIOPANCREATOGRAPHY, REMOVE STONE/BALLOON;;  Surgeon: Keturah Bergeron MD;  Location:  OR     ESOPHAGOSCOPY, GASTROSCOPY, DUODENOSCOPY (EGD), COMBINED Left 01/02/2020    Procedure: ESOPHAGOGASTRODUODENOSCOPY (fv); random stomach biopsies of polyps using jumbo bx forcep;  Surgeon: Thais Martinez MD;  Location:  GI     ESOPHAGOSCOPY, GASTROSCOPY, DUODENOSCOPY (EGD), COMBINED N/A 12/28/2021    Procedure: ESOPHAGOGASTRODUODENOSCOPY, WITH BIOPSies using biopsy forceps  ;  Surgeon: Schuyler Calvillo MD;  Location:  GI     HYSTERECTOMY  1987     LAPAROSCOPIC CHOLECYSTECTOMY  2008     LAPAROTOMY EXPLORATORY N/A 04/22/2016    Procedure: LAPAROTOMY EXPLORATORY;  Surgeon: Shana Alaniz MD;  Location:  OR     PHACOEMULSIFICATION CLEAR CORNEA WITH STANDARD INTRAOCULAR LENS IMPLANT Left 05/09/2017    Procedure: PHACOEMULSIFICATION CLEAR CORNEA WITH STANDARD  INTRAOCULAR LENS IMPLANT;  LEFT EYE PHACOEMULSIFICATION CLEAR CORNEA WITH STANDARD INTRAOCULAR LENS IMPLANT ;  Surgeon: Eloy Eric MD;  Location:  [facility]     PHACOEMULSIFICATION CLEAR CORNEA WITH STANDARD INTRAOCULAR LENS IMPLANT Right 05/23/2017    Procedure: PHACOEMULSIFICATION CLEAR CORNEA WITH STANDARD INTRAOCULAR LENS IMPLANT;  RIGHT EYE PHACOEMULSIFICATION CLEAR CORNEA WITH STANDARD INTRAOCULAR LENS IMPLANT ;  Surgeon: Eloy Eric MD;  Location: [facility]         Bleeding tendencies:  No    Relevant Family History:  NONE     Relevant Social History:  NONE      A relevant review of systems was performed and was negative    ALLERGIES/SENSITIVITIES:   Allergies   Allergen Reactions     Codeine GI Disturbance     Metronidazole      GI distubance     Sulfacetamide      Sulfamethoxazole-Trimethoprim GI Disturbance     Vomiting     Sulfur        CURRENT MEDICATIONS:   No current outpatient medications on file.        PRE-SEDATION ASSESSMENT:    Lung Exam:  normal  Heart Exam:  normal  Airway Exam: normal  Previous reaction to anesthesia/sedation:   No  Sedation plan based on assessment: Moderate (conscious) sedation  ASA Classification:  3 - Severe systemic disease, but not incapacitating        IMPRESSION:  melena    PLAN:  egd    Leah Cespedes MD  [facility]  Office: [phone]

## 2023-06-29 NOTE — CONSULTS
GASTROENTEROLOGY CONSULTATION     Gely Keene  [address]  [facility] [address]  83 year old female    Admission Date/Time: 6/28/2023  Primary Care Provider: Hien Booth    We were asked to see the patient in consultation by  JOSEFINA Ceballos for evalution of melena.        HPI:  Gely Keene is a 83 year old female with a past medical history significant for chronic kidney disease stage IV, hypertension, SLE, GERD, hyperlipidemia, hysterectomy, cholecystectomy, appendectomy, anxiety, chronic abdominal pain, and history of severe C. 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    She presents with melena, nausea, vomiting, and chronic midepigastric tenderness.  She reports the melena started 4 days ago, but today it has resolved.  Denies use of NSAIDs.  If she needs anything for general aches and pains she will take Tylenol.  She reports ongoing chronic upper abdominal pain, this has not changed in severity.    Her labs show a normal BUN.  Her baseline hemoglobin in the last 6 months to 1 year has ranged from 10.3-11.7, today after IV hydration it is down to 9.6.  Her platelets are also low at 132.  Her white blood cell count is low at 2.4, and it has been in the last 6 to 9 months at about 3.3-3.4.    Her CT scan of the abdomen and pelvis 6/28/23 shows the following:  HEPATOBILIARY: Cholecystectomy. No evidence of biliary obstruction.     PANCREAS: Normal.     SPLEEN: Normal.     ADRENAL GLANDS: Normal.     KIDNEYS/BLADDER: No nephroureterolithiasis or hydronephrosis. Urinary  bladder is unremarkable.     BOWEL: Prior near-total colectomy with right lower quadrant end  ileostomy again noted. Extensive diverticulosis of the small bowel in  the dependent pelvis with new surrounding fat stranding and associated  mesenteric edema, consistent with small bowel diverticulitis. No evidence of perforation or bowel obstruction.    Upper endoscopy December 28, 2021 with   Kromhout was done for epigastric abdominal pain-this showed a normal esophagus, multiple gastric polyps that were biopsied, and normal duodenum.  Biopsies from the stomach were taken and came back as negative for H. pylori and fundic gland polyp.    Upper endoscopy January 2, 2020 with Dr. Martinez was done for epigastric abdominal pain, right upper quadrant abdominal pain, left upper quadrant abdominal pain.  This showed a normal esophagus, multiple gastric polyps, normal duodenum.  The gastric polyps were biopsied came back as fundic gland polyps.          ROS: A comprehensive ten point review of systems was negative aside from those in mentioned in the HPI.      MEDICATIONS: No current facility-administered medications on file prior to encounter.  acetaminophen (TYLENOL) 500 MG tablet, Take 500-1,000 mg by mouth every 6 hours as needed (pain)  albuterol (PROAIR HFA/PROVENTIL HFA/VENTOLIN HFA) 108 (90 Base) MCG/ACT inhaler, Inhale 2 puffs into the lungs every 6 hours as needed for shortness of breath, wheezing or cough  azaTHIOprine (IMURAN) 50 MG tablet, Take 50 mg by mouth 2 times daily  carvedilol (COREG) 3.125 MG tablet, TAKE 1 TABLET BY MOUTH TWICE A DAY WITH MEALS  DULoxetine (CYMBALTA) 30 MG capsule, TAKE 1 CAPSULE BY MOUTH EVERY DAY (Patient taking differently: Take 30 mg by mouth At Bedtime)  estradiol (ESTRACE) 0.1 MG/GM vaginal cream, Place vaginally three times a week M-W-F  fluticasone (FLONASE) 50 MCG/ACT nasal spray, Spray 1 spray into both nostrils daily (Patient taking differently: Spray 1 spray into both nostrils daily as needed for rhinitis or allergies)  folic acid (FOLVITE) 1 MG tablet, Take 1 mg by mouth 2 times daily  gabapentin (NEURONTIN) 300 MG capsule, Take 300 mg by mouth At Bedtime  omeprazole (PRILOSEC) 20 MG DR capsule, TAKE 1 CAPSULE BY MOUTH EVERY DAY  ondansetron (ZOFRAN ODT) 4 MG ODT tab, Take 1 tablet (4 mg) by mouth every 8 hours as needed for nausea  Vitamin D, Cholecalciferol,  10 MCG (400 UNIT) TABS, Take 800 Units by mouth daily  ciprofloxacin (CIPRO) 250 MG tablet, Take 1 tablet by mouth 2 times daily (Patient not taking: Reported on 6/28/2023)        ALLERGIES:   Allergies   Allergen Reactions     Codeine GI Disturbance     Metronidazole      GI distubance     Sulfacetamide      Sulfamethoxazole-Trimethoprim GI Disturbance     Vomiting     Sulfur        Past Medical History:   Diagnosis Date     Anxiety      BCC (basal cell carcinoma of skin)      CKD (chronic kidney disease)      Esophageal reflux (GERD) 09/08/2014     h/o Mumps      HTN (hypertension) 09/08/2014     Hyperlipidemia 09/08/2014     Restless legs syndrome (RLS)      Systemic lupus erythematosus        Past Surgical History:   Procedure Laterality Date     APPENDECTOMY  1952     COLECTOMY WITH COLOSTOMY, COMBINED N/A 04/22/2016    Procedure: COMBINED COLECTOMY WITH COLOSTOMY;  Surgeon: Shana Alaniz MD;  Location:  OR     CYSTOSCOPY       ENDOSCOPIC RETROGRADE CHOLANGIOPANCREATOGRAM N/A 08/15/2017    Procedure: COMBINED ENDOSCOPIC RETROGRADE CHOLANGIOPANCREATOGRAPHY, SPHINCTEROTOMY;  ENDOSCOPIC RETROGRADE CHOLANGIOPANCREATOGRAPHY, SPHINCTEROTOMY. STONE EXTRACTION;  Surgeon: Keturah Bergeron MD;  Location:  OR     ENDOSCOPIC RETROGRADE CHOLANGIOPANCREATOGRAM N/A 08/15/2017    Procedure: COMBINED ENDOSCOPIC RETROGRADE CHOLANGIOPANCREATOGRAPHY, REMOVE STONE/BALLOON;;  Surgeon: Keturah Bergeron MD;  Location:  OR     ESOPHAGOSCOPY, GASTROSCOPY, DUODENOSCOPY (EGD), COMBINED Left 01/02/2020    Procedure: ESOPHAGOGASTRODUODENOSCOPY (fv); random stomach biopsies of polyps using jumbo bx forcep;  Surgeon: Thais Martinez MD;  Location:  GI     ESOPHAGOSCOPY, GASTROSCOPY, DUODENOSCOPY (EGD), COMBINED N/A 12/28/2021    Procedure: ESOPHAGOGASTRODUODENOSCOPY, WITH BIOPSies using biopsy forceps  ;  Surgeon: Schuyler Calvillo MD;  Location:  GI     HYSTERECTOMY  1987     LAPAROSCOPIC CHOLECYSTECTOMY   2008     LAPAROTOMY EXPLORATORY N/A 04/22/2016    Procedure: LAPAROTOMY EXPLORATORY;  Surgeon: Shana Alaniz MD;  Location: [facility]     PHACOEMULSIFICATION CLEAR CORNEA WITH STANDARD INTRAOCULAR LENS IMPLANT Left 05/09/2017    Procedure: PHACOEMULSIFICATION CLEAR CORNEA WITH STANDARD INTRAOCULAR LENS IMPLANT;  LEFT EYE PHACOEMULSIFICATION CLEAR CORNEA WITH STANDARD INTRAOCULAR LENS IMPLANT ;  Surgeon: Eloy Eric MD;  Location: [facility]     PHACOEMULSIFICATION CLEAR CORNEA WITH STANDARD INTRAOCULAR LENS IMPLANT Right 05/23/2017    Procedure: PHACOEMULSIFICATION CLEAR CORNEA WITH STANDARD INTRAOCULAR LENS IMPLANT;  RIGHT EYE PHACOEMULSIFICATION CLEAR CORNEA WITH STANDARD INTRAOCULAR LENS IMPLANT ;  Surgeon: Eloy Eric MD;  Location: [facility]         SOCIAL HISTORY:  Social History     Tobacco Use     Smoking status: Never     Passive exposure: Never     Smokeless tobacco: Never   Vaping Use     Vaping Use: Never used   Substance Use Topics     Alcohol use: Yes     Comment: socially     Drug use: No       FAMILY HISTORY:  Reviewed in her chart    PHYSICAL EXAM:   BP (!) 147/53 (BP Location: Left arm)   Pulse 69   Temp 98.2  F (36.8  C) (Oral)   Resp 16   Wt 58.7 kg (129 lb 6.6 oz)   SpO2 95%   BMI 24.45 kg/m      Constitutional: NAD, comfortable  Cardiovascular: RRR  Respiratory: CTAB  Psychiatric: mentation appears normal and affect normal/bright  Head: Normocephalic. Atraumatic.    Neck: Neck supple.   Eyes:  no icterus  ENT: hearing adequate  Abdomen:   The ostomy bag is noted to have green-brown stool.  No fresh red blood or black color at this time.  Abdomen is soft, nontender nondistended.  NEURO: grossly negative  SKIN: no suspicious lesions or rashes            ADDITIONAL COMMENTS:   I reviewed the patient's new clinical lab test results.   Recent Labs   Lab Test 06/29/23  0541 06/28/23  2355 06/28/23  1840 06/28/23  1135 12/20/22  1206 01/03/20  1951 12/01/19  6470  08/16/17  0701 08/15/17  0918 05/11/16  0655 05/09/16  0545   WBC 2.4*  --   --  2.2* 3.3*   < > 7.6   < > 17.3*   < >  --    HGB 9.6* 10.5* 11.0* 11.3* 11.7   < > 11.9   < > 12.0   < >  --    MCV 94  --   --  93 95   < > 91   < > 91   < >  --    *  --   --  155 174   < > 197   < > 166   < >  --    INR  --   --   --   --   --   --  1.01  --  1.05  --  1.18*    < > = values in this interval not displayed.     Recent Labs   Lab Test 06/29/23  0541 06/28/23  2115 06/28/23  1618 06/28/23  1136 06/28/23  1135 12/20/22  1206   * 130* 129*  --  124* 141   POTASSIUM 3.8  --   --   --  4.7 4.0   CHLORIDE 98  --   --   --  92* 103   CO2 21*  --   --   --  25 23   BUN 16.9  --   --   --  19.9 38*   CR 1.71*  --   --  2.0* 1.71* 2.30*   ANIONGAP 11  --   --   --  7 15*   GRICEL 8.0*  --   --   --  8.8 10.0   GLC 95  --   --   --  141* 141*     Recent Labs   Lab Test 06/28/23  1344 06/28/23  1135 12/20/22  1206 10/08/22  1112 09/26/22  1340 06/07/22  1448 12/19/21  0116 11/30/21  1519 09/21/20  1456 09/21/20  1342   ALBUMIN  --  3.9 3.8  --  3.6   < > 3.3*  --    < > 3.5   BILITOTAL  --  0.7 0.9  --   --   --  0.9  --    < > 0.9   ALT  --  <5 18  --   --   --  32  --    < > 20   AST  --  24 35  --   --   --  48*  --    < > 25   ALKPHOS  --  67 63  --   --   --  77  --    < > 76   PROTEIN 10*  --   --  >=300*  --   --   --  Negative   < >  --    LIPASE  --  71*  --   --   --   --  401*  --   --  160    < > = values in this interval not displayed.             .    CONSULTATION ASSESSMENT AND PLAN:     Gely Keene is a 83 year old female with a past medical history significant for chronic kidney disease stage IV, hypertension, SLE, GERD, hyperlipidemia, hysterectomy, cholecystectomy, appendectomy, anxiety, chronic upper abdominal pain, and history of severe C. 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  She presents with melena in her ostomy bag with a  drop in her hemoglobin from her  baseline (baseline hgb is 10.3-11.7, today after IV hydration it is down to 9.6).  The CT scan of the abdomen and pelvis also shows small bowel diverticulitis.  The melena in her ostomy bag has now resolved.    The differential diagnosis includes AVMs, diverticular bleed from the small bowel diverticulitis, peptic ulcer disease, bleeding gastric polyp, vs other.    Proceed with upper endoscopy today and further recommendations to follow afterwards.    Darius Cespedes MD  [facility]

## 2023-06-29 NOTE — CARE PLAN
PRIMARY DIAGNOSIS: GENERALIZED WEAKNESS    OUTPATIENT/OBSERVATION GOALS TO BE MET BEFORE DISCHARGE  1. Orthostatic performed: N/A    2. Tolerating PO medications: Yes    3. Return to near baseline physical activity: Yes    4. Cleared for discharge by consultants (if involved): No    Discharge Planner Nurse   Safe discharge environment identified: Yes  Barriers to discharge: Yes       Entered by: Mirella Ann RN 06/28/2023 11:04 PM     Please review provider order for any additional goals.   Nurse to notify provider when observation goals have been met and patient is ready for discharge.    Pt. A/O x 4  [place] of hearing. NPO at midnight. SL infusing to begin at midnight. SBA. Gave scheduled meds.     BP (!) 169/68 (BP Location: Left arm)   Pulse 66   Temp 98.3  F (36.8  C) (Oral)   Resp 16   Wt 58.7 kg (129 lb 6.6 oz)   SpO2 97%   BMI 24.45 kg/m      
Patient is alert and oriented x4. VS WNL and documented on the FS. Lung sounds clear in all lobes and patient is on RA. Denies SOB. Active bowel sounds in all 4 quadrants (illesotomy with liquid output and is dark green). Patient manages Ileostomy.  Patient denies any pain, urgency, and frequency when voiding. Patient states she is only having some throat pain. IV fluids infusing at 75 ml/hr. NPO. Patient on mag replacement and mag was 2.1 with no replacement needed this morning. 1 assist when up. Son at bedside.     Plan: EGD at 1235 today.     BP (!) 140/55 (BP Location: Left arm)   Pulse 57   Temp 98.5  F (36.9  C) (Oral)   Resp 24   Wt 58.7 kg (129 lb 6.6 oz)   SpO2 98%   BMI 24.45 kg/m      
"""
Patient wants to be without pain at home. States she "doesn't want to go back to that apartment though"
IV ABX/STEROIDS

## 2023-06-29 NOTE — PLAN OF CARE
Goal Outcome Evaluation:           PRIMARY DIAGNOSIS: GENERALIZED WEAKNESS    OUTPATIENT/OBSERVATION GOALS TO BE MET BEFORE DISCHARGE  1. Orthostatic performed: N/A    2. Tolerating PO medications: Yes, .NPO after midnight    3. Return to near baseline physical activity: Yes    4. Cleared for discharge by consultants (if involved): No    Discharge Planner Nurse   Safe discharge environment identified: Yes  Barriers to discharge: Yes       Entered by: Gricelda Lerner RN 06/29/2023   BP (!) 143/66 (BP Location: Left arm)   Pulse 61   Temp 98.5  F (36.9  C) (Oral)   Resp 16   Wt 58.7 kg (129 lb 6.6 oz)   SpO2 94%   BMI 24.45 kg/m       Please review provider order for any additional goals.   Nurse to notify provider when observation goals have been met and patient is ready for discharge.

## 2023-06-29 NOTE — PROGRESS NOTES
[facility]    Medicine Progress Note - Hospitalist Service    Date of Admission:  6/28/2023    Assessment & Plan   Gely Keene is a 83 year old female with PMH CKD stage 4, HTN, Systemic lupus erythematous, hx severe c-diff requiring total colectomy, and GERD who presents with nausea, bilious vomiting x 5, epigastric pain, and black ileostomy output.     On Sunday she noted black liquid ileostomy output that she thought was due to her diet. She has continued to have daily black stools.  On 6/27 she developed nausea and had 5 bilious emesis episodes over the course of 24 hours.  Has slight epigastric tenderness but states she has had this for years and doctors are unsure what is causing the pain. Denies fever, chills, chest pain, shortness of breath, new cough. Does not take iron supplements. No frequent alcohol use or NSAIDs. No aspirin use.      In the ED, afebrile, hypertensive 193/76, heart rate 68, respirations 18, oxygen 98% on room air.  BMP notable for sodium 124, chloride 92, creatinine 1.71, magnesium 1.4.  Lipase 71.  Lactic acid 1.9.  CBC notable for white count of 2.2, hemoglobin 11.3.  EKG shows sinus rhythm.  Occult blood positive.  UA notable for protein, few bacteria.  CT abdomen shows prior any near total colectomy with likely small bowel diverticulitis, no evidence of perforation or associated bowel obstruction.    Melanotic ileostomy output  Epigastric tenderness  Nausea, bilious emesis   - GI consult, s/p EGD on 6/29 without evidence of bleeding, suspect related to diverticular bleed, possibly from diverticulitis   - baseline Hgb of 10.3-11.7 with initial Hgb of 11.3 -> 9.6, suspect some of this may be dilutional  - transfuse if Hgb < 7   - recheck CBC in AM   - IV Protonix initially, will discontinue due to no evidence of bleeding, restart PTA PO Omeprazole  - restart diet afternoon of 6/29, monitor for recurrence and tolerating, hopeful to discharge in AM if  continues to feel well as well labs improve     Hyponatremia   Suspect this is hypovolemic given the vomiting episodes and poor oral intake in the last 24 hours.   - received 1 liter NS in ED and NA at 75 ml/hour overnight   - sodium improved from 124-130  - continue NS at 75 ml/hour   - if not improving could consider checking urine sodium and osm     Hypomagnesemia   - resolved with replacement      Diverticulitis of small bowel on imaging  Noted on CT imaging but physical exam does not correlate location of tenderness. Given hx of severe c-diff in 2020 requiring colectomy antibiotics were not started on admission.   - serial abdominal exams  - discussed with GI due to thoughts bleeding was from diverticulitis, agreed on indication for antibiotics unless clinical change   - Notify provider of any fevers or changes in abdominal exams, then should start antibiotics      CKD stage 4   - creatinine range last year 1.86-2.39  - creatinine currently stable at 1.71      Mild lipase elevation  - lipase of 71, doubt there is any pancreatitis and no pancreatitis seen on imaging.      HTN  - resume PTA Carvedilol      SLE  - continue pta azathioprine     Diet: Regular Diet Adult    DVT Prophylaxis: Pneumatic Compression Devices  Chan Catheter: Not present  Lines: None     Cardiac Monitoring: ACTIVE order. Indication: Electrolyte Imbalance (24 hours)- Magnesium <1.3 mg/ml; Potassium < =2.8 or > 5.5 mg/ml  Code Status: Full Code      Clinically Significant Risk Factors Present on Admission         # Hyponatremia: Lowest Na = 124 mmol/L in last 2 days, will monitor as appropriate  # Hypocalcemia: Lowest Ca = 8 mg/dL in last 2 days, will monitor and replace as appropriate   # Hypomagnesemia: Lowest Mg = 1.4 mg/dL in last 2 days, will replace as needed       # Hypertension: Noted on problem list               Disposition Plan      Expected Discharge Date: 06/30/2023                The patient's care was discussed with the  Attending Physician, Dr. Carbajal, Bedside Nurse, Patient, Patient's Family and GI Consultant(s).    Daisy Ceballos PA-C  Hospitalist Service  [facility]  Securely message with [employer] (more info)  Text page via [employer] Paging/Directory   ______________________________________________________________________    Interval History   Patient reports resolution of melena and now having brown/green output.  Denies upper abdominal pain, nausea, or vomiting.  She did mention chronic pain that happens intermittently just below her rib cage that no provider has been able to diagnose.  Notes a mild flare of her lupus symptoms including headache, neck ache, and feels warm.    Patient's   is at the bedside and updated on the current plan as well as daughters on the phone with all questions answered.    Physical Exam   Vital Signs: Temp: 98.2  F (36.8  C) Temp src: Oral BP: (!) 160/66 Pulse: 59   Resp: 16 SpO2: 97 % O2 Device: None (Room air) Oxygen Delivery: 4 LPM  Weight: 129 lbs 6.56 oz    General Appearance: A&Ox3, pleasant, interactive  Respiratory: CTAB without wheezing  Cardiovascular: RRR without murmur  GI: soft, non-tender, non-distended, ileostomy just emptied with remanence of brownish appearing stool   Skin: warm, dry, no lesions in visualized areas     Medical Decision Making       40 MINUTES SPENT BY ME on the date of service doing chart review, history, exam, documentation & further activities per the note.      Data   ------------------------- PAST 24 HR DATA REVIEWED -----------------------------------------------

## 2023-06-30 VITALS
HEART RATE: 60 BPM | TEMPERATURE: 97.5 F | RESPIRATION RATE: 16 BRPM | WEIGHT: 129.41 LBS | SYSTOLIC BLOOD PRESSURE: 160 MMHG | DIASTOLIC BLOOD PRESSURE: 71 MMHG | BODY MASS INDEX: 24.45 KG/M2 | OXYGEN SATURATION: 98 %

## 2023-06-30 LAB
ANION GAP SERPL CALCULATED.3IONS-SCNC: 8 MMOL/L (ref 7–15)
BASOPHILS # BLD AUTO: 0 10E3/UL (ref 0–0.2)
BASOPHILS NFR BLD AUTO: 1 %
BUN SERPL-MCNC: 16.9 MG/DL (ref 8–23)
CALCIUM SERPL-MCNC: 7.7 MG/DL (ref 8.8–10.2)
CHLORIDE SERPL-SCNC: 103 MMOL/L (ref 98–107)
CREAT SERPL-MCNC: 1.79 MG/DL (ref 0.51–0.95)
DEPRECATED HCO3 PLAS-SCNC: 21 MMOL/L (ref 22–29)
EOSINOPHIL # BLD AUTO: 0.1 10E3/UL (ref 0–0.7)
EOSINOPHIL NFR BLD AUTO: 4 %
ERYTHROCYTE [DISTWIDTH] IN BLOOD BY AUTOMATED COUNT: 13.6 % (ref 10–15)
GFR SERPL CREATININE-BSD FRML MDRD: 27 ML/MIN/1.73M2
GLUCOSE SERPL-MCNC: 95 MG/DL (ref 70–99)
HCT VFR BLD AUTO: 27 % (ref 35–47)
HGB BLD-MCNC: 9.2 G/DL (ref 11.7–15.7)
IMM GRANULOCYTES # BLD: 0 10E3/UL
IMM GRANULOCYTES NFR BLD: 0 %
LYMPHOCYTES # BLD AUTO: 0.6 10E3/UL (ref 0.8–5.3)
LYMPHOCYTES NFR BLD AUTO: 25 %
MAGNESIUM SERPL-MCNC: 1.6 MG/DL (ref 1.7–2.3)
MCH RBC QN AUTO: 31.8 PG (ref 26.5–33)
MCHC RBC AUTO-ENTMCNC: 34.1 G/DL (ref 31.5–36.5)
MCV RBC AUTO: 93 FL (ref 78–100)
MONOCYTES # BLD AUTO: 0.5 10E3/UL (ref 0–1.3)
MONOCYTES NFR BLD AUTO: 22 %
NEUTROPHILS # BLD AUTO: 1.1 10E3/UL (ref 1.6–8.3)
NEUTROPHILS NFR BLD AUTO: 48 %
NRBC # BLD AUTO: 0 10E3/UL
NRBC BLD AUTO-RTO: 0 /100
PLAT MORPH BLD: NORMAL
PLATELET # BLD AUTO: 116 10E3/UL (ref 150–450)
POTASSIUM SERPL-SCNC: 3.7 MMOL/L (ref 3.4–5.3)
RBC # BLD AUTO: 2.89 10E6/UL (ref 3.8–5.2)
RBC MORPH BLD: NORMAL
SODIUM SERPL-SCNC: 132 MMOL/L (ref 136–145)
WBC # BLD AUTO: 2.3 10E3/UL (ref 4–11)

## 2023-06-30 PROCEDURE — 36415 COLL VENOUS BLD VENIPUNCTURE: CPT | Performed by: PHYSICIAN ASSISTANT

## 2023-06-30 PROCEDURE — 99239 HOSP IP/OBS DSCHRG MGMT >30: CPT | Performed by: NURSE PRACTITIONER

## 2023-06-30 PROCEDURE — 96361 HYDRATE IV INFUSION ADD-ON: CPT

## 2023-06-30 PROCEDURE — 80048 BASIC METABOLIC PNL TOTAL CA: CPT | Performed by: PHYSICIAN ASSISTANT

## 2023-06-30 PROCEDURE — G0378 HOSPITAL OBSERVATION PER HR: HCPCS

## 2023-06-30 PROCEDURE — 250N000013 HC RX MED GY IP 250 OP 250 PS 637: Performed by: PHYSICIAN ASSISTANT

## 2023-06-30 PROCEDURE — 85025 COMPLETE CBC W/AUTO DIFF WBC: CPT | Performed by: PHYSICIAN ASSISTANT

## 2023-06-30 PROCEDURE — 258N000003 HC RX IP 258 OP 636

## 2023-06-30 PROCEDURE — 250N000012 HC RX MED GY IP 250 OP 636 PS 637

## 2023-06-30 PROCEDURE — 250N000013 HC RX MED GY IP 250 OP 250 PS 637

## 2023-06-30 PROCEDURE — 83735 ASSAY OF MAGNESIUM: CPT | Performed by: PHYSICIAN ASSISTANT

## 2023-06-30 RX ORDER — ONDANSETRON 4 MG/1
4 TABLET, ORALLY DISINTEGRATING ORAL EVERY 8 HOURS PRN
Qty: 30 TABLET | Refills: 11 | Status: SHIPPED | OUTPATIENT
Start: 2023-06-30

## 2023-06-30 RX ORDER — ACETAMINOPHEN 325 MG/1
650 TABLET ORAL EVERY 6 HOURS PRN
COMMUNITY
Start: 2023-06-30 | End: 2023-11-06

## 2023-06-30 RX ORDER — FLUTICASONE PROPIONATE 50 MCG
1 SPRAY, SUSPENSION (ML) NASAL DAILY PRN
Start: 2023-06-30 | End: 2024-05-06

## 2023-06-30 RX ADMIN — CARVEDILOL 3.12 MG: 3.12 TABLET, FILM COATED ORAL at 08:13

## 2023-06-30 RX ADMIN — ACETAMINOPHEN 650 MG: 325 TABLET, FILM COATED ORAL at 08:13

## 2023-06-30 RX ADMIN — PANTOPRAZOLE SODIUM 40 MG: 40 TABLET, DELAYED RELEASE ORAL at 08:13

## 2023-06-30 RX ADMIN — AZATHIOPRINE 50 MG: 50 TABLET ORAL at 08:13

## 2023-06-30 RX ADMIN — SODIUM CHLORIDE: 9 INJECTION, SOLUTION INTRAVENOUS at 00:45

## 2023-06-30 ASSESSMENT — ACTIVITIES OF DAILY LIVING (ADL)
ADLS_ACUITY_SCORE: 33

## 2023-06-30 NOTE — PLAN OF CARE
PRIMARY DIAGNOSIS: Abdominal Pain  OUTPATIENT/OBSERVATION GOALS TO BE MET BEFORE DISCHARGE:  1. Pain Status: Yes, 3/10 headache    2. Return to near baseline physical activity: No    3. Cleared for discharge by consultants (if involved): No    Discharge Planner Nurse   Safe discharge environment identified: No  Barriers to discharge: Yes       Entered by: Saira Mcdaniel RN 06/29/2023   Patient is A & O x 4 but intermittently forgetful. Lung sounds CTA & bowel sounds active. Ileostomy in place with liquid dark green output. Pt is able to manage the ileostomy. Pt is a SBA, c/o headache, Tylenol prn given at 1650, now pt denies pain/SOB/N/V. Daughter visited and asked staff to walk pt. Pt walked with staff & daughter around the unit x 2. PIV to MIGDALIA infusing NS @ 75 ml/hr. Pt on Mg protocol, currently Mag is 2.1, recheck tomorrow AM. GI consults is following.  BP (!) 162/68 (BP Location: Left arm)   Pulse 64   Temp 98.1  F (36.7  C) (Oral)   Resp 16   Wt 58.7 kg (129 lb 6.6 oz)   SpO2 99%   BMI 24.45 kg/m    Please review provider order for any additional goals.   Nurse to notify provider when observation goals have been met and patient is ready for discharge.

## 2023-06-30 NOTE — PLAN OF CARE
[facility]  ED  EMERGENCY DEPARTMENT ENCOUNTER      Pt Name: Gillermina Hashimoto  MRN: [id number]  Cristianegfkylee PRIMARY DIAGNOSIS: Melanotic ileostomy output   OUTPATIENT/OBSERVATION GOALS TO BE MET BEFORE DISCHARGE:  /59 (BP Location: Left arm)   Pulse 63   Temp 98.4  F (36.9  C) (Oral)   Resp 16   Wt 58.7 kg (129 lb 6.6 oz)   SpO2 96%   BMI 24.45 kg/m    1. ADLs back to baseline: Yes    2. Activity and level of assistance: Up with standby assistance.    3. Pain status: Pain free.    4. Return to near baseline physical activity: Yes     Discharge Planner Nurse   Safe discharge environment identified: Yes  Barriers to discharge: Yes       Entered by: Leighton Esteves, RN     Pt A&Ox4. VSS on RA. Regular diet. SBA w walker. Pt denied any pain or discomfort.  Pt able to manage ileostomy (output liquid and dark green).  NS infusing at 75 ml/hr. Tele in place running SR 60s. Pt on mag protocol, recheck in AM. Plan- GI consult, recheck CBC in AM, discharge in AM if continue to feel well and labs improve. Will continue with POC   Please review provider order for any additional goals.   Nurse to notify provider when observation goals have been met and patient is ready for discharge.       Lanita Cogan, MD at [address] [place] [address]       Venkat Dobson DO  PGY-2          Trisha Dobson DO  Resident  12/08/22 [id number]

## 2023-06-30 NOTE — PLAN OF CARE
PRIMARY DIAGNOSIS: N/V/Black Ileostomy Output  OUTPATIENT/OBSERVATION GOALS TO BE MET BEFORE DISCHARGE:  ADLs back to baseline: Yes    Activity and level of assistance: Up with standby assistance.    Pain status: Improved-controlled with oral pain medications.    Return to near baseline physical activity: Yes     Discharge Planner Nurse   Safe discharge environment identified: Yes  Barriers to discharge: No       Patient remains A/Ox4, on room air, up stand by assist with a walker, regular diet. Patient has had no N/V episodes or black stools so far this shift. Patient ambulated in the adrian x1 an tolerated that well.     /62 (BP Location: Left arm, Cuff Size: Adult Regular)   Pulse 60   Temp 97.5  F (36.4  C) (Oral)   Resp 16   Wt 58.7 kg (129 lb 6.6 oz)   SpO2 98%   BMI 24.45 kg/m         Please review provider order for any additional goals.   Nurse to notify provider when observation goals have been met and patient is ready for discharge.Goal Outcome Evaluation:

## 2023-06-30 NOTE — PLAN OF CARE
PRIMARY DIAGNOSIS: Melanotic ileostomy output   OUTPATIENT/OBSERVATION GOALS TO BE MET BEFORE DISCHARGE:  1. ADLs back to baseline: Yes    2. Activity and level of assistance: Up with standby assistance.    3. Pain status: Pain free.    4. Return to near baseline physical activity: Yes     Discharge Planner Nurse   Safe discharge environment identified: Yes  Barriers to discharge: Yes       Entered by: Leighton Esteves, RN     Pt A&Ox4. VSS on RA. Regular diet. SBA w walker. Pt denied any pain or discomfort. Active bowel sounds in all four quadrant. Pt able to manage ileostomy.  NS infusing at 75 ml/hr. Tele in place running SR 60s. Pt on mag protocol, recheck in AM. Plan- GI consult, recheck CBC in AM, discharge if continue to feel well and labs improve.   Please review provider order for any additional goals.   Nurse to notify provider when observation goals have been met and patient is ready for discharge.

## 2023-06-30 NOTE — PLAN OF CARE
IMARY DIAGNOSIS: Melanotic Ileostomy Output  OUTPATIENT/OBSERVATION GOALS TO BE MET BEFORE DISCHARGE:  1. ADLs back to baseline: Yes    2. Activity and level of assistance: Up with standby assistance.    3. Pain status: Improved-controlled with oral pain medications.    4. Return to near baseline physical activity: Yes     Discharge Planner Nurse   Safe discharge environment identified: Yes  Barriers to discharge: No        Patient is A/Ox4, on room air, up SBA with a walker. Patient reported 6/10 pain in her legs and requested PRN tylenol. NaCl running at 75 mL/hr. Hbg was 9.2 this morning. On a regular diet, tolerating that well.   /62 (BP Location: Left arm, Cuff Size: Adult Regular)   Pulse 65   Temp 97.7  F (36.5  C) (Oral)   Resp 16   Wt 58.7 kg (129 lb 6.6 oz)   SpO2 95%   BMI 24.45 kg/m         Please review provider order for any additional goals.   Nurse to notify provider when observation goals have been met and patient is ready for discharge.

## 2023-06-30 NOTE — DISCHARGE SUMMARY
[facility]  Hospitalist Discharge Summary      Date of Admission:  6/28/2023  Date of Discharge:  6/30/2023  Discharging Provider: JUNIOR Fontanez CNP  Discharge Service: Hospitalist Service    Discharge Diagnoses   See below      Follow-ups Needed After Discharge   Follow-up Appointments     Follow-up and recommended labs and tests       Follow up with primary care provider, Hien Booth, within 7 days   for hospital follow- up.  The following labs/tests are recommended: CBC,   BMP.    Follow up with Dr. Cespedes with [facility] as needed.            Unresulted Labs Ordered in the Past 30 Days of this Admission     No orders found from 5/29/2023 to 6/29/2023.      These results will be followed up by NA    Discharge Disposition   Discharged to home  Condition at discharge: Stable    Hospital Course   Gely Keene is a 83 year old female with PMH CKD stage 4, HTN, Systemic lupus erythematous, hx severe c-diff requiring total colectomy, and GERD who presents with nausea, bilious vomiting x 5, epigastric pain, and black ileostomy output.     On Sunday she noted black liquid ileostomy output that she thought was due to her diet. She has continued to have daily black stools.  On 6/27 she developed nausea and had 5 bilious emesis episodes over the course of 24 hours.  Has slight epigastric tenderness but states she has had this for years and doctors are unsure what is causing the pain. Denies fever, chills, chest pain, shortness of breath, new cough. Does not take iron supplements. No frequent alcohol use or NSAIDs. No aspirin use.      In the ED, afebrile, hypertensive 193/76, heart rate 68, respirations 18, oxygen 98% on room air.  BMP notable for sodium 124, chloride 92, creatinine 1.71, magnesium 1.4.  Lipase 71.  Lactic acid 1.9.  CBC notable for white count of 2.2, hemoglobin 11.3.  EKG shows sinus rhythm.  Occult blood positive.  UA notable for protein, few bacteria.   CT abdomen shows prior any near total colectomy with likely small bowel diverticulitis, no evidence of perforation or associated bowel obstruction.    Melanotic ileostomy output  Epigastric tenderness  Nausea, bilious emesis   Underwent EGD by GI on 6/29 without evidence of bleeding.  Suspect related to diverticular bleed from small bowel diverticulitis.  Hgb 10.3-11.7.  On PPI.      Hyponatremia   Suspect this is hypovolemic given the vomiting episodes and poor oral intake in the last 24 hours. Sodium improved with crystalloid.      Hypomagnesemia resolved with replacement      Diverticulitis of small bowel on imaging  Noted on CT imaging but physical exam does not correlate location of tenderness. Given hx of severe c-diff in 2020 requiring colectomy antibiotics were not started on admission. Serial abdominal exams reassuring.      CKD stage 4  creatinine range last year 1.86-2.39  Creatinine currently stable at 1.71      Mild lipase elevation lipase of 71, doubt there is any pancreatitis and no pancreatitis seen on imaging.      HTN resume PTA carvedilol.     SLE continue PTA azathioprine    See AVS and discharge orders for discharge plan of care and after care.      Consultations This Hospital Stay   GASTROENTEROLOGY IP CONSULT    Code Status   Full Code    Time Spent on this Encounter   I, JUNIOR Fontanez CNP, personally saw the patient today and spent greater than 30 minutes discharging this patient.       JUNIOR Fontanez CNP  [facility] OBSERVATION DEPT  [address] [place] [address]  Phone: [phone]  ______________________________________________________________________    Physical Exam   Vital Signs: Temp: 97.5  F (36.4  C) Temp src: Oral BP: 139/62 Pulse: 60   Resp: 16 SpO2: 98 % O2 Device: None (Room air) Oxygen Delivery: 4 LPM  Weight: 129 lbs 6.56 oz  GEN:   Alert, oriented x 3, appears comfortable, NAD.  NECK:   Supple ,no mass or thyromegaly   HEENT:   Normocephalic/atraumatic, no scleral icterus, no nasal discharge, mouth moist.  CV:   Regular rate and rhythm, no murmur or JVD.  S1 + S2 noted, no S3 or S4.  LUNGS:   Clear to auscultation bilaterally without rales/rhonchi/wheezing/retractions.  Symmetric chest rise on inhalation noted.  ABD:   Active bowel sounds, soft, non-tender/non-distended.  No rebound/guarding/rigidity.  EXT:   No edema.  No cyanosis.  No joint synovitis noted.  SKIN:   Dry to touch, no exanthems noted in the visualized areas.  Neurologic: Grossly intact,non focal.   Neuropsychiatric:  General: normal, calm and normal eye contact  Level of consciousness: alert / normal  Affect: normal  Orientation: oriented to self, place, time and situation        Primary Care Physician   Hien Booth    Discharge Orders      Reason for your hospital stay    Melanotic stool   Possible GI bleed 2/2 small bowel diverticiulitis  Hyponatremia  Chronic kidney disease stage IV  Hypertension  Lupus    Procedures:  EGD -- overall reassuring. No evidence of active bleeding.  Small polyps were noted and have been noted in the past.  These have been biopsied in the past and were benign (non cancerous).     Follow-up and recommended labs and tests     Follow up with primary care provider, Hien Booth, within 7 days for hospital follow- up.  The following labs/tests are recommended: CBC, BMP.    Follow up with Dr. Cespedes with [facility] as needed.     Activity    Your activity upon discharge: activity as tolerated     When to contact your care team    Call your primary doctor if you have any of the following: any questions or concerns.     Diet    Follow this diet upon discharge: Orders Placed This Encounter      Regular Diet Adult       Significant Results and Procedures   Most Recent 3 CBC's:Recent Labs   Lab Test 06/30/23  0530 06/29/23  0541 06/28/23  2355 06/28/23  1840 06/28/23  1135   WBC 2.3* 2.4*  --   --  2.2*   HGB 9.2* 9.6* 10.5*   < > 11.3*    MCV 93 94  --   --  93   * 132*  --   --  155    < > = values in this interval not displayed.     Most Recent 3 BMP's:Recent Labs   Lab Test 06/30/23  0530 06/29/23  0541 06/28/23  2115 06/28/23  1618 06/28/23  1136 06/28/23  1135   * 130* 130*   < >  --  124*   POTASSIUM 3.7 3.8  --   --   --  4.7   CHLORIDE 103 98  --   --   --  92*   CO2 21* 21*  --   --   --  25   BUN 16.9 16.9  --   --   --  19.9   CR 1.79* 1.71*  --   --  2.0* 1.71*   ANIONGAP 8 11  --   --   --  7   GRICEL 7.7* 8.0*  --   --   --  8.8   GLC 95 95  --   --   --  141*    < > = values in this interval not displayed.     Most Recent 2 LFT's:Recent Labs   Lab Test 06/28/23  1135 12/20/22  1206   AST 24 35   ALT <5 18   ALKPHOS 67 63   BILITOTAL 0.7 0.9     7-Day Micro Results     Collected Updated Procedure Result Status      06/23/2023 1446 06/25/2023 0604 Urine Culture [[id number]]   Urine, Clean Catch    Final result Component Value   Culture 10,000-50,000 CFU/mL Mixture of urogenital dickson                   Most Recent Urinalysis:Recent Labs   Lab Test 06/28/23  1344 01/25/23  1341 10/08/22  1112   COLOR Light Yellow  --  Yellow   APPEARANCE Clear  --  Cloudy*   URINEGLC Negative  --  Negative   URINEBILI Negative  --  Negative   URINEKETONE Negative  --  Negative   SG 1.011  --  1.025   UBLD Negative  --  Large*   URINEPH 5.0  --  6.0   PROTEIN 10*  --  >=300*   UROBILINOGEN  --   --  0.2   NITRITE Negative  --  Negative   LEUKEST Negative  --  Moderate*   RBCU <1   < > >100*   WBCU 1   < > >100*    < > = values in this interval not displayed.       Discharge Medications   Current Discharge Medication List      START taking these medications    Details   !! acetaminophen (TYLENOL) 325 MG tablet Take 2 tablets (650 mg) by mouth every 6 hours as needed for mild pain or other (and adjunct with moderate or severe pain or per patient request)    Associated Diagnoses: Abdominal pain, generalized       !! - Potential duplicate  medications found. Please discuss with provider.      CONTINUE these medications which have CHANGED    Details   fluticasone (FLONASE) 50 MCG/ACT nasal spray Spray 1 spray into both nostrils daily as needed for rhinitis or allergies    Associated Diagnoses: Post-nasal drainage      ondansetron (ZOFRAN ODT) 4 MG ODT tab Take 1 tablet (4 mg) by mouth every 8 hours as needed for nausea  Qty: 30 tablet, Refills: 11    Associated Diagnoses: Abdominal pain, generalized         CONTINUE these medications which have NOT CHANGED    Details   !! acetaminophen (TYLENOL) 500 MG tablet Take 500-1,000 mg by mouth every 6 hours as needed (pain)      albuterol (PROAIR HFA/PROVENTIL HFA/VENTOLIN HFA) 108 (90 Base) MCG/ACT inhaler Inhale 2 puffs into the lungs every 6 hours as needed for shortness of breath, wheezing or cough  Qty: 18 g, Refills: 0    Comments: Pharmacy may dispense brand covered by insurance (Proair, or proventil or ventolin or generic albuterol inhaler)  Associated Diagnoses: Cough, unspecified type      azaTHIOprine (IMURAN) 50 MG tablet Take 50 mg by mouth 2 times daily      carvedilol (COREG) 3.125 MG tablet TAKE 1 TABLET BY MOUTH TWICE A DAY WITH MEALS  Qty: 180 tablet, Refills: 3    Associated Diagnoses: Essential hypertension      DULoxetine (CYMBALTA) 30 MG capsule TAKE 1 CAPSULE BY MOUTH EVERY DAY  Qty: 90 capsule, Refills: 2    Associated Diagnoses: Axonal sensorimotor neuropathy; Systemic lupus erythematosus, unspecified SLE type, unspecified organ involvement status (H); Anxiety      estradiol (ESTRACE) 0.1 MG/GM vaginal cream Place vaginally three times a week M-W-F      folic acid (FOLVITE) 1 MG tablet Take 1 mg by mouth 2 times daily      gabapentin (NEURONTIN) 300 MG capsule Take 300 mg by mouth At Bedtime      omeprazole (PRILOSEC) 20 MG DR capsule TAKE 1 CAPSULE BY MOUTH EVERY DAY  Qty: 90 capsule, Refills: 3    Associated Diagnoses: Gastroesophageal reflux disease      Vitamin D, Cholecalciferol,  10 MCG (400 UNIT) TABS Take 800 Units by mouth daily       !! - Potential duplicate medications found. Please discuss with provider.      STOP taking these medications       ciprofloxacin (CIPRO) 250 MG tablet Comments:   Reason for Stopping:             Allergies   Allergies   Allergen Reactions     Codeine GI Disturbance     Metronidazole      GI distubance     Sulfacetamide      Sulfamethoxazole-Trimethoprim GI Disturbance     Vomiting     Sulfur

## 2023-06-30 NOTE — PLAN OF CARE
Patient's After Visit Summary was reviewed with patient and/or spouse.   Patient verbalized understanding of After Visit Summary, recommended follow up and was given an opportunity to ask questions.   Discharge medications sent home with patient/family: No, prescription for Zofran refill was sent to [facility] pharmacy in [place]   Discharged with spouse        OBSERVATION patient END time: 12:59    /62 (BP Location: Left arm, Cuff Size: Adult Regular)   Pulse 60   Temp 97.5  F (36.4  C) (Oral)   Resp 16   Wt 58.7 kg (129 lb 6.6 oz)   SpO2 98%   BMI 24.45 kg/m

## 2023-07-02 ENCOUNTER — PATIENT OUTREACH (OUTPATIENT)
Dept: CARE COORDINATION | Facility: CLINIC | Age: 83
End: 2023-07-02
Payer: MEDICARE

## 2023-07-02 NOTE — PROGRESS NOTES
Clinic Care Coordination Contact  [facility]: Post-Discharge Note  SITUATION                                                      Admission:    Admission Date: 06/28/23   Reason for Admission: 1. Hyponatremia  E87.1       2. Nausea and vomiting, unspecified vomiting type  R11.2       3. Epigastric pain  R10.13       4. Melena  K92.1       5. Chronic kidney disease, unspecified CKD stage  N18.9  Discharge:   Discharge Date: 06/30/23  Discharge Diagnosis: Melanotic ileostomy output  Epigastric tenderness  Nausea, bilious emesis   Hyponatremia   Hypomagnesemia   Diverticulitis of small bowel on imaging  CKD stage 4   Mild lipase elevation  HTN  SLE    BACKGROUND                                                      Per hospital discharge summary and inpatient provider notes:    Gely Keene is a 83 year old female who presents emergency department concerns for abdominal pain with nausea and vomiting.  She notes her symptoms started yesterday.  She is at the doctor's office for routine checkup and afterward noticed nausea.  Later in the night she developed vomiting.  She has had 4 episodes total.  Not black or bloody.  Bilious on the most recent.  She notes last night when the vomiting started she also had abdominal pain, but it seemed to start after the vomiting.  She notes there was some black output in her ileostomy sac with reduced output in general.  She has had less urination than normal.  She notes she has chronic intermittent lower extremity swelling, but has been persistent from yesterday today, when normally it goes away in the morning.  She notes she has had chronic upper abdominal pain for years with no clear cause.      ASSESSMENT           Discharge Assessment  How are you doing now that you are home?: Patient states she is feeling pretty good.  Denies any further black or bloody stools.  How are your symptoms? (Red Flag symptoms escalate to triage hotline per guidelines): Improved  Do you  feel your condition is stable enough to be safe at home until your provider visit?: Yes  Does the patient have their discharge instructions? : Yes  Does the patient have questions regarding their discharge instructions? : No  Were you started on any new medications or were there changes to any of your previous medications? : Yes  Does the patient have all of their medications?: Yes  Do you have questions regarding any of your medications? : No  Do you have all of your needed medical supplies or equipment (DME)?  (i.e. oxygen tank, CPAP, cane, etc.): Yes  Discharge follow-up appointment scheduled within 14 calendar days? : No  Is patient agreeable to assistance with scheduling? : No         Post-op (Clinicians Only)  Did the patient have surgery or a procedure: Yes  Bleeding: none  Fever: No  Chills: No  Incision site pain: No  Eating & Drinking: eating and drinking without complaints/concerns  PO Intake: regular diet  Bowel Function:  (Ileostomy output is normal.)  Urinary Status: voiding without complaint/concerns      PLAN                                                      Outpatient Plan:  Follow up with primary care provider, Hien Booth, within 7 days   for hospital follow- up.  The following labs/tests are recommended: CBC,   BMP.     Follow up with Dr. Cespedes with [facility] as needed.      No future appointments.      For any urgent concerns, please contact our 24 hour nurse triage line: [phone] ([phone])         Kasey Ceballos RN

## 2023-07-07 ENCOUNTER — TRANSFERRED RECORDS (OUTPATIENT)
Dept: HEALTH INFORMATION MANAGEMENT | Facility: CLINIC | Age: 83
End: 2023-07-07
Payer: MEDICARE

## 2023-07-07 LAB
ALT SERPL-CCNC: 7 U/L (ref 6–29)
AST SERPL-CCNC: 15 U/L (ref 10–35)
CREATININE (EXTERNAL): 1.72 MG/DL (ref 0.6–0.95)
GFR ESTIMATED (EXTERNAL): 29 ML/MIN/1.73M2
GLUCOSE (EXTERNAL): 89 MG/DL (ref 65–99)
POTASSIUM (EXTERNAL): 4.6 MMOL/L (ref 3.5–5.3)

## 2023-07-14 ENCOUNTER — TRANSFERRED RECORDS (OUTPATIENT)
Dept: HEALTH INFORMATION MANAGEMENT | Facility: CLINIC | Age: 83
End: 2023-07-14
Payer: MEDICARE

## 2023-07-18 ENCOUNTER — TELEPHONE (OUTPATIENT)
Dept: PEDIATRICS | Facility: CLINIC | Age: 83
End: 2023-07-18
Payer: MEDICARE

## 2023-07-18 DIAGNOSIS — G62.89 AXONAL SENSORIMOTOR NEUROPATHY: ICD-10-CM

## 2023-07-18 DIAGNOSIS — E78.5 HYPERLIPIDEMIA LDL GOAL <160: ICD-10-CM

## 2023-07-18 DIAGNOSIS — I10 HYPERTENSION GOAL BP (BLOOD PRESSURE) < 140/90: Primary | ICD-10-CM

## 2023-07-18 NOTE — TELEPHONE ENCOUNTER
Routing to Dr. Booth - Please advise if patient should restart a statin medication.     Patient states her Nephrologist mentioned to follow up with Dr. Booth about restarting the statin.     We also set up a hospital follow up visit with Mallika Sims NP 7/19/2023.

## 2023-07-19 ENCOUNTER — OFFICE VISIT (OUTPATIENT)
Dept: PEDIATRICS | Facility: CLINIC | Age: 83
End: 2023-07-19
Payer: MEDICARE

## 2023-07-19 VITALS
DIASTOLIC BLOOD PRESSURE: 52 MMHG | OXYGEN SATURATION: 99 % | WEIGHT: 129.3 LBS | BODY MASS INDEX: 24.43 KG/M2 | HEART RATE: 74 BPM | TEMPERATURE: 97.3 F | SYSTOLIC BLOOD PRESSURE: 124 MMHG | RESPIRATION RATE: 14 BRPM

## 2023-07-19 DIAGNOSIS — E87.1 HYPONATREMIA: ICD-10-CM

## 2023-07-19 DIAGNOSIS — Z09 HOSPITAL DISCHARGE FOLLOW-UP: Primary | ICD-10-CM

## 2023-07-19 DIAGNOSIS — R42 DIZZINESS: ICD-10-CM

## 2023-07-19 DIAGNOSIS — D64.9 ANEMIA, UNSPECIFIED TYPE: ICD-10-CM

## 2023-07-19 LAB
ANION GAP SERPL CALCULATED.3IONS-SCNC: 10 MMOL/L (ref 7–15)
BUN SERPL-MCNC: 27 MG/DL (ref 8–23)
CALCIUM SERPL-MCNC: 9.2 MG/DL (ref 8.8–10.2)
CHLORIDE SERPL-SCNC: 99 MMOL/L (ref 98–107)
CREAT SERPL-MCNC: 1.85 MG/DL (ref 0.51–0.95)
DEPRECATED HCO3 PLAS-SCNC: 23 MMOL/L (ref 22–29)
ERYTHROCYTE [DISTWIDTH] IN BLOOD BY AUTOMATED COUNT: 13.9 % (ref 10–15)
GFR SERPL CREATININE-BSD FRML MDRD: 26 ML/MIN/1.73M2
GLUCOSE SERPL-MCNC: 110 MG/DL (ref 70–99)
HCT VFR BLD AUTO: 30.7 % (ref 35–47)
HGB BLD-MCNC: 9.9 G/DL (ref 11.7–15.7)
MCH RBC QN AUTO: 30.9 PG (ref 26.5–33)
MCHC RBC AUTO-ENTMCNC: 32.2 G/DL (ref 31.5–36.5)
MCV RBC AUTO: 96 FL (ref 78–100)
PLATELET # BLD AUTO: 178 10E3/UL (ref 150–450)
POTASSIUM SERPL-SCNC: 4 MMOL/L (ref 3.4–5.3)
RBC # BLD AUTO: 3.2 10E6/UL (ref 3.8–5.2)
SODIUM SERPL-SCNC: 132 MMOL/L (ref 136–145)
WBC # BLD AUTO: 2.7 10E3/UL (ref 4–11)

## 2023-07-19 PROCEDURE — 99213 OFFICE O/P EST LOW 20 MIN: CPT | Performed by: NURSE PRACTITIONER

## 2023-07-19 PROCEDURE — 36415 COLL VENOUS BLD VENIPUNCTURE: CPT | Performed by: NURSE PRACTITIONER

## 2023-07-19 PROCEDURE — 80048 BASIC METABOLIC PNL TOTAL CA: CPT | Performed by: NURSE PRACTITIONER

## 2023-07-19 PROCEDURE — 85027 COMPLETE CBC AUTOMATED: CPT | Performed by: NURSE PRACTITIONER

## 2023-07-19 RX ORDER — MECLIZINE HYDROCHLORIDE 25 MG/1
25 TABLET ORAL 3 TIMES DAILY PRN
Qty: 30 TABLET | Refills: 0 | Status: SHIPPED | OUTPATIENT
Start: 2023-07-19 | End: 2024-08-28

## 2023-07-19 ASSESSMENT — PAIN SCALES - GENERAL: PAINLEVEL: NO PAIN (0)

## 2023-07-19 NOTE — PATIENT INSTRUCTIONS
It was nice seeing you today.    Please let me know if you have any questions regarding today's visit!    Take care,    ALEXIS Sims DNP  Family Medicine

## 2023-07-19 NOTE — PROGRESS NOTES
Assessment & Plan     Hospital discharge follow-up  Summary reviewed.    Follow-up with [facility] as needed  Repeat labs needed.    Hyponatremia  Repeat labs today.  Chronic history of hyponatremia.  - Basic metabolic panel  (Ca, Cl, CO2, Creat, Gluc, K, Na, BUN)    Dizziness  Intermittent for several months.  Describes symptoms as different then lightheadedness.    Will trial meclizine.  Deferred PT referral or further work-up.  - meclizine (ANTIVERT) 25 MG tablet; Take 1 tablet (25 mg) by mouth 3 times daily as needed for dizziness    Anemia, unspecified type  Repeat needed from hospital visit.  - CBC with platelets      Message sent to RN to coordinate visit with PCP or consult regarding statin and duloxetine.       MED REC REQUIRED  Post Medication Reconciliation Status: discharge medications reconciled and changed, per note/orders      Mallika Sims NP  [facility] TAHIRA Hong is a 83 year old, presenting for the following health issues:  Hospital F/U      [facility]       Hospital Follow-up Visit:    Hospital/Nursing Home/IP Rehab Facility: [facility]  Date of Admission: 06/28/2023  Date of Discharge: 06/30/2023  Reason(s) for Admission: Black stools     Was your hospitalization related to COVID-19? No   Problems taking medications regularly:  None  Medication changes since discharge: Prescribed Zofran but had some from previous prescription from PCP.  Problems adhering to non-medication therapy:  None    Summary of hospitalization:  [facility] discharge summary reviewed  Diagnostic Tests/Treatments reviewed.  Follow up needed: PCP and GI as needed.   GI at [facility] Dr. Coy GREGORIO   BMP  Other Healthcare Providers Involved in Patient s Care:          GI  Update since discharge: improved.   Plan of care communicated with patient      Dizziness:  -Intermittent for the lab couple of months  -Has to move head slowly so she does not get dizzy.  -No allergies  "or sinus issues.  -Different then hypotension symptoms per patient.  -Low sodium.  Chronic.      Unsure is she should restart statin.  Per note,   \"Once clear if tolerating cymbalta or not, can restart statin - based on most recent labs, recommended restarting\"      Review of Systems   Constitutional, HEENT, cardiovascular, pulmonary, gi and gu systems are negative, except as otherwise noted.      Objective    /52 (BP Location: Right arm, Patient Position: Sitting, Cuff Size: Adult Regular)   Pulse 74   Temp 97.3  F (36.3  C) (Tympanic)   Resp 14   Wt 58.7 kg (129 lb 4.8 oz)   SpO2 99%   BMI 24.43 kg/m    Body mass index is 24.43 kg/m .       Physical Exam   GENERAL: healthy, alert and no distress  RESP: lungs clear to auscultation - no rales, rhonchi or wheezes  CV: regular rate and rhythm, normal S1 S2, no S3 or S4, no murmur, click or rub, no peripheral edema and peripheral pulses strong  PSYCH: mentation appears normal, affect normal/bright            "

## 2023-07-19 NOTE — TELEPHONE ENCOUNTER
I would love for Gely to restart her previous statin dosing.   Please also help her schedule an AWV with me in Dec.   OK for follow up with me sooner if desired.    Hien Booth MD

## 2023-07-19 NOTE — TELEPHONE ENCOUNTER
"Voicemail not set up. Will need to try back later.     Patient also saw Mallika today and she wanted me to follow up on:  \"Please follow-up with patient.       Per note from Dr. Booth 1/2023 patient was to restart cholesterol medication if she tolerates duloxetine.  Needs to follow-up on dose of duloxetine (does not think it is working and unsure if she wants to increase) and restarting statin.       Mallika \"  "

## 2023-07-21 RX ORDER — DULOXETIN HYDROCHLORIDE 20 MG/1
20 CAPSULE, DELAYED RELEASE ORAL DAILY
Qty: 90 CAPSULE | Refills: 1 | Status: SHIPPED | OUTPATIENT
Start: 2023-07-21 | End: 2023-11-15

## 2023-07-21 RX ORDER — ATORVASTATIN CALCIUM 20 MG/1
20 TABLET, FILM COATED ORAL DAILY
Qty: 90 TABLET | Refills: 3 | Status: SHIPPED | OUTPATIENT
Start: 2023-07-21 | End: 2024-08-16

## 2023-07-21 NOTE — TELEPHONE ENCOUNTER
Spoke with .   Patient doesn't wake up until 11am.   Will need to call back on Monday to discuss below.      was also wondering about the lab results from the visit this week.

## 2023-07-21 NOTE — TELEPHONE ENCOUNTER
New scripts for atorvastatin and lower dose duloxetine sent to pharmacy.  Would recommend taking the lower dose of duloxetine for at least 1 month before stopping.    Hien Booth MD

## 2023-07-21 NOTE — TELEPHONE ENCOUNTER
Routing to Dr. Booth to advise on Atorvastatin rx & Duloxetine taper.     Spoke with patient.     She agrees with restarting the statin. Pended Atrovastatin.     Inquires on Duloxetine taper. Doesn't think it it helping her pain or neuropathy.   Has been on since December. Scared to just stop.  Ok with taking one less pill.     Set up AWV on 1/9/2024.

## 2023-07-24 DIAGNOSIS — D64.9 ANEMIA, UNSPECIFIED TYPE: Primary | ICD-10-CM

## 2023-07-24 NOTE — PROGRESS NOTES
Follow up lab appointment needed in 2 weeks.   Patient going out of town.   We set up for 8/21/2023.

## 2023-07-25 NOTE — TELEPHONE ENCOUNTER
Updated patient on plan.   She understands and agrees.     She did recently  the 30mg Duloxetine. Will likely finish that then go to the 20mg.

## 2023-08-21 ENCOUNTER — TELEPHONE (OUTPATIENT)
Dept: PEDIATRICS | Facility: CLINIC | Age: 83
End: 2023-08-21

## 2023-08-21 ENCOUNTER — LAB (OUTPATIENT)
Dept: LAB | Facility: CLINIC | Age: 83
End: 2023-08-21
Payer: MEDICARE

## 2023-08-21 DIAGNOSIS — E83.42 HYPOMAGNESEMIA: ICD-10-CM

## 2023-08-21 DIAGNOSIS — I12.9 CKD STAGE 4 SECONDARY TO HYPERTENSION (H): ICD-10-CM

## 2023-08-21 DIAGNOSIS — E78.5 HYPERLIPIDEMIA LDL GOAL <160: ICD-10-CM

## 2023-08-21 DIAGNOSIS — N18.4 CKD STAGE 4 SECONDARY TO HYPERTENSION (H): ICD-10-CM

## 2023-08-21 DIAGNOSIS — N18.30 CHRONIC KIDNEY DISEASE, STAGE III (MODERATE) (H): ICD-10-CM

## 2023-08-21 DIAGNOSIS — E87.1 HYPONATREMIA: ICD-10-CM

## 2023-08-21 DIAGNOSIS — R73.01 IFG (IMPAIRED FASTING GLUCOSE): ICD-10-CM

## 2023-08-21 DIAGNOSIS — Z00.00 MEDICARE ANNUAL WELLNESS VISIT, SUBSEQUENT: ICD-10-CM

## 2023-08-21 DIAGNOSIS — Z00.00 MEDICARE ANNUAL WELLNESS VISIT, SUBSEQUENT: Primary | ICD-10-CM

## 2023-08-21 DIAGNOSIS — D64.9 ANEMIA, UNSPECIFIED TYPE: ICD-10-CM

## 2023-08-21 LAB
ANION GAP SERPL CALCULATED.3IONS-SCNC: 9 MMOL/L (ref 7–15)
BUN SERPL-MCNC: 29.5 MG/DL (ref 8–23)
CALCIUM SERPL-MCNC: 9.2 MG/DL (ref 8.8–10.2)
CHLORIDE SERPL-SCNC: 103 MMOL/L (ref 98–107)
CHOLEST SERPL-MCNC: 152 MG/DL
CREAT SERPL-MCNC: 1.9 MG/DL (ref 0.51–0.95)
DEPRECATED HCO3 PLAS-SCNC: 21 MMOL/L (ref 22–29)
ERYTHROCYTE [DISTWIDTH] IN BLOOD BY AUTOMATED COUNT: 14.3 % (ref 10–15)
GFR SERPL CREATININE-BSD FRML MDRD: 25 ML/MIN/1.73M2
GLUCOSE SERPL-MCNC: 113 MG/DL (ref 70–99)
HBA1C MFR BLD: 6.1 % (ref 0–5.6)
HCT VFR BLD AUTO: 30.6 % (ref 35–47)
HDLC SERPL-MCNC: 73 MG/DL
HGB BLD-MCNC: 10.2 G/DL (ref 11.7–15.7)
LDLC SERPL CALC-MCNC: 65 MG/DL
MAGNESIUM SERPL-MCNC: 1.4 MG/DL (ref 1.7–2.3)
MCH RBC QN AUTO: 31.8 PG (ref 26.5–33)
MCHC RBC AUTO-ENTMCNC: 33.3 G/DL (ref 31.5–36.5)
MCV RBC AUTO: 95 FL (ref 78–100)
NONHDLC SERPL-MCNC: 79 MG/DL
PLATELET # BLD AUTO: 184 10E3/UL (ref 150–450)
POTASSIUM SERPL-SCNC: 4.4 MMOL/L (ref 3.4–5.3)
RBC # BLD AUTO: 3.21 10E6/UL (ref 3.8–5.2)
SODIUM SERPL-SCNC: 133 MMOL/L (ref 136–145)
TRIGL SERPL-MCNC: 70 MG/DL
WBC # BLD AUTO: 4.1 10E3/UL (ref 4–11)

## 2023-08-21 PROCEDURE — 80048 BASIC METABOLIC PNL TOTAL CA: CPT

## 2023-08-21 PROCEDURE — 85027 COMPLETE CBC AUTOMATED: CPT

## 2023-08-21 PROCEDURE — 83036 HEMOGLOBIN GLYCOSYLATED A1C: CPT

## 2023-08-21 PROCEDURE — 36415 COLL VENOUS BLD VENIPUNCTURE: CPT

## 2023-08-21 PROCEDURE — 80061 LIPID PANEL: CPT

## 2023-08-21 PROCEDURE — 83735 ASSAY OF MAGNESIUM: CPT

## 2023-08-21 NOTE — TELEPHONE ENCOUNTER
Spoke with patient.   She forgot why she was coming in today for labs.     We ended up setting up a fasting lab only appointment before her 1/9/2024 AWV.     Will get lab orders scheduled for the future.

## 2023-09-19 ENCOUNTER — TRANSFERRED RECORDS (OUTPATIENT)
Dept: HEALTH INFORMATION MANAGEMENT | Facility: CLINIC | Age: 83
End: 2023-09-19
Payer: MEDICARE

## 2023-10-11 ENCOUNTER — TRANSFERRED RECORDS (OUTPATIENT)
Dept: HEALTH INFORMATION MANAGEMENT | Facility: CLINIC | Age: 83
End: 2023-10-11

## 2023-10-27 DIAGNOSIS — G62.89 AXONAL SENSORIMOTOR NEUROPATHY: ICD-10-CM

## 2023-10-27 RX ORDER — DULOXETIN HYDROCHLORIDE 20 MG/1
20 CAPSULE, DELAYED RELEASE ORAL DAILY
Qty: 90 CAPSULE | Refills: 1 | Status: CANCELLED | OUTPATIENT
Start: 2023-10-27

## 2023-10-30 NOTE — TELEPHONE ENCOUNTER
Attempted to reach patient, LVMTCB. [employer] Direct message sent at this time.     Kareem CLARK RN 10/30/2023 at 5:18 PM

## 2023-11-01 NOTE — TELEPHONE ENCOUNTER
Spoke with patient.   Reviewed plan from the 7/18/2023 rx - decrease to 20mg x 30days then stop after 30mg rx used up.   Patient just finished the 30mg Duloxetine rx that she had.   Patient is in agreement to go to the 20mg.     There was discussion with patient and daughter. Daughter hesitant for patient to stop.   Advised ok to discuss and let us know if patient would like to continue it or not.     They did  the 20mg rx for 90 caps.

## 2023-11-03 ENCOUNTER — LAB (OUTPATIENT)
Dept: LAB | Facility: CLINIC | Age: 83
DRG: 683 | End: 2023-11-03
Payer: MEDICARE

## 2023-11-03 DIAGNOSIS — N18.4 CHRONIC KIDNEY DISEASE, STAGE IV (SEVERE) (H): ICD-10-CM

## 2023-11-03 LAB
ALBUMIN SERPL BCG-MCNC: 4 G/DL (ref 3.5–5.2)
ANION GAP SERPL CALCULATED.3IONS-SCNC: 15 MMOL/L (ref 7–15)
BUN SERPL-MCNC: 73.1 MG/DL (ref 8–23)
CALCIUM SERPL-MCNC: 8.6 MG/DL (ref 8.8–10.2)
CHLORIDE SERPL-SCNC: 96 MMOL/L (ref 98–107)
CREAT SERPL-MCNC: 4.06 MG/DL (ref 0.51–0.95)
DEPRECATED HCO3 PLAS-SCNC: 17 MMOL/L (ref 22–29)
EGFRCR SERPLBLD CKD-EPI 2021: 10 ML/MIN/1.73M2
GLUCOSE SERPL-MCNC: 206 MG/DL (ref 70–99)
PHOSPHATE SERPL-MCNC: 3.6 MG/DL (ref 2.5–4.5)
POTASSIUM SERPL-SCNC: 3.2 MMOL/L (ref 3.4–5.3)
SODIUM SERPL-SCNC: 128 MMOL/L (ref 135–145)

## 2023-11-03 PROCEDURE — 36415 COLL VENOUS BLD VENIPUNCTURE: CPT

## 2023-11-03 PROCEDURE — 80069 RENAL FUNCTION PANEL: CPT

## 2023-11-06 ENCOUNTER — APPOINTMENT (OUTPATIENT)
Dept: GENERAL RADIOLOGY | Facility: CLINIC | Age: 83
DRG: 683 | End: 2023-11-06
Attending: STUDENT IN AN ORGANIZED HEALTH CARE EDUCATION/TRAINING PROGRAM
Payer: MEDICARE

## 2023-11-06 ENCOUNTER — HOSPITAL ENCOUNTER (INPATIENT)
Facility: CLINIC | Age: 83
LOS: 3 days | Discharge: HOME | DRG: 683 | End: 2023-11-09
Attending: STUDENT IN AN ORGANIZED HEALTH CARE EDUCATION/TRAINING PROGRAM | Admitting: INTERNAL MEDICINE
Payer: MEDICARE

## 2023-11-06 ENCOUNTER — APPOINTMENT (OUTPATIENT)
Dept: ULTRASOUND IMAGING | Facility: CLINIC | Age: 83
DRG: 683 | End: 2023-11-06
Attending: STUDENT IN AN ORGANIZED HEALTH CARE EDUCATION/TRAINING PROGRAM
Payer: MEDICARE

## 2023-11-06 DIAGNOSIS — N18.9 ACUTE RENAL FAILURE SUPERIMPOSED ON CHRONIC KIDNEY DISEASE, UNSPECIFIED ACUTE RENAL FAILURE TYPE, UNSPECIFIED CKD STAGE (H): Primary | ICD-10-CM

## 2023-11-06 DIAGNOSIS — R79.89 ELEVATED TROPONIN: ICD-10-CM

## 2023-11-06 DIAGNOSIS — E83.42 HYPOMAGNESEMIA: ICD-10-CM

## 2023-11-06 DIAGNOSIS — N17.9 ACUTE KIDNEY FAILURE, UNSPECIFIED (H): Primary | ICD-10-CM

## 2023-11-06 DIAGNOSIS — E83.51 HYPOCALCEMIA: ICD-10-CM

## 2023-11-06 DIAGNOSIS — E87.1 HYPONATREMIA: ICD-10-CM

## 2023-11-06 DIAGNOSIS — N17.9 ACUTE RENAL FAILURE SUPERIMPOSED ON CHRONIC KIDNEY DISEASE, UNSPECIFIED ACUTE RENAL FAILURE TYPE, UNSPECIFIED CKD STAGE (H): Primary | ICD-10-CM

## 2023-11-06 DIAGNOSIS — D64.9 ANEMIA, UNSPECIFIED TYPE: ICD-10-CM

## 2023-11-06 DIAGNOSIS — E87.6 HYPOKALEMIA: ICD-10-CM

## 2023-11-06 DIAGNOSIS — G25.81 RESTLESS LEGS SYNDROME (RLS): ICD-10-CM

## 2023-11-06 PROBLEM — N19 RENAL FAILURE: Status: ACTIVE | Noted: 2023-11-06

## 2023-11-06 LAB
ALBUMIN MFR UR ELPH: 13.1 MG/DL
ALBUMIN SERPL BCG-MCNC: 3.9 G/DL (ref 3.5–5.2)
ALBUMIN UR-MCNC: 20 MG/DL
ALP SERPL-CCNC: 79 U/L (ref 35–104)
ALT SERPL W P-5'-P-CCNC: 33 U/L (ref 0–50)
ANION GAP SERPL CALCULATED.3IONS-SCNC: 12 MMOL/L (ref 7–15)
APPEARANCE UR: CLEAR
AST SERPL W P-5'-P-CCNC: 69 U/L (ref 0–45)
BACTERIA #/AREA URNS HPF: ABNORMAL /HPF
BASOPHILS # BLD AUTO: 0.1 10E3/UL (ref 0–0.2)
BASOPHILS NFR BLD AUTO: 1 %
BILIRUB DIRECT SERPL-MCNC: 0.25 MG/DL (ref 0–0.3)
BILIRUB SERPL-MCNC: 1 MG/DL
BILIRUB UR QL STRIP: NEGATIVE
BUN SERPL-MCNC: 67.5 MG/DL (ref 8–23)
CALCIUM SERPL-MCNC: 8.5 MG/DL (ref 8.8–10.2)
CHLORIDE SERPL-SCNC: 99 MMOL/L (ref 98–107)
CK SERPL-CCNC: 57 U/L (ref 26–192)
COLOR UR AUTO: ABNORMAL
CREAT SERPL-MCNC: 3.93 MG/DL (ref 0.51–0.95)
CREAT UR-MCNC: 85.2 MG/DL
CRP SERPL-MCNC: <3 MG/L
DEPRECATED HCO3 PLAS-SCNC: 17 MMOL/L (ref 22–29)
EGFRCR SERPLBLD CKD-EPI 2021: 11 ML/MIN/1.73M2
EOSINOPHIL # BLD AUTO: 0.1 10E3/UL (ref 0–0.7)
EOSINOPHIL NFR BLD AUTO: 3 %
ERYTHROCYTE [DISTWIDTH] IN BLOOD BY AUTOMATED COUNT: 15.5 % (ref 10–15)
GLUCOSE SERPL-MCNC: 176 MG/DL (ref 70–99)
GLUCOSE UR STRIP-MCNC: NEGATIVE MG/DL
HCT VFR BLD AUTO: 28.5 % (ref 35–47)
HGB BLD-MCNC: 9.8 G/DL (ref 11.7–15.7)
HGB UR QL STRIP: NEGATIVE
HOLD SPECIMEN: NORMAL
HOLD SPECIMEN: NORMAL
IMM GRANULOCYTES # BLD: 0 10E3/UL
IMM GRANULOCYTES NFR BLD: 1 %
KETONES UR STRIP-MCNC: NEGATIVE MG/DL
LEUKOCYTE ESTERASE UR QL STRIP: NEGATIVE
LYMPHOCYTES # BLD AUTO: 0.4 10E3/UL (ref 0.8–5.3)
LYMPHOCYTES NFR BLD AUTO: 7 %
MAGNESIUM SERPL-MCNC: 1.3 MG/DL (ref 1.7–2.3)
MCH RBC QN AUTO: 31.1 PG (ref 26.5–33)
MCHC RBC AUTO-ENTMCNC: 34.4 G/DL (ref 31.5–36.5)
MCV RBC AUTO: 91 FL (ref 78–100)
MONOCYTES # BLD AUTO: 0.7 10E3/UL (ref 0–1.3)
MONOCYTES NFR BLD AUTO: 14 %
MUCOUS THREADS #/AREA URNS LPF: PRESENT /LPF
NEUTROPHILS # BLD AUTO: 3.9 10E3/UL (ref 1.6–8.3)
NEUTROPHILS NFR BLD AUTO: 74 %
NITRATE UR QL: NEGATIVE
NRBC # BLD AUTO: 0 10E3/UL
NRBC BLD AUTO-RTO: 0 /100
PH UR STRIP: 6 [PH] (ref 5–7)
PHOSPHATE SERPL-MCNC: 3.5 MG/DL (ref 2.5–4.5)
PLATELET # BLD AUTO: 189 10E3/UL (ref 150–450)
POTASSIUM SERPL-SCNC: 3.3 MMOL/L (ref 3.4–5.3)
PROT SERPL-MCNC: 6.5 G/DL (ref 6.4–8.3)
PROT/CREAT 24H UR: 0.15 MG/MG CR (ref 0–0.2)
RBC # BLD AUTO: 3.15 10E6/UL (ref 3.8–5.2)
RBC URINE: <1 /HPF
SODIUM SERPL-SCNC: 128 MMOL/L (ref 135–145)
SP GR UR STRIP: 1.01 (ref 1–1.03)
SQUAMOUS EPITHELIAL: 4 /HPF
TROPONIN T SERPL HS-MCNC: 44 NG/L
TROPONIN T SERPL HS-MCNC: 44 NG/L
UROBILINOGEN UR STRIP-MCNC: NORMAL MG/DL
WBC # BLD AUTO: 5.2 10E3/UL (ref 4–11)
WBC URINE: 1 /HPF

## 2023-11-06 PROCEDURE — 99285 EMERGENCY DEPT VISIT HI MDM: CPT | Mod: 25

## 2023-11-06 PROCEDURE — 85025 COMPLETE CBC W/AUTO DIFF WBC: CPT | Performed by: STUDENT IN AN ORGANIZED HEALTH CARE EDUCATION/TRAINING PROGRAM

## 2023-11-06 PROCEDURE — 36415 COLL VENOUS BLD VENIPUNCTURE: CPT | Performed by: EMERGENCY MEDICINE

## 2023-11-06 PROCEDURE — 86162 COMPLEMENT TOTAL (CH50): CPT | Performed by: INTERNAL MEDICINE

## 2023-11-06 PROCEDURE — 86160 COMPLEMENT ANTIGEN: CPT | Performed by: STUDENT IN AN ORGANIZED HEALTH CARE EDUCATION/TRAINING PROGRAM

## 2023-11-06 PROCEDURE — 250N000013 HC RX MED GY IP 250 OP 250 PS 637: Performed by: STUDENT IN AN ORGANIZED HEALTH CARE EDUCATION/TRAINING PROGRAM

## 2023-11-06 PROCEDURE — 86140 C-REACTIVE PROTEIN: CPT | Performed by: STUDENT IN AN ORGANIZED HEALTH CARE EDUCATION/TRAINING PROGRAM

## 2023-11-06 PROCEDURE — 93005 ELECTROCARDIOGRAM TRACING: CPT

## 2023-11-06 PROCEDURE — 82550 ASSAY OF CK (CPK): CPT | Performed by: INTERNAL MEDICINE

## 2023-11-06 PROCEDURE — 85041 AUTOMATED RBC COUNT: CPT | Performed by: EMERGENCY MEDICINE

## 2023-11-06 PROCEDURE — 82310 ASSAY OF CALCIUM: CPT | Performed by: STUDENT IN AN ORGANIZED HEALTH CARE EDUCATION/TRAINING PROGRAM

## 2023-11-06 PROCEDURE — 71046 X-RAY EXAM CHEST 2 VIEWS: CPT

## 2023-11-06 PROCEDURE — 120N000001 HC R&B MED SURG/OB

## 2023-11-06 PROCEDURE — 81001 URINALYSIS AUTO W/SCOPE: CPT | Performed by: STUDENT IN AN ORGANIZED HEALTH CARE EDUCATION/TRAINING PROGRAM

## 2023-11-06 PROCEDURE — 36415 COLL VENOUS BLD VENIPUNCTURE: CPT | Performed by: INTERNAL MEDICINE

## 2023-11-06 PROCEDURE — 36415 COLL VENOUS BLD VENIPUNCTURE: CPT | Performed by: STUDENT IN AN ORGANIZED HEALTH CARE EDUCATION/TRAINING PROGRAM

## 2023-11-06 PROCEDURE — 86225 DNA ANTIBODY NATIVE: CPT | Performed by: STUDENT IN AN ORGANIZED HEALTH CARE EDUCATION/TRAINING PROGRAM

## 2023-11-06 PROCEDURE — 96374 THER/PROPH/DIAG INJ IV PUSH: CPT | Mod: 59

## 2023-11-06 PROCEDURE — 82248 BILIRUBIN DIRECT: CPT | Performed by: STUDENT IN AN ORGANIZED HEALTH CARE EDUCATION/TRAINING PROGRAM

## 2023-11-06 PROCEDURE — 76770 US EXAM ABDO BACK WALL COMP: CPT

## 2023-11-06 PROCEDURE — 84100 ASSAY OF PHOSPHORUS: CPT | Performed by: STUDENT IN AN ORGANIZED HEALTH CARE EDUCATION/TRAINING PROGRAM

## 2023-11-06 PROCEDURE — 258N000003 HC RX IP 258 OP 636: Performed by: INTERNAL MEDICINE

## 2023-11-06 PROCEDURE — 250N000011 HC RX IP 250 OP 636: Mod: JZ | Performed by: STUDENT IN AN ORGANIZED HEALTH CARE EDUCATION/TRAINING PROGRAM

## 2023-11-06 PROCEDURE — 84156 ASSAY OF PROTEIN URINE: CPT | Performed by: INTERNAL MEDICINE

## 2023-11-06 PROCEDURE — 258N000003 HC RX IP 258 OP 636: Performed by: STUDENT IN AN ORGANIZED HEALTH CARE EDUCATION/TRAINING PROGRAM

## 2023-11-06 PROCEDURE — 99223 1ST HOSP IP/OBS HIGH 75: CPT | Mod: AI | Performed by: INTERNAL MEDICINE

## 2023-11-06 PROCEDURE — 84484 ASSAY OF TROPONIN QUANT: CPT | Performed by: STUDENT IN AN ORGANIZED HEALTH CARE EDUCATION/TRAINING PROGRAM

## 2023-11-06 PROCEDURE — 85014 HEMATOCRIT: CPT | Performed by: EMERGENCY MEDICINE

## 2023-11-06 PROCEDURE — 250N000013 HC RX MED GY IP 250 OP 250 PS 637: Performed by: INTERNAL MEDICINE

## 2023-11-06 PROCEDURE — 83735 ASSAY OF MAGNESIUM: CPT | Performed by: STUDENT IN AN ORGANIZED HEALTH CARE EDUCATION/TRAINING PROGRAM

## 2023-11-06 RX ORDER — GABAPENTIN 100 MG/1
400 CAPSULE ORAL AT BEDTIME
Status: DISCONTINUED | OUTPATIENT
Start: 2023-11-06 | End: 2023-11-07

## 2023-11-06 RX ORDER — POTASSIUM CHLORIDE 1500 MG/1
20 TABLET, EXTENDED RELEASE ORAL ONCE
Status: COMPLETED | OUTPATIENT
Start: 2023-11-06 | End: 2023-11-06

## 2023-11-06 RX ORDER — ACETAMINOPHEN 325 MG/1
650 TABLET ORAL EVERY 6 HOURS PRN
Status: DISCONTINUED | OUTPATIENT
Start: 2023-11-06 | End: 2023-11-09 | Stop reason: HOSPADM

## 2023-11-06 RX ORDER — ACETAMINOPHEN 650 MG/1
650 SUPPOSITORY RECTAL EVERY 6 HOURS PRN
Status: DISCONTINUED | OUTPATIENT
Start: 2023-11-06 | End: 2023-11-09 | Stop reason: HOSPADM

## 2023-11-06 RX ORDER — ONDANSETRON 2 MG/ML
4 INJECTION INTRAMUSCULAR; INTRAVENOUS EVERY 6 HOURS PRN
Status: DISCONTINUED | OUTPATIENT
Start: 2023-11-06 | End: 2023-11-09 | Stop reason: HOSPADM

## 2023-11-06 RX ORDER — SODIUM CHLORIDE 9 MG/ML
INJECTION, SOLUTION INTRAVENOUS CONTINUOUS
Status: DISCONTINUED | OUTPATIENT
Start: 2023-11-06 | End: 2023-11-07

## 2023-11-06 RX ORDER — ATORVASTATIN CALCIUM 10 MG/1
20 TABLET, FILM COATED ORAL DAILY
Status: DISCONTINUED | OUTPATIENT
Start: 2023-11-07 | End: 2023-11-09 | Stop reason: HOSPADM

## 2023-11-06 RX ORDER — CARVEDILOL 3.12 MG/1
3.12 TABLET ORAL 2 TIMES DAILY WITH MEALS
Status: DISCONTINUED | OUTPATIENT
Start: 2023-11-06 | End: 2023-11-08

## 2023-11-06 RX ORDER — FAMOTIDINE 20 MG/1
20 TABLET, FILM COATED ORAL 2 TIMES DAILY
Status: DISCONTINUED | OUTPATIENT
Start: 2023-11-06 | End: 2023-11-07

## 2023-11-06 RX ORDER — GABAPENTIN 100 MG/1
100 CAPSULE ORAL AT BEDTIME
Status: ON HOLD | COMMUNITY
End: 2023-11-09

## 2023-11-06 RX ORDER — ONDANSETRON 4 MG/1
4 TABLET, ORALLY DISINTEGRATING ORAL EVERY 6 HOURS PRN
Status: DISCONTINUED | OUTPATIENT
Start: 2023-11-06 | End: 2023-11-09 | Stop reason: HOSPADM

## 2023-11-06 RX ORDER — DULOXETIN HYDROCHLORIDE 20 MG/1
20 CAPSULE, DELAYED RELEASE ORAL AT BEDTIME
Status: DISCONTINUED | OUTPATIENT
Start: 2023-11-06 | End: 2023-11-09 | Stop reason: HOSPADM

## 2023-11-06 RX ORDER — MAGNESIUM SULFATE HEPTAHYDRATE 40 MG/ML
2 INJECTION, SOLUTION INTRAVENOUS ONCE
Status: COMPLETED | OUTPATIENT
Start: 2023-11-06 | End: 2023-11-06

## 2023-11-06 RX ADMIN — FAMOTIDINE 20 MG: 20 TABLET ORAL at 23:29

## 2023-11-06 RX ADMIN — SODIUM CHLORIDE: 9 INJECTION, SOLUTION INTRAVENOUS at 21:18

## 2023-11-06 RX ADMIN — GABAPENTIN 400 MG: 100 CAPSULE ORAL at 23:28

## 2023-11-06 RX ADMIN — SODIUM CHLORIDE, POTASSIUM CHLORIDE, SODIUM LACTATE AND CALCIUM CHLORIDE 1000 ML: 600; 310; 30; 20 INJECTION, SOLUTION INTRAVENOUS at 17:18

## 2023-11-06 RX ADMIN — MAGNESIUM SULFATE HEPTAHYDRATE 2 G: 40 INJECTION, SOLUTION INTRAVENOUS at 17:17

## 2023-11-06 RX ADMIN — CARVEDILOL 3.12 MG: 3.12 TABLET, FILM COATED ORAL at 23:29

## 2023-11-06 RX ADMIN — POTASSIUM CHLORIDE 20 MEQ: 1500 TABLET, EXTENDED RELEASE ORAL at 17:21

## 2023-11-06 ASSESSMENT — ACTIVITIES OF DAILY LIVING (ADL)
ADLS_ACUITY_SCORE: 35

## 2023-11-06 NOTE — ED TRIAGE NOTES
Patient presents to the ED after being called by her doctor following abnormal lab work. Patient's family states many things were abnormal, including a creatinine of 4.06. Patient states she has been feeling fatigued and dizzy.

## 2023-11-06 NOTE — ED NOTES
[facility]  ED Nurse Handoff Report    ED Chief complaint: Abnormal Labs  . ED Diagnosis:   Final diagnoses:   Acute renal failure superimposed on chronic kidney disease, unspecified acute renal failure type, unspecified CKD stage    Hyponatremia   Hypokalemia   Hypomagnesemia   Elevated troponin   Hypocalcemia       Allergies:   Allergies   Allergen Reactions    Codeine GI Disturbance    Metronidazole      GI distubance    Sulfacetamide     Sulfamethoxazole-Trimethoprim GI Disturbance     Vomiting    Sulfur        Code Status: Full Code    Activity level - Baseline/Home:  independent.  Activity Level - Current:   standby.   Lift room needed: No.   Bariatric: No   Needed: No   Isolation: No.   Infection: Not Applicable.     Respiratory status: Room air    Vital Signs (within 30 minutes):   Vitals:    11/06/23 1309   BP: 115/81   Pulse: 67   Resp: 18   Temp: 97.6  F (36.4  C)   TempSrc: Temporal   SpO2: 99%       Cardiac Rhythm:  ,      Pain level:    Patient confused: No.   Patient Falls Risk: bed/chair alarm on, nonskid shoes/slippers when out of bed, arm band in place, patient and family education, assistive device/personal items within reach, and activity supervised.   Elimination Status: Has voided     Patient Report - Initial Complaint: Abnormal Labs.   Focused Assessment: sergio Keene is a 83 year old female with a history of lupus, hypertension, CKD who presents with abnormal lab results. She got a call today about labs drawn last Friday and told to come into the ED. Was told she had a Creatinine of 4.06. She has been recently experiencing shortness of breath, fatigue, dizziness. She also recently finished a round of antibiotics for a UTI. She denies any vomiting, diarrhea.       Abnormal Results:   Labs Ordered and Resulted from Time of ED Arrival to Time of ED Departure   BASIC METABOLIC PANEL - Abnormal       Result Value    Sodium 128 (*)     Potassium 3.3 (*)      Chloride 99      Carbon Dioxide (CO2) 17 (*)     Anion Gap 12      Urea Nitrogen 67.5 (*)     Creatinine 3.93 (*)     GFR Estimate 11 (*)     Calcium 8.5 (*)     Glucose 176 (*)    CBC WITH PLATELETS AND DIFFERENTIAL - Abnormal    WBC Count 5.2      RBC Count 3.15 (*)     Hemoglobin 9.8 (*)     Hematocrit 28.5 (*)     MCV 91      MCH 31.1      MCHC 34.4      RDW 15.5 (*)     Platelet Count 189      % Neutrophils 74      % Lymphocytes 7      % Monocytes 14      % Eosinophils 3      % Basophils 1      % Immature Granulocytes 1      NRBCs per 100 WBC 0      Absolute Neutrophils 3.9      Absolute Lymphocytes 0.4 (*)     Absolute Monocytes 0.7      Absolute Eosinophils 0.1      Absolute Basophils 0.1      Absolute Immature Granulocytes 0.0      Absolute NRBCs 0.0     HEPATIC FUNCTION PANEL - Abnormal    Protein Total 6.5      Albumin 3.9      Bilirubin Total 1.0      Alkaline Phosphatase 79      AST 69 (*)     ALT 33      Bilirubin Direct 0.25     TROPONIN T, HIGH SENSITIVITY - Abnormal    Troponin T, High Sensitivity 44 (*)    MAGNESIUM - Abnormal    Magnesium 1.3 (*)    ROUTINE UA WITH MICROSCOPIC REFLEX TO CULTURE - Abnormal    Color Urine Light Yellow      Appearance Urine Clear      Glucose Urine Negative      Bilirubin Urine Negative      Ketones Urine Negative      Specific Gravity Urine 1.009      Blood Urine Negative      pH Urine 6.0      Protein Albumin Urine 20 (*)     Urobilinogen Urine Normal      Nitrite Urine Negative      Leukocyte Esterase Urine Negative      Bacteria Urine Few (*)     Mucus Urine Present (*)     RBC Urine <1      WBC Urine 1      Squamous Epithelials Urine 4 (*)    PHOSPHORUS - Normal    Phosphorus 3.5     CRP INFLAMMATION - Normal    CRP Inflammation <3.00     TROPONIN T, HIGH SENSITIVITY   COMPLEMENT C3   COMPLEMENT C4   DNA DOUBLE STRANDED ANTIBODIES   PROTEIN TIMED URINE        XR Chest 2 Views   Final Result   IMPRESSION: No acute findings. The lungs are clear and there are no    pleural effusions. Normal heart size. Spinal degenerative changes.   Right upper quadrant surgical clips.      MASON ROWELL MD            SYSTEM ID:  [id number]       Renal Complete Non-Vascular    (Results Pending)       Treatments provided: See MAR  Family Comments: Updated daughters.   OBS brochure/video discussed/provided to patient:  Yes  ED Medications:   Medications   magnesium sulfate 2 g in 50 mL sterile water intermittent infusion (2 g Intravenous $New Bag 11/6/23 1717)   lactated ringers BOLUS 1,000 mL (1,000 mLs Intravenous $New Bag 11/6/23 1718)   potassium chloride ER (KLOR-CON M) CR tablet 20 mEq (20 mEq Oral $Given 11/6/23 1721)       Drips infusing:  No  For the majority of the shift this patient was Green.   Interventions performed were none.    Sepsis treatment initiated: No      ED Nurse Name: Jett Pinto RN  5:29 PM

## 2023-11-06 NOTE — H&P
[facility]    History and Physical - Hospitalist Service       Date of Admission:  11/6/2023    Assessment & Plan      Gely Keene is a 83 year old female admitted on 11/6/2023.     Acute kidney injury on stage III CKD  Probably on the basis of nephritis with SLE with urine showing multiple RBCs as well as WBCs, proteinuria  Will order urine creatinine to protein ratio, complement levels.  We will consult nephrology  Hold azathioprine  Renal ultrasound is not suggestive of hydronephrosis  Patient's dizziness on getting up might be related to acute kidney injury nevertheless orthostatic blood pressures will be checked  IV fluids will be continued    Elevated troponins  EKG looks normal  Patient has elevated creatinine as well so troponin T might be elevated from the elevated creatinine level  We will order an echocardiogram of the heart to evaluate LV and RV function    Probable syncope and fall  We will check echocardiogram of the heart to evaluate LV and RV function    Systemic: Lupus erythematosus  On treatment with azathioprine 50 mg 2 times daily  Nephrology has recommended to hold this to the emergency department physician    Hypertension   patient is on treatment with carvedilol which will be continued    GERD  On treatment with omeprazole 20 mg daily  I will replace this with famotidine in this patient with acute kidney injury    Hypercholesteremia  I will continue atorvastatin 20 mg daily that the patient has  We will check CPK level to rule out pigment associated disease    Anxiety  Patient is on treatment with gabapentin 300 mg at bedtime which will be continued  We will request pharmacy to intervene if the equivalent doses are exceeded    Restless leg syndrome   continue gabapentin    Abdominal pain  Uncertain etiology continue the famotidine if the abdominal pain gets worse can change to IV Protonix trying to avoid PPI in this patient with acute kidney  injury/CKD    Acute on chronic hyponatremia  Nephrology is consulted    Hypocalcemia probably on the basis of kidney failure        Diet:  Renal diet  DVT Prophylaxis: Pneumatic Compression Devices  Chan Catheter: Not present  Lines: None     Cardiac Monitoring: None  Code Status:  Full code    Clinically Significant Risk Factors Present on Admission        # Hypokalemia: Lowest K = 3.3 mmol/L in last 2 days, will replace as needed  # Hyponatremia: Lowest Na = 128 mmol/L in last 2 days, will monitor as appropriate    # Hypomagnesemia: Lowest Mg = 1.3 mg/dL in last 2 days, will replace as needed      # Acute Kidney Injury, unspecified: based on a >150% or 0.3 mg/dL increase in last creatinine compared to past 90 day average, will monitor renal function  # Hypertension: Noted on problem list                 Disposition Plan   Will be admitted inpatient to the hospital       Bro Montiel MD  Hospitalist Service  [facility]  Securely message with [employer] (more info)  Text page via [employer] Paging/Directory     ______________________________________________________________________    Chief Complaint   Abnormal labs    History is obtained from the patient, her daughter Diana, medical records and my discussion with emergency department physician      History of Present Illness   Gely Keene is a pleasant 83 year old lady with anxiety, GERD, hypertension, hypercholesteremia, restless leg syndrome, colostomy bag drainage s/p colon resection for diverticulitis,, stage III CKD who follows up with Dr. Singer nephrology, systemic lupus erythematosus.    Patient's creatinine has progressively been getting increased and her GFR continuing to decline lately.  Creatinine was 1.85 and GFR 26 in July 2023 creatinine 1.9 GFR 25 in August and from the labs that were done on 11/3/2023 creatinine increased to 4.06 with GFR decreasing to 10 because of which Dr. Singer's nurse called them and asked them to go and  get admitted to the hospital.    Patient has been progressively feeling weak and for the past 1 week has been using a walker after she fell down from her bed.  She is getting dizzy on getting up and moving.    She also has pain like a rope tied around her upper abdomen which is tightening heart making her breathing difficult.  This is on and off for over 10 in intensity of pain lasting for 2 to 3 minutes when it comes.  She denies any nausea vomiting or black-colored stool in her colostomy bag because of this.    Her appetite is decreasing and she is eating less and less of food and probably also drinking less of fluids.      Past Medical History    Past Medical History:   Diagnosis Date    Anxiety     BCC (basal cell carcinoma of skin)     CKD (chronic kidney disease)     Esophageal reflux (GERD) 09/08/2014    h/o Mumps     HTN (hypertension) 09/08/2014    Hyperlipidemia 09/08/2014    Restless legs syndrome (RLS)     Systemic lupus erythematosus        Past Surgical History   Past Surgical History:   Procedure Laterality Date    APPENDECTOMY  1952    COLECTOMY WITH COLOSTOMY, COMBINED N/A 04/22/2016    Procedure: COMBINED COLECTOMY WITH COLOSTOMY;  Surgeon: Shana Alaniz MD;  Location:  OR    CYSTOSCOPY      ENDOSCOPIC RETROGRADE CHOLANGIOPANCREATOGRAM N/A 08/15/2017    Procedure: COMBINED ENDOSCOPIC RETROGRADE CHOLANGIOPANCREATOGRAPHY, SPHINCTEROTOMY;  ENDOSCOPIC RETROGRADE CHOLANGIOPANCREATOGRAPHY, SPHINCTEROTOMY. STONE EXTRACTION;  Surgeon: Keturah Bergerno MD;  Location:  OR    ENDOSCOPIC RETROGRADE CHOLANGIOPANCREATOGRAM N/A 08/15/2017    Procedure: COMBINED ENDOSCOPIC RETROGRADE CHOLANGIOPANCREATOGRAPHY, REMOVE STONE/BALLOON;;  Surgeon: Keturah Bergeron MD;  Location:  OR    ESOPHAGOSCOPY, GASTROSCOPY, DUODENOSCOPY (EGD), COMBINED Left 01/02/2020    Procedure: ESOPHAGOGASTRODUODENOSCOPY (fv); random stomach biopsies of polyps using jumbo bx forcep;  Surgeon: Thais Martinez,  MD;  Location: [facility]    ESOPHAGOSCOPY, GASTROSCOPY, DUODENOSCOPY (EGD), COMBINED N/A 12/28/2021    Procedure: ESOPHAGOGASTRODUODENOSCOPY, WITH BIOPSies using biopsy forceps  ;  Surgeon: Schuyler Calvillo MD;  Location: [facility]    ESOPHAGOSCOPY, GASTROSCOPY, DUODENOSCOPY (EGD), COMBINED N/A 6/29/2023    Procedure: Esophagoscopy, gastroscopy, duodenoscopy (EGD), combined;  Surgeon: Leha Cespedes MD;  Location: [facility]    HYSTERECTOMY  1987    LAPAROSCOPIC CHOLECYSTECTOMY  2008    LAPAROTOMY EXPLORATORY N/A 04/22/2016    Procedure: LAPAROTOMY EXPLORATORY;  Surgeon: Shana Alaniz MD;  Location: [facility]    PHACOEMULSIFICATION CLEAR CORNEA WITH STANDARD INTRAOCULAR LENS IMPLANT Left 05/09/2017    Procedure: PHACOEMULSIFICATION CLEAR CORNEA WITH STANDARD INTRAOCULAR LENS IMPLANT;  LEFT EYE PHACOEMULSIFICATION CLEAR CORNEA WITH STANDARD INTRAOCULAR LENS IMPLANT ;  Surgeon: Eloy Eric MD;  Location:  [facility]    PHACOEMULSIFICATION CLEAR CORNEA WITH STANDARD INTRAOCULAR LENS IMPLANT Right 05/23/2017    Procedure: PHACOEMULSIFICATION CLEAR CORNEA WITH STANDARD INTRAOCULAR LENS IMPLANT;  RIGHT EYE PHACOEMULSIFICATION CLEAR CORNEA WITH STANDARD INTRAOCULAR LENS IMPLANT ;  Surgeon: Eloy Eric MD;  Location: [facility]       Prior to Admission Medications   Prior to Admission Medications   Prescriptions Last Dose Informant Patient Reported? Taking?   DULoxetine (CYMBALTA) 20 MG capsule   No No   Sig: Take 1 capsule (20 mg) by mouth daily   Vitamin D, Cholecalciferol, 10 MCG (400 UNIT) TABS   Yes No   Sig: Take 800 Units by mouth daily   acetaminophen (TYLENOL) 325 MG tablet   No No   Sig: Take 2 tablets (650 mg) by mouth every 6 hours as needed for mild pain or other (and adjunct with moderate or severe pain or per patient request)   acetaminophen (TYLENOL) 500 MG tablet   Yes No   Sig: Take 500-1,000 mg by mouth every 6 hours as needed (pain)   albuterol (PROAIR HFA/PROVENTIL HFA/VENTOLIN HFA) 108 (90  Base) MCG/ACT inhaler   No No   Sig: Inhale 2 puffs into the lungs every 6 hours as needed for shortness of breath, wheezing or cough   atorvastatin (LIPITOR) 20 MG tablet   No No   Sig: Take 1 tablet (20 mg) by mouth daily   azaTHIOprine (IMURAN) 50 MG tablet   Yes No   Sig: Take 50 mg by mouth 2 times daily   carvedilol (COREG) 3.125 MG tablet   No No   Sig: TAKE 1 TABLET BY MOUTH TWICE A DAY WITH MEALS   estradiol (ESTRACE) 0.1 MG/GM vaginal cream   Yes No   Sig: Place vaginally three times a week M-W-F   fluticasone (FLONASE) 50 MCG/ACT nasal spray   No No   Sig: Spray 1 spray into both nostrils daily as needed for rhinitis or allergies   folic acid (FOLVITE) 1 MG tablet   Yes No   Sig: Take 1 mg by mouth 2 times daily   gabapentin (NEURONTIN) 300 MG capsule   Yes No   Sig: Take 300 mg by mouth At Bedtime   meclizine (ANTIVERT) 25 MG tablet   No No   Sig: Take 1 tablet (25 mg) by mouth 3 times daily as needed for dizziness   omeprazole (PRILOSEC) 20 MG DR capsule   No No   Sig: TAKE 1 CAPSULE BY MOUTH EVERY DAY   ondansetron (ZOFRAN ODT) 4 MG ODT tab   No No   Sig: Take 1 tablet (4 mg) by mouth every 8 hours as needed for nausea      Facility-Administered Medications: None        Review of Systems    Denies any headache, blurring of vision or double vision, neck pain jaw pain or shoulder pain, chest pain, shortness of breath, cough or increased sputum production, nausea or vomiting,  diarrhea or constipation.  Denies any urinary symptoms of burning or increased frequency. Denies any weakness of upper  extremities or any seizure activity. Fever or chills. Bleeding from anywhere else.  No rashes or cellulitis.      Social History   I have reviewed this patient's social history and updated it with pertinent information if needed.  Social History     Tobacco Use    Smoking status: Never     Passive exposure: Never    Smokeless tobacco: Never   Vaping Use    Vaping Use: Never used   Substance Use Topics    Alcohol  use: Yes     Comment: socially    Drug use: No         Family History   I have reviewed this patient's family history and updated it with pertinent information if needed.  Family History   Problem Relation Age of Onset    Breast Cancer Daughter     Breast Cancer Mother     Breast Cancer Sister          Allergies   Allergies   Allergen Reactions    Codeine GI Disturbance    Metronidazole      GI distubance    Sulfacetamide     Sulfamethoxazole-Trimethoprim GI Disturbance     Vomiting    Sulfur         Physical Exam   Vital Signs: Temp: 97.6  F (36.4  C) Temp src: Temporal BP: 115/81 Pulse: 67   Resp: 18 SpO2: 99 %      Weight: 0 lbs 0 oz      GENERAL: Patient does not look in any acute distress  HEENT: EOM+, Conjunctiva is clear   NECK:  no Jugular Venous distention  HEART: S1 S2 regular  Rate and Rhythm,  no murmur,    LUNGS: Respirations are  not laboured, Lungs have decreased breath sounds in the lung bases otherwise clear to auscultation without crepitations or Wheezing   ABDOMEN: Soft , there is no colostomy bag drainage is present without any tenderness, Bowel Sounds are Positive   LOWER LIMBS: no Pedal Edema  Bilaterally   CNS:  Alert,  Oriented x 3, Moving all the Four Limbs           Data   Imaging results reviewed over the past 24 hrs:   Recent Results (from the past 24 hour(s))   XR Chest 2 Views    Narrative    XR CHEST 2 VIEWS 11/6/2023 3:33 PM    HISTORY: Chest pain    COMPARISON: Chest x-ray 9/22/2020      Impression    IMPRESSION: No acute findings. The lungs are clear and there are no  pleural effusions. Normal heart size. Spinal degenerative changes.  Right upper quadrant surgical clips.    MASON ROWELL MD         SYSTEM ID:  [id number]   US Renal Complete Non-Vascular    Narrative    EXAM: US RENAL COMPLETE NON-VASCULAR  LOCATION: [facility]  DATE: 11/6/2023    INDICATION: New renal failure. Evaluate for obstruction.  COMPARISON: None.  TECHNIQUE: Routine Bilateral Renal  Patent and Bladder Ultrasound.    FINDINGS:    RIGHT KIDNEY: 8.3 x 4.0 x 4.6 cm. Normal without hydronephrosis or masses.     LEFT KIDNEY: 9.0 x 3.8 x 4.4 cm. Normal without hydronephrosis or masses.     BLADDER: Unremarkable given its level of distention.      Impression    IMPRESSION: No obstruction demonstrated.     Most Recent 3 CBC's:  Recent Labs   Lab Test 11/06/23  1427 08/21/23  1406 07/19/23  1146   WBC 5.2 4.1 2.7*   HGB 9.8* 10.2* 9.9*   MCV 91 95 96    184 178     Most Recent 3 BMP's:  Recent Labs   Lab Test 11/06/23  1427 11/03/23  1534 08/21/23  1406   * 128* 133*   POTASSIUM 3.3* 3.2* 4.4   CHLORIDE 99 96* 103   CO2 17* 17* 21*   BUN 67.5* 73.1* 29.5*   CR 3.93* 4.06* 1.90*   ANIONGAP 12 15 9   GRICEL 8.5* 8.6* 9.2   * 206* 113*     Most Recent 2 LFT's:  Recent Labs   Lab Test 11/06/23  1427 06/28/23  1135   AST 69* 24   ALT 33 <5   ALKPHOS 79 67   BILITOTAL 1.0 0.7     Most Recent Urinalysis:  Recent Labs   Lab Test 11/06/23  1656 01/25/23  1341 10/08/22  1112   COLOR Light Yellow   < > Yellow   APPEARANCE Clear   < > Cloudy*   URINEGLC Negative   < > Negative   URINEBILI Negative   < > Negative   URINEKETONE Negative   < > Negative   SG 1.009   < > 1.025   UBLD Negative   < > Large*   URINEPH 6.0   < > 6.0   PROTEIN 20*   < > >=300*   UROBILINOGEN  --   --  0.2   NITRITE Negative   < > Negative   LEUKEST Negative   < > Moderate*   RBCU <1   < > >100*   WBCU 1   < > >100*    < > = values in this interval not displayed.     Most Recent ESR & CRP:  Recent Labs   Lab Test 11/06/23  1427 11/30/21  1439   SED  --  38*   CRP  --  3.9   CRPI <3.00  --

## 2023-11-06 NOTE — ED PROVIDER NOTES
History     Chief Complaint:  Abnormal Labs       HPI   Gely Keene is a 83 year old female with a history of lupus, hypertension, CKD who presents with abnormal lab results. She got a call today about labs drawn last Friday and told to come into the ED. Was told she had a Creatinine of 4.06. She has been recently experiencing shortness of breath, fatigue, dizziness.  She also sometimes has a discomfort in the lower center of her sternum that does not radiate without specific triggers.  However currently she denies any current symptoms.  She also recently finished a round of antibiotics for a UTI, which she states was ciprofloxacin. She denies any vomiting or diarrhea.  Patient is making urine.  She can tolerate p.o.  No reported fever.    Independent Historian:    None - Patient Only    Review of External Notes:  I reviewed lab results from 11/3    Allergies:  Codeine  Metronidazole  Sulfacetamide  Sulfamethoxazole-Trimethoprim  Sulfur     Physical Exam   Patient Vitals for the past 24 hrs:   BP Temp Temp src Pulse Resp SpO2   11/06/23 1921 122/54 97.7  F (36.5  C) Oral 60 20 98 %   11/06/23 1700 126/53 98  F (36.7  C) Temporal 56 (!) 36 98 %   11/06/23 1309 115/81 97.6  F (36.4  C) Temporal 67 18 99 %        Physical Exam  GENERAL: Patient well-appearing  HEAD: Atraumatic.  NECK: No rigidity  CV: RRR, no murmurs rubs or gallops  PULM: CTAB with good aeration; no retractions, rales, rhonchi, or wheezing  ABD: Soft, nontender, nondistended, no guarding. Ileostomy bag present on upper abdomen, dry and intact.  DERM: No rash. Skin warm and dry  EXTREMITY: Moving all extremities without difficulty. No calf tenderness or peripheral edema  VASCULAR: Symmetric pulses bilaterally      Emergency Department Course   ECG  ECG obtained at 1512, ECG read at 1520  Sinus bradycardia   Otherwise normal ECG  Rate 57 bpm. NJ interval 152 ms. QRS duration 96 ms. QT/QTc 456/443 ms. P-R-T axes 81 29 49.    Laboratory:  Imaging:   Labs Ordered and Resulted from Time of ED Arrival to Time of ED Departure   BASIC METABOLIC PANEL - Abnormal       Result Value    Sodium 128 (*)     Potassium 3.3 (*)     Chloride 99      Carbon Dioxide (CO2) 17 (*)     Anion Gap 12      Urea Nitrogen 67.5 (*)     Creatinine 3.93 (*)     GFR Estimate 11 (*)     Calcium 8.5 (*)     Glucose 176 (*)    CBC WITH PLATELETS AND DIFFERENTIAL - Abnormal    WBC Count 5.2      RBC Count 3.15 (*)     Hemoglobin 9.8 (*)     Hematocrit 28.5 (*)     MCV 91      MCH 31.1      MCHC 34.4      RDW 15.5 (*)     Platelet Count 189      % Neutrophils 74      % Lymphocytes 7      % Monocytes 14      % Eosinophils 3      % Basophils 1      % Immature Granulocytes 1      NRBCs per 100 WBC 0      Absolute Neutrophils 3.9      Absolute Lymphocytes 0.4 (*)     Absolute Monocytes 0.7      Absolute Eosinophils 0.1      Absolute Basophils 0.1      Absolute Immature Granulocytes 0.0      Absolute NRBCs 0.0     HEPATIC FUNCTION PANEL - Abnormal    Protein Total 6.5      Albumin 3.9      Bilirubin Total 1.0      Alkaline Phosphatase 79      AST 69 (*)     ALT 33      Bilirubin Direct 0.25     TROPONIN T, HIGH SENSITIVITY - Abnormal    Troponin T, High Sensitivity 44 (*)    MAGNESIUM - Abnormal    Magnesium 1.3 (*)    ROUTINE UA WITH MICROSCOPIC REFLEX TO CULTURE - Abnormal    Color Urine Light Yellow      Appearance Urine Clear      Glucose Urine Negative      Bilirubin Urine Negative      Ketones Urine Negative      Specific Gravity Urine 1.009      Blood Urine Negative      pH Urine 6.0      Protein Albumin Urine 20 (*)     Urobilinogen Urine Normal      Nitrite Urine Negative      Leukocyte Esterase Urine Negative      Bacteria Urine Few (*)     Mucus Urine Present (*)     RBC Urine <1      WBC Urine 1      Squamous Epithelials Urine 4 (*)    TROPONIN T, HIGH SENSITIVITY - Abnormal    Troponin T, High Sensitivity 44 (*)    PHOSPHORUS - Normal    Phosphorus 3.5     CRP INFLAMMATION -  Normal    CRP Inflammation <3.00     COMPLEMENT C3   COMPLEMENT C4   DNA DOUBLE STRANDED ANTIBODIES   PROTEIN TIMED URINE     US Renal Complete Non-Vascular   Final Result   IMPRESSION: No obstruction demonstrated.      XR Chest 2 Views   Final Result   IMPRESSION: No acute findings. The lungs are clear and there are no   pleural effusions. Normal heart size. Spinal degenerative changes.   Right upper quadrant surgical clips.      MASON ROWELL MD            SYSTEM ID:  [id number]              Emergency Department Course & Assessments:     Chest x-ray independently interpreted and unremarkable.  Interventions:  Medications   magnesium sulfate 2 g in 50 mL sterile water intermittent infusion (2 g Intravenous $New Bag 11/6/23 1717)   potassium chloride ER (KLOR-CON M) CR tablet 20 mEq (20 mEq Oral $Given 11/6/23 1721)   lactated ringers BOLUS 1,000 mL (1,000 mLs Intravenous $New Bag 11/6/23 1718)      Assessments, Independent Interpretation, Consult/Discussion of ManagementTests:  ED Course as of 11/06/23 1725   Mon Nov 06, 2023   1440 I examined the patient and obtained history    1647 I consulted with Dr. Saha from Nephrology about the patient and plan of care   1723 I rechecked the patient    1725 I consulted with the hospitalist Dr. Montiel about the patient and plan of care     Social Determinants of Health affecting care:  None    Disposition:  The patient was admitted to the hospital under the care of Dr. Montiel.     Impression & Plan    CMS Diagnoses: None    Code Status: Prior    Medical Decision Making:  Patient presenting with concerns of elevated creatinine and outpatient labs.  Chronic conditions complicating-lupus.  Differential diagnosis-considered prerenal versus postrenal causes, metabolic abnormality, lupus nephritis, among others.  Initial vital signs unremarkable.  Currently she is asymptomatic and denies any active chest pain.  ECG is unremarkable.  Chest x-ray unremarkable.  High-sensitivity  troponin 44 and repeat also 44, I suspect this is type II NSTEMI from renal failure.  Labs significant for renal failure.  She is also hyponatremic hypokalemic and hypomagnesemic.  I replace potassium and magnesium.  Anemia at baseline.  Discussed with on-call nephrology who recommended additional lab work to evaluate for lupus nephritis such as double-stranded antibodies and complement levels, and renal ultrasound and he will see patient in the morning.  Renal ultrasound unremarkable.  Discussed with hospitalist and patient to be admitted for further work-up.    Diagnosis:    ICD-10-CM    1. Acute renal failure superimposed on chronic kidney disease, unspecified acute renal failure type, unspecified CKD stage   N17.9     N18.9       2. Hyponatremia  E87.1       3. Hypokalemia  E87.6       4. Hypomagnesemia  E83.42       5. Elevated troponin  R79.89       6. Hypocalcemia  E83.51            Discharge Medications:  New Prescriptions    No medications on file        Scribe Disclosure:  I, Justyn Farooq, am serving as a scribe at 2:48 PM on 11/6/2023 to document services personally performed by Daryn Regalado MD based on my observations and the provider's statements to me.    11/6/2023   Daryn Regalado MD Foss, Kevin, MD  11/06/23 1946

## 2023-11-07 ENCOUNTER — APPOINTMENT (OUTPATIENT)
Dept: CARDIOLOGY | Facility: CLINIC | Age: 83
DRG: 683 | End: 2023-11-07
Attending: INTERNAL MEDICINE
Payer: MEDICARE

## 2023-11-07 ENCOUNTER — APPOINTMENT (OUTPATIENT)
Dept: OCCUPATIONAL THERAPY | Facility: CLINIC | Age: 83
DRG: 683 | End: 2023-11-07
Attending: INTERNAL MEDICINE
Payer: MEDICARE

## 2023-11-07 ENCOUNTER — APPOINTMENT (OUTPATIENT)
Dept: PHYSICAL THERAPY | Facility: CLINIC | Age: 83
DRG: 683 | End: 2023-11-07
Attending: INTERNAL MEDICINE
Payer: MEDICARE

## 2023-11-07 LAB
ALBUMIN MFR UR ELPH: 8.4 MG/DL
ALBUMIN SERPL BCG-MCNC: 3.2 G/DL (ref 3.5–5.2)
ALP SERPL-CCNC: 81 U/L (ref 35–104)
ALT SERPL W P-5'-P-CCNC: 26 U/L (ref 0–50)
ANION GAP SERPL CALCULATED.3IONS-SCNC: 12 MMOL/L (ref 7–15)
AST SERPL W P-5'-P-CCNC: 47 U/L (ref 0–45)
ATRIAL RATE - MUSE: 57 BPM
BILIRUB SERPL-MCNC: 0.8 MG/DL
BUN SERPL-MCNC: 57.7 MG/DL (ref 8–23)
C3 SERPL-MCNC: 103 MG/DL (ref 81–157)
C4 SERPL-MCNC: 31 MG/DL (ref 13–39)
CALCIUM SERPL-MCNC: 8.1 MG/DL (ref 8.8–10.2)
CHLORIDE SERPL-SCNC: 107 MMOL/L (ref 98–107)
COLLECT DURATION TIME UR: 26 H
CREAT SERPL-MCNC: 3.17 MG/DL (ref 0.51–0.95)
DEPRECATED HCO3 PLAS-SCNC: 14 MMOL/L (ref 22–29)
DIASTOLIC BLOOD PRESSURE - MUSE: NORMAL MMHG
DSDNA AB SER-ACNC: 0.8 IU/ML
EGFRCR SERPLBLD CKD-EPI 2021: 14 ML/MIN/1.73M2
ERYTHROCYTE [DISTWIDTH] IN BLOOD BY AUTOMATED COUNT: 15.4 % (ref 10–15)
FLUAV RNA SPEC QL NAA+PROBE: NEGATIVE
FLUBV RNA RESP QL NAA+PROBE: NEGATIVE
GLUCOSE SERPL-MCNC: 117 MG/DL (ref 70–99)
HCT VFR BLD AUTO: 26 % (ref 35–47)
HGB BLD-MCNC: 8.8 G/DL (ref 11.7–15.7)
INTERPRETATION ECG - MUSE: NORMAL
LVEF ECHO: NORMAL
MAGNESIUM SERPL-MCNC: 2.9 MG/DL (ref 1.7–2.3)
MCH RBC QN AUTO: 30.9 PG (ref 26.5–33)
MCHC RBC AUTO-ENTMCNC: 33.8 G/DL (ref 31.5–36.5)
MCV RBC AUTO: 91 FL (ref 78–100)
P AXIS - MUSE: 81 DEGREES
PLATELET # BLD AUTO: 160 10E3/UL (ref 150–450)
POTASSIUM SERPL-SCNC: 3.4 MMOL/L (ref 3.4–5.3)
POTASSIUM SERPL-SCNC: 3.4 MMOL/L (ref 3.4–5.3)
PR INTERVAL - MUSE: 152 MS
PROT 24H UR-MRATE: 67.85 MG/SPECIMEN
PROT SERPL-MCNC: 5.4 G/DL (ref 6.4–8.3)
QRS DURATION - MUSE: 96 MS
QT - MUSE: 456 MS
QTC - MUSE: 443 MS
R AXIS - MUSE: 29 DEGREES
RBC # BLD AUTO: 2.85 10E6/UL (ref 3.8–5.2)
RSV RNA SPEC NAA+PROBE: NEGATIVE
SARS-COV-2 RNA RESP QL NAA+PROBE: NEGATIVE
SODIUM SERPL-SCNC: 133 MMOL/L (ref 135–145)
SPECIMEN VOL UR: 875 ML
SYSTOLIC BLOOD PRESSURE - MUSE: NORMAL MMHG
T AXIS - MUSE: 49 DEGREES
TROPONIN T SERPL HS-MCNC: 46 NG/L
VENTRICULAR RATE- MUSE: 57 BPM
WBC # BLD AUTO: 4.7 10E3/UL (ref 4–11)

## 2023-11-07 PROCEDURE — 99232 SBSQ HOSP IP/OBS MODERATE 35: CPT | Performed by: INTERNAL MEDICINE

## 2023-11-07 PROCEDURE — 80053 COMPREHEN METABOLIC PANEL: CPT | Performed by: INTERNAL MEDICINE

## 2023-11-07 PROCEDURE — 250N000013 HC RX MED GY IP 250 OP 250 PS 637: Performed by: INTERNAL MEDICINE

## 2023-11-07 PROCEDURE — 97110 THERAPEUTIC EXERCISES: CPT | Mod: GO | Performed by: OCCUPATIONAL THERAPIST

## 2023-11-07 PROCEDURE — 250N000009 HC RX 250: Performed by: INTERNAL MEDICINE

## 2023-11-07 PROCEDURE — 99223 1ST HOSP IP/OBS HIGH 75: CPT | Performed by: INTERNAL MEDICINE

## 2023-11-07 PROCEDURE — 97165 OT EVAL LOW COMPLEX 30 MIN: CPT | Mod: GO | Performed by: OCCUPATIONAL THERAPIST

## 2023-11-07 PROCEDURE — 250N000011 HC RX IP 250 OP 636: Mod: JZ | Performed by: INTERNAL MEDICINE

## 2023-11-07 PROCEDURE — 999N000208 ECHOCARDIOGRAM COMPLETE

## 2023-11-07 PROCEDURE — 97161 PT EVAL LOW COMPLEX 20 MIN: CPT | Mod: GP

## 2023-11-07 PROCEDURE — 84156 ASSAY OF PROTEIN URINE: CPT | Performed by: STUDENT IN AN ORGANIZED HEALTH CARE EDUCATION/TRAINING PROGRAM

## 2023-11-07 PROCEDURE — 93306 TTE W/DOPPLER COMPLETE: CPT | Mod: 26 | Performed by: INTERNAL MEDICINE

## 2023-11-07 PROCEDURE — 97530 THERAPEUTIC ACTIVITIES: CPT | Mod: GP

## 2023-11-07 PROCEDURE — 36415 COLL VENOUS BLD VENIPUNCTURE: CPT | Performed by: INTERNAL MEDICINE

## 2023-11-07 PROCEDURE — 255N000002 HC RX 255 OP 636: Performed by: INTERNAL MEDICINE

## 2023-11-07 PROCEDURE — 97116 GAIT TRAINING THERAPY: CPT | Mod: GP

## 2023-11-07 PROCEDURE — 258N000003 HC RX IP 258 OP 636: Performed by: INTERNAL MEDICINE

## 2023-11-07 PROCEDURE — 85027 COMPLETE CBC AUTOMATED: CPT | Performed by: INTERNAL MEDICINE

## 2023-11-07 PROCEDURE — 81050 URINALYSIS VOLUME MEASURE: CPT | Performed by: STUDENT IN AN ORGANIZED HEALTH CARE EDUCATION/TRAINING PROGRAM

## 2023-11-07 PROCEDURE — 83735 ASSAY OF MAGNESIUM: CPT | Performed by: INTERNAL MEDICINE

## 2023-11-07 PROCEDURE — 87637 SARSCOV2&INF A&B&RSV AMP PRB: CPT | Performed by: INTERNAL MEDICINE

## 2023-11-07 PROCEDURE — 97535 SELF CARE MNGMENT TRAINING: CPT | Mod: GO | Performed by: OCCUPATIONAL THERAPIST

## 2023-11-07 PROCEDURE — 84132 ASSAY OF SERUM POTASSIUM: CPT | Performed by: INTERNAL MEDICINE

## 2023-11-07 PROCEDURE — 84484 ASSAY OF TROPONIN QUANT: CPT | Performed by: INTERNAL MEDICINE

## 2023-11-07 PROCEDURE — 120N000001 HC R&B MED SURG/OB

## 2023-11-07 RX ORDER — POTASSIUM CHLORIDE 1500 MG/1
20 TABLET, EXTENDED RELEASE ORAL ONCE
Status: COMPLETED | OUTPATIENT
Start: 2023-11-07 | End: 2023-11-07

## 2023-11-07 RX ORDER — NALOXONE HYDROCHLORIDE 0.4 MG/ML
0.2 INJECTION, SOLUTION INTRAMUSCULAR; INTRAVENOUS; SUBCUTANEOUS
Status: DISCONTINUED | OUTPATIENT
Start: 2023-11-07 | End: 2023-11-09 | Stop reason: HOSPADM

## 2023-11-07 RX ORDER — GABAPENTIN 300 MG/1
300 CAPSULE ORAL AT BEDTIME
Status: DISCONTINUED | OUTPATIENT
Start: 2023-11-07 | End: 2023-11-08

## 2023-11-07 RX ORDER — POTASSIUM CHLORIDE 1.5 G/1.58G
20 POWDER, FOR SOLUTION ORAL ONCE
Status: COMPLETED | OUTPATIENT
Start: 2023-11-07 | End: 2023-11-07

## 2023-11-07 RX ORDER — MAGNESIUM SULFATE HEPTAHYDRATE 40 MG/ML
2 INJECTION, SOLUTION INTRAVENOUS ONCE
Status: COMPLETED | OUTPATIENT
Start: 2023-11-07 | End: 2023-11-07

## 2023-11-07 RX ORDER — FAMOTIDINE 20 MG/1
20 TABLET, FILM COATED ORAL
Status: DISCONTINUED | OUTPATIENT
Start: 2023-11-09 | End: 2023-11-09 | Stop reason: HOSPADM

## 2023-11-07 RX ORDER — NALOXONE HYDROCHLORIDE 0.4 MG/ML
0.4 INJECTION, SOLUTION INTRAMUSCULAR; INTRAVENOUS; SUBCUTANEOUS
Status: DISCONTINUED | OUTPATIENT
Start: 2023-11-07 | End: 2023-11-09 | Stop reason: HOSPADM

## 2023-11-07 RX ADMIN — MAGNESIUM SULFATE HEPTAHYDRATE 2 G: 2 INJECTION, SOLUTION INTRAVENOUS at 03:56

## 2023-11-07 RX ADMIN — HUMAN ALBUMIN MICROSPHERES AND PERFLUTREN 3 ML: 10; .22 INJECTION, SOLUTION INTRAVENOUS at 09:48

## 2023-11-07 RX ADMIN — FAMOTIDINE 20 MG: 20 TABLET ORAL at 09:03

## 2023-11-07 RX ADMIN — DULOXETINE HYDROCHLORIDE 20 MG: 20 CAPSULE, DELAYED RELEASE ORAL at 00:02

## 2023-11-07 RX ADMIN — SODIUM CHLORIDE 500 ML: 9 INJECTION, SOLUTION INTRAVENOUS at 10:08

## 2023-11-07 RX ADMIN — POTASSIUM CHLORIDE 20 MEQ: 1500 TABLET, EXTENDED RELEASE ORAL at 03:55

## 2023-11-07 RX ADMIN — GABAPENTIN 300 MG: 300 CAPSULE ORAL at 21:57

## 2023-11-07 RX ADMIN — POTASSIUM CHLORIDE FOR ORAL SOLUTION 20 MEQ: 1.5 POWDER, FOR SOLUTION ORAL at 09:03

## 2023-11-07 RX ADMIN — DULOXETINE HYDROCHLORIDE 20 MG: 20 CAPSULE, DELAYED RELEASE ORAL at 21:57

## 2023-11-07 RX ADMIN — ATORVASTATIN CALCIUM 20 MG: 10 TABLET, FILM COATED ORAL at 09:03

## 2023-11-07 RX ADMIN — SODIUM BICARBONATE: 84 INJECTION, SOLUTION INTRAVENOUS at 13:50

## 2023-11-07 ASSESSMENT — ACTIVITIES OF DAILY LIVING (ADL)
ADLS_ACUITY_SCORE: 22

## 2023-11-07 NOTE — PHARMACY-ADMISSION MEDICATION HISTORY
Pharmacist Admission Medication History    Admission medication history is complete. The information provided in this note is only as accurate as the sources available at the time of the update.    Information Source(s): Patient, Family member, and CareEverywhere/SureScripts via in-person    Pertinent Information: None    Changes made to PTA medication list:  Added: None  Deleted:   Folic acid  Changed:   Gabapentin 300mg HS -> 400mg HS    Allergies reviewed with patient and updates made in EHR: no    Medication History Completed By: [place] Knott [facility] 11/6/2023 8:04 PM    PTA Med List   Medication Sig Last Dose    acetaminophen (TYLENOL) 500 MG tablet Take 500-1,000 mg by mouth every 6 hours as needed (pain) Unknown at PRN    albuterol (PROAIR HFA/PROVENTIL HFA/VENTOLIN HFA) 108 (90 Base) MCG/ACT inhaler Inhale 2 puffs into the lungs every 6 hours as needed for shortness of breath, wheezing or cough Unknown at PRN    atorvastatin (LIPITOR) 20 MG tablet Take 1 tablet (20 mg) by mouth daily 11/6/2023    azaTHIOprine (IMURAN) 50 MG tablet Take 50 mg by mouth 2 times daily 11/6/2023 at X1    carvedilol (COREG) 3.125 MG tablet TAKE 1 TABLET BY MOUTH TWICE A DAY WITH MEALS 11/6/2023 at X1    DULoxetine (CYMBALTA) 20 MG capsule Take 1 capsule (20 mg) by mouth daily 11/5/2023 at HS    estradiol (ESTRACE) 0.1 MG/GM vaginal cream Place vaginally three times a week M-W-F Past Week    fluticasone (FLONASE) 50 MCG/ACT nasal spray Spray 1 spray into both nostrils daily as needed for rhinitis or allergies Unknown at PRN    gabapentin (NEURONTIN) 100 MG capsule Take 100 mg by mouth at bedtime Total daily dose of 400 mg 11/5/2023 at HS    gabapentin (NEURONTIN) 300 MG capsule Take 300 mg by mouth at bedtime Total daily dose of 400 mg 11/5/2023 at HS    meclizine (ANTIVERT) 25 MG tablet Take 1 tablet (25 mg) by mouth 3 times daily as needed for dizziness Unknown at PRN    omeprazole (PRILOSEC) 20 MG DR capsule TAKE 1 CAPSULE BY  MOUTH EVERY DAY Past Month    ondansetron (ZOFRAN ODT) 4 MG ODT tab Take 1 tablet (4 mg) by mouth every 8 hours as needed for nausea Unknown at PRN    Vitamin D, Cholecalciferol, 10 MCG (400 UNIT) TABS Take 800 Units by mouth daily Past Month

## 2023-11-07 NOTE — PROGRESS NOTES
11/07/23 1319   Appointment Info   Signing Clinician's Name / Credentials (OT) Kedar Huerta EdD, OTR/L   Living Environment   People in Home spouse   Current Living Arrangements house  (one step into house, one step between living room and kitchen)   Self-Care   Usual Activity Tolerance good   Current Activity Tolerance good   Regular Exercise No   Equipment Currently Used at Home walker, rolling  (pt has FWW and 4WW)   Fall history within last six months yes   Number of times patient has fallen within last six months 1   Activity/Exercise/Self-Care Comment pt willing to participate in more exercise, says she has done some during recovery from other surgeries   General Information   Onset of Illness/Injury or Date of Surgery 11/06/23   Referring Physician Bro Wade   Patient/Family Therapy Goal Statement (OT) return home, get stronger   Additional Occupational Profile Info/Pertinent History of Current Problem Pt is an 83 year old female admitted after increasing weakness and a fall.   MITCHELL nephritis and protienurinia.  PMH includes CKD3, SLE, HTN, GERD, hypercholesterolemia, anxiety, restless legs.  Pt also has a ileostomy that she manages.  Spouse and one son present in room during session.  Pt wears glasses and B hearing aides.  Hearing aides not here   Existing Precautions/Restrictions fall   Cognitive Status Examination   Orientation Status orientation to person, place and time   Cognitive Status Comments pt having some inconsistent answers, may be partially due to missing hearing aides   Visual Perception   Visual Impairment/Limitations corrective lenses full-time   Pain Assessment   Patient Currently in Pain No   Range of Motion Comprehensive   General Range of Motion no range of motion deficits identified   Comment, General Range of Motion B UEs   Strength Comprehensive (MMT)   General Manual Muscle Testing (MMT) Assessment upper extremity strength deficits identified   Comment, General Manual Muscle  Testing (MMT) Assessment general weakness, right UE weaker than left.  Pt is right handed   Coordination   Upper Extremity Coordination No deficits were identified   Bed Mobility   Bed Mobility supine-sit;sit-supine   Supine-Sit [place] (Bed Mobility) verbal cues;contact guard   Sit-Supine [place] (Bed Mobility) verbal cues;contact guard   Assistive Device (Bed Mobility) bed rails   Transfers   Transfers sit-stand transfer;toilet transfer   Sit-Stand Transfer   Sit-Stand [place] (Transfers) verbal cues;contact guard   Assistive Device (Sit-Stand Transfers) other (see comments)   Sit/Stand Transfer Comments Pt pushed IV pole at times   Toilet Transfer   Type (Toilet Transfer) sit-stand;stand-sit   [place] Level (Toilet Transfer) verbal cues;contact guard   Assistive Device (Toilet Transfer) grab bars/safety frame   Clinical Impression   Criteria for Skilled Therapeutic Interventions Met (OT) Yes, treatment indicated   OT Diagnosis decreased independence in ADLS   OT Problem List-Impairments impacting ADL problems related to;activity tolerance impaired;strength;fear & anxiety;other (see comments)  (pt may have some minor memory issues however did not have her hearing aides present)   Assessment of Occupational Performance 1-3 Performance Deficits   Identified Performance Deficits decreased endurance for dressing, bathing, functional mobility   Planned Therapy Interventions (OT) ADL retraining;strengthening;home program guidelines   Clinical Decision Making Complexity (OT) problem focused assessment/low complexity   Risk & Benefits of therapy have been explained evaluation/treatment results reviewed;care plan/treatment goals reviewed;patient;spouse/significant other;son   OT Total Evaluation Time   OT Eval, Low Complexity Minutes (04039) 15   OT Goals   Therapy Frequency (OT) One time eval and treatment   OT Predicted Duration/Target Date for Goal Attainment 11/07/23   OT Goals Lower Body  Dressing;Toilet Transfer/Toileting;OT Goal 1;OT Goal 2   OT: Lower Body Dressing Modified independent;within precautions;including set-up/clothing retrieval   OT: Toilet Transfer/Toileting Modified independent;toilet transfer;cleaning and garment management;using adaptive equipment   OT: Goal 1 Pt will complete 10+ minutes B UE theraband exercise with red band   OT: Goal 2 Pt will identify 3 strategies she can use at home to prevent falls.   Interventions   Interventions Quick Adds Self-Care/Home Management;Therapeutic Procedures/Exercise   Self-Care/Home Management   Self-Care/Home Mgmt/ADL, Compensatory, Meal Prep Minutes (88535) 20   Symptoms Noted During/After Treatment (Meal Preparation/Planning Training) fatigue   Treatment Detail/Skilled Intervention Pt and spouse discussed periods of dizziness pt has when up and moving around.  This caused her fall during this admission.  Provided handout on fall prevention at home and reviewed some ideas for home.  Pt has a step from living room to kitchen, suggested use of grab bar for this, especially if pt is using the walker.  Pt states she uses the walker infrequently at home.  Spouse says he is concerned pt does not drink enough which leads to kidney issues and dizziness.  Pt and family found suggestions in handout useful.   Therapeutic Procedures/Exercise   Therapeutic Procedure: strength, endurance, ROM, flexibillity minutes (03720) 15   Symptoms Noted During/After Treatment fatigue   Treatment Detail/Skilled Intervention OT: Pt demonstrating some UE weakness, moreso in her dominnant hand  Provided red and green theraband and instruction sheet to use.  Pt completed 4 UE exercises with red band x 5 reps   OT Discharge Planning   OT Plan pt has met goals, DC   OT Discharge Recommendation (DC Rec) home with assist   OT Rationale for DC Rec Pt appears to be closer to her baseline and steadier on her feet.  Anticipate her being able to return home to complete daily ADLS  with supervision from family.  Pt may need AD for longer walks defer further eval to PT.   OT Brief overview of current status SBA with verbal cues for bed mobility, LE dressing, and bathroom transfers.  Recommend theraband HEP to increase UE strength and endurance.   Total Session Time   Timed Code Treatment Minutes 35   Total Session Time (sum of timed and untimed services) 50

## 2023-11-07 NOTE — ED NOTES
[facility]    ED Boarding Nurse Handoff Addendum Report:    Date/time: 11/6/2023, 7:27 PM    Activity Level: standby    Fall Risk: Yes:  nonskid shoes/slippers when out of bed, patient and family education, and activity supervised    Active Infusions: none    Current Meds Due: see MAR    Current care needs: per care plan    Oxygen requirements (liters/min and/or FiO2): none    Respiratory status: Room air    Vital signs (within last 30 minutes):    Vitals:    11/06/23 1309 11/06/23 1700 11/06/23 1921   BP: 115/81 126/53 122/54   BP Location:  Right arm Right arm   Patient Position:  Semi-Naqvi's    Pulse: 67 56 60   Resp: 18 (!) 36 20   Temp: 97.6  F (36.4  C) 98  F (36.7  C) 97.7  F (36.5  C)   TempSrc: Temporal Temporal Oral   SpO2: 99% 98% 98%       Focused assessment within last 30 minutes:    Patient alert and oriented x4 . Denies any pain, SOB or nausea. Ambulated to bathroom SBA with daughter.    ED Boarding Nurse name: Lorena Johnson RN

## 2023-11-07 NOTE — CONSULTS
"RENAL CONSULTATION NOTE    REFERRING MD:  Bro Montiel MD     REASON FOR CONSULTATION:  Acute kidney injury on stage III CKD in patient with SLE with?  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   She follows with Dr. Singer.   She saw him on 9/19 for routine FUV.  Her creatinine at the time was 2 mg/dl and an eGFR of 23 ml/min.   No changes were made at that visit.   She was advised to come to the hospital for worsening kidney function.     She says she fell a week ago.   She was getting out of bed, took a couple steps, felt dizzy and fell.   She says she feels dizzy at time but not too bad.  \"I get up slowly and walk slowly,\" she says.  She uses a walker sometimes.     She says she has been doing worse since she saw Dr. Singer.   She is more tied and sleeps more.  She energy is less and appetite are less.  \"Food does not taste good,\" she says.   No nausea, vomiting or diarrhea.  She does not take NSAIDs.     ROS:  A complete review of systems was performed and is negative except as noted above.    PMH:    Past Medical History:   Diagnosis Date    Anxiety     BCC (basal cell carcinoma of skin)     CKD (chronic kidney disease)     Esophageal reflux (GERD) 09/08/2014    h/o Mumps     HTN (hypertension) 09/08/2014    Hyperlipidemia 09/08/2014    Restless legs syndrome (RLS)     Systemic lupus erythematosus        PSH:    Past Surgical History:   Procedure Laterality Date    APPENDECTOMY  1952    COLECTOMY WITH COLOSTOMY, COMBINED N/A 04/22/2016    Procedure: COMBINED COLECTOMY WITH COLOSTOMY;  Surgeon: Shana Alaniz MD;  Location: [facility] OR    CYSTOSCOPY      ENDOSCOPIC RETROGRADE CHOLANGIOPANCREATOGRAM N/A 08/15/2017    Procedure: COMBINED ENDOSCOPIC RETROGRADE CHOLANGIOPANCREATOGRAPHY, SPHINCTEROTOMY;  ENDOSCOPIC RETROGRADE CHOLANGIOPANCREATOGRAPHY, SPHINCTEROTOMY. STONE EXTRACTION;  Surgeon: Keturah Bergeron MD;  Location: "  OR    ENDOSCOPIC RETROGRADE CHOLANGIOPANCREATOGRAM N/A 08/15/2017    Procedure: COMBINED ENDOSCOPIC RETROGRADE CHOLANGIOPANCREATOGRAPHY, REMOVE STONE/BALLOON;;  Surgeon: Keturah Bergeron MD;  Location:  OR    ESOPHAGOSCOPY, GASTROSCOPY, DUODENOSCOPY (EGD), COMBINED Left 01/02/2020    Procedure: ESOPHAGOGASTRODUODENOSCOPY (fv); random stomach biopsies of polyps using jumbo bx forcep;  Surgeon: Thais Martinez MD;  Location:  GI    ESOPHAGOSCOPY, GASTROSCOPY, DUODENOSCOPY (EGD), COMBINED N/A 12/28/2021    Procedure: ESOPHAGOGASTRODUODENOSCOPY, WITH BIOPSies using biopsy forceps  ;  Surgeon: Schuyler Calvillo MD;  Location:  GI    ESOPHAGOSCOPY, GASTROSCOPY, DUODENOSCOPY (EGD), COMBINED N/A 6/29/2023    Procedure: Esophagoscopy, gastroscopy, duodenoscopy (EGD), combined;  Surgeon: Leah Cespedes MD;  Location:  GI    HYSTERECTOMY  1987    LAPAROSCOPIC CHOLECYSTECTOMY  2008    LAPAROTOMY EXPLORATORY N/A 04/22/2016    Procedure: LAPAROTOMY EXPLORATORY;  Surgeon: Shana Alaniz MD;  Location:  OR    PHACOEMULSIFICATION CLEAR CORNEA WITH STANDARD INTRAOCULAR LENS IMPLANT Left 05/09/2017    Procedure: PHACOEMULSIFICATION CLEAR CORNEA WITH STANDARD INTRAOCULAR LENS IMPLANT;  LEFT EYE PHACOEMULSIFICATION CLEAR CORNEA WITH STANDARD INTRAOCULAR LENS IMPLANT ;  Surgeon: Eloy Eric MD;  Location: [facility]    PHACOEMULSIFICATION CLEAR CORNEA WITH STANDARD INTRAOCULAR LENS IMPLANT Right 05/23/2017    Procedure: PHACOEMULSIFICATION CLEAR CORNEA WITH STANDARD INTRAOCULAR LENS IMPLANT;  RIGHT EYE PHACOEMULSIFICATION CLEAR CORNEA WITH STANDARD INTRAOCULAR LENS IMPLANT ;  Surgeon: Eloy Eric MD;  Location:  [facility]       MEDICATIONS:     atorvastatin  20 mg Oral Daily    [Held by provider] carvedilol  3.125 mg Oral BID w/meals    DULoxetine  20 mg Oral At Bedtime    famotidine  20 mg Oral BID    gabapentin  400 mg Oral At Bedtime       ALLERGIES:    Allergies as of 11/06/2023 -  Reviewed 11/06/2023   Allergen Reaction Noted    Codeine GI Disturbance 03/28/2016    Metronidazole  03/28/2016    Sulfacetamide  06/23/2023    Sulfamethoxazole-trimethoprim GI Disturbance 06/08/2015    Sulfur  06/23/2023       FH:    Family History   Problem Relation Age of Onset    Breast Cancer Daughter     Breast Cancer Mother     Breast Cancer Sister        SH:    Social History     Socioeconomic History    Marital status:      Spouse name: Not on file    Number of children: Not on file    Years of education: Not on file    Highest education level: 12th grade   Occupational History    Not on file   Tobacco Use    Smoking status: Never     Passive exposure: Never    Smokeless tobacco: Never   Vaping Use    Vaping Use: Never used   Substance and Sexual Activity    Alcohol use: Yes     Comment: socially    Drug use: No    Sexual activity: Yes     Partners: Male     Birth control/protection: Post-menopausal   Other Topics Concern    Parent/sibling w/ CABG, MI or angioplasty before 65F 55M? No   Social History Narrative    ** Merged History Encounter **         Daughter is Diana Keller  Spends 2 months most duong in [place]  Worked for the [employer]     Social Determinants of Health     Financial Resource Strain: Low Risk  (12/20/2022)    Overall Financial Resource Strain (CARDIA)     Difficulty of Paying Living Expenses: Not very hard   Food Insecurity: No Food Insecurity (12/20/2022)    Hunger Vital Sign     Worried About Running Out of Food in the Last Year: Never true     Ran Out of Food in the Last Year: Never true   Transportation Needs: No Transportation Needs (12/20/2022)    PRAPARE - Transportation     Lack of Transportation (Medical): No     Lack of Transportation (Non-Medical): No   Physical Activity: Inactive (12/20/2022)    Exercise Vital Sign     Days of Exercise per Week: 0 days     Minutes of Exercise per Session: 0 min   Stress: Stress Concern Present (12/20/2022)    [employer] - Occupational Stress Questionnaire     Feeling of Stress : To some extent   Social Connections: Moderately Integrated (12/20/2022)    Social Connection and Isolation Panel [NHANES]     Frequency of Communication with Friends and Family: More than three times a week     Frequency of Social Gatherings with Friends and Family: Twice a week     Attends Faith Services: 1 to 4 times per year     Active Member of Clubs or Organizations: No     Attends Club or Organization Meetings: Not on file     Marital Status:    Interpersonal Safety: Not on file   Housing Stability: Low Risk  (12/20/2022)    Housing Stability Vital Sign     Unable to Pay for Housing in the Last Year: No     Number of Places Lived in the Last Year: 1     Unstable Housing in the Last Year: No       PHYSICAL EXAM:    BP (!) 97/39 (BP Location: Left arm)   Pulse 58   Temp 97.5  F (36.4  C) (Oral)   Resp 16   Wt 55 kg (121 lb 3.2 oz)   SpO2 97%   BMI 22.90 kg/m    GENERAL: NAD. Frail.   HEENT:  Normocephalic. No gross abnormalities.  Pupils equal.  MMM.  Dentition is ok.  CV: RRR, soft murmurs, no clicks, gallops, or rubs,  no edema.  RESP: Clear bilaterally with good efforts. No wheezes or crackles.   GI: Abdomen Soft, NT. ND.  MUSCULOSKELETAL: extremities nl - no gross deformities noted. No edema.   SKIN: no suspicious lesions or rashes, dry to touch  NEURO:  Awake, alert and answering questions appropriately.   PSYCH: mood good, affect appropriate  LYMPH: No palpable ant/post cervical     LABS:      CBC RESULTS:     Recent Labs   Lab 11/07/23  0621 11/06/23  1427   WBC 4.7 5.2   RBC 2.85* 3.15*   HGB 8.8* 9.8*   HCT 26.0* 28.5*    189       BMP RESULTS:  Recent Labs   Lab 11/07/23  0621 11/06/23  1427 11/03/23  1534   * 128* 128*   POTASSIUM 3.4  3.4 3.3* 3.2*   CHLORIDE 107 99 96*   CO2 14* 17* 17*   BUN 57.7* 67.5* 73.1*   CR 3.17* 3.93* 4.06*   * 176* 206*   GRICEL 8.1* 8.5* 8.6*       INRNo  lab results found in last 7 days.     DIAGNOSTICS:  Reviewed    CXR: No edema or infiltrate/     Renal ultrasound: RIGHT KIDNEY: 8.3 x 4.0 x 4.6 cm. Normal without hydronephrosis or masses.      LEFT KIDNEY: 9.0 x 3.8 x 4.4 cm. Normal without hydronephrosis or masses.      BLADDER: Unremarkable given its level of distention.                                                                      IMPRESSION: No obstruction demonstrated.    I personally reviewed the CXR and the renal ultrasound.     A/P:  83 y.o woman with CKD IV due to SLE and HTN, admitted for MITCHELL due to volume depletion.     # CKD IV: Creatinine was 2 mg/dl and eGFR of 23 ml/min in September.     # Acute kidney injury: Due to volume depletion and hypotension. Improving with IVF.    -normal complements   -CRP <3    # Hypertension: Admitted with hypotension. PTA Coreg is on hold.     # FEN: On the dry side. Hypokalemia and acidosis.     # Anemia: Hgb is blow target.     # SLE:    -AZA 50 mg bid     Plan:   # Not a lupus nephritis flare  # Change NS to one  liter of D5+ 150 meq of bicarb x 1 liter only.  # Agree with hold Coreg. Frail and elder, so okay to tolerate a higher baseline SBP. Okay with SBP ~ 130-140.   # Agree with hold AZA for now  # Okay with general diet without salt restriction  # Add iron study    I discussed the plan with patient, her  and son at the beside. I reviewed Dr. Montiel H&P. I discussed the case with JERI Sanders MD, yesterday.       James Saha MD  [facility] - Nephrology  Office Phone: [phone]  Pager: [phone]

## 2023-11-07 NOTE — PLAN OF CARE
[facility]    ED Boarding Nurse Handoff Addendum Report:    Date/time: 11/6/2023, 8:53 PM    Activity Level: standby    Fall Risk: Yes:  arm band in place, patient and family education, and assistive device/personal items within reach    Active Infusions: NS @75 mL    Current Meds Due: 1957 meds due, not verified yet    Current care needs: Protein timed urine over 12 hrs, mag & k protocol, I&O    Oxygen requirements (liters/min and/or FiO2): no    Respiratory status: Room air    Vital signs (within last 30 minutes):    Vitals:    11/06/23 1309 11/06/23 1700 11/06/23 1921   BP: 115/81 126/53 122/54   BP Location:  Right arm Right arm   Patient Position:  Semi-Naqvi's    Pulse: 67 56 60   Resp: 18 (!) 36 20   Temp: 97.6  F (36.4  C) 98  F (36.7  C) 97.7  F (36.5  C)   TempSrc: Temporal Temporal Oral   SpO2: 99% 98% 98%       Focused assessment within last 30 minutes:    A&Ox4. VSS. Pt is SBA, up to bathroom. Renal diet. Pt has protein timed urine over 12 hrs, bucket is sitting over ice at bedside.      ED Boarding Nurse name: Baldev Wall RN

## 2023-11-07 NOTE — PLAN OF CARE
/47   Pulse 64   Temp 97.6  F (36.4  C) (Oral)   Resp 18   Wt 56 kg (123 lb 8 oz)   SpO2 100%   BMI 23.34 kg/m      VSS, Pt is A&O x's 4, her hearing aids are at home and will be brought tomorrow morning, her glasses are at the bedside. Skin is intact with one small bruise in the Right AC. LS clear, not on tele, no edema noted. IV site redressed. Pt does have an ileostomy which is she manages. The stoma is red, moist and without indications of irritation or erosion. Pt is able to ambulate with SBA.     24 hour urine collection started upon pt arrival at 2230.    Orthostatic BP:  Lying HR 60 /41  Sitting HR 60 BP 88/41  Standing HR 85 BP 80/43    Problem: Adult Inpatient Plan of Care  Goal: Plan of Care Review  Description: The Plan of Care Review/Shift note should be completed every shift.  The Outcome Evaluation is a brief statement about your assessment that the patient is improving, declining, or no change.  This information will be displayed automatically on your shift  note.  Outcome: Not Progressing  Flowsheets (Taken 11/7/2023 0108)  Outcome Evaluation: oriented to room and call light, plan of care for the night and expectation for the shift including lab draws in the morning and 24 hour urine collection.  Plan of Care Reviewed With:   patient   child  Overall Patient Progress: no change  Goal: Absence of Hospital-Acquired Illness or Injury  Intervention: Identify and Manage Fall Risk  Recent Flowsheet Documentation  Taken 11/6/2023 2349 by Arabella Ruelas, RN  Safety Promotion/Fall Prevention: safety round/check completed  Taken 11/6/2023 2215 by Arabella Ruelas, RN  Safety Promotion/Fall Prevention:   safety round/check completed   room organization consistent   room near nurse's station   nonskid shoes/slippers when out of bed  Intervention: Prevent Skin Injury  Recent Flowsheet Documentation  Taken 11/6/2023 2155 by Arabella Ruelas, RN  Body Position: position changed  independently  Goal: Readiness for Transition of Care  Intervention: Mutually Develop Transition Plan  Recent Flowsheet Documentation  Taken 11/6/2023 2215 by Arabella Ruelas RN  Equipment Currently Used at Home: none     Problem: Fall Injury Risk  Goal: Absence of Fall and Fall-Related Injury  Intervention: Identify and Manage Contributors  Recent Flowsheet Documentation  Taken 11/6/2023 2215 by Arabella Ruelas, RN  Medication Review/Management:   medications reviewed   high-risk medications identified  Intervention: Promote Injury-Free Environment  Recent Flowsheet Documentation  Taken 11/6/2023 2349 by Arabella Ruelas, RN  Safety Promotion/Fall Prevention: safety round/check completed  Taken 11/6/2023 2215 by Arabella Ruelas, RN  Safety Promotion/Fall Prevention:   safety round/check completed   room organization consistent   room near nurse's station   nonskid shoes/slippers when out of bed     Goal Outcome Evaluation:      Plan of Care Reviewed With: patient, child    Overall Patient Progress: no changeOverall Patient Progress: no change    Outcome Evaluation: oriented to room and call light, plan of care for the night and expectation for the shift including lab draws in the morning and 24 hour urine collection.

## 2023-11-07 NOTE — PROGRESS NOTES
Occupational Therapy Discharge Summary    Reason for therapy discharge:    All goals and outcomes met, no further needs identified.    Progress towards therapy goal(s). See goals on Care Plan in [facility] electronic health record for goal details.  Goals met    Therapy recommendation(s):    No further therapy is recommended.

## 2023-11-07 NOTE — PROGRESS NOTES
11/07/23 1613   Appointment Info   Signing Clinician's Name / Credentials (PT) BECKY Guaman   Student Supervision Direct Patient Contact Provided;Therapy services provided with the co-signing licensed therapist guiding and directing the services, and providing the skilled judgement and assessment throughout the session   Living Environment   People in Home spouse   Current Living Arrangements house   Home Accessibility stairs to enter home;stairs within home   Number of Stairs, Main Entrance 2   Stair Railings, Main Entrance none   Number of Stairs, Within Home, Primary greater than 10 stairs   Stair Railings, Within Home, Primary railing on left side (ascending)   Transportation Anticipated family or friend will provide   Living Environment Comments Pt lives in single level home with basement, per pt report does not need to stairs inside to use basement.   Self-Care   Usual Activity Tolerance moderate   Current Activity Tolerance fair   Regular Exercise No   Equipment Currently Used at Home grab bar, toilet;grab bar, tub/shower;walker, rolling;walker, standard   Fall history within last six months no   Number of times patient has fallen within last six months 1   Activity/Exercise/Self-Care Comment Pt indep with all ADL's, receives some help with IADL's such as laundry which requires going down stairs to basement.   General Information   Onset of Illness/Injury or Date of Surgery 11/06/23   Referring Physician Bro Montiel MD   Patient/Family Therapy Goals Statement (PT) return home, get stronger   Pertinent History of Current Problem (include personal factors and/or comorbidities that impact the POC) Per med chart: Gely Keene is a 83 year old female with a past medical history significant for chronic kidney disease, GERD, hypertension, hyperlipidemia, anxiety, recurrent C. difficile colitis, and previous colostomy placement.  She presented to emergency room with weakness.  Found to have acute  kidney injury on chronic kidney disease.   General Observations [place]-hearing aides left at home   Cognition   Orientation Status (Cognition) oriented x 4   Pain Assessment   Patient Currently in Pain   (no pain reported)   Range of Motion (ROM)   Range of Motion ROM is WFL   Strength (Manual Muscle Testing)   Strength (Manual Muscle Testing) strength is WFL   Bed Mobility   Comment, (Bed Mobility) indep with sit <--> sup   Transfers   Comment, (Transfers) SBA x 2WW for STS   Gait/Stairs (Locomotion)   Union Level (Gait) contact guard   Assistive Device (Gait) walker, front-wheeled   Distance in Feet (Gait) 10 (eval)   Balance   Balance Comments per pt report some light headedness/unsteadiness during ambulation however did not impact gait quality   Clinical Impression   Criteria for Skilled Therapeutic Intervention Yes, treatment indicated   PT Diagnosis (PT) imapired functional mobility   Influenced by the following impairments decreased endurance, strength   Functional limitations due to impairments decreased ambulation, transfer ability   Clinical Presentation (PT Evaluation Complexity) stable   Clinical Presentation Rationale clinical judgement   Clinical Decision Making (Complexity) low complexity   Planned Therapy Interventions (PT) balance training;gait training;home exercise program;neuromuscular re-education;stair training;strengthening;transfer training   Risk & Benefits of therapy have been explained evaluation/treatment results reviewed;care plan/treatment goals reviewed;risks/benefits reviewed;current/potential barriers reviewed;participants voiced agreement with care plan;participants included;patient   PT Total Evaluation Time   PT Eval, Low Complexity Minutes (48279) 15   Physical Therapy Goals   PT Frequency Daily   PT Predicted Duration/Target Date for Goal Attainment 11/14/23   PT Goals Transfers;Gait;Stairs   PT: Transfers Supervision/stand-by assist;Assistive device;Sit to/from stand   PT:  Gait Supervision/stand-by assist;Assistive device;150 feet   PT: Stairs Supervision/stand-by assist;2 stairs;Assistive device   Interventions   Interventions Quick Adds Gait Training;Therapeutic Activity   Therapeutic Activity   Therapeutic Activities: dynamic activities to improve functional performance Minutes (55170) 10   Symptoms Noted During/After Treatment Dizziness;Fatigue   Treatment Detail/Skilled Intervention Pt greeted in supine,  present.  left shortly after initiation of session. Blood pressure obtained in supine, seated, and standing with 2WW are stated below: 110/44, 114/50, 114/46. Pt denies any signs or symptoms of orthostatic hypotension. Agreeable to ambulation with 2WW. Discussed discharge recommendations as home with home nurse 1x/week and home PT/OT to progress strength, balance, endurance, and functional mobility. Pt left supine in bed with all needs within reach, bed alarm on.   Gait Training   Gait Training Minutes (71463) 10   Symptoms Noted During/After Treatment (Gait Training) dizziness;fatigue   Treatment Detail/Skilled Intervention Facilitated ambulation of (10 eval) + 150 ft x 2WW x CGA with wheelchair follow, verbal cueing to keep 2WW close during turns. Pt demonstrates smooth, even, step through pattern. Per pt report occasional light headedness during ambulation however denies needing rest break in wheelchair. BP after ambulation (standing) = 113/46.   PT Discharge Planning   PT Plan assess stairs, progress ambulation distance   PT Discharge Recommendation (DC Rec) home with assist;home with home care physical therapy   PT Rationale for DC Rec Pt is CGA x 2WW for mobility and transfers, however would benefit from continued [facility] to progress strength and endurance   PT Brief overview of current status A of 1 x 2WW   Total Session Time   Timed Code Treatment Minutes 20   Total Session Time (sum of timed and untimed services) 35      Never

## 2023-11-07 NOTE — PROGRESS NOTES
[facility]    Medicine Progress Note - Hospitalist Service    Date of Admission:  11/6/2023    Assessment & Plan     Gely Keene is a 83 year old female with a past medical history significant for chronic kidney disease, GERD, hypertension, hyperlipidemia, anxiety, recurrent C. difficile colitis, and previous colostomy placement.  She presented to emergency room with weakness.  Found to have acute kidney injury on chronic kidney disease.    Acute kidney injury on chronic kidney disease stage IV.  -Creatinine initially elevated at 3.9.  -Improved to 3.2 today.  -Appreciate nephrology input.  -Changing IV fluids to D5 with bicarb.  -Avoid nephrotoxins as able.  -Decrease dose of gabapentin to 300 mg a day.  -Hold carvedilol.    Hypertension.  Orthostatic hypotension.  -Blood pressures low at times.  -Hold carvedilol.    Slight troponin elevation.  Probable near syncope.  -Suspect type II myocardial infarction due to demand ischemia.  -Likely due to intermittent hypotension at home.  -Possible bradycardia.  -Serial troponins flat.  -Monitor on telemetry.  -Echocardiogram on 11/7 shows EF greater than 70%, no obvious WMA's, mild tricuspid and mitral regurgitation.  -Carvedilol put on hold.    GERD.  -Decrease famotidine to 20 mg once a day.    Hyperlipidemia.  -Continue atorvastatin 20 mg a day.    Deconditioning.  Poor recent oral intake.  -Physical therapy consult.  -Registered dietitian consult.    Hypokalemia.  Hypomagnesemia.  -Both improved.  -Stop automatic replacement protocols due to acute kidney injury.  -Recheck levels tomorrow.  -Replace as needed.            Diet: Regular Diet Adult    DVT Prophylaxis: Pneumatic Compression Devices  Chan Catheter: Not present  Lines: None     Cardiac Monitoring: None  Code Status: Full Code      Clinically Significant Risk Factors Present on Admission        # Hypokalemia: Lowest K = 3.3 mmol/L in last 2 days, will replace as needed  #  Hyponatremia: Lowest Na = 128 mmol/L in last 2 days, will monitor as appropriate  # Hypocalcemia: Lowest Ca = 8.1 mg/dL in last 2 days, will monitor and replace as appropriate   # Hypomagnesemia: Lowest Mg = 1.3 mg/dL in last 2 days, will replace as needed   # Hypoalbuminemia: Lowest albumin = 3.2 g/dL at 11/7/2023  6:21 AM, will monitor as appropriate     # Hypertension: Noted on problem list                 Disposition Plan      Expected Discharge Date: 11/10/2023                    Shoaib Preston, DO  Hospitalist Service  [facility]  Securely message with [employer] (more info)  Text page via [facility] Paging/Directory   ______________________________________________________________________    Interval History   Denies chest pain, shortness of breath, fevers, chills, nausea, or vomiting.    Physical Exam   Vital Signs: Temp: 97.5  F (36.4  C) Temp src: Oral BP: (!) 97/39 Pulse: 58   Resp: 16 SpO2: 97 % O2 Device: None (Room air)    Weight: 121 lbs 3.2 oz    Gen: Thin, NAD, A&Ox3.  Eyes:  PERRL, sclera anicteric.  OP:  MMM, no lesions.  Neck:  Supple.  CV:  Regular, no loud murmurs.  Lung:  CTA b/l, normal effort.  Ab: Ostomy present, +BS, soft.  Skin:  Warm, dry to touch.  No rash.  Ext:  No pitting edema LE b/l.      Medical Decision Making       40 MINUTES SPENT BY ME on the date of service doing chart review, history, exam, documentation & further activities per the note.      Data     I have personally reviewed the following data over the past 24 hrs:    4.7  \   8.8 (L)   / 160     133 (L) 107 57.7 (H) /  117 (H)   3.4; 3.4 14 (L) 3.17 (H) \     ALT: 26 AST: 47 (H) AP: 81 TBILI: 0.8   ALB: 3.2 (L) TOT PROTEIN: 5.4 (L) LIPASE: N/A     Trop: 46 (H) BNP: N/A     Procal: N/A CRP: N/A Lactic Acid: N/A         Imaging results reviewed over the past 24 hrs:   Recent Results (from the past 24 hour(s))   XR Chest 2 Views    Narrative    XR CHEST 2 VIEWS 11/6/2023 3:33 PM    HISTORY: Chest  pain    COMPARISON: Chest x-ray 2020      Impression    IMPRESSION: No acute findings. The lungs are clear and there are no  pleural effusions. Normal heart size. Spinal degenerative changes.  Right upper quadrant surgical clips.    MASON ROWELL MD         SYSTEM ID:  [id number]   US Renal Complete Non-Vascular    Narrative    EXAM: US RENAL COMPLETE NON-VASCULAR  LOCATION: [facility]  DATE: 2023    INDICATION: New renal failure. Evaluate for obstruction.  COMPARISON: None.  TECHNIQUE: Routine Bilateral Renal and Bladder Ultrasound.    FINDINGS:    RIGHT KIDNEY: 8.3 x 4.0 x 4.6 cm. Normal without hydronephrosis or masses.     LEFT KIDNEY: 9.0 x 3.8 x 4.4 cm. Normal without hydronephrosis or masses.     BLADDER: Unremarkable given its level of distention.      Impression    IMPRESSION: No obstruction demonstrated.   Echocardiogram Complete   Result Value    LVEF  >70%    Narrative    [id number]  [id number]  [id number]  [id number]^GEOFF^LIZA^AUREA     [facility]  Echocardiography Laboratory  [address] [place] [address]     Name: SYLVESTER CARVER  MRN: [id number]  : 1940  Study Date: 2023 09:14 AM  Age: 83 yrs  Gender: Female  Patient Location: [facility]  Reason For Study: Chest Pain  Ordering Physician: LIZA TELLEZ  Performed By: Deisy Solis     BSA: 1.5 m2  Height: 61 in  Weight: 122 lb  BP: 122/54 mmHg  ______________________________________________________________________________  Procedure  Complete Portable Echo Adult. Optison (NDC #[id number]) given intravenously.  ______________________________________________________________________________  Interpretation Summary     Hyperdynamic left ventricular functionThe visual ejection fraction is >70%.  The right ventricular systolic function is normal.  There is mild (1+) mitral regurgitation.There is trace aortic  regurgitation.Mild aortic valve scllerosis.  The inferior vena cava was normal in  size with preserved respiratory  variability.  ______________________________________________________________________________  Left Ventricle  The left ventricle is normal in size. Proximal septal thickening is noted.  Hyperdynamic left ventricular function. The visual ejection fraction is >70%.  Diastolic Doppler findings (E/E' ratio and/or other parameters) suggest left  ventricular filling pressures are indeterminate. No regional wall motion  abnormalities noted.     Right Ventricle  The right ventricle is normal size. The right ventricular systolic function is  normal.     Atria  The left atrium is mildly dilated. Right atrial size is normal. There is no  color Doppler evidence of an atrial shunt.     Mitral Valve  There is mild mitral annular calcification. There is mild (1+) mitral  regurgitation.     Tricuspid Valve  There is mild (1+) tricuspid regurgitation. Right ventricular systolic  pressure could not be approximated due to inadequate tricuspid regurgitation.     Aortic Valve  The aortic valve is trileaflet with aortic valve sclerosis. There is trace  aortic regurgitation.     Pulmonic Valve  There is trace pulmonic valvular regurgitation. There is no pulmonic valvular  stenosis.     Vessels  The aortic root is normal size. Normal size ascending aorta. The inferior vena  cava was normal in size with preserved respiratory variability.     Pericardium  There is no pericardial effusion.     Rhythm  The rhythm was sinus bradycardia.  ______________________________________________________________________________  MMode/2D Measurements & Calculations  IVSd: 0.95 cm  LVIDd: 4.2 cm  LVIDs: 2.2 cm  LVPWd: 0.61 cm  FS: 47.7 %  LV mass(C)d: 96.3 grams  LV mass(C)dI: 62.9 grams/m2  Ao root diam: 2.5 cm  asc Aorta Diam: 3.4 cm  LVOT diam: 1.9 cm  LVOT area: 2.9 cm2  Ao root diam index Ht(cm/m): 1.6  Ao root diam index BSA (cm/m2): 1.7  Asc Ao diam index BSA (cm/m2): 2.2  Asc Ao diam index Ht(cm/m): 2.2  LA Volume  (BP): 48.1 ml     LA Volume Index (BP): 31.4 ml/m2  RWT: 0.29  TAPSE: 3.1 cm     Doppler Measurements & Calculations  MV E max olesya: 78.4 cm/sec  MV A max olesya: 89.1 cm/sec  MV E/A: 0.88  MV dec slope: 216.2 cm/sec2  MV dec time: 0.36 sec  PA acc time: 0.13 sec  E/E' av.0  Lateral E/e': 9.7  Medial E/e': 12.3     ______________________________________________________________________________  Report approved by: Sharona Wiggins 2023 12:51 PM

## 2023-11-07 NOTE — UTILIZATION REVIEW
Admission Status; Secondary Review Determination       Under the authority of the Utilization Management Committee, the utilization review process indicated a secondary review on the above patient. The review outcome is based on review of the medical records, discussions with staff, and applying clinical experience noted on the date of the review.     (x) Inpatient Status Appropriate - This patient's medical care is consistent with medical management for inpatient care and reasonable inpatient medical practice.     RATIONALE FOR DETERMINATION   83-year-old female with a history of stage III CKD, systemic lupus erythematosus (SLE), hypertension, GERD, hypercholesterolemia, anxiety, and restless leg syndrome was admitted with acute kidney injury, likely due to nephritis indicated by RBCs, WBCs, and proteinuria in her urine. The patient's azathioprine treatment has been halted, and nephrology consultation is pending. Despite a normal EKG, she has elevated troponins, possibly related to renal function impairment; an echocardiogram is planned to assess cardiac function. Orthostatic blood pressures are to be monitored due to dizziness, potentially linked to her renal condition. Carvedilol for hypertension and atorvastatin for hypercholesterolemia are continued, with CPK levels to be checked. Omeprazole is replaced with famotidine due to acute kidney injury, with a potential switch to IV Protonix if necessary.   The patient's acute kidney injury superimposed on stage III chronic kidney disease and the presence of systemic lupus erythematosus significantly increase the risk of further renal deterioration and potential cardiovascular complications, necessitating inpatient admission for close monitoring and specialist intervention.   This is a conditional review for a Medicare patient, at the time of this review patient is anticipated to require at least 1 more night of hospital care; however if plan changes and discharges  before 2 midnights please send for post discharge review.        This document was produced using voice recognition software       The information on this document is developed by the utilization review team in order for the business office to ensure compliance. This only denotes the appropriateness of proper admission status and does not reflect the quality of care rendered.   The definitions of Inpatient Status and Observation Status used in making the determination above are those provided in the CMS Coverage Manual, Chapter 1 and Chapter 6, section 70.4.   Sincerely,   CHELSIE MARTINEZ MD   System Medical Director   Utilization Management   [facility].

## 2023-11-08 ENCOUNTER — APPOINTMENT (OUTPATIENT)
Dept: PHYSICAL THERAPY | Facility: CLINIC | Age: 83
DRG: 683 | End: 2023-11-08
Payer: MEDICARE

## 2023-11-08 LAB
ANION GAP SERPL CALCULATED.3IONS-SCNC: 8 MMOL/L (ref 7–15)
BUN SERPL-MCNC: 43.3 MG/DL (ref 8–23)
CALCIUM SERPL-MCNC: 7.6 MG/DL (ref 8.8–10.2)
CH50 SERPL-ACNC: 45.4 U/ML
CHLORIDE SERPL-SCNC: 105 MMOL/L (ref 98–107)
CREAT SERPL-MCNC: 2.65 MG/DL (ref 0.51–0.95)
DEPRECATED HCO3 PLAS-SCNC: 24 MMOL/L (ref 22–29)
EGFRCR SERPLBLD CKD-EPI 2021: 17 ML/MIN/1.73M2
ERYTHROCYTE [DISTWIDTH] IN BLOOD BY AUTOMATED COUNT: 15.5 % (ref 10–15)
FERRITIN SERPL-MCNC: 179 NG/ML (ref 11–328)
GLUCOSE SERPL-MCNC: 148 MG/DL (ref 70–99)
HCT VFR BLD AUTO: 24.5 % (ref 35–47)
HGB BLD-MCNC: 8.6 G/DL (ref 11.7–15.7)
IRON BINDING CAPACITY (ROCHE): 236 UG/DL (ref 240–430)
IRON SATN MFR SERPL: 34 % (ref 15–46)
IRON SERPL-MCNC: 80 UG/DL (ref 37–145)
MAGNESIUM SERPL-MCNC: 1.9 MG/DL (ref 1.7–2.3)
MCH RBC QN AUTO: 31.3 PG (ref 26.5–33)
MCHC RBC AUTO-ENTMCNC: 35.1 G/DL (ref 31.5–36.5)
MCV RBC AUTO: 89 FL (ref 78–100)
PLATELET # BLD AUTO: 168 10E3/UL (ref 150–450)
POTASSIUM SERPL-SCNC: 2.8 MMOL/L (ref 3.4–5.3)
POTASSIUM SERPL-SCNC: 3.4 MMOL/L (ref 3.4–5.3)
RBC # BLD AUTO: 2.75 10E6/UL (ref 3.8–5.2)
SODIUM SERPL-SCNC: 137 MMOL/L (ref 135–145)
WBC # BLD AUTO: 3.8 10E3/UL (ref 4–11)

## 2023-11-08 PROCEDURE — 97116 GAIT TRAINING THERAPY: CPT | Mod: GP

## 2023-11-08 PROCEDURE — 250N000013 HC RX MED GY IP 250 OP 250 PS 637: Performed by: HOSPITALIST

## 2023-11-08 PROCEDURE — 83550 IRON BINDING TEST: CPT | Performed by: INTERNAL MEDICINE

## 2023-11-08 PROCEDURE — 36415 COLL VENOUS BLD VENIPUNCTURE: CPT | Performed by: INTERNAL MEDICINE

## 2023-11-08 PROCEDURE — 84132 ASSAY OF SERUM POTASSIUM: CPT | Performed by: HOSPITALIST

## 2023-11-08 PROCEDURE — 250N000013 HC RX MED GY IP 250 OP 250 PS 637: Performed by: INTERNAL MEDICINE

## 2023-11-08 PROCEDURE — 250N000009 HC RX 250: Performed by: INTERNAL MEDICINE

## 2023-11-08 PROCEDURE — 258N000003 HC RX IP 258 OP 636: Performed by: INTERNAL MEDICINE

## 2023-11-08 PROCEDURE — 120N000001 HC R&B MED SURG/OB

## 2023-11-08 PROCEDURE — 36415 COLL VENOUS BLD VENIPUNCTURE: CPT | Performed by: HOSPITALIST

## 2023-11-08 PROCEDURE — 99232 SBSQ HOSP IP/OBS MODERATE 35: CPT | Performed by: HOSPITALIST

## 2023-11-08 PROCEDURE — 85027 COMPLETE CBC AUTOMATED: CPT | Performed by: INTERNAL MEDICINE

## 2023-11-08 PROCEDURE — 80048 BASIC METABOLIC PNL TOTAL CA: CPT | Performed by: INTERNAL MEDICINE

## 2023-11-08 PROCEDURE — 82728 ASSAY OF FERRITIN: CPT | Performed by: INTERNAL MEDICINE

## 2023-11-08 PROCEDURE — 83735 ASSAY OF MAGNESIUM: CPT | Performed by: INTERNAL MEDICINE

## 2023-11-08 PROCEDURE — 97530 THERAPEUTIC ACTIVITIES: CPT | Mod: GP

## 2023-11-08 PROCEDURE — 99232 SBSQ HOSP IP/OBS MODERATE 35: CPT | Performed by: INTERNAL MEDICINE

## 2023-11-08 RX ORDER — FERROUS GLUCONATE 324(38)MG
324 TABLET ORAL
Status: DISCONTINUED | OUTPATIENT
Start: 2023-11-09 | End: 2023-11-09 | Stop reason: HOSPADM

## 2023-11-08 RX ORDER — POTASSIUM CHLORIDE 1.5 G/1.58G
40 POWDER, FOR SOLUTION ORAL ONCE
Status: COMPLETED | OUTPATIENT
Start: 2023-11-08 | End: 2023-11-08

## 2023-11-08 RX ORDER — GABAPENTIN 100 MG/1
200 CAPSULE ORAL AT BEDTIME
Status: DISCONTINUED | OUTPATIENT
Start: 2023-11-08 | End: 2023-11-09 | Stop reason: HOSPADM

## 2023-11-08 RX ORDER — POTASSIUM CHLORIDE 1500 MG/1
20 TABLET, EXTENDED RELEASE ORAL ONCE
Status: COMPLETED | OUTPATIENT
Start: 2023-11-08 | End: 2023-11-08

## 2023-11-08 RX ADMIN — DULOXETINE HYDROCHLORIDE 20 MG: 20 CAPSULE, DELAYED RELEASE ORAL at 22:12

## 2023-11-08 RX ADMIN — Medication 125 MCG: at 11:43

## 2023-11-08 RX ADMIN — SODIUM BICARBONATE: 84 INJECTION, SOLUTION INTRAVENOUS at 00:51

## 2023-11-08 RX ADMIN — GABAPENTIN 200 MG: 100 CAPSULE ORAL at 22:11

## 2023-11-08 RX ADMIN — POTASSIUM CHLORIDE 20 MEQ: 1500 TABLET, EXTENDED RELEASE ORAL at 19:50

## 2023-11-08 RX ADMIN — POTASSIUM CHLORIDE FOR ORAL SOLUTION 40 MEQ: 1.5 POWDER, FOR SOLUTION ORAL at 08:06

## 2023-11-08 RX ADMIN — ATORVASTATIN CALCIUM 20 MG: 10 TABLET, FILM COATED ORAL at 08:07

## 2023-11-08 ASSESSMENT — ACTIVITIES OF DAILY LIVING (ADL)
ADLS_ACUITY_SCORE: 22
ADLS_ACUITY_SCORE: 24
ADLS_ACUITY_SCORE: 22
ADLS_ACUITY_SCORE: 22
ADLS_ACUITY_SCORE: 24
ADLS_ACUITY_SCORE: 22
DEPENDENT_IADLS:: INDEPENDENT
ADLS_ACUITY_SCORE: 22

## 2023-11-08 NOTE — PROVIDER NOTIFICATION
"Pt reported drinking more water and was surprised to fine ostomy bag full of water in such a short amount of time and wanted to alert MD. Boris paged at 12 \"FYI colostomy bag full of water and surprised pt, pt and family wanted to let you know that fluid is going straight out her stoma.\" When asked about tracking output RN  reported mainly occurrences were tracked and not measurements. Boris ordered strict I&Os as a response  "

## 2023-11-08 NOTE — PROGRESS NOTES
[facility]    Medicine Progress Note - Hospitalist Service    Date of Admission:  11/6/2023    Assessment & Plan   Gely Keene is a 83 year old female with a past medical history significant for chronic kidney disease, GERD, hypertension, hyperlipidemia, anxiety, recurrent C. difficile colitis, and previous colostomy placement.  She presented to emergency room with weakness.  Found to have acute kidney injury on chronic kidney disease.    Acute kidney injury on chronic kidney disease stage IV.  -Creatinine initially elevated at 3.9.  -Changed to D5 with bicarb yesterday, nephrology following and managing  -gabapentin dose decreased and coreg held  -nursing now reporting large volume ostomy output, ? Contributing. Strict I/O's ordered for now    Hypertension.  Orthostatic hypotension.  -Blood pressures low at times.  -Holding carvedilol.    Slight troponin elevation.  Probable near syncope.  -elevated trop likely type II myocardial infarction due to demand ischemia. Near syncope from dehydration  -Serial troponins flat.  -Echocardiogram on 11/7 shows EF greater than 70%, no obvious WMA's, mild tricuspid and mitral regurgitation.  -Carvedilol held    GERD.  -Decrease famotidine to 20 mg once a day.    Hyperlipidemia.  -Continue atorvastatin 20 mg a day.    Deconditioning.  Moderate malnutrition  -Physical therapy recommending [facility]  -Registered dietitian consulted    Hypokalemia.  Hypomagnesemia.  -replete, place on potassium protocol        Diet: Regular Diet Adult    DVT Prophylaxis: Pneumatic Compression Devices  Chan Catheter: Not present  Lines: None     Cardiac Monitoring: None  Code Status: Full Code        Disposition Plan   Await further improvement, likely another 1-2 days         Ricardo Escalante DO  Hospitalist Service  [facility]  Securely message with [employer] (more info)  Text page via [email or web address] Paging/Directory    ______________________________________________________________________    Interval History   Oral intake is improving and Cr better. Remains on bicarb drip. Nursing did report large volume watery ostomy output but no fever, abd pain or leukocytosis. Plan for home with PT    Physical Exam   Vital Signs: Temp: 98.1  F (36.7  C) Temp src: Oral BP: (!) 115/39 Pulse: 69   Resp: 16 SpO2: 99 % O2 Device: None (Room air)    Weight: 121 lbs 3.2 oz    Constitutional: awake, alert, and cooperative  Eyes: pupils equal, round and reactive to light and conjunctiva normal  ENT: normocepalic, without obvious abnormality, atramatic  Respiratory: no increased work of breathing, good air exchange, and clear to auscultation  Cardiovascular: regular rate and rhythm and no murmur noted  GI: normal bowel sounds, soft, non-distended, and non-tender, ostomy  Skin: no bruising or bleeding  Neurologic: alert, hard of hearing, moving all extremeties    50 MINUTES SPENT BY ME on the date of service doing chart review, history, exam, documentation & further activities per the note.      Data   ------------------------- PAST 24 HR DATA REVIEWED -----------------------------------------------    I have personally reviewed the following data over the past 24 hrs:    3.8 (L)  \   8.6 (L)   / 168     137 105 43.3 (H) /  148 (H)   2.8 (L) 24 2.65 (H) \     Ferritin:  179 % Retic:  N/A LDH:  N/A       Imaging results reviewed over the past 24 hrs:   No results found for this or any previous visit (from the past 24 hour(s)).

## 2023-11-08 NOTE — PROGRESS NOTES
Renal Medicine Progress Note            Assessment/Plan:     83 y.o woman with CKD IV due to SLE and HTN, admitted for MITCHELL due to volume depletion.      # CKD IV: Creatinine was 2 mg/dl and eGFR of 23 ml/min in September.      # Acute kidney injury: Due to volume depletion and hypotension. Improving and trending towards baseline.                -normal complements               -CRP <3     # Hypertension: Admitted with hypotension. PTA Coreg is on hold.      # FEN: On the dry side. Hypokalemia-persists due to poor oral intake,  and acidosis-improved.      # Anemia: Hgb is blow target.      # SLE:                -AZA 50 mg bid     # Hypocalcemia:      Plan:   # Start vitamin D3, 5000 units daily.  # Start Ferrous gluconate.  # Hold Coreg when discharged-can evaluate outpatient if she needs it or not.  # Replace potassium.  # I encouraged her to have better intake including water.  # Decrease Neurontin to 200 mg at bedtime.  # HFU with us in 1-2 weeks after discharged.     I discussed the plan with patient and her family at the bedside.        Interval History:     Afebrile. BP is better, but DBP remains low.   Orthostatic BP check is unremarkable based on BP. HR was not recorded.   She says she feels dizzy when she gets up.  She says her mind is foggy today  She would like to stay another day and go home tomorrow.  Kidney function continues to improve nicely.           Medications and Allergies:      atorvastatin  20 mg Oral Daily    DULoxetine  20 mg Oral At Bedtime    [START ON 11/9/2023] famotidine  20 mg Oral Q48H    gabapentin  300 mg Oral At Bedtime        Allergies   Allergen Reactions    Codeine GI Disturbance    Metronidazole      GI distubance    Sulfacetamide     Sulfamethoxazole-Trimethoprim GI Disturbance     Vomiting    Sulfur             Physical Exam:   Vitals were reviewed   , Blood pressure (!) 115/39, pulse 69, temperature 98.1  F (36.7  C), temperature source Oral, resp. rate 16, weight 55 kg  (121 lb 3.2 oz), SpO2 99%, not currently breastfeeding.    Wt Readings from Last 3 Encounters:   11/07/23 55 kg (121 lb 3.2 oz)   07/19/23 58.7 kg (129 lb 4.8 oz)   06/28/23 58.7 kg (129 lb 6.6 oz)       Intake/Output Summary (Last 24 hours) at 11/8/2023 1043  Last data filed at 11/7/2023 1904  Gross per 24 hour   Intake 240 ml   Output 700 ml   Net -460 ml     GENERAL: NAD. Frail.   HEENT:  Normocephalic. No gross abnormalities.  Pupils equal.  MMM.  Dentition is ok.  CV: RRR, soft murmurs, no clicks, gallops, or rubs,  no edema.  RESP: Clear bilaterally with good efforts. No wheezes or crackles.   GI: Abdomen Soft, NT. ND.  MUSCULOSKELETAL: extremities nl - no gross deformities noted. No edema.   SKIN: no suspicious lesions or rashes, dry to touch  NEURO:  Awake, alert and answering questions appropriately. Hard of hearing.         Data:     CBC RESULTS:     Recent Labs   Lab 11/08/23  0639 11/07/23  0621 11/06/23  1427   WBC 3.8* 4.7 5.2   RBC 2.75* 2.85* 3.15*   HGB 8.6* 8.8* 9.8*   HCT 24.5* 26.0* 28.5*    160 189       Basic Metabolic Panel:  Recent Labs   Lab 11/08/23  0639 11/07/23  0621 11/06/23  1427 11/03/23  1534    133* 128* 128*   POTASSIUM 2.8* 3.4  3.4 3.3* 3.2*   CHLORIDE 105 107 99 96*   CO2 24 14* 17* 17*   BUN 43.3* 57.7* 67.5* 73.1*   CR 2.65* 3.17* 3.93* 4.06*   * 117* 176* 206*   GRICEL 7.6* 8.1* 8.5* 8.6*       INRNo lab results found in last 7 days.   Attestation:   I have reviewed today's relevant vital signs, notes, medications, labs and imaging.    James Saha MD  [facility] - Nephrology  Office phone :[phone]  Pager: [phone]

## 2023-11-08 NOTE — PLAN OF CARE
"VSS AO x4 intermittent confusion. SBA in room, A1 with walker and gait belt out of room. Ostomy RLQ changed and cared for by pt, strict I&Os started this shift related to high water output in ostomy after drinking large amounts of water. K 2.8 replaced today with AM draw, plan to discharge home as early as tomorrow.    Goal Outcome Evaluation:    Plan of Care Reviewed With: patient, child, spouse Overall Patient Progress: improving Outcome Evaluation: states she is ready to go    Problem: Adult Inpatient Plan of Care  Goal: Plan of Care Review  Description: The Plan of Care Review/Shift note should be completed every shift.  The Outcome Evaluation is a brief statement about your assessment that the patient is improving, declining, or no change.  This information will be displayed automatically on your shift  note.  Outcome: Progressing  Flowsheets (Taken 11/8/2023 1561)  Outcome Evaluation: states she is ready to go  Plan of Care Reviewed With:   patient   child   spouse  Overall Patient Progress: improving  Goal: Patient-Specific Goal (Individualized)  Description: You can add care plan individualizations to a care plan. Examples of Individualization might be:  \"Parent requests to be called daily at 9am for status\", \"I have a hard time hearing out of my right ear\", or \"Do not touch me to wake me up as it startles  me\".  Outcome: Progressing  Goal: Absence of Hospital-Acquired Illness or Injury  Outcome: Progressing  Intervention: Identify and Manage Fall Risk  Recent Flowsheet Documentation  Taken 11/8/2023 1100 by Gracy Gamez, RN  Safety Promotion/Fall Prevention: activity supervised  Taken 11/8/2023 0920 by Gracy Gamez, RN  Safety Promotion/Fall Prevention:   supervised activity   safety round/check completed  Goal: Optimal Comfort and Wellbeing  Outcome: Progressing  Goal: Readiness for Transition of Care  Outcome: Progressing     Problem: Fall Injury Risk  Goal: Absence of Fall and Fall-Related Injury  Outcome: " Progressing  Intervention: Promote Injury-Free Environment  Recent Flowsheet Documentation  Taken 11/8/2023 1100 by Gracy Gamez, RN  Safety Promotion/Fall Prevention: activity supervised  Taken 11/8/2023 0920 by Gracy Gamez, RN  Safety Promotion/Fall Prevention:   supervised activity   safety round/check completed     Problem: Electrolyte Imbalance  Goal: Electrolyte Balance  Outcome: Progressing     Problem: Oral Intake Inadequate  Goal: Improved Oral Intake  Outcome: Progressing

## 2023-11-08 NOTE — PLAN OF CARE
Goal Outcome Evaluation:             AOx4, VSS, Assist with 1 walker and GB, ileostomy RLQ, 24 hour urine collection sent on evening shift, D5 with sodium bicarb infusing at 100 mL/hr, denies pain and SOB, plan to discharge home TBD.

## 2023-11-08 NOTE — PROVIDER NOTIFICATION
"Boris paged at 7:55 \"K 2.8 this am down from 3.4 yesterday, no tele or K protocol in place. can we get K protocol ordered and/or tele please?\"  Boris placed order for K protocol  "

## 2023-11-08 NOTE — CONSULTS
"CLINICAL NUTRITION SERVICES  -  ASSESSMENT NOTE    Recommendations Ordered by Registered Dietitian (RD):   Diet per MD   Ordered Ensure Enlive at 2pm (vanilla)    Malnutrition:   % Weight Loss:  Weight loss does not meet criteria for malnutrition   % Intake:  <75% for > 7 days (moderate malnutrition)  Subcutaneous Fat Loss:  Upper arm region mild depletion - will not use given only one indicator   Muscle Loss:  Temporal region mild depletion, Scapular bone region mild depletion, and Posterior calf region mild depletion  Fluid Retention:  None noted    Malnutrition Diagnosis: Moderate malnutrition  In Context of:  Acute illness or injury  Chronic illness or disease      REASON FOR ASSESSMENT  Gely Keene is a 83 year old female seen by Registered Dietitian for Provider Order - malnutrition    Past medical history: anxiety, GERD, hypertension, hypercholesteremia, restless leg syndrome, colostomy bag drainage s/p colon resection for diverticulitis,, stage III CKD     Admitted for: Acute kidney injury on stage III CKD     NUTRITION HISTORY  - Information obtained from patient and chart   - Typical diet at home: regular diet  - Typical food/fluid intake PTA: patient reports a poor appetite at home for the past couple of weeks. Notes that nothing tasted good and difficulty in preparation of foods. Since it is just her  and her at home she does not prepare large meals everyday. During the past ~ 2 weeks she has been skipping meals and consuming about half of her normal portions.   - Supplements: ensure, every other day   - Food allergies: NKFA    CURRENT NUTRITION ORDERS  Diet Order:     Regular Diet    Current Intake/Tolerance:  One meal recorded at 75% consumed.     Obtained from Chart/Interdisciplinary Team:  - Nephrology following   - Reviewed stooling patterns  - Please refer to flowsheets for more information on skin     ANTHROPOMETRICS  Height: 5' 1\" --> taken on 6/23/2023  Weight: 55 kg ( 121 lbs " 3.2 oz)   Body mass index is 22.9 kg/m .  Weight Status:  Normal BMI  Weight History: weight loss of 3.7 kg (6%) over the past ~ 3.5 months. Patient endorses a weight loss of 40 lbs about 4 years ago. More recently she has stayed around 120-115 lbs per report.   Wt Readings from Last 10 Encounters:   11/07/23 55 kg (121 lb 3.2 oz)   07/19/23 58.7 kg (129 lb 4.8 oz)   06/28/23 58.7 kg (129 lb 6.6 oz)   06/23/23 57.2 kg (126 lb)   01/19/23 58.1 kg (128 lb 1.6 oz)   12/28/21 56.2 kg (124 lb)   12/14/21 57.7 kg (127 lb 3.2 oz)   10/05/20 61.2 kg (135 lb)   09/21/20 63.3 kg (139 lb 8 oz)   06/20/20 63.5 kg (140 lb)     LABS  Labs reviewed    Labs:  Electrolytes  Potassium (mmol/L)   Date Value   11/08/2023 2.8 (L)   11/07/2023 3.4   11/07/2023 3.4   12/20/2022 4.0   09/26/2022 4.4   06/07/2022 4.1   05/26/2021 4.6   03/25/2021 4.4   01/29/2021 4.3     Phosphorus (mg/dL)   Date Value   11/06/2023 3.5   11/03/2023 3.6   09/26/2022 3.8   06/07/2022 3.9   05/09/2016 2.4 (L)   05/02/2016 2.5   04/29/2016 2.5   04/28/2016 1.1 (L)   04/27/2016 2.4 (L)    Blood Glucose  Glucose (mg/dL)   Date Value   11/08/2023 148 (H)   11/07/2023 117 (H)   11/06/2023 176 (H)   11/03/2023 206 (H)   08/21/2023 113 (H)   12/20/2022 141 (H)   09/26/2022 118 (H)   06/07/2022 117 (H)   01/07/2022 121 (H)   12/19/2021 125 (H)   05/26/2021 110 (H)   03/25/2021 115 (A)   01/29/2021 105 (A)   01/06/2021 133 (H)   09/29/2020 92     Hemoglobin A1C (%)   Date Value   08/21/2023 6.1 (H)   12/20/2022 6.0 (H)   09/29/2020 5.3   11/01/2018 6.0 (H)   01/03/2018 6.0   10/27/2016 5.9   04/21/2016 6.0    Inflammatory Markers  CRP Inflammation (mg/L)   Date Value   11/30/2021 3.9   09/21/2020 <2.9   02/06/2020 <2.9     WBC (10e9/L)   Date Value   09/29/2020 5.3   09/22/2020 4.5   09/21/2020 4.8     WBC Count (10e3/uL)   Date Value   11/08/2023 3.8 (L)   11/07/2023 4.7   11/06/2023 5.2     Albumin (g/dL)   Date Value   11/07/2023 3.2 (L)   11/06/2023 3.9    11/03/2023 4.0   12/20/2022 3.8   09/26/2022 3.6   06/07/2022 3.7   09/29/2020 3.4   09/21/2020 3.5   02/06/2020 3.9      Magnesium (mg/dL)   Date Value   11/08/2023 1.9   11/07/2023 2.9 (H)   11/06/2023 1.3 (L)   01/22/2020 1.6   01/21/2020 1.9   01/20/2020 1.3 (L)     Sodium (mmol/L)   Date Value   11/08/2023 137   11/07/2023 133 (L)   11/06/2023 128 (L)   01/06/2021 129 (L)   09/29/2020 133   09/22/2020 133    Renal  Urea Nitrogen (mg/dL)   Date Value   11/08/2023 43.3 (H)   11/07/2023 57.7 (H)   11/06/2023 67.5 (H)   12/20/2022 38 (H)   09/26/2022 34 (H)   06/07/2022 37 (H)   01/06/2021 26   09/29/2020 30   09/22/2020 13     Creatinine (mg/dL)   Date Value   11/08/2023 2.65 (H)   11/07/2023 3.17 (H)   11/06/2023 3.93 (H)   05/26/2021 1.47 (H)   03/25/2021 1.66 (A)   01/29/2021 1.54 (A)     Additional  Triglycerides (mg/dL)   Date Value   08/21/2023 70   12/20/2022 174 (H)   09/29/2020 166 (H)   11/01/2018 81   10/31/2017 116     Ketones Urine (mg/dL)   Date Value   11/06/2023 Negative   09/21/2020 Negative        MEDICATIONS  Medications reviewed     atorvastatin  20 mg Oral Daily    cholecalciferol  125 mcg Oral Daily    DULoxetine  20 mg Oral At Bedtime    [START ON 11/9/2023] famotidine  20 mg Oral Q48H    [START ON 11/9/2023] ferrous gluconate  324 mg Oral Daily with breakfast    gabapentin  200 mg Oral At Bedtime         acetaminophen **OR** acetaminophen, HYDROmorphone, melatonin, naloxone **OR** naloxone **OR** naloxone **OR** naloxone, ondansetron **OR** ondansetron     ASSESSED NUTRITION NEEDS PER APPROVED PRACTICE GUIDELINES:    Dosing Weight 55 kg   Estimated Energy Needs: 4827-7151 kcals (25-30 Kcal/Kg)  Justification: maintenance  Estimated Protein Needs: 66+ grams protein (1.2+ g pro/Kg)  Justification: preservation of lean body mass  Estimated Fluid Needs: per MD     NUTRITION DIAGNOSIS:  Inadequate oral intake related to difficulty in preparation of foods and lack of appetite as evidenced by  patient likely consuming <75% of nutritional needs over the past > 2 weeks     NUTRITION INTERVENTIONS  Recommendations / Nutrition Prescription  Diet per MD   Ordered Ensure Enlive at 2pm (vanilla)     Implementation  Nutrition education: Provided education on the different oral nutritional supplements offered + indications for use. Encouraged protein with meals consistently TID as able.   Medical Food Supplement: as above    Nutrition Goals  Patient to consume 75% of meals or oral nutritional supplements ordered TID    MONITORING AND EVALUATION:  Progress towards goals will be monitored and evaluated per protocol and Practice Guidelines    Dari Umanzor RD, LD  Clinical Dietitian     3rd floor/ICU: [phone]  All other floors: [phone]  Weekend/holiday: [phone]  Office: [phone]

## 2023-11-08 NOTE — PLAN OF CARE
/44 (BP Location: Left arm)   Pulse 63   Temp 97.1  F (36.2  C) (Oral)   Resp 16   Wt 55 kg (121 lb 3.2 oz)   SpO2 98%   BMI 22.90 kg/m      VSS, PT denies pain, SoB, CP. A&Ox4 but forgetful. IVF switched to D5 with sodium bicarb. Moving well with A1 per PT. Echo today, EF 70%. Discharge plan pending.

## 2023-11-08 NOTE — CONSULTS
Care Management Initial Consult    General Information  Assessment completed with: Patient, Spouse or significant other,    Type of CM/SW Visit: Initial Assessment    Primary Care Provider verified and updated as needed: Yes   Readmission within the last 30 days: no previous admission in last 30 days      Reason for Consult: discharge planning  Advance Care Planning:            Communication Assessment  Patient's communication style: spoken language (English or Bilingual)    Hearing Difficulty or Deaf: yes   Wear Glasses or Blind: yes    Cognitive  Cognitive/Neuro/Behavioral: WDL                      Living Environment:   People in home: spouse     Current living Arrangements: house      Able to return to prior arrangements: yes       Family/Social Support:  Care provided by: self, spouse/significant other  Provides care for: no one  Marital Status:   , Children          Description of Support System: Supportive, Involved    Support Assessment: Adequate family and caregiver support, Adequate social supports    Current Resources:   Patient receiving home care services: No     Community Resources: None  Equipment currently used at home: grab bar, toilet, grab bar, tub/shower, walker, rolling, walker, standard    Lifestyle & Psychosocial Needs:  Social Determinants of Health     Food Insecurity: No Food Insecurity (12/20/2022)    Hunger Vital Sign     Worried About Running Out of Food in the Last Year: Never true     Ran Out of Food in the Last Year: Never true   Depression: Not at risk (12/20/2022)    PHQ-2     PHQ-2 Score: 2   Housing Stability: Low Risk  (12/20/2022)    Housing Stability Vital Sign     Unable to Pay for Housing in the Last Year: No     Number of Places Lived in the Last Year: 1     Unstable Housing in the Last Year: No   Tobacco Use: Low Risk  (7/19/2023)    Patient History     Smoking Tobacco Use: Never     Smokeless Tobacco Use: Never     Passive Exposure: Never   Financial Resource  Strain: Low Risk  (12/20/2022)    Overall Financial Resource Strain (CARDIA)     Difficulty of Paying Living Expenses: Not very hard   Alcohol Use: Not At Risk (12/20/2022)    AUDIT-C     Frequency of Alcohol Consumption: Monthly or less     Average Number of Drinks: 1 or 2     Frequency of Binge Drinking: Never   Transportation Needs: No Transportation Needs (12/20/2022)    PRAPARE - Transportation     Lack of Transportation (Medical): No     Lack of Transportation (Non-Medical): No   Physical Activity: Inactive (12/20/2022)    Exercise Vital Sign     Days of Exercise per Week: 0 days     Minutes of Exercise per Session: 0 min   Interpersonal Safety: Not on file   Stress: Stress Concern Present (12/20/2022)    [employer] - Occupational Stress Questionnaire     Feeling of Stress : To some extent   Social Connections: Moderately Integrated (12/20/2022)    Social Connection and Isolation Panel [NHANES]     Frequency of Communication with Friends and Family: More than three times a week     Frequency of Social Gatherings with Friends and Family: Twice a week     Attends Yazdanism Services: 1 to 4 times per year     Active Member of Clubs or Organizations: No     Attends Club or Organization Meetings: Not on file     Marital Status:        Functional Status:  Prior to admission patient needed assistance:   Dependent ADLs:: Independent  Dependent IADLs:: Independent     Additional Information:  Care management consult for discharge planning/disposition. Patient admitted with acute kidney injury on CKD stage IV, HTN, near syncope. PT recommendation is home with home SN, PT, OT. CM met with patient and spouse at bedside. Patient lives at home with spouse. Home has stairs inside and out. She is not currently receiving any assistance at home.   Patient is agreeable to homecare services. She does not have an agency preference.  Spouse or daughter will transport patient home.   Homecare  referral sent to hub.   Care coordination to follow for discharge planning.     Diana Rodriguez RN  Care Coordinator  [facility]

## 2023-11-09 ENCOUNTER — APPOINTMENT (OUTPATIENT)
Dept: PHYSICAL THERAPY | Facility: CLINIC | Age: 83
DRG: 683 | End: 2023-11-09
Payer: MEDICARE

## 2023-11-09 VITALS
BODY MASS INDEX: 23.18 KG/M2 | TEMPERATURE: 98 F | HEART RATE: 67 BPM | RESPIRATION RATE: 18 BRPM | OXYGEN SATURATION: 96 % | WEIGHT: 122.7 LBS | SYSTOLIC BLOOD PRESSURE: 113 MMHG | DIASTOLIC BLOOD PRESSURE: 44 MMHG

## 2023-11-09 LAB
ANION GAP SERPL CALCULATED.3IONS-SCNC: 8 MMOL/L (ref 7–15)
BUN SERPL-MCNC: 34.7 MG/DL (ref 8–23)
CALCIUM SERPL-MCNC: 8.1 MG/DL (ref 8.8–10.2)
CHLORIDE SERPL-SCNC: 102 MMOL/L (ref 98–107)
CREAT SERPL-MCNC: 2.46 MG/DL (ref 0.51–0.95)
DEPRECATED HCO3 PLAS-SCNC: 26 MMOL/L (ref 22–29)
EGFRCR SERPLBLD CKD-EPI 2021: 19 ML/MIN/1.73M2
GLUCOSE SERPL-MCNC: 112 MG/DL (ref 70–99)
POTASSIUM SERPL-SCNC: 3.8 MMOL/L (ref 3.4–5.3)
POTASSIUM SERPL-SCNC: 4 MMOL/L (ref 3.4–5.3)
SODIUM SERPL-SCNC: 136 MMOL/L (ref 135–145)

## 2023-11-09 PROCEDURE — 99239 HOSP IP/OBS DSCHRG MGMT >30: CPT | Performed by: HOSPITALIST

## 2023-11-09 PROCEDURE — 99232 SBSQ HOSP IP/OBS MODERATE 35: CPT | Performed by: STUDENT IN AN ORGANIZED HEALTH CARE EDUCATION/TRAINING PROGRAM

## 2023-11-09 PROCEDURE — 84132 ASSAY OF SERUM POTASSIUM: CPT | Performed by: HOSPITALIST

## 2023-11-09 PROCEDURE — 97530 THERAPEUTIC ACTIVITIES: CPT | Mod: GP | Performed by: PHYSICAL THERAPIST

## 2023-11-09 PROCEDURE — 97116 GAIT TRAINING THERAPY: CPT | Mod: GP | Performed by: PHYSICAL THERAPIST

## 2023-11-09 PROCEDURE — 82374 ASSAY BLOOD CARBON DIOXIDE: CPT | Performed by: HOSPITALIST

## 2023-11-09 PROCEDURE — 250N000013 HC RX MED GY IP 250 OP 250 PS 637: Performed by: INTERNAL MEDICINE

## 2023-11-09 PROCEDURE — 36415 COLL VENOUS BLD VENIPUNCTURE: CPT | Performed by: HOSPITALIST

## 2023-11-09 RX ORDER — FERROUS GLUCONATE 324(38)MG
324 TABLET ORAL
Qty: 30 TABLET | Refills: 1 | Status: SHIPPED | OUTPATIENT
Start: 2023-11-10 | End: 2024-01-09

## 2023-11-09 RX ORDER — GABAPENTIN 100 MG/1
200 CAPSULE ORAL AT BEDTIME
Qty: 60 CAPSULE | Refills: 1 | Status: SHIPPED | OUTPATIENT
Start: 2023-11-09 | End: 2023-11-15

## 2023-11-09 RX ADMIN — FERROUS GLUCONATE 324 MG: 324 TABLET ORAL at 09:00

## 2023-11-09 RX ADMIN — ATORVASTATIN CALCIUM 20 MG: 10 TABLET, FILM COATED ORAL at 08:59

## 2023-11-09 RX ADMIN — FAMOTIDINE 20 MG: 20 TABLET ORAL at 09:01

## 2023-11-09 RX ADMIN — Medication 125 MCG: at 09:00

## 2023-11-09 ASSESSMENT — ACTIVITIES OF DAILY LIVING (ADL)
ADLS_ACUITY_SCORE: 24

## 2023-11-09 NOTE — PROGRESS NOTES
Care Management Discharge Note    Discharge Date: 11/09/2023       Discharge Disposition: Home Care    Discharge Services: None    Discharge DME: None    Discharge Transportation: family or friend will provide    Private pay costs discussed: Not applicable    Patient/family educated on Medicare website which has current facility and service quality ratings: yes    Education Provided on the Discharge Plan: Yes  Persons Notified of Discharge Plans: patient, spouse  Patient/Family in Agreement with the Plan: yes    Handoff Referral Completed: Yes    Additional Information:  Patient will be discharging home today. Met with patient and family and updated that [facility] will be seeing them for homecare and they will be contacting patient in the next day or two to schedule. Patient verbalizes understanding.   Discharge orders faxed to [facility]. Liaison updated.  [facility] ordered.    Diana Rodriguez RN  Care Coordinator  [facility]

## 2023-11-09 NOTE — PROGRESS NOTES
Renal Medicine Progress Note            Assessment/Plan:     83 y.o woman with CKD IV due to SLE and HTN, admitted for MITCHELL due to volume depletion.      # CKD IV: Creatinine was 2 mg/dl and eGFR of 23 ml/min in September.      # Acute kidney injury, improving: Due to volume depletion and hypotension. Improving and trending towards baseline. No evidence of lupus nephritis flare.               -normal complements               -CRP <3     # Hypertension: Admitted with hypotension. PTA Coreg is on hold. BP stable.      # FEN: appears more euvolemic today. Hypokalemia and acidosis resolved.      # Anemia: Hgb is blow target.      # SLE:                -AZA 50 mg bid     # Hypocalcemia     Plan:   # continue vitamin D3, 5000 units daily.  # continueFerrous gluconate.  # Hold Coreg when discharged-can evaluate outpatient if she needs it or not.  # continue PO hydration, no further IVF needed today  # Decrease Neurontin to 200 mg at bedtime.  # HFU with us in 1-2 weeks after discharged.     Reviewed notes from hospitalist and Rns.         Interval History:     Afebrile. BP high overnight, but normalized this AM.   She reports increased PO intake   Denies n/v   Denies SOB   Denies other complaints or concerns       BP is better, but DBP remains low.   Orthostatic BP check is unremarkable based on BP. HR was not recorded.   She says she feels dizzy when she gets up.  She says her mind is foggy today  She would like to stay another day and go home tomorrow.  Kidney function continues to improve nicely.           Medications and Allergies:      atorvastatin  20 mg Oral Daily    cholecalciferol  125 mcg Oral Daily    DULoxetine  20 mg Oral At Bedtime    famotidine  20 mg Oral Q48H    ferrous gluconate  324 mg Oral Daily with breakfast    gabapentin  200 mg Oral At Bedtime        Allergies   Allergen Reactions    Codeine GI Disturbance    Metronidazole      GI distubance    Sulfacetamide     Sulfamethoxazole-Trimethoprim GI  Disturbance     Vomiting    Sulfur             Physical Exam:   Vitals were reviewed   , Blood pressure 113/44, pulse 67, temperature 98  F (36.7  C), temperature source Oral, resp. rate 18, weight 55.7 kg (122 lb 11.2 oz), SpO2 96%, not currently breastfeeding.    Wt Readings from Last 3 Encounters:   11/09/23 55.7 kg (122 lb 11.2 oz)   07/19/23 58.7 kg (129 lb 4.8 oz)   06/28/23 58.7 kg (129 lb 6.6 oz)       Intake/Output Summary (Last 24 hours) at 11/8/2023 1043  Last data filed at 11/7/2023 1904  Gross per 24 hour   Intake 240 ml   Output 700 ml   Net -460 ml     GENERAL: NAD. Frail.   HEENT:  Normocephalic. No gross abnormalities.  Pupils equal. Dry mucous membranes.  Dentition is ok.  CV: RRR, soft murmurs, no clicks, gallops, or rubs,  no edema.  RESP: Clear bilaterally with good efforts. No wheezes or crackles.   MUSCULOSKELETAL: extremities nl - no gross deformities noted. No edema.   SKIN: no suspicious lesions or rashes, dry to touch  NEURO:  Awake, alert and answering questions appropriately. Hard of hearing.         Data:     CBC RESULTS:     Recent Labs   Lab 11/08/23  0639 11/07/23  0621 11/06/23  1427   WBC 3.8* 4.7 5.2   RBC 2.75* 2.85* 3.15*   HGB 8.6* 8.8* 9.8*   HCT 24.5* 26.0* 28.5*    160 189       Basic Metabolic Panel:  Recent Labs   Lab 11/09/23  0644 11/09/23  0046 11/08/23  1310 11/08/23  0639 11/07/23  0621 11/06/23  1427 11/03/23  1534     --   --  137 133* 128* 128*   POTASSIUM 4.0 3.8 3.4 2.8* 3.4  3.4 3.3* 3.2*   CHLORIDE 102  --   --  105 107 99 96*   CO2 26  --   --  24 14* 17* 17*   BUN 34.7*  --   --  43.3* 57.7* 67.5* 73.1*   CR 2.46*  --   --  2.65* 3.17* 3.93* 4.06*   *  --   --  148* 117* 176* 206*   GRICEL 8.1*  --   --  7.6* 8.1* 8.5* 8.6*       INRNo lab results found in last 7 days.   Attestation:   I have reviewed today's relevant vital signs, notes, medications, labs and imaging.    Priscilla Ty MD   [facility] - Nephrology  Office  phone :[phone]

## 2023-11-09 NOTE — PROGRESS NOTES
5500-3422  VSS, low diastolic BP, better than earlier. PIV SL. Alert and oriented x 4. Up assist of 1 with walker. Ambulated in adrian x 2. Tolerating diet, good ouput. Illeostomy intact, watery brown output. Denies pain. Discharge pending.

## 2023-11-09 NOTE — PLAN OF CARE
"VSS denaies pain or SOB. SBA, trace BLE edema, strict I&Os including ostomy managed by pt.  Discharged home with healthcare orders. Taken home by spouse in private car, staff accompanied pt down in w/c with belongings. Pt and spouse verbalized understanding of discharge instructions and medications sent home.    Goal Outcome Evaluation:    Plan of Care Reviewed With: patient, child, spouse  Overall Patient Progress: improving  Outcome Evaluation: states she is ready to go  Problem: Adult Inpatient Plan of Care  Goal: Plan of Care Review  Description: The Plan of Care Review/Shift note should be completed every shift.  The Outcome Evaluation is a brief statement about your assessment that the patient is improving, declining, or no change.  This information will be displayed automatically on your shift  note.  Outcome: Met  Goal: Patient-Specific Goal (Individualized)  Description: You can add care plan individualizations to a care plan. Examples of Individualization might be:  \"Parent requests to be called daily at 9am for status\", \"I have a hard time hearing out of my right ear\", or \"Do not touch me to wake me up as it startles  me\".  Outcome: Met  Goal: Absence of Hospital-Acquired Illness or Injury  Outcome: Met  Intervention: Identify and Manage Fall Risk  Recent Flowsheet Documentation  Taken 11/9/2023 0900 by Gracy Gamez, RN  Safety Promotion/Fall Prevention: activity supervised  Intervention: Prevent Skin Injury  Recent Flowsheet Documentation  Taken 11/9/2023 0900 by Gracy Gamez, RN  Body Position: position changed independently  Goal: Optimal Comfort and Wellbeing  Outcome: Met  Goal: Readiness for Transition of Care  Outcome: Met     Problem: Fall Injury Risk  Goal: Absence of Fall and Fall-Related Injury  Outcome: Met  Intervention: Promote Injury-Free Environment  Recent Flowsheet Documentation  Taken 11/9/2023 0900 by Gracy Gamez, RN  Safety Promotion/Fall Prevention: activity supervised     Problem: " Electrolyte Imbalance  Goal: Electrolyte Balance  Outcome: Met     Problem: Oral Intake Inadequate  Goal: Improved Oral Intake  Outcome: Met

## 2023-11-09 NOTE — PLAN OF CARE
"Goal Outcome Evaluation:      Plan of Care Reviewed With: patient    Overall Patient Progress: improvingOverall Patient Progress: improving    Outcome Evaluation: .    VSS on RA. A/O, [place]. SBA. Colostomy, loose stool. Strict I/Os. Potassium replaced, recheck ordered. Holding carvedilol for episodes of hypotension. Awaiting discharge time, but plan to go home with PT.     Problem: Adult Inpatient Plan of Care  Goal: Plan of Care Review  Description: The Plan of Care Review/Shift note should be completed every shift.  The Outcome Evaluation is a brief statement about your assessment that the patient is improving, declining, or no change.  This information will be displayed automatically on your shift  note.  Outcome: Progressing  Flowsheets (Taken 11/8/2023 2655)  Outcome Evaluation: .  Plan of Care Reviewed With: patient  Overall Patient Progress: improving  Goal: Patient-Specific Goal (Individualized)  Description: You can add care plan individualizations to a care plan. Examples of Individualization might be:  \"Parent requests to be called daily at 9am for status\", \"I have a hard time hearing out of my right ear\", or \"Do not touch me to wake me up as it startles  me\".  Outcome: Progressing  Goal: Absence of Hospital-Acquired Illness or Injury  Outcome: Progressing  Intervention: Identify and Manage Fall Risk  Recent Flowsheet Documentation  Taken 11/8/2023 1900 by Tomas Coronel RN  Safety Promotion/Fall Prevention:   activity supervised   assistive device/personal items within reach   clutter free environment maintained   increased rounding and observation   increase visualization of patient   lighting adjusted   treat underlying cause   treat reversible contributory factors   supervised activity   room organization consistent   safety round/check completed   room near nurse's station   nonskid shoes/slippers when out of bed   mobility aid in reach  Intervention: Prevent Skin Injury  Recent Flowsheet " Documentation  Taken 11/8/2023 1900 by Tomas Coronel RN  Body Position: position changed independently  Intervention: Prevent and Manage VTE (Venous Thromboembolism) Risk  Recent Flowsheet Documentation  Taken 11/8/2023 1900 by Tomas Coronel RN  VTE Prevention/Management: SCDs (sequential compression devices) off  Intervention: Prevent Infection  Recent Flowsheet Documentation  Taken 11/8/2023 1900 by Tomas Coronel RN  Infection Prevention:   rest/sleep promoted   hand hygiene promoted   personal protective equipment utilized  Goal: Optimal Comfort and Wellbeing  Outcome: Progressing  Goal: Readiness for Transition of Care  Outcome: Progressing     Problem: Fall Injury Risk  Goal: Absence of Fall and Fall-Related Injury  Outcome: Progressing  Intervention: Identify and Manage Contributors  Recent Flowsheet Documentation  Taken 11/8/2023 1900 by Tomas Coronel RN  Medication Review/Management: medications reviewed  Intervention: Promote Injury-Free Environment  Recent Flowsheet Documentation  Taken 11/8/2023 1900 by Tomas Coronel RN  Safety Promotion/Fall Prevention:   activity supervised   assistive device/personal items within reach   clutter free environment maintained   increased rounding and observation   increase visualization of patient   lighting adjusted   treat underlying cause   treat reversible contributory factors   supervised activity   room organization consistent   safety round/check completed   room near nurse's station   nonskid shoes/slippers when out of bed   mobility aid in reach     Problem: Electrolyte Imbalance  Goal: Electrolyte Balance  Outcome: Progressing     Problem: Oral Intake Inadequate  Goal: Improved Oral Intake  Outcome: Progressing

## 2023-11-09 NOTE — DISCHARGE SUMMARY
[facility]  Hospitalist Discharge Summary      Date of Admission:  11/6/2023  Date of Discharge:  11/9/2023  Discharging Provider: Ricardo Escalante DO  Discharge Service: Hospitalist Service    Discharge Diagnoses   MITCHELL on CKD stage IV  Hypotension w/hx HTN  Moderate Malnutrition  Detectable troponin  Probably near syncope  Hypokalemia, hypomag  HX GERD, DLD       Follow-ups Needed After Discharge   Follow-up Appointments     Follow-up and recommended labs and tests       Follow up with primary care provider, Hien Booth, within 7 days   for hospital follow- up.  No follow up labs or test are needed.    Follow up with nephrology in 1-2 weeks or as directed, repeat lab work per   them.         Discharge Disposition   Discharged to home w/[facility]  Condition at discharge: Stable    Hospital Course   Gely Keene is a 83 year old female with a past medical history significant for chronic kidney disease, GERD, hypertension, hyperlipidemia, anxiety, recurrent C. difficile colitis, and previous colostomy placement.  She presented to emergency room with weakness.  Found to have acute kidney injury on chronic kidney disease.    MITCHELL on CKD stage IV  -Creatinine initially elevated at 3.9.  -initially on bicarb and seen by nephrology  -fluids stopped, Cr down to 2.46.   -Recommend drinking 2L of fluid daily and close follow-up with nephrology on discharge with repeat lab work per them  -gabapentin dose decreased and coreg held, cont changes on discharge    Hypertension.  Orthostatic hypotension.  -Blood pressures low at times, holding carvedilol on discharge    Detectable troponin  Probable near syncope  -elevated trop due to demand ischemia. Near syncope from dehydration  -Echocardiogram on 11/7 shows EF greater than 70%, no obvious WMA's, mild tricuspid and mitral regurgitation.  -Carvedilol held    GERD.  -cont pepcid    Hyperlipidemia.  -Continue atorvastatin 20 mg a  day.    Deconditioning.  Moderate malnutrition  -HHC set up on discharge  -Registered dietitian consulted    Hypokalemia.  Hypomagnesemia.  -replete, place on potassium protocol    Consultations This Hospital Stay   NEPHROLOGY IP CONSULT  PHYSICAL THERAPY ADULT IP CONSULT  OCCUPATIONAL THERAPY ADULT IP CONSULT  NUTRITION SERVICES ADULT IP CONSULT  CARE MANAGEMENT / SOCIAL WORK IP CONSULT    Code Status   Full Code    Time Spent on this Encounter   I, Ricardo Escalante DO, personally saw the patient today and spent greater than 30 minutes discharging this patient.       Ricardo Escalante DO  [place] [id number] MEDICAL SURGICAL  [address] [facility] [address]  Phone: [phone]  Fax: [phone]  ______________________________________________________________________    Physical Exam   Vital Signs: Temp: 98  F (36.7  C) Temp src: Oral BP: 113/44 Pulse: 67   Resp: 18 SpO2: 96 % O2 Device: None (Room air)    Weight: 122 lbs 11.2 oz  Face to face completed day of discharge       Primary Care Physician   Hien Booth    Discharge Orders      Home Care Referral      Primary Care - Care Coordination Referral      Reason for your hospital stay    Admitted for MITCHELL and was rehydrated. Home coreg stopped due to hypotension. Started on oral iron by nephrology, cont to follow as outpt with repeat labs. Gabapentin dose decreased due to MITCHELL, new script given     Follow-up and recommended labs and tests     Follow up with primary care provider, Hien Booth, within 7 days for hospital follow- up.  No follow up labs or test are needed.    Follow up with nephrology in 1-2 weeks or as directed, repeat lab work per them.     Activity    Your activity upon discharge: activity as tolerated     Diet    Follow this diet upon discharge: Orders Placed This Encounter      Snacks/Supplements Adult: Ensure Enlive; Between Meals      Regular Diet Adult       Significant Results and Procedures   Most Recent 3  CBC's:  Recent Labs   Lab Test 11/08/23  0639 11/07/23  0621 11/06/23  1427   WBC 3.8* 4.7 5.2   HGB 8.6* 8.8* 9.8*   MCV 89 91 91    160 189     Most Recent 3 BMP's:  Recent Labs   Lab Test 11/09/23  0644 11/09/23  0046 11/08/23  1310 11/08/23  0639 11/07/23  0621     --   --  137 133*   POTASSIUM 4.0 3.8 3.4 2.8* 3.4  3.4   CHLORIDE 102  --   --  105 107   CO2 26  --   --  24 14*   BUN 34.7*  --   --  43.3* 57.7*   CR 2.46*  --   --  2.65* 3.17*   ANIONGAP 8  --   --  8 12   GRICEL 8.1*  --   --  7.6* 8.1*   *  --   --  148* 117*     Most Recent 2 LFT's:  Recent Labs   Lab Test 11/07/23  0621 11/06/23  1427   AST 47* 69*   ALT 26 33   ALKPHOS 81 79   BILITOTAL 0.8 1.0     Most Recent 3 INR's:  Recent Labs   Lab Test 12/01/19  2319 08/15/17  0918 05/09/16  0545   INR 1.01 1.05 1.18*     Most Recent 3 Hemoglobins:  Recent Labs   Lab Test 11/08/23  0639 11/07/23  0621 11/06/23  1427   HGB 8.6* 8.8* 9.8*     Most Recent 3 Troponin's:  Recent Labs   Lab Test 01/03/20 1951 12/01/19 2319 11/29/19  1837   TROPI <0.015 <0.015 <0.015     Most Recent 3 BNP's:No lab results found.  Most Recent D-dimer:No lab results found.,   Results for orders placed or performed during the hospital encounter of 11/06/23   XR Chest 2 Views    Narrative    XR CHEST 2 VIEWS 11/6/2023 3:33 PM    HISTORY: Chest pain    COMPARISON: Chest x-ray 9/22/2020      Impression    IMPRESSION: No acute findings. The lungs are clear and there are no  pleural effusions. Normal heart size. Spinal degenerative changes.  Right upper quadrant surgical clips.    MASON ROWELL MD         SYSTEM ID:  [id number]   US Renal Complete Non-Vascular    Narrative    EXAM: US RENAL COMPLETE NON-VASCULAR  LOCATION: [facility]  DATE: 11/6/2023    INDICATION: New renal failure. Evaluate for obstruction.  COMPARISON: None.  TECHNIQUE: Routine Bilateral Renal and Bladder Ultrasound.    FINDINGS:    RIGHT KIDNEY: 8.3 x 4.0 x 4.6 cm.  Normal without hydronephrosis or masses.     LEFT KIDNEY: 9.0 x 3.8 x 4.4 cm. Normal without hydronephrosis or masses.     BLADDER: Unremarkable given its level of distention.      Impression    IMPRESSION: No obstruction demonstrated.   Echocardiogram Complete     Value    LVEF  >70%    [facility]    [id number]  [id number]  [id number]  [id number]^GEOFF^LIZA^AUREA     [facility]  Echocardiography Laboratory  [address] [place] [address]     Name: SYLVESTER CARVER  MRN: [id number]  : 1940  Study Date: 2023 09:14 AM  Age: 83 yrs  Gender: Female  Patient Location: [facility]  Reason For Study: Chest Pain  Ordering Physician: LIZA TELLEZ  Performed By: Deisy Solis     BSA: 1.5 m2  Height: 61 in  Weight: 122 lb  BP: 122/54 mmHg  ______________________________________________________________________________  Procedure  Complete Portable Echo Adult. Optison (NDC #[id number]) given intravenously.  ______________________________________________________________________________  Interpretation Summary     Hyperdynamic left ventricular functionThe visual ejection fraction is >70%.  The right ventricular systolic function is normal.  There is mild (1+) mitral regurgitation.There is trace aortic  regurgitation.Mild aortic valve scllerosis.  The inferior vena cava was normal in size with preserved respiratory  variability.  ______________________________________________________________________________  Left Ventricle  The left ventricle is normal in size. Proximal septal thickening is noted.  Hyperdynamic left ventricular function. The visual ejection fraction is >70%.  Diastolic Doppler findings (E/E' ratio and/or other parameters) suggest left  ventricular filling pressures are indeterminate. No regional wall motion  abnormalities noted.     Right Ventricle  The right ventricle is normal size. The right ventricular systolic function is  normal.     Atria  The left atrium is mildly dilated. Right  atrial size is normal. There is no  color Doppler evidence of an atrial shunt.     Mitral Valve  There is mild mitral annular calcification. There is mild (1+) mitral  regurgitation.     Tricuspid Valve  There is mild (1+) tricuspid regurgitation. Right ventricular systolic  pressure could not be approximated due to inadequate tricuspid regurgitation.     Aortic Valve  The aortic valve is trileaflet with aortic valve sclerosis. There is trace  aortic regurgitation.     Pulmonic Valve  There is trace pulmonic valvular regurgitation. There is no pulmonic valvular  stenosis.     Vessels  The aortic root is normal size. Normal size ascending aorta. The inferior vena  cava was normal in size with preserved respiratory variability.     Pericardium  There is no pericardial effusion.     Rhythm  The rhythm was sinus bradycardia.  ______________________________________________________________________________  MMode/2D Measurements & Calculations  IVSd: 0.95 cm  LVIDd: 4.2 cm  LVIDs: 2.2 cm  LVPWd: 0.61 cm  FS: 47.7 %  LV mass(C)d: 96.3 grams  LV mass(C)dI: 62.9 grams/m2  Ao root diam: 2.5 cm  asc Aorta Diam: 3.4 cm  LVOT diam: 1.9 cm  LVOT area: 2.9 cm2  Ao root diam index Ht(cm/m): 1.6  Ao root diam index BSA (cm/m2): 1.7  Asc Ao diam index BSA (cm/m2): 2.2  Asc Ao diam index Ht(cm/m): 2.2  LA Volume (BP): 48.1 ml     LA Volume Index (BP): 31.4 ml/m2  RWT: 0.29  TAPSE: 3.1 cm     Doppler Measurements & Calculations  MV E max olesya: 78.4 cm/sec  MV A max olesya: 89.1 cm/sec  MV E/A: 0.88  MV dec slope: 216.2 cm/sec2  MV dec time: 0.36 sec  PA acc time: 0.13 sec  E/E' av.0  Lateral E/e': 9.7  Medial E/e': 12.3     ______________________________________________________________________________  Report approved by: Sharona Wiggins 2023 12:51 PM             Discharge Medications   Discharge Medication List as of 2023  1:10 PM        START taking these medications    Details   ferrous gluconate (FERGON) 324 (38 Fe)  MG tablet Take 1 tablet (324 mg) by mouth daily (with breakfast) for 60 days, Disp-30 tablet, R-1, E-Prescribe           CONTINUE these medications which have CHANGED    Details   gabapentin (NEURONTIN) 100 MG capsule Take 2 capsules (200 mg) by mouth at bedtime for 60 days, Disp-60 capsule, R-1, E-Prescribe           CONTINUE these medications which have NOT CHANGED    Details   acetaminophen (TYLENOL) 500 MG tablet Take 500-1,000 mg by mouth every 6 hours as needed (pain), Historical      albuterol (PROAIR HFA/PROVENTIL HFA/VENTOLIN HFA) 108 (90 Base) MCG/ACT inhaler Inhale 2 puffs into the lungs every 6 hours as needed for shortness of breath, wheezing or cough, Disp-18 g, R-0, E-PrescribePharmacy may dispense brand covered by insurance (Proair, or proventil or ventolin or generic albuterol inhaler)      atorvastatin (LIPITOR) 20 MG tablet Take 1 tablet (20 mg) by mouth daily, Disp-90 tablet, R-3, E-Prescribe      azaTHIOprine (IMURAN) 50 MG tablet Take 50 mg by mouth 2 times daily, Historical      DULoxetine (CYMBALTA) 20 MG capsule Take 1 capsule (20 mg) by mouth daily, Disp-90 capsule, R-1, E-Prescribe      estradiol (ESTRACE) 0.1 MG/GM vaginal cream Place vaginally three times a week P-A-AMtvlobiomd      fluticasone (FLONASE) 50 MCG/ACT nasal spray Spray 1 spray into both nostrils daily as needed for rhinitis or allergies, No Print Out      meclizine (ANTIVERT) 25 MG tablet Take 1 tablet (25 mg) by mouth 3 times daily as needed for dizziness, Disp-30 tablet, R-0, E-Prescribe      omeprazole (PRILOSEC) 20 MG DR capsule TAKE 1 CAPSULE BY MOUTH EVERY DAY, Disp-90 capsule, R-3, E-Prescribe      ondansetron (ZOFRAN ODT) 4 MG ODT tab Take 1 tablet (4 mg) by mouth every 8 hours as needed for nausea, Disp-30 tablet, R-11, E-Prescribe      Vitamin D, Cholecalciferol, 10 MCG (400 UNIT) TABS Take 800 Units by mouth daily, Historical           STOP taking these medications       carvedilol (COREG) 3.125 MG tablet  Comments:   Reason for Stopping:             Allergies   Allergies   Allergen Reactions    Codeine GI Disturbance    Metronidazole      GI distubance    Sulfacetamide     Sulfamethoxazole-Trimethoprim GI Disturbance     Vomiting    Sulfur

## 2023-11-10 ENCOUNTER — TELEPHONE (OUTPATIENT)
Dept: PEDIATRICS | Facility: CLINIC | Age: 83
End: 2023-11-10
Payer: MEDICARE

## 2023-11-10 ENCOUNTER — PATIENT OUTREACH (OUTPATIENT)
Dept: CARE COORDINATION | Facility: CLINIC | Age: 83
End: 2023-11-10
Payer: MEDICARE

## 2023-11-10 NOTE — LETTER
[facility]  Patient Centered Plan of Care  About Me:      Patient Name:  Gely Keene    YOB: 1940  Age:         83 year old   Francisco MRN:    [id number] Telephone Information:  Home Phone [phone]   Mobile [phone]   Mobile [phone]       Address:  [address] [place] [address] Email address:  [email or web address]      Emergency Contact(s)    Name Relationship Lgl Grd Work Phone Home Phone Mobile Phone   1. MEHUL,* Spouse   [phone]   2. JOANNA ARORA Daughter   [phone]   3. WALDO Spouse   [phone]            Primary language:  English     needed? No   [place] [employer]:  [phone] op. 1  Other communication barriers:Glasses; Hearing aides    Preferred Method of Communication:  Mail  Current living arrangement: I live in a private home with spouse    Mobility Status/ Medical Equipment: Independent w/Device    Health Maintenance  Health Maintenance Reviewed: Due/Overdue   Health Maintenance Due   Topic Date Due    RSV VACCINE (Pregnancy & 60+) (1 - 1-dose 60+ series) Never done    ZOSTER IMMUNIZATION (1 of 2) 01/04/2013    DTAP/TDAP/TD IMMUNIZATION (3 - Td or Tdap) 10/24/2021    MICROALBUMIN  02/28/2022    PHQ-2 (once per calendar year)  01/01/2023    COVID-19 Vaccine (5 - 2023-24 season) 09/01/2023      My Access Plan  Medical Emergency 911   Primary Clinic Line [facility] - [phone]   24 Hour Appointment Line [phone] or  [phone]) (toll-free)   24 Hour Nurse Line [phone] (toll-free)   Preferred Urgent Care [facility] [place], [phone]     Preferred Hospital [facility]  [phone]     Preferred Pharmacy [facility] - [place], [place] - [address]     Behavioral Health Crisis Line The National Suicide Prevention Lifeline at 1-438.301.3145 or Text/Call  988     My Care Team Members  Patient Care Team         Relationship Specialty Notifications Start End    Hien Booth MD PCP - General Internal Medicine - Pediatrics  6/23/23     Merged    Phone: [phone] Fax: [phone] [address] [place] [address]    Shana Alaniz MD MD Colon and Rectal Surgery  5/23/16     Phone: [phone] Fax: [phone]         COLON RECTAL SURGERY [address] [place] [address] [place] [address]    Hien Booth MD Assigned PCP   11/24/19     Phone: [phone] Fax: [phone]         [facility] [place] [address]    Ashely Solis RN Personal Advocate & Liaison (PAL)  Admissions 12/12/19     phone: [phone] fax: [phone]    Hien Booth MD  Internal Medicine  6/23/23     Merged    Phone: [phone] Fax: [phone]         [facility] [place] [address]    Liliam Alicea, RN Lead Care Coordinator  Admissions 11/10/23     Phone: [phone]                  My Care Plans  Self Management and Treatment Plan    Care Plan  Care Plan: Utilization       Problem: Increased risk of re-admission       Long-Range Goal: I would like additional resources and support to manage my health and prevent future avoidable ED visits/hospital admissions       Start Date: 11/10/2023 Expected End Date: 4/26/2024    Note:     Barriers: diagnosis of multiple, chronic, complex medical conditions, provider availability - wait time to complete appointments, etc.   Strengths: motivated, engaged in care coordination, spouse/daughter supportive, home care therapies pending.   Patient expressed understanding of goal: yes  Action steps to achieve this goal:  1. I will seek the appropriate level of care to manage my health conditions, symptoms, and questions.  Primary Care Provider: 11/15/23  Nephrology: 11/28/23  2. I will take my medications as prescribed and contact my care team for questions and refill  requests.  3. I will contact my Care Coordination team with questions as needed.    4. I will discuss, review, schedule and complete recommended overdue health maintenance with my Primary Care Provider .   5. I will provide a copy of my health care directive to my care team.                             Action Plans on File:                       Advance Care Plans/Directives:   Advanced Care Plan/Directives on file:   No    Discussed with patient/caregiver(s):   Other (Comment) (in process)             My Medical and Care Information  Problem List   Patient Active Problem List   Diagnosis    Hyperlipidemia LDL goal <160    Hypertension goal BP (blood pressure) < 140/90    IFG (impaired fasting glucose)    BCC (basal cell carcinoma)    Gastroesophageal reflux disease, esophagitis presence not specified    S/p total colectomy on 4/22/16 by Shana Alaniz MD    Restless legs syndrome (RLS)    Anxiety    Health Care Home    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)    Advance care planning    Chronic kidney disease, stage III (moderate) (H)    Hyponatremia    Axonal sensorimotor neuropathy    Systemic lupus erythematosus, unspecified SLE type, unspecified organ involvement status (H)    CKD stage 4 secondary to hypertension (H)    Melena    Epigastric pain    Chronic kidney disease, unspecified CKD stage    Nausea and vomiting, unspecified vomiting type    Renal failure      Current Medications and Allergies:    Allergies   Allergen Reactions    Codeine GI Disturbance    Metronidazole      GI distubance    Sulfacetamide     Sulfamethoxazole-Trimethoprim GI Disturbance     Vomiting    Sulfur       Current Outpatient Medications:     acetaminophen (TYLENOL) 500 MG tablet, Take 500-1,000 mg by mouth every 6 hours as needed (pain), Disp: , Rfl:     albuterol (PROAIR HFA/PROVENTIL HFA/VENTOLIN HFA) 108 (90 Base) MCG/ACT inhaler, Inhale 2 puffs into the lungs every 6 hours as needed for shortness of breath,  wheezing or cough, Disp: 18 g, Rfl: 0    atorvastatin (LIPITOR) 20 MG tablet, Take 1 tablet (20 mg) by mouth daily, Disp: 90 tablet, Rfl: 3    azaTHIOprine (IMURAN) 50 MG tablet, Take 50 mg by mouth 2 times daily, Disp: , Rfl:     DULoxetine (CYMBALTA) 20 MG capsule, Take 1 capsule (20 mg) by mouth daily, Disp: 90 capsule, Rfl: 1    estradiol (ESTRACE) 0.1 MG/GM vaginal cream, Place vaginally three times a week M-W-F, Disp: , Rfl:     ferrous gluconate (FERGON) 324 (38 Fe) MG tablet, Take 1 tablet (324 mg) by mouth daily (with breakfast) for 60 days, Disp: 30 tablet, Rfl: 1    fluticasone (FLONASE) 50 MCG/ACT nasal spray, Spray 1 spray into both nostrils daily as needed for rhinitis or allergies, Disp: , Rfl:     gabapentin (NEURONTIN) 100 MG capsule, Take 2 capsules (200 mg) by mouth at bedtime for 60 days, Disp: 60 capsule, Rfl: 1    meclizine (ANTIVERT) 25 MG tablet, Take 1 tablet (25 mg) by mouth 3 times daily as needed for dizziness, Disp: 30 tablet, Rfl: 0    omeprazole (PRILOSEC) 20 MG DR capsule, TAKE 1 CAPSULE BY MOUTH EVERY DAY, Disp: 90 capsule, Rfl: 3    ondansetron (ZOFRAN ODT) 4 MG ODT tab, Take 1 tablet (4 mg) by mouth every 8 hours as needed for nausea, Disp: 30 tablet, Rfl: 11    Vitamin D, Cholecalciferol, 10 MCG (400 UNIT) TABS, Take 800 Units by mouth daily, Disp: , Rfl:      Care Coordination Start Date: 11/10/2023   Frequency of Care Coordination: monthly, more frequently as needed     Form Last Updated: 11/10/2023

## 2023-11-10 NOTE — TELEPHONE ENCOUNTER
Huddled with ALISA Metz. She spoke with the patient for a hospital follow up and patient noted some symptoms.     Overall feeling ok. Not a strong appetite.   Patient reported /60 HR 76  Had spontaneous emesis x 1 last night before bed.   Lower extremity swelling that has been present for a few weeks. Left greater than the right. Does not wear compression stockings.   Warm/hot sensation in her legs when laying down at night.   Ostomy has had liquid stool for a few weeks. This continues.   The amount is a normal amount. Not a large amount. Not emptying more than usual.   Denies headache, dizziness, sob, vision changes, redness/pain in her legs, abdominal pain, fever, nausea.     Has been drinking gatorade, ensure, water. No appetite but trying to eat.     Advised if swelling in leg(s) worsen to go to ER for assessment.   If BP >160/100 to call the clinic for triage.   Any change or worsening in symptoms, she should be assessed.   Patient understands and agrees with the plan.

## 2023-11-10 NOTE — PROGRESS NOTES
"Clinic Care Coordination Contact  OUTREACH with Post Discharge Assessment    Referral Information:  Referral Source: IP Handoff  Primary Diagnosis: CKD    Chief Complaint   Patient presents with    Clinic Care Coordination - Post Hospital    Clinic Care Coordination - Initial    Clinic Care Coordination - Homecare/TCU     [place] Utilization: 87% Risk of Admission or ED Visit   Clinic Utilization  Difficulty keeping appointments:: No  Compliance Concerns: No  No-Show Concerns: No  No PCP office visit in Past Year: No  Utilization      No Show Count (past year)  1             ED Visits  2             Hospital Admissions  2                    Current as of: 11/10/2023  9:36 AM              Clinical Concerns:  Current Medical Concerns:    Patient Active Problem List   Diagnosis    Hyperlipidemia LDL goal <160    Hypertension goal BP (blood pressure) < 140/90    IFG (impaired fasting glucose)    BCC (basal cell carcinoma)    Gastroesophageal reflux disease, esophagitis presence not specified    S/p total colectomy on 4/22/16 by Shana Alaniz MD    Restless legs syndrome (RLS)    Anxiety    Health Care Home    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)    Advance care planning    Chronic kidney disease, stage III (moderate) (H)    Hyponatremia    Axonal sensorimotor neuropathy    Systemic lupus erythematosus, unspecified SLE type, unspecified organ involvement status (H)    CKD stage 4 secondary to hypertension (H)    Melena    Epigastric pain    Chronic kidney disease, unspecified CKD stage    Nausea and vomiting, unspecified vomiting type    Renal failure        Home Care Set up for Wednesday, unsure what time, they will call.   Nephrology follow up scheduled for 11/28/23.   Patient still sleeping. Requests call back after 1 pm.   Swelling greater in LLE, onset in hospital.   She had an \"unexpected\" emesis last night, just came up all of a sudden. Ostomy still has a lot of water in it as per before " "admission. Has increased water intake to 60 oz per day. Blood pressure is 157/60. HR: 76. No headache, dizziness, lightheadedness, shortness of breath or vision changes.   Hasn't started taking ferrous gluconate, due to fearful after reading literature on side effects. After discussion, agreeable to start taking.   Current Behavioral Concerns: none noted.     Education Provided to patient: Care Coordination role, clinic after hours, medications, appointments, After Visit Summary discussed/reviewed. Support provided.    Pain  Pain (GOAL):: No  Health Maintenance Reviewed: Due/Overdue   Health Maintenance Due   Topic Date Due    RSV VACCINE (Pregnancy & 60+) (1 - 1-dose 60+ series) Never done    ZOSTER IMMUNIZATION (1 of 2) 01/04/2013    DTAP/TDAP/TD IMMUNIZATION (3 - Td or Tdap) 10/24/2021    MICROALBUMIN  02/28/2022    PHQ-2 (once per calendar year)  01/01/2023    COVID-19 Vaccine (5 - 2023-24 season) 09/01/2023      Clinical Pathway: None    Admission:    Admission Date: 11/06/23   Reason for Admission: MITCHELL on CKD stage IV  Hypotension w/hx HTN  Moderate Malnutrition  Detectable troponin  Probably near syncope  Hypokalemia, hypomag  HX GERD, DLD  Discharge:   Discharge Date: 11/09/23  Discharge Diagnosis: MITCHELL on CKD stage IV  Hypotension w/hx HTN  Moderate Malnutrition  Detectable troponin  Probably near syncope  Hypokalemia, hypomag  HX GERD, DLD    Enrollment  Outreach Frequency: monthly, more frequently as needed    Discharge Assessment  How are you doing now that you are home?: \"okay\"  How are your symptoms? (Red Flag symptoms escalate to triage hotline per guidelines): Improved  Do you feel your condition is stable enough to be safe at home until your provider visit?: Yes  Does the patient have their discharge instructions? : Yes  Does the patient have questions regarding their discharge instructions? : No  Were you started on any new medications or were there changes to any of your previous medications? : " Yes  Does the patient have all of their medications?: Yes  Do you have questions regarding any of your medications? : No  Do you have all of your needed medical supplies or equipment (DME)?  (i.e. oxygen tank, CPAP, cane, etc.): Yes  Discharge follow-up appointment scheduled within 14 calendar days? : No  Is patient agreeable to assistance with scheduling? : Yes         Post-op (Clinicians Only)  Did the patient have surgery or a procedure: No    Medication Management:  Medication review status: Medications reviewed and no changes reported per patient.           Functional Status:  Dependent ADLs:: Independent  Dependent IADLs:: Transportation, Medication Management, Laundry  Bed or wheelchair confined:: No  Mobility Status: Independent w/Device  Fallen 2 or more times in the past year?: No  Any fall with injury in the past year?: No    Living Situation:  Current living arrangement:: I live in a private home with spouse  Type of residence:: Private home - \[facility] [place] [facility]\""    Lifestyle & Psychosocial Needs:    Social Determinants of Health     Food Insecurity: No Food Insecurity (12/20/2022)    Hunger Vital Sign     Worried About Running Out of Food in the Last Year: Never true     Ran Out of Food in the Last Year: Never true   Depression: Not at risk (12/20/2022)    PHQ-2     PHQ-2 Score: 2   Housing Stability: Low Risk  (12/20/2022)    Housing Stability Vital Sign     Unable to Pay for Housing in the Last Year: No     Number of Places Lived in the Last Year: 1     Unstable Housing in the Last Year: No   Tobacco Use: Low Risk  (7/19/2023)    Patient History     Smoking Tobacco Use: Never     Smokeless Tobacco Use: Never     Passive Exposure: Never   Financial Resource Strain: Low Risk  (12/20/2022)    Overall Financial Resource Strain (CARDIA)     Difficulty of Paying Living Expenses: Not very hard   Alcohol Use: Not At Risk (12/20/2022)    AUDIT-C     Frequency of Alcohol Consumption: Monthly or less     Average Number of  Drinks: 1 or 2     Frequency of Binge Drinking: Never   Transportation Needs: No Transportation Needs (12/20/2022)    PRAPARE - Transportation     Lack of Transportation (Medical): No     Lack of Transportation (Non-Medical): No   Physical Activity: Inactive (12/20/2022)    Exercise Vital Sign     Days of Exercise per Week: 0 days     Minutes of Exercise per Session: 0 min   Interpersonal Safety: Not on file   Stress: Stress Concern Present (12/20/2022)    [place] [employer] - Occupational Stress Questionnaire     Feeling of Stress : To some extent   Social Connections: Moderately Integrated (12/20/2022)    Social Connection and Isolation Panel [NHANES]     Frequency of Communication with Friends and Family: More than three times a week     Frequency of Social Gatherings with Friends and Family: Twice a week     Attends Denominational Services: 1 to 4 times per year     Active Member of Clubs or Organizations: No     Attends Club or Organization Meetings: Not on file     Marital Status:      Diet:: Regular  Inadequate nutrition (GOAL):: No  Tube Feeding: No  Inadequate activity/exercise (GOAL):: No  Significant changes in sleep pattern (GOAL): No  Transportation means:: Regular car, Family  Denominational or spiritual beliefs that impact treatment:: No  Mental health DX:: Yes  Mental health DX how managed:: Medication  Mental health management concern (GOAL):: No  Chemical Dependency Status: No Current Concerns  Chemical Dependency Management:  (NA)  Informal Support system:: Spouse, Children      Resources and Interventions:  Current Resources:   Skilled Home Care Services: Skilled Nursing, Physical Therapy, Occupational Therapy  Community Resources: DME, Home Care  Supplies Currently Used at Home: Hearing Aid Batteries, Nutritional Supplements  Equipment Currently Used at Home: colostomy/ostomy supplies, grab bar, tub/shower, walker, rolling, walker, standard, shower chair  Advance Care  Plan/Directive  Advanced Care Plans/Directives on file:: No  Discussed with patient/caregiver:: Other (Comment) (in process)     Care Plan:   Care Plan: Utilization       Problem: Increased risk of re-admission       Long-Range Goal: I would like additional resources and support to manage my health and prevent future avoidable ED visits/hospital admissions       Start Date: 11/10/2023 Expected End Date: 4/26/2024    Note:     Barriers: diagnosis of multiple, chronic, complex medical conditions, provider availability - wait time to complete appointments, etc.   Strengths: motivated, engaged in care coordination, spouse/daughter supportive, home care therapies pending.   Patient expressed understanding of goal: yes  Action steps to achieve this goal:  1. I will seek the appropriate level of care to manage my health conditions, symptoms, and questions.  Primary Care Provider: 11/15/23  Nephrology: 11/28/23  2. I will take my medications as prescribed and contact my care team for questions and refill requests.  3. I will contact my Care Coordination team with questions as needed.    4. I will discuss, review, schedule and complete recommended overdue health maintenance with my Primary Care Provider .   5. I will provide a copy of my health care directive to my care team.                             Patient/Caregiver understanding: Patient/caregiver verbalized understanding and denies any additional questions or concerns at this time. RNCC engaged in [employer] communications during encounter.      Outreach Frequency: monthly, more frequently as needed  Future Appointments                In 5 days Hien Booth MD [facility]    In 1 month [facility]    In 2 months Hien Booth MD [facility]            Plan: RNCC will send clinic care coordination introduction letter, patient centered plan of care, and medication list to  patient via [employer] Meetings. Patient/caregiver was provided with writers contact information and encouraged to call with questions, concerns, support needs. RNCC will remain available as needed. RNCC will follow up with patient/caregiver again in 3 weeks.      Liliam Alicea RN Care Coordinator  [facility]  Email: [email or web address]  Phone: [phone]

## 2023-11-10 NOTE — PLAN OF CARE
Physical Therapy Discharge Summary    Reason for therapy discharge:    Discharged to home with home therapy.    Progress towards therapy goal(s). See goals on Care Plan in [facility] electronic health record for goal details.  Goals partially met.  Barriers to achieving goals:   discharge from facility.    Therapy recommendation(s):    Continued therapy is recommended.  Rationale/Recommendations:  Home with assist from spouse and Home PT to maximize return to PLOF.

## 2023-11-10 NOTE — LETTER
[facility] CARE COORDINATION  [address] [place] [address]     November 10, 2023    Gely Griffithpfencalin  [address] [facility] [place] [address]      Dear Gely,    I am a clinic care coordinator who works with Hien Booth MD with the [facility]. I wanted to thank you for spending the time to talk with me.  Below is a description of clinic care coordination and how I can further assist you.       The clinic care coordination team is made up of a registered nurse, , financial resource worker and community health worker who understand the health care system. The goal of clinic care coordination is to help you manage your health and improve access to the health care system. Our team works alongside your provider to assist you in determining your health and social needs. We can help you obtain health care and community resources, providing you with necessary information and education. We can work with you through any barriers and develop a care plan that helps coordinate and strengthen the communication between you and your care team.  Our services are voluntary and are offered without charge to you personally.    Please feel free to contact me with any questions or concerns regarding care coordination and what we can offer.      We are focused on providing you with the highest-quality healthcare experience possible.    Sincerely,     Liliam Alicea RN Care Coordinator  [facility] [place]  Email: [email or web address]  Phone: [phone]     Enclosed: I have enclosed a copy of the Patient Centered Plan of Care. This has helpful information and goals that we have talked about. Please keep this in an easy to access place to use as needed.

## 2023-11-15 ENCOUNTER — OFFICE VISIT (OUTPATIENT)
Dept: PEDIATRICS | Facility: CLINIC | Age: 83
End: 2023-11-15
Payer: MEDICARE

## 2023-11-15 ENCOUNTER — MEDICAL CORRESPONDENCE (OUTPATIENT)
Dept: HEALTH INFORMATION MANAGEMENT | Facility: CLINIC | Age: 83
End: 2023-11-15

## 2023-11-15 VITALS
TEMPERATURE: 98.3 F | DIASTOLIC BLOOD PRESSURE: 50 MMHG | BODY MASS INDEX: 22.48 KG/M2 | HEART RATE: 82 BPM | SYSTOLIC BLOOD PRESSURE: 110 MMHG | WEIGHT: 119 LBS | RESPIRATION RATE: 16 BRPM | OXYGEN SATURATION: 99 %

## 2023-11-15 DIAGNOSIS — G25.81 RESTLESS LEGS SYNDROME (RLS): ICD-10-CM

## 2023-11-15 DIAGNOSIS — G62.89 AXONAL SENSORIMOTOR NEUROPATHY: ICD-10-CM

## 2023-11-15 DIAGNOSIS — I12.9 CKD STAGE 4 SECONDARY TO HYPERTENSION (H): Primary | ICD-10-CM

## 2023-11-15 DIAGNOSIS — E87.1 HYPONATREMIA: ICD-10-CM

## 2023-11-15 DIAGNOSIS — F41.9 ANXIETY: ICD-10-CM

## 2023-11-15 DIAGNOSIS — K94.19 ALTERED BOWEL ELIMINATION DUE TO INTESTINAL OSTOMY (H): ICD-10-CM

## 2023-11-15 DIAGNOSIS — N18.4 ACUTE RENAL FAILURE SUPERIMPOSED ON STAGE 4 CHRONIC KIDNEY DISEASE, UNSPECIFIED ACUTE RENAL FAILURE TYPE (H): ICD-10-CM

## 2023-11-15 DIAGNOSIS — N18.4 CKD STAGE 4 SECONDARY TO HYPERTENSION (H): Primary | ICD-10-CM

## 2023-11-15 DIAGNOSIS — N17.9 ACUTE RENAL FAILURE SUPERIMPOSED ON STAGE 4 CHRONIC KIDNEY DISEASE, UNSPECIFIED ACUTE RENAL FAILURE TYPE (H): ICD-10-CM

## 2023-11-15 DIAGNOSIS — Z90.49 S/P TOTAL COLECTOMY: ICD-10-CM

## 2023-11-15 DIAGNOSIS — M32.9 SYSTEMIC LUPUS ERYTHEMATOSUS, UNSPECIFIED SLE TYPE, UNSPECIFIED ORGAN INVOLVEMENT STATUS (H): ICD-10-CM

## 2023-11-15 DIAGNOSIS — E86.0 DEHYDRATION: ICD-10-CM

## 2023-11-15 LAB
BASOPHILS # BLD AUTO: 0.1 10E3/UL (ref 0–0.2)
BASOPHILS NFR BLD AUTO: 2 %
EOSINOPHIL # BLD AUTO: 0.2 10E3/UL (ref 0–0.7)
EOSINOPHIL NFR BLD AUTO: 4 %
ERYTHROCYTE [DISTWIDTH] IN BLOOD BY AUTOMATED COUNT: 15.4 % (ref 10–15)
ERYTHROCYTE [SEDIMENTATION RATE] IN BLOOD BY WESTERGREN METHOD: 48 MM/HR (ref 0–30)
HCT VFR BLD AUTO: 29.3 % (ref 35–47)
HGB BLD-MCNC: 9.5 G/DL (ref 11.7–15.7)
IMM GRANULOCYTES # BLD: 0 10E3/UL
IMM GRANULOCYTES NFR BLD: 1 %
LYMPHOCYTES # BLD AUTO: 0.6 10E3/UL (ref 0.8–5.3)
LYMPHOCYTES NFR BLD AUTO: 17 %
MCH RBC QN AUTO: 31.1 PG (ref 26.5–33)
MCHC RBC AUTO-ENTMCNC: 32.4 G/DL (ref 31.5–36.5)
MCV RBC AUTO: 96 FL (ref 78–100)
MONOCYTES # BLD AUTO: 0.7 10E3/UL (ref 0–1.3)
MONOCYTES NFR BLD AUTO: 20 %
NEUTROPHILS # BLD AUTO: 2.1 10E3/UL (ref 1.6–8.3)
NEUTROPHILS NFR BLD AUTO: 57 %
PLATELET # BLD AUTO: 187 10E3/UL (ref 150–450)
RBC # BLD AUTO: 3.05 10E6/UL (ref 3.8–5.2)
WBC # BLD AUTO: 3.7 10E3/UL (ref 4–11)

## 2023-11-15 PROCEDURE — 82607 VITAMIN B-12: CPT | Performed by: PEDIATRICS

## 2023-11-15 PROCEDURE — 84165 PROTEIN E-PHORESIS SERUM: CPT | Performed by: PATHOLOGY

## 2023-11-15 PROCEDURE — 86160 COMPLEMENT ANTIGEN: CPT | Performed by: PEDIATRICS

## 2023-11-15 PROCEDURE — 85025 COMPLETE CBC W/AUTO DIFF WBC: CPT | Performed by: PEDIATRICS

## 2023-11-15 PROCEDURE — 83735 ASSAY OF MAGNESIUM: CPT | Performed by: PEDIATRICS

## 2023-11-15 PROCEDURE — 86235 NUCLEAR ANTIGEN ANTIBODY: CPT | Performed by: PEDIATRICS

## 2023-11-15 PROCEDURE — 86225 DNA ANTIBODY NATIVE: CPT | Performed by: PEDIATRICS

## 2023-11-15 PROCEDURE — 99496 TRANSJ CARE MGMT HIGH F2F 7D: CPT | Performed by: PEDIATRICS

## 2023-11-15 PROCEDURE — 84155 ASSAY OF PROTEIN SERUM: CPT | Mod: 59 | Performed by: PEDIATRICS

## 2023-11-15 PROCEDURE — 80053 COMPREHEN METABOLIC PANEL: CPT | Performed by: PEDIATRICS

## 2023-11-15 PROCEDURE — 85652 RBC SED RATE AUTOMATED: CPT | Performed by: PEDIATRICS

## 2023-11-15 PROCEDURE — 36415 COLL VENOUS BLD VENIPUNCTURE: CPT | Performed by: PEDIATRICS

## 2023-11-15 RX ORDER — HEPARIN SODIUM (PORCINE) LOCK FLUSH IV SOLN 100 UNIT/ML 100 UNIT/ML
5 SOLUTION INTRAVENOUS
Status: CANCELLED | OUTPATIENT
Start: 2023-11-16

## 2023-11-15 RX ORDER — DULOXETIN HYDROCHLORIDE 30 MG/1
30 CAPSULE, DELAYED RELEASE ORAL DAILY
Qty: 90 CAPSULE | Refills: 3 | Status: SHIPPED | OUTPATIENT
Start: 2023-11-15

## 2023-11-15 RX ORDER — RESPIRATORY SYNCYTIAL VIRUS VACCINE 120MCG/0.5
0.5 KIT INTRAMUSCULAR ONCE
Qty: 1 EACH | Refills: 0 | Status: CANCELLED | OUTPATIENT
Start: 2023-11-15 | End: 2023-11-15

## 2023-11-15 RX ORDER — GABAPENTIN 100 MG/1
300 CAPSULE ORAL AT BEDTIME
Qty: 270 CAPSULE | Refills: 3 | Status: SHIPPED | OUTPATIENT
Start: 2023-11-15 | End: 2024-01-19

## 2023-11-15 RX ORDER — HEPARIN SODIUM,PORCINE 10 UNIT/ML
5-20 VIAL (ML) INTRAVENOUS DAILY PRN
Status: CANCELLED | OUTPATIENT
Start: 2023-11-16

## 2023-11-15 ASSESSMENT — PAIN SCALES - GENERAL: PAINLEVEL: NO PAIN (0)

## 2023-11-15 NOTE — PROGRESS NOTES
Assessment & Plan       ICD-10-CM    1. CKD stage 4 secondary to hypertension (H)  I12.9 Comprehensive metabolic panel (BMP + Alb, Alk Phos, ALT, AST, Total. Bili, TP)    N18.4 CBC with platelets and differential      With recent admission for dehydration, MITCHELL - discussed at length today ways to help stay hydrated.  Made plans to drink more during the day, discussed nutrition at length.  Getting great support from her family.    Also plan to have IV fluids available at infusion center as needed.   Orders placed today - plan for 1L NS weekly as needed - can adjust based on her needs.    Greatly appreciate care from her nephrology and rheumatology teams      2. Axonal sensorimotor neuropathy  G62.89 DULoxetine (CYMBALTA) 30 MG capsule    Also using cymbalta for depression - pain in her legs worse since being home - dose adjusted to 30mg      3. Systemic lupus erythematosus, unspecified SLE type, unspecified organ involvement status (H)  M32.9 DULoxetine (CYMBALTA) 30 MG capsule     Comprehensive metabolic panel (BMP + Alb, Alk Phos, ALT, AST, Total. Bili, TP)     CBC with platelets and differential     Vitamin B12     Complement C4     Complement C3     Protein electrophoresis     Chromatin (Nucleosomal) Atb ([facility])     DNA double stranded antibodies     DION antibody panel     ESR: Erythrocyte sedimentation rate     Magnesium    Patient requested to repeat labs for Dr Tabares today - will have these drawn and faxed for his review prior to her next rheum visit also      4. Anxiety  F41.9 DULoxetine (CYMBALTA) 30 MG capsule      5. Dehydration  E86.0 Comprehensive metabolic panel (BMP + Alb, Alk Phos, ALT, AST, Total. Bili, TP)     Magnesium    See above    Increased liquid stool output through ostomy with poor oral intake contributed to her MITCHELL and dehydration       6. S/P total colectomy  Z90.49       7. Restless legs syndrome (RLS)  G25.81 CBC with platelets and differential     gabapentin (NEURONTIN) 100 MG  "capsule      8. 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)  K94.19 Comprehensive metabolic panel (BMP + Alb, Alk Phos, ALT, AST, Total. Bili, TP)     CBC with platelets and differential      9. Hyponatremia  E87.1 Comprehensive metabolic panel (BMP + Alb, Alk Phos, ALT, AST, Total. Bili, TP)      10. Acute renal failure superimposed on stage 4 chronic kidney disease, unspecified acute renal failure type (H)  N17.9 Comprehensive metabolic panel (BMP + Alb, Alk Phos, ALT, AST, Total. Bili, TP)    N18.4               I spent a total of 77 minutes on the day of the visit.   Time spent by me doing chart review, history and exam, documentation and further activities per the note     MED REC REQUIRED  Post Medication Reconciliation Status: discharge medications reconciled and changed, per note/orders  See Patient Instructions    Hien Booth MD  [facility] TAHIRA Hong is a 83 year old, presenting for the following health issues:  Hospital F/U        11/15/2023     2:51 PM   Additional Questions   Roomed by Kenia Schneider CMA   Accompanied by Daughter - Diana        [facility]         11/10/2023     9:15 AM   Post Discharge Outreach   Admission Date 11/6/2023   Reason for Admission MITCHELL on CKD stage IV  Hypotension w/hx HTN  Moderate Malnutrition  Detectable troponin  Probably near syncope  Hypokalemia, hypomag  HX GERD, DLD   Discharge Date 11/9/2023   Discharge Diagnosis MITCHELL on CKD stage IV  Hypotension w/hx HTN  Moderate Malnutrition  Detectable troponin  Probably near syncope  Hypokalemia, hypomag  HX GERD, DLD   How are you doing now that you are home? \"okay\"   How are your symptoms? (Red Flag symptoms escalate to triage hotline per guidelines) Improved   Do you feel your condition is stable enough to be safe at home until your provider visit? Yes   Does the patient have their discharge instructions?  Yes   Does the patient have questions regarding their discharge instructions? "  No   Were you started on any new medications or were there changes to any of your previous medications?  Yes   Does the patient have all of their medications? Yes   Do you have questions regarding any of your medications?  No   Do you have all of your needed medical supplies or equipment (DME)?  (i.e. oxygen tank, CPAP, cane, etc.) Yes   Discharge follow-up appointment scheduled within 14 calendar days?  No     Hospital Follow-up Visit:    Hospital/Nursing Home/IP Rehab Facility: [facility]  Date of Admission: 11/6  Date of Discharge: 11/9  Reason(s) for Admission: see above    Was your hospitalization related to COVID-19? No   Problems taking medications regularly:  None  Medication changes since discharge: None  Problems adhering to non-medication therapy:  None    Summary of hospitalization:  [facility] discharge summary reviewed  Diagnostic Tests/Treatments reviewed.  Follow up needed: nephrology follow up  Other Healthcare Providers Involved in Patient s Care:         Homecare, Specialist appointment - nephrology, and Physical Therapy  Update since discharge: improved.         Plan of care communicated with patient and family                 Objective    /50 (BP Location: Right arm, Patient Position: Sitting, Cuff Size: Adult Regular)   Pulse 82   Temp 98.3  F (36.8  C) (Tympanic)   Resp 16   Wt 54 kg (119 lb)   SpO2 99%   BMI 22.48 kg/m    Body mass index is 22.48 kg/m .  Wt Readings from Last 4 Encounters:   11/15/23 54 kg (119 lb)   11/09/23 55.7 kg (122 lb 11.2 oz)   07/19/23 58.7 kg (129 lb 4.8 oz)   06/28/23 58.7 kg (129 lb 6.6 oz)       Physical Exam   GENERAL: alert, no distress, elderly, and fatigued  EYES: Eyes grossly normal to inspection, PERRL and conjunctivae and sclerae normal  RESP: lungs clear to auscultation - no rales, rhonchi or wheezes  CV: regular rate and rhythm, normal S1 S2, no S3 or S4, no murmur, click or rub, no peripheral edema and  peripheral pulses strong  ABDOMEN: bowel sounds normal, soft, ostomy with liquid brown stool, healthy appearing stoma, no abdominal pain to palp  NEURO: tremor - intermittent fine intention tremor in hands, mentation intact, speech normal, and climbs up on exam table with minimal assist, walking without ambulation aid  PSYCH: mentation appears normal, affect normal/bright    Imaging and labs reviewed from hospital stay

## 2023-11-15 NOTE — PATIENT INSTRUCTIONS
Labs today    OK to bump up gabapentin to 300mg    Still hold the carvedilol    I'll work on getting standing IV fluid orders for you.   You can do this every week if you need to.   Stay tuned - Ashely will double check my work and alert you with the instructions.      Fluid goals - at least 64 ounces per day    Keep up your protein    No laundry

## 2023-11-15 NOTE — LETTER
November 20, 2023      Gely Keene  [address] [facility] [place] [address]        Resulted Orders   Comprehensive metabolic panel (BMP + Alb, Alk Phos, ALT, AST, Total. Bili, TP)   Result Value Ref Range    Sodium 134 (L) 135 - 145 mmol/L      Comment:      Reference intervals for this test were updated on 09/26/2023 to more accurately reflect our healthy population. There may be differences in the flagging of prior results with similar values performed with this method. Interpretation of those prior results can be made in the context of the updated reference intervals.     Potassium 4.0 3.4 - 5.3 mmol/L    Carbon Dioxide (CO2) 20 (L) 22 - 29 mmol/L    Anion Gap 13 7 - 15 mmol/L    Urea Nitrogen 27.5 (H) 8.0 - 23.0 mg/dL    Creatinine 2.48 (H) 0.51 - 0.95 mg/dL    GFR Estimate 18 (L) >60 mL/min/1.73m2    Calcium 9.1 8.8 - 10.2 mg/dL    Chloride 101 98 - 107 mmol/L    Glucose 103 (H) 70 - 99 mg/dL    Alkaline Phosphatase 76 40 - 150 U/L      Comment:      Reference intervals for this test were updated on 11/14/2023 to more accurately reflect our healthy population. There may be differences in the flagging of prior results with similar values performed with this method. Interpretation of those prior results can be made in the context of the updated reference intervals.    AST 45 0 - 45 U/L      Comment:      Reference intervals for this test were updated on 6/12/2023 to more accurately reflect our healthy population. There may be differences in the flagging of prior results with similar values performed with this method. Interpretation of those prior results can be made in the context of the updated reference intervals.    ALT 42 0 - 50 U/L      Comment:      Reference intervals for this test were updated on 6/12/2023 to more accurately reflect our healthy population. There may be differences in the flagging of prior results with similar values performed with this method. Interpretation of those prior  results can be made in the context of the updated reference intervals.      Protein Total 6.7 6.4 - 8.3 g/dL    Albumin 4.1 3.5 - 5.2 g/dL    Bilirubin Total 1.0 <=1.2 mg/dL   Vitamin B12   Result Value Ref Range    Vitamin B12 681 232 - 1,245 pg/mL   Complement C4   Result Value Ref Range    C4 Complement 33 13 - 39 mg/dL   Complement C3   Result Value Ref Range    C3 Complement 134 81 - 157 mg/dL   DNA double stranded antibodies   Result Value Ref Range    DNA (ds) Antibody 0.9 <10.0 IU/mL      Comment:      Negative    Narrative    Negative:  Less than 10  Equivocal: 10-15  Positive:  Greater than 15   DION antibody panel   Result Value Ref Range    RNP Samantha IgG Instrument Value 2.2 <5.0 U/mL    RNP Antibody IgG Negative Negative    Coles DION Samantha IgG Instrument Value 1.0 <7.0 U/mL    Smith DION Antibody IgG Negative Negative    SSA Samantha IgG Instrument Value 0.5 <7.0 U/mL    SSA (Ro) Antibody IgG Negative Negative    SSB Samantha IgG Instrument Value <0.6 <7.0 U/mL    SSB (La) Antibody IgG Negative Negative   ESR: Erythrocyte sedimentation rate   Result Value Ref Range    Erythrocyte Sedimentation Rate 48 (H) 0 - 30 mm/hr    Narrative    Results confirmed by repeat test.    Magnesium   Result Value Ref Range    Magnesium 1.4 (L) 1.7 - 2.3 mg/dL   CBC with platelets and differential   Result Value Ref Range    WBC Count 3.7 (L) 4.0 - 11.0 10e3/uL    RBC Count 3.05 (L) 3.80 - 5.20 10e6/uL    Hemoglobin 9.5 (L) 11.7 - 15.7 g/dL    Hematocrit 29.3 (L) 35.0 - 47.0 %    MCV 96 78 - 100 fL    MCH 31.1 26.5 - 33.0 pg    MCHC 32.4 31.5 - 36.5 g/dL    RDW 15.4 (H) 10.0 - 15.0 %    Platelet Count 187 150 - 450 10e3/uL    % Neutrophils 57 %    % Lymphocytes 17 %    % Monocytes 20 %    % Eosinophils 4 %    % Basophils 2 %    % Immature Granulocytes 1 %    Absolute Neutrophils 2.1 1.6 - 8.3 10e3/uL    Absolute Lymphocytes 0.6 (L) 0.8 - 5.3 10e3/uL    Absolute Monocytes 0.7 0.0 - 1.3 10e3/uL    Absolute Eosinophils 0.2 0.0 - 0.7 10e3/uL     Absolute Basophils 0.1 0.0 - 0.2 10e3/uL    Absolute Immature Granulocytes 0.0 <=0.4 10e3/uL   Total Protein, Serum for ELP   Result Value Ref Range    Total Protein Serum for ELP 6.5 6.4 - 8.3 g/dL   Protein Electrophoresis, Serum   Result Value Ref Range    Albumin 3.9 3.7 - 5.1 g/dL    Alpha 1 0.4 0.2 - 0.4 g/dL    Alpha 2 0.8 0.5 - 0.9 g/dL    Beta Globulin 0.7 0.6 - 1.0 g/dL    Gamma Globulin 0.8 0.7 - 1.6 g/dL    Monoclonal Peak 0.0 <=0.0 g/dL    ELP Interpretation       Essentially normal electrophoretic pattern. No obvious monoclonal proteins seen. Pathologic significance requires clinical correlation. MONTEZ Vance M.D., Ph.D., Pathologist ().       If you have any questions or concerns, please call the clinic at the number listed above.       Sincerely,      Hien Booth MD

## 2023-11-15 NOTE — LETTER
November 20, 2023      Gely Keene  [address] [facility] [address]        Dear ,    We are writing to inform you of your test results.    {results letter list:[id number]}    Resulted Orders   Comprehensive metabolic panel (BMP + Alb, Alk Phos, ALT, AST, Total. Bili, TP)   Result Value Ref Range    Sodium 134 (L) 135 - 145 mmol/L      Comment:      Reference intervals for this test were updated on 09/26/2023 to more accurately reflect our healthy population. There may be differences in the flagging of prior results with similar values performed with this method. Interpretation of those prior results can be made in the context of the updated reference intervals.     Potassium 4.0 3.4 - 5.3 mmol/L    Carbon Dioxide (CO2) 20 (L) 22 - 29 mmol/L    Anion Gap 13 7 - 15 mmol/L    Urea Nitrogen 27.5 (H) 8.0 - 23.0 mg/dL    Creatinine 2.48 (H) 0.51 - 0.95 mg/dL    GFR Estimate 18 (L) >60 mL/min/1.73m2    Calcium 9.1 8.8 - 10.2 mg/dL    Chloride 101 98 - 107 mmol/L    Glucose 103 (H) 70 - 99 mg/dL    Alkaline Phosphatase 76 40 - 150 U/L      Comment:      Reference intervals for this test were updated on 11/14/2023 to more accurately reflect our healthy population. There may be differences in the flagging of prior results with similar values performed with this method. Interpretation of those prior results can be made in the context of the updated reference intervals.    AST 45 0 - 45 U/L      Comment:      Reference intervals for this test were updated on 6/12/2023 to more accurately reflect our healthy population. There may be differences in the flagging of prior results with similar values performed with this method. Interpretation of those prior results can be made in the context of the updated reference intervals.    ALT 42 0 - 50 U/L      Comment:      Reference intervals for this test were updated on 6/12/2023 to more accurately reflect our healthy population. There may be differences in  the flagging of prior results with similar values performed with this method. Interpretation of those prior results can be made in the context of the updated reference intervals.      Protein Total 6.7 6.4 - 8.3 g/dL    Albumin 4.1 3.5 - 5.2 g/dL    Bilirubin Total 1.0 <=1.2 mg/dL   Vitamin B12   Result Value Ref Range    Vitamin B12 681 232 - 1,245 pg/mL   Complement C4   Result Value Ref Range    C4 Complement 33 13 - 39 mg/dL   Complement C3   Result Value Ref Range    C3 Complement 134 81 - 157 mg/dL   DNA double stranded antibodies   Result Value Ref Range    DNA (ds) Antibody 0.9 <10.0 IU/mL      Comment:      Negative    Narrative    Negative:  Less than 10  Equivocal: 10-15  Positive:  Greater than 15   DION antibody panel   Result Value Ref Range    RNP Samantha IgG Instrument Value 2.2 <5.0 U/mL    RNP Antibody IgG Negative Negative    Coles DION Samantha IgG Instrument Value 1.0 <7.0 U/mL    Smith DION Antibody IgG Negative Negative    SSA Samantha IgG Instrument Value 0.5 <7.0 U/mL    SSA (Ro) Antibody IgG Negative Negative    SSB Samantha IgG Instrument Value <0.6 <7.0 U/mL    SSB (La) Antibody IgG Negative Negative   ESR: Erythrocyte sedimentation rate   Result Value Ref Range    Erythrocyte Sedimentation Rate 48 (H) 0 - 30 mm/hr    Narrative    Results confirmed by repeat test.    Magnesium   Result Value Ref Range    Magnesium 1.4 (L) 1.7 - 2.3 mg/dL   CBC with platelets and differential   Result Value Ref Range    WBC Count 3.7 (L) 4.0 - 11.0 10e3/uL    RBC Count 3.05 (L) 3.80 - 5.20 10e6/uL    Hemoglobin 9.5 (L) 11.7 - 15.7 g/dL    Hematocrit 29.3 (L) 35.0 - 47.0 %    MCV 96 78 - 100 fL    MCH 31.1 26.5 - 33.0 pg    MCHC 32.4 31.5 - 36.5 g/dL    RDW 15.4 (H) 10.0 - 15.0 %    Platelet Count 187 150 - 450 10e3/uL    % Neutrophils 57 %    % Lymphocytes 17 %    % Monocytes 20 %    % Eosinophils 4 %    % Basophils 2 %    % Immature Granulocytes 1 %    Absolute Neutrophils 2.1 1.6 - 8.3 10e3/uL    Absolute Lymphocytes 0.6 (L)  0.8 - 5.3 10e3/uL    Absolute Monocytes 0.7 0.0 - 1.3 10e3/uL    Absolute Eosinophils 0.2 0.0 - 0.7 10e3/uL    Absolute Basophils 0.1 0.0 - 0.2 10e3/uL    Absolute Immature Granulocytes 0.0 <=0.4 10e3/uL   Total Protein, Serum for ELP   Result Value Ref Range    Total Protein Serum for ELP 6.5 6.4 - 8.3 g/dL   Protein Electrophoresis, Serum   Result Value Ref Range    Albumin 3.9 3.7 - 5.1 g/dL    Alpha 1 0.4 0.2 - 0.4 g/dL    Alpha 2 0.8 0.5 - 0.9 g/dL    Beta Globulin 0.7 0.6 - 1.0 g/dL    Gamma Globulin 0.8 0.7 - 1.6 g/dL    Monoclonal Peak 0.0 <=0.0 g/dL    ELP Interpretation       Essentially normal electrophoretic pattern. No obvious monoclonal proteins seen. Pathologic significance requires clinical correlation. MONTEZ Vance M.D., Ph.D., Pathologist ().       If you have any questions or concerns, please call the clinic at the number listed above.       Sincerely,

## 2023-11-16 LAB
ALBUMIN SERPL BCG-MCNC: 4.1 G/DL (ref 3.5–5.2)
ALP SERPL-CCNC: 76 U/L (ref 40–150)
ALT SERPL W P-5'-P-CCNC: 42 U/L (ref 0–50)
ANION GAP SERPL CALCULATED.3IONS-SCNC: 13 MMOL/L (ref 7–15)
AST SERPL W P-5'-P-CCNC: 45 U/L (ref 0–45)
BILIRUB SERPL-MCNC: 1 MG/DL
BUN SERPL-MCNC: 27.5 MG/DL (ref 8–23)
CALCIUM SERPL-MCNC: 9.1 MG/DL (ref 8.8–10.2)
CHLORIDE SERPL-SCNC: 101 MMOL/L (ref 98–107)
CREAT SERPL-MCNC: 2.48 MG/DL (ref 0.51–0.95)
DEPRECATED HCO3 PLAS-SCNC: 20 MMOL/L (ref 22–29)
EGFRCR SERPLBLD CKD-EPI 2021: 18 ML/MIN/1.73M2
GLUCOSE SERPL-MCNC: 103 MG/DL (ref 70–99)
MAGNESIUM SERPL-MCNC: 1.4 MG/DL (ref 1.7–2.3)
POTASSIUM SERPL-SCNC: 4 MMOL/L (ref 3.4–5.3)
PROT SERPL-MCNC: 6.7 G/DL (ref 6.4–8.3)
SODIUM SERPL-SCNC: 134 MMOL/L (ref 135–145)
TOTAL PROTEIN SERUM FOR ELP: 6.5 G/DL (ref 6.4–8.3)
VIT B12 SERPL-MCNC: 681 PG/ML (ref 232–1245)

## 2023-11-17 LAB
ALBUMIN SERPL ELPH-MCNC: 3.9 G/DL (ref 3.7–5.1)
ALPHA1 GLOB SERPL ELPH-MCNC: 0.4 G/DL (ref 0.2–0.4)
ALPHA2 GLOB SERPL ELPH-MCNC: 0.8 G/DL (ref 0.5–0.9)
B-GLOBULIN SERPL ELPH-MCNC: 0.7 G/DL (ref 0.6–1)
C3 SERPL-MCNC: 134 MG/DL (ref 81–157)
C4 SERPL-MCNC: 33 MG/DL (ref 13–39)
DSDNA AB SER-ACNC: 0.9 IU/ML
ENA SM IGG SER IA-ACNC: 1 U/ML
ENA SM IGG SER IA-ACNC: NEGATIVE
ENA SS-A AB SER IA-ACNC: 0.5 U/ML
ENA SS-A AB SER IA-ACNC: NEGATIVE
ENA SS-B IGG SER IA-ACNC: <0.6 U/ML
ENA SS-B IGG SER IA-ACNC: NEGATIVE
GAMMA GLOB SERPL ELPH-MCNC: 0.8 G/DL (ref 0.7–1.6)
M PROTEIN SERPL ELPH-MCNC: 0 G/DL
PROT PATTERN SERPL ELPH-IMP: NORMAL
U1 SNRNP IGG SER IA-ACNC: 2.2 U/ML
U1 SNRNP IGG SER IA-ACNC: NEGATIVE

## 2023-11-28 ENCOUNTER — TRANSFERRED RECORDS (OUTPATIENT)
Dept: HEALTH INFORMATION MANAGEMENT | Facility: CLINIC | Age: 83
End: 2023-11-28
Payer: MEDICARE

## 2023-11-29 ENCOUNTER — PATIENT OUTREACH (OUTPATIENT)
Dept: CARE COORDINATION | Facility: CLINIC | Age: 83
End: 2023-11-29
Payer: MEDICARE

## 2023-11-29 NOTE — PROGRESS NOTES
Clinic Care Coordination Contact  Follow Up Progress Note      Assessment: Continues receiving home care therapies. Tolerating Ferrous Gluconate medication well. Denies any additional emesis. States stool is thickening up, but still watery at times. Keeping up with fluids, appetite and exercises. Has to check labs for Nephrology. No additional concerns at this time.     Care Gaps:    Health Maintenance Due   Topic Date Due    RSV VACCINE (Pregnancy & 60+) (1 - 1-dose 60+ series) Never done    ZOSTER IMMUNIZATION (1 of 2) 01/04/2013    DTAP/TDAP/TD IMMUNIZATION (3 - Td or Tdap) 10/24/2021    MICROALBUMIN  02/28/2022    COVID-19 Vaccine (5 - 2023-24 season) 09/01/2023    MEDICARE ANNUAL WELLNESS VISIT  12/20/2023     Care Gap Goal set: Yes    Care Plans  Care Plan: Utilization       Problem: Increased risk of re-admission       Long-Range Goal: I would like additional resources and support to manage my health and prevent future avoidable ED visits/hospital admissions       Start Date: 11/10/2023 Expected End Date: 4/26/2024    This Visit's Progress: 40%    Note:     Barriers: diagnosis of multiple, chronic, complex medical conditions, provider availability - wait time to complete appointments, etc.   Strengths: motivated, engaged in care coordination, spouse/daughter supportive, home care therapies pending.   Patient expressed understanding of goal: yes  Action steps to achieve this goal:  1. I will seek the appropriate level of care to manage my health conditions, symptoms, and questions.  Primary Care Provider: 11/15/23  Nephrology: 11/28/23  2. I will take my medications as prescribed and contact my care team for questions and refill requests.  3. I will contact my Care Coordination team with questions as needed.    4. I will discuss, review, schedule and complete recommended overdue health maintenance with my Primary Care Provider .   5. I will provide a copy of my health care directive to my care team.                              Intervention/Education provided during outreach: As above. CC role, goal(s), clinic after hours, appointments, discussed/reviewed. Support provided.      Outreach Frequency: monthly, more frequently as needed    Plan:   Patient/caregiver will call RNCC with questions, concerns, support needs. RNCC will be available as needed.    Care Coordinator will follow up in 1 month.     Liliam Alicea RN Care Coordinator  [facility] [place]  Email: [email or web address]  Phone: [phone]

## 2023-12-01 ENCOUNTER — MEDICAL CORRESPONDENCE (OUTPATIENT)
Dept: HEALTH INFORMATION MANAGEMENT | Facility: CLINIC | Age: 83
End: 2023-12-01
Payer: MEDICARE

## 2023-12-11 DIAGNOSIS — Z53.9 DIAGNOSIS NOT YET DEFINED: Primary | ICD-10-CM

## 2023-12-11 PROCEDURE — G0180 MD CERTIFICATION HHA PATIENT: HCPCS | Performed by: PEDIATRICS

## 2023-12-14 ENCOUNTER — OFFICE VISIT (OUTPATIENT)
Dept: PEDIATRICS | Facility: CLINIC | Age: 83
End: 2023-12-14
Payer: MEDICARE

## 2023-12-14 VITALS
OXYGEN SATURATION: 99 % | WEIGHT: 127.7 LBS | RESPIRATION RATE: 20 BRPM | BODY MASS INDEX: 24.13 KG/M2 | SYSTOLIC BLOOD PRESSURE: 113 MMHG | DIASTOLIC BLOOD PRESSURE: 70 MMHG | HEART RATE: 68 BPM | TEMPERATURE: 97.4 F

## 2023-12-14 DIAGNOSIS — Z90.49 S/P TOTAL COLECTOMY: ICD-10-CM

## 2023-12-14 DIAGNOSIS — F41.9 ANXIETY: ICD-10-CM

## 2023-12-14 DIAGNOSIS — R10.13 EPIGASTRIC PAIN: ICD-10-CM

## 2023-12-14 DIAGNOSIS — E86.0 DEHYDRATION: Primary | ICD-10-CM

## 2023-12-14 DIAGNOSIS — Z23 NEED FOR COVID-19 VACCINE: ICD-10-CM

## 2023-12-14 DIAGNOSIS — N18.9 CHRONIC KIDNEY DISEASE, UNSPECIFIED CKD STAGE: ICD-10-CM

## 2023-12-14 PROCEDURE — 99214 OFFICE O/P EST MOD 30 MIN: CPT | Performed by: PEDIATRICS

## 2023-12-14 PROCEDURE — 91320 SARSCV2 VAC 30MCG TRS-SUC IM: CPT | Performed by: PEDIATRICS

## 2023-12-14 PROCEDURE — 90480 ADMN SARSCOV2 VAC 1/ONLY CMP: CPT | Performed by: PEDIATRICS

## 2023-12-14 ASSESSMENT — PAIN SCALES - GENERAL: PAINLEVEL: NO PAIN (0)

## 2023-12-14 NOTE — PROGRESS NOTES
Assessment & Plan       ICD-10-CM    1. Dehydration  E86.0 Much improved, congratulated patient on all of her efforts in improving her hydration status.  Reminded her that she does have IV fluids available should she need them with standing orders at the infusion center.      2. Chronic kidney disease, unspecified CKD stage  N18.9 Following closely with nephrology, kidney function stable      3. Need for COVID-19 vaccine  Z23 COVID-19 12+ (2023-24) (PFIZER)      4. Epigastric pain  R10.13 Stable at present, continue current treatment plan      5. S/p total colectomy on 4/22/16 by Shana Alaniz MD  Z90.49       6. Anxiety  F41.9 Continue duloxetine, doing well               Hien Booth MD  [facility] TAHIRA Hong is a 83 year old, presenting for the following health issues:  Follow Up        12/14/2023     3:11 PM   Additional Questions   Roomed by Brionna Troy   Accompanied by        HPI     Patient presenting today with her  to review multiple chronic conditions and give me a status update.    Prior to her last office visit, patient has been hospitalized for acute kidney failure in the setting of chronic kidney disease.  She was very dehydrated and was having uremic symptoms.  Thankfully, she has been able to get back to her renal baseline and has had a chance to follow-up with nephrology since her last visit.    She is instituted multiple interventions to help promote hydration status.  She is waking up earlier and also drinking water at night.  Her colostomy status further contributes to the difficulty in keeping her hydrated.  We reviewed this today.  Her family feels that she is doing much better and they are very pleased with her progress.    Mood is in a good place.  Her restless legs and leg pain are stable to improved.            Objective    /70 (BP Location: Left arm, Patient Position: Sitting, Cuff Size: Adult Regular)   Pulse 68    Temp 97.4  F (36.3  C) (Tympanic)   Resp 20   Wt 57.9 kg (127 lb 11.2 oz)   SpO2 99%   BMI 24.13 kg/m    Body mass index is 24.13 kg/m .  Wt Readings from Last 4 Encounters:   12/14/23 57.9 kg (127 lb 11.2 oz)   11/15/23 54 kg (119 lb)   11/09/23 55.7 kg (122 lb 11.2 oz)   07/19/23 58.7 kg (129 lb 4.8 oz)       Physical Exam   GENERAL: healthy, alert and no distress  RESP: lungs clear to auscultation - no rales, rhonchi or wheezes  CV: regular rate and rhythm, normal S1 S2, no S3 or S4, no murmur, click or rub, no peripheral edema and peripheral pulses strong  ABDOMEN: Soft, positive bowel sounds, healthy appearing stoma with soft brown stool in ostomy bag.  PSYCH: mentation appears normal, affect normal/bright    Recent labs reviewed

## 2023-12-19 ENCOUNTER — LAB (OUTPATIENT)
Dept: LAB | Facility: CLINIC | Age: 83
End: 2023-12-19
Payer: MEDICARE

## 2023-12-19 ENCOUNTER — TELEPHONE (OUTPATIENT)
Dept: PEDIATRICS | Facility: CLINIC | Age: 83
End: 2023-12-19

## 2023-12-19 DIAGNOSIS — I12.9 MALIGNANT HYPERTENSIVE KIDNEY DISEASE WITH CHRONIC KIDNEY DISEASE STAGE I THROUGH STAGE IV, OR UNSPECIFIED(403.00): ICD-10-CM

## 2023-12-19 DIAGNOSIS — M32.9 SYSTEMIC LUPUS ERYTHEMATOSUS (H): ICD-10-CM

## 2023-12-19 DIAGNOSIS — N18.4 CHRONIC RENAL DISEASE, STAGE IV (H): ICD-10-CM

## 2023-12-19 LAB
ALBUMIN SERPL BCG-MCNC: 4.1 G/DL (ref 3.5–5.2)
ANION GAP SERPL CALCULATED.3IONS-SCNC: 10 MMOL/L (ref 7–15)
BUN SERPL-MCNC: 21.3 MG/DL (ref 8–23)
CALCIUM SERPL-MCNC: 9.1 MG/DL (ref 8.8–10.2)
CHLORIDE SERPL-SCNC: 93 MMOL/L (ref 98–107)
CREAT SERPL-MCNC: 2.06 MG/DL (ref 0.51–0.95)
DEPRECATED HCO3 PLAS-SCNC: 27 MMOL/L (ref 22–29)
EGFRCR SERPLBLD CKD-EPI 2021: 23 ML/MIN/1.73M2
GLUCOSE SERPL-MCNC: 134 MG/DL (ref 70–99)
PHOSPHATE SERPL-MCNC: 3.2 MG/DL (ref 2.5–4.5)
POTASSIUM SERPL-SCNC: 3.5 MMOL/L (ref 3.4–5.3)
SODIUM SERPL-SCNC: 130 MMOL/L (ref 135–145)

## 2023-12-19 PROCEDURE — 80069 RENAL FUNCTION PANEL: CPT

## 2023-12-19 PROCEDURE — 36415 COLL VENOUS BLD VENIPUNCTURE: CPT

## 2023-12-19 NOTE — TELEPHONE ENCOUNTER
Patient calling.   Was inquiring if Dr. Booth could review the renal panel that Dr. Saha ordered.   She didn't get a call back yet from Dr. Saha. They likely will call her.     Patient said she's had a headache & acidic stomach the past few days.   Drinks about 100-120 oz of water a day.   She is taking baking soda. Didn't take it yet today before the lab.   Denies confusion, dizziness, altered status.     If she starts to feel worse, she will go to the ER.

## 2023-12-23 DIAGNOSIS — N17.9 ACUTE KIDNEY FAILURE, UNSPECIFIED (H): ICD-10-CM

## 2023-12-23 DIAGNOSIS — N18.4 CHRONIC KIDNEY DISEASE, STAGE IV (SEVERE) (H): Primary | ICD-10-CM

## 2024-01-03 ENCOUNTER — PATIENT OUTREACH (OUTPATIENT)
Dept: CARE COORDINATION | Facility: CLINIC | Age: 84
End: 2024-01-03
Payer: MEDICARE

## 2024-01-03 NOTE — PROGRESS NOTES
Clinic Care Coordination Contact  Follow Up Progress Note      Assessment: Things are going well. No longer receiving home care therapies. Not needed.     Care Gaps:    Health Maintenance Due   Topic Date Due    RSV VACCINE (Pregnancy & 60+) (1 - 1-dose 60+ series) Never done    ZOSTER IMMUNIZATION (1 of 2) 01/04/2013    DTAP/TDAP/TD IMMUNIZATION (3 - Td or Tdap) 10/24/2021    MICROALBUMIN  02/28/2022    PHQ-2 (once per calendar year)  01/01/2024    MEDICARE ANNUAL WELLNESS VISIT  12/20/2023       Scheduled 01/09/2024      Care Plans  Care Plan: Utilization Completed 1/3/2024      Problem: Increased risk of re-admission  Resolved 1/3/2024      Long-Range Goal: I would like additional resources and support to manage my health and prevent future avoidable ED visits/hospital admissions  Completed 1/3/2024      Start Date: 11/10/2023 Expected End Date: 4/26/2024    This Visit's Progress: 100% Recent Progress: 40%    Note:     Barriers: diagnosis of multiple, chronic, complex medical conditions, provider availability - wait time to complete appointments, etc.   Strengths: motivated, engaged in care coordination, spouse/daughter supportive, home care therapies pending.   Patient expressed understanding of goal: yes  Action steps to achieve this goal:  1. I will seek the appropriate level of care to manage my health conditions, symptoms, and questions.  Primary Care Provider: 11/15/23  Nephrology: 11/28/23  2. I will take my medications as prescribed and contact my care team for questions and refill requests.  3. I will contact my Care Coordination team with questions as needed.    4. I will discuss, review, schedule and complete recommended overdue health maintenance with my Primary Care Provider .   5. I will provide a copy of my health care directive to my care team.                             Intervention/Education provided during outreach: Patient verbalizes feeling current clinic care coordination goal complete with  no new goal(s), resource, support needs from clinic care coordination team identified at this time. Patient agreeable to transitioning clinic care coordination enrollment status to maintenance.       Outreach Frequency: 2 months, more frequently as needed    Plan:   Patient/caregiver will call RNCC with questions, concerns, support needs. RNCC will be available as needed.    Care Coordinator will follow up in 2 months to review for transition to clinic care coordination graduation if no outstanding concerns, resource and/or support needs present.     Liliam Alicea RN Care Coordinator  [facility] [place]  Email: [email or web address]  Phone: [phone]

## 2024-01-04 ENCOUNTER — LAB (OUTPATIENT)
Dept: LAB | Facility: CLINIC | Age: 84
End: 2024-01-04
Payer: MEDICARE

## 2024-01-04 DIAGNOSIS — N18.4 CHRONIC KIDNEY DISEASE, STAGE IV (SEVERE) (H): ICD-10-CM

## 2024-01-04 DIAGNOSIS — N17.9 ACUTE KIDNEY FAILURE, UNSPECIFIED (H): ICD-10-CM

## 2024-01-04 LAB
ALBUMIN SERPL BCG-MCNC: 4.2 G/DL (ref 3.5–5.2)
ANION GAP SERPL CALCULATED.3IONS-SCNC: 10 MMOL/L (ref 7–15)
BUN SERPL-MCNC: 23.4 MG/DL (ref 8–23)
CALCIUM SERPL-MCNC: 9.7 MG/DL (ref 8.8–10.2)
CHLORIDE SERPL-SCNC: 95 MMOL/L (ref 98–107)
CREAT SERPL-MCNC: 1.91 MG/DL (ref 0.51–0.95)
DEPRECATED HCO3 PLAS-SCNC: 24 MMOL/L (ref 22–29)
EGFRCR SERPLBLD CKD-EPI 2021: 25 ML/MIN/1.73M2
GLUCOSE SERPL-MCNC: 116 MG/DL (ref 70–99)
PHOSPHATE SERPL-MCNC: 4.1 MG/DL (ref 2.5–4.5)
POTASSIUM SERPL-SCNC: 5 MMOL/L (ref 3.4–5.3)
SODIUM SERPL-SCNC: 129 MMOL/L (ref 135–145)

## 2024-01-04 PROCEDURE — 80069 RENAL FUNCTION PANEL: CPT

## 2024-01-04 PROCEDURE — 36415 COLL VENOUS BLD VENIPUNCTURE: CPT

## 2024-01-09 ENCOUNTER — PREP FOR PROCEDURE (OUTPATIENT)
Dept: SURGERY | Facility: CLINIC | Age: 84
End: 2024-01-09

## 2024-01-09 ENCOUNTER — TELEPHONE (OUTPATIENT)
Dept: SURGERY | Facility: CLINIC | Age: 84
End: 2024-01-09

## 2024-01-09 ENCOUNTER — OFFICE VISIT (OUTPATIENT)
Dept: PEDIATRICS | Facility: CLINIC | Age: 84
End: 2024-01-09
Payer: MEDICARE

## 2024-01-09 VITALS
BODY MASS INDEX: 23.39 KG/M2 | HEART RATE: 73 BPM | OXYGEN SATURATION: 100 % | DIASTOLIC BLOOD PRESSURE: 60 MMHG | HEIGHT: 61 IN | WEIGHT: 123.9 LBS | RESPIRATION RATE: 14 BRPM | SYSTOLIC BLOOD PRESSURE: 122 MMHG | TEMPERATURE: 98 F

## 2024-01-09 DIAGNOSIS — Z90.49 S/P TOTAL COLECTOMY: ICD-10-CM

## 2024-01-09 DIAGNOSIS — G25.81 RESTLESS LEGS SYNDROME (RLS): ICD-10-CM

## 2024-01-09 DIAGNOSIS — R11.2 NAUSEA AND VOMITING, UNSPECIFIED VOMITING TYPE: ICD-10-CM

## 2024-01-09 DIAGNOSIS — Z93.2 ILEOSTOMY STATUS (H): ICD-10-CM

## 2024-01-09 DIAGNOSIS — I12.9 CKD STAGE 4 SECONDARY TO HYPERTENSION (H): ICD-10-CM

## 2024-01-09 DIAGNOSIS — K94.19 ALTERED BOWEL ELIMINATION DUE TO INTESTINAL OSTOMY (H): ICD-10-CM

## 2024-01-09 DIAGNOSIS — I10 ESSENTIAL HYPERTENSION: ICD-10-CM

## 2024-01-09 DIAGNOSIS — N18.4 CKD STAGE 4 SECONDARY TO HYPERTENSION (H): ICD-10-CM

## 2024-01-09 DIAGNOSIS — F41.9 ANXIETY: ICD-10-CM

## 2024-01-09 DIAGNOSIS — Z00.00 ENCOUNTER FOR MEDICARE ANNUAL WELLNESS EXAM: Primary | ICD-10-CM

## 2024-01-09 DIAGNOSIS — M32.9 SYSTEMIC LUPUS ERYTHEMATOSUS, UNSPECIFIED SLE TYPE, UNSPECIFIED ORGAN INVOLVEMENT STATUS (H): ICD-10-CM

## 2024-01-09 DIAGNOSIS — Z93.2 ILEOSTOMY STATUS (H): Primary | ICD-10-CM

## 2024-01-09 DIAGNOSIS — I87.8 POOR VENOUS ACCESS: ICD-10-CM

## 2024-01-09 PROCEDURE — 99214 OFFICE O/P EST MOD 30 MIN: CPT | Mod: 25 | Performed by: PEDIATRICS

## 2024-01-09 PROCEDURE — G0439 PPPS, SUBSEQ VISIT: HCPCS | Performed by: PEDIATRICS

## 2024-01-09 RX ORDER — RESPIRATORY SYNCYTIAL VIRUS VACCINE 120MCG/0.5
0.5 KIT INTRAMUSCULAR ONCE
Qty: 1 EACH | Refills: 0 | Status: CANCELLED | OUTPATIENT
Start: 2024-01-09 | End: 2024-01-09

## 2024-01-09 ASSESSMENT — ENCOUNTER SYMPTOMS
DYSURIA: 0
JOINT SWELLING: 0
PARESTHESIAS: 1
ABDOMINAL PAIN: 0
CONSTIPATION: 0
FEVER: 0
HEMATOCHEZIA: 0
COUGH: 0
DIARRHEA: 0
DIZZINESS: 1
HEARTBURN: 0
HEADACHES: 1
WEAKNESS: 1
NERVOUS/ANXIOUS: 1
SHORTNESS OF BREATH: 1
PALPITATIONS: 0
BREAST MASS: 0
SORE THROAT: 0
NAUSEA: 1
MYALGIAS: 1
ARTHRALGIAS: 1
HEMATURIA: 0
EYE PAIN: 1
CHILLS: 1
FREQUENCY: 0

## 2024-01-09 ASSESSMENT — PAIN SCALES - GENERAL: PAINLEVEL: NO PAIN (0)

## 2024-01-09 ASSESSMENT — ACTIVITIES OF DAILY LIVING (ADL): CURRENT_FUNCTION: NO ASSISTANCE NEEDED

## 2024-01-09 NOTE — PROGRESS NOTES
"SUBJECTIVE:   Gely is a 83 year old, presenting for the following:  Physical        1/9/2024    10:24 AM   Additional Questions   Roomed by Kenia Schneider   Accompanied by Daughter       Are you in the first 12 months of your Medicare coverage?  No    Healthy Habits:     In general, how would you rate your overall health?  Fair    Frequency of exercise:  None    Do you usually eat at least 4 servings of fruit and vegetables a day, include whole grains    & fiber and avoid regularly eating high fat or \"junk\" foods?  No    Taking medications regularly:  Yes    Medication side effects:  Lightheadedness    Ability to successfully perform activities of daily living:  No assistance needed    Home Safety:  No safety concerns identified    Hearing Impairment:  No hearing concerns    In the past 6 months, have you been bothered by leaking of urine?  No    In general, how would you rate your overall mental or emotional health?  Good    Additional concerns today:  Yes      Today's PHQ-2 Score:       1/9/2024    10:18 AM   PHQ-2 ( 1999 [employer])   Q1: Little interest or pleasure in doing things 1   Q2: Feeling down, depressed or hopeless 1   PHQ-2 Score 2   Q1: Little interest or pleasure in doing things Several days   Q2: Feeling down, depressed or hopeless Several days   PHQ-2 Score 2           Have you ever done Advance Care Planning? (For example, a Health Directive, POLST, or a discussion with a medical provider or your loved ones about your wishes): No, advance care planning information given to patient to review.  Patient plans to discuss their wishes with loved ones or provider.         Fall risk  Fallen 2 or more times in the past year?: No  Any fall with injury in the past year?: No    Cognitive Screening:   Patient declined to complete       Reviewed and updated as needed this visit by clinical staff     Meds              Reviewed and updated as needed this visit by Provider                 Social History     Tobacco " Use    Smoking status: Never     Passive exposure: Never    Smokeless tobacco: Never   Substance Use Topics    Alcohol use: Yes     Comment: socially           1/9/2024    10:17 AM   Alcohol Use   Prescreen: >3 drinks/day or >7 drinks/week? No     Do you have a current opioid prescription? No  Do you use any other controlled substances or medications that are not prescribed by a provider? None      Life goals: Make a quilt - working on a Jellycoasterilt with her daughter and this is very important to her.    Children are very involved in her care.    Hoping to go to the hospital less and making plans to prevent hospital stays      Current providers sharing in care for this patient include:   Patient Care Team:  Hien Booth MD as PCP - General (Internal Medicine - Pediatrics)  Shana Alaniz MD as MD (Colon and Rectal Surgery)  Hien Booth MD as Assigned PCP  Ashely Solis, RN as Personal Advocate & Liaison (PAL)  Hien Booth MD (Internal Medicine)  Liliam Alicea, RN as Lead Care Coordinator    The following health maintenance items are reviewed in Epic and correct as of today:  Health Maintenance   Topic Date Due    ANNUAL REVIEW OF HM ORDERS  Never done    RSV VACCINE (Pregnancy & 60+) (1 - 1-dose 60+ series) Never done    ZOSTER IMMUNIZATION (1 of 2) 01/04/2013    DTAP/TDAP/TD IMMUNIZATION (3 - Td or Tdap) 10/24/2021    MICROALBUMIN  02/28/2022    MEDICARE ANNUAL WELLNESS VISIT  12/20/2023    COVID-19 Vaccine (6 - 2023-24 season) 02/08/2024    BMP  04/04/2024    HEMOGLOBIN  05/15/2024    CMP  11/15/2024    FALL RISK ASSESSMENT  01/09/2025    ADVANCE CARE PLANNING  12/20/2027    PARATHYROID  Completed    PHOSPHORUS  Completed    PHQ-2 (once per calendar year)  Completed    INFLUENZA VACCINE  Completed    Pneumococcal Vaccine: 65+ Years  Completed    URINALYSIS  Completed    ALK PHOS  Completed    IPV IMMUNIZATION  Aged Out    HPV IMMUNIZATION  Aged Out    MENINGITIS  IMMUNIZATION  Aged Out    RSV MONOCLONAL ANTIBODY  Aged Out    A1C  Discontinued    LIPID  Discontinued    MAMMO SCREENING  Discontinued         Mammogram Screening - Patient over age 75, has elected to discontinue screenings.  Pertinent mammograms are reviewed under the imaging tab.    At our last visit in mid December, was doing well with regard to hydration and food intake, however, had an abrupt onset of nausea and vomiting last week that required a visit to Urgency Room.  Responded well to IV fluids.  Hasn't yet had IV fluids through infusion center, though has standing orders.    Access has become difficult - was hard to get IV started in Urgency Room.   Getting lab work has started to be more difficult - especially when dehydrated.       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 Very difficult to stay on top of fluid status.    CKD 4 - follows with Dr Singer, most recent kidney function stable    SLE - currently follows with Dr Tabares - no recent changes to immunosuppression    RLS - last ferritin/CBC normal.  No acute sytmpoms    HTN - BP controlled, no new cardiac symptoms    HL - tolerating statin well    GERD - on PPI    Hx of BCC - follows with derm    Anxiety and pain - hasn't yet increased her cymbalta to 30mg daily, but has the pills and plans to do so soon          Review of Systems   Constitutional:  Positive for chills. Negative for fever.   HENT:  Positive for hearing loss. Negative for congestion, ear pain and sore throat.    Eyes:  Positive for pain and visual disturbance.   Respiratory:  Positive for shortness of breath. Negative for cough.    Cardiovascular:  Negative for chest pain, palpitations and peripheral edema.   Gastrointestinal:  Positive for nausea. Negative for abdominal pain, constipation, diarrhea, heartburn and hematochezia.   Breasts:  Negative for tenderness, breast mass and discharge.   Genitourinary:  Negative for dysuria, frequency, genital sores, hematuria,  "pelvic pain, urgency, vaginal bleeding and vaginal discharge.   Musculoskeletal:  Positive for arthralgias and myalgias. Negative for joint swelling.   Skin:  Positive for rash.   Neurological:  Positive for dizziness, weakness, headaches and paresthesias.   Psychiatric/Behavioral:  Negative for mood changes. The patient is nervous/anxious.          OBJECTIVE:   /60 (BP Location: Right arm, Patient Position: Sitting, Cuff Size: Adult Regular)   Pulse 73   Temp 98  F (36.7  C) (Oral)   Resp 14   Ht 1.549 m (5' 1\")   Wt 56.2 kg (123 lb 14.4 oz)   SpO2 100%   BMI 23.41 kg/m   Estimated body mass index is 23.41 kg/m  as calculated from the following:    Height as of this encounter: 1.549 m (5' 1\").    Weight as of this encounter: 56.2 kg (123 lb 14.4 oz).  Wt Readings from Last 4 Encounters:   01/09/24 56.2 kg (123 lb 14.4 oz)   12/14/23 57.9 kg (127 lb 11.2 oz)   11/15/23 54 kg (119 lb)   11/09/23 55.7 kg (122 lb 11.2 oz)       Physical Exam  GENERAL: healthy, alert and no distress  EYES: Eyes grossly normal to inspection, PERRL and conjunctivae and sclerae normal  HENT: ear canals and TM's normal, nose and mouth without ulcers or lesions  NECK: no adenopathy, no asymmetry, masses, or scars and thyroid normal to palpation  RESP: lungs clear to auscultation - no rales, rhonchi or wheezes  CV: regular rate and rhythm, normal S1 S2, no S3 or S4, no murmur, click or rub, no peripheral edema and peripheral pulses strong  ABDOMEN: soft, nontender, without hepatosplenomegaly or masses, healthy stoma RLQ with liquid and soft stool in ileostomy bag - no blood  MS: no gross musculoskeletal defects noted, no edema  SKIN: bruise on left shin, no acute rashes or skin changes  NEURO: Normal strength and tone, mentation intact and speech normal  PSYCH: mentation appears normal, affect normal/bright    Diagnostic Test Results:  Labs reviewed in [facility]  New lab orders pending    ASSESSMENT / PLAN:       ICD-10-CM    1. " Encounter for Medicare annual wellness exam  Z00.00 Comprehensive metabolic panel (BMP + Alb, Alk Phos, ALT, AST, Total. Bili, TP)     Lipid panel reflex to direct LDL Fasting     Magnesium     PRIMARY CARE FOLLOW-UP SCHEDULING      2. 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  Has standing orders placed for IV fluids at the infusion center - encouraged her to utilize these regularly.    With access issues, also recommended meeting with the surgeons to talk about the feasibility of a port to help facilitate easy access for regular fluid resuscitation, which will continue to be a regular issue      3. Restless legs syndrome (RLS)  G25.81 Iron status currently in a good place, using gabapentin nightly, follow      4. Nausea and vomiting, unspecified vomiting type  R11.2 Comprehensive metabolic panel (BMP + Alb, Alk Phos, ALT, AST, Total. Bili, TP)     Magnesium     Adult General Surg Referral    See above, still unclear cause of sudden N/V last week      5. Systemic lupus erythematosus, unspecified SLE type, unspecified organ involvement status (H)  M32.9 Adult Rheumatology  Referral    Interested in moving to [facility] - new referral placed      6. S/P total colectomy  Z90.49       7. Ileostomy status (H)  Z93.2 Comprehensive metabolic panel (BMP + Alb, Alk Phos, ALT, AST, Total. Bili, TP)     Adult General Surg Referral      8. Poor venous access  I87.8 Adult General Surg Referral      9. CKD stage 4 secondary to hypertension (H)  I12.9 Comprehensive metabolic panel (BMP + Alb, Alk Phos, ALT, AST, Total. Bili, TP)    N18.4 Lipid panel reflex to direct LDL Fasting    Follows with Dr Singer, recent hospital stay for acute on chronic renal failure      10. Essential hypertension  I10 Comprehensive metabolic panel (BMP + Alb, Alk Phos, ALT, AST, Total. Bili, TP)     Lipid panel  reflex to direct LDL Fasting    BP currently in a good place, follow      11. Anxiety  F41.9 Increase cymbalta to 30mg and follow                COUNSELING:  Reviewed preventive health counseling, as reflected in patient instructions        She reports that she has never smoked. She has never been exposed to tobacco smoke. She has never used smokeless tobacco.      Appropriate preventive services were discussed with this patient, including applicable screening as appropriate for fall prevention, nutrition, physical activity, Tobacco-use cessation, weight loss and cognition.  Checklist reviewing preventive services available has been given to the patient.    Reviewed patients plan of care and provided an AVS. The Complex Care Plan (for patients with higher acuity and needing more deliberate coordination of services) for Gely meets the Care Plan requirement. This Care Plan has been established and reviewed with the Patient and daughter.        Hien Booth MD  [facility]    Identified Health Risks:  I have reviewed Opioid Use Disorder and Substance Use Disorder risk factors and made any needed referrals.

## 2024-01-09 NOTE — PATIENT INSTRUCTIONS
Expect a phone call to schedule with surgery    RSV vaccine - get in a [place] pharmacy    I'll put a new referral in for rheumatology - expect a call to schedule        Patient Education   Personalized Prevention Plan  You are due for the preventive services outlined below.  Your care team is available to assist you in scheduling these services.  If you have already completed any of these items, please share that information with your care team to update in your medical record.  Health Maintenance Due   Topic Date Due    ANNUAL REVIEW OF HM ORDERS  Never done    RSV VACCINE (Pregnancy & 60+) (1 - 1-dose 60+ series) Never done    Zoster (Shingles) Vaccine (1 of 2) 01/04/2013    Diptheria Tetanus Pertussis (DTAP/TDAP/TD) Vaccine (3 - Td or Tdap) 10/24/2021    Kidney Microalbumin Urine Test  02/28/2022    Annual Wellness Visit  12/20/2023

## 2024-01-09 NOTE — TELEPHONE ENCOUNTER
Type of surgery:   INSERTION VASCULAR ACCESS PORT    Location of surgery: [facility]  Date and time of surgery: 1/26/2024 @ 12:15 PM   Surgeon: MARGO CHEUNG MD    Pre-Op Appt Date: 1/9/2024 DR WALTER   Post-Op Appt Date: PATIENT TO SCHEDULE     Packet sent out: Yes  Pre-cert/Authorization completed:  Not Applicable  Date: 1/9/2024         INSERTION VASCULAR ACCESS PORT     GENERAL H&P DONE 1/9/2024 DR WALTER 60 MIN REQ NON RMO NMS

## 2024-01-09 NOTE — LETTER
2024       Gely Keene  [address] [facility] [address]     RE: [id number]  : 1940    Gely Keene has been scheduled for surgery on 2024 at 12:15 PM  at [facility] with Dr Ervin Perez.  The hospital is located at [address] in [place].    Please check in at the Surgery reception desk at 10:15 AM . This is located in the back of the hospital on the East side, just past the Emergency Room entrance.     DO NOT EAT OR DRINK ANYTHING 8 HOURS BEFORE YOUR ARRIVAL TIME.   You may have sips of clear liquids up until 2 hours before your arrival time. If you have been advised to take your medication, please do this early in the morning with just sips of clear liquid.      Hospital regulations require an updated pre-operative examination to be completed within 30 days of the procedure. This can be done by your primary care provider. Please ask them to fax documentation to [phone]. We also recommend you bring a copy with you.     You should shower before your surgery with Hibiclens or Exidine soap.  This can be found at your local pharmacy or you can pick it up from our office for free.  Please call our office if you have any questions.     You will be required to have an Adult (friend or family member) drive you home after your surgery and arrange for an adult to stay with you until the next morning.     You will receive several calls from our staff 3-7 days prior to your scheduled procedure with further details and to answer any questions you may have.    It is sometimes necessary to adjust the surgery schedule due to emergencies and additions to the schedule.  If your surgery is affected by this, we greatly appreciate your flexibility and understanding in this matter    It is best if you call regarding post-operative questions between the hours of 8:00 am & 3:00 pm Monday-Friday, so you have access to the daytime care team that know  you best.  Prescription refills are accepted during regular office hours only.    Please do not bring any Disability or FMLA papers to the hospital.  They need to be either faxed ([phone]), mailed or hand delivered to our office by you or a family member for completion.  Please allow 14 business days to complete paperwork.        If you have questions or concerns, please contact our office at [phone].

## 2024-01-09 NOTE — LETTER
January 9, 2024      Gely Keene  [address] [facility] [address]        To Whom It May Concern,         If possible, please draw CMP, lipid panel, magnesium, urine microalbumin, CBC with next lab draw.    Please fax to [phone]            Sincerely,        Hien Booth MD

## 2024-01-17 ENCOUNTER — TRANSFERRED RECORDS (OUTPATIENT)
Dept: HEALTH INFORMATION MANAGEMENT | Facility: CLINIC | Age: 84
End: 2024-01-17
Payer: MEDICARE

## 2024-01-17 LAB
ALT SERPL-CCNC: 8 U/L (ref 6–29)
AST SERPL-CCNC: 21 U/L (ref 10–35)
CHOLESTEROL (EXTERNAL): 186 MG/DL (ref 4–200)
CREATININE (EXTERNAL): 1.81 MG/DL (ref 0.6–0.95)
GFR ESTIMATED (EXTERNAL): 27 ML/MIN/1.73M2
GLUCOSE (EXTERNAL): 105 MG/DL (ref 65–99)
HDLC SERPL-MCNC: 88 MG/DL (ref 30–85)
LDL CHOLESTEROL CALCULATED (EXTERNAL): 86.2 MG/DL (ref 0–129)
POTASSIUM (EXTERNAL): 4.6 MMOL/L (ref 3.5–5.3)
TRIGLYCERIDES (EXTERNAL): 69 MG/DL (ref 7–150)

## 2024-01-19 RX ORDER — MULTIVITAMIN WITH IRON
1 TABLET ORAL DAILY
COMMUNITY

## 2024-01-19 RX ORDER — GABAPENTIN 100 MG/1
400 CAPSULE ORAL AT BEDTIME
COMMUNITY
End: 2024-03-18

## 2024-01-26 ENCOUNTER — HOSPITAL ENCOUNTER (OUTPATIENT)
Facility: CLINIC | Age: 84
Discharge: HOME | End: 2024-01-26
Attending: SURGERY | Admitting: SURGERY
Payer: MEDICARE

## 2024-01-26 ENCOUNTER — ANESTHESIA (OUTPATIENT)
Dept: SURGERY | Facility: CLINIC | Age: 84
End: 2024-01-26
Payer: MEDICARE

## 2024-01-26 ENCOUNTER — ANESTHESIA EVENT (OUTPATIENT)
Dept: SURGERY | Facility: CLINIC | Age: 84
End: 2024-01-26
Payer: MEDICARE

## 2024-01-26 ENCOUNTER — APPOINTMENT (OUTPATIENT)
Dept: GENERAL RADIOLOGY | Facility: CLINIC | Age: 84
End: 2024-01-26
Attending: SURGERY
Payer: MEDICARE

## 2024-01-26 ENCOUNTER — APPOINTMENT (OUTPATIENT)
Dept: SURGERY | Facility: PHYSICIAN GROUP | Age: 84
End: 2024-01-26
Payer: MEDICARE

## 2024-01-26 VITALS
HEART RATE: 69 BPM | WEIGHT: 129.7 LBS | BODY MASS INDEX: 24.51 KG/M2 | RESPIRATION RATE: 18 BRPM | SYSTOLIC BLOOD PRESSURE: 164 MMHG | OXYGEN SATURATION: 97 % | TEMPERATURE: 97.6 F | DIASTOLIC BLOOD PRESSURE: 75 MMHG

## 2024-01-26 DIAGNOSIS — Z93.2 ILEOSTOMY STATUS (H): Primary | ICD-10-CM

## 2024-01-26 PROCEDURE — 999N000141 HC STATISTIC PRE-PROCEDURE NURSING ASSESSMENT: Performed by: SURGERY

## 2024-01-26 PROCEDURE — 250N000011 HC RX IP 250 OP 636: Performed by: ANESTHESIOLOGY

## 2024-01-26 PROCEDURE — 258N000003 HC RX IP 258 OP 636: Performed by: NURSE ANESTHETIST, CERTIFIED REGISTERED

## 2024-01-26 PROCEDURE — 250N000013 HC RX MED GY IP 250 OP 250 PS 637: Performed by: SURGERY

## 2024-01-26 PROCEDURE — 272N000001 HC OR GENERAL SUPPLY STERILE: Performed by: SURGERY

## 2024-01-26 PROCEDURE — 77001 FLUOROGUIDE FOR VEIN DEVICE: CPT | Mod: 26 | Performed by: SURGERY

## 2024-01-26 PROCEDURE — 250N000025 HC SEVOFLURANE, PER MIN: Performed by: SURGERY

## 2024-01-26 PROCEDURE — 250N000011 HC RX IP 250 OP 636: Performed by: SURGERY

## 2024-01-26 PROCEDURE — 710N000012 HC RECOVERY PHASE 2, PER MINUTE: Performed by: SURGERY

## 2024-01-26 PROCEDURE — 360N000082 HC SURGERY LEVEL 2 W/ FLUORO, PER MIN: Performed by: SURGERY

## 2024-01-26 PROCEDURE — 250N000009 HC RX 250: Performed by: NURSE ANESTHETIST, CERTIFIED REGISTERED

## 2024-01-26 PROCEDURE — 258N000001 HC RX 258: Performed by: SURGERY

## 2024-01-26 PROCEDURE — 250N000011 HC RX IP 250 OP 636: Performed by: NURSE ANESTHETIST, CERTIFIED REGISTERED

## 2024-01-26 PROCEDURE — 36561 INSERT TUNNELED CV CATH: CPT | Performed by: SURGERY

## 2024-01-26 PROCEDURE — C1788 PORT, INDWELLING, IMP: HCPCS | Performed by: SURGERY

## 2024-01-26 PROCEDURE — 710N000009 HC RECOVERY PHASE 1, LEVEL 1, PER MIN: Performed by: SURGERY

## 2024-01-26 PROCEDURE — 370N000017 HC ANESTHESIA TECHNICAL FEE, PER MIN: Performed by: SURGERY

## 2024-01-26 PROCEDURE — 999N000179 XR SURGERY CARM FLUORO LESS THAN 5 MIN W STILLS: Mod: TC

## 2024-01-26 PROCEDURE — 999N000065 XR CHEST PORT 1 VIEW

## 2024-01-26 DEVICE — CATH PORT SMART VORTEX LOW PROFILE 8FR CT80LPPDVI: Type: IMPLANTABLE DEVICE | Site: CHEST  WALL | Status: FUNCTIONAL

## 2024-01-26 RX ORDER — SODIUM CHLORIDE, SODIUM LACTATE, POTASSIUM CHLORIDE, CALCIUM CHLORIDE 600; 310; 30; 20 MG/100ML; MG/100ML; MG/100ML; MG/100ML
INJECTION, SOLUTION INTRAVENOUS CONTINUOUS PRN
Status: DISCONTINUED | OUTPATIENT
Start: 2024-01-26 | End: 2024-01-26

## 2024-01-26 RX ORDER — FENTANYL CITRATE 50 UG/ML
25 INJECTION, SOLUTION INTRAMUSCULAR; INTRAVENOUS
Status: DISCONTINUED | OUTPATIENT
Start: 2024-01-26 | End: 2024-01-26 | Stop reason: HOSPADM

## 2024-01-26 RX ORDER — FENTANYL CITRATE 50 UG/ML
INJECTION, SOLUTION INTRAMUSCULAR; INTRAVENOUS PRN
Status: DISCONTINUED | OUTPATIENT
Start: 2024-01-26 | End: 2024-01-26

## 2024-01-26 RX ORDER — KETOROLAC TROMETHAMINE 15 MG/ML
15 INJECTION, SOLUTION INTRAMUSCULAR; INTRAVENOUS ONCE
Status: COMPLETED | OUTPATIENT
Start: 2024-01-26 | End: 2024-01-26

## 2024-01-26 RX ORDER — ONDANSETRON 2 MG/ML
INJECTION INTRAMUSCULAR; INTRAVENOUS PRN
Status: DISCONTINUED | OUTPATIENT
Start: 2024-01-26 | End: 2024-01-26

## 2024-01-26 RX ORDER — SODIUM CHLORIDE, SODIUM LACTATE, POTASSIUM CHLORIDE, CALCIUM CHLORIDE 600; 310; 30; 20 MG/100ML; MG/100ML; MG/100ML; MG/100ML
INJECTION, SOLUTION INTRAVENOUS CONTINUOUS
Status: DISCONTINUED | OUTPATIENT
Start: 2024-01-26 | End: 2024-01-26 | Stop reason: HOSPADM

## 2024-01-26 RX ORDER — BUPIVACAINE HYDROCHLORIDE 5 MG/ML
INJECTION, SOLUTION EPIDURAL; INTRACAUDAL PRN
Status: DISCONTINUED | OUTPATIENT
Start: 2024-01-26 | End: 2024-01-26 | Stop reason: HOSPADM

## 2024-01-26 RX ORDER — ONDANSETRON 2 MG/ML
4 INJECTION INTRAMUSCULAR; INTRAVENOUS EVERY 30 MIN PRN
Status: DISCONTINUED | OUTPATIENT
Start: 2024-01-26 | End: 2024-01-26 | Stop reason: HOSPADM

## 2024-01-26 RX ORDER — ALBUTEROL SULFATE 0.83 MG/ML
2.5 SOLUTION RESPIRATORY (INHALATION) EVERY 4 HOURS PRN
Status: DISCONTINUED | OUTPATIENT
Start: 2024-01-26 | End: 2024-01-26 | Stop reason: HOSPADM

## 2024-01-26 RX ORDER — LABETALOL HYDROCHLORIDE 5 MG/ML
10 INJECTION, SOLUTION INTRAVENOUS EVERY 10 MIN PRN
Status: DISCONTINUED | OUTPATIENT
Start: 2024-01-26 | End: 2024-01-26 | Stop reason: HOSPADM

## 2024-01-26 RX ORDER — LIDOCAINE HYDROCHLORIDE 20 MG/ML
INJECTION, SOLUTION INFILTRATION; PERINEURAL PRN
Status: DISCONTINUED | OUTPATIENT
Start: 2024-01-26 | End: 2024-01-26

## 2024-01-26 RX ORDER — FENTANYL CITRATE 50 UG/ML
25 INJECTION, SOLUTION INTRAMUSCULAR; INTRAVENOUS EVERY 5 MIN PRN
Status: DISCONTINUED | OUTPATIENT
Start: 2024-01-26 | End: 2024-01-26 | Stop reason: HOSPADM

## 2024-01-26 RX ORDER — ONDANSETRON 4 MG/1
4 TABLET, ORALLY DISINTEGRATING ORAL EVERY 30 MIN PRN
Status: DISCONTINUED | OUTPATIENT
Start: 2024-01-26 | End: 2024-01-26 | Stop reason: HOSPADM

## 2024-01-26 RX ORDER — PROPOFOL 10 MG/ML
INJECTION, EMULSION INTRAVENOUS PRN
Status: DISCONTINUED | OUTPATIENT
Start: 2024-01-26 | End: 2024-01-26

## 2024-01-26 RX ORDER — HYDROCODONE BITARTRATE AND ACETAMINOPHEN 5; 325 MG/1; MG/1
1 TABLET ORAL
Status: COMPLETED | OUTPATIENT
Start: 2024-01-26 | End: 2024-01-26

## 2024-01-26 RX ORDER — LIDOCAINE 40 MG/G
CREAM TOPICAL
Status: DISCONTINUED | OUTPATIENT
Start: 2024-01-26 | End: 2024-01-26 | Stop reason: HOSPADM

## 2024-01-26 RX ORDER — HYDROMORPHONE HCL IN WATER/PF 6 MG/30 ML
0.2 PATIENT CONTROLLED ANALGESIA SYRINGE INTRAVENOUS EVERY 5 MIN PRN
Status: DISCONTINUED | OUTPATIENT
Start: 2024-01-26 | End: 2024-01-26 | Stop reason: HOSPADM

## 2024-01-26 RX ORDER — MEPERIDINE HYDROCHLORIDE 25 MG/ML
12.5 INJECTION INTRAMUSCULAR; INTRAVENOUS; SUBCUTANEOUS EVERY 5 MIN PRN
Status: DISCONTINUED | OUTPATIENT
Start: 2024-01-26 | End: 2024-01-26 | Stop reason: HOSPADM

## 2024-01-26 RX ORDER — DEXAMETHASONE SODIUM PHOSPHATE 4 MG/ML
INJECTION, SOLUTION INTRA-ARTICULAR; INTRALESIONAL; INTRAMUSCULAR; INTRAVENOUS; SOFT TISSUE PRN
Status: DISCONTINUED | OUTPATIENT
Start: 2024-01-26 | End: 2024-01-26

## 2024-01-26 RX ORDER — DIAZEPAM 10 MG/2ML
2.5 INJECTION, SOLUTION INTRAMUSCULAR; INTRAVENOUS
Status: DISCONTINUED | OUTPATIENT
Start: 2024-01-26 | End: 2024-01-26 | Stop reason: HOSPADM

## 2024-01-26 RX ORDER — HEPARIN SODIUM (PORCINE) LOCK FLUSH IV SOLN 100 UNIT/ML 100 UNIT/ML
SOLUTION INTRAVENOUS PRN
Status: DISCONTINUED | OUTPATIENT
Start: 2024-01-26 | End: 2024-01-26 | Stop reason: HOSPADM

## 2024-01-26 RX ORDER — HYDROCODONE BITARTRATE AND ACETAMINOPHEN 5; 325 MG/1; MG/1
1-2 TABLET ORAL EVERY 4 HOURS PRN
Qty: 6 TABLET | Refills: 0 | Status: SHIPPED | OUTPATIENT
Start: 2024-01-26 | End: 2024-03-18

## 2024-01-26 RX ORDER — CEFAZOLIN SODIUM/WATER 2 G/20 ML
2 SYRINGE (ML) INTRAVENOUS
Status: COMPLETED | OUTPATIENT
Start: 2024-01-26 | End: 2024-01-26

## 2024-01-26 RX ORDER — OXYCODONE HYDROCHLORIDE 5 MG/1
10 TABLET ORAL EVERY 4 HOURS PRN
Status: DISCONTINUED | OUTPATIENT
Start: 2024-01-26 | End: 2024-01-26 | Stop reason: HOSPADM

## 2024-01-26 RX ORDER — FENTANYL CITRATE 50 UG/ML
50 INJECTION, SOLUTION INTRAMUSCULAR; INTRAVENOUS EVERY 5 MIN PRN
Status: DISCONTINUED | OUTPATIENT
Start: 2024-01-26 | End: 2024-01-26 | Stop reason: HOSPADM

## 2024-01-26 RX ORDER — HYDROMORPHONE HCL IN WATER/PF 6 MG/30 ML
0.4 PATIENT CONTROLLED ANALGESIA SYRINGE INTRAVENOUS EVERY 5 MIN PRN
Status: DISCONTINUED | OUTPATIENT
Start: 2024-01-26 | End: 2024-01-26 | Stop reason: HOSPADM

## 2024-01-26 RX ORDER — DEXAMETHASONE SODIUM PHOSPHATE 4 MG/ML
4 INJECTION, SOLUTION INTRA-ARTICULAR; INTRALESIONAL; INTRAMUSCULAR; INTRAVENOUS; SOFT TISSUE
Status: DISCONTINUED | OUTPATIENT
Start: 2024-01-26 | End: 2024-01-26 | Stop reason: HOSPADM

## 2024-01-26 RX ORDER — CEFAZOLIN SODIUM/WATER 2 G/20 ML
2 SYRINGE (ML) INTRAVENOUS SEE ADMIN INSTRUCTIONS
Status: DISCONTINUED | OUTPATIENT
Start: 2024-01-26 | End: 2024-01-26 | Stop reason: HOSPADM

## 2024-01-26 RX ORDER — OXYCODONE HYDROCHLORIDE 5 MG/1
5 TABLET ORAL EVERY 4 HOURS PRN
Status: DISCONTINUED | OUTPATIENT
Start: 2024-01-26 | End: 2024-01-26 | Stop reason: HOSPADM

## 2024-01-26 RX ADMIN — LABETALOL HYDROCHLORIDE 10 MG: 5 INJECTION, SOLUTION INTRAVENOUS at 13:31

## 2024-01-26 RX ADMIN — LABETALOL HYDROCHLORIDE 10 MG: 5 INJECTION, SOLUTION INTRAVENOUS at 13:12

## 2024-01-26 RX ADMIN — Medication 2 G: at 11:53

## 2024-01-26 RX ADMIN — FENTANYL CITRATE 50 MCG: 50 INJECTION INTRAMUSCULAR; INTRAVENOUS at 12:18

## 2024-01-26 RX ADMIN — DEXAMETHASONE SODIUM PHOSPHATE 8 MG: 4 INJECTION, SOLUTION INTRA-ARTICULAR; INTRALESIONAL; INTRAMUSCULAR; INTRAVENOUS; SOFT TISSUE at 12:01

## 2024-01-26 RX ADMIN — LIDOCAINE HYDROCHLORIDE 50 MG: 20 INJECTION, SOLUTION INFILTRATION; PERINEURAL at 12:00

## 2024-01-26 RX ADMIN — ONDANSETRON 4 MG: 2 INJECTION INTRAMUSCULAR; INTRAVENOUS at 12:22

## 2024-01-26 RX ADMIN — PROPOFOL 100 MG: 10 INJECTION, EMULSION INTRAVENOUS at 12:00

## 2024-01-26 RX ADMIN — FENTANYL CITRATE 50 MCG: 50 INJECTION INTRAMUSCULAR; INTRAVENOUS at 12:00

## 2024-01-26 RX ADMIN — SODIUM CHLORIDE, POTASSIUM CHLORIDE, SODIUM LACTATE AND CALCIUM CHLORIDE: 600; 310; 30; 20 INJECTION, SOLUTION INTRAVENOUS at 11:53

## 2024-01-26 RX ADMIN — LABETALOL HYDROCHLORIDE 10 MG: 5 INJECTION, SOLUTION INTRAVENOUS at 13:48

## 2024-01-26 RX ADMIN — KETOROLAC TROMETHAMINE 15 MG: 15 INJECTION, SOLUTION INTRAMUSCULAR; INTRAVENOUS at 14:31

## 2024-01-26 RX ADMIN — LABETALOL HYDROCHLORIDE 10 MG: 5 INJECTION, SOLUTION INTRAVENOUS at 12:57

## 2024-01-26 RX ADMIN — HYDROCODONE BITARTRATE AND ACETAMINOPHEN 1 TABLET: 5; 325 TABLET ORAL at 13:42

## 2024-01-26 ASSESSMENT — ACTIVITIES OF DAILY LIVING (ADL)
ADLS_ACUITY_SCORE: 33

## 2024-01-26 NOTE — ANESTHESIA PROCEDURE NOTES
Airway       Patient location during procedure: OR  Staff -        CRNA: Thais Orona APRN CRNA       Performed By: CRNA  Consent for Airway        Urgency: elective  Indications and Patient Condition       Indications for airway management: lou-procedural       Induction type:intravenous       Mask difficulty assessment: 1 - vent by mask    Final Airway Details       Final airway type: supraglottic airway    Supraglottic Airway Details        Type: LMA       Brand: I-Gel       LMA size: 4    Post intubation assessment        Placement verified by: capnometry, equal breath sounds and chest rise        Number of attempts at approach: 1       Number of other approaches attempted: 0       Secured with: commercial tube roque       Ease of procedure: easy       Dentition: Unchanged

## 2024-01-26 NOTE — ANESTHESIA CARE TRANSFER NOTE
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    Anesthesia Type:   General     Note:    Oropharynx: oral airway in place and spontaneously breathing  Level of Consciousness: awake  Oxygen Supplementation: face mask  Level of Supplemental Oxygen (L/min / FiO2): 6  Independent Airway: airway patency satisfactory and stable  Dentition: dentition unchanged  Vital Signs Stable: post-procedure vital signs reviewed and stable  Report to RN Given: handoff report given  Patient transferred to: PACU    Handoff Report: Identifed the Patient, Identified the Reponsible Provider, Reviewed the pertinent medical history, Discussed the surgical course, Reviewed Intra-OP anesthesia mangement and issues during anesthesia, Set expectations for post-procedure period and Allowed opportunity for questions and acknowledgement of understanding      Vitals:  Vitals Value Taken Time   BP     Temp     Pulse     Resp 15 01/26/24 1244   SpO2 100 % 01/26/24 1244   Vitals shown include unfiled device data.    Electronically Signed By: JUNIOR Bruce CRNA  January 26, 2024  12:46 PM

## 2024-01-26 NOTE — ANESTHESIA PREPROCEDURE EVALUATION
Anesthesia Pre-Procedure Evaluation    Patient: Gely Keene   MRN: [id number] : 1940        Procedure : Procedure(s):  INSERTION, VASCULAR ACCESS PORT          Past Medical History:   Diagnosis Date    Anxiety     BCC (basal cell carcinoma of skin)     CKD (chronic kidney disease)     Esophageal reflux (GERD) 2014    h/o Mumps     HTN (hypertension) 2014    Hyperlipidemia 2014    Restless legs syndrome (RLS)     Systemic lupus erythematosus       Past Surgical History:   Procedure Laterality Date    APPENDECTOMY      COLECTOMY WITH COLOSTOMY, COMBINED N/A 2016    Procedure: COMBINED COLECTOMY WITH COLOSTOMY;  Surgeon: Shana Alaniz MD;  Location:  OR    CYSTOSCOPY      ENDOSCOPIC RETROGRADE CHOLANGIOPANCREATOGRAM N/A 08/15/2017    Procedure: COMBINED ENDOSCOPIC RETROGRADE CHOLANGIOPANCREATOGRAPHY, SPHINCTEROTOMY;  ENDOSCOPIC RETROGRADE CHOLANGIOPANCREATOGRAPHY, SPHINCTEROTOMY. STONE EXTRACTION;  Surgeon: Keturah Bergeron MD;  Location:  OR    ENDOSCOPIC RETROGRADE CHOLANGIOPANCREATOGRAM N/A 08/15/2017    Procedure: COMBINED ENDOSCOPIC RETROGRADE CHOLANGIOPANCREATOGRAPHY, REMOVE STONE/BALLOON;;  Surgeon: Keturah Bergeron MD;  Location:  OR    ESOPHAGOSCOPY, GASTROSCOPY, DUODENOSCOPY (EGD), COMBINED Left 2020    Procedure: ESOPHAGOGASTRODUODENOSCOPY (fv); random stomach biopsies of polyps using jumbo bx forcep;  Surgeon: Thais Martinez MD;  Location:  GI    ESOPHAGOSCOPY, GASTROSCOPY, DUODENOSCOPY (EGD), COMBINED N/A 2021    Procedure: ESOPHAGOGASTRODUODENOSCOPY, WITH BIOPSies using biopsy forceps  ;  Surgeon: Schuyler Calvillo MD;  Location:  GI    ESOPHAGOSCOPY, GASTROSCOPY, DUODENOSCOPY (EGD), COMBINED N/A 2023    Procedure: Esophagoscopy, gastroscopy, duodenoscopy (EGD), combined;  Surgeon: Leah Cespedes MD;  Location:  GI    HYSTERECTOMY      LAPAROSCOPIC CHOLECYSTECTOMY      LAPAROTOMY  EXPLORATORY N/A 04/22/2016    Procedure: LAPAROTOMY EXPLORATORY;  Surgeon: Shana Alaniz MD;  Location: [facility] OR    PHACOEMULSIFICATION CLEAR CORNEA WITH STANDARD INTRAOCULAR LENS IMPLANT Left 05/09/2017    Procedure: PHACOEMULSIFICATION CLEAR CORNEA WITH STANDARD INTRAOCULAR LENS IMPLANT;  LEFT EYE PHACOEMULSIFICATION CLEAR CORNEA WITH STANDARD INTRAOCULAR LENS IMPLANT ;  Surgeon: Eloy Eric MD;  Location:  [facility]    PHACOEMULSIFICATION CLEAR CORNEA WITH STANDARD INTRAOCULAR LENS IMPLANT Right 05/23/2017    Procedure: PHACOEMULSIFICATION CLEAR CORNEA WITH STANDARD INTRAOCULAR LENS IMPLANT;  RIGHT EYE PHACOEMULSIFICATION CLEAR CORNEA WITH STANDARD INTRAOCULAR LENS IMPLANT ;  Surgeon: Eloy Eric MD;  Location:  [facility]      Allergies   Allergen Reactions    Codeine GI Disturbance    Metronidazole      GI distubance    Sulfacetamide     Sulfamethoxazole-Trimethoprim GI Disturbance     Vomiting    Sulfur       Social History     Tobacco Use    Smoking status: Never     Passive exposure: Never    Smokeless tobacco: Never   Substance Use Topics    Alcohol use: Yes     Comment: socially      Wt Readings from Last 1 Encounters:   01/26/24 58.8 kg (129 lb 11.2 oz)        Anesthesia Evaluation   Pt has had prior anesthetic. Type: General.    No history of anesthetic complications       ROS/MED HX  ENT/Pulmonary:  - neg pulmonary ROS     Neurologic:  - neg neurologic ROS     Cardiovascular:     (+)  hypertension- -   -  - -                                      METS/Exercise Tolerance:     Hematologic:  - neg hematologic  ROS     Musculoskeletal:  - neg musculoskeletal ROS     GI/Hepatic: Comment: ileostomy    (+) GERD, Asymptomatic on medication,                  Renal/Genitourinary:  - neg Renal ROS   (+) renal disease, type: CRI, Pt does not require dialysis,           Endo:  - neg endo ROS     Psychiatric/Substance Use:  - neg psychiatric ROS     Infectious Disease:  - neg infectious disease ROS      Malignancy:  - neg malignancy ROS     Other:  - neg other ROS          Physical Exam    Airway        Mallampati: II   TM distance: > 3 FB   Neck ROM: full   Mouth opening: > 3 cm    Respiratory Devices and Support         Dental  no notable dental history         Cardiovascular   cardiovascular exam normal          Pulmonary   pulmonary exam normal                OUTSIDE LABS:  CBC:   Lab Results   Component Value Date    WBC 3.7 (L) 11/15/2023    WBC 3.8 (L) 11/08/2023    HGB 9.5 (L) 11/15/2023    HGB 8.6 (L) 11/08/2023    HCT 29.3 (L) 11/15/2023    HCT 24.5 (L) 11/08/2023     11/15/2023     11/08/2023     BMP:   Lab Results   Component Value Date     (L) 01/04/2024     (L) 12/19/2023    POTASSIUM 5.0 01/04/2024    POTASSIUM 3.5 12/19/2023    CHLORIDE 95 (L) 01/04/2024    CHLORIDE 93 (L) 12/19/2023    CO2 24 01/04/2024    CO2 27 12/19/2023    BUN 23.4 (H) 01/04/2024    BUN 21.3 12/19/2023    CR 1.91 (H) 01/04/2024    CR 2.06 (H) 12/19/2023     (H) 01/04/2024     (H) 12/19/2023     COAGS:   Lab Results   Component Value Date    INR 1.01 12/01/2019     POC:   Lab Results   Component Value Date     (H) 05/06/2016     HEPATIC:   Lab Results   Component Value Date    ALBUMIN 4.2 01/04/2024    PROTTOTAL 6.7 11/15/2023    ALT 42 11/15/2023    AST 45 11/15/2023    ALKPHOS 76 11/15/2023    BILITOTAL 1.0 11/15/2023     OTHER:   Lab Results   Component Value Date    LACT 0.9 06/28/2023    A1C 6.1 (H) 08/21/2023    GRICEL 9.7 01/04/2024    PHOS 4.1 01/04/2024    MAG 1.4 (L) 11/15/2023    LIPASE 71 (H) 06/28/2023    AMYLASE 79 07/15/2008    TSH 1.17 01/21/2020    CRP 3.9 11/30/2021    SED 48 (H) 11/15/2023       Anesthesia Plan    ASA Status:  3    NPO Status:  NPO Appropriate    Anesthesia Type: General.     - Airway: LMA   Induction: Intravenous, Propofol.   Maintenance: Balanced.        Consents    Anesthesia Plan(s) and associated risks, benefits, and realistic alternatives  discussed. Questions answered and patient/representative(s) expressed understanding.     - Discussed:     - Discussed with:  Patient            Postoperative Care    Pain management: IV analgesics, Oral pain medications, Multi-modal analgesia.   PONV prophylaxis: Ondansetron (or other 5HT-3), Dexamethasone or Solumedrol     Comments:               Schuyler Aguilar MD    I have reviewed the pertinent notes and labs in the chart from the past 30 days and (re)examined the patient.  Any updates or changes from those notes are reflected in this note.     # Hyponatremia: Lowest Na = 129 mmol/L in last 30 days, will monitor as appropriate

## 2024-01-26 NOTE — OP NOTE
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  The patient was placed on the table in supine position.  IV anesthetic was administered and her airway was controlled with an LMA.  The bilateral neck and upper chest were prepped and draped in standard sterile fashion.  We anesthetized the skin of the upper left chest.  The patient was placed in Trendelenburg.  We cannulated the subclavian vein percutaneously with a needle on the first attempt.  We then passed a wire through the needle into the subclavian vein.  We then passed a dilator over the wire.  The catheter was then placed through the catheter introducer and threaded, using fluoroscopy until the tip of the catheter was at the atrial caval junction.  The catheter introducer was removed.  We then made a pocket in the subcutaneous tissue after anesthetizing the subcutaneous tissue with local anesthetic.  We flushed the port with injectable saline.  We attached the catheter to the port and secured it in place with the catheter hub.  We then closed the subcutaneous tissue with 3-0 interrupted Vicryl sutures.  The skin was closed with a running 4-0 Vicryl subcuticular suture and Dermabond.  A final flush of heparinized saline was injected into the port using a Bishop needle.  The patient tolerated the procedure well.  All sponge and instrument counts were correct.    Ervin Noyola MD

## 2024-01-26 NOTE — ANESTHESIA POSTPROCEDURE EVALUATION
"Patient: Gely Keene    Procedure: Procedure(s):  INSERTION, VASCULAR ACCESS PORT       Anesthesia Type:  General    Note:     Postop Pain Control: Uneventful            Sign Out: Well controlled pain   PONV: No   Neuro/Psych: Uneventful            Sign Out: Acceptable/Baseline neuro status   Airway/Respiratory: Uneventful            Sign Out: Acceptable/Baseline resp. status   CV/Hemodynamics: Uneventful            Sign Out: Acceptable CV status   Other NRE: NONE   DID A NON-ROUTINE EVENT OCCUR? No    Event details/Postop Comments:  Patient reported that her jaw was \"locked\" and tat her posterior neck was sore.  She is able to open her mouth approximately 4 cm, but feels that it hurts to open farther.  She is able to move her neck without obvious difficulty but reports that it is sore.  Given ketorolac and ice pack.  Anticipate resolution.           Last vitals:  Vitals Value Taken Time   /94 01/26/24 1330   Temp 96.7  F (35.9  C) 01/26/24 1245   Pulse 82 01/26/24 1331   Resp 13 01/26/24 1331   SpO2 96 % 01/26/24 1331   Vitals shown include unfiled device data.    Electronically Signed By: Schuyler Aguilar MD  January 26, 2024  1:33 PM  " On omeprazole at home  - c/w equivalent pantoprazole 40mg qd  constipation - bowel regimen

## 2024-01-26 NOTE — DISCHARGE INSTRUCTIONS
HOME CARE FOLLOWING MINOR SURGERY  AMBER Mendoza, ACOSTA Menchaca C. Pratt, J. Shaheen    RESULTS:  If a biopsy of tissue was done, you may call for your final pathology report after 1p.m. two working days after surgery.  Otherwise, this will be reviewed with you at time of phone follow-up (described below).    INCISIONAL CARE:  If you have a dressing in place, keep clean and dry for 48 hours; you may replace the gauze if it becomes soiled.  After 48 hours you may remove the dressing and shower.  Do not submerse incision in water for 1 week.  If you have a Dermabond dressing (a type of skin glue), you may shower immediately.  Sutures which are beneath the skin will absorb and do not need to be removed.  Sutures you can see should be removed at your surgeon's office near 2 weeks postop, unless otherwise instructed.  If present, leave the steri-strips (white paper tapes) in place for 14 days after surgery.  If present, leave Dermabond glue in place until it wears/flakes off.  You may expect a small amount of drainage from your incision.  A lump/ridge under the incision is normal and will gradually resolve.  If it becomes red or very uncomfortable, contact the nurse at your surgeon's office to discuss whether this needs to be evaluated.    ACTIVITY:  Cautiously resume exercise and strenuous activities such as jogging, tennis, aerobics, etc. Also, be careful of stretching activities which affect the area of surgery for two weeks.    DIET:  Start with liquids and gradually resume your regular diet as tolerated.  Increased fluid intake is recommended. While taking pain medications, consider use of a stool softener, increase your fiber in your diet, or add a fiber supplement (like Metamucil, Citrucel) to help prevent constipation - a possible side effect of pain medications.    DISCOMFORT:  Local anesthetic placed at surgery should provide relief for 4-8 hours.  Begin taking pain pills before  discomfort is severe.  Take the pain medication with some food, when possible, to minimize side effects.  Intermittent use of ice packs may help during the first 1-3 weeks after surgery.  Expect gradual improvement.    Over-the-counter anti-inflammatory medications (i.e. Ibuprofen/Advil/Motrin or Naprosyn/Aleve) may be used per package instructions in addition to or while tapering off the narcotic pain medications to decrease swelling and sensitivity.  DO NOT TAKE these Anti-inflammatory medications if your primary physician has advised against doing so, or if you have acid reflux, ulcer, or bleeding disorder, or take blood-thinner medications.  Call your primary physician or the surgery office if you have medication questions.      FOLLOW-UP AFTER SURGERY:  -Our office will contact you approximately 2-3 weeks after surgery to check on your progress and answer any questions you may have.  If you are doing well, you will not need to return for an office appointment.  If any concerns are identified over the phone, we will help you make an appointment to see a provider.    -If you have not received a phone call, have any questions or concerns, or would like to be seen, please call us at [phone].  We are located at: [address], [address]; [place] [address]    -CONTACT US IF THE FOLLOWING DEVELOPS:   1. A fever that is above 101     2. Increased redness, warmth, drainage, bleeding, or swelling.   3. Pain that is not relieved by rest/ice and your prescription.   4.  Increasing pain after 48 hours.   5. Drainage that is thick, cloudy, yellow, green or white.   6. Any other questions or concerns.      FREQUENTLY ASKED QUESTIONS:    Q:  How should my incision look?    A:  Normally your incision will appear slightly swollen with light redness directly along the incision itself as it heals.  It may feel like a bump or ridge as the healing/scarring happens, and over time (3-4 months) this bump or ridge feeling  should slowly go away.  In general, clear or pink watery drainage can be normal at first as your incision heals, but should decrease over time.    Q:  How do I know if my incision is infected?  A:  Look at your incision for signs of infection, like redness around the incision spreading to surrounding skin, or drainage of cloudy or foul-smelling drainage.  If you feel warm, check your temperature to see if you are running a fever.    **If any of these things occur, please notify the nurse at our office.  We may need you to come into the office for an incision check.      Q:  How do I take care of my incision?  A:  If you have a dressing in place - Starting the day after surgery, replace the dressing 1-2 times a day until there is no further drainage from the incision.  At that time, a dressing is no longer needed.  Try to minimize tape on the skin if irritation is occurring at the tape sites.  If you have significant irritation from tape on the skin, please call the office to discuss other method of dressing your incision.    Small pieces of tape called  steri-strips  may be present directly overlying your incision; these may be removed 10 days after surgery unless otherwise specified by your surgeon.  If these tapes start to loosen at the ends, you may trim them back until they fall off or are removed.    A:  If you had  Dermabond  tissue glue used as a dressing (this causes your incision to look shiny with a clear covering over it) - This type of dressing wears off with time and does not require more dressings over the top unless it is draining around the glue as it wears off.  Do not apply ointments or lotions over the incisions until the glue has completely worn off.    Q:  There is a piece of tape or a sticky  lead  still on my skin.  Can I remove this?  A:  Sometimes the sticky  leads  used for monitoring during surgery or for evaluation in the emergency department are not all removed while you are in the  hospital.  These sometimes have a tab or metal dot on them.  You can easily remove these on your own, like taking off a band-aid.  If there is a gel substance under the  lead , simply wipe/clean it off with a washcloth or paper towel.      Q:  What can I do to minimize constipation (very hard stools, or lack of stools)?  A:  Stay well hydrated.  Increase your dietary fiber intake or take a fiber supplement -with plenty of water.  Walk around frequently.  You may consider an over-the-counter stool-softener.  Your Pharmacist can assist you with choosing one that is stocked at your pharmacy.  Constipation is also one of the most common side effects of pain medication.  If you are using pain medication, be pro-active and try to PREVENT problems with constipation by taking the steps above BEFORE constipation becomes a problem.    Q:  What do I do if I need more pain medications?  A:  Call the office to receive refills.  Be aware that certain pain meds cannot be called into a pharmacy and actually require a paper prescription.  A change may be made in your pain med as you progress thru your recovery period or if you have side effects to certain meds.    --Pain meds are NOT refilled after 5pm on weekdays, and NOT AT ALL on the weekends, so please look ahead to prevent problems.      Q:  Why am I having a hard time sleeping now that I am at home?  A:  Many medications you receive while you are in the hospital can impact your sleep for a number of days after your surgery/hospitalization.  Decreased level of activity and naps during the day may also make sleeping at night difficult.  Try to minimize day-time naps, and get up frequently during the day to walk around your home during your recovery time.  Sleep aides may be of some help, but are not recommended for long-term use.      Q:  I am having some back discomfort.  What should I do?  A:  This may be related to certain positioning that was required for your surgery,  extended periods of time in bed, or other changes in your overall activity level.  You may try ice, heat, acetaminophen, or ibuprofen to treat this temporarily.  Note that many pain medications have acetaminophen in them and would state this on the prescription bottle.  Be sure not to exceed the maximum of 4000mg per day of acetaminophen.     **If the pain you are having does not resolve, is severe, or is a flare of back pain you have had on other occasions prior to surgery, please contact your primary physician for further recommendations or for an appointment to be examined at their office.    Q:  Why am I having headaches?  A:  Headaches can be caused by many things:  caffeine withdrawal, use of pain meds, dehydration, high blood pressure, lack of sleep, over-activity/exhaustion, flare-up of usual migraine headaches.  If you feel this is related to muscle tension (a band-like feeling around the head, or a pressure at the low-back of the head) you may try ice or heat to this area.  You may need to drink more fluids (try electrolyte drink like Gatorade), rest, or take your usual migraine medications.   **If your headaches do not resolve, worsen, are accompanied by other symptoms, or if your blood pressure is high, please call your primary physician for recommendation and/or examination.    Q:  I am unable to urinate.  What do I do?  A:  A small percentage of people can have difficulty urinating initially after surgery.  This includes being able to urinate only a very small amount at a time and feeling discomfort or pressure in the very low abdomen.  This is called  urinary retention , and is actually an urgent situation.  Proceed to your nearest Emergency department for evaluation (not an Urgent Care Center).  Sometimes the bladder does not work correctly after certain medications you receive during surgery, or related to certain procedures.  You may need to have a catheter placed until your bladder recovers.  When  planning to go to an Emergency department, it may help to call the ER to let them know you are coming in for this problem after a surgery.  This may help you get in quicker to be evaluated.  **If you have symptoms of a urinary tract infection, please contact your primary physician for the proper evaluation and treatment.        If you have other questions, please call the office Monday thru Friday between 8am and 4:30pm to discuss with the nurse or physician assistant.  [phone]    There is a surgeon ON CALL on weekday evenings and over the weekend in case of urgent need only, and may be contacted at the same number.    If you are having an emergency, call 911 or proceed to your nearest emergency department.    You received Toradol, an IV form of Ibuprofen (Motrin) at 2:30pm.  Do not take any Ibuprofen products until 8:30pm.

## 2024-01-26 NOTE — PROGRESS NOTES
"Notified MD at 1330 PM regarding  neck pain, jaw feels \"locked\" and BP still high after 30mg labetolol .      Spoke with: Dr Aguilar    Orders were obtained.    Comments: give pain meds for neck pain and keep giving labetolol for BP      Preop /81  "

## 2024-02-28 ENCOUNTER — TRANSFERRED RECORDS (OUTPATIENT)
Dept: HEALTH INFORMATION MANAGEMENT | Facility: CLINIC | Age: 84
End: 2024-02-28
Payer: MEDICARE

## 2024-03-01 ENCOUNTER — PATIENT OUTREACH (OUTPATIENT)
Dept: CARE COORDINATION | Facility: CLINIC | Age: 84
End: 2024-03-01
Payer: MEDICARE

## 2024-03-01 DIAGNOSIS — K21.9 GASTROESOPHAGEAL REFLUX DISEASE: ICD-10-CM

## 2024-03-01 NOTE — LETTER
[facility]ORD[facility]  [address] [place] [address]     March 1, 2024    Gely Keene  [address]  [facility] [address]    Dear Gely,  Your Care Team congratulates you on your journey to maintain wellness. This document will help guide you on your journey to maintain a healthy lifestyle.  You can use this to help you overcome any barriers you may encounter.  If you should have any questions or concerns, you can contact the members of your Care Team or contact your Primary Care Clinic for assistance.     Health Maintenance  Health Maintenance Reviewed: Due/Overdue   Health Maintenance Due   Topic Date Due    RSV VACCINE (Pregnancy & 60+) (1 - 1-dose 60+ series) Never done    ZOSTER IMMUNIZATION (1 of 2) 01/04/2013    DTAP/TDAP/TD IMMUNIZATION (3 - Td or Tdap) 10/24/2021    MICROALBUMIN  02/28/2022    HEMOGLOBIN  05/15/2024      My Access Plan  Medical Emergency 911   Primary Clinic Line [facility] - [phone]   24 Hour Appointment Line [phone] or  [phone]) (toll-free)   24 Hour Nurse Line [phone] (toll-free)   Preferred Urgent Care [facility] - [place], [phone]   [facility]  [phone]   Preferred Pharmacy [facility] - [place], [place] - [address]     Behavioral Health Crisis Line The National Suicide Prevention Lifeline at 1-728.649.9197 or 911     My Care Team Members  Patient Care Team         Relationship Specialty Notifications Start End    Hien Booth MD PCP - General Internal Medicine - Pediatrics  6/23/23     Merged    Phone: [phone] Fax: [phone]         [address] [place] [address]    Shana Alaniz MD MD Colon and Rectal Surgery  5/23/16     Phone: [phone] Fax: [phone]         COLON RECTAL SURGERY [address] [facility] [address]    Hien Booth  MD Camilo Assigned PCP   11/24/19     Phone: [phone] Fax: [phone] [address] [place] [address]    Ashely Solis, RN Personal Advocate & Liaison (PAL)  Admissions 12/12/19     phone: [phone] fax: [phone]    Hien Booth MD  Internal Medicine  6/23/23     Merged    Phone: [phone] Fax: [phone] [address] [place] [address]    Liliam Alicea, RN Lead Care Coordinator  Admissions 11/10/23 3/1/24    Phone: [phone]         Lina Ortiz MD MD Rheumatology  1/17/24     Phone: [phone] Fax: [phone]         [address] [place] [address]    Mk Barraza MD MD Rheumatology  1/17/24     Phone: [phone] Fax: [phone]         [address] [place] [address]            .         Advance Care Plans/Directives Type:      We notice that you do not have an Advance Directive on file. Upon completion of your Health Care Directive, please bring a copy with you to your next office visit.    It has been your Clinic Care Team's pleasure to work with you on your goals.    Regards,  Your Clinic Care Team

## 2024-03-01 NOTE — PROGRESS NOTES
Clinic Care Coordination Contact    Assessment: Care Coordinator contacted patient for 2 month follow up.  Patient has continued to follow the plan of care and assessment is negative for any new needs or concerns.    Enrollment status: Graduated.      Plan: No further outreaches at this time.  Patient will continue to follow the plan of care.  If new needs arise a new Care Coordination referral may be placed.  RNCC will send clinic care coordination graduation letter to patient via [employer].     Liliam Alicea RN Care Coordinator  [facility] [place]  Email: [email or web address]  Phone: [phone]

## 2024-03-18 ENCOUNTER — OFFICE VISIT (OUTPATIENT)
Dept: PEDIATRICS | Facility: CLINIC | Age: 84
End: 2024-03-18
Payer: MEDICARE

## 2024-03-18 VITALS
SYSTOLIC BLOOD PRESSURE: 138 MMHG | BODY MASS INDEX: 24.62 KG/M2 | HEIGHT: 60 IN | WEIGHT: 125.4 LBS | OXYGEN SATURATION: 99 % | TEMPERATURE: 97.5 F | RESPIRATION RATE: 16 BRPM | HEART RATE: 66 BPM | DIASTOLIC BLOOD PRESSURE: 68 MMHG

## 2024-03-18 DIAGNOSIS — Z93.2 ILEOSTOMY STATUS (H): ICD-10-CM

## 2024-03-18 DIAGNOSIS — R42 DIZZINESS: ICD-10-CM

## 2024-03-18 DIAGNOSIS — I12.9 CKD STAGE 4 SECONDARY TO HYPERTENSION (H): Primary | ICD-10-CM

## 2024-03-18 DIAGNOSIS — N18.4 CKD STAGE 4 SECONDARY TO HYPERTENSION (H): Primary | ICD-10-CM

## 2024-03-18 DIAGNOSIS — I10 HYPERTENSION GOAL BP (BLOOD PRESSURE) < 140/90: ICD-10-CM

## 2024-03-18 DIAGNOSIS — Z90.49 S/P TOTAL COLECTOMY: ICD-10-CM

## 2024-03-18 DIAGNOSIS — M32.9 SYSTEMIC LUPUS ERYTHEMATOSUS, UNSPECIFIED SLE TYPE, UNSPECIFIED ORGAN INVOLVEMENT STATUS (H): ICD-10-CM

## 2024-03-18 DIAGNOSIS — F41.9 ANXIETY: ICD-10-CM

## 2024-03-18 PROCEDURE — 99214 OFFICE O/P EST MOD 30 MIN: CPT | Performed by: PEDIATRICS

## 2024-03-18 RX ORDER — GABAPENTIN 300 MG/1
300 CAPSULE ORAL AT BEDTIME
COMMUNITY
Start: 2024-03-18

## 2024-03-18 ASSESSMENT — PAIN SCALES - GENERAL: PAINLEVEL: NO PAIN (0)

## 2024-03-18 NOTE — PROGRESS NOTES
Assessment & Plan       ICD-10-CM    1. CKD stage 4 secondary to hypertension (H)  I12.9 Appreciate care from Dr Singer - reviewed most recent labs - kidney function is stable    N18.4       2. S/p total colectomy on 4/22/16 by Shana Alaniz MD  Z90.49 No new ileostomy concerns  Dehydration has been an issue intermittently - patient working diligently on staying hydrated.  She now had a port placed and has standing IVF orders should she become dehydrated      3. Systemic lupus erythematosus, unspecified SLE type, unspecified organ involvement status (H)  M32.9 Follows with Dr Tabares - next visit is next month      4. Ileostomy status (H)  Z93.2 See above      5. Hypertension goal BP (blood pressure) < 140/90  I10 Well controlled, continue current medications        6. Anxiety  F41.9 Mood in a better place than at our last few visits - continue cymbalta at current dosing schedule      7. Dizziness  R42 More of an issue recently - believes that she has meclizine at home - she will check and alert me if needing refills          Aracelis Hong is a 84 year old, presenting for the following health issues:  Follow Up        3/18/2024     3:54 PM   Additional Questions   Roomed by Isabel   Accompanied by self         3/18/2024     3:54 PM   Patient Reported Additional Medications   Patient reports taking the following new medications vitamin E     History of Present Illness       Reason for visit:  Dehydration and follow up from port    She eats 0-1 servings of fruits and vegetables daily.She consumes 0 sweetened beverage(s) daily.She exercises with enough effort to increase her heart rate 9 or less minutes per day.  She exercises with enough effort to increase her heart rate 3 or less days per week.   She is taking medications regularly.     Working on eating and drinking    Gabapentin at night - changed from 600mg at night to 400mg at night and it went poorly, so put self back to 600mg    SLE - following  with Dr Tabares    Skin cancer on nose - now on Aldara cream -     Follows with Dr Singer for CKD 4    S/p colectomy - trouble with staying hydrated with loss of large intestine.  Has port in place and IVF orders waiting when she needs them.      Appetite has been in a good place    Fluids are in a better place - has a bottle in the fridge that has 64 ounces and this helps her measure out her daily intake    Working on quilting with her granddaughter    Has her health care directive documents at home and will bring to clinic    Mood is in a better spot    Meclizine - thinks she still has some at home - will check and get back to us              Objective    /68 (BP Location: Right arm, Patient Position: Sitting, Cuff Size: Adult Regular)   Pulse 66   Temp 97.5  F (36.4  C) (Temporal)   Resp 16   Ht 1.524 m (5')   Wt 56.9 kg (125 lb 6.4 oz)   SpO2 99%   BMI 24.49 kg/m    Body mass index is 24.49 kg/m .  Wt Readings from Last 4 Encounters:   03/18/24 56.9 kg (125 lb 6.4 oz)   01/26/24 58.8 kg (129 lb 11.2 oz)   01/09/24 56.2 kg (123 lb 14.4 oz)   12/14/23 57.9 kg (127 lb 11.2 oz)       Physical Exam   GENERAL: alert and no distress  RESP: lungs clear to auscultation - no rales, rhonchi or wheezes  CV: regular rate and rhythm, normal S1 S2, no S3 or S4, no murmur, click or rub, no peripheral edema   PSYCH: mentation appears normal, affect normal/bright  Port site in left upper chest well healed without surrounding erythema or tenderness    Labs reviewed in Epic        Signed Electronically by: Hien Booth MD

## 2024-03-18 NOTE — PATIENT INSTRUCTIONS
Keep up your wonderful water intake!  This is great!    Keep walking as you are able    I look forward to seeing you in August - we'll help you set this up

## 2024-04-24 ENCOUNTER — TRANSFERRED RECORDS (OUTPATIENT)
Dept: HEALTH INFORMATION MANAGEMENT | Facility: CLINIC | Age: 84
End: 2024-04-24
Payer: MEDICARE

## 2024-04-24 LAB
ALT SERPL-CCNC: 15 IU/L (ref 5–35)
AST SERPL-CCNC: 28 U/L (ref 5–34)
CREATININE (EXTERNAL): 2.83 MG/DL (ref 0.5–1.3)
GFR ESTIMATED (EXTERNAL): 16.8 ML/MIN/1.73M2

## 2024-04-30 DIAGNOSIS — E78.5 HYPERLIPIDEMIA LDL GOAL <160: ICD-10-CM

## 2024-04-30 RX ORDER — ATORVASTATIN CALCIUM 20 MG/1
20 TABLET, FILM COATED ORAL DAILY
Qty: 90 TABLET | Refills: 3 | OUTPATIENT
Start: 2024-04-30

## 2024-05-02 DIAGNOSIS — N18.9 CHRONIC KIDNEY DISEASE, UNSPECIFIED CKD STAGE: Primary | ICD-10-CM

## 2024-05-06 ENCOUNTER — OFFICE VISIT (OUTPATIENT)
Dept: PEDIATRICS | Facility: CLINIC | Age: 84
End: 2024-05-06
Payer: MEDICARE

## 2024-05-06 ENCOUNTER — TELEPHONE (OUTPATIENT)
Dept: PEDIATRICS | Facility: CLINIC | Age: 84
End: 2024-05-06

## 2024-05-06 VITALS
BODY MASS INDEX: 24.61 KG/M2 | WEIGHT: 126 LBS | DIASTOLIC BLOOD PRESSURE: 67 MMHG | TEMPERATURE: 97.9 F | OXYGEN SATURATION: 100 % | SYSTOLIC BLOOD PRESSURE: 174 MMHG | RESPIRATION RATE: 18 BRPM | HEART RATE: 75 BPM

## 2024-05-06 DIAGNOSIS — I12.9 CKD STAGE 4 SECONDARY TO HYPERTENSION (H): Primary | ICD-10-CM

## 2024-05-06 DIAGNOSIS — R94.4 DECREASED CREATININE CLEARANCE: ICD-10-CM

## 2024-05-06 DIAGNOSIS — N18.4 CKD STAGE 4 SECONDARY TO HYPERTENSION (H): Primary | ICD-10-CM

## 2024-05-06 DIAGNOSIS — N18.4 STAGE 4 CHRONIC KIDNEY DISEASE (H): ICD-10-CM

## 2024-05-06 DIAGNOSIS — E86.0 DEHYDRATION: ICD-10-CM

## 2024-05-06 DIAGNOSIS — I10 HYPERTENSION GOAL BP (BLOOD PRESSURE) < 140/90: ICD-10-CM

## 2024-05-06 DIAGNOSIS — N30.00 ACUTE CYSTITIS WITHOUT HEMATURIA: ICD-10-CM

## 2024-05-06 LAB
ALBUMIN UR-MCNC: 10 MG/DL
ANION GAP SERPL CALCULATED.3IONS-SCNC: 13 MMOL/L (ref 7–15)
APPEARANCE UR: CLEAR
BACTERIA #/AREA URNS HPF: ABNORMAL /HPF
BILIRUB UR QL STRIP: NEGATIVE
BUN SERPL-MCNC: 34.4 MG/DL (ref 8–23)
CALCIUM SERPL-MCNC: 8.6 MG/DL (ref 8.8–10.2)
CHLORIDE SERPL-SCNC: 99 MMOL/L (ref 98–107)
COLOR UR AUTO: ABNORMAL
CREAT SERPL-MCNC: 2.33 MG/DL (ref 0.51–0.95)
DEPRECATED HCO3 PLAS-SCNC: 16 MMOL/L (ref 22–29)
EGFRCR SERPLBLD CKD-EPI 2021: 20 ML/MIN/1.73M2
GLUCOSE SERPL-MCNC: 111 MG/DL (ref 70–99)
GLUCOSE UR STRIP-MCNC: NEGATIVE MG/DL
HGB UR QL STRIP: NEGATIVE
KETONES UR STRIP-MCNC: NEGATIVE MG/DL
LEUKOCYTE ESTERASE UR QL STRIP: ABNORMAL
NITRATE UR QL: NEGATIVE
PH UR STRIP: 6 [PH] (ref 5–7)
POTASSIUM SERPL-SCNC: 3.7 MMOL/L (ref 3.4–5.3)
RBC URINE: 1 /HPF
SODIUM SERPL-SCNC: 128 MMOL/L (ref 135–145)
SP GR UR STRIP: 1.01 (ref 1–1.03)
SQUAMOUS EPITHELIAL: 5 /HPF
UROBILINOGEN UR STRIP-MCNC: NORMAL MG/DL
WBC URINE: 74 /HPF

## 2024-05-06 PROCEDURE — 99215 OFFICE O/P EST HI 40 MIN: CPT | Mod: 25 | Performed by: PHYSICIAN ASSISTANT

## 2024-05-06 PROCEDURE — 80048 BASIC METABOLIC PNL TOTAL CA: CPT | Performed by: PHYSICIAN ASSISTANT

## 2024-05-06 PROCEDURE — 96361 HYDRATE IV INFUSION ADD-ON: CPT | Performed by: PHYSICIAN ASSISTANT

## 2024-05-06 PROCEDURE — 36415 COLL VENOUS BLD VENIPUNCTURE: CPT | Performed by: PHYSICIAN ASSISTANT

## 2024-05-06 PROCEDURE — 87086 URINE CULTURE/COLONY COUNT: CPT | Mod: GZ | Performed by: PHYSICIAN ASSISTANT

## 2024-05-06 PROCEDURE — 81001 URINALYSIS AUTO W/SCOPE: CPT | Performed by: PHYSICIAN ASSISTANT

## 2024-05-06 PROCEDURE — 96360 HYDRATION IV INFUSION INIT: CPT | Performed by: PHYSICIAN ASSISTANT

## 2024-05-06 RX ADMIN — Medication 1000 ML: at 15:23

## 2024-05-06 NOTE — TELEPHONE ENCOUNTER
Pt's daughter Diana.  States pt has standing order for infusion and  needs infusion.   However, pt is unable to get an appt until May 20th at the infusion center.   States pt has grandson's wedding to attend at the end the month and would like to have infusion every week until the wedding to help pt feel better.     Daughter is requesting the following.    Can pt get infusion at the [facility]  Can pt use her port for the infusion.   Can pt get infusion every week.       Routing to PCP for review and advise.     Sabina Clay RN

## 2024-05-06 NOTE — TELEPHONE ENCOUNTER
Forwarded to [facility] PAL - can you call Diana and help get Gely for IVF at [facility] this week (she has standing orders at infusion center) and then help with scheduling regular visits at infusion center?     I'm not sure about the port question - can [facility] use port?      Many thanks,  Hien Booth MD

## 2024-05-06 NOTE — TELEPHONE ENCOUNTER
Spoke with [facility]. They do not access ports.     Will discuss with Daughter then follow up with [facility] if needed.   I left a voicemail for Diana to call me back directly. Would recommend other locations.

## 2024-05-06 NOTE — PROGRESS NOTES
Acute [facility] Diagnostic Services Clinic Visit    Assessment & Plan     CKD stage 4 secondary to hypertension (H)  Dehydration  Stage 4 chronic kidney disease (H)  Hypertension goal BP (blood pressure) < 140/90   1 L normal saline bolus given today over 2 hours.  Patient tolerated well.  Renal function rechecked today showing improvement from most recent labs completed at rheumatology on 4/24/2024.  Close follow-up with primary care clinic to ensure continued positive trends of creatinine.  Also, close monitoring of blood pressure as patient is not currently on any blood pressure medications at present.  - sodium chloride 0.9% BOLUS 1,000 mL  - UA with Microscopic reflex to Culture  - Urine Culture  - Basic metabolic panel  (Ca, Cl, CO2, Creat, Gluc, K, Na, BUN)    Acute cystitis without hematuria  Decreased creatinine clearance  Asymptomatic urinary tract infection.  Will treat with cephalexin adjusted for creatinine clearance that is calculated at 16 today.  Hydration efforts encouraged.  Close follow-up with primary care clinic next week to ensure symptom improvement and blood pressure stabilization.  - cephALEXin (KEFLEX) 250 MG capsule  Dispense: 10 capsule; Refill: 0      42  minutes were spent doing chart review, history and exam, documentation and further activities per the note.      Return in about 8 days (around 5/14/2024) for Follow-up with primary care clinic.      Varsha Albert MBA, MS, PA-C  [facility]        Aracelis Hong is a 84 year old, presenting for the following health issues:  Dehydration (Fatigued, abnormal labs)        3/18/2024     3:54 PM   Additional Questions   Roomed by Isabel   Accompanied by self     HPI     Concern - Dehydration   Onset: X 2 weeks  Description: Patient feeling dehydrated, daughter noting abnormal labs.  Daughter notes altered mental status while dehydrated   Intensity: moderate  Progression of Symptoms:   "same  Accompanying Signs & Symptoms: altered mental status, fatigue, HA  Previous history of similar problem: yes, daughter notes patient can \"go down hill very quickly\"  Precipitating factors:        Worsened by: none  Alleviating factors:        Improved by: resting  Therapies tried and outcome: Ensure,  Food, Gatorade, patient not feeling like eating.  Notes food is \"tasting funny\".  Usually takes Ensure at night, hard for patient to drink all these fluids.  Notes she has been sleeping a lot, going to bed around 22:00 and waking at 12:00, late in the day to push fluids per patient.  Difficult for patient to get up sooner in the day.    Recent creatinine drawn 4/24/2024 [facility] Dr[facility] showed a creatinine of 2.8      Review of Systems  Constitutional, HEENT, cardiovascular, pulmonary, GI, , musculoskeletal, neuro, skin, endocrine and psych systems are negative, except as otherwise noted.      Objective    BP (!) 174/67 (BP Location: Left arm, Patient Position: Semi-Naqvi's)   Pulse 75   Temp 97.9  F (36.6  C) (Oral)   Resp 18   Wt 57.2 kg (126 lb)   SpO2 100%   BMI 24.61 kg/m    Body mass index is 24.61 kg/m .  Physical Exam   GENERAL: alert and no distress  EYES: Eyes grossly normal to inspection, PERRL and conjunctivae and sclerae normal  RESP: lungs clear to auscultation - no rales, rhonchi or wheezes  CV: regular rate and rhythm, normal S1 S2, no S3 or S4, no murmur, click or rub, no peripheral edema  MS: no gross musculoskeletal defects noted, no edema  SKIN: no suspicious lesions or rashes  NEURO: Normal strength and tone, mentation intact and speech normal  PSYCH: mentation appears normal, affect normal/bright    Results for orders placed or performed in visit on 05/06/24   Basic metabolic panel  (Ca, Cl, CO2, Creat, Gluc, K, Na, BUN)     Status: Abnormal   Result Value Ref Range    Sodium 128 (L) 135 - 145 mmol/L    Potassium 3.7 3.4 - 5.3 mmol/L    " Chloride 99 98 - 107 mmol/L    Carbon Dioxide (CO2) 16 (L) 22 - 29 mmol/L    Anion Gap 13 7 - 15 mmol/L    Urea Nitrogen 34.4 (H) 8.0 - 23.0 mg/dL    Creatinine 2.33 (H) 0.51 - 0.95 mg/dL    GFR Estimate 20 (L) >60 mL/min/1.73m2    Calcium 8.6 (L) 8.8 - 10.2 mg/dL    Glucose 111 (H) 70 - 99 mg/dL   UA with Microscopic reflex to Culture     Status: Abnormal    Specimen: Urine, Clean Catch   Result Value Ref Range    Color Urine Light Yellow Colorless, Straw, Light Yellow, Yellow    Appearance Urine Clear Clear    Glucose Urine Negative Negative mg/dL    Bilirubin Urine Negative Negative    Ketones Urine Negative Negative mg/dL    Specific Gravity Urine 1.009 1.003 - 1.035    Blood Urine Negative Negative    pH Urine 6.0 5.0 - 7.0    Protein Albumin Urine 10 (A) Negative mg/dL    Urobilinogen Urine Normal Normal, 2.0 mg/dL    Nitrite Urine Negative Negative    Leukocyte Esterase Urine Large (A) Negative    Bacteria Urine Few (A) None Seen /HPF    RBC Urine 1 <=2 /HPF    WBC Urine 74 (H) <=5 /HPF    Squamous Epithelials Urine 5 (H) <=1 /HPF    Narrative    Urine Culture ordered based on laboratory criteria           Signed Electronically by: Varsha Albert PA-C

## 2024-05-06 NOTE — TELEPHONE ENCOUNTER
Discussed with Diana & Varsha Albert NP at the [facility].     Patient is ok with not using her port for this infusion.   [facility] can take the patient. Check in at 1:30pm.     Diana agrees with the plan. She will update and bring the patient.

## 2024-05-07 ENCOUNTER — MYC MEDICAL ADVICE (OUTPATIENT)
Dept: PEDIATRICS | Facility: CLINIC | Age: 84
End: 2024-05-07
Payer: MEDICARE

## 2024-05-07 ENCOUNTER — TELEPHONE (OUTPATIENT)
Dept: PEDIATRICS | Facility: CLINIC | Age: 84
End: 2024-05-07
Payer: MEDICARE

## 2024-05-07 DIAGNOSIS — I12.9 CKD STAGE 4 SECONDARY TO HYPERTENSION (H): Primary | ICD-10-CM

## 2024-05-07 DIAGNOSIS — N18.4 CKD STAGE 4 SECONDARY TO HYPERTENSION (H): Primary | ICD-10-CM

## 2024-05-07 LAB — BACTERIA UR CULT: NORMAL

## 2024-05-07 NOTE — TELEPHONE ENCOUNTER
Kasia from nephrology clinic calling. Wanting to know if sodium was drawn before or after patient received fluids.     RN reviewed documentation; no timing discussed in note.  Recommended Kasia speak with [facility] where patient was seen. Transferred Kasia and provided [facility] phone number.      Eda SANTIAGO RN on 5/7/2024 at 1:38 PM

## 2024-05-08 ENCOUNTER — TELEPHONE (OUTPATIENT)
Dept: SURGERY | Facility: CLINIC | Age: 84
End: 2024-05-08

## 2024-05-08 ENCOUNTER — LAB (OUTPATIENT)
Dept: LAB | Facility: CLINIC | Age: 84
End: 2024-05-08
Payer: MEDICARE

## 2024-05-08 DIAGNOSIS — Z00.00 ENCOUNTER FOR MEDICARE ANNUAL WELLNESS EXAM: ICD-10-CM

## 2024-05-08 DIAGNOSIS — N18.4 CKD STAGE 4 SECONDARY TO HYPERTENSION (H): ICD-10-CM

## 2024-05-08 DIAGNOSIS — R11.2 NAUSEA AND VOMITING, UNSPECIFIED VOMITING TYPE: ICD-10-CM

## 2024-05-08 DIAGNOSIS — I12.9 CKD STAGE 4 SECONDARY TO HYPERTENSION (H): ICD-10-CM

## 2024-05-08 LAB
ANION GAP SERPL CALCULATED.3IONS-SCNC: 13 MMOL/L (ref 7–15)
BUN SERPL-MCNC: 30.5 MG/DL (ref 8–23)
CALCIUM SERPL-MCNC: 8.7 MG/DL (ref 8.8–10.2)
CHLORIDE SERPL-SCNC: 104 MMOL/L (ref 98–107)
CREAT SERPL-MCNC: 2.23 MG/DL (ref 0.51–0.95)
DEPRECATED HCO3 PLAS-SCNC: 19 MMOL/L (ref 22–29)
EGFRCR SERPLBLD CKD-EPI 2021: 21 ML/MIN/1.73M2
GLUCOSE SERPL-MCNC: 107 MG/DL (ref 70–99)
MAGNESIUM SERPL-MCNC: 1.4 MG/DL (ref 1.7–2.3)
POTASSIUM SERPL-SCNC: 3.9 MMOL/L (ref 3.4–5.3)
SODIUM SERPL-SCNC: 136 MMOL/L (ref 135–145)

## 2024-05-08 PROCEDURE — 83735 ASSAY OF MAGNESIUM: CPT

## 2024-05-08 PROCEDURE — 36415 COLL VENOUS BLD VENIPUNCTURE: CPT

## 2024-05-08 PROCEDURE — 80048 BASIC METABOLIC PNL TOTAL CA: CPT

## 2024-05-08 NOTE — TELEPHONE ENCOUNTER
Name of caller: daughterDiana, Consent to Comm is on file    Reason for Call:  Question about flushing the port and if it needs to be. Diana states the port has not been used yet. Did advise her she may not hear back form anyone until tomorrow.    Surgeon:  Dr. Perez    Recent Surgery:  Yes.    If yes, when & what type:  1/26,     Left subclavian Power Port placement          Best phone number to reach pt at is: [phone]  Ok to leave a message with medical info? Yes.    Pharmacy preferred (if calling for a refill): NA

## 2024-05-09 NOTE — TELEPHONE ENCOUNTER
Allegra Ramsey PA-C  You27 minutes ago (9:25 AM)     MICHAEL Munson,    Discussed with Dr Perez - from our perspective we don't manage ports - only place and remove them.    She should check with her PCP who ordered port placement and should be managing the port.  PCP may need to contact infusion center for recommendations as well.    Allegra   Returned call to pt daughter Diana  Informed her of above - she states her PCP is the one who asked her to contact surgeon for recommendations. Informed Diana I would send message over to PCP  with message from  to get plan in place for port maintenance.     No further questions per pt daughter Diana  Jeimy Bianchi, RN on 5/9/2024 at 10:03 AM

## 2024-05-10 NOTE — TELEPHONE ENCOUNTER
"Called the infusion center ([phone]).    States that since its been close to 5 months since it was placed and has not been accessed, would recommend she be seen to assess.  If there is blood return, would just need to be flushed, otherwise if there isn't blood return they would discuss interventions.      Called the pt's daughter, Diana.    Reviewed recommendation.  States that the pt has a scheduled infusion on May 20th.  Reviewed that may still recommend they contact the infusion center next week, though.  If they show up for appt on the 20th and the port is block, it may require interventions and delay care for infusion.    Daughter agreed to contact infusion center next week, and I would sent [employer] message with contact info.      - Constantino \"Logan\" Trace (he/him/his), RN - Patient Advocate Liason (PAL)  [facility]    "

## 2024-05-11 DIAGNOSIS — E78.5 HYPERLIPIDEMIA LDL GOAL <160: ICD-10-CM

## 2024-05-13 ENCOUNTER — MYC MEDICAL ADVICE (OUTPATIENT)
Dept: PEDIATRICS | Facility: CLINIC | Age: 84
End: 2024-05-13
Payer: MEDICARE

## 2024-05-13 RX ORDER — HEPARIN SODIUM (PORCINE) LOCK FLUSH IV SOLN 100 UNIT/ML 100 UNIT/ML
5 SOLUTION INTRAVENOUS
Status: CANCELLED | OUTPATIENT
Start: 2024-05-14

## 2024-05-13 RX ORDER — ATORVASTATIN CALCIUM 20 MG/1
20 TABLET, FILM COATED ORAL DAILY
Qty: 90 TABLET | Refills: 3 | OUTPATIENT
Start: 2024-05-13

## 2024-05-13 RX ORDER — HEPARIN SODIUM,PORCINE 10 UNIT/ML
5-20 VIAL (ML) INTRAVENOUS DAILY PRN
Status: CANCELLED | OUTPATIENT
Start: 2024-05-14

## 2024-05-13 NOTE — TELEPHONE ENCOUNTER
Forwarded to [facility] PAL - can you check with infusion center to make sure the vascular access device orders that I just placed are adequate for them. I'm open to any changes they'd like me to make.    Would like to get Gely's port flushed every 4-6 weeks.    Hien Booth MD

## 2024-05-15 ENCOUNTER — LAB (OUTPATIENT)
Dept: INFUSION THERAPY | Facility: CLINIC | Age: 84
End: 2024-05-15
Attending: PEDIATRICS
Payer: MEDICARE

## 2024-05-15 DIAGNOSIS — Z93.2 ILEOSTOMY STATUS (H): ICD-10-CM

## 2024-05-15 DIAGNOSIS — E86.0 DEHYDRATION: ICD-10-CM

## 2024-05-15 DIAGNOSIS — K94.19 ALTERED BOWEL ELIMINATION DUE TO INTESTINAL OSTOMY (H): Primary | ICD-10-CM

## 2024-05-15 PROCEDURE — 250N000011 HC RX IP 250 OP 636: Mod: JZ | Performed by: PEDIATRICS

## 2024-05-15 PROCEDURE — 36593 DECLOT VASCULAR DEVICE: CPT

## 2024-05-15 RX ORDER — HEPARIN SODIUM (PORCINE) LOCK FLUSH IV SOLN 100 UNIT/ML 100 UNIT/ML
5 SOLUTION INTRAVENOUS
Status: DISCONTINUED | OUTPATIENT
Start: 2024-05-15 | End: 2024-05-15 | Stop reason: HOSPADM

## 2024-05-15 RX ORDER — HEPARIN SODIUM (PORCINE) LOCK FLUSH IV SOLN 100 UNIT/ML 100 UNIT/ML
5 SOLUTION INTRAVENOUS
Status: CANCELLED | OUTPATIENT
Start: 2024-05-15

## 2024-05-15 RX ORDER — HEPARIN SODIUM,PORCINE 10 UNIT/ML
5-20 VIAL (ML) INTRAVENOUS DAILY PRN
Status: CANCELLED | OUTPATIENT
Start: 2024-05-15

## 2024-05-15 RX ADMIN — Medication 5 ML: at 15:58

## 2024-05-15 RX ADMIN — ALTEPLASE 2 MG: 2.2 INJECTION, POWDER, LYOPHILIZED, FOR SOLUTION INTRAVENOUS at 14:51

## 2024-05-15 NOTE — PROGRESS NOTES
Infusion Nursing Note:  Gely FELIPE Yecenia presents today for port flush.    Patient seen by provider today: No   present during visit today: Not Applicable.    Note: Patient's port was placed in January and has never been flushed, as she was not instructed to do so.  Flushes well, but unable to obtain a blood return.  Instilled cathflo for 60 minutes, obtained good blood return then.      Intravenous Access:  Implanted Port.      Post Infusion Assessment:  Site patent and intact, free from redness, edema or discomfort.  No evidence of extravasations.  Access discontinued per protocol.       Discharge Plan:   [facility]  Patient will return 5/20/24 for next appointment.   Patient discharged in stable condition accompanied by: daughter.  Departure Mode: Ambulatory.      Lainey Bhandari RN

## 2024-05-20 ENCOUNTER — INFUSION THERAPY VISIT (OUTPATIENT)
Dept: INFUSION THERAPY | Facility: CLINIC | Age: 84
End: 2024-05-20
Attending: PEDIATRICS
Payer: MEDICARE

## 2024-05-20 VITALS
RESPIRATION RATE: 16 BRPM | TEMPERATURE: 97.9 F | OXYGEN SATURATION: 100 % | SYSTOLIC BLOOD PRESSURE: 149 MMHG | HEART RATE: 71 BPM | DIASTOLIC BLOOD PRESSURE: 76 MMHG

## 2024-05-20 DIAGNOSIS — N18.4 ACUTE RENAL FAILURE SUPERIMPOSED ON STAGE 4 CHRONIC KIDNEY DISEASE, UNSPECIFIED ACUTE RENAL FAILURE TYPE (H): ICD-10-CM

## 2024-05-20 DIAGNOSIS — E87.1 HYPONATREMIA: ICD-10-CM

## 2024-05-20 DIAGNOSIS — N17.9 ACUTE RENAL FAILURE SUPERIMPOSED ON STAGE 4 CHRONIC KIDNEY DISEASE, UNSPECIFIED ACUTE RENAL FAILURE TYPE (H): ICD-10-CM

## 2024-05-20 DIAGNOSIS — I12.9 CKD STAGE 4 SECONDARY TO HYPERTENSION (H): ICD-10-CM

## 2024-05-20 DIAGNOSIS — E86.0 DEHYDRATION: ICD-10-CM

## 2024-05-20 DIAGNOSIS — Z90.49 S/P TOTAL COLECTOMY: ICD-10-CM

## 2024-05-20 DIAGNOSIS — N18.4 CKD STAGE 4 SECONDARY TO HYPERTENSION (H): ICD-10-CM

## 2024-05-20 DIAGNOSIS — K94.19 ALTERED BOWEL ELIMINATION DUE TO INTESTINAL OSTOMY (H): Primary | ICD-10-CM

## 2024-05-20 PROCEDURE — 96361 HYDRATE IV INFUSION ADD-ON: CPT

## 2024-05-20 PROCEDURE — 250N000011 HC RX IP 250 OP 636: Performed by: PEDIATRICS

## 2024-05-20 PROCEDURE — 258N000003 HC RX IP 258 OP 636: Performed by: PEDIATRICS

## 2024-05-20 PROCEDURE — 96360 HYDRATION IV INFUSION INIT: CPT

## 2024-05-20 RX ORDER — HEPARIN SODIUM,PORCINE 10 UNIT/ML
5-20 VIAL (ML) INTRAVENOUS DAILY PRN
Status: CANCELLED | OUTPATIENT
Start: 2024-05-26

## 2024-05-20 RX ORDER — HEPARIN SODIUM (PORCINE) LOCK FLUSH IV SOLN 100 UNIT/ML 100 UNIT/ML
5 SOLUTION INTRAVENOUS
Status: CANCELLED | OUTPATIENT
Start: 2024-05-26

## 2024-05-20 RX ORDER — HEPARIN SODIUM (PORCINE) LOCK FLUSH IV SOLN 100 UNIT/ML 100 UNIT/ML
5 SOLUTION INTRAVENOUS
Status: DISCONTINUED | OUTPATIENT
Start: 2024-05-20 | End: 2024-05-20 | Stop reason: HOSPADM

## 2024-05-20 RX ADMIN — SODIUM CHLORIDE 1000 ML: 9 INJECTION, SOLUTION INTRAVENOUS at 13:16

## 2024-05-20 RX ADMIN — Medication 5 ML: at 15:33

## 2024-05-20 ASSESSMENT — PAIN SCALES - GENERAL: PAINLEVEL: NO PAIN (0)

## 2024-05-20 NOTE — PROGRESS NOTES
Infusion Nursing Note:  Gely Keene presents today for IV fluids.    Patient seen by provider today: No   present during visit today: Not Applicable.    Note: N/A.      Intravenous Access:  Implanted Port.    Treatment Conditions:  Not Applicable.      Post Infusion Assessment:  Patient tolerated infusion without incident.  Blood return noted pre and post infusion.  Site patent and intact, free from redness, edema or discomfort.  No evidence of extravasations.  Access discontinued per protocol.       Discharge Plan:   Discharge instructions reviewed with: Patient and Family.  Patient and/or family verbalized understanding of discharge instructions and all questions answered.  Copy of AVS reviewed with patient and/or family.  Patient will return 5/30/24 for next appointment.  Patient discharged in stable condition accompanied by: self and daughter.  Departure Mode: Ambulatory.      Simi Bucio RN

## 2024-05-21 DIAGNOSIS — E78.5 HYPERLIPIDEMIA LDL GOAL <160: ICD-10-CM

## 2024-05-21 RX ORDER — ATORVASTATIN CALCIUM 20 MG/1
20 TABLET, FILM COATED ORAL DAILY
Qty: 90 TABLET | Refills: 3 | Status: CANCELLED | OUTPATIENT
Start: 2024-05-21

## 2024-05-21 NOTE — TELEPHONE ENCOUNTER
Pt Is taking medication and is having trouble getting a med refill. Please help pt refill this medication. Please call pharmacy about refill medication, to be sure pt get it refilled. She has been trying to get a refill for 2 weeks on med. Pt is going out of town this weekend and would like it filled before then.     Please update pt once it is sen to pharmacy.    ADDIS PEDRAZA on 5/21/2024 at 12:33 PM

## 2024-05-21 NOTE — TELEPHONE ENCOUNTER
Called and spoke to patient's pharmacy. Patient last filled the atorvastatin for a 90 day supply on 4/19/24.    Spoke to patient. She states she is unable to locate her atorvastatin. Advised patient to call her pharmacy and have them run a lost medication with her insurance. Patient will call. Advised patient to reach out to clinic if she is still having issues. Patient agreeable to plan.  Veronica LOPEZ RN, BSN

## 2024-05-21 NOTE — TELEPHONE ENCOUNTER
Patient should have refills until July.  Please call the pharmacy to see if patient still has refills.  Thanks!

## 2024-05-28 ENCOUNTER — TRANSFERRED RECORDS (OUTPATIENT)
Dept: HEALTH INFORMATION MANAGEMENT | Facility: CLINIC | Age: 84
End: 2024-05-28
Payer: MEDICARE

## 2024-05-30 ENCOUNTER — INFUSION THERAPY VISIT (OUTPATIENT)
Dept: INFUSION THERAPY | Facility: CLINIC | Age: 84
End: 2024-05-30
Attending: PEDIATRICS
Payer: MEDICARE

## 2024-05-30 VITALS
TEMPERATURE: 97.8 F | OXYGEN SATURATION: 100 % | RESPIRATION RATE: 16 BRPM | HEART RATE: 65 BPM | DIASTOLIC BLOOD PRESSURE: 68 MMHG | SYSTOLIC BLOOD PRESSURE: 127 MMHG

## 2024-05-30 DIAGNOSIS — K94.19 ALTERED BOWEL ELIMINATION DUE TO INTESTINAL OSTOMY (H): ICD-10-CM

## 2024-05-30 DIAGNOSIS — N18.4 CKD STAGE 4 SECONDARY TO HYPERTENSION (H): ICD-10-CM

## 2024-05-30 DIAGNOSIS — Z90.49 S/P TOTAL COLECTOMY: ICD-10-CM

## 2024-05-30 DIAGNOSIS — E86.0 DEHYDRATION: ICD-10-CM

## 2024-05-30 DIAGNOSIS — I12.9 CKD STAGE 4 SECONDARY TO HYPERTENSION (H): ICD-10-CM

## 2024-05-30 DIAGNOSIS — N17.9 ACUTE RENAL FAILURE SUPERIMPOSED ON STAGE 4 CHRONIC KIDNEY DISEASE, UNSPECIFIED ACUTE RENAL FAILURE TYPE (H): Primary | ICD-10-CM

## 2024-05-30 DIAGNOSIS — Z93.2 ILEOSTOMY STATUS (H): ICD-10-CM

## 2024-05-30 DIAGNOSIS — N18.4 ACUTE RENAL FAILURE SUPERIMPOSED ON STAGE 4 CHRONIC KIDNEY DISEASE, UNSPECIFIED ACUTE RENAL FAILURE TYPE (H): Primary | ICD-10-CM

## 2024-05-30 DIAGNOSIS — E87.1 HYPONATREMIA: ICD-10-CM

## 2024-05-30 PROCEDURE — 96360 HYDRATION IV INFUSION INIT: CPT

## 2024-05-30 PROCEDURE — 96361 HYDRATE IV INFUSION ADD-ON: CPT

## 2024-05-30 PROCEDURE — 250N000011 HC RX IP 250 OP 636: Performed by: PEDIATRICS

## 2024-05-30 PROCEDURE — 258N000003 HC RX IP 258 OP 636: Performed by: PEDIATRICS

## 2024-05-30 RX ORDER — HEPARIN SODIUM (PORCINE) LOCK FLUSH IV SOLN 100 UNIT/ML 100 UNIT/ML
5 SOLUTION INTRAVENOUS
Status: CANCELLED | OUTPATIENT
Start: 2024-05-30

## 2024-05-30 RX ORDER — HEPARIN SODIUM,PORCINE 10 UNIT/ML
5-20 VIAL (ML) INTRAVENOUS DAILY PRN
Status: CANCELLED | OUTPATIENT
Start: 2024-05-30

## 2024-05-30 RX ORDER — HEPARIN SODIUM (PORCINE) LOCK FLUSH IV SOLN 100 UNIT/ML 100 UNIT/ML
5 SOLUTION INTRAVENOUS
Status: DISCONTINUED | OUTPATIENT
Start: 2024-05-30 | End: 2024-05-30 | Stop reason: HOSPADM

## 2024-05-30 RX ORDER — HEPARIN SODIUM,PORCINE 10 UNIT/ML
5-20 VIAL (ML) INTRAVENOUS DAILY PRN
Status: CANCELLED | OUTPATIENT
Start: 2024-06-03

## 2024-05-30 RX ADMIN — Medication 5 ML: at 15:19

## 2024-05-30 RX ADMIN — SODIUM CHLORIDE 1000 ML: 9 INJECTION, SOLUTION INTRAVENOUS at 13:19

## 2024-05-30 NOTE — PROGRESS NOTES
Infusion Nursing Note:  Gely Griffithluisjane presents today for IV fluids.    Patient seen by provider today: No   present during visit today: Not Applicable.    Note: N/A.      Intravenous Access:  Implanted Port.    Treatment Conditions:  Not Applicable.      Post Infusion Assessment:  Patient tolerated infusion without incident.  Blood return noted pre and post infusion.  Site patent and intact, free from redness, edema or discomfort.  No evidence of extravasations.  Access discontinued per protocol.       Discharge Plan:   Discharge instructions reviewed with: Patient.  Patient and/or family verbalized understanding of discharge instructions and all questions answered.  AVS to patient via [employer].  Patient will return 6/12 for next appointment.   Patient discharged in stable condition accompanied by: self.  Departure Mode: Ambulatory.      Shana Sin RN

## 2024-06-12 ENCOUNTER — INFUSION THERAPY VISIT (OUTPATIENT)
Dept: INFUSION THERAPY | Facility: CLINIC | Age: 84
End: 2024-06-12
Attending: PEDIATRICS
Payer: MEDICARE

## 2024-06-12 VITALS
RESPIRATION RATE: 16 BRPM | SYSTOLIC BLOOD PRESSURE: 136 MMHG | TEMPERATURE: 97.4 F | OXYGEN SATURATION: 100 % | HEART RATE: 67 BPM | DIASTOLIC BLOOD PRESSURE: 56 MMHG

## 2024-06-12 DIAGNOSIS — E86.0 DEHYDRATION: ICD-10-CM

## 2024-06-12 DIAGNOSIS — N18.4 ACUTE RENAL FAILURE SUPERIMPOSED ON STAGE 4 CHRONIC KIDNEY DISEASE, UNSPECIFIED ACUTE RENAL FAILURE TYPE (H): Primary | ICD-10-CM

## 2024-06-12 DIAGNOSIS — E87.1 HYPONATREMIA: ICD-10-CM

## 2024-06-12 DIAGNOSIS — Z93.2 ILEOSTOMY STATUS (H): ICD-10-CM

## 2024-06-12 DIAGNOSIS — Z90.49 S/P TOTAL COLECTOMY: ICD-10-CM

## 2024-06-12 DIAGNOSIS — K94.19 ALTERED BOWEL ELIMINATION DUE TO INTESTINAL OSTOMY (H): ICD-10-CM

## 2024-06-12 DIAGNOSIS — N17.9 ACUTE RENAL FAILURE SUPERIMPOSED ON STAGE 4 CHRONIC KIDNEY DISEASE, UNSPECIFIED ACUTE RENAL FAILURE TYPE (H): Primary | ICD-10-CM

## 2024-06-12 DIAGNOSIS — I12.9 CKD STAGE 4 SECONDARY TO HYPERTENSION (H): ICD-10-CM

## 2024-06-12 DIAGNOSIS — N18.4 CKD STAGE 4 SECONDARY TO HYPERTENSION (H): ICD-10-CM

## 2024-06-12 PROCEDURE — 258N000003 HC RX IP 258 OP 636: Mod: JZ | Performed by: PEDIATRICS

## 2024-06-12 PROCEDURE — 96360 HYDRATION IV INFUSION INIT: CPT

## 2024-06-12 PROCEDURE — 250N000011 HC RX IP 250 OP 636: Mod: JZ | Performed by: PEDIATRICS

## 2024-06-12 PROCEDURE — 96361 HYDRATE IV INFUSION ADD-ON: CPT

## 2024-06-12 RX ORDER — HEPARIN SODIUM (PORCINE) LOCK FLUSH IV SOLN 100 UNIT/ML 100 UNIT/ML
5 SOLUTION INTRAVENOUS
Status: DISCONTINUED | OUTPATIENT
Start: 2024-06-12 | End: 2024-06-12 | Stop reason: HOSPADM

## 2024-06-12 RX ORDER — HEPARIN SODIUM (PORCINE) LOCK FLUSH IV SOLN 100 UNIT/ML 100 UNIT/ML
5 SOLUTION INTRAVENOUS
Status: CANCELLED | OUTPATIENT
Start: 2024-06-12

## 2024-06-12 RX ORDER — HEPARIN SODIUM,PORCINE 10 UNIT/ML
5-20 VIAL (ML) INTRAVENOUS DAILY PRN
Status: CANCELLED | OUTPATIENT
Start: 2024-06-17

## 2024-06-12 RX ORDER — HEPARIN SODIUM,PORCINE 10 UNIT/ML
5-20 VIAL (ML) INTRAVENOUS DAILY PRN
Status: CANCELLED | OUTPATIENT
Start: 2024-06-12

## 2024-06-12 RX ADMIN — Medication 5 ML: at 15:35

## 2024-06-12 RX ADMIN — SODIUM CHLORIDE 1000 ML: 9 INJECTION, SOLUTION INTRAVENOUS at 13:34

## 2024-06-12 NOTE — PROGRESS NOTES
Infusion Nursing Note:  Gely Keene presents today for IVF.    Patient seen by provider today: No   present during visit today: Not Applicable.    Note: N/A.      Intravenous Access:  Implanted Port.    Treatment Conditions:  Not Applicable.      Post Infusion Assessment:  Patient tolerated infusion without incident.  Blood return noted pre and post infusion.  Site patent and intact, free from redness, edema or discomfort.  No evidence of extravasations.  Access discontinued per protocol.       Discharge Plan:   Discharge instructions reviewed with: Patient.  Patient and/or family verbalized understanding of discharge instructions and all questions answered.  AVS to patient via [email or web address].  Patient will return 6/26/24 for next appointment.   Patient discharged in stable condition accompanied by: self.  Departure Mode: Ambulatory.      Bouchra Barron RN

## 2024-06-26 ENCOUNTER — INFUSION THERAPY VISIT (OUTPATIENT)
Dept: INFUSION THERAPY | Facility: CLINIC | Age: 84
End: 2024-06-26
Attending: PEDIATRICS
Payer: MEDICARE

## 2024-06-26 VITALS — HEART RATE: 62 BPM | SYSTOLIC BLOOD PRESSURE: 145 MMHG | OXYGEN SATURATION: 100 % | DIASTOLIC BLOOD PRESSURE: 65 MMHG

## 2024-06-26 DIAGNOSIS — N17.9 ACUTE RENAL FAILURE SUPERIMPOSED ON STAGE 4 CHRONIC KIDNEY DISEASE, UNSPECIFIED ACUTE RENAL FAILURE TYPE (H): Primary | ICD-10-CM

## 2024-06-26 DIAGNOSIS — K94.19 ALTERED BOWEL ELIMINATION DUE TO INTESTINAL OSTOMY (H): ICD-10-CM

## 2024-06-26 DIAGNOSIS — E87.1 HYPONATREMIA: ICD-10-CM

## 2024-06-26 DIAGNOSIS — E86.0 DEHYDRATION: ICD-10-CM

## 2024-06-26 DIAGNOSIS — Z90.49 S/P TOTAL COLECTOMY: ICD-10-CM

## 2024-06-26 DIAGNOSIS — N18.4 ACUTE RENAL FAILURE SUPERIMPOSED ON STAGE 4 CHRONIC KIDNEY DISEASE, UNSPECIFIED ACUTE RENAL FAILURE TYPE (H): Primary | ICD-10-CM

## 2024-06-26 DIAGNOSIS — N18.4 CKD STAGE 4 SECONDARY TO HYPERTENSION (H): ICD-10-CM

## 2024-06-26 DIAGNOSIS — I12.9 CKD STAGE 4 SECONDARY TO HYPERTENSION (H): ICD-10-CM

## 2024-06-26 PROCEDURE — 96360 HYDRATION IV INFUSION INIT: CPT

## 2024-06-26 PROCEDURE — 96361 HYDRATE IV INFUSION ADD-ON: CPT

## 2024-06-26 PROCEDURE — 96374 THER/PROPH/DIAG INJ IV PUSH: CPT

## 2024-06-26 PROCEDURE — 250N000011 HC RX IP 250 OP 636: Performed by: PEDIATRICS

## 2024-06-26 PROCEDURE — 258N000003 HC RX IP 258 OP 636: Performed by: PEDIATRICS

## 2024-06-26 RX ORDER — HEPARIN SODIUM,PORCINE 10 UNIT/ML
5-20 VIAL (ML) INTRAVENOUS DAILY PRN
Status: CANCELLED | OUTPATIENT
Start: 2024-07-03

## 2024-06-26 RX ORDER — HEPARIN SODIUM,PORCINE 10 UNIT/ML
5-20 VIAL (ML) INTRAVENOUS DAILY PRN
Status: DISCONTINUED | OUTPATIENT
Start: 2024-06-26 | End: 2024-06-26 | Stop reason: HOSPADM

## 2024-06-26 RX ORDER — HEPARIN SODIUM (PORCINE) LOCK FLUSH IV SOLN 100 UNIT/ML 100 UNIT/ML
5 SOLUTION INTRAVENOUS EVERY 8 HOURS
Status: DISCONTINUED | OUTPATIENT
Start: 2024-06-26 | End: 2024-06-26 | Stop reason: HOSPADM

## 2024-06-26 RX ADMIN — SODIUM CHLORIDE, PRESERVATIVE FREE 5 ML: 5 INJECTION INTRAVENOUS at 15:47

## 2024-06-26 RX ADMIN — SODIUM CHLORIDE 1000 ML: 9 INJECTION, SOLUTION INTRAVENOUS at 13:46

## 2024-06-26 NOTE — PROGRESS NOTES
Infusion Nursing Note:  Gely Keene presents today for IVF.    Patient seen by provider today: No   present during visit today: Not Applicable.    Note: N/A.      Intravenous Access:  Implanted Port.    Treatment Conditions:  Not Applicable.      Post Infusion Assessment:  Patient tolerated infusion without incident.  Blood return noted pre and post infusion.  Site patent and intact, free from redness, edema or discomfort.  No evidence of extravasations.  Access discontinued per protocol.       Discharge Plan:   AVS to patient via MYCHART.  Patient will return 7/10/24 for IVF   Patient discharged in stable condition accompanied by: .  Departure Mode: Ambulatory.      Gris Fisher RN

## 2024-07-10 ENCOUNTER — INFUSION THERAPY VISIT (OUTPATIENT)
Dept: INFUSION THERAPY | Facility: CLINIC | Age: 84
End: 2024-07-10
Attending: PEDIATRICS
Payer: MEDICARE

## 2024-07-10 VITALS
RESPIRATION RATE: 16 BRPM | TEMPERATURE: 98 F | DIASTOLIC BLOOD PRESSURE: 63 MMHG | SYSTOLIC BLOOD PRESSURE: 121 MMHG | HEART RATE: 69 BPM | OXYGEN SATURATION: 100 %

## 2024-07-10 DIAGNOSIS — K94.19 ALTERED BOWEL ELIMINATION DUE TO INTESTINAL OSTOMY (H): ICD-10-CM

## 2024-07-10 DIAGNOSIS — E87.1 HYPONATREMIA: ICD-10-CM

## 2024-07-10 DIAGNOSIS — Z93.2 ILEOSTOMY STATUS (H): ICD-10-CM

## 2024-07-10 DIAGNOSIS — N18.4 ACUTE RENAL FAILURE SUPERIMPOSED ON STAGE 4 CHRONIC KIDNEY DISEASE, UNSPECIFIED ACUTE RENAL FAILURE TYPE (H): Primary | ICD-10-CM

## 2024-07-10 DIAGNOSIS — I12.9 CKD STAGE 4 SECONDARY TO HYPERTENSION (H): ICD-10-CM

## 2024-07-10 DIAGNOSIS — E86.0 DEHYDRATION: ICD-10-CM

## 2024-07-10 DIAGNOSIS — N18.4 CKD STAGE 4 SECONDARY TO HYPERTENSION (H): ICD-10-CM

## 2024-07-10 DIAGNOSIS — Z90.49 S/P TOTAL COLECTOMY: ICD-10-CM

## 2024-07-10 DIAGNOSIS — N17.9 ACUTE RENAL FAILURE SUPERIMPOSED ON STAGE 4 CHRONIC KIDNEY DISEASE, UNSPECIFIED ACUTE RENAL FAILURE TYPE (H): Primary | ICD-10-CM

## 2024-07-10 PROCEDURE — 96360 HYDRATION IV INFUSION INIT: CPT

## 2024-07-10 PROCEDURE — 250N000011 HC RX IP 250 OP 636: Performed by: PEDIATRICS

## 2024-07-10 PROCEDURE — 96361 HYDRATE IV INFUSION ADD-ON: CPT

## 2024-07-10 PROCEDURE — 258N000003 HC RX IP 258 OP 636: Performed by: PEDIATRICS

## 2024-07-10 RX ORDER — HEPARIN SODIUM (PORCINE) LOCK FLUSH IV SOLN 100 UNIT/ML 100 UNIT/ML
5 SOLUTION INTRAVENOUS
Status: CANCELLED | OUTPATIENT
Start: 2024-07-10

## 2024-07-10 RX ORDER — HEPARIN SODIUM (PORCINE) LOCK FLUSH IV SOLN 100 UNIT/ML 100 UNIT/ML
5 SOLUTION INTRAVENOUS
Status: DISCONTINUED | OUTPATIENT
Start: 2024-07-10 | End: 2024-07-10 | Stop reason: HOSPADM

## 2024-07-10 RX ORDER — HEPARIN SODIUM,PORCINE 10 UNIT/ML
5-20 VIAL (ML) INTRAVENOUS DAILY PRN
Status: CANCELLED | OUTPATIENT
Start: 2024-07-10

## 2024-07-10 RX ADMIN — SODIUM CHLORIDE 1000 ML: 9 INJECTION, SOLUTION INTRAVENOUS at 12:07

## 2024-07-10 RX ADMIN — Medication 5 ML: at 14:07

## 2024-07-10 NOTE — PROGRESS NOTES
Infusion Nursing Note:  Gely Keene presents today for IVF.    Patient seen by provider today: No   present during visit today: Not Applicable.    Note: N/A.      Intravenous Access:  Implanted Port.    Treatment Conditions:  Not Applicable.      Post Infusion Assessment:  Patient tolerated infusion without incident.  Blood return noted pre and post infusion.  Site patent and intact, free from redness, edema or discomfort.  No evidence of extravasations.  Access discontinued per protocol.       Discharge Plan:   AVS to patient via MYCHART.  Patient will return 7/24/24 for next appointment.   Patient discharged in stable condition accompanied by: self.  Departure Mode: Ambulatory.      Lainey Bhandari RN

## 2024-07-24 ENCOUNTER — INFUSION THERAPY VISIT (OUTPATIENT)
Dept: INFUSION THERAPY | Facility: CLINIC | Age: 84
End: 2024-07-24
Attending: PEDIATRICS
Payer: MEDICARE

## 2024-07-24 VITALS
OXYGEN SATURATION: 99 % | SYSTOLIC BLOOD PRESSURE: 130 MMHG | TEMPERATURE: 97 F | RESPIRATION RATE: 18 BRPM | HEART RATE: 70 BPM | DIASTOLIC BLOOD PRESSURE: 75 MMHG

## 2024-07-24 DIAGNOSIS — K94.19 ALTERED BOWEL ELIMINATION DUE TO INTESTINAL OSTOMY (H): ICD-10-CM

## 2024-07-24 DIAGNOSIS — I12.9 CKD STAGE 4 SECONDARY TO HYPERTENSION (H): ICD-10-CM

## 2024-07-24 DIAGNOSIS — Z93.2 ILEOSTOMY STATUS (H): ICD-10-CM

## 2024-07-24 DIAGNOSIS — E87.1 HYPONATREMIA: ICD-10-CM

## 2024-07-24 DIAGNOSIS — N18.4 CKD STAGE 4 SECONDARY TO HYPERTENSION (H): ICD-10-CM

## 2024-07-24 DIAGNOSIS — E86.0 DEHYDRATION: ICD-10-CM

## 2024-07-24 DIAGNOSIS — N18.4 ACUTE RENAL FAILURE SUPERIMPOSED ON STAGE 4 CHRONIC KIDNEY DISEASE, UNSPECIFIED ACUTE RENAL FAILURE TYPE (H): Primary | ICD-10-CM

## 2024-07-24 DIAGNOSIS — N17.9 ACUTE RENAL FAILURE SUPERIMPOSED ON STAGE 4 CHRONIC KIDNEY DISEASE, UNSPECIFIED ACUTE RENAL FAILURE TYPE (H): Primary | ICD-10-CM

## 2024-07-24 DIAGNOSIS — Z90.49 S/P TOTAL COLECTOMY: ICD-10-CM

## 2024-07-24 PROCEDURE — 258N000003 HC RX IP 258 OP 636: Performed by: PEDIATRICS

## 2024-07-24 PROCEDURE — 96361 HYDRATE IV INFUSION ADD-ON: CPT

## 2024-07-24 PROCEDURE — 96360 HYDRATION IV INFUSION INIT: CPT

## 2024-07-24 PROCEDURE — 250N000011 HC RX IP 250 OP 636: Performed by: PEDIATRICS

## 2024-07-24 RX ORDER — HEPARIN SODIUM (PORCINE) LOCK FLUSH IV SOLN 100 UNIT/ML 100 UNIT/ML
5 SOLUTION INTRAVENOUS
Status: DISCONTINUED | OUTPATIENT
Start: 2024-07-24 | End: 2024-07-24 | Stop reason: HOSPADM

## 2024-07-24 RX ORDER — HEPARIN SODIUM (PORCINE) LOCK FLUSH IV SOLN 100 UNIT/ML 100 UNIT/ML
5 SOLUTION INTRAVENOUS
Status: CANCELLED | OUTPATIENT
Start: 2024-07-24

## 2024-07-24 RX ORDER — HEPARIN SODIUM,PORCINE 10 UNIT/ML
5-20 VIAL (ML) INTRAVENOUS DAILY PRN
Status: CANCELLED | OUTPATIENT
Start: 2024-07-24

## 2024-07-24 RX ADMIN — SODIUM CHLORIDE 1000 ML: 9 INJECTION, SOLUTION INTRAVENOUS at 13:00

## 2024-07-24 RX ADMIN — Medication 5 ML: at 15:11

## 2024-07-24 NOTE — PROGRESS NOTES
Infusion Nursing Note:  Gely Keene presents today for IVF.    Patient seen by provider today: No   present during visit today: Not Applicable.    Note: Gely reports feeling well, no new symptoms or concerns.    Intravenous Access:  Implanted Port.    Treatment Conditions:  Not Applicable.    Post Infusion Assessment:  Patient tolerated infusion without incident.  Blood return noted pre and post infusion.  Site patent and intact, free from redness, edema or discomfort.  No evidence of extravasations.  Access discontinued per protocol.     Discharge Plan:   Discharge instructions reviewed with: Patient.  Patient and/or family verbalized understanding of discharge instructions and all questions answered.  AVS to patient via [employer].  Patient will return 8/7 for next appointment.   Patient discharged in stable condition accompanied by: self.  Departure Mode: Ambulatory.    Ángel Medina RN

## 2024-08-07 ENCOUNTER — INFUSION THERAPY VISIT (OUTPATIENT)
Dept: INFUSION THERAPY | Facility: CLINIC | Age: 84
End: 2024-08-07
Attending: PEDIATRICS
Payer: MEDICARE

## 2024-08-07 VITALS
DIASTOLIC BLOOD PRESSURE: 64 MMHG | HEART RATE: 68 BPM | OXYGEN SATURATION: 99 % | SYSTOLIC BLOOD PRESSURE: 122 MMHG | TEMPERATURE: 97.9 F | RESPIRATION RATE: 16 BRPM

## 2024-08-07 DIAGNOSIS — I12.9 CKD STAGE 4 SECONDARY TO HYPERTENSION (H): ICD-10-CM

## 2024-08-07 DIAGNOSIS — K94.19 ALTERED BOWEL ELIMINATION DUE TO INTESTINAL OSTOMY (H): ICD-10-CM

## 2024-08-07 DIAGNOSIS — N17.9 ACUTE RENAL FAILURE SUPERIMPOSED ON STAGE 4 CHRONIC KIDNEY DISEASE, UNSPECIFIED ACUTE RENAL FAILURE TYPE (H): Primary | ICD-10-CM

## 2024-08-07 DIAGNOSIS — Z93.2 ILEOSTOMY STATUS (H): ICD-10-CM

## 2024-08-07 DIAGNOSIS — N18.4 CKD STAGE 4 SECONDARY TO HYPERTENSION (H): ICD-10-CM

## 2024-08-07 DIAGNOSIS — Z90.49 S/P TOTAL COLECTOMY: ICD-10-CM

## 2024-08-07 DIAGNOSIS — E87.1 HYPONATREMIA: ICD-10-CM

## 2024-08-07 DIAGNOSIS — N18.4 ACUTE RENAL FAILURE SUPERIMPOSED ON STAGE 4 CHRONIC KIDNEY DISEASE, UNSPECIFIED ACUTE RENAL FAILURE TYPE (H): Primary | ICD-10-CM

## 2024-08-07 DIAGNOSIS — E86.0 DEHYDRATION: ICD-10-CM

## 2024-08-07 PROCEDURE — 250N000011 HC RX IP 250 OP 636: Performed by: PEDIATRICS

## 2024-08-07 PROCEDURE — 96360 HYDRATION IV INFUSION INIT: CPT

## 2024-08-07 PROCEDURE — 258N000003 HC RX IP 258 OP 636: Performed by: PEDIATRICS

## 2024-08-07 PROCEDURE — 96361 HYDRATE IV INFUSION ADD-ON: CPT

## 2024-08-07 RX ORDER — HEPARIN SODIUM (PORCINE) LOCK FLUSH IV SOLN 100 UNIT/ML 100 UNIT/ML
5 SOLUTION INTRAVENOUS
Status: CANCELLED | OUTPATIENT
Start: 2024-08-07

## 2024-08-07 RX ORDER — HEPARIN SODIUM,PORCINE 10 UNIT/ML
5-20 VIAL (ML) INTRAVENOUS DAILY PRN
Status: CANCELLED | OUTPATIENT
Start: 2024-08-07

## 2024-08-07 RX ORDER — HEPARIN SODIUM (PORCINE) LOCK FLUSH IV SOLN 100 UNIT/ML 100 UNIT/ML
5 SOLUTION INTRAVENOUS
Status: DISCONTINUED | OUTPATIENT
Start: 2024-08-07 | End: 2024-08-07 | Stop reason: HOSPADM

## 2024-08-07 RX ADMIN — Medication 5 ML: at 13:09

## 2024-08-07 RX ADMIN — SODIUM CHLORIDE 1000 ML: 9 INJECTION, SOLUTION INTRAVENOUS at 13:07

## 2024-08-07 NOTE — PROGRESS NOTES
Infusion Nursing Note:  Gely Keene presents today for IVF.    Patient seen by provider today: No   present during visit today: Not Applicable.    Note: N/A.      Intravenous Access:  Implanted Port.    Treatment Conditions:  Not Applicable.      Post Infusion Assessment:  Patient tolerated infusion without incident.  Blood return noted pre and post infusion.  Site patent and intact, free from redness, edema or discomfort.  No evidence of extravasations.  Access discontinued per protocol.       Discharge Plan:   AVS to patient via MYCHART.  Patient will return 8/21/24 for next appointment.   Patient discharged in stable condition accompanied by: self.  Departure Mode: Ambulatory.      Lainey Bhandari RN

## 2024-08-08 DIAGNOSIS — I12.9 CKD STAGE 4 SECONDARY TO HYPERTENSION (H): ICD-10-CM

## 2024-08-08 DIAGNOSIS — K94.19 ALTERED BOWEL ELIMINATION DUE TO INTESTINAL OSTOMY (H): Primary | ICD-10-CM

## 2024-08-08 DIAGNOSIS — Z90.49 S/P TOTAL COLECTOMY: ICD-10-CM

## 2024-08-08 DIAGNOSIS — N18.4 ACUTE RENAL FAILURE SUPERIMPOSED ON STAGE 4 CHRONIC KIDNEY DISEASE, UNSPECIFIED ACUTE RENAL FAILURE TYPE (H): ICD-10-CM

## 2024-08-08 DIAGNOSIS — E86.0 DEHYDRATION: ICD-10-CM

## 2024-08-08 DIAGNOSIS — E87.1 HYPONATREMIA: ICD-10-CM

## 2024-08-08 DIAGNOSIS — N18.4 CKD STAGE 4 SECONDARY TO HYPERTENSION (H): ICD-10-CM

## 2024-08-08 DIAGNOSIS — N17.9 ACUTE RENAL FAILURE SUPERIMPOSED ON STAGE 4 CHRONIC KIDNEY DISEASE, UNSPECIFIED ACUTE RENAL FAILURE TYPE (H): ICD-10-CM

## 2024-08-14 ENCOUNTER — MEDICAL CORRESPONDENCE (OUTPATIENT)
Dept: HEALTH INFORMATION MANAGEMENT | Facility: CLINIC | Age: 84
End: 2024-08-14

## 2024-08-14 ENCOUNTER — TRANSFERRED RECORDS (OUTPATIENT)
Dept: HEALTH INFORMATION MANAGEMENT | Facility: CLINIC | Age: 84
End: 2024-08-14
Payer: MEDICARE

## 2024-08-16 DIAGNOSIS — E78.5 HYPERLIPIDEMIA LDL GOAL <160: ICD-10-CM

## 2024-08-16 RX ORDER — ATORVASTATIN CALCIUM 20 MG/1
20 TABLET, FILM COATED ORAL DAILY
Qty: 90 TABLET | Refills: 1 | Status: SHIPPED | OUTPATIENT
Start: 2024-08-16

## 2024-08-21 ENCOUNTER — INFUSION THERAPY VISIT (OUTPATIENT)
Dept: INFUSION THERAPY | Facility: CLINIC | Age: 84
End: 2024-08-21
Attending: PEDIATRICS
Payer: MEDICARE

## 2024-08-21 VITALS
RESPIRATION RATE: 16 BRPM | DIASTOLIC BLOOD PRESSURE: 67 MMHG | HEART RATE: 57 BPM | SYSTOLIC BLOOD PRESSURE: 121 MMHG | OXYGEN SATURATION: 100 % | TEMPERATURE: 97.4 F

## 2024-08-21 DIAGNOSIS — K94.19 ALTERED BOWEL ELIMINATION DUE TO INTESTINAL OSTOMY (H): ICD-10-CM

## 2024-08-21 DIAGNOSIS — Z93.2 ILEOSTOMY STATUS (H): ICD-10-CM

## 2024-08-21 DIAGNOSIS — I12.9 CKD STAGE 4 SECONDARY TO HYPERTENSION (H): ICD-10-CM

## 2024-08-21 DIAGNOSIS — N17.9 ACUTE RENAL FAILURE SUPERIMPOSED ON STAGE 4 CHRONIC KIDNEY DISEASE, UNSPECIFIED ACUTE RENAL FAILURE TYPE (H): ICD-10-CM

## 2024-08-21 DIAGNOSIS — E87.1 HYPONATREMIA: ICD-10-CM

## 2024-08-21 DIAGNOSIS — E86.0 DEHYDRATION: ICD-10-CM

## 2024-08-21 DIAGNOSIS — N18.4 CKD STAGE 4 SECONDARY TO HYPERTENSION (H): ICD-10-CM

## 2024-08-21 DIAGNOSIS — N18.4 ACUTE RENAL FAILURE SUPERIMPOSED ON STAGE 4 CHRONIC KIDNEY DISEASE, UNSPECIFIED ACUTE RENAL FAILURE TYPE (H): ICD-10-CM

## 2024-08-21 DIAGNOSIS — M32.9 SYSTEMIC LUPUS ERYTHEMATOSUS (H): ICD-10-CM

## 2024-08-21 DIAGNOSIS — Z90.49 S/P TOTAL COLECTOMY: Primary | ICD-10-CM

## 2024-08-21 LAB
ALBUMIN SERPL BCG-MCNC: 3.9 G/DL (ref 3.5–5.2)
ALT SERPL W P-5'-P-CCNC: 14 U/L (ref 0–50)
AST SERPL W P-5'-P-CCNC: 22 U/L (ref 0–45)
BASOPHILS # BLD AUTO: 0 10E3/UL (ref 0–0.2)
BASOPHILS NFR BLD AUTO: 1 %
CREAT SERPL-MCNC: 3.03 MG/DL (ref 0.51–0.95)
CRP SERPL-MCNC: 4.24 MG/L
EGFRCR SERPLBLD CKD-EPI 2021: 14 ML/MIN/1.73M2
EOSINOPHIL # BLD AUTO: 0.1 10E3/UL (ref 0–0.7)
EOSINOPHIL NFR BLD AUTO: 1 %
ERYTHROCYTE [DISTWIDTH] IN BLOOD BY AUTOMATED COUNT: 14.8 % (ref 10–15)
ERYTHROCYTE [SEDIMENTATION RATE] IN BLOOD BY WESTERGREN METHOD: 26 MM/HR (ref 0–30)
HCT VFR BLD AUTO: 27.8 % (ref 35–47)
HGB BLD-MCNC: 9.8 G/DL (ref 11.7–15.7)
IMM GRANULOCYTES # BLD: 0 10E3/UL
IMM GRANULOCYTES NFR BLD: 1 %
LYMPHOCYTES # BLD AUTO: 0.5 10E3/UL (ref 0.8–5.3)
LYMPHOCYTES NFR BLD AUTO: 11 %
Lab: NORMAL
MCH RBC QN AUTO: 31.9 PG (ref 26.5–33)
MCHC RBC AUTO-ENTMCNC: 35.3 G/DL (ref 31.5–36.5)
MCV RBC AUTO: 91 FL (ref 78–100)
MONOCYTES # BLD AUTO: 0.5 10E3/UL (ref 0–1.3)
MONOCYTES NFR BLD AUTO: 12 %
NEUTROPHILS # BLD AUTO: 3.3 10E3/UL (ref 1.6–8.3)
NEUTROPHILS NFR BLD AUTO: 74 %
NRBC # BLD AUTO: 0 10E3/UL
NRBC BLD AUTO-RTO: 0 /100
PERFORMING LABORATORY: NORMAL
PLATELET # BLD AUTO: 154 10E3/UL (ref 150–450)
RBC # BLD AUTO: 3.07 10E6/UL (ref 3.8–5.2)
SPECIMEN STATUS: NORMAL
TEST NAME: NORMAL
TOTAL PROTEIN SERUM FOR ELP: 5.7 G/DL (ref 6.4–8.3)
WBC # BLD AUTO: 4.5 10E3/UL (ref 4–11)

## 2024-08-21 PROCEDURE — 250N000011 HC RX IP 250 OP 636: Performed by: PEDIATRICS

## 2024-08-21 PROCEDURE — 36415 COLL VENOUS BLD VENIPUNCTURE: CPT

## 2024-08-21 PROCEDURE — 86160 COMPLEMENT ANTIGEN: CPT | Performed by: INTERNAL MEDICINE

## 2024-08-21 PROCEDURE — 96361 HYDRATE IV INFUSION ADD-ON: CPT

## 2024-08-21 PROCEDURE — 83516 IMMUNOASSAY NONANTIBODY: CPT | Performed by: INTERNAL MEDICINE

## 2024-08-21 PROCEDURE — 86235 NUCLEAR ANTIGEN ANTIBODY: CPT | Performed by: INTERNAL MEDICINE

## 2024-08-21 PROCEDURE — 86140 C-REACTIVE PROTEIN: CPT | Performed by: INTERNAL MEDICINE

## 2024-08-21 PROCEDURE — 84450 TRANSFERASE (AST) (SGOT): CPT | Performed by: INTERNAL MEDICINE

## 2024-08-21 PROCEDURE — 96360 HYDRATION IV INFUSION INIT: CPT

## 2024-08-21 PROCEDURE — 84155 ASSAY OF PROTEIN SERUM: CPT | Performed by: INTERNAL MEDICINE

## 2024-08-21 PROCEDURE — 86225 DNA ANTIBODY NATIVE: CPT | Performed by: INTERNAL MEDICINE

## 2024-08-21 PROCEDURE — 85652 RBC SED RATE AUTOMATED: CPT | Performed by: INTERNAL MEDICINE

## 2024-08-21 PROCEDURE — 85025 COMPLETE CBC W/AUTO DIFF WBC: CPT | Performed by: INTERNAL MEDICINE

## 2024-08-21 PROCEDURE — 84165 PROTEIN E-PHORESIS SERUM: CPT | Mod: TC | Performed by: PATHOLOGY

## 2024-08-21 PROCEDURE — 84165 PROTEIN E-PHORESIS SERUM: CPT | Mod: 26 | Performed by: PATHOLOGY

## 2024-08-21 PROCEDURE — 82040 ASSAY OF SERUM ALBUMIN: CPT | Performed by: INTERNAL MEDICINE

## 2024-08-21 PROCEDURE — 82565 ASSAY OF CREATININE: CPT | Performed by: INTERNAL MEDICINE

## 2024-08-21 PROCEDURE — 84460 ALANINE AMINO (ALT) (SGPT): CPT | Performed by: INTERNAL MEDICINE

## 2024-08-21 PROCEDURE — 258N000003 HC RX IP 258 OP 636: Performed by: PEDIATRICS

## 2024-08-21 RX ORDER — HEPARIN SODIUM (PORCINE) LOCK FLUSH IV SOLN 100 UNIT/ML 100 UNIT/ML
5 SOLUTION INTRAVENOUS
Status: DISCONTINUED | OUTPATIENT
Start: 2024-08-21 | End: 2024-08-21 | Stop reason: HOSPADM

## 2024-08-21 RX ORDER — HEPARIN SODIUM (PORCINE) LOCK FLUSH IV SOLN 100 UNIT/ML 100 UNIT/ML
5 SOLUTION INTRAVENOUS
Status: CANCELLED | OUTPATIENT
Start: 2024-08-21

## 2024-08-21 RX ORDER — HEPARIN SODIUM,PORCINE 10 UNIT/ML
5-20 VIAL (ML) INTRAVENOUS DAILY PRN
Status: CANCELLED | OUTPATIENT
Start: 2024-08-21

## 2024-08-21 RX ADMIN — SODIUM CHLORIDE 1000 ML: 9 INJECTION, SOLUTION INTRAVENOUS at 13:45

## 2024-08-21 RX ADMIN — Medication 5 ML: at 15:49

## 2024-08-21 NOTE — PROGRESS NOTES
Infusion Nursing Note:  Gely Keene presents today for Labs per Port + 1 Liter NS.    Patient seen by provider today: No   present during visit today: Not Applicable.    Note:  Patient reports is feeling fine today.  Patient denies fevers, chills or signs of infection.  Urine:  Patient reports did not urinate right away when woke up this morning, but has voided x 3 today.      Intravenous Access:  Labs drawn without difficulty.  Implanted Port.  Port site C/D/I.  Port flushes well + excellent blood return.    Labs drawn per orders from Dr. Meek Tabares ([facility]).  -Writer included request for lab to fax results to Dr. Meek Tabares at [phone].    -The following labs are to be drawn Q 3 months per Dr. Tabares: C Reactive Protein, C3, C4, Cbc/diff/plts, Sedimentation Rate, Serum Protein Electrophoresis, DMARD Panel (Albumin, Creatinine, AST, ALT) + PAUL 7 (ssDNA, dsDNA, RNP/SM, SSA, SSB and Chromatin).  -Fax lab results to Dr. Meek Tabares at [phone].  -Copy of lab orders sent for scanning.    **ssDNA test not drawn today.  Single stranded DNA.  This test is not in our system.  I spoke with Crista at Dr. Tabares's office to make aware**.      Per Crista at office of Dr. Tabares:  dsDNA = Double Stranded DNA, RNP = RNP Antibody IgG, SM = Smith DION Antibody IgG, SSA = SSA RO DION Antibody IgG, SSB = SSB LA DION Antibody IgG and Chromatin = Chromatin Nucleosomal Antibody.       Treatment Conditions:  Not Applicable.      Post Infusion Assessment:  Patient tolerated infusion without incident.  Blood return noted pre and post infusion.  Site patent and intact, free from redness, edema or discomfort.  No evidence of extravasations.  Access discontinued per protocol.       Discharge Plan:   Discharge instructions reviewed with: Patient.  Patient and/or family verbalized understanding of discharge instructions and all questions answered.  Patient  discharged in stable condition accompanied by: self.  Departure Mode: Ambulatory.  9/4/24:  IVF.    Sarah Pollack RN, BSN, OCN on 8/21/2024 at 4:08 PM

## 2024-08-22 LAB
ALBUMIN SERPL ELPH-MCNC: 3.4 G/DL (ref 3.7–5.1)
ALPHA1 GLOB SERPL ELPH-MCNC: 0.4 G/DL (ref 0.2–0.4)
ALPHA2 GLOB SERPL ELPH-MCNC: 0.7 G/DL (ref 0.5–0.9)
B-GLOBULIN SERPL ELPH-MCNC: 0.6 G/DL (ref 0.6–1)
C3 SERPL-MCNC: 106 MG/DL (ref 81–157)
C4 SERPL-MCNC: 25 MG/DL (ref 13–39)
DSDNA AB SER-ACNC: 0.8 IU/ML
ENA SM IGG SER IA-ACNC: <0.7 U/ML
ENA SM IGG SER IA-ACNC: NEGATIVE
ENA SS-A AB SER IA-ACNC: <0.5 U/ML
ENA SS-A AB SER IA-ACNC: NEGATIVE
ENA SS-B IGG SER IA-ACNC: <0.6 U/ML
ENA SS-B IGG SER IA-ACNC: NEGATIVE
GAMMA GLOB SERPL ELPH-MCNC: 0.7 G/DL (ref 0.7–1.6)
LOCATION OF TASK: ABNORMAL
M PROTEIN SERPL ELPH-MCNC: 0 G/DL
PROT PATTERN SERPL ELPH-IMP: ABNORMAL
U1 SNRNP IGG SER IA-ACNC: 1.2 U/ML
U1 SNRNP IGG SER IA-ACNC: NEGATIVE

## 2024-08-23 LAB — MISCELLANEOUS TEST 1 (ARUP): ABNORMAL

## 2024-08-25 NOTE — PROGRESS NOTES
Assessment & Plan       ICD-10-CM    1. Axonal sensorimotor neuropathy  G62.89 Patient stopped cymbalta after 1 dose after our last visit, but is willing to retry and keep a side effect journal.   Restart cymbalta, renally dosed.  Continue gabapentin.  Await patient update.      2. Abdominal pain, generalized  R10.84 ondansetron (ZOFRAN ODT) 4 MG ODT tab    Still undiagnosed pain in abdomen - ? Related to scar tissue.   S/p colectomy for C diff.   Follow and use zofran prn nausea.      3. Nausea  R11.0 Has had some struggles with nausea - zofran for prn use provided      4. Systemic lupus erythematosus, unspecified SLE type, unspecified organ involvement status (H)  M32.9 Appreciate care from Dr Tabares      5. CKD stage 4 secondary to hypertension (H)  I12.9 Appreciate care from Dr Singer    N18.4       6. 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 Anxiety  F41.9 Restart cymbalta and monitor      8. Hyperlipidemia LDL goal <160  E78.5 Once clear if tolerating cymbalta or not, can restart statin - based on most recent labs, recommended restarting      9. Post-nasal drainage  R09.82 fluticasone (FLONASE) 50 MCG/ACT nasal spray    Sequela from recent infection - recommended nasal steroid use for next few weeks        I spent a total of 42 minutes on the day of the visit.   Time spent doing chart review, history and exam, documentation and further activities per the note       See Patient Instructions    Return in about 4 weeks (around 2/16/2023), or if symptoms worsen or fail to improve.    Hien Booth MD  [facility] TAHIRA Hong is a 83 year old, presenting for the following health issues:  RECHECK      History of Present Illness       She eats 0-1 servings of fruits and vegetables daily.She exercises with enough effort to increase  "her heart rate 9 or less minutes per day.  She exercises with enough effort to increase her heart rate 3 or less days per week.   She is taking medications regularly.     At last visit, was ill with Influenza A and went to [facility] for IVF and zofran for related dehydration and vomiting.   Also treated with omnicef for presumed secondary sinusitis.    At last visit, added duloxetine for help with leg pain at night.   Kept gabapentin.                  Objective    /42 (BP Location: Right arm, Patient Position: Chair, Cuff Size: Adult Regular)   Pulse 65   Temp 97.8  F (36.6  C) (Tympanic)   Resp 16   Ht 1.549 m (5' 1\")   Wt 58.1 kg (128 lb 1.6 oz)   SpO2 99%   BMI 24.20 kg/m    Body mass index is 24.2 kg/m .   Wt Readings from Last 4 Encounters:   01/19/23 58.1 kg (128 lb 1.6 oz)   12/28/21 56.2 kg (124 lb)   12/14/21 57.7 kg (127 lb 3.2 oz)   10/05/20 61.2 kg (135 lb)       Physical Exam   GENERAL: alert and no distress  HENT: normal cephalic/atraumatic, nasal mucosa edematous , posterior OP with clear drainage, oral mucous membranes moist and sinuses: not tender  RESP: lungs clear to auscultation - no rales, rhonchi or wheezes  CV: regular rates and rhythm, normal S1 S2, no S3 or S4, no murmur, click or rub, peripheral pulses strong and no peripheral edema  PSYCH: mentation appears normal, affect normal/bright    Hien Booth MD        " GRAYSON Jarquin yes

## 2024-08-28 ENCOUNTER — OFFICE VISIT (OUTPATIENT)
Dept: PEDIATRICS | Facility: CLINIC | Age: 84
End: 2024-08-28
Payer: MEDICARE

## 2024-08-28 VITALS
WEIGHT: 118 LBS | OXYGEN SATURATION: 100 % | BODY MASS INDEX: 23.05 KG/M2 | HEART RATE: 61 BPM | TEMPERATURE: 97.8 F | DIASTOLIC BLOOD PRESSURE: 60 MMHG | SYSTOLIC BLOOD PRESSURE: 122 MMHG | RESPIRATION RATE: 16 BRPM

## 2024-08-28 DIAGNOSIS — M32.9 SYSTEMIC LUPUS ERYTHEMATOSUS, UNSPECIFIED SLE TYPE, UNSPECIFIED ORGAN INVOLVEMENT STATUS (H): ICD-10-CM

## 2024-08-28 DIAGNOSIS — N18.4 CKD STAGE 4 SECONDARY TO HYPERTENSION (H): Primary | ICD-10-CM

## 2024-08-28 DIAGNOSIS — Z93.2 ILEOSTOMY STATUS (H): ICD-10-CM

## 2024-08-28 DIAGNOSIS — I10 HYPERTENSION GOAL BP (BLOOD PRESSURE) < 140/90: ICD-10-CM

## 2024-08-28 DIAGNOSIS — G25.81 RESTLESS LEGS SYNDROME (RLS): ICD-10-CM

## 2024-08-28 DIAGNOSIS — K94.19 ALTERED BOWEL ELIMINATION DUE TO INTESTINAL OSTOMY (H): ICD-10-CM

## 2024-08-28 DIAGNOSIS — Z90.49 S/P TOTAL COLECTOMY: ICD-10-CM

## 2024-08-28 DIAGNOSIS — I12.9 CKD STAGE 4 SECONDARY TO HYPERTENSION (H): Primary | ICD-10-CM

## 2024-08-28 PROCEDURE — 80069 RENAL FUNCTION PANEL: CPT | Performed by: PEDIATRICS

## 2024-08-28 PROCEDURE — 36415 COLL VENOUS BLD VENIPUNCTURE: CPT | Performed by: PEDIATRICS

## 2024-08-28 PROCEDURE — G2211 COMPLEX E/M VISIT ADD ON: HCPCS | Performed by: PEDIATRICS

## 2024-08-28 PROCEDURE — 99214 OFFICE O/P EST MOD 30 MIN: CPT | Performed by: PEDIATRICS

## 2024-08-28 ASSESSMENT — PAIN SCALES - GENERAL: PAINLEVEL: NO PAIN (0)

## 2024-08-28 NOTE — PROGRESS NOTES
Assessment & Plan       ICD-10-CM    1. CKD stage 4 secondary to hypertension (H)  I12.9 Renal panel (Alb, BUN, Ca, Cl, CO2, Creat, Gluc, Phos, K, Na)    Last creatinine measured in rheumatology was higher than usual.        Patient has follow up planned in November with with Lucrecia in Nephrology.    She has grown tired of spending so much time in medical settings and has been getting IV fluids every 2-3 weeks.    Discussed increasing frequency of IV fluids to help with kidney function and energy.   says that she does much better physically and mentally after infusions.     Will repeat labs today      N18.4       2. Hypertension goal BP (blood pressure) < 140/90  I10 Well controlled, continue current medications        3. 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   She is agreeable to increasing the frequency of her IV fluid infusions to weekly and monitoring          4. 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 Ileostomy status (H)  Z93.2       6. Restless legs syndrome (RLS)  G25.81       7. Systemic lupus erythematosus, unspecified SLE type, unspecified organ involvement status (H)  M32.9 Following with Dr Tabares, no medication changes made today        The longitudinal plan of care for the diagnosis(es)/condition(s) as documented were addressed during this visit. Due to the added complexity in care, I will continue to support Gely in the subsequent management and with ongoing continuity of care.       Aracelis Hong is a 84 year old, presenting for the following health issues:  Follow Up        8/28/2024     1:21 PM   Additional Questions   Roomed by Kenia Schneider   Accompanied by Nato -       History of Present Illness       Reason for visit:  Following up on health    She eats 2-3 servings of fruits and vegetables  daily.She consumes 0 sweetened beverage(s) daily.She exercises with enough effort to increase her heart rate 9 or less minutes per day.  She exercises with enough effort to increase her heart rate 3 or less days per week.   She is taking medications regularly.       Having more diffuse pain and burning sensation.       Dehydration related to colectomy - has been getting intermittent infusions to help with volume status    Hesitant to spend more time in infusion center, but agreeable with husbands recommendation that she do so.    Has had some increased abdominal pain with radiation to back - not interested in additional work up at this time, but aware that she needs to contact us with any concerns regarding possible worsening of symptoms, especially anything that could represent a cardiac source.    Mood in a good place.              Objective    /60 (BP Location: Right arm, Patient Position: Sitting, Cuff Size: Adult Regular)   Pulse 61   Temp 97.8  F (36.6  C) (Tympanic)   Resp 16   Wt 53.5 kg (118 lb)   SpO2 100%   BMI 23.05 kg/m    Body mass index is 23.05 kg/m .  Wt Readings from Last 4 Encounters:   08/28/24 53.5 kg (118 lb)   05/06/24 57.2 kg (126 lb)   03/18/24 56.9 kg (125 lb 6.4 oz)   01/26/24 58.8 kg (129 lb 11.2 oz)       Physical Exam   GENERAL: alert and no distress  EYES: Eyes grossly normal to inspection, PERRL and conjunctivae and sclerae normal  HENT: ear canals and TM's normal, nose and mouth without ulcers or lesions  NECK: no adenopathy, no asymmetry, masses, or scars  RESP: lungs clear to auscultation - no rales, rhonchi or wheezes  CV: regular rate and rhythm, normal S1 S2, no S3 or S4, no murmur, click or rub, no peripheral edema  ABDOMEN: Ostomy bag in place - opaque, soft stool present in bag, no abdominal pain on palpation  MS: no gross musculoskeletal defects noted, no edema  SKIN: no suspicious lesions or rashes  NEURO: Normal strength and tone, mentation intact and speech  normal  PSYCH: mentation appears normal, affect normal/bright    Labs pending        Signed Electronically by: Hien Booth MD

## 2024-08-28 NOTE — PATIENT INSTRUCTIONS
See if you can do infusions once weekly for awhile - I think weekly infusions will make a bit difference    Please alert me with any changes that make you worried about your heart    Keep pushing fluid all day - this makes a difference    We'll recheck your kidney function today

## 2024-08-29 LAB
ALBUMIN SERPL BCG-MCNC: 4.2 G/DL (ref 3.5–5.2)
ANION GAP SERPL CALCULATED.3IONS-SCNC: 11 MMOL/L (ref 7–15)
BUN SERPL-MCNC: 32.5 MG/DL (ref 8–23)
CALCIUM SERPL-MCNC: 9.1 MG/DL (ref 8.8–10.4)
CHLORIDE SERPL-SCNC: 98 MMOL/L (ref 98–107)
CREAT SERPL-MCNC: 2.6 MG/DL (ref 0.51–0.95)
EGFRCR SERPLBLD CKD-EPI 2021: 17 ML/MIN/1.73M2
GLUCOSE SERPL-MCNC: 120 MG/DL (ref 70–99)
HCO3 SERPL-SCNC: 17 MMOL/L (ref 22–29)
PHOSPHATE SERPL-MCNC: 3.5 MG/DL (ref 2.5–4.5)
POTASSIUM SERPL-SCNC: 3.5 MMOL/L (ref 3.4–5.3)
SODIUM SERPL-SCNC: 126 MMOL/L (ref 135–145)

## 2024-09-04 ENCOUNTER — INFUSION THERAPY VISIT (OUTPATIENT)
Dept: INFUSION THERAPY | Facility: CLINIC | Age: 84
End: 2024-09-04
Attending: PEDIATRICS
Payer: MEDICARE

## 2024-09-04 VITALS
HEART RATE: 64 BPM | TEMPERATURE: 98 F | DIASTOLIC BLOOD PRESSURE: 60 MMHG | OXYGEN SATURATION: 100 % | RESPIRATION RATE: 16 BRPM | SYSTOLIC BLOOD PRESSURE: 126 MMHG

## 2024-09-04 DIAGNOSIS — N18.4 ACUTE RENAL FAILURE SUPERIMPOSED ON STAGE 4 CHRONIC KIDNEY DISEASE, UNSPECIFIED ACUTE RENAL FAILURE TYPE (H): ICD-10-CM

## 2024-09-04 DIAGNOSIS — E86.0 DEHYDRATION: ICD-10-CM

## 2024-09-04 DIAGNOSIS — N18.4 CKD STAGE 4 SECONDARY TO HYPERTENSION (H): ICD-10-CM

## 2024-09-04 DIAGNOSIS — Z90.49 S/P TOTAL COLECTOMY: ICD-10-CM

## 2024-09-04 DIAGNOSIS — E87.1 HYPONATREMIA: ICD-10-CM

## 2024-09-04 DIAGNOSIS — I12.9 CKD STAGE 4 SECONDARY TO HYPERTENSION (H): ICD-10-CM

## 2024-09-04 DIAGNOSIS — Z93.2 ILEOSTOMY STATUS (H): ICD-10-CM

## 2024-09-04 DIAGNOSIS — N17.9 ACUTE RENAL FAILURE SUPERIMPOSED ON STAGE 4 CHRONIC KIDNEY DISEASE, UNSPECIFIED ACUTE RENAL FAILURE TYPE (H): ICD-10-CM

## 2024-09-04 DIAGNOSIS — K94.19 ALTERED BOWEL ELIMINATION DUE TO INTESTINAL OSTOMY (H): Primary | ICD-10-CM

## 2024-09-04 PROCEDURE — 250N000011 HC RX IP 250 OP 636: Performed by: PEDIATRICS

## 2024-09-04 PROCEDURE — 96360 HYDRATION IV INFUSION INIT: CPT

## 2024-09-04 PROCEDURE — 258N000003 HC RX IP 258 OP 636: Performed by: PEDIATRICS

## 2024-09-04 PROCEDURE — 96361 HYDRATE IV INFUSION ADD-ON: CPT

## 2024-09-04 RX ORDER — HEPARIN SODIUM (PORCINE) LOCK FLUSH IV SOLN 100 UNIT/ML 100 UNIT/ML
5 SOLUTION INTRAVENOUS
Status: CANCELLED | OUTPATIENT
Start: 2024-09-04

## 2024-09-04 RX ORDER — HEPARIN SODIUM (PORCINE) LOCK FLUSH IV SOLN 100 UNIT/ML 100 UNIT/ML
5 SOLUTION INTRAVENOUS
Status: DISCONTINUED | OUTPATIENT
Start: 2024-09-04 | End: 2024-09-04 | Stop reason: HOSPADM

## 2024-09-04 RX ORDER — HEPARIN SODIUM,PORCINE 10 UNIT/ML
5-20 VIAL (ML) INTRAVENOUS DAILY PRN
Status: CANCELLED | OUTPATIENT
Start: 2024-09-04

## 2024-09-04 RX ADMIN — Medication 5 ML: at 15:06

## 2024-09-04 RX ADMIN — SODIUM CHLORIDE 1000 ML: 9 INJECTION, SOLUTION INTRAVENOUS at 13:06

## 2024-09-04 NOTE — PROGRESS NOTES
Infusion Nursing Note:  Gely Keene presents today for IVF 1 L over 2 hours.    Patient seen by provider today: No   present during visit today: Not Applicable.    Note: N/A.      Intravenous Access:  Implanted Port.    Treatment Conditions:  Not Applicable.      Post Infusion Assessment:  Patient tolerated infusion without incident.  Blood return noted pre and post infusion.  Site patent and intact, free from redness, edema or discomfort.  No evidence of extravasations.  Access discontinued per protocol.       Discharge Plan:   Discharge instructions reviewed with: Patient.  Patient and/or family verbalized understanding of discharge instructions and all questions answered.  AVS to patient via [employer].  Patient will return 9/18/24 for next appointment.   Patient discharged in stable condition accompanied by: self.  Departure Mode: Ambulatory.      Shana Khan RN

## 2024-09-18 ENCOUNTER — INFUSION THERAPY VISIT (OUTPATIENT)
Dept: INFUSION THERAPY | Facility: CLINIC | Age: 84
End: 2024-09-18
Attending: PEDIATRICS
Payer: MEDICARE

## 2024-09-18 VITALS
DIASTOLIC BLOOD PRESSURE: 56 MMHG | RESPIRATION RATE: 16 BRPM | TEMPERATURE: 98 F | SYSTOLIC BLOOD PRESSURE: 112 MMHG | OXYGEN SATURATION: 98 % | HEART RATE: 72 BPM

## 2024-09-18 DIAGNOSIS — E87.1 HYPONATREMIA: ICD-10-CM

## 2024-09-18 DIAGNOSIS — N18.4 ACUTE RENAL FAILURE SUPERIMPOSED ON STAGE 4 CHRONIC KIDNEY DISEASE, UNSPECIFIED ACUTE RENAL FAILURE TYPE (H): ICD-10-CM

## 2024-09-18 DIAGNOSIS — Z90.49 S/P TOTAL COLECTOMY: Primary | ICD-10-CM

## 2024-09-18 DIAGNOSIS — N17.9 ACUTE RENAL FAILURE SUPERIMPOSED ON STAGE 4 CHRONIC KIDNEY DISEASE, UNSPECIFIED ACUTE RENAL FAILURE TYPE (H): ICD-10-CM

## 2024-09-18 DIAGNOSIS — N18.4 CKD STAGE 4 SECONDARY TO HYPERTENSION (H): ICD-10-CM

## 2024-09-18 DIAGNOSIS — E86.0 DEHYDRATION: ICD-10-CM

## 2024-09-18 DIAGNOSIS — K94.19 ALTERED BOWEL ELIMINATION DUE TO INTESTINAL OSTOMY (H): ICD-10-CM

## 2024-09-18 DIAGNOSIS — Z93.2 ILEOSTOMY STATUS (H): ICD-10-CM

## 2024-09-18 DIAGNOSIS — I12.9 CKD STAGE 4 SECONDARY TO HYPERTENSION (H): ICD-10-CM

## 2024-09-18 PROCEDURE — 258N000003 HC RX IP 258 OP 636: Performed by: PEDIATRICS

## 2024-09-18 PROCEDURE — 96360 HYDRATION IV INFUSION INIT: CPT

## 2024-09-18 PROCEDURE — 96361 HYDRATE IV INFUSION ADD-ON: CPT

## 2024-09-18 PROCEDURE — 250N000011 HC RX IP 250 OP 636: Performed by: PEDIATRICS

## 2024-09-18 RX ORDER — HEPARIN SODIUM (PORCINE) LOCK FLUSH IV SOLN 100 UNIT/ML 100 UNIT/ML
5 SOLUTION INTRAVENOUS
Status: DISCONTINUED | OUTPATIENT
Start: 2024-09-18 | End: 2024-09-18 | Stop reason: HOSPADM

## 2024-09-18 RX ORDER — HEPARIN SODIUM,PORCINE 10 UNIT/ML
5-20 VIAL (ML) INTRAVENOUS DAILY PRN
Status: CANCELLED | OUTPATIENT
Start: 2024-09-18

## 2024-09-18 RX ORDER — HEPARIN SODIUM (PORCINE) LOCK FLUSH IV SOLN 100 UNIT/ML 100 UNIT/ML
5 SOLUTION INTRAVENOUS
Status: CANCELLED | OUTPATIENT
Start: 2024-09-18

## 2024-09-18 RX ADMIN — SODIUM CHLORIDE 1000 ML: 9 INJECTION, SOLUTION INTRAVENOUS at 12:35

## 2024-09-18 RX ADMIN — Medication 5 ML: at 14:32

## 2024-09-18 NOTE — PROGRESS NOTES
Infusion Nursing Note:  Gely Keene presents today for IVF.    Patient seen by provider today: No   present during visit today: Not Applicable.    Note: N/A.      Intravenous Access:  Implanted Port.    Treatment Conditions:  Not Applicable.      Post Infusion Assessment:  Patient tolerated infusion without incident.  Blood return noted pre and post infusion.  Site patent and intact, free from redness, edema or discomfort.  No evidence of extravasations.  Access discontinued per protocol.       Discharge Plan:   Discharge instructions reviewed with: Patient.  Patient and/or family verbalized understanding of discharge instructions and all questions answered.  AVS to patient via [employer].  Patient will return 10/2/24 for next appointment.   Patient discharged in stable condition accompanied by: self.  Departure Mode: Ambulatory.      Bouchra Barron RN

## 2024-09-19 ENCOUNTER — DOCUMENTATION ONLY (OUTPATIENT)
Dept: INFUSION THERAPY | Facility: CLINIC | Age: 84
End: 2024-09-19
Payer: MEDICARE

## 2024-09-19 DIAGNOSIS — M32.9 SYSTEMIC LUPUS ERYTHEMATOSUS (H): Primary | ICD-10-CM

## 2024-09-19 NOTE — PROGRESS NOTES
Lab orders entered per Dr. Meek Tabares.  Q 3 months.  Last drawn on 8/21/24.  Order sent for scanning.    Sarah Pollack RN, BSN, OCN on 9/19/2024 at 3:58 PM

## 2024-10-02 ENCOUNTER — INFUSION THERAPY VISIT (OUTPATIENT)
Dept: INFUSION THERAPY | Facility: CLINIC | Age: 84
End: 2024-10-02
Attending: PEDIATRICS
Payer: MEDICARE

## 2024-10-02 VITALS
RESPIRATION RATE: 16 BRPM | SYSTOLIC BLOOD PRESSURE: 124 MMHG | OXYGEN SATURATION: 99 % | TEMPERATURE: 98 F | HEART RATE: 71 BPM | DIASTOLIC BLOOD PRESSURE: 75 MMHG

## 2024-10-02 DIAGNOSIS — K94.19 ALTERED BOWEL ELIMINATION DUE TO INTESTINAL OSTOMY (H): ICD-10-CM

## 2024-10-02 DIAGNOSIS — E86.0 DEHYDRATION: ICD-10-CM

## 2024-10-02 DIAGNOSIS — Z93.2 ILEOSTOMY STATUS (H): ICD-10-CM

## 2024-10-02 DIAGNOSIS — N17.9 ACUTE RENAL FAILURE SUPERIMPOSED ON STAGE 4 CHRONIC KIDNEY DISEASE, UNSPECIFIED ACUTE RENAL FAILURE TYPE (H): ICD-10-CM

## 2024-10-02 DIAGNOSIS — N18.4 ACUTE RENAL FAILURE SUPERIMPOSED ON STAGE 4 CHRONIC KIDNEY DISEASE, UNSPECIFIED ACUTE RENAL FAILURE TYPE (H): ICD-10-CM

## 2024-10-02 DIAGNOSIS — N18.4 CKD STAGE 4 SECONDARY TO HYPERTENSION (H): ICD-10-CM

## 2024-10-02 DIAGNOSIS — I12.9 CKD STAGE 4 SECONDARY TO HYPERTENSION (H): ICD-10-CM

## 2024-10-02 DIAGNOSIS — E87.1 HYPONATREMIA: ICD-10-CM

## 2024-10-02 DIAGNOSIS — Z90.49 S/P TOTAL COLECTOMY: Primary | ICD-10-CM

## 2024-10-02 PROCEDURE — 96360 HYDRATION IV INFUSION INIT: CPT

## 2024-10-02 PROCEDURE — 258N000003 HC RX IP 258 OP 636: Performed by: PEDIATRICS

## 2024-10-02 PROCEDURE — 250N000011 HC RX IP 250 OP 636: Performed by: PEDIATRICS

## 2024-10-02 PROCEDURE — 96361 HYDRATE IV INFUSION ADD-ON: CPT

## 2024-10-02 RX ORDER — HEPARIN SODIUM,PORCINE 10 UNIT/ML
5-20 VIAL (ML) INTRAVENOUS DAILY PRN
Status: CANCELLED | OUTPATIENT
Start: 2024-10-02

## 2024-10-02 RX ORDER — HEPARIN SODIUM (PORCINE) LOCK FLUSH IV SOLN 100 UNIT/ML 100 UNIT/ML
5 SOLUTION INTRAVENOUS
Status: DISCONTINUED | OUTPATIENT
Start: 2024-10-02 | End: 2024-10-02 | Stop reason: HOSPADM

## 2024-10-02 RX ORDER — HEPARIN SODIUM (PORCINE) LOCK FLUSH IV SOLN 100 UNIT/ML 100 UNIT/ML
5 SOLUTION INTRAVENOUS
Status: CANCELLED | OUTPATIENT
Start: 2024-10-02

## 2024-10-02 RX ADMIN — Medication 5 ML: at 15:10

## 2024-10-02 RX ADMIN — SODIUM CHLORIDE 1000 ML: 9 INJECTION, SOLUTION INTRAVENOUS at 13:08

## 2024-10-02 NOTE — PROGRESS NOTES
Infusion Nursing Note:  Gely Keene presents today for IVF.    Patient seen by provider today: No   present during visit today: Not Applicable.    Note: N/A.      Intravenous Access:  Implanted Port.    Treatment Conditions:  Not Applicable.      Post Infusion Assessment:  Patient tolerated infusion without incident.  Blood return noted pre and post infusion.  Site patent and intact, free from redness, edema or discomfort.  No evidence of extravasations.  Access discontinued per protocol.       Discharge Plan:   Discharge instructions reviewed with: Patient.  Patient and/or family verbalized understanding of discharge instructions and all questions answered.  AVS to patient via [employer].  Patient will return 10/9/24 for next appointment.   Patient discharged in stable condition accompanied by: self.  Departure Mode: Ambulatory.      Bouchra Barron RN

## 2024-10-02 NOTE — PROGRESS NOTES
SUBJECTIVE:   Gely Keene is a 78 year old female presenting with a chief complaint of   Chief Complaint   Patient presents with     Urgent Care     Urinary Problem     possible uti       She is an established patient of [facility].    The patient reports onset of dysuria, frequency, and urgency beginning this morning. She states that she has a history of UTIs, and this feels similar to the past. She denies any fevers, chills, flank pain, hematuria, or vaginal symptoms. She has not taken anything for her symptoms. She denies any history of pyelonephritis or UTI-related complications.    Review of Systems   As above.    Past Medical History:   Diagnosis Date     Anxiety      BCC (basal cell carcinoma of skin)      Esophageal reflux (GERD) 9/8/2014     HTN (hypertension) 9/8/2014     Hyperlipidemia LDL goal <160 9/8/2014     IFG (impaired fasting glucose) 9/8/2014     Mumps      PONV (postoperative nausea and vomiting)      Family History   Problem Relation Age of Onset     Breast Cancer Daughter      Breast Cancer Mother      Breast Cancer Sister      Current Outpatient Prescriptions   Medication Sig Dispense Refill     ACETAMINOPHEN PO Take 500 mg by mouth every 4 hours as needed for pain Reported on 4/10/2017       atorvastatin (LIPITOR) 20 MG tablet Take 1 tablet (20 mg) by mouth daily 90 tablet 3     BIOTIN PO Take 1 tablet by mouth daily dose unknown       conjugated estrogens (PREMARIN) cream Place 0.5 g vaginally three times a week 30 g 6     Cyanocobalamin (VITAMIN B 12 PO) Take by mouth daily Dose unknown       fish oil-omega-3 fatty acids 1000 MG capsule Take 1 capsule daily.  Indications: PN:       MELATONIN PO Take 3 mg by mouth nightly as needed       metoprolol (TOPROL-XL) 25 MG 24 hr tablet Take 0.5 tablets (12.5 mg) by mouth daily 45 tablet 3     Multiple Minerals-Vitamins (CALCIUM CITRATE PLUS/MAGNESIUM) TABS Take 1 tablet by mouth daily        nystatin (MYCOSTATIN) 570692 UNIT/GM POWD  Apply topically as needed       omeprazole (PRILOSEC) 20 MG CR capsule TAKE ONE CAPSULE BY MOUTH EVERY DAY 90 capsule 1     ciprofloxacin (CIPRO) 250 MG tablet Take 1 tablet (250 mg) by mouth 2 times daily (Patient not taking: Reported on 9/18/2018) 6 tablet 0     Social History   Substance Use Topics     Smoking status: Never Smoker     Smokeless tobacco: Never Used     Alcohol use Yes      Comment: socially       OBJECTIVE  /58 (BP Location: Right arm, Patient Position: Chair, Cuff Size: Adult Regular)  Pulse 73  Temp 97.9  F (36.6  C)  SpO2 97%    Physical Exam   Constitutional: She appears well-developed and well-nourished.   HENT:   Mouth/Throat: Uvula is midline and mucous membranes are normal.   Eyes: Conjunctivae are normal. No scleral icterus.   Cardiovascular: Normal rate, regular rhythm, S1 normal, S2 normal and normal heart sounds.    Pulmonary/Chest: Effort normal and breath sounds normal. She has no wheezes. She has no rhonchi. She has no rales.   Abdominal: Soft. Bowel sounds are normal. She exhibits no distension. There is no tenderness. There is no CVA tenderness.   Neurological: She is alert.   Skin: No rash noted.       Labs:  Results for orders placed or performed in visit on 09/18/18 (from the past 24 hour(s))   UA reflex to Microscopic and Culture   Result Value Ref Range    Color Urine Yellow     Appearance Urine Clear     Glucose Urine Negative NEG^Negative mg/dL    Bilirubin Urine Negative NEG^Negative    Ketones Urine Negative NEG^Negative mg/dL    Specific Gravity Urine 1.010 1.003 - 1.035    Blood Urine Moderate (A) NEG^Negative    pH Urine 6.0 5.0 - 7.0 pH    Protein Albumin Urine 30 (A) NEG^Negative mg/dL    Urobilinogen Urine 0.2 0.2 - 1.0 EU/dL    Nitrite Urine Negative NEG^Negative    Leukocyte Esterase Urine Large (A) NEG^Negative    Source Midstream Urine    Urine Microscopic   Result Value Ref Range    WBC Urine >100 (A) OTO5^0 - 5 /HPF    RBC Urine O - 2 OTO2^O - 2 /HPF     Squamous Epithelial /LPF Urine Few FEW^Few /LPF    Bacteria Urine Moderate (A) NEG^Negative /HPF       ASSESSMENT:      ICD-10-CM    1. Urinary tract infection without hematuria, site unspecified N39.0 ciprofloxacin (CIPRO) 250 MG tablet   2. Dysuria R30.0 UA reflex to Microscopic and Culture     Urine Microscopic     Urine Culture Aerobic Bacterial        Medical Decision Making:  No evidence of upper urinary tract involvement at this time.    PLAN:  1) Cipro 250mg BID for 7 days. Discussed to avoid taking medication with calcium.  Culture pending.  Will adjust antibiotics if indicated.  2) Drink plenty of fluids.   3) Follow up with PCP if not improving by Friday, return sooner if worsening.    Pt seen in conjunction with JOSEFINA Sears student, who served as a scribe for this encounter. I have reviewed the ROS and PSFH documented by the student.  I performed the pertinent history, exam, and assessment and plan components as documented above. ~Anita Munoz M.D.       room air

## 2024-10-15 ENCOUNTER — TELEPHONE (OUTPATIENT)
Dept: PEDIATRICS | Facility: CLINIC | Age: 84
End: 2024-10-15
Payer: MEDICARE

## 2024-10-15 ENCOUNTER — TRANSFERRED RECORDS (OUTPATIENT)
Dept: HEALTH INFORMATION MANAGEMENT | Facility: CLINIC | Age: 84
End: 2024-10-15
Payer: MEDICARE

## 2024-10-15 NOTE — TELEPHONE ENCOUNTER
Called [facility] at [phone] and spoke with EUGENE Duenas    - Informed EUGENE Duenas of Dr. Booth's comments/recommendations   - Informed EUGENE Duenas that Dr. Booth would consider the patient an essential case and to please keep her upcoming infusion center appointment   - EUGENE Duenas verbalized understanding and agrees with the plan; EUGENE Duenas states that she will inform the charge nurse of this     Appointments in Next Year      Oct 16, 2024 1:00 PM  Infusion Visit with  INFUSION CHAIR,  CANCER INFUSION NURSE  [facility] [phone]     Shahida RAHMAN RN   [facility]

## 2024-10-15 NOTE — TELEPHONE ENCOUNTER
Infusion center calling, patient is scheduled for IV hydration tomorrow and they are wondering if patient is considered an urgent or essential case? Otherwise they will have to cancel her appointment due to the IV fluid shortage. Routing to PCP.    Nancie Brown RN on 10/15/2024 at 12:19 PM

## 2024-10-16 ENCOUNTER — INFUSION THERAPY VISIT (OUTPATIENT)
Dept: INFUSION THERAPY | Facility: CLINIC | Age: 84
End: 2024-10-16
Attending: PEDIATRICS
Payer: MEDICARE

## 2024-10-16 VITALS
DIASTOLIC BLOOD PRESSURE: 65 MMHG | SYSTOLIC BLOOD PRESSURE: 127 MMHG | RESPIRATION RATE: 16 BRPM | HEART RATE: 76 BPM | TEMPERATURE: 98 F

## 2024-10-16 DIAGNOSIS — E86.0 DEHYDRATION: ICD-10-CM

## 2024-10-16 DIAGNOSIS — N18.4 ACUTE RENAL FAILURE SUPERIMPOSED ON STAGE 4 CHRONIC KIDNEY DISEASE, UNSPECIFIED ACUTE RENAL FAILURE TYPE (H): ICD-10-CM

## 2024-10-16 DIAGNOSIS — Z93.2 ILEOSTOMY STATUS (H): ICD-10-CM

## 2024-10-16 DIAGNOSIS — N17.9 ACUTE RENAL FAILURE SUPERIMPOSED ON STAGE 4 CHRONIC KIDNEY DISEASE, UNSPECIFIED ACUTE RENAL FAILURE TYPE (H): ICD-10-CM

## 2024-10-16 DIAGNOSIS — E87.1 HYPONATREMIA: ICD-10-CM

## 2024-10-16 DIAGNOSIS — K94.19 ALTERED BOWEL ELIMINATION DUE TO INTESTINAL OSTOMY (H): Primary | ICD-10-CM

## 2024-10-16 DIAGNOSIS — Z90.49 S/P TOTAL COLECTOMY: ICD-10-CM

## 2024-10-16 DIAGNOSIS — N18.4 CKD STAGE 4 SECONDARY TO HYPERTENSION (H): ICD-10-CM

## 2024-10-16 DIAGNOSIS — I12.9 CKD STAGE 4 SECONDARY TO HYPERTENSION (H): ICD-10-CM

## 2024-10-16 PROCEDURE — 96360 HYDRATION IV INFUSION INIT: CPT

## 2024-10-16 PROCEDURE — 96361 HYDRATE IV INFUSION ADD-ON: CPT

## 2024-10-16 PROCEDURE — 258N000003 HC RX IP 258 OP 636: Performed by: PEDIATRICS

## 2024-10-16 PROCEDURE — 250N000011 HC RX IP 250 OP 636: Performed by: PEDIATRICS

## 2024-10-16 RX ORDER — HEPARIN SODIUM,PORCINE 10 UNIT/ML
5-20 VIAL (ML) INTRAVENOUS DAILY PRN
Status: CANCELLED | OUTPATIENT
Start: 2024-10-16

## 2024-10-16 RX ORDER — HEPARIN SODIUM (PORCINE) LOCK FLUSH IV SOLN 100 UNIT/ML 100 UNIT/ML
5 SOLUTION INTRAVENOUS
Status: DISCONTINUED | OUTPATIENT
Start: 2024-10-16 | End: 2024-10-16 | Stop reason: HOSPADM

## 2024-10-16 RX ORDER — HEPARIN SODIUM (PORCINE) LOCK FLUSH IV SOLN 100 UNIT/ML 100 UNIT/ML
5 SOLUTION INTRAVENOUS
Status: CANCELLED | OUTPATIENT
Start: 2024-10-16

## 2024-10-16 RX ADMIN — SODIUM CHLORIDE 1000 ML: 9 INJECTION, SOLUTION INTRAVENOUS at 13:25

## 2024-10-16 RX ADMIN — Medication 5 ML: at 15:25

## 2024-10-16 NOTE — PROGRESS NOTES
Infusion Nursing Note:  Gely Keene presents today for IVF.    Patient seen by provider today: No   present during visit today: Not Applicable.    Note: N/A.    Intravenous Access:  Implanted Port.    Treatment Conditions:  Not Applicable.    Post Infusion Assessment:  Patient tolerated infusion without incident.  Blood return noted pre and post infusion.  Site patent and intact, free from redness, edema or discomfort.  No evidence of extravasations.  Access discontinued per protocol.     Discharge Plan:   Discharge instructions reviewed with: Patient.  Patient and/or family verbalized understanding of discharge instructions and all questions answered.  AVS to patient via [employer].  Patient will return 10/23 for next appointment.   Patient discharged in stable condition accompanied by: self.  Departure Mode: Ambulatory.    Ángel Medina RN

## 2024-10-23 ENCOUNTER — INFUSION THERAPY VISIT (OUTPATIENT)
Dept: INFUSION THERAPY | Facility: CLINIC | Age: 84
End: 2024-10-23
Attending: PEDIATRICS
Payer: MEDICARE

## 2024-10-23 ENCOUNTER — TELEPHONE (OUTPATIENT)
Dept: PEDIATRICS | Facility: CLINIC | Age: 84
End: 2024-10-23

## 2024-10-23 VITALS
RESPIRATION RATE: 16 BRPM | SYSTOLIC BLOOD PRESSURE: 112 MMHG | DIASTOLIC BLOOD PRESSURE: 67 MMHG | TEMPERATURE: 97.5 F | HEART RATE: 60 BPM | OXYGEN SATURATION: 100 %

## 2024-10-23 DIAGNOSIS — N17.9 ACUTE RENAL FAILURE SUPERIMPOSED ON STAGE 4 CHRONIC KIDNEY DISEASE, UNSPECIFIED ACUTE RENAL FAILURE TYPE (H): ICD-10-CM

## 2024-10-23 DIAGNOSIS — N18.4 ACUTE RENAL FAILURE SUPERIMPOSED ON STAGE 4 CHRONIC KIDNEY DISEASE, UNSPECIFIED ACUTE RENAL FAILURE TYPE (H): ICD-10-CM

## 2024-10-23 DIAGNOSIS — K94.19 ALTERED BOWEL ELIMINATION DUE TO INTESTINAL OSTOMY (H): Primary | ICD-10-CM

## 2024-10-23 DIAGNOSIS — E87.1 HYPONATREMIA: ICD-10-CM

## 2024-10-23 DIAGNOSIS — N18.4 CKD STAGE 4 SECONDARY TO HYPERTENSION (H): ICD-10-CM

## 2024-10-23 DIAGNOSIS — Z90.49 S/P TOTAL COLECTOMY: ICD-10-CM

## 2024-10-23 DIAGNOSIS — E86.0 DEHYDRATION: ICD-10-CM

## 2024-10-23 DIAGNOSIS — Z93.2 ILEOSTOMY STATUS (H): ICD-10-CM

## 2024-10-23 DIAGNOSIS — I12.9 CKD STAGE 4 SECONDARY TO HYPERTENSION (H): ICD-10-CM

## 2024-10-23 PROCEDURE — 258N000003 HC RX IP 258 OP 636: Performed by: PEDIATRICS

## 2024-10-23 PROCEDURE — 96360 HYDRATION IV INFUSION INIT: CPT

## 2024-10-23 PROCEDURE — 96361 HYDRATE IV INFUSION ADD-ON: CPT

## 2024-10-23 PROCEDURE — 250N000011 HC RX IP 250 OP 636: Performed by: PEDIATRICS

## 2024-10-23 RX ORDER — HEPARIN SODIUM (PORCINE) LOCK FLUSH IV SOLN 100 UNIT/ML 100 UNIT/ML
5 SOLUTION INTRAVENOUS
Status: CANCELLED | OUTPATIENT
Start: 2024-10-23

## 2024-10-23 RX ORDER — HEPARIN SODIUM (PORCINE) LOCK FLUSH IV SOLN 100 UNIT/ML 100 UNIT/ML
5 SOLUTION INTRAVENOUS
Status: DISCONTINUED | OUTPATIENT
Start: 2024-10-23 | End: 2024-10-23 | Stop reason: HOSPADM

## 2024-10-23 RX ORDER — HEPARIN SODIUM,PORCINE 10 UNIT/ML
5-20 VIAL (ML) INTRAVENOUS DAILY PRN
Status: CANCELLED | OUTPATIENT
Start: 2024-10-23

## 2024-10-23 RX ADMIN — HEPARIN 5 ML: 100 SYRINGE at 13:18

## 2024-10-23 RX ADMIN — SODIUM CHLORIDE 1000 ML: 9 INJECTION, SOLUTION INTRAVENOUS at 13:18

## 2024-10-23 ASSESSMENT — PAIN SCALES - GENERAL: PAINLEVEL_OUTOF10: NO PAIN (0)

## 2024-10-23 NOTE — PROGRESS NOTES
Infusion Nursing Note:    Gely Keene presents today for 1L of NS over 2hrs.      Patient seen by provider today: No     present during visit today: Not Applicable.    Note: N/A.      Intravenous Access:  Implanted Port.    Treatment Conditions:  Not Applicable.      Post Infusion Assessment:  Patient tolerated infusion without incident.  Blood return noted pre and post infusion.  Site patent and intact, free from redness, edema or discomfort.  No evidence of extravasations.  Access discontinued per protocol.       Discharge Plan:   Discharge instructions reviewed with: Patient and Family.  Patient and/or family verbalized understanding of discharge instructions and all questions answered.  AVS to patient via [employer].  Patient will return 11/6 and 11/14 for next appointment.   Patient discharged in stable condition accompanied by: self and daughter.  Departure Mode: Ambulatory.      Ashely Win RN

## 2024-10-23 NOTE — TELEPHONE ENCOUNTER
Patient Returning Call    Reason for call:  Needing infusion orders extended       Patient has additional questions:  No        Could we send this information to you in [employer] or would you prefer to receive a phone call?:   Patient would prefer a phone call   Okay to leave a detailed message?: Yes at Cell number on file:    Telephone Information:   Mobile [phone]   Mobile Not on file.

## 2024-10-23 NOTE — TELEPHONE ENCOUNTER
"Per chart review    Patient has therapy treatment: bariatric dehydration    Infusion Therapy Visit 10/23/24      OV 8/28/224 with Dr. Booth  \"Dehydration related to colectomy - has been getting intermittent infusions to help with volume status. Discussed increasing frequency of IV fluids to help with kidney function and energy.  says that she does much better physically and mentally after infusions.\"      Dr. Booth please review and order as appropriate.    Shahida Carlos RN    "

## 2024-10-24 RX ORDER — HEPARIN SODIUM (PORCINE) LOCK FLUSH IV SOLN 100 UNIT/ML 100 UNIT/ML
5 SOLUTION INTRAVENOUS
Status: CANCELLED | OUTPATIENT
Start: 2024-10-24

## 2024-10-24 RX ORDER — HEPARIN SODIUM,PORCINE 10 UNIT/ML
5-20 VIAL (ML) INTRAVENOUS DAILY PRN
Status: CANCELLED | OUTPATIENT
Start: 2024-10-24

## 2024-10-25 NOTE — TELEPHONE ENCOUNTER
Incoming call from Diana, patient's daughter (CTC on file). She is calling to follow up on infusion orders. Reviewed with Diana that Dr. Booth placed the orders yesterday. Daina verbalized understanding and will call the infusion center to schedule patient.     Selene Rodríguez RN 10/25/2024 12:07 PM  [facility]

## 2024-10-30 ENCOUNTER — OFFICE VISIT (OUTPATIENT)
Dept: PEDIATRICS | Facility: CLINIC | Age: 84
End: 2024-10-30
Payer: MEDICARE

## 2024-10-30 VITALS
HEART RATE: 82 BPM | RESPIRATION RATE: 18 BRPM | OXYGEN SATURATION: 99 % | BODY MASS INDEX: 23.16 KG/M2 | DIASTOLIC BLOOD PRESSURE: 63 MMHG | SYSTOLIC BLOOD PRESSURE: 100 MMHG | TEMPERATURE: 97.3 F | HEIGHT: 60 IN | WEIGHT: 118 LBS

## 2024-10-30 DIAGNOSIS — Z90.49 S/P TOTAL COLECTOMY: Primary | ICD-10-CM

## 2024-10-30 DIAGNOSIS — M32.9 SYSTEMIC LUPUS ERYTHEMATOSUS, UNSPECIFIED SLE TYPE, UNSPECIFIED ORGAN INVOLVEMENT STATUS (H): ICD-10-CM

## 2024-10-30 DIAGNOSIS — N18.4 CKD STAGE 4 SECONDARY TO HYPERTENSION (H): ICD-10-CM

## 2024-10-30 DIAGNOSIS — R73.01 IFG (IMPAIRED FASTING GLUCOSE): ICD-10-CM

## 2024-10-30 DIAGNOSIS — Z93.2 ILEOSTOMY STATUS (H): ICD-10-CM

## 2024-10-30 DIAGNOSIS — I12.9 CKD STAGE 4 SECONDARY TO HYPERTENSION (H): ICD-10-CM

## 2024-10-30 PROCEDURE — G0008 ADMIN INFLUENZA VIRUS VAC: HCPCS | Performed by: PEDIATRICS

## 2024-10-30 PROCEDURE — 99215 OFFICE O/P EST HI 40 MIN: CPT | Mod: 25 | Performed by: PEDIATRICS

## 2024-10-30 PROCEDURE — 90662 IIV NO PRSV INCREASED AG IM: CPT | Performed by: PEDIATRICS

## 2024-10-30 ASSESSMENT — PAIN SCALES - GENERAL: PAINLEVEL_OUTOF10: NO PAIN (0)

## 2024-10-30 NOTE — PROGRESS NOTES
Assessment & Plan     Stable at present - discussed importance of hydration, electrolyte containing fluids, and nutrition.  Also discussed best ways to stay at home safely - patient agreeable to using walker when needed and to not going downstairs unless her daughter is home.  Thankfully, has mostly one level living.   Strong family support.      Plan to keep medications the same for now.      ICD-10-CM    1. S/p total colectomy on 4/22/16 by Shana Alaniz MD  Z90.49       2. Ileostomy status (H)  Z93.2       3. CKD stage 4 secondary to hypertension (H)  I12.9 PRIMARY CARE FOLLOW-UP SCHEDULING    N18.4       4. Systemic lupus erythematosus, unspecified SLE type, unspecified organ involvement status (H)  M32.9       5. IFG (impaired fasting glucose)  R73.01             43 minutes spent in chart review, office visit, and documentation on day of service.    Aracelis Hong is a 84 year old, presenting for the following health issues:  RECHECK        10/30/2024     1:16 PM   Additional Questions   Roomed by liza   Accompanied by daughter         10/30/2024     1:16 PM   Patient Reported Additional Medications   Patient reports taking the following new medications na     History of Present Illness       Reason for visit:  General follow up    She eats 0-1 servings of fruits and vegetables daily.She consumes 1 sweetened beverage(s) daily.She exercises with enough effort to increase her heart rate 9 or less minutes per day.  She exercises with enough effort to increase her heart rate 3 or less days per week.   She is taking medications regularly.     S/p total colectomy by Dr Alaniz in 2016    CKD stage 4 - struggles with hydration - getting IV fluids on average once weekly.  With colectomy, difficult for her to stay hydrated.       Follows closely with Dr Singer - has repeat labs planned next week    SLE - no new symptoms - following with rheumatology, stable medications    Mood in a good place on current  medication regimen.     in the hospital last week    Nutrition is becoming more of an issue - working to take Ensure at least once daily.   Adding more electrolyte containing beverages.            Objective    /63 (BP Location: Right arm, Patient Position: Sitting, Cuff Size: Adult Regular)   Pulse 82   Temp 97.3  F (36.3  C) (Tympanic)   Resp 18   Ht 1.524 m (5')   Wt 53.5 kg (118 lb)   LMP  (LMP Unknown)   SpO2 99%   BMI 23.05 kg/m    Body mass index is 23.05 kg/m .  Wt Readings from Last 4 Encounters:   10/30/24 53.5 kg (118 lb)   08/28/24 53.5 kg (118 lb)   05/06/24 57.2 kg (126 lb)   03/18/24 56.9 kg (125 lb 6.4 oz)       Physical Exam   GENERAL: alert, no distress, frail, elderly, and fatigued  RESP: lungs clear to auscultation - no rales, rhonchi or wheezes  CV: regular rate and rhythm, normal S1 S2, no S3 or S4, no murmur, click or rub, no peripheral edema   NEURO: mentation intact, speech normal, and slow gait  PSYCH: mentation appears normal, affect normal/bright    Labs reviewed        Signed Electronically by: Hien Booth MD

## 2024-11-05 ENCOUNTER — LAB (OUTPATIENT)
Dept: LAB | Facility: CLINIC | Age: 84
End: 2024-11-05
Payer: MEDICARE

## 2024-11-05 DIAGNOSIS — N18.4 CHRONIC KIDNEY DISEASE, STAGE IV (SEVERE) (H): ICD-10-CM

## 2024-11-05 LAB
ALBUMIN SERPL BCG-MCNC: 4.4 G/DL (ref 3.5–5.2)
ANION GAP SERPL CALCULATED.3IONS-SCNC: 13 MMOL/L (ref 7–15)
BUN SERPL-MCNC: 37.5 MG/DL (ref 8–23)
CALCIUM SERPL-MCNC: 9.4 MG/DL (ref 8.8–10.4)
CHLORIDE SERPL-SCNC: 101 MMOL/L (ref 98–107)
CREAT SERPL-MCNC: 2.6 MG/DL (ref 0.51–0.95)
EGFRCR SERPLBLD CKD-EPI 2021: 17 ML/MIN/1.73M2
GLUCOSE SERPL-MCNC: 122 MG/DL (ref 70–99)
HCO3 SERPL-SCNC: 18 MMOL/L (ref 22–29)
PHOSPHATE SERPL-MCNC: 3.5 MG/DL (ref 2.5–4.5)
POTASSIUM SERPL-SCNC: 3.7 MMOL/L (ref 3.4–5.3)
SODIUM SERPL-SCNC: 132 MMOL/L (ref 135–145)

## 2024-11-05 PROCEDURE — 82040 ASSAY OF SERUM ALBUMIN: CPT

## 2024-11-05 PROCEDURE — 36415 COLL VENOUS BLD VENIPUNCTURE: CPT

## 2024-11-06 ENCOUNTER — INFUSION THERAPY VISIT (OUTPATIENT)
Dept: INFUSION THERAPY | Facility: CLINIC | Age: 84
End: 2024-11-06
Attending: PEDIATRICS
Payer: MEDICARE

## 2024-11-06 VITALS
OXYGEN SATURATION: 100 % | HEART RATE: 58 BPM | DIASTOLIC BLOOD PRESSURE: 68 MMHG | SYSTOLIC BLOOD PRESSURE: 112 MMHG | RESPIRATION RATE: 18 BRPM | TEMPERATURE: 98 F

## 2024-11-06 DIAGNOSIS — N18.4 ACUTE RENAL FAILURE SUPERIMPOSED ON STAGE 4 CHRONIC KIDNEY DISEASE, UNSPECIFIED ACUTE RENAL FAILURE TYPE (H): ICD-10-CM

## 2024-11-06 DIAGNOSIS — K94.19 ALTERED BOWEL ELIMINATION DUE TO INTESTINAL OSTOMY (H): Primary | ICD-10-CM

## 2024-11-06 DIAGNOSIS — M32.9 SYSTEMIC LUPUS ERYTHEMATOSUS (H): ICD-10-CM

## 2024-11-06 DIAGNOSIS — E86.0 DEHYDRATION: ICD-10-CM

## 2024-11-06 DIAGNOSIS — Z90.49 S/P TOTAL COLECTOMY: ICD-10-CM

## 2024-11-06 DIAGNOSIS — N18.4 CKD STAGE 4 SECONDARY TO HYPERTENSION (H): ICD-10-CM

## 2024-11-06 DIAGNOSIS — E87.1 HYPONATREMIA: ICD-10-CM

## 2024-11-06 DIAGNOSIS — N17.9 ACUTE RENAL FAILURE SUPERIMPOSED ON STAGE 4 CHRONIC KIDNEY DISEASE, UNSPECIFIED ACUTE RENAL FAILURE TYPE (H): ICD-10-CM

## 2024-11-06 DIAGNOSIS — Z93.2 ILEOSTOMY STATUS (H): ICD-10-CM

## 2024-11-06 DIAGNOSIS — I12.9 CKD STAGE 4 SECONDARY TO HYPERTENSION (H): ICD-10-CM

## 2024-11-06 LAB
ALBUMIN SERPL BCG-MCNC: 4.1 G/DL (ref 3.5–5.2)
ALT SERPL W P-5'-P-CCNC: 13 U/L (ref 0–50)
AST SERPL W P-5'-P-CCNC: 29 U/L (ref 0–45)
BASOPHILS # BLD AUTO: 0.1 10E3/UL (ref 0–0.2)
BASOPHILS NFR BLD AUTO: 1 %
CREAT SERPL-MCNC: 2.5 MG/DL (ref 0.51–0.95)
CRP SERPL-MCNC: <3 MG/L
EGFRCR SERPLBLD CKD-EPI 2021: 18 ML/MIN/1.73M2
EOSINOPHIL # BLD AUTO: 0.2 10E3/UL (ref 0–0.7)
EOSINOPHIL NFR BLD AUTO: 4 %
ERYTHROCYTE [DISTWIDTH] IN BLOOD BY AUTOMATED COUNT: 15.6 % (ref 10–15)
ERYTHROCYTE [SEDIMENTATION RATE] IN BLOOD BY WESTERGREN METHOD: 25 MM/HR (ref 0–30)
HCT VFR BLD AUTO: 30.3 % (ref 35–47)
HGB BLD-MCNC: 9.6 G/DL (ref 11.7–15.7)
IMM GRANULOCYTES # BLD: 0 10E3/UL
IMM GRANULOCYTES NFR BLD: 1 %
LYMPHOCYTES # BLD AUTO: 0.7 10E3/UL (ref 0.8–5.3)
LYMPHOCYTES NFR BLD AUTO: 17 %
Lab: NORMAL
MCH RBC QN AUTO: 32.2 PG (ref 26.5–33)
MCHC RBC AUTO-ENTMCNC: 31.7 G/DL (ref 31.5–36.5)
MCV RBC AUTO: 102 FL (ref 78–100)
MONOCYTES # BLD AUTO: 0.6 10E3/UL (ref 0–1.3)
MONOCYTES NFR BLD AUTO: 14 %
NEUTROPHILS # BLD AUTO: 2.5 10E3/UL (ref 1.6–8.3)
NEUTROPHILS NFR BLD AUTO: 63 %
NRBC # BLD AUTO: 0 10E3/UL
NRBC BLD AUTO-RTO: 0 /100
PERFORMING LABORATORY: NORMAL
PLATELET # BLD AUTO: 155 10E3/UL (ref 150–450)
RBC # BLD AUTO: 2.98 10E6/UL (ref 3.8–5.2)
SPECIMEN STATUS: NORMAL
TEST NAME: NORMAL
TOTAL PROTEIN SERUM FOR ELP: 6.3 G/DL (ref 6.4–8.3)
WBC # BLD AUTO: 4 10E3/UL (ref 4–11)

## 2024-11-06 PROCEDURE — 86160 COMPLEMENT ANTIGEN: CPT | Performed by: INTERNAL MEDICINE

## 2024-11-06 PROCEDURE — 85652 RBC SED RATE AUTOMATED: CPT | Performed by: INTERNAL MEDICINE

## 2024-11-06 PROCEDURE — 82040 ASSAY OF SERUM ALBUMIN: CPT | Performed by: INTERNAL MEDICINE

## 2024-11-06 PROCEDURE — 83516 IMMUNOASSAY NONANTIBODY: CPT | Performed by: INTERNAL MEDICINE

## 2024-11-06 PROCEDURE — 258N000003 HC RX IP 258 OP 636: Performed by: PEDIATRICS

## 2024-11-06 PROCEDURE — 84450 TRANSFERASE (AST) (SGOT): CPT | Performed by: INTERNAL MEDICINE

## 2024-11-06 PROCEDURE — 86225 DNA ANTIBODY NATIVE: CPT | Performed by: INTERNAL MEDICINE

## 2024-11-06 PROCEDURE — 85025 COMPLETE CBC W/AUTO DIFF WBC: CPT | Performed by: INTERNAL MEDICINE

## 2024-11-06 PROCEDURE — 84165 PROTEIN E-PHORESIS SERUM: CPT | Mod: TC | Performed by: STUDENT IN AN ORGANIZED HEALTH CARE EDUCATION/TRAINING PROGRAM

## 2024-11-06 PROCEDURE — 86140 C-REACTIVE PROTEIN: CPT | Performed by: INTERNAL MEDICINE

## 2024-11-06 PROCEDURE — 36415 COLL VENOUS BLD VENIPUNCTURE: CPT

## 2024-11-06 PROCEDURE — 250N000011 HC RX IP 250 OP 636: Performed by: PEDIATRICS

## 2024-11-06 PROCEDURE — 84460 ALANINE AMINO (ALT) (SGPT): CPT | Performed by: INTERNAL MEDICINE

## 2024-11-06 PROCEDURE — 86235 NUCLEAR ANTIGEN ANTIBODY: CPT | Performed by: INTERNAL MEDICINE

## 2024-11-06 PROCEDURE — 82565 ASSAY OF CREATININE: CPT | Performed by: INTERNAL MEDICINE

## 2024-11-06 PROCEDURE — 84155 ASSAY OF PROTEIN SERUM: CPT | Performed by: INTERNAL MEDICINE

## 2024-11-06 PROCEDURE — 96361 HYDRATE IV INFUSION ADD-ON: CPT

## 2024-11-06 PROCEDURE — 84165 PROTEIN E-PHORESIS SERUM: CPT | Mod: 26 | Performed by: STUDENT IN AN ORGANIZED HEALTH CARE EDUCATION/TRAINING PROGRAM

## 2024-11-06 PROCEDURE — 96360 HYDRATION IV INFUSION INIT: CPT

## 2024-11-06 RX ORDER — HEPARIN SODIUM (PORCINE) LOCK FLUSH IV SOLN 100 UNIT/ML 100 UNIT/ML
5 SOLUTION INTRAVENOUS
Status: DISCONTINUED | OUTPATIENT
Start: 2024-11-06 | End: 2024-11-06 | Stop reason: HOSPADM

## 2024-11-06 RX ORDER — HEPARIN SODIUM,PORCINE 10 UNIT/ML
5-20 VIAL (ML) INTRAVENOUS DAILY PRN
OUTPATIENT
Start: 2024-11-06

## 2024-11-06 RX ORDER — HEPARIN SODIUM (PORCINE) LOCK FLUSH IV SOLN 100 UNIT/ML 100 UNIT/ML
5 SOLUTION INTRAVENOUS
OUTPATIENT
Start: 2024-11-06

## 2024-11-06 RX ADMIN — HEPARIN 5 ML: 100 SYRINGE at 15:20

## 2024-11-06 RX ADMIN — SODIUM CHLORIDE 1000 ML: 9 INJECTION, SOLUTION INTRAVENOUS at 13:14

## 2024-11-06 NOTE — PROGRESS NOTES
Infusion Nursing Note:  Gely Keene presents today for labs + IVF.    Patient seen by provider today: No   present during visit today: Not Applicable.    Note: N/A.      Intravenous Access:  Labs drawn without difficulty.  Implanted Port.    Treatment Conditions:  Not Applicable.      Post Infusion Assessment:  Patient tolerated infusion without incident.  Blood return noted pre and post infusion.  Site patent and intact, free from redness, edema or discomfort.  No evidence of extravasations.  Access discontinued per protocol.       Discharge Plan:   Discharge instructions reviewed with: Patient.  Patient and/or family verbalized understanding of discharge instructions and all questions answered.  AVS to patient via [employer].  Patient will return 11/14/24 for next appointment.   Patient discharged in stable condition accompanied by: daughter.  Departure Mode: Ambulatory.      Bouchra Barron RN

## 2024-11-07 LAB
ALBUMIN SERPL ELPH-MCNC: 3.8 G/DL (ref 3.7–5.1)
ALPHA1 GLOB SERPL ELPH-MCNC: 0.3 G/DL (ref 0.2–0.4)
ALPHA2 GLOB SERPL ELPH-MCNC: 0.7 G/DL (ref 0.5–0.9)
B-GLOBULIN SERPL ELPH-MCNC: 0.7 G/DL (ref 0.6–1)
C3 SERPL-MCNC: 120 MG/DL (ref 81–157)
C4 SERPL-MCNC: 27 MG/DL (ref 13–39)
GAMMA GLOB SERPL ELPH-MCNC: 0.7 G/DL (ref 0.7–1.6)
LOCATION OF TASK: NORMAL
M PROTEIN SERPL ELPH-MCNC: 0 G/DL
PROT PATTERN SERPL ELPH-IMP: NORMAL

## 2024-11-08 LAB — MISCELLANEOUS TEST 1 (ARUP): ABNORMAL

## 2024-11-11 LAB
DSDNA AB SER-ACNC: 0.8 IU/ML
ENA SM IGG SER IA-ACNC: 0.7 U/ML
ENA SM IGG SER IA-ACNC: NEGATIVE
ENA SS-A AB SER IA-ACNC: <0.5 U/ML
ENA SS-A AB SER IA-ACNC: NEGATIVE
ENA SS-B IGG SER IA-ACNC: <0.6 U/ML
ENA SS-B IGG SER IA-ACNC: NEGATIVE
U1 SNRNP IGG SER IA-ACNC: 2.2 U/ML
U1 SNRNP IGG SER IA-ACNC: NEGATIVE

## 2024-11-12 ENCOUNTER — TRANSFERRED RECORDS (OUTPATIENT)
Dept: HEALTH INFORMATION MANAGEMENT | Facility: CLINIC | Age: 84
End: 2024-11-12
Payer: MEDICARE

## 2024-11-14 ENCOUNTER — INFUSION THERAPY VISIT (OUTPATIENT)
Dept: INFUSION THERAPY | Facility: CLINIC | Age: 84
End: 2024-11-14
Attending: PEDIATRICS
Payer: MEDICARE

## 2024-11-14 VITALS
TEMPERATURE: 97.8 F | SYSTOLIC BLOOD PRESSURE: 115 MMHG | DIASTOLIC BLOOD PRESSURE: 71 MMHG | RESPIRATION RATE: 18 BRPM | OXYGEN SATURATION: 100 % | HEART RATE: 71 BPM

## 2024-11-14 DIAGNOSIS — Z90.49 S/P TOTAL COLECTOMY: ICD-10-CM

## 2024-11-14 DIAGNOSIS — E87.1 HYPONATREMIA: ICD-10-CM

## 2024-11-14 DIAGNOSIS — E86.0 DEHYDRATION: ICD-10-CM

## 2024-11-14 DIAGNOSIS — N17.9 ACUTE RENAL FAILURE SUPERIMPOSED ON STAGE 4 CHRONIC KIDNEY DISEASE, UNSPECIFIED ACUTE RENAL FAILURE TYPE (H): ICD-10-CM

## 2024-11-14 DIAGNOSIS — I12.9 CKD STAGE 4 SECONDARY TO HYPERTENSION (H): ICD-10-CM

## 2024-11-14 DIAGNOSIS — N18.4 CKD STAGE 4 SECONDARY TO HYPERTENSION (H): ICD-10-CM

## 2024-11-14 DIAGNOSIS — K94.19 ALTERED BOWEL ELIMINATION DUE TO INTESTINAL OSTOMY (H): Primary | ICD-10-CM

## 2024-11-14 DIAGNOSIS — N18.4 ACUTE RENAL FAILURE SUPERIMPOSED ON STAGE 4 CHRONIC KIDNEY DISEASE, UNSPECIFIED ACUTE RENAL FAILURE TYPE (H): ICD-10-CM

## 2024-11-14 PROCEDURE — 96360 HYDRATION IV INFUSION INIT: CPT

## 2024-11-14 PROCEDURE — 258N000003 HC RX IP 258 OP 636: Performed by: PEDIATRICS

## 2024-11-14 PROCEDURE — 96361 HYDRATE IV INFUSION ADD-ON: CPT

## 2024-11-14 PROCEDURE — 250N000011 HC RX IP 250 OP 636: Performed by: PEDIATRICS

## 2024-11-14 RX ORDER — HEPARIN SODIUM (PORCINE) LOCK FLUSH IV SOLN 100 UNIT/ML 100 UNIT/ML
500 SOLUTION INTRAVENOUS EVERY 8 HOURS
Status: DISCONTINUED | OUTPATIENT
Start: 2024-11-14 | End: 2024-11-14 | Stop reason: HOSPADM

## 2024-11-14 RX ADMIN — HEPARIN 500 UNITS: 100 SYRINGE at 15:20

## 2024-11-14 RX ADMIN — SODIUM CHLORIDE 1000 ML: 9 INJECTION, SOLUTION INTRAVENOUS at 13:14

## 2024-11-14 NOTE — PROGRESS NOTES
Infusion Nursing Note:  Gely Keene presents today for IVf.    Patient seen by provider today: No   present during visit today: Not Applicable.    Note: N/A.      Intravenous Access:  Implanted Port.    Treatment Conditions:  Not Applicable.      Post Infusion Assessment:  Patient tolerated infusion without incident.  Blood return noted pre and post infusion.  Site patent and intact, free from redness, edema or discomfort.  No evidence of extravasations.  Access discontinued per protocol.       Discharge Plan:   Discharge instructions reviewed with: Patient.  Patient and/or family verbalized understanding of discharge instructions and all questions answered.  AVS to patient via [employer].  Patient will return 11/21/24 for next appointment.   Patient discharged in stable condition accompanied by: self.  Departure Mode: Ambulatory.      Simi Nino RN

## 2024-11-19 DIAGNOSIS — E78.5 HYPERLIPIDEMIA LDL GOAL <160: ICD-10-CM

## 2024-11-19 RX ORDER — ATORVASTATIN CALCIUM 20 MG/1
20 TABLET, FILM COATED ORAL DAILY
Qty: 90 TABLET | Refills: 1 | OUTPATIENT
Start: 2024-11-19

## 2024-11-21 ENCOUNTER — INFUSION THERAPY VISIT (OUTPATIENT)
Dept: INFUSION THERAPY | Facility: CLINIC | Age: 84
End: 2024-11-21
Attending: PEDIATRICS
Payer: MEDICARE

## 2024-11-21 VITALS
OXYGEN SATURATION: 98 % | DIASTOLIC BLOOD PRESSURE: 66 MMHG | TEMPERATURE: 97.4 F | HEART RATE: 69 BPM | SYSTOLIC BLOOD PRESSURE: 132 MMHG

## 2024-11-21 DIAGNOSIS — N17.9 ACUTE RENAL FAILURE SUPERIMPOSED ON STAGE 4 CHRONIC KIDNEY DISEASE, UNSPECIFIED ACUTE RENAL FAILURE TYPE (H): ICD-10-CM

## 2024-11-21 DIAGNOSIS — N18.4 ACUTE RENAL FAILURE SUPERIMPOSED ON STAGE 4 CHRONIC KIDNEY DISEASE, UNSPECIFIED ACUTE RENAL FAILURE TYPE (H): ICD-10-CM

## 2024-11-21 DIAGNOSIS — E87.1 HYPONATREMIA: ICD-10-CM

## 2024-11-21 DIAGNOSIS — Z93.2 ILEOSTOMY STATUS (H): ICD-10-CM

## 2024-11-21 DIAGNOSIS — E86.0 DEHYDRATION: ICD-10-CM

## 2024-11-21 DIAGNOSIS — N18.4 CKD STAGE 4 SECONDARY TO HYPERTENSION (H): ICD-10-CM

## 2024-11-21 DIAGNOSIS — I12.9 CKD STAGE 4 SECONDARY TO HYPERTENSION (H): ICD-10-CM

## 2024-11-21 DIAGNOSIS — K94.19 ALTERED BOWEL ELIMINATION DUE TO INTESTINAL OSTOMY (H): Primary | ICD-10-CM

## 2024-11-21 DIAGNOSIS — Z90.49 S/P TOTAL COLECTOMY: ICD-10-CM

## 2024-11-21 PROCEDURE — 258N000003 HC RX IP 258 OP 636: Performed by: PEDIATRICS

## 2024-11-21 PROCEDURE — 250N000011 HC RX IP 250 OP 636: Performed by: PEDIATRICS

## 2024-11-21 RX ORDER — HEPARIN SODIUM (PORCINE) LOCK FLUSH IV SOLN 100 UNIT/ML 100 UNIT/ML
5 SOLUTION INTRAVENOUS
OUTPATIENT
Start: 2024-11-21

## 2024-11-21 RX ORDER — HEPARIN SODIUM (PORCINE) LOCK FLUSH IV SOLN 100 UNIT/ML 100 UNIT/ML
5 SOLUTION INTRAVENOUS
Status: DISCONTINUED | OUTPATIENT
Start: 2024-11-21 | End: 2024-11-21 | Stop reason: HOSPADM

## 2024-11-21 RX ORDER — HEPARIN SODIUM,PORCINE 10 UNIT/ML
5-20 VIAL (ML) INTRAVENOUS DAILY PRN
OUTPATIENT
Start: 2024-11-21

## 2024-11-21 RX ADMIN — HEPARIN 5 ML: 100 SYRINGE at 15:25

## 2024-11-21 RX ADMIN — SODIUM CHLORIDE 1000 ML: 9 INJECTION, SOLUTION INTRAVENOUS at 13:22

## 2024-11-21 NOTE — PROGRESS NOTES
Infusion Nursing Note:  Gely Keene presents today for IVF x 2 hours.    Patient seen by provider today: No   present during visit today: Not Applicable.    Note: N/A.      Intravenous Access:  Implanted Port.    Treatment Conditions:  Not Applicable.      Post Infusion Assessment:  Patient tolerated infusion without incident.  Blood return noted pre and post infusion.  Site patent and intact, free from redness, edema or discomfort.  No evidence of extravasations.  Access discontinued per protocol.       Discharge Plan:   AVS to patient via [employer]CH[employer].  Patient will return 11/29/24 for next appointment.   Patient discharged in stable condition accompanied by: .  Departure Mode: Ambulatory.      Lainey Bhandari RN

## 2024-12-04 ENCOUNTER — INFUSION THERAPY VISIT (OUTPATIENT)
Dept: INFUSION THERAPY | Facility: CLINIC | Age: 84
End: 2024-12-04
Attending: PEDIATRICS
Payer: MEDICARE

## 2024-12-04 VITALS
DIASTOLIC BLOOD PRESSURE: 64 MMHG | OXYGEN SATURATION: 99 % | SYSTOLIC BLOOD PRESSURE: 107 MMHG | HEART RATE: 62 BPM | RESPIRATION RATE: 18 BRPM | TEMPERATURE: 97.7 F

## 2024-12-04 DIAGNOSIS — E86.0 DEHYDRATION: ICD-10-CM

## 2024-12-04 DIAGNOSIS — N17.9 ACUTE RENAL FAILURE SUPERIMPOSED ON STAGE 4 CHRONIC KIDNEY DISEASE, UNSPECIFIED ACUTE RENAL FAILURE TYPE (H): ICD-10-CM

## 2024-12-04 DIAGNOSIS — N18.4 CKD STAGE 4 SECONDARY TO HYPERTENSION (H): ICD-10-CM

## 2024-12-04 DIAGNOSIS — I12.9 CKD STAGE 4 SECONDARY TO HYPERTENSION (H): ICD-10-CM

## 2024-12-04 DIAGNOSIS — N18.4 ACUTE RENAL FAILURE SUPERIMPOSED ON STAGE 4 CHRONIC KIDNEY DISEASE, UNSPECIFIED ACUTE RENAL FAILURE TYPE (H): ICD-10-CM

## 2024-12-04 DIAGNOSIS — Z90.49 S/P TOTAL COLECTOMY: ICD-10-CM

## 2024-12-04 DIAGNOSIS — E87.1 HYPONATREMIA: ICD-10-CM

## 2024-12-04 DIAGNOSIS — Z93.2 ILEOSTOMY STATUS (H): ICD-10-CM

## 2024-12-04 DIAGNOSIS — K94.19 ALTERED BOWEL ELIMINATION DUE TO INTESTINAL OSTOMY (H): Primary | ICD-10-CM

## 2024-12-04 PROCEDURE — 96360 HYDRATION IV INFUSION INIT: CPT

## 2024-12-04 PROCEDURE — 250N000011 HC RX IP 250 OP 636: Performed by: PEDIATRICS

## 2024-12-04 PROCEDURE — 96361 HYDRATE IV INFUSION ADD-ON: CPT

## 2024-12-04 PROCEDURE — 258N000003 HC RX IP 258 OP 636: Performed by: PEDIATRICS

## 2024-12-04 RX ORDER — HEPARIN SODIUM (PORCINE) LOCK FLUSH IV SOLN 100 UNIT/ML 100 UNIT/ML
5 SOLUTION INTRAVENOUS
Status: DISCONTINUED | OUTPATIENT
Start: 2024-12-04 | End: 2024-12-04 | Stop reason: HOSPADM

## 2024-12-04 RX ORDER — HEPARIN SODIUM (PORCINE) LOCK FLUSH IV SOLN 100 UNIT/ML 100 UNIT/ML
5 SOLUTION INTRAVENOUS
OUTPATIENT
Start: 2024-12-04

## 2024-12-04 RX ORDER — HEPARIN SODIUM,PORCINE 10 UNIT/ML
5-20 VIAL (ML) INTRAVENOUS DAILY PRN
OUTPATIENT
Start: 2024-12-04

## 2024-12-04 RX ADMIN — SODIUM CHLORIDE 1000 ML: 9 INJECTION, SOLUTION INTRAVENOUS at 13:41

## 2024-12-04 RX ADMIN — HEPARIN 5 ML: 100 SYRINGE at 15:45

## 2024-12-04 NOTE — PROGRESS NOTES
Infusion Nursing Note:  Gely Keene presents today for IVF.    Patient seen by provider today: No   present during visit today: Not Applicable.    Note: N/A.      Intravenous Access:  Implanted Port.    Treatment Conditions:  Not Applicable.      Post Infusion Assessment:  Patient tolerated infusion without incident.  Blood return noted pre and post infusion.  Site patent and intact, free from redness, edema or discomfort.  No evidence of extravasations.  Access discontinued per protocol.       Discharge Plan:   Discharge instructions reviewed with: Patient.  Patient and/or family verbalized understanding of discharge instructions and all questions answered.  AVS to patient via [employer].  Patient will return 12/11/24 for next appointment.   Patient discharged in stable condition accompanied by: self.  Departure Mode: Ambulatory.      Bouchra Barron RN

## 2024-12-11 ENCOUNTER — INFUSION THERAPY VISIT (OUTPATIENT)
Dept: INFUSION THERAPY | Facility: CLINIC | Age: 84
End: 2024-12-11
Attending: PEDIATRICS
Payer: MEDICARE

## 2024-12-11 VITALS
SYSTOLIC BLOOD PRESSURE: 133 MMHG | DIASTOLIC BLOOD PRESSURE: 64 MMHG | OXYGEN SATURATION: 95 % | HEART RATE: 73 BPM | RESPIRATION RATE: 18 BRPM

## 2024-12-11 DIAGNOSIS — Z93.2 ILEOSTOMY STATUS (H): ICD-10-CM

## 2024-12-11 DIAGNOSIS — N18.4 ACUTE RENAL FAILURE SUPERIMPOSED ON STAGE 4 CHRONIC KIDNEY DISEASE, UNSPECIFIED ACUTE RENAL FAILURE TYPE (H): ICD-10-CM

## 2024-12-11 DIAGNOSIS — E86.0 DEHYDRATION: ICD-10-CM

## 2024-12-11 DIAGNOSIS — N17.9 ACUTE RENAL FAILURE SUPERIMPOSED ON STAGE 4 CHRONIC KIDNEY DISEASE, UNSPECIFIED ACUTE RENAL FAILURE TYPE (H): ICD-10-CM

## 2024-12-11 DIAGNOSIS — K94.19 ALTERED BOWEL ELIMINATION DUE TO INTESTINAL OSTOMY (H): Primary | ICD-10-CM

## 2024-12-11 DIAGNOSIS — E87.1 HYPONATREMIA: ICD-10-CM

## 2024-12-11 DIAGNOSIS — N18.4 CKD STAGE 4 SECONDARY TO HYPERTENSION (H): ICD-10-CM

## 2024-12-11 DIAGNOSIS — I12.9 CKD STAGE 4 SECONDARY TO HYPERTENSION (H): ICD-10-CM

## 2024-12-11 DIAGNOSIS — Z90.49 S/P TOTAL COLECTOMY: ICD-10-CM

## 2024-12-11 PROCEDURE — 96360 HYDRATION IV INFUSION INIT: CPT

## 2024-12-11 PROCEDURE — 96361 HYDRATE IV INFUSION ADD-ON: CPT

## 2024-12-11 PROCEDURE — 258N000003 HC RX IP 258 OP 636: Performed by: PEDIATRICS

## 2024-12-11 PROCEDURE — 250N000011 HC RX IP 250 OP 636: Performed by: PEDIATRICS

## 2024-12-11 RX ORDER — HEPARIN SODIUM,PORCINE 10 UNIT/ML
5-20 VIAL (ML) INTRAVENOUS DAILY PRN
OUTPATIENT
Start: 2024-12-11

## 2024-12-11 RX ORDER — HEPARIN SODIUM (PORCINE) LOCK FLUSH IV SOLN 100 UNIT/ML 100 UNIT/ML
5 SOLUTION INTRAVENOUS
Status: DISCONTINUED | OUTPATIENT
Start: 2024-12-11 | End: 2024-12-11 | Stop reason: HOSPADM

## 2024-12-11 RX ORDER — HEPARIN SODIUM (PORCINE) LOCK FLUSH IV SOLN 100 UNIT/ML 100 UNIT/ML
5 SOLUTION INTRAVENOUS
OUTPATIENT
Start: 2024-12-11

## 2024-12-11 RX ADMIN — Medication 5 ML: at 15:40

## 2024-12-11 RX ADMIN — SODIUM CHLORIDE 1000 ML: 9 INJECTION, SOLUTION INTRAVENOUS at 13:28

## 2024-12-11 NOTE — PROGRESS NOTES
Infusion Nursing Note:  Gely Keene presents today for IV hydration.    Patient seen by provider today: No   present during visit today: Not Applicable.    Note: N/A.      Intravenous Access:  Implanted Port.    Treatment Conditions:  Not Applicable.      Post Infusion Assessment:  Patient tolerated infusion without incident.  Blood return noted pre and post infusion.  Site patent and intact, free from redness, edema or discomfort.  No evidence of extravasations.  Access discontinued per protocol.       Discharge Plan:   Discharge instructions reviewed with: Patient.  Patient and/or family verbalized understanding of discharge instructions and all questions answered.  AVS to patient via [employer] [facility].  Patient will return 12/18/24 for next appointment.   Patient discharged in stable condition accompanied by: self.  Departure Mode: Ambulatory.      Simi Nino RN

## 2024-12-18 ENCOUNTER — INFUSION THERAPY VISIT (OUTPATIENT)
Dept: INFUSION THERAPY | Facility: CLINIC | Age: 84
End: 2024-12-18
Attending: PEDIATRICS
Payer: MEDICARE

## 2024-12-18 VITALS
SYSTOLIC BLOOD PRESSURE: 116 MMHG | OXYGEN SATURATION: 100 % | HEART RATE: 71 BPM | RESPIRATION RATE: 16 BRPM | TEMPERATURE: 98 F | DIASTOLIC BLOOD PRESSURE: 58 MMHG

## 2024-12-18 DIAGNOSIS — E86.0 DEHYDRATION: ICD-10-CM

## 2024-12-18 DIAGNOSIS — Z93.2 ILEOSTOMY STATUS (H): ICD-10-CM

## 2024-12-18 DIAGNOSIS — N17.9 ACUTE RENAL FAILURE SUPERIMPOSED ON STAGE 4 CHRONIC KIDNEY DISEASE, UNSPECIFIED ACUTE RENAL FAILURE TYPE (H): ICD-10-CM

## 2024-12-18 DIAGNOSIS — K94.19 ALTERED BOWEL ELIMINATION DUE TO INTESTINAL OSTOMY (H): ICD-10-CM

## 2024-12-18 DIAGNOSIS — Z90.49 S/P TOTAL COLECTOMY: Primary | ICD-10-CM

## 2024-12-18 DIAGNOSIS — E87.1 HYPONATREMIA: ICD-10-CM

## 2024-12-18 DIAGNOSIS — N18.4 ACUTE RENAL FAILURE SUPERIMPOSED ON STAGE 4 CHRONIC KIDNEY DISEASE, UNSPECIFIED ACUTE RENAL FAILURE TYPE (H): ICD-10-CM

## 2024-12-18 DIAGNOSIS — I12.9 CKD STAGE 4 SECONDARY TO HYPERTENSION (H): ICD-10-CM

## 2024-12-18 DIAGNOSIS — N18.4 CKD STAGE 4 SECONDARY TO HYPERTENSION (H): ICD-10-CM

## 2024-12-18 PROCEDURE — 96361 HYDRATE IV INFUSION ADD-ON: CPT

## 2024-12-18 PROCEDURE — 250N000011 HC RX IP 250 OP 636: Performed by: PEDIATRICS

## 2024-12-18 PROCEDURE — 258N000003 HC RX IP 258 OP 636: Performed by: PEDIATRICS

## 2024-12-18 PROCEDURE — 96360 HYDRATION IV INFUSION INIT: CPT

## 2024-12-18 RX ORDER — HEPARIN SODIUM (PORCINE) LOCK FLUSH IV SOLN 100 UNIT/ML 100 UNIT/ML
5 SOLUTION INTRAVENOUS
OUTPATIENT
Start: 2024-12-18

## 2024-12-18 RX ORDER — HEPARIN SODIUM (PORCINE) LOCK FLUSH IV SOLN 100 UNIT/ML 100 UNIT/ML
5 SOLUTION INTRAVENOUS
Status: DISCONTINUED | OUTPATIENT
Start: 2024-12-18 | End: 2024-12-18 | Stop reason: HOSPADM

## 2024-12-18 RX ORDER — HEPARIN SODIUM,PORCINE 10 UNIT/ML
5-20 VIAL (ML) INTRAVENOUS DAILY PRN
OUTPATIENT
Start: 2024-12-18

## 2024-12-18 RX ADMIN — SODIUM CHLORIDE 1000 ML: 9 INJECTION, SOLUTION INTRAVENOUS at 13:23

## 2024-12-18 RX ADMIN — HEPARIN 5 ML: 100 SYRINGE at 15:30

## 2024-12-18 NOTE — PROGRESS NOTES
Infusion Nursing Note:  Gely Keene presents today for 1L IVF over 2 hrs.    Patient seen by provider today: No   present during visit today: Not Applicable.    Note: N/A.      Intravenous Access:  Implanted Port.    Treatment Conditions:  Not Applicable.      Post Infusion Assessment:  Patient tolerated infusion without incident.  Blood return noted pre and post infusion.  Site patent and intact, free from redness, edema or discomfort.  No evidence of extravasations.  Access discontinued per protocol.       Discharge Plan:   AVS to patient via MYCHART.  Patient will return 12/27/24 for next appointment.   Patient discharged in stable condition accompanied by: self.  Departure Mode: Ambulatory.      Shana Khan RN

## 2024-12-27 ENCOUNTER — MEDICAL CORRESPONDENCE (OUTPATIENT)
Dept: HEALTH INFORMATION MANAGEMENT | Facility: CLINIC | Age: 84
End: 2024-12-27

## 2024-12-27 PROCEDURE — 85049 AUTOMATED PLATELET COUNT: CPT | Performed by: INTERNAL MEDICINE

## 2024-12-27 PROCEDURE — 85004 AUTOMATED DIFF WBC COUNT: CPT | Performed by: INTERNAL MEDICINE

## 2024-12-27 PROCEDURE — 80069 RENAL FUNCTION PANEL: CPT | Performed by: INTERNAL MEDICINE

## 2024-12-30 ENCOUNTER — INFUSION THERAPY VISIT (OUTPATIENT)
Dept: INFUSION THERAPY | Facility: CLINIC | Age: 84
End: 2024-12-30
Attending: PEDIATRICS
Payer: MEDICARE

## 2024-12-30 VITALS
SYSTOLIC BLOOD PRESSURE: 140 MMHG | TEMPERATURE: 97.8 F | OXYGEN SATURATION: 99 % | DIASTOLIC BLOOD PRESSURE: 60 MMHG | HEART RATE: 63 BPM

## 2024-12-30 DIAGNOSIS — E87.1 HYPONATREMIA: ICD-10-CM

## 2024-12-30 DIAGNOSIS — N18.4 CKD STAGE 4 SECONDARY TO HYPERTENSION (H): ICD-10-CM

## 2024-12-30 DIAGNOSIS — N17.9 ACUTE RENAL FAILURE SUPERIMPOSED ON STAGE 4 CHRONIC KIDNEY DISEASE, UNSPECIFIED ACUTE RENAL FAILURE TYPE (H): ICD-10-CM

## 2024-12-30 DIAGNOSIS — K94.19 ALTERED BOWEL ELIMINATION DUE TO INTESTINAL OSTOMY (H): ICD-10-CM

## 2024-12-30 DIAGNOSIS — N18.4 ACUTE RENAL FAILURE SUPERIMPOSED ON STAGE 4 CHRONIC KIDNEY DISEASE, UNSPECIFIED ACUTE RENAL FAILURE TYPE (H): ICD-10-CM

## 2024-12-30 DIAGNOSIS — Z93.2 ILEOSTOMY STATUS (H): ICD-10-CM

## 2024-12-30 DIAGNOSIS — I12.9 CKD STAGE 4 SECONDARY TO HYPERTENSION (H): ICD-10-CM

## 2024-12-30 DIAGNOSIS — E86.0 DEHYDRATION: ICD-10-CM

## 2024-12-30 DIAGNOSIS — Z90.49 S/P TOTAL COLECTOMY: Primary | ICD-10-CM

## 2024-12-30 PROCEDURE — 258N000003 HC RX IP 258 OP 636: Performed by: PEDIATRICS

## 2024-12-30 PROCEDURE — 96361 HYDRATE IV INFUSION ADD-ON: CPT

## 2024-12-30 PROCEDURE — 96360 HYDRATION IV INFUSION INIT: CPT

## 2024-12-30 PROCEDURE — 250N000011 HC RX IP 250 OP 636: Performed by: PEDIATRICS

## 2024-12-30 RX ORDER — HEPARIN SODIUM (PORCINE) LOCK FLUSH IV SOLN 100 UNIT/ML 100 UNIT/ML
5 SOLUTION INTRAVENOUS
Status: DISCONTINUED | OUTPATIENT
Start: 2024-12-30 | End: 2024-12-30 | Stop reason: HOSPADM

## 2024-12-30 RX ORDER — HEPARIN SODIUM,PORCINE 10 UNIT/ML
5-20 VIAL (ML) INTRAVENOUS DAILY PRN
OUTPATIENT
Start: 2024-12-30

## 2024-12-30 RX ORDER — HEPARIN SODIUM (PORCINE) LOCK FLUSH IV SOLN 100 UNIT/ML 100 UNIT/ML
5 SOLUTION INTRAVENOUS
OUTPATIENT
Start: 2024-12-30

## 2024-12-30 RX ADMIN — Medication 5 ML: at 15:46

## 2024-12-30 RX ADMIN — SODIUM CHLORIDE 1000 ML: 9 INJECTION, SOLUTION INTRAVENOUS at 13:44

## 2024-12-30 NOTE — PROGRESS NOTES
Infusion Nursing Note:  Gely Keene presents today for IVF.    Patient seen by provider today: No   present during visit today: Not Applicable.    Note: N/A.      Intravenous Access:  Implanted Port.    Treatment Conditions:  Not Applicable.      Post Infusion Assessment:  Patient tolerated infusion without incident.  Blood return noted pre and post infusion.  Site patent and intact, free from redness, edema or discomfort.  No evidence of extravasations.  Access discontinued per protocol.       Discharge Plan:   Discharge instructions reviewed with: Patient.  Patient and/or family verbalized understanding of discharge instructions and all questions answered.  AVS to patient via [employer].  Patient will return 1/8 for next appointment.   Patient discharged in stable condition accompanied by: Daughter- Martita   Departure Mode: Ambulatory.      Lynn Rodriguez RN

## 2025-01-02 ENCOUNTER — TRANSFERRED RECORDS (OUTPATIENT)
Dept: HEALTH INFORMATION MANAGEMENT | Facility: CLINIC | Age: 85
End: 2025-01-02
Payer: MEDICARE

## 2025-01-08 ENCOUNTER — TELEPHONE (OUTPATIENT)
Dept: PEDIATRICS | Facility: CLINIC | Age: 85
End: 2025-01-08
Payer: MEDICARE

## 2025-01-08 NOTE — TELEPHONE ENCOUNTER
Spoke with [facility] and they can see the patient 1/13/25 at 10 AM.    Notified daughter, Diana, who agrees with plan.    Varsha Whelan RN

## 2025-01-08 NOTE — TELEPHONE ENCOUNTER
[facility] on Monday for IVF sounds like a great plan for me.    Can you help orchestrate?  Let me know if I need to help.    Hien Booth MD

## 2025-01-08 NOTE — TELEPHONE ENCOUNTER
Daughter, Diana calls today to report patient had an emesis last night and again this morning.    Patient reports chills.     Had to cancel fluid infusion appointment today.    Wondering if patient can be seen at [facility] on Monday 1/13/25 for IV fluids. Patient has a port.    Patient has an infusion appointment on 1/15/25 but daughter is concerned about dehydration. Patient has a colostomy.     Patient is not with daughter at this time. Ok to leave a detailed message on voice mail.

## 2025-01-13 ENCOUNTER — OFFICE VISIT (OUTPATIENT)
Dept: PEDIATRICS | Facility: CLINIC | Age: 85
End: 2025-01-13
Payer: MEDICARE

## 2025-01-13 VITALS
BODY MASS INDEX: 22.87 KG/M2 | DIASTOLIC BLOOD PRESSURE: 85 MMHG | HEART RATE: 66 BPM | WEIGHT: 117.1 LBS | TEMPERATURE: 97.9 F | OXYGEN SATURATION: 99 % | RESPIRATION RATE: 18 BRPM | SYSTOLIC BLOOD PRESSURE: 129 MMHG

## 2025-01-13 DIAGNOSIS — Z93.2 ILEOSTOMY STATUS (H): ICD-10-CM

## 2025-01-13 DIAGNOSIS — N18.4 CKD STAGE 4 SECONDARY TO HYPERTENSION (H): ICD-10-CM

## 2025-01-13 DIAGNOSIS — I12.9 CKD STAGE 4 SECONDARY TO HYPERTENSION (H): ICD-10-CM

## 2025-01-13 DIAGNOSIS — R11.2 NAUSEA AND VOMITING, UNSPECIFIED VOMITING TYPE: Primary | ICD-10-CM

## 2025-01-13 DIAGNOSIS — E86.0 DEHYDRATION: ICD-10-CM

## 2025-01-13 PROBLEM — N18.30 CHRONIC KIDNEY DISEASE, STAGE III (MODERATE) (H): Status: RESOLVED | Noted: 2018-01-10 | Resolved: 2025-01-13

## 2025-01-13 PROBLEM — N18.9 CHRONIC KIDNEY DISEASE, UNSPECIFIED CKD STAGE: Status: RESOLVED | Noted: 2023-06-28 | Resolved: 2025-01-13

## 2025-01-13 LAB
ANION GAP SERPL CALCULATED.3IONS-SCNC: 17 MMOL/L (ref 7–15)
BUN SERPL-MCNC: 51.5 MG/DL (ref 8–23)
CALCIUM SERPL-MCNC: 9.9 MG/DL (ref 8.8–10.4)
CHLORIDE SERPL-SCNC: 98 MMOL/L (ref 98–107)
CREAT SERPL-MCNC: 2.27 MG/DL (ref 0.51–0.95)
EGFRCR SERPLBLD CKD-EPI 2021: 20 ML/MIN/1.73M2
GLUCOSE SERPL-MCNC: 180 MG/DL (ref 70–99)
HCO3 SERPL-SCNC: 23 MMOL/L (ref 22–29)
POTASSIUM SERPL-SCNC: 3.5 MMOL/L (ref 3.4–5.3)
SODIUM SERPL-SCNC: 138 MMOL/L (ref 135–145)

## 2025-01-13 PROCEDURE — 99214 OFFICE O/P EST MOD 30 MIN: CPT | Mod: 25 | Performed by: PHYSICIAN ASSISTANT

## 2025-01-13 PROCEDURE — 96374 THER/PROPH/DIAG INJ IV PUSH: CPT | Performed by: PHYSICIAN ASSISTANT

## 2025-01-13 PROCEDURE — 80048 BASIC METABOLIC PNL TOTAL CA: CPT | Performed by: PHYSICIAN ASSISTANT

## 2025-01-13 PROCEDURE — 96361 HYDRATE IV INFUSION ADD-ON: CPT | Performed by: PHYSICIAN ASSISTANT

## 2025-01-13 RX ORDER — HEPARIN SODIUM (PORCINE) LOCK FLUSH IV SOLN 100 UNIT/ML 100 UNIT/ML
5-10 SOLUTION INTRAVENOUS
Status: ACTIVE | OUTPATIENT
Start: 2025-01-13

## 2025-01-13 RX ORDER — ONDANSETRON 2 MG/ML
4 INJECTION INTRAMUSCULAR; INTRAVENOUS ONCE
Status: COMPLETED | OUTPATIENT
Start: 2025-01-13 | End: 2025-01-13

## 2025-01-13 RX ORDER — HEPARIN SODIUM,PORCINE 10 UNIT/ML
5-10 VIAL (ML) INTRAVENOUS
Status: ACTIVE | OUTPATIENT
Start: 2025-01-13

## 2025-01-13 RX ORDER — HEPARIN SODIUM,PORCINE 10 UNIT/ML
5-10 VIAL (ML) INTRAVENOUS EVERY 24 HOURS
Status: ACTIVE | OUTPATIENT
Start: 2025-01-13

## 2025-01-13 RX ADMIN — ONDANSETRON 4 MG: 2 INJECTION INTRAMUSCULAR; INTRAVENOUS at 11:12

## 2025-01-13 RX ADMIN — HEPARIN SODIUM (PORCINE) LOCK FLUSH IV SOLN 100 UNIT/ML 5 ML: 100 SOLUTION at 12:28

## 2025-01-13 RX ADMIN — Medication 1000 ML: at 11:14

## 2025-01-13 NOTE — PROGRESS NOTES
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  The Zofran significantly helped nausea.  Does have sublingual ondansetron at home that she will utilize for as needed use.  Hydration efforts encouraged.  Renal function abnormal but stable when compared to previous.  Close follow-up with infusion center as scheduled on 15 January 2025.  - ondansetron (ZOFRAN) injection 4 mg  - De-access if no medication or labs ordered within the next 24 hours  - sodium chloride (PF) 0.9% PF flush 10-20 mL  - heparin lock flush 100 unit/mL injection 5-10 mL  - sodium chloride 0.9% BOLUS 1,000 mL  - Basic metabolic panel  - Central Venous Catheter: implanted port (Port-A-Cath, Power Port)  - Port Access  - Catheter Line Use  - De-access Port prior to Discharge        Return in about 2 days (around 1/15/2025) for Infusion center.      Varsha Albert MBA, MS, PA-C  [facility]        Aracelis Hong is a 84 year old, presenting for the following health issues:  Dehydration        10/30/2024     1:16 PM   Additional Questions   Roomed by liza   Accompanied by daughter     HPI     Nausea/Vomiting/Dehydration  Onset/Duration: about 1 week       Nausea: YES-intermittent       Vomiting:  YES       How many times in the last 24 hours:  0 - last emesis was 3 days ago       What is the appearance:  green       Any blood present: no        Diarrhea: YES- has illeostomy, decreased output the past week.       Constipation: no        Melena/dark stools: no        What has oral intake been like: trying to drink fluids and Ensure, decreased appetite        Abdominal pain: YES       Character/location/radiation:  tightness across upper abdomen, ongoing since 2019 and  no worse than usual  Risks       Recent travel: no        Exposures to ill contacts: no        Recent antibiotics past 2 months: no        Other:  history of chronic dehydration   Progression of symptoms: improving, but still poor appetite  Accompanying signs and symptoms:       Fever/chills: YES- chills, no fever       Gas/bloating: no        Dysuria or hematuria: no        Other symptoms: No  Therapies tried and outcome: anti-nausea medication at home        Review of Systems  Constitutional, HEENT, cardiovascular, pulmonary, GI, , musculoskeletal, neuro, skin, endocrine and psych systems are negative, except as otherwise noted.      Objective    /85 (BP Location: Right arm, Patient Position: Sitting)   Pulse 66   Temp 97.9  F (36.6  C) (Oral)   Resp 18   Wt 53.1 kg (117 lb 1.6 oz)   LMP  (LMP Unknown)   SpO2 99%   BMI 22.87 kg/m    Body mass index is 22.87 kg/m .  Physical Exam   GENERAL: alert and no distress  EYES: Eyes grossly normal to inspection, PERRL and conjunctivae and sclerae normal  RESP: lungs clear to auscultation - no rales, rhonchi or wheezes  CV: regular rate and rhythm, normal S1 S2, no S3 or S4, no murmur, click or rub, no peripheral edema  MS: no gross musculoskeletal defects noted, no edema  SKIN: no suspicious lesions or rashes  NEURO: Normal strength and tone, mentation intact and speech normal  PSYCH: mentation appears normal, affect normal/bright    Results for orders placed or performed in visit on 01/13/25   Basic metabolic panel     Status: Abnormal   Result Value Ref Range    Sodium 138 135 - 145 mmol/L    Potassium 3.5 3.4 - 5.3 mmol/L    Chloride 98 98 - 107 mmol/L    Carbon Dioxide (CO2) 23 22 - 29 mmol/L    Anion Gap 17 (H) 7 - 15 mmol/L    Urea Nitrogen 51.5 (H) 8.0 - 23.0 mg/dL    Creatinine 2.27 (H) 0.51 - 0.95 mg/dL    GFR Estimate 20 (L) >60 mL/min/1.73m2    Calcium 9.9 8.8 - 10.4 mg/dL    Glucose 180 (H) 70 - 99 mg/dL           Signed Electronically by: Varsha  Shahida Albert PA-C

## 2025-01-13 NOTE — RESULT ENCOUNTER NOTE
Results discussed directly with patient while patient was present. Any further details documented in the note.   Varsha Albert PA-C

## 2025-01-15 ENCOUNTER — INFUSION THERAPY VISIT (OUTPATIENT)
Dept: INFUSION THERAPY | Facility: CLINIC | Age: 85
End: 2025-01-15
Attending: PEDIATRICS
Payer: MEDICARE

## 2025-01-15 VITALS
SYSTOLIC BLOOD PRESSURE: 136 MMHG | HEART RATE: 63 BPM | OXYGEN SATURATION: 98 % | TEMPERATURE: 97.7 F | RESPIRATION RATE: 18 BRPM | DIASTOLIC BLOOD PRESSURE: 81 MMHG

## 2025-01-15 DIAGNOSIS — Z93.2 ILEOSTOMY STATUS (H): Primary | ICD-10-CM

## 2025-01-15 DIAGNOSIS — Z90.49 S/P TOTAL COLECTOMY: ICD-10-CM

## 2025-01-15 DIAGNOSIS — E87.1 HYPONATREMIA: ICD-10-CM

## 2025-01-15 DIAGNOSIS — N18.4 CKD STAGE 4 SECONDARY TO HYPERTENSION (H): ICD-10-CM

## 2025-01-15 DIAGNOSIS — E86.0 DEHYDRATION: ICD-10-CM

## 2025-01-15 DIAGNOSIS — N17.9 ACUTE RENAL FAILURE SUPERIMPOSED ON STAGE 4 CHRONIC KIDNEY DISEASE, UNSPECIFIED ACUTE RENAL FAILURE TYPE (H): ICD-10-CM

## 2025-01-15 DIAGNOSIS — I12.9 CKD STAGE 4 SECONDARY TO HYPERTENSION (H): ICD-10-CM

## 2025-01-15 DIAGNOSIS — K94.19 ALTERED BOWEL ELIMINATION DUE TO INTESTINAL OSTOMY (H): ICD-10-CM

## 2025-01-15 DIAGNOSIS — N18.4 ACUTE RENAL FAILURE SUPERIMPOSED ON STAGE 4 CHRONIC KIDNEY DISEASE, UNSPECIFIED ACUTE RENAL FAILURE TYPE (H): ICD-10-CM

## 2025-01-15 PROCEDURE — 250N000011 HC RX IP 250 OP 636: Performed by: PEDIATRICS

## 2025-01-15 PROCEDURE — 96361 HYDRATE IV INFUSION ADD-ON: CPT

## 2025-01-15 PROCEDURE — 96360 HYDRATION IV INFUSION INIT: CPT

## 2025-01-15 PROCEDURE — 258N000003 HC RX IP 258 OP 636: Performed by: PEDIATRICS

## 2025-01-15 RX ORDER — HEPARIN SODIUM (PORCINE) LOCK FLUSH IV SOLN 100 UNIT/ML 100 UNIT/ML
5 SOLUTION INTRAVENOUS
Status: DISCONTINUED | OUTPATIENT
Start: 2025-01-15 | End: 2025-01-15 | Stop reason: HOSPADM

## 2025-01-15 RX ORDER — HEPARIN SODIUM (PORCINE) LOCK FLUSH IV SOLN 100 UNIT/ML 100 UNIT/ML
5 SOLUTION INTRAVENOUS
OUTPATIENT
Start: 2025-01-15

## 2025-01-15 RX ORDER — HEPARIN SODIUM,PORCINE 10 UNIT/ML
5-20 VIAL (ML) INTRAVENOUS DAILY PRN
OUTPATIENT
Start: 2025-01-15

## 2025-01-15 RX ADMIN — Medication 5 ML: at 14:18

## 2025-01-15 RX ADMIN — SODIUM CHLORIDE 1000 ML: 9 INJECTION, SOLUTION INTRAVENOUS at 12:16

## 2025-01-15 ASSESSMENT — PAIN SCALES - GENERAL: PAINLEVEL_OUTOF10: NO PAIN (0)

## 2025-01-15 NOTE — PROGRESS NOTES
Infusion Nursing Note:  Gely Keene presents today for IVF.    Patient seen by provider today: No   present during visit today: Not Applicable.    Note: Gely had a GI bug last week, she reports that has since improved and she is feeling better. 1L NS bolus administered over 2hrs.      Intravenous Access:  Implanted Port.    Treatment Conditions:  Not Applicable.      Post Infusion Assessment:  Patient tolerated infusion without incident.  Blood return noted pre and post infusion.  Site patent and intact, free from redness, edema or discomfort.  No evidence of extravasations.  Access discontinued per protocol.       Discharge Plan:   Discharge instructions reviewed with: Patient.  Patient and/or family verbalized understanding of discharge instructions and all questions answered.  Patient discharged in stable condition accompanied by: self, spouse driving her home.  Departure Mode: Ambulatory.      Isatu Tejeda RN

## 2025-01-22 ENCOUNTER — INFUSION THERAPY VISIT (OUTPATIENT)
Dept: INFUSION THERAPY | Facility: CLINIC | Age: 85
End: 2025-01-22
Attending: PEDIATRICS
Payer: MEDICARE

## 2025-01-22 VITALS
OXYGEN SATURATION: 99 % | HEART RATE: 71 BPM | DIASTOLIC BLOOD PRESSURE: 73 MMHG | TEMPERATURE: 97.6 F | SYSTOLIC BLOOD PRESSURE: 137 MMHG

## 2025-01-22 DIAGNOSIS — K94.19 ALTERED BOWEL ELIMINATION DUE TO INTESTINAL OSTOMY (H): ICD-10-CM

## 2025-01-22 DIAGNOSIS — N18.4 CHRONIC KIDNEY DISEASE, STAGE IV (SEVERE) (H): ICD-10-CM

## 2025-01-22 DIAGNOSIS — Z93.2 ILEOSTOMY STATUS (H): ICD-10-CM

## 2025-01-22 DIAGNOSIS — I12.9 CKD STAGE 4 SECONDARY TO HYPERTENSION (H): ICD-10-CM

## 2025-01-22 DIAGNOSIS — N18.4 CKD STAGE 4 SECONDARY TO HYPERTENSION (H): ICD-10-CM

## 2025-01-22 DIAGNOSIS — N17.9 ACUTE RENAL FAILURE SUPERIMPOSED ON STAGE 4 CHRONIC KIDNEY DISEASE, UNSPECIFIED ACUTE RENAL FAILURE TYPE (H): ICD-10-CM

## 2025-01-22 DIAGNOSIS — E87.1 HYPONATREMIA: ICD-10-CM

## 2025-01-22 DIAGNOSIS — N18.4 ACUTE RENAL FAILURE SUPERIMPOSED ON STAGE 4 CHRONIC KIDNEY DISEASE, UNSPECIFIED ACUTE RENAL FAILURE TYPE (H): ICD-10-CM

## 2025-01-22 DIAGNOSIS — Z90.49 S/P TOTAL COLECTOMY: Primary | ICD-10-CM

## 2025-01-22 DIAGNOSIS — E86.0 DEHYDRATION: ICD-10-CM

## 2025-01-22 DIAGNOSIS — N18.9 ANEMIA OF CHRONIC RENAL FAILURE: ICD-10-CM

## 2025-01-22 DIAGNOSIS — D63.1 ANEMIA OF CHRONIC RENAL FAILURE: ICD-10-CM

## 2025-01-22 LAB
ALBUMIN SERPL BCG-MCNC: 3.4 G/DL (ref 3.5–5.2)
ANION GAP SERPL CALCULATED.3IONS-SCNC: 13 MMOL/L (ref 7–15)
BUN SERPL-MCNC: 36.5 MG/DL (ref 8–23)
CALCIUM SERPL-MCNC: 8.1 MG/DL (ref 8.8–10.4)
CHLORIDE SERPL-SCNC: 93 MMOL/L (ref 98–107)
CREAT SERPL-MCNC: 2.29 MG/DL (ref 0.51–0.95)
EGFRCR SERPLBLD CKD-EPI 2021: 20 ML/MIN/1.73M2
GLUCOSE SERPL-MCNC: 138 MG/DL (ref 70–99)
HCO3 SERPL-SCNC: 19 MMOL/L (ref 22–29)
PHOSPHATE SERPL-MCNC: 3.2 MG/DL (ref 2.5–4.5)
POTASSIUM SERPL-SCNC: 3.9 MMOL/L (ref 3.4–5.3)
SODIUM SERPL-SCNC: 125 MMOL/L (ref 135–145)

## 2025-01-22 PROCEDURE — 258N000003 HC RX IP 258 OP 636: Performed by: PEDIATRICS

## 2025-01-22 PROCEDURE — 82040 ASSAY OF SERUM ALBUMIN: CPT | Performed by: INTERNAL MEDICINE

## 2025-01-22 PROCEDURE — 96360 HYDRATION IV INFUSION INIT: CPT

## 2025-01-22 PROCEDURE — 250N000011 HC RX IP 250 OP 636: Performed by: PEDIATRICS

## 2025-01-22 PROCEDURE — 82947 ASSAY GLUCOSE BLOOD QUANT: CPT | Performed by: INTERNAL MEDICINE

## 2025-01-22 PROCEDURE — 82610 CYSTATIN C: CPT | Performed by: INTERNAL MEDICINE

## 2025-01-22 PROCEDURE — 36591 DRAW BLOOD OFF VENOUS DEVICE: CPT

## 2025-01-22 PROCEDURE — 96361 HYDRATE IV INFUSION ADD-ON: CPT

## 2025-01-22 PROCEDURE — 80051 ELECTROLYTE PANEL: CPT | Performed by: INTERNAL MEDICINE

## 2025-01-22 RX ORDER — HEPARIN SODIUM,PORCINE 10 UNIT/ML
5-20 VIAL (ML) INTRAVENOUS DAILY PRN
OUTPATIENT
Start: 2025-01-22

## 2025-01-22 RX ORDER — HEPARIN SODIUM (PORCINE) LOCK FLUSH IV SOLN 100 UNIT/ML 100 UNIT/ML
5 SOLUTION INTRAVENOUS
Status: DISCONTINUED | OUTPATIENT
Start: 2025-01-22 | End: 2025-01-22 | Stop reason: HOSPADM

## 2025-01-22 RX ORDER — HEPARIN SODIUM (PORCINE) LOCK FLUSH IV SOLN 100 UNIT/ML 100 UNIT/ML
5 SOLUTION INTRAVENOUS
OUTPATIENT
Start: 2025-01-22

## 2025-01-22 RX ADMIN — Medication 5 ML: at 15:12

## 2025-01-22 RX ADMIN — SODIUM CHLORIDE 1000 ML: 9 INJECTION, SOLUTION INTRAVENOUS at 13:09

## 2025-01-22 NOTE — PROGRESS NOTES
Infusion Nursing Note:  Gely Griffithluisjane presents today for IVF and labs.    Patient seen by provider today: No   present during visit today: Not Applicable.    Note: N/A.      Intravenous Access:  Labs drawn without difficulty.  Implanted Port.    Treatment Conditions:  Not Applicable.      Post Infusion Assessment:  Patient tolerated infusion without incident.  Blood return noted pre and post infusion.  Site patent and intact, free from redness, edema or discomfort.  No evidence of extravasations.  Access discontinued per protocol.       Discharge Plan:   AVS to patient via MYCHART.  Patient will return 1/29/25 for next appointment.   Patient discharged in stable condition accompanied by: .  Departure Mode: Ambulatory.      Lainey Bhandari RN

## 2025-01-23 LAB
CYSTATIN C (ROCHE): 2.3 MG/L (ref 0.6–1)
GFR/BSA.PRED SERPLBLD CYS-BASED-ARV: 22 ML/MIN/1.73M2

## 2025-01-24 ENCOUNTER — LAB (OUTPATIENT)
Dept: LAB | Facility: CLINIC | Age: 85
End: 2025-01-24
Payer: MEDICARE

## 2025-01-24 DIAGNOSIS — N18.4 CHRONIC KIDNEY DISEASE, STAGE IV (SEVERE) (H): ICD-10-CM

## 2025-01-24 LAB
ANION GAP SERPL CALCULATED.3IONS-SCNC: 11 MMOL/L (ref 7–15)
BUN SERPL-MCNC: 26.7 MG/DL (ref 8–23)
CALCIUM SERPL-MCNC: 9.3 MG/DL (ref 8.8–10.4)
CHLORIDE SERPL-SCNC: 102 MMOL/L (ref 98–107)
CREAT SERPL-MCNC: 2.41 MG/DL (ref 0.51–0.95)
EGFRCR SERPLBLD CKD-EPI 2021: 19 ML/MIN/1.73M2
GLUCOSE SERPL-MCNC: 154 MG/DL (ref 70–99)
HCO3 SERPL-SCNC: 22 MMOL/L (ref 22–29)
POTASSIUM SERPL-SCNC: 3.7 MMOL/L (ref 3.4–5.3)
SODIUM SERPL-SCNC: 135 MMOL/L (ref 135–145)

## 2025-01-24 PROCEDURE — 80048 BASIC METABOLIC PNL TOTAL CA: CPT

## 2025-01-24 PROCEDURE — 36415 COLL VENOUS BLD VENIPUNCTURE: CPT

## 2025-01-24 PROCEDURE — 82565 ASSAY OF CREATININE: CPT

## 2025-01-26 NOTE — IP AVS SNAPSHOT
[facility]    [address]    [facility] [address]    Phone:  [phone]    Fax:  [phone]                                       After Visit Summary   8/15[birth year]2017    Gely Keene    MRN: [id number]           After Visit Summary Signature Page     I have received my discharge instructions, and my questions have been answered. I have discussed any challenges I see with this plan with the nurse or doctor.    ..........................................................................................................................................  Patient/Patient Representative Signature      ..........................................................................................................................................  Patient Representative Print Name and Relationship to Patient    ..................................................               ................................................  Date                                            Time    ..........................................................................................................................................  Reviewed by Signature/Title    ...................................................              ..............................................  Date                                                            Time           Mariana Johnson, RNC-OB, MSN. eshtery [id number] - Rev .    Postpartum Care at Home With Your Baby: Care Instructions  Overview     After childbirth (postpartum period), your body goes through many changes as you recover. In these weeks after delivery, try to take good care of yourself. Get rest whenever you can and accept help from others.  It may take 4 to 6 weeks to feel like yourself again, and possibly longer if you had a  birth. You may feel sore or very tired as you recover. After delivery, you may continue to have contractions as the uterus returns to the size it was before your pregnancy. You will also have some vaginal bleeding. And you may have pain around the vagina as you heal. Several days after delivery you may also have pain and swelling in your breasts as they fill with milk. There are things you can do at home to help ease these discomforts.  After childbirth, it's common to feel emotional. You may feel irritable, cry easily, and feel happy one minute and sad the next. This is called the \"baby blues.\" " Hormone changes are one cause of these emotional changes. These feelings usually get better within a couple of weeks. If they don't, talk to your doctor or midwife.  In the first couple of weeks after you give birth, your doctor or midwife may want to check in with you and make a plan for follow-up care. You will likely have a complete postpartum visit in the first 3 months after delivery. At that time, your doctor or midwife will check on your recovery and see how you're doing. But if you have questions or concerns before then, you can always call your doctor or midwife.  Follow-up care is a key part of your treatment and safety. Be sure to make and go to all appointments, and call your doctor if you are having problems. It's also a good idea to know your test results and keep a list of the medicines you take.  How can you care for yourself at home?  Taking care of your body  Use pads instead of tampons for bleeding. After birth, you will have bloody vaginal discharge. You may also pass some blood clots that shouldn't be bigger than an egg. Over the next 6 weeks or so, your bleeding should decrease a little every day and slowly change to a pinkish and then whitish discharge.  For cramps or mild pain, try an over-the-counter pain medicine, such as acetaminophen (Tylenol) or ibuprofen (Advil, Motrin). Read and follow all instructions on the label.  To ease pain around the vagina or from hemorrhoids:  Put ice or a cold pack on the area for 10 to 20 minutes at a time. Put a thin cloth between the ice and your skin.  Try sitting in a few inches of warm water (sitz bath) when you can or after bowel movements.  Clean yourself with a gentle squeeze of warm water from a bottle instead of wiping with toilet paper.  Use witch hazel or hemorrhoid pads (such as Tucks).  Try using a cold compress for sore and swollen breasts. And wear a supportive bra that fits.  Ease constipation by drinking plenty of fluids and eating  high-fiber foods. Ask your doctor or midwife about over-the-counter stool softeners.  Activity  Rest when you can.  Ask for help from family or friends when you need it.  If you can, have another adult in your home for at least 2 or 3 days after birth.  When you feel ready, try to get some exercise every day. For many people, walking is a good choice. Don't do any heavy exercise until your doctor or midwife says it's okay.  Ask your doctor or midwife when it is okay to have vaginal sex.  If you don't want to get pregnant, talk to your doctor or midwife about birth control options. You can get pregnant even before your period returns. Also, you can get pregnant while you are breastfeeding.  Talk to your doctor or midwife if you want to get pregnant again. They can talk to you about when it is safe.  Emotional health  It's normal to have some sadness, anxiety, and mood swings after delivery. It may help to talk with a trusted friend or family member. You can also call the Maternal Mental Health Hotline at 8-802-RRE-[facility] (1-364.647.3446) 736.     Call 5-392-561-TALK (1-478.648.2095).     Text HOME to 439582 to access the Crisis Text  Line.   Consider saving these numbers in your phone.  Go to [employer] for more information or to chat online.  Call your doctor or midwife now or seek immediate medical care if:    You have signs of hemorrhage (too much bleeding), such as:  Heavy vaginal bleeding. This means that you are soaking through one or more pads in an hour. Or you pass blood clots bigger than an egg.  Feeling dizzy or lightheaded, or you feel like you may faint.  Feeling so tired or weak that you cannot do your usual activities.  A fast or irregular heartbeat.  New or worse belly pain.     You have signs of infection, such as:  A fever.  Increased pain, swelling, warmth, or redness from an incision or wound.  Frequent or painful urination or blood in your urine.  Vaginal discharge that smells bad.  New or worse belly pain.     You have symptoms of a blood clot in your leg (called a deep vein thrombosis), such as:  Pain in the calf, back of the knee, thigh, or groin.  Swelling in the leg or groin.  A color change on the leg or groin. The skin may be reddish or purplish, depending on your usual skin color.     You have signs of preeclampsia, such as:  Sudden swelling of your face, hands, or feet.  New vision problems (such as dimness, blurring, or seeing spots).  A severe headache.     You have signs of heart failure, such as:  New or increased shortness of breath.  New or worse swelling in your legs, ankles, or feet.  Sudden weight gain, such as more than 2 to 3 pounds in a day or 5 pounds in a week.  Feeling so tired or weak that you cannot do your usual activities.     You had spinal or epidural pain relief and have:  New or worse back pain.  Increased pain, swelling, warmth, or redness at the injection site.  Tingling, weakness, or numbness in your legs or groin.   Watch closely for changes in your health, and be sure to contact your doctor or midwife if:    Your vaginal bleeding isn't decreasing.     You feel sad, anxious, or  "hopeless for more than a few days.     You are having problems with your breasts or breastfeeding.   Where can you learn more?  Go to https://www.[employer].net/patiented  Enter Z768 in the search box to learn more about \"Postpartum Care at Home With Your Baby: Care Instructions.\"  Current as of: April 30, 2024  Content Version: 14.3    2024 [employer].   Care instructions adapted under license by your healthcare professional. If you have questions about a medical condition or this instruction, always ask your healthcare professional. [employer] disclaims any warranty or liability for your use of this information.    "

## 2025-01-29 ENCOUNTER — INFUSION THERAPY VISIT (OUTPATIENT)
Dept: INFUSION THERAPY | Facility: CLINIC | Age: 85
End: 2025-01-29
Attending: PEDIATRICS
Payer: MEDICARE

## 2025-01-29 VITALS
OXYGEN SATURATION: 99 % | DIASTOLIC BLOOD PRESSURE: 61 MMHG | RESPIRATION RATE: 16 BRPM | SYSTOLIC BLOOD PRESSURE: 118 MMHG | TEMPERATURE: 97.8 F | HEART RATE: 79 BPM

## 2025-01-29 DIAGNOSIS — N18.4 CKD STAGE 4 SECONDARY TO HYPERTENSION (H): ICD-10-CM

## 2025-01-29 DIAGNOSIS — N17.9 ACUTE RENAL FAILURE SUPERIMPOSED ON STAGE 4 CHRONIC KIDNEY DISEASE, UNSPECIFIED ACUTE RENAL FAILURE TYPE (H): ICD-10-CM

## 2025-01-29 DIAGNOSIS — Z90.49 S/P TOTAL COLECTOMY: Primary | ICD-10-CM

## 2025-01-29 DIAGNOSIS — N18.4 ACUTE RENAL FAILURE SUPERIMPOSED ON STAGE 4 CHRONIC KIDNEY DISEASE, UNSPECIFIED ACUTE RENAL FAILURE TYPE (H): ICD-10-CM

## 2025-01-29 DIAGNOSIS — Z93.2 ILEOSTOMY STATUS (H): ICD-10-CM

## 2025-01-29 DIAGNOSIS — E87.1 HYPONATREMIA: ICD-10-CM

## 2025-01-29 DIAGNOSIS — E86.0 DEHYDRATION: ICD-10-CM

## 2025-01-29 DIAGNOSIS — K94.19 ALTERED BOWEL ELIMINATION DUE TO INTESTINAL OSTOMY (H): ICD-10-CM

## 2025-01-29 DIAGNOSIS — I12.9 CKD STAGE 4 SECONDARY TO HYPERTENSION (H): ICD-10-CM

## 2025-01-29 PROCEDURE — 96360 HYDRATION IV INFUSION INIT: CPT

## 2025-01-29 PROCEDURE — 258N000003 HC RX IP 258 OP 636: Performed by: PEDIATRICS

## 2025-01-29 PROCEDURE — 250N000011 HC RX IP 250 OP 636: Performed by: PEDIATRICS

## 2025-01-29 PROCEDURE — 96361 HYDRATE IV INFUSION ADD-ON: CPT

## 2025-01-29 RX ORDER — HEPARIN SODIUM,PORCINE 10 UNIT/ML
5-20 VIAL (ML) INTRAVENOUS DAILY PRN
OUTPATIENT
Start: 2025-01-29

## 2025-01-29 RX ORDER — HEPARIN SODIUM (PORCINE) LOCK FLUSH IV SOLN 100 UNIT/ML 100 UNIT/ML
5 SOLUTION INTRAVENOUS
OUTPATIENT
Start: 2025-01-29

## 2025-01-29 RX ORDER — HEPARIN SODIUM (PORCINE) LOCK FLUSH IV SOLN 100 UNIT/ML 100 UNIT/ML
5 SOLUTION INTRAVENOUS
Status: DISCONTINUED | OUTPATIENT
Start: 2025-01-29 | End: 2025-01-29 | Stop reason: HOSPADM

## 2025-01-29 RX ADMIN — Medication 5 ML: at 15:22

## 2025-01-29 RX ADMIN — SODIUM CHLORIDE 1000 ML: 9 INJECTION, SOLUTION INTRAVENOUS at 13:20

## 2025-01-29 NOTE — PROGRESS NOTES
Infusion Nursing Note:  Gely Keene presents today for IV hydration.    Patient seen by provider today: No   present during visit today: Not Applicable.    Note: N/A.    Intravenous Access:  Implanted Port.    Treatment Conditions:  Not Applicable.    Post Infusion Assessment:  Patient tolerated infusion without incident.  Blood return noted pre and post infusion.  Site patent and intact, free from redness, edema or discomfort.  No evidence of extravasations.  Access discontinued per protocol.     Discharge Plan:   Discharge instructions reviewed with: Patient.  Patient and/or family verbalized understanding of discharge instructions and all questions answered.  AVS to patient via [employer].  Patient will return 2/5 for next appointment.   Patient discharged in stable condition accompanied by: self.  Departure Mode: Ambulatory.      Scar Dickson RN

## 2025-02-05 ENCOUNTER — INFUSION THERAPY VISIT (OUTPATIENT)
Dept: INFUSION THERAPY | Facility: CLINIC | Age: 85
End: 2025-02-05
Attending: PEDIATRICS
Payer: MEDICARE

## 2025-02-05 VITALS
SYSTOLIC BLOOD PRESSURE: 122 MMHG | DIASTOLIC BLOOD PRESSURE: 63 MMHG | OXYGEN SATURATION: 98 % | TEMPERATURE: 97.7 F | RESPIRATION RATE: 16 BRPM | HEART RATE: 65 BPM

## 2025-02-05 DIAGNOSIS — E87.1 HYPONATREMIA: ICD-10-CM

## 2025-02-05 DIAGNOSIS — E86.0 DEHYDRATION: ICD-10-CM

## 2025-02-05 DIAGNOSIS — I12.9 CKD STAGE 4 SECONDARY TO HYPERTENSION (H): ICD-10-CM

## 2025-02-05 DIAGNOSIS — N18.4 CKD STAGE 4 SECONDARY TO HYPERTENSION (H): ICD-10-CM

## 2025-02-05 DIAGNOSIS — N17.9 ACUTE RENAL FAILURE SUPERIMPOSED ON STAGE 4 CHRONIC KIDNEY DISEASE, UNSPECIFIED ACUTE RENAL FAILURE TYPE (H): ICD-10-CM

## 2025-02-05 DIAGNOSIS — K94.19 ALTERED BOWEL ELIMINATION DUE TO INTESTINAL OSTOMY (H): ICD-10-CM

## 2025-02-05 DIAGNOSIS — Z90.49 S/P TOTAL COLECTOMY: Primary | ICD-10-CM

## 2025-02-05 DIAGNOSIS — N18.4 ACUTE RENAL FAILURE SUPERIMPOSED ON STAGE 4 CHRONIC KIDNEY DISEASE, UNSPECIFIED ACUTE RENAL FAILURE TYPE (H): ICD-10-CM

## 2025-02-05 DIAGNOSIS — Z93.2 ILEOSTOMY STATUS (H): ICD-10-CM

## 2025-02-05 DIAGNOSIS — M32.9 SYSTEMIC LUPUS ERYTHEMATOSUS (H): ICD-10-CM

## 2025-02-05 PROCEDURE — 250N000011 HC RX IP 250 OP 636: Performed by: PEDIATRICS

## 2025-02-05 PROCEDURE — 258N000003 HC RX IP 258 OP 636: Performed by: PEDIATRICS

## 2025-02-05 PROCEDURE — 96361 HYDRATE IV INFUSION ADD-ON: CPT

## 2025-02-05 PROCEDURE — 96360 HYDRATION IV INFUSION INIT: CPT

## 2025-02-05 RX ORDER — HEPARIN SODIUM,PORCINE 10 UNIT/ML
5-20 VIAL (ML) INTRAVENOUS DAILY PRN
OUTPATIENT
Start: 2025-02-05

## 2025-02-05 RX ORDER — HEPARIN SODIUM (PORCINE) LOCK FLUSH IV SOLN 100 UNIT/ML 100 UNIT/ML
5 SOLUTION INTRAVENOUS
Status: DISCONTINUED | OUTPATIENT
Start: 2025-02-05 | End: 2025-02-05 | Stop reason: HOSPADM

## 2025-02-05 RX ORDER — HEPARIN SODIUM (PORCINE) LOCK FLUSH IV SOLN 100 UNIT/ML 100 UNIT/ML
5 SOLUTION INTRAVENOUS
OUTPATIENT
Start: 2025-02-05

## 2025-02-05 RX ADMIN — SODIUM CHLORIDE 1000 ML: 9 INJECTION, SOLUTION INTRAVENOUS at 13:09

## 2025-02-05 RX ADMIN — Medication 5 ML: at 15:16

## 2025-02-05 NOTE — PROGRESS NOTES
Infusion Nursing Note:  Gely Keene presents today for IVF.    Patient seen by provider today: No   present during visit today: Not Applicable.    Note: Gely has lab orders from Dr Meek Tabares to be done q3 months. They were due today however she would like to wait a month to have labs drawn.    Gely reports she has been having some intermittent chest pressure the past few weeks. She notices it if she becomes anxious or stressed. Explained to her that if the chest pressure is more consistent or worsens she should go to the ED immediately. She did say she notified her PCP about the chest pressure last week.      Intravenous Access:  Implanted Port.    Treatment Conditions:  Not Applicable.    Post Infusion Assessment:  Patient tolerated infusion without incident.  Blood return noted pre and post infusion.  Site patent and intact, free from redness, edema or discomfort.  No evidence of extravasations.  Access discontinued per protocol.     Discharge Plan:   Discharge instructions reviewed with: Patient and Family.  Patient and/or family verbalized understanding of discharge instructions and all questions answered.  AVS to patient via [employer].  Patient will return 2/12 for next appointment.   Patient discharged in stable condition accompanied by: daughter.  Departure Mode: Ambulatory.    Ángel Medina RN

## 2025-02-12 ENCOUNTER — INFUSION THERAPY VISIT (OUTPATIENT)
Dept: INFUSION THERAPY | Facility: CLINIC | Age: 85
End: 2025-02-12
Attending: PEDIATRICS
Payer: MEDICARE

## 2025-02-12 VITALS — OXYGEN SATURATION: 99 % | SYSTOLIC BLOOD PRESSURE: 100 MMHG | HEART RATE: 88 BPM | DIASTOLIC BLOOD PRESSURE: 65 MMHG

## 2025-02-12 DIAGNOSIS — N18.4 CKD STAGE 4 SECONDARY TO HYPERTENSION (H): ICD-10-CM

## 2025-02-12 DIAGNOSIS — Z90.49 S/P TOTAL COLECTOMY: Primary | ICD-10-CM

## 2025-02-12 DIAGNOSIS — I12.9 CKD STAGE 4 SECONDARY TO HYPERTENSION (H): ICD-10-CM

## 2025-02-12 DIAGNOSIS — K94.19 ALTERED BOWEL ELIMINATION DUE TO INTESTINAL OSTOMY (H): ICD-10-CM

## 2025-02-12 DIAGNOSIS — E87.1 HYPONATREMIA: ICD-10-CM

## 2025-02-12 DIAGNOSIS — Z93.2 ILEOSTOMY STATUS (H): ICD-10-CM

## 2025-02-12 DIAGNOSIS — E86.0 DEHYDRATION: ICD-10-CM

## 2025-02-12 DIAGNOSIS — N18.4 ACUTE RENAL FAILURE SUPERIMPOSED ON STAGE 4 CHRONIC KIDNEY DISEASE, UNSPECIFIED ACUTE RENAL FAILURE TYPE (H): ICD-10-CM

## 2025-02-12 DIAGNOSIS — N17.9 ACUTE RENAL FAILURE SUPERIMPOSED ON STAGE 4 CHRONIC KIDNEY DISEASE, UNSPECIFIED ACUTE RENAL FAILURE TYPE (H): ICD-10-CM

## 2025-02-12 PROCEDURE — 250N000011 HC RX IP 250 OP 636: Performed by: PEDIATRICS

## 2025-02-12 PROCEDURE — 96360 HYDRATION IV INFUSION INIT: CPT

## 2025-02-12 PROCEDURE — 258N000003 HC RX IP 258 OP 636: Performed by: PEDIATRICS

## 2025-02-12 PROCEDURE — 96361 HYDRATE IV INFUSION ADD-ON: CPT

## 2025-02-12 RX ORDER — HEPARIN SODIUM (PORCINE) LOCK FLUSH IV SOLN 100 UNIT/ML 100 UNIT/ML
5 SOLUTION INTRAVENOUS
Status: DISCONTINUED | OUTPATIENT
Start: 2025-02-12 | End: 2025-02-12 | Stop reason: HOSPADM

## 2025-02-12 RX ORDER — HEPARIN SODIUM (PORCINE) LOCK FLUSH IV SOLN 100 UNIT/ML 100 UNIT/ML
5 SOLUTION INTRAVENOUS
OUTPATIENT
Start: 2025-02-12

## 2025-02-12 RX ORDER — HEPARIN SODIUM,PORCINE 10 UNIT/ML
5-20 VIAL (ML) INTRAVENOUS DAILY PRN
OUTPATIENT
Start: 2025-02-12

## 2025-02-12 RX ADMIN — SODIUM CHLORIDE 1000 ML: 9 INJECTION, SOLUTION INTRAVENOUS at 10:34

## 2025-02-12 RX ADMIN — Medication 5 ML: at 12:35

## 2025-02-19 ENCOUNTER — INFUSION THERAPY VISIT (OUTPATIENT)
Dept: INFUSION THERAPY | Facility: CLINIC | Age: 85
End: 2025-02-19
Attending: PEDIATRICS
Payer: MEDICARE

## 2025-02-19 VITALS
OXYGEN SATURATION: 100 % | DIASTOLIC BLOOD PRESSURE: 58 MMHG | SYSTOLIC BLOOD PRESSURE: 115 MMHG | RESPIRATION RATE: 16 BRPM | HEART RATE: 60 BPM | TEMPERATURE: 97 F

## 2025-02-19 DIAGNOSIS — K94.19 ALTERED BOWEL ELIMINATION DUE TO INTESTINAL OSTOMY (H): ICD-10-CM

## 2025-02-19 DIAGNOSIS — Z90.49 S/P TOTAL COLECTOMY: Primary | ICD-10-CM

## 2025-02-19 DIAGNOSIS — E87.1 HYPONATREMIA: ICD-10-CM

## 2025-02-19 DIAGNOSIS — I12.9 CKD STAGE 4 SECONDARY TO HYPERTENSION (H): ICD-10-CM

## 2025-02-19 DIAGNOSIS — N17.9 ACUTE RENAL FAILURE SUPERIMPOSED ON STAGE 4 CHRONIC KIDNEY DISEASE, UNSPECIFIED ACUTE RENAL FAILURE TYPE (H): ICD-10-CM

## 2025-02-19 DIAGNOSIS — Z93.2 ILEOSTOMY STATUS (H): ICD-10-CM

## 2025-02-19 DIAGNOSIS — N18.4 CKD STAGE 4 SECONDARY TO HYPERTENSION (H): ICD-10-CM

## 2025-02-19 DIAGNOSIS — N18.4 ACUTE RENAL FAILURE SUPERIMPOSED ON STAGE 4 CHRONIC KIDNEY DISEASE, UNSPECIFIED ACUTE RENAL FAILURE TYPE (H): ICD-10-CM

## 2025-02-19 DIAGNOSIS — E86.0 DEHYDRATION: ICD-10-CM

## 2025-02-19 PROCEDURE — 250N000011 HC RX IP 250 OP 636: Performed by: PEDIATRICS

## 2025-02-19 PROCEDURE — 96361 HYDRATE IV INFUSION ADD-ON: CPT

## 2025-02-19 PROCEDURE — 258N000003 HC RX IP 258 OP 636: Performed by: PEDIATRICS

## 2025-02-19 PROCEDURE — 96360 HYDRATION IV INFUSION INIT: CPT

## 2025-02-19 RX ORDER — HEPARIN SODIUM,PORCINE 10 UNIT/ML
5-20 VIAL (ML) INTRAVENOUS DAILY PRN
OUTPATIENT
Start: 2025-02-19

## 2025-02-19 RX ORDER — HEPARIN SODIUM (PORCINE) LOCK FLUSH IV SOLN 100 UNIT/ML 100 UNIT/ML
5 SOLUTION INTRAVENOUS
OUTPATIENT
Start: 2025-02-19

## 2025-02-19 RX ORDER — HEPARIN SODIUM (PORCINE) LOCK FLUSH IV SOLN 100 UNIT/ML 100 UNIT/ML
5 SOLUTION INTRAVENOUS
Status: DISCONTINUED | OUTPATIENT
Start: 2025-02-19 | End: 2025-02-19 | Stop reason: HOSPADM

## 2025-02-19 RX ADMIN — Medication 5 ML: at 14:55

## 2025-02-19 RX ADMIN — SODIUM CHLORIDE 1000 ML: 9 INJECTION, SOLUTION INTRAVENOUS at 12:54

## 2025-02-19 NOTE — PROGRESS NOTES
Infusion Nursing Note:  Gely Keene presents today for IVF.    Patient seen by provider today: No   present during visit today: Not Applicable.    Note: N/A.      Intravenous Access:  Implanted Port.    Treatment Conditions:  Not Applicable.      Post Infusion Assessment:  Patient tolerated infusion without incident.  Blood return noted pre and post infusion.  Site patent and intact, free from redness, edema or discomfort.  No evidence of extravasations.  Access discontinued per protocol.       Discharge Plan:   Discharge instructions reviewed with: Patient.  Patient and/or family verbalized understanding of discharge instructions and all questions answered.  AVS to patient via [employer].  Patient will return 2/26/25 for next appointment.   Patient discharged in stable condition accompanied by: self.  Departure Mode: Ambulatory.      Simi Nino RN

## 2025-02-26 ENCOUNTER — INFUSION THERAPY VISIT (OUTPATIENT)
Dept: INFUSION THERAPY | Facility: CLINIC | Age: 85
End: 2025-02-26
Attending: PEDIATRICS
Payer: MEDICARE

## 2025-02-26 VITALS
RESPIRATION RATE: 16 BRPM | TEMPERATURE: 97.7 F | HEART RATE: 68 BPM | SYSTOLIC BLOOD PRESSURE: 118 MMHG | DIASTOLIC BLOOD PRESSURE: 57 MMHG | OXYGEN SATURATION: 98 %

## 2025-02-26 DIAGNOSIS — N18.4 CKD STAGE 4 SECONDARY TO HYPERTENSION (H): ICD-10-CM

## 2025-02-26 DIAGNOSIS — Z90.49 S/P TOTAL COLECTOMY: Primary | ICD-10-CM

## 2025-02-26 DIAGNOSIS — I12.9 CKD STAGE 4 SECONDARY TO HYPERTENSION (H): ICD-10-CM

## 2025-02-26 DIAGNOSIS — N18.4 ACUTE RENAL FAILURE SUPERIMPOSED ON STAGE 4 CHRONIC KIDNEY DISEASE, UNSPECIFIED ACUTE RENAL FAILURE TYPE (H): ICD-10-CM

## 2025-02-26 DIAGNOSIS — K94.19 ALTERED BOWEL ELIMINATION DUE TO INTESTINAL OSTOMY (H): ICD-10-CM

## 2025-02-26 DIAGNOSIS — Z93.2 ILEOSTOMY STATUS (H): ICD-10-CM

## 2025-02-26 DIAGNOSIS — E87.1 HYPONATREMIA: ICD-10-CM

## 2025-02-26 DIAGNOSIS — N17.9 ACUTE RENAL FAILURE SUPERIMPOSED ON STAGE 4 CHRONIC KIDNEY DISEASE, UNSPECIFIED ACUTE RENAL FAILURE TYPE (H): ICD-10-CM

## 2025-02-26 DIAGNOSIS — E86.0 DEHYDRATION: ICD-10-CM

## 2025-02-26 PROCEDURE — 96360 HYDRATION IV INFUSION INIT: CPT

## 2025-02-26 PROCEDURE — 250N000011 HC RX IP 250 OP 636: Performed by: PEDIATRICS

## 2025-02-26 PROCEDURE — 96361 HYDRATE IV INFUSION ADD-ON: CPT

## 2025-02-26 PROCEDURE — 258N000003 HC RX IP 258 OP 636: Performed by: PEDIATRICS

## 2025-02-26 RX ORDER — HEPARIN SODIUM (PORCINE) LOCK FLUSH IV SOLN 100 UNIT/ML 100 UNIT/ML
5 SOLUTION INTRAVENOUS
OUTPATIENT
Start: 2025-02-26

## 2025-02-26 RX ORDER — HEPARIN SODIUM (PORCINE) LOCK FLUSH IV SOLN 100 UNIT/ML 100 UNIT/ML
5 SOLUTION INTRAVENOUS
Status: DISCONTINUED | OUTPATIENT
Start: 2025-02-26 | End: 2025-02-26 | Stop reason: HOSPADM

## 2025-02-26 RX ORDER — HEPARIN SODIUM,PORCINE 10 UNIT/ML
5-20 VIAL (ML) INTRAVENOUS DAILY PRN
OUTPATIENT
Start: 2025-02-26

## 2025-02-26 RX ADMIN — HEPARIN 5 ML: 100 SYRINGE at 15:16

## 2025-02-26 RX ADMIN — SODIUM CHLORIDE 1000 ML: 9 INJECTION, SOLUTION INTRAVENOUS at 13:15

## 2025-02-26 NOTE — PROGRESS NOTES
Infusion Nursing Note:  Gely Keene presents today for IVF.    Patient seen by provider today: No   present during visit today: Not Applicable.    Note: N/A.      Intravenous Access:  Implanted Port.    Treatment Conditions:  Not Applicable.      Post Infusion Assessment:  Patient tolerated infusion without incident.  Blood return noted pre and post infusion.  Site patent and intact, free from redness, edema or discomfort.  No evidence of extravasations.  Access discontinued per protocol.       Discharge Plan:   Discharge instructions reviewed with: Patient.  Patient and/or family verbalized understanding of discharge instructions and all questions answered.  AVS to patient via [employer].  Patient will return 3/5/25 for next appointment.   Patient discharged in stable condition accompanied by: self.  Departure Mode: Ambulatory.      Bouchra Barron RN

## 2025-03-05 ENCOUNTER — INFUSION THERAPY VISIT (OUTPATIENT)
Dept: INFUSION THERAPY | Facility: CLINIC | Age: 85
End: 2025-03-05
Attending: PEDIATRICS
Payer: MEDICARE

## 2025-03-05 VITALS
SYSTOLIC BLOOD PRESSURE: 150 MMHG | RESPIRATION RATE: 16 BRPM | HEART RATE: 80 BPM | OXYGEN SATURATION: 98 % | DIASTOLIC BLOOD PRESSURE: 79 MMHG | TEMPERATURE: 97.7 F

## 2025-03-05 DIAGNOSIS — Z93.2 ILEOSTOMY STATUS (H): ICD-10-CM

## 2025-03-05 DIAGNOSIS — I12.9 CKD STAGE 4 SECONDARY TO HYPERTENSION (H): ICD-10-CM

## 2025-03-05 DIAGNOSIS — E86.0 DEHYDRATION: ICD-10-CM

## 2025-03-05 DIAGNOSIS — N18.4 CKD STAGE 4 SECONDARY TO HYPERTENSION (H): ICD-10-CM

## 2025-03-05 DIAGNOSIS — N17.9 ACUTE RENAL FAILURE SUPERIMPOSED ON STAGE 4 CHRONIC KIDNEY DISEASE, UNSPECIFIED ACUTE RENAL FAILURE TYPE (H): ICD-10-CM

## 2025-03-05 DIAGNOSIS — E87.1 HYPONATREMIA: ICD-10-CM

## 2025-03-05 DIAGNOSIS — K94.19 ALTERED BOWEL ELIMINATION DUE TO INTESTINAL OSTOMY (H): Primary | ICD-10-CM

## 2025-03-05 DIAGNOSIS — Z90.49 S/P TOTAL COLECTOMY: ICD-10-CM

## 2025-03-05 DIAGNOSIS — N18.4 ACUTE RENAL FAILURE SUPERIMPOSED ON STAGE 4 CHRONIC KIDNEY DISEASE, UNSPECIFIED ACUTE RENAL FAILURE TYPE (H): ICD-10-CM

## 2025-03-05 PROCEDURE — 258N000003 HC RX IP 258 OP 636: Performed by: PEDIATRICS

## 2025-03-05 PROCEDURE — 250N000011 HC RX IP 250 OP 636: Performed by: PEDIATRICS

## 2025-03-05 PROCEDURE — 96361 HYDRATE IV INFUSION ADD-ON: CPT

## 2025-03-05 PROCEDURE — 96360 HYDRATION IV INFUSION INIT: CPT

## 2025-03-05 RX ORDER — HEPARIN SODIUM (PORCINE) LOCK FLUSH IV SOLN 100 UNIT/ML 100 UNIT/ML
5 SOLUTION INTRAVENOUS
Status: DISCONTINUED | OUTPATIENT
Start: 2025-03-05 | End: 2025-03-05 | Stop reason: HOSPADM

## 2025-03-05 RX ORDER — HEPARIN SODIUM,PORCINE 10 UNIT/ML
5-20 VIAL (ML) INTRAVENOUS DAILY PRN
OUTPATIENT
Start: 2025-03-05

## 2025-03-05 RX ORDER — HEPARIN SODIUM (PORCINE) LOCK FLUSH IV SOLN 100 UNIT/ML 100 UNIT/ML
5 SOLUTION INTRAVENOUS
OUTPATIENT
Start: 2025-03-05

## 2025-03-05 RX ADMIN — SODIUM CHLORIDE 1000 ML: 0.9 INJECTION, SOLUTION INTRAVENOUS at 13:56

## 2025-03-05 RX ADMIN — Medication 5 ML: at 15:58

## 2025-03-05 NOTE — PROGRESS NOTES
Infusion Nursing Note:  Gely Keene presents today for IV hydration (1L NS).    Patient seen by provider today: No   present during visit today: Not Applicable.    Note: Gely reports she is feeling well today.  She wonders if she is coming down with a cold because she has an intermittent runny nose and sneezing.    Intravenous Access:  Implanted Port.    Treatment Conditions:  Not Applicable.    Post Infusion Assessment:  Patient tolerated infusion without incident.  Blood return noted pre and post infusion.  Site patent and intact, free from redness, edema or discomfort.  No evidence of extravasations.  Access discontinued per protocol.     Discharge Plan:   Discharge instructions reviewed with: Patient.  Patient and/or family verbalized understanding of discharge instructions and all questions answered.  AVS to patient via [employer].  Patient will return 3/12 for next appointment.   Patient discharged in stable condition accompanied by: self.  Departure Mode: Ambulatory.      Scar Dickson RN

## 2025-03-08 ENCOUNTER — HEALTH MAINTENANCE LETTER (OUTPATIENT)
Age: 85
End: 2025-03-08

## 2025-03-12 ENCOUNTER — INFUSION THERAPY VISIT (OUTPATIENT)
Dept: INFUSION THERAPY | Facility: CLINIC | Age: 85
End: 2025-03-12
Attending: PEDIATRICS
Payer: MEDICARE

## 2025-03-12 VITALS
RESPIRATION RATE: 16 BRPM | HEART RATE: 55 BPM | SYSTOLIC BLOOD PRESSURE: 143 MMHG | DIASTOLIC BLOOD PRESSURE: 61 MMHG | OXYGEN SATURATION: 99 % | TEMPERATURE: 98 F

## 2025-03-12 DIAGNOSIS — K94.19 ALTERED BOWEL ELIMINATION DUE TO INTESTINAL OSTOMY (H): Primary | ICD-10-CM

## 2025-03-12 DIAGNOSIS — Z90.49 S/P TOTAL COLECTOMY: ICD-10-CM

## 2025-03-12 DIAGNOSIS — N17.9 ACUTE RENAL FAILURE SUPERIMPOSED ON STAGE 4 CHRONIC KIDNEY DISEASE, UNSPECIFIED ACUTE RENAL FAILURE TYPE (H): ICD-10-CM

## 2025-03-12 DIAGNOSIS — I12.9 CKD STAGE 4 SECONDARY TO HYPERTENSION (H): ICD-10-CM

## 2025-03-12 DIAGNOSIS — E87.1 HYPONATREMIA: ICD-10-CM

## 2025-03-12 DIAGNOSIS — N18.4 CKD STAGE 4 SECONDARY TO HYPERTENSION (H): ICD-10-CM

## 2025-03-12 DIAGNOSIS — N18.4 ACUTE RENAL FAILURE SUPERIMPOSED ON STAGE 4 CHRONIC KIDNEY DISEASE, UNSPECIFIED ACUTE RENAL FAILURE TYPE (H): ICD-10-CM

## 2025-03-12 DIAGNOSIS — E86.0 DEHYDRATION: ICD-10-CM

## 2025-03-12 DIAGNOSIS — Z93.2 ILEOSTOMY STATUS (H): ICD-10-CM

## 2025-03-12 PROCEDURE — 258N000003 HC RX IP 258 OP 636: Performed by: PEDIATRICS

## 2025-03-12 PROCEDURE — 96361 HYDRATE IV INFUSION ADD-ON: CPT

## 2025-03-12 PROCEDURE — 96360 HYDRATION IV INFUSION INIT: CPT

## 2025-03-12 PROCEDURE — 250N000011 HC RX IP 250 OP 636: Performed by: PEDIATRICS

## 2025-03-12 RX ORDER — HEPARIN SODIUM (PORCINE) LOCK FLUSH IV SOLN 100 UNIT/ML 100 UNIT/ML
5 SOLUTION INTRAVENOUS
OUTPATIENT
Start: 2025-03-12

## 2025-03-12 RX ORDER — HEPARIN SODIUM (PORCINE) LOCK FLUSH IV SOLN 100 UNIT/ML 100 UNIT/ML
5 SOLUTION INTRAVENOUS
Status: DISCONTINUED | OUTPATIENT
Start: 2025-03-12 | End: 2025-03-12 | Stop reason: HOSPADM

## 2025-03-12 RX ORDER — HEPARIN SODIUM,PORCINE 10 UNIT/ML
5-20 VIAL (ML) INTRAVENOUS DAILY PRN
OUTPATIENT
Start: 2025-03-12

## 2025-03-12 RX ADMIN — Medication 5 ML: at 15:35

## 2025-03-12 RX ADMIN — SODIUM CHLORIDE 1000 ML: 0.9 INJECTION, SOLUTION INTRAVENOUS at 13:33

## 2025-03-12 ASSESSMENT — PAIN SCALES - GENERAL: PAINLEVEL_OUTOF10: NO PAIN (0)

## 2025-03-12 NOTE — PROGRESS NOTES
Infusion Nursing Note:  Gely Griffithluisjane presents today for IV fluids.    Patient seen by provider today: No   present during visit today: Not Applicable.    Note: N/A.      Intravenous Access:  Implanted Port.    Treatment Conditions:  Not Applicable.      Post Infusion Assessment:  Patient tolerated infusion without incident.  Blood return noted pre and post infusion.  Site patent and intact, free from redness, edema or discomfort.  No evidence of extravasations.  Access discontinued per protocol.       Discharge Plan:   Discharge instructions reviewed with: Patient.  Patient and/or family verbalized understanding of discharge instructions and all questions answered.  AVS to patient via [employer].  Patient will return 3/19 for next appointment.   Patient discharged in stable condition accompanied by: self.  Departure Mode: Ambulatory.      Shana Sin RN

## 2025-03-19 ENCOUNTER — INFUSION THERAPY VISIT (OUTPATIENT)
Dept: INFUSION THERAPY | Facility: CLINIC | Age: 85
End: 2025-03-19
Attending: PEDIATRICS
Payer: MEDICARE

## 2025-03-19 VITALS
HEART RATE: 66 BPM | DIASTOLIC BLOOD PRESSURE: 60 MMHG | OXYGEN SATURATION: 99 % | SYSTOLIC BLOOD PRESSURE: 130 MMHG | TEMPERATURE: 97.8 F | RESPIRATION RATE: 16 BRPM

## 2025-03-19 DIAGNOSIS — N18.4 CKD STAGE 4 SECONDARY TO HYPERTENSION (H): ICD-10-CM

## 2025-03-19 DIAGNOSIS — N18.4 ACUTE RENAL FAILURE SUPERIMPOSED ON STAGE 4 CHRONIC KIDNEY DISEASE, UNSPECIFIED ACUTE RENAL FAILURE TYPE (H): ICD-10-CM

## 2025-03-19 DIAGNOSIS — Z93.2 ILEOSTOMY STATUS (H): ICD-10-CM

## 2025-03-19 DIAGNOSIS — I12.9 CKD STAGE 4 SECONDARY TO HYPERTENSION (H): ICD-10-CM

## 2025-03-19 DIAGNOSIS — Z90.49 S/P TOTAL COLECTOMY: ICD-10-CM

## 2025-03-19 DIAGNOSIS — E86.0 DEHYDRATION: ICD-10-CM

## 2025-03-19 DIAGNOSIS — E87.1 HYPONATREMIA: ICD-10-CM

## 2025-03-19 DIAGNOSIS — N17.9 ACUTE RENAL FAILURE SUPERIMPOSED ON STAGE 4 CHRONIC KIDNEY DISEASE, UNSPECIFIED ACUTE RENAL FAILURE TYPE (H): ICD-10-CM

## 2025-03-19 DIAGNOSIS — K94.19 ALTERED BOWEL ELIMINATION DUE TO INTESTINAL OSTOMY (H): Primary | ICD-10-CM

## 2025-03-19 PROCEDURE — 258N000003 HC RX IP 258 OP 636: Performed by: PEDIATRICS

## 2025-03-19 PROCEDURE — 96360 HYDRATION IV INFUSION INIT: CPT

## 2025-03-19 PROCEDURE — 250N000011 HC RX IP 250 OP 636: Performed by: PEDIATRICS

## 2025-03-19 PROCEDURE — 96361 HYDRATE IV INFUSION ADD-ON: CPT

## 2025-03-19 RX ORDER — HEPARIN SODIUM,PORCINE 10 UNIT/ML
5-20 VIAL (ML) INTRAVENOUS DAILY PRN
OUTPATIENT
Start: 2025-03-19

## 2025-03-19 RX ORDER — HEPARIN SODIUM (PORCINE) LOCK FLUSH IV SOLN 100 UNIT/ML 100 UNIT/ML
5 SOLUTION INTRAVENOUS
OUTPATIENT
Start: 2025-03-19

## 2025-03-19 RX ORDER — HEPARIN SODIUM (PORCINE) LOCK FLUSH IV SOLN 100 UNIT/ML 100 UNIT/ML
5 SOLUTION INTRAVENOUS
Status: DISCONTINUED | OUTPATIENT
Start: 2025-03-19 | End: 2025-03-19 | Stop reason: HOSPADM

## 2025-03-19 RX ADMIN — SODIUM CHLORIDE 1000 ML: 0.9 INJECTION, SOLUTION INTRAVENOUS at 13:19

## 2025-03-19 RX ADMIN — Medication 5 ML: at 15:34

## 2025-03-19 ASSESSMENT — PAIN SCALES - GENERAL: PAINLEVEL_OUTOF10: NO PAIN (0)

## 2025-03-19 NOTE — PROGRESS NOTES
Infusion Nursing Note:    Gely Keene presents today for 1L of IV fluids over 2hours.      Patient seen by provider today: No     present during visit today: Not Applicable.    Note: N/A.      Intravenous Access:  Implanted Port.    Treatment Conditions:  Not Applicable.      Post Infusion Assessment:  Patient tolerated infusion without incident.  Blood return noted pre and post infusion.  Site patent and intact, free from redness, edema or discomfort.  No evidence of extravasations.  Access discontinued per protocol.       Discharge Plan:   Discharge instructions reviewed with: Patient and Family.  Patient and/or family verbalized understanding of discharge instructions and all questions answered.  AVS to patient via "[employer].  Patient will return 3/26 for next appointment.   Patient discharged in stable condition accompanied by: self and daughter.  Departure Mode: Ambulatory.      Ashely Win RN

## 2025-03-25 ENCOUNTER — APPOINTMENT (OUTPATIENT)
Age: 85
Setting detail: DERMATOLOGY
End: 2025-03-25

## 2025-03-25 DIAGNOSIS — L82.1 OTHER SEBORRHEIC KERATOSIS: ICD-10-CM

## 2025-03-25 DIAGNOSIS — Z85.828 PERSONAL HISTORY OF OTHER MALIGNANT NEOPLASM OF SKIN: ICD-10-CM

## 2025-03-25 DIAGNOSIS — D18.0 HEMANGIOMA: ICD-10-CM

## 2025-03-25 DIAGNOSIS — H61.03 CHONDRITIS OF EXTERNAL EAR: ICD-10-CM

## 2025-03-25 DIAGNOSIS — D22 MELANOCYTIC NEVI: ICD-10-CM

## 2025-03-25 DIAGNOSIS — L57.0 ACTINIC KERATOSIS: ICD-10-CM | Status: RESOLVED

## 2025-03-25 DIAGNOSIS — Z71.89 OTHER SPECIFIED COUNSELING: ICD-10-CM

## 2025-03-25 PROBLEM — D18.01 HEMANGIOMA OF SKIN AND SUBCUTANEOUS TISSUE: Status: ACTIVE | Noted: 2025-03-25

## 2025-03-25 PROBLEM — D22.5 MELANOCYTIC NEVI OF TRUNK: Status: ACTIVE | Noted: 2025-03-25

## 2025-03-25 PROBLEM — H61.031 CHONDRITIS OF RIGHT EXTERNAL EAR: Status: ACTIVE | Noted: 2025-03-25

## 2025-03-25 PROCEDURE — ? COUNSELING

## 2025-03-25 PROCEDURE — ? OBSERVATION

## 2025-03-25 PROCEDURE — ? SUNSCREEN RECOMMENDATIONS

## 2025-03-25 PROCEDURE — 99213 OFFICE O/P EST LOW 20 MIN: CPT

## 2025-03-25 PROCEDURE — ? ADDITIONAL NOTES

## 2025-03-25 ASSESSMENT — LOCATION SIMPLE DESCRIPTION DERM
LOCATION SIMPLE: RIGHT CHEEK
LOCATION SIMPLE: RIGHT LOWER BACK
LOCATION SIMPLE: ABDOMEN
LOCATION SIMPLE: CHEST
LOCATION SIMPLE: UPPER BACK
LOCATION SIMPLE: RIGHT EAR
LOCATION SIMPLE: LEFT FOREHEAD
LOCATION SIMPLE: RIGHT NOSE

## 2025-03-25 ASSESSMENT — LOCATION ZONE DERM
LOCATION ZONE: FACE
LOCATION ZONE: TRUNK
LOCATION ZONE: NOSE
LOCATION ZONE: EAR

## 2025-03-25 ASSESSMENT — LOCATION DETAILED DESCRIPTION DERM
LOCATION DETAILED: RIGHT CENTRAL MALAR CHEEK
LOCATION DETAILED: RIGHT ANTIHELIX
LOCATION DETAILED: SUPERIOR THORACIC SPINE
LOCATION DETAILED: MIDDLE STERNUM
LOCATION DETAILED: RIGHT NASAL ALA
LOCATION DETAILED: LEFT INFERIOR MEDIAL FOREHEAD
LOCATION DETAILED: INFERIOR THORACIC SPINE
LOCATION DETAILED: EPIGASTRIC SKIN
LOCATION DETAILED: RIGHT SUPERIOR MEDIAL MIDBACK

## 2025-03-25 NOTE — PROCEDURE: OBSERVATION
Body Location Override (Optional - Billing Will Still Be Based On Selected Body Map Location If Applicable): right nasal ala, right lower back, left nasal sidewall, left lateral shoulder, right chest, mid superior upper back.
Detail Level: Detailed
Size Of Lesion In Cm (Optional): 0

## 2025-03-25 NOTE — PROCEDURE: ADDITIONAL NOTES
Render Risk Assessment In Note?: no
Detail Level: Simple
Additional Notes: Avoid pressure on right ear while sleeping. Recommended the use of a soft pillow or donut pillow.

## 2025-03-25 NOTE — HPI: NON-MELANOMA SKIN CANCER F/U (HISTORY OF NMSC)
How Many Skin Cancers Have You Had?: more than one
What Is The Reason For Today's Visit?: History of Non-Melanoma Skin Cancer
When Was Your Last Cancer Diagnosed?: 06/19/2024
Additional History: NMSC located on the right nasal ala, right lower back, left nasal sidewall, left lateral shoulder, right chest, mid superior upper back.

## 2025-03-26 ENCOUNTER — INFUSION THERAPY VISIT (OUTPATIENT)
Dept: INFUSION THERAPY | Facility: CLINIC | Age: 85
End: 2025-03-26
Attending: PEDIATRICS
Payer: MEDICARE

## 2025-03-26 VITALS
SYSTOLIC BLOOD PRESSURE: 115 MMHG | HEART RATE: 68 BPM | RESPIRATION RATE: 20 BRPM | DIASTOLIC BLOOD PRESSURE: 63 MMHG | TEMPERATURE: 97.6 F | OXYGEN SATURATION: 99 %

## 2025-03-26 DIAGNOSIS — E87.1 HYPONATREMIA: ICD-10-CM

## 2025-03-26 DIAGNOSIS — Z90.49 S/P TOTAL COLECTOMY: ICD-10-CM

## 2025-03-26 DIAGNOSIS — Z93.2 ILEOSTOMY STATUS (H): ICD-10-CM

## 2025-03-26 DIAGNOSIS — E86.0 DEHYDRATION: ICD-10-CM

## 2025-03-26 DIAGNOSIS — K94.19 ALTERED BOWEL ELIMINATION DUE TO INTESTINAL OSTOMY (H): Primary | ICD-10-CM

## 2025-03-26 DIAGNOSIS — I12.9 CKD STAGE 4 SECONDARY TO HYPERTENSION (H): ICD-10-CM

## 2025-03-26 DIAGNOSIS — N18.4 CKD STAGE 4 SECONDARY TO HYPERTENSION (H): ICD-10-CM

## 2025-03-26 DIAGNOSIS — N17.9 ACUTE RENAL FAILURE SUPERIMPOSED ON STAGE 4 CHRONIC KIDNEY DISEASE, UNSPECIFIED ACUTE RENAL FAILURE TYPE (H): ICD-10-CM

## 2025-03-26 DIAGNOSIS — N18.4 ACUTE RENAL FAILURE SUPERIMPOSED ON STAGE 4 CHRONIC KIDNEY DISEASE, UNSPECIFIED ACUTE RENAL FAILURE TYPE (H): ICD-10-CM

## 2025-03-26 PROCEDURE — 96360 HYDRATION IV INFUSION INIT: CPT

## 2025-03-26 PROCEDURE — 250N000011 HC RX IP 250 OP 636: Performed by: PEDIATRICS

## 2025-03-26 PROCEDURE — 96361 HYDRATE IV INFUSION ADD-ON: CPT

## 2025-03-26 PROCEDURE — 258N000003 HC RX IP 258 OP 636: Performed by: PEDIATRICS

## 2025-03-26 RX ORDER — HEPARIN SODIUM,PORCINE 10 UNIT/ML
5-20 VIAL (ML) INTRAVENOUS DAILY PRN
OUTPATIENT
Start: 2025-03-26

## 2025-03-26 RX ORDER — HEPARIN SODIUM (PORCINE) LOCK FLUSH IV SOLN 100 UNIT/ML 100 UNIT/ML
5 SOLUTION INTRAVENOUS
OUTPATIENT
Start: 2025-03-26

## 2025-03-26 RX ORDER — HEPARIN SODIUM (PORCINE) LOCK FLUSH IV SOLN 100 UNIT/ML 100 UNIT/ML
5 SOLUTION INTRAVENOUS
Status: DISCONTINUED | OUTPATIENT
Start: 2025-03-26 | End: 2025-03-26 | Stop reason: HOSPADM

## 2025-03-26 RX ADMIN — SODIUM CHLORIDE 1000 ML: 0.9 INJECTION, SOLUTION INTRAVENOUS at 13:02

## 2025-03-26 RX ADMIN — Medication 5 ML: at 15:05

## 2025-03-26 NOTE — PROGRESS NOTES
Infusion Nursing Note:  Gely Keene presents today for 1L IVF over 2 hours.    Patient seen by provider today: No   present during visit today: Not Applicable.    Note: N/A.      Intravenous Access:  Implanted Port.    Treatment Conditions:  Not Applicable.      Post Infusion Assessment:  Patient tolerated infusion without incident.  Blood return noted pre and post infusion.  Site patent and intact, free from redness, edema or discomfort.  No evidence of extravasations.  Access discontinued per protocol.       Discharge Plan:   AVS to patient via MYCHART.  Patient will return 4/4/25 for next appointment.   Patient discharged in stable condition accompanied by: self.  Departure Mode: Ambulatory.      Shana Khan RN

## 2025-04-04 PROCEDURE — 83540 ASSAY OF IRON: CPT | Performed by: PEDIATRICS

## 2025-04-04 PROCEDURE — 80051 ELECTROLYTE PANEL: CPT | Performed by: PEDIATRICS

## 2025-04-04 PROCEDURE — 83550 IRON BINDING TEST: CPT | Performed by: PEDIATRICS

## 2025-04-04 PROCEDURE — 86160 COMPLEMENT ANTIGEN: CPT | Performed by: INTERNAL MEDICINE

## 2025-04-04 PROCEDURE — 82607 VITAMIN B-12: CPT | Performed by: INTERNAL MEDICINE

## 2025-04-04 PROCEDURE — 84550 ASSAY OF BLOOD/URIC ACID: CPT | Performed by: INTERNAL MEDICINE

## 2025-04-04 PROCEDURE — 84450 TRANSFERASE (AST) (SGOT): CPT | Performed by: INTERNAL MEDICINE

## 2025-04-04 PROCEDURE — 82728 ASSAY OF FERRITIN: CPT | Performed by: INTERNAL MEDICINE

## 2025-04-04 PROCEDURE — 85014 HEMATOCRIT: CPT | Performed by: INTERNAL MEDICINE

## 2025-04-04 PROCEDURE — 83970 ASSAY OF PARATHORMONE: CPT | Performed by: INTERNAL MEDICINE

## 2025-04-04 PROCEDURE — 86140 C-REACTIVE PROTEIN: CPT | Performed by: INTERNAL MEDICINE

## 2025-04-04 PROCEDURE — 86225 DNA ANTIBODY NATIVE: CPT | Performed by: INTERNAL MEDICINE

## 2025-04-04 PROCEDURE — 82746 ASSAY OF FOLIC ACID SERUM: CPT | Performed by: INTERNAL MEDICINE

## 2025-04-04 PROCEDURE — 82247 BILIRUBIN TOTAL: CPT | Performed by: INTERNAL MEDICINE

## 2025-04-04 PROCEDURE — 84075 ASSAY ALKALINE PHOSPHATASE: CPT | Performed by: INTERNAL MEDICINE

## 2025-04-04 PROCEDURE — 82248 BILIRUBIN DIRECT: CPT | Performed by: INTERNAL MEDICINE

## 2025-04-04 PROCEDURE — 84460 ALANINE AMINO (ALT) (SGPT): CPT | Performed by: INTERNAL MEDICINE

## 2025-04-04 PROCEDURE — 82306 VITAMIN D 25 HYDROXY: CPT | Performed by: INTERNAL MEDICINE

## 2025-04-04 PROCEDURE — 85652 RBC SED RATE AUTOMATED: CPT | Performed by: INTERNAL MEDICINE

## 2025-04-04 PROCEDURE — 82040 ASSAY OF SERUM ALBUMIN: CPT | Performed by: PEDIATRICS

## 2025-04-04 PROCEDURE — 85025 COMPLETE CBC W/AUTO DIFF WBC: CPT | Performed by: INTERNAL MEDICINE

## 2025-04-04 PROCEDURE — 82947 ASSAY GLUCOSE BLOOD QUANT: CPT | Performed by: PEDIATRICS

## 2025-04-09 ENCOUNTER — INFUSION THERAPY VISIT (OUTPATIENT)
Dept: INFUSION THERAPY | Facility: CLINIC | Age: 85
End: 2025-04-09
Attending: PEDIATRICS
Payer: MEDICARE

## 2025-04-09 VITALS
HEART RATE: 66 BPM | TEMPERATURE: 97.9 F | DIASTOLIC BLOOD PRESSURE: 66 MMHG | RESPIRATION RATE: 16 BRPM | OXYGEN SATURATION: 100 % | SYSTOLIC BLOOD PRESSURE: 123 MMHG

## 2025-04-09 DIAGNOSIS — I12.9 CKD STAGE 4 SECONDARY TO HYPERTENSION (H): ICD-10-CM

## 2025-04-09 DIAGNOSIS — Z93.2 ILEOSTOMY STATUS (H): ICD-10-CM

## 2025-04-09 DIAGNOSIS — E87.1 HYPONATREMIA: ICD-10-CM

## 2025-04-09 DIAGNOSIS — N18.4 ACUTE RENAL FAILURE SUPERIMPOSED ON STAGE 4 CHRONIC KIDNEY DISEASE, UNSPECIFIED ACUTE RENAL FAILURE TYPE (H): ICD-10-CM

## 2025-04-09 DIAGNOSIS — Z90.49 S/P TOTAL COLECTOMY: ICD-10-CM

## 2025-04-09 DIAGNOSIS — N17.9 ACUTE RENAL FAILURE SUPERIMPOSED ON STAGE 4 CHRONIC KIDNEY DISEASE, UNSPECIFIED ACUTE RENAL FAILURE TYPE (H): ICD-10-CM

## 2025-04-09 DIAGNOSIS — E86.0 DEHYDRATION: ICD-10-CM

## 2025-04-09 DIAGNOSIS — K94.19 ALTERED BOWEL ELIMINATION DUE TO INTESTINAL OSTOMY (H): Primary | ICD-10-CM

## 2025-04-09 DIAGNOSIS — N18.4 CKD STAGE 4 SECONDARY TO HYPERTENSION (H): ICD-10-CM

## 2025-04-09 PROCEDURE — 250N000011 HC RX IP 250 OP 636: Performed by: PEDIATRICS

## 2025-04-09 PROCEDURE — 258N000003 HC RX IP 258 OP 636: Performed by: PEDIATRICS

## 2025-04-09 PROCEDURE — 96361 HYDRATE IV INFUSION ADD-ON: CPT

## 2025-04-09 PROCEDURE — 96360 HYDRATION IV INFUSION INIT: CPT

## 2025-04-09 RX ORDER — HEPARIN SODIUM (PORCINE) LOCK FLUSH IV SOLN 100 UNIT/ML 100 UNIT/ML
5 SOLUTION INTRAVENOUS
Status: DISCONTINUED | OUTPATIENT
Start: 2025-04-09 | End: 2025-04-09 | Stop reason: HOSPADM

## 2025-04-09 RX ORDER — HEPARIN SODIUM (PORCINE) LOCK FLUSH IV SOLN 100 UNIT/ML 100 UNIT/ML
5 SOLUTION INTRAVENOUS
OUTPATIENT
Start: 2025-04-09

## 2025-04-09 RX ORDER — HEPARIN SODIUM,PORCINE 10 UNIT/ML
5-20 VIAL (ML) INTRAVENOUS DAILY PRN
OUTPATIENT
Start: 2025-04-09

## 2025-04-09 RX ADMIN — Medication 5 ML: at 15:16

## 2025-04-09 RX ADMIN — SODIUM CHLORIDE 1000 ML: 0.9 INJECTION, SOLUTION INTRAVENOUS at 13:16

## 2025-04-09 NOTE — PROGRESS NOTES
Infusion Nursing Note:  Gely Keene presents today for IVF.    Patient seen by provider today: No   present during visit today: Not Applicable.    Note: Didn't feel well this morning, but took tylenol and had a rest and is feeling better at this appointment.  - does note that she has seen some remnants of pills in her ostomy bag, she is going to let her provider know when she sees her in May.     Intravenous Access:  Implanted Port.    Treatment Conditions:  Not Applicable.      Post Infusion Assessment:  Patient tolerated infusion without incident.  Blood return noted pre and post infusion.  Site patent and intact, free from redness, edema or discomfort.  No evidence of extravasations.  Access discontinued per protocol.       Discharge Plan:   Discharge instructions reviewed with: Patient.  Patient and/or family verbalized understanding of discharge instructions and all questions answered.  AVS to patient via [employer].  Patient will return 4/16/25 for next appointment.   Patient discharged in stable condition accompanied by: self.  Departure Mode: Ambulatory.      Simi Nino RN

## 2025-04-16 ENCOUNTER — CARE PLANS (OUTPATIENT)
Dept: PEDIATRICS | Facility: CLINIC | Age: 85
End: 2025-04-16

## 2025-04-16 ENCOUNTER — INFUSION THERAPY VISIT (OUTPATIENT)
Dept: INFUSION THERAPY | Facility: CLINIC | Age: 85
End: 2025-04-16
Attending: PEDIATRICS
Payer: MEDICARE

## 2025-04-16 VITALS
DIASTOLIC BLOOD PRESSURE: 72 MMHG | RESPIRATION RATE: 16 BRPM | TEMPERATURE: 97.8 F | OXYGEN SATURATION: 97 % | HEART RATE: 86 BPM | SYSTOLIC BLOOD PRESSURE: 127 MMHG

## 2025-04-16 DIAGNOSIS — Z90.49 S/P TOTAL COLECTOMY: ICD-10-CM

## 2025-04-16 DIAGNOSIS — Z93.2 ILEOSTOMY STATUS (H): ICD-10-CM

## 2025-04-16 DIAGNOSIS — N18.4 ACUTE RENAL FAILURE SUPERIMPOSED ON STAGE 4 CHRONIC KIDNEY DISEASE, UNSPECIFIED ACUTE RENAL FAILURE TYPE (H): ICD-10-CM

## 2025-04-16 DIAGNOSIS — E86.0 DEHYDRATION: ICD-10-CM

## 2025-04-16 DIAGNOSIS — N17.9 ACUTE RENAL FAILURE SUPERIMPOSED ON STAGE 4 CHRONIC KIDNEY DISEASE, UNSPECIFIED ACUTE RENAL FAILURE TYPE (H): ICD-10-CM

## 2025-04-16 DIAGNOSIS — I12.9 CKD STAGE 4 SECONDARY TO HYPERTENSION (H): ICD-10-CM

## 2025-04-16 DIAGNOSIS — E87.1 HYPONATREMIA: ICD-10-CM

## 2025-04-16 DIAGNOSIS — N18.4 CKD STAGE 4 SECONDARY TO HYPERTENSION (H): ICD-10-CM

## 2025-04-16 DIAGNOSIS — K94.19 ALTERED BOWEL ELIMINATION DUE TO INTESTINAL OSTOMY (H): Primary | ICD-10-CM

## 2025-04-16 PROCEDURE — 250N000011 HC RX IP 250 OP 636: Performed by: PEDIATRICS

## 2025-04-16 PROCEDURE — 258N000003 HC RX IP 258 OP 636: Performed by: PEDIATRICS

## 2025-04-16 PROCEDURE — 96361 HYDRATE IV INFUSION ADD-ON: CPT

## 2025-04-16 PROCEDURE — 96360 HYDRATION IV INFUSION INIT: CPT

## 2025-04-16 RX ORDER — HEPARIN SODIUM,PORCINE 10 UNIT/ML
5-20 VIAL (ML) INTRAVENOUS DAILY PRN
OUTPATIENT
Start: 2025-04-16

## 2025-04-16 RX ORDER — HEPARIN SODIUM (PORCINE) LOCK FLUSH IV SOLN 100 UNIT/ML 100 UNIT/ML
5 SOLUTION INTRAVENOUS
Status: DISCONTINUED | OUTPATIENT
Start: 2025-04-16 | End: 2025-04-16 | Stop reason: HOSPADM

## 2025-04-16 RX ORDER — HEPARIN SODIUM (PORCINE) LOCK FLUSH IV SOLN 100 UNIT/ML 100 UNIT/ML
5 SOLUTION INTRAVENOUS
OUTPATIENT
Start: 2025-04-16

## 2025-04-16 RX ADMIN — Medication 5 ML: at 15:11

## 2025-04-16 RX ADMIN — SODIUM CHLORIDE 1000 ML: 0.9 INJECTION, SOLUTION INTRAVENOUS at 13:10

## 2025-04-16 NOTE — PROGRESS NOTES
Infusion Nursing Note:  Gely Keene presents today for IVF-1 liter over 2 hours.    Patient seen by provider today: No   present during visit today: Not Applicable.    Note: Dr. Booth messaged to update expiring IVF and VAD orders.        Intravenous Access:  Implanted Port.    Treatment Conditions:  Not Applicable.      Post Infusion Assessment:  Patient tolerated infusion without incident.  Blood return noted pre and post infusion.  Site patent and intact, free from redness, edema or discomfort.  No evidence of extravasations.  Access discontinued per protocol.       Discharge Plan:   Discharge instructions reviewed with: Patient.  Patient and/or family verbalized understanding of discharge instructions and all questions answered.  AVS to patient via [employer]T.  Patient will return 4/23 for next appointment.   Patient discharged in stable condition accompanied by: self.  Departure Mode: Ambulatory.      Tobi Patino RN

## 2025-04-23 ENCOUNTER — INFUSION THERAPY VISIT (OUTPATIENT)
Dept: INFUSION THERAPY | Facility: CLINIC | Age: 85
End: 2025-04-23
Attending: PEDIATRICS
Payer: MEDICARE

## 2025-04-23 VITALS
HEART RATE: 68 BPM | RESPIRATION RATE: 16 BRPM | TEMPERATURE: 98 F | DIASTOLIC BLOOD PRESSURE: 61 MMHG | SYSTOLIC BLOOD PRESSURE: 109 MMHG | OXYGEN SATURATION: 98 %

## 2025-04-23 DIAGNOSIS — N18.4 ACUTE RENAL FAILURE SUPERIMPOSED ON STAGE 4 CHRONIC KIDNEY DISEASE, UNSPECIFIED ACUTE RENAL FAILURE TYPE (H): ICD-10-CM

## 2025-04-23 DIAGNOSIS — I12.9 CKD STAGE 4 SECONDARY TO HYPERTENSION (H): ICD-10-CM

## 2025-04-23 DIAGNOSIS — E87.1 HYPONATREMIA: ICD-10-CM

## 2025-04-23 DIAGNOSIS — N17.9 ACUTE RENAL FAILURE SUPERIMPOSED ON STAGE 4 CHRONIC KIDNEY DISEASE, UNSPECIFIED ACUTE RENAL FAILURE TYPE (H): ICD-10-CM

## 2025-04-23 DIAGNOSIS — K94.19 ALTERED BOWEL ELIMINATION DUE TO INTESTINAL OSTOMY (H): Primary | ICD-10-CM

## 2025-04-23 DIAGNOSIS — Z90.49 S/P TOTAL COLECTOMY: ICD-10-CM

## 2025-04-23 DIAGNOSIS — N18.4 CKD STAGE 4 SECONDARY TO HYPERTENSION (H): ICD-10-CM

## 2025-04-23 DIAGNOSIS — E86.0 DEHYDRATION: ICD-10-CM

## 2025-04-23 PROCEDURE — 250N000011 HC RX IP 250 OP 636: Performed by: PEDIATRICS

## 2025-04-23 PROCEDURE — 96360 HYDRATION IV INFUSION INIT: CPT

## 2025-04-23 PROCEDURE — 258N000003 HC RX IP 258 OP 636: Performed by: PEDIATRICS

## 2025-04-23 PROCEDURE — 96361 HYDRATE IV INFUSION ADD-ON: CPT

## 2025-04-23 RX ORDER — HEPARIN SODIUM,PORCINE 10 UNIT/ML
5-20 VIAL (ML) INTRAVENOUS DAILY PRN
OUTPATIENT
Start: 2025-04-24

## 2025-04-23 RX ORDER — HEPARIN SODIUM (PORCINE) LOCK FLUSH IV SOLN 100 UNIT/ML 100 UNIT/ML
5 SOLUTION INTRAVENOUS
Status: CANCELLED | OUTPATIENT
Start: 2025-04-24

## 2025-04-23 RX ORDER — METHYLPREDNISOLONE SODIUM SUCCINATE 40 MG/ML
40 INJECTION INTRAMUSCULAR; INTRAVENOUS
Start: 2025-04-24

## 2025-04-23 RX ORDER — ALBUTEROL SULFATE 0.83 MG/ML
2.5 SOLUTION RESPIRATORY (INHALATION)
OUTPATIENT
Start: 2025-04-24

## 2025-04-23 RX ORDER — EPINEPHRINE 1 MG/ML
0.3 INJECTION, SOLUTION INTRAMUSCULAR; SUBCUTANEOUS EVERY 5 MIN PRN
OUTPATIENT
Start: 2025-04-24

## 2025-04-23 RX ORDER — DIPHENHYDRAMINE HYDROCHLORIDE 50 MG/ML
50 INJECTION, SOLUTION INTRAMUSCULAR; INTRAVENOUS
Start: 2025-04-24

## 2025-04-23 RX ORDER — DIPHENHYDRAMINE HYDROCHLORIDE 50 MG/ML
25 INJECTION, SOLUTION INTRAMUSCULAR; INTRAVENOUS
Start: 2025-04-24

## 2025-04-23 RX ORDER — ALBUTEROL SULFATE 90 UG/1
1-2 INHALANT RESPIRATORY (INHALATION)
Start: 2025-04-24

## 2025-04-23 RX ORDER — MEPERIDINE HYDROCHLORIDE 25 MG/ML
25 INJECTION INTRAMUSCULAR; INTRAVENOUS; SUBCUTANEOUS
OUTPATIENT
Start: 2025-04-24

## 2025-04-23 RX ORDER — HEPARIN SODIUM (PORCINE) LOCK FLUSH IV SOLN 100 UNIT/ML 100 UNIT/ML
5 SOLUTION INTRAVENOUS
Status: DISCONTINUED | OUTPATIENT
Start: 2025-04-23 | End: 2025-04-23 | Stop reason: HOSPADM

## 2025-04-23 RX ADMIN — Medication 5 ML: at 15:00

## 2025-04-23 RX ADMIN — SODIUM CHLORIDE 1000 ML: 9 INJECTION, SOLUTION INTRAVENOUS at 13:02

## 2025-04-23 ASSESSMENT — PAIN SCALES - GENERAL: PAINLEVEL_OUTOF10: NO PAIN (0)

## 2025-04-23 NOTE — PROGRESS NOTES
Infusion Nursing Note:  Gely Griffithluisjane presents today for IV fluids.    Patient seen by provider today: No   present during visit today: Not Applicable.    Note: N/A.      Intravenous Access:  Implanted Port.    Treatment Conditions:  Not Applicable.      Post Infusion Assessment:  Patient tolerated infusion without incident.  Blood return noted pre and post infusion.  Site patent and intact, free from redness, edema or discomfort.  No evidence of extravasations.  Access discontinued per protocol.       Discharge Plan:   Discharge instructions reviewed with: Patient.  Patient and/or family verbalized understanding of discharge instructions and all questions answered.  AVS to patient via [employer]od[employer].  Patient will return 4/30 for next appointment.   Patient discharged in stable condition accompanied by: self.  Departure Mode: Ambulatory.      Shana Sin RN

## 2025-04-30 ENCOUNTER — INFUSION THERAPY VISIT (OUTPATIENT)
Dept: INFUSION THERAPY | Facility: CLINIC | Age: 85
End: 2025-04-30
Attending: PEDIATRICS
Payer: MEDICARE

## 2025-04-30 VITALS
SYSTOLIC BLOOD PRESSURE: 120 MMHG | TEMPERATURE: 97.6 F | OXYGEN SATURATION: 99 % | DIASTOLIC BLOOD PRESSURE: 63 MMHG | RESPIRATION RATE: 16 BRPM | HEART RATE: 80 BPM

## 2025-04-30 DIAGNOSIS — I12.9 CKD STAGE 4 SECONDARY TO HYPERTENSION (H): ICD-10-CM

## 2025-04-30 DIAGNOSIS — N18.4 CKD STAGE 4 SECONDARY TO HYPERTENSION (H): ICD-10-CM

## 2025-04-30 DIAGNOSIS — Z90.49 S/P TOTAL COLECTOMY: ICD-10-CM

## 2025-04-30 DIAGNOSIS — E87.1 HYPONATREMIA: ICD-10-CM

## 2025-04-30 DIAGNOSIS — K94.19 ALTERED BOWEL ELIMINATION DUE TO INTESTINAL OSTOMY (H): Primary | ICD-10-CM

## 2025-04-30 DIAGNOSIS — E86.0 DEHYDRATION: ICD-10-CM

## 2025-04-30 DIAGNOSIS — Z93.2 ILEOSTOMY STATUS (H): ICD-10-CM

## 2025-04-30 DIAGNOSIS — N17.9 ACUTE RENAL FAILURE SUPERIMPOSED ON STAGE 4 CHRONIC KIDNEY DISEASE, UNSPECIFIED ACUTE RENAL FAILURE TYPE (H): ICD-10-CM

## 2025-04-30 DIAGNOSIS — N18.4 ACUTE RENAL FAILURE SUPERIMPOSED ON STAGE 4 CHRONIC KIDNEY DISEASE, UNSPECIFIED ACUTE RENAL FAILURE TYPE (H): ICD-10-CM

## 2025-04-30 PROCEDURE — 250N000011 HC RX IP 250 OP 636: Performed by: PEDIATRICS

## 2025-04-30 PROCEDURE — 96360 HYDRATION IV INFUSION INIT: CPT

## 2025-04-30 PROCEDURE — 258N000003 HC RX IP 258 OP 636: Performed by: PEDIATRICS

## 2025-04-30 PROCEDURE — 96361 HYDRATE IV INFUSION ADD-ON: CPT

## 2025-04-30 RX ORDER — HEPARIN SODIUM (PORCINE) LOCK FLUSH IV SOLN 100 UNIT/ML 100 UNIT/ML
5 SOLUTION INTRAVENOUS
OUTPATIENT
Start: 2025-04-30

## 2025-04-30 RX ORDER — ALBUTEROL SULFATE 90 UG/1
1-2 INHALANT RESPIRATORY (INHALATION)
Start: 2025-05-01

## 2025-04-30 RX ORDER — METHYLPREDNISOLONE SODIUM SUCCINATE 40 MG/ML
40 INJECTION INTRAMUSCULAR; INTRAVENOUS
Start: 2025-05-01

## 2025-04-30 RX ORDER — DIPHENHYDRAMINE HYDROCHLORIDE 50 MG/ML
50 INJECTION, SOLUTION INTRAMUSCULAR; INTRAVENOUS
Start: 2025-05-01

## 2025-04-30 RX ORDER — ALBUTEROL SULFATE 0.83 MG/ML
2.5 SOLUTION RESPIRATORY (INHALATION)
OUTPATIENT
Start: 2025-05-01

## 2025-04-30 RX ORDER — EPINEPHRINE 1 MG/ML
0.3 INJECTION, SOLUTION INTRAMUSCULAR; SUBCUTANEOUS EVERY 5 MIN PRN
OUTPATIENT
Start: 2025-05-01

## 2025-04-30 RX ORDER — HEPARIN SODIUM (PORCINE) LOCK FLUSH IV SOLN 100 UNIT/ML 100 UNIT/ML
5 SOLUTION INTRAVENOUS
Status: DISCONTINUED | OUTPATIENT
Start: 2025-04-30 | End: 2025-04-30 | Stop reason: HOSPADM

## 2025-04-30 RX ORDER — MEPERIDINE HYDROCHLORIDE 25 MG/ML
25 INJECTION INTRAMUSCULAR; INTRAVENOUS; SUBCUTANEOUS
OUTPATIENT
Start: 2025-05-01

## 2025-04-30 RX ORDER — HEPARIN SODIUM,PORCINE 10 UNIT/ML
5-20 VIAL (ML) INTRAVENOUS DAILY PRN
OUTPATIENT
Start: 2025-04-30

## 2025-04-30 RX ORDER — DIPHENHYDRAMINE HYDROCHLORIDE 50 MG/ML
25 INJECTION, SOLUTION INTRAMUSCULAR; INTRAVENOUS
Start: 2025-05-01

## 2025-04-30 RX ORDER — HEPARIN SODIUM,PORCINE 10 UNIT/ML
5-20 VIAL (ML) INTRAVENOUS DAILY PRN
OUTPATIENT
Start: 2025-05-01

## 2025-04-30 RX ADMIN — Medication 5 ML: at 15:28

## 2025-04-30 RX ADMIN — SODIUM CHLORIDE 1000 ML: 0.9 INJECTION, SOLUTION INTRAVENOUS at 13:22

## 2025-04-30 NOTE — PROGRESS NOTES
Infusion Nursing Note:  Gely Keene presents today for IV hydration (1L NS).    Patient seen by provider today: No   present during visit today: Not Applicable.    Note: N/A.    Intravenous Access:  Implanted Port.    Treatment Conditions:  Not Applicable.    Post Infusion Assessment:  Patient tolerated infusion without incident.  Blood return noted pre and post infusion.  Site patent and intact, free from redness, edema or discomfort.  No evidence of extravasations.  Access discontinued per protocol.     Discharge Plan:   Discharge instructions reviewed with: Patient.  Patient and/or family verbalized understanding of discharge instructions and all questions answered.  AVS to patient via [employer].  Patient will return 5/7 for next appointment.   Patient discharged in stable condition accompanied by: self.  Departure Mode: Ambulatory.      Scar Dickson RN

## 2025-05-07 ENCOUNTER — INFUSION THERAPY VISIT (OUTPATIENT)
Dept: INFUSION THERAPY | Facility: CLINIC | Age: 85
End: 2025-05-07
Attending: PEDIATRICS
Payer: MEDICARE

## 2025-05-07 VITALS
TEMPERATURE: 97.1 F | OXYGEN SATURATION: 96 % | RESPIRATION RATE: 16 BRPM | SYSTOLIC BLOOD PRESSURE: 112 MMHG | HEART RATE: 72 BPM | DIASTOLIC BLOOD PRESSURE: 65 MMHG

## 2025-05-07 DIAGNOSIS — N18.4 ACUTE RENAL FAILURE SUPERIMPOSED ON STAGE 4 CHRONIC KIDNEY DISEASE, UNSPECIFIED ACUTE RENAL FAILURE TYPE (H): ICD-10-CM

## 2025-05-07 DIAGNOSIS — E87.1 HYPONATREMIA: ICD-10-CM

## 2025-05-07 DIAGNOSIS — Z90.49 S/P TOTAL COLECTOMY: ICD-10-CM

## 2025-05-07 DIAGNOSIS — N18.4 CKD STAGE 4 SECONDARY TO HYPERTENSION (H): ICD-10-CM

## 2025-05-07 DIAGNOSIS — I12.9 CKD STAGE 4 SECONDARY TO HYPERTENSION (H): ICD-10-CM

## 2025-05-07 DIAGNOSIS — E86.0 DEHYDRATION: ICD-10-CM

## 2025-05-07 DIAGNOSIS — N17.9 ACUTE RENAL FAILURE SUPERIMPOSED ON STAGE 4 CHRONIC KIDNEY DISEASE, UNSPECIFIED ACUTE RENAL FAILURE TYPE (H): ICD-10-CM

## 2025-05-07 DIAGNOSIS — K94.19 ALTERED BOWEL ELIMINATION DUE TO INTESTINAL OSTOMY (H): Primary | ICD-10-CM

## 2025-05-07 DIAGNOSIS — Z93.2 ILEOSTOMY STATUS (H): ICD-10-CM

## 2025-05-07 PROCEDURE — 250N000011 HC RX IP 250 OP 636: Performed by: PEDIATRICS

## 2025-05-07 PROCEDURE — 96361 HYDRATE IV INFUSION ADD-ON: CPT

## 2025-05-07 PROCEDURE — 258N000003 HC RX IP 258 OP 636: Performed by: PEDIATRICS

## 2025-05-07 PROCEDURE — 96360 HYDRATION IV INFUSION INIT: CPT

## 2025-05-07 RX ORDER — HEPARIN SODIUM,PORCINE 10 UNIT/ML
5-20 VIAL (ML) INTRAVENOUS DAILY PRN
OUTPATIENT
Start: 2025-05-08

## 2025-05-07 RX ORDER — EPINEPHRINE 1 MG/ML
0.3 INJECTION, SOLUTION INTRAMUSCULAR; SUBCUTANEOUS EVERY 5 MIN PRN
OUTPATIENT
Start: 2025-05-08

## 2025-05-07 RX ORDER — MEPERIDINE HYDROCHLORIDE 25 MG/ML
25 INJECTION INTRAMUSCULAR; INTRAVENOUS; SUBCUTANEOUS
OUTPATIENT
Start: 2025-05-08

## 2025-05-07 RX ORDER — HEPARIN SODIUM (PORCINE) LOCK FLUSH IV SOLN 100 UNIT/ML 100 UNIT/ML
5 SOLUTION INTRAVENOUS
Status: DISCONTINUED | OUTPATIENT
Start: 2025-05-07 | End: 2025-05-07 | Stop reason: HOSPADM

## 2025-05-07 RX ORDER — ALBUTEROL SULFATE 90 UG/1
1-2 INHALANT RESPIRATORY (INHALATION)
Start: 2025-05-08

## 2025-05-07 RX ORDER — METHYLPREDNISOLONE SODIUM SUCCINATE 40 MG/ML
40 INJECTION INTRAMUSCULAR; INTRAVENOUS
Start: 2025-05-08

## 2025-05-07 RX ORDER — DIPHENHYDRAMINE HYDROCHLORIDE 50 MG/ML
50 INJECTION, SOLUTION INTRAMUSCULAR; INTRAVENOUS
Start: 2025-05-08

## 2025-05-07 RX ORDER — ALBUTEROL SULFATE 0.83 MG/ML
2.5 SOLUTION RESPIRATORY (INHALATION)
OUTPATIENT
Start: 2025-05-08

## 2025-05-07 RX ORDER — DIPHENHYDRAMINE HYDROCHLORIDE 50 MG/ML
25 INJECTION, SOLUTION INTRAMUSCULAR; INTRAVENOUS
Start: 2025-05-08

## 2025-05-07 RX ORDER — HEPARIN SODIUM,PORCINE 10 UNIT/ML
5-20 VIAL (ML) INTRAVENOUS DAILY PRN
OUTPATIENT
Start: 2025-05-07

## 2025-05-07 RX ORDER — HEPARIN SODIUM (PORCINE) LOCK FLUSH IV SOLN 100 UNIT/ML 100 UNIT/ML
5 SOLUTION INTRAVENOUS
OUTPATIENT
Start: 2025-05-07

## 2025-05-07 RX ADMIN — SODIUM CHLORIDE 1000 ML: 9 INJECTION, SOLUTION INTRAVENOUS at 13:05

## 2025-05-07 RX ADMIN — Medication 5 ML: at 15:08

## 2025-05-07 NOTE — PROGRESS NOTES
Infusion Nursing Note:  Gely Keene presents today for IVF.    Patient seen by provider today: No   present during visit today: Not Applicable.    Note: N/A.      Intravenous Access:  Implanted Port.    Treatment Conditions:  Not Applicable.      Post Infusion Assessment:  Patient tolerated infusion without incident.  Blood return noted pre and post infusion.  Site patent and intact, free from redness, edema or discomfort.  No evidence of extravasations.  Access discontinued per protocol.       Discharge Plan:   Discharge instructions reviewed with: Patient.  Patient and/or family verbalized understanding of discharge instructions and all questions answered.  AVS to patient via [employer].  Patient will return 5/13/25 for next appointment.   Patient discharged in stable condition accompanied by: self.  Departure Mode: Ambulatory.      Simi Nino RN

## 2025-05-13 ENCOUNTER — INFUSION THERAPY VISIT (OUTPATIENT)
Dept: INFUSION THERAPY | Facility: CLINIC | Age: 85
End: 2025-05-13
Attending: PEDIATRICS
Payer: MEDICARE

## 2025-05-13 VITALS
TEMPERATURE: 97.8 F | HEART RATE: 72 BPM | OXYGEN SATURATION: 99 % | SYSTOLIC BLOOD PRESSURE: 130 MMHG | RESPIRATION RATE: 18 BRPM | DIASTOLIC BLOOD PRESSURE: 62 MMHG

## 2025-05-13 DIAGNOSIS — N18.4 ACUTE RENAL FAILURE SUPERIMPOSED ON STAGE 4 CHRONIC KIDNEY DISEASE, UNSPECIFIED ACUTE RENAL FAILURE TYPE (H): ICD-10-CM

## 2025-05-13 DIAGNOSIS — N18.4 CKD STAGE 4 SECONDARY TO HYPERTENSION (H): ICD-10-CM

## 2025-05-13 DIAGNOSIS — I12.9 CKD STAGE 4 SECONDARY TO HYPERTENSION (H): ICD-10-CM

## 2025-05-13 DIAGNOSIS — E87.1 HYPONATREMIA: ICD-10-CM

## 2025-05-13 DIAGNOSIS — E86.0 DEHYDRATION: ICD-10-CM

## 2025-05-13 DIAGNOSIS — Z93.2 ILEOSTOMY STATUS (H): ICD-10-CM

## 2025-05-13 DIAGNOSIS — Z90.49 S/P TOTAL COLECTOMY: ICD-10-CM

## 2025-05-13 DIAGNOSIS — K94.19 ALTERED BOWEL ELIMINATION DUE TO INTESTINAL OSTOMY (H): Primary | ICD-10-CM

## 2025-05-13 DIAGNOSIS — N17.9 ACUTE RENAL FAILURE SUPERIMPOSED ON STAGE 4 CHRONIC KIDNEY DISEASE, UNSPECIFIED ACUTE RENAL FAILURE TYPE (H): ICD-10-CM

## 2025-05-13 PROCEDURE — 250N000011 HC RX IP 250 OP 636: Performed by: PEDIATRICS

## 2025-05-13 PROCEDURE — 96360 HYDRATION IV INFUSION INIT: CPT

## 2025-05-13 PROCEDURE — 96361 HYDRATE IV INFUSION ADD-ON: CPT

## 2025-05-13 PROCEDURE — 258N000003 HC RX IP 258 OP 636: Performed by: PEDIATRICS

## 2025-05-13 RX ORDER — MEPERIDINE HYDROCHLORIDE 25 MG/ML
25 INJECTION INTRAMUSCULAR; INTRAVENOUS; SUBCUTANEOUS
OUTPATIENT
Start: 2025-05-14

## 2025-05-13 RX ORDER — EPINEPHRINE 1 MG/ML
0.3 INJECTION, SOLUTION INTRAMUSCULAR; SUBCUTANEOUS EVERY 5 MIN PRN
OUTPATIENT
Start: 2025-05-14

## 2025-05-13 RX ORDER — HEPARIN SODIUM (PORCINE) LOCK FLUSH IV SOLN 100 UNIT/ML 100 UNIT/ML
5 SOLUTION INTRAVENOUS
OUTPATIENT
Start: 2025-05-13

## 2025-05-13 RX ORDER — ALBUTEROL SULFATE 90 UG/1
1-2 INHALANT RESPIRATORY (INHALATION)
Start: 2025-05-14

## 2025-05-13 RX ORDER — HEPARIN SODIUM (PORCINE) LOCK FLUSH IV SOLN 100 UNIT/ML 100 UNIT/ML
5 SOLUTION INTRAVENOUS
Status: DISCONTINUED | OUTPATIENT
Start: 2025-05-13 | End: 2025-05-13 | Stop reason: HOSPADM

## 2025-05-13 RX ORDER — ALBUTEROL SULFATE 0.83 MG/ML
2.5 SOLUTION RESPIRATORY (INHALATION)
OUTPATIENT
Start: 2025-05-14

## 2025-05-13 RX ORDER — DIPHENHYDRAMINE HYDROCHLORIDE 50 MG/ML
50 INJECTION, SOLUTION INTRAMUSCULAR; INTRAVENOUS
Start: 2025-05-14

## 2025-05-13 RX ORDER — METHYLPREDNISOLONE SODIUM SUCCINATE 40 MG/ML
40 INJECTION INTRAMUSCULAR; INTRAVENOUS
Start: 2025-05-14

## 2025-05-13 RX ORDER — HEPARIN SODIUM,PORCINE 10 UNIT/ML
5-20 VIAL (ML) INTRAVENOUS DAILY PRN
OUTPATIENT
Start: 2025-05-14

## 2025-05-13 RX ORDER — DIPHENHYDRAMINE HYDROCHLORIDE 50 MG/ML
25 INJECTION, SOLUTION INTRAMUSCULAR; INTRAVENOUS
Start: 2025-05-14

## 2025-05-13 RX ORDER — HEPARIN SODIUM,PORCINE 10 UNIT/ML
5-20 VIAL (ML) INTRAVENOUS DAILY PRN
OUTPATIENT
Start: 2025-05-13

## 2025-05-13 RX ADMIN — Medication 5 ML: at 15:10

## 2025-05-13 RX ADMIN — SODIUM CHLORIDE 1000 ML: 0.9 INJECTION, SOLUTION INTRAVENOUS at 13:06

## 2025-05-13 ASSESSMENT — PAIN SCALES - GENERAL: PAINLEVEL_OUTOF10: NO PAIN (0)

## 2025-05-13 NOTE — PROGRESS NOTES
Infusion Nursing Note:  Gely Keene presents today for IV Hydration.    Patient seen by provider today: No   present during visit today: Not Applicable.    Note: N/A.      Intravenous Access:  Implanted Port.    Treatment Conditions:  Not Applicable.      Post Infusion Assessment:  Patient tolerated infusion without incident.  Blood return noted pre and post infusion.  Site patent and intact, free from redness, edema or discomfort.  No evidence of extravasations.  Access discontinued per protocol.       Discharge Plan:   Discharge instructions reviewed with: Patient.  Patient and/or family verbalized understanding of discharge instructions and all questions answered.  AVS to patient via [employer].  Patient will return 5/21/25 for next appointment.   Patient discharged in stable condition accompanied by: self.  Departure Mode: Ambulatory.      Isatu Tejeda RN

## 2025-05-14 ENCOUNTER — OFFICE VISIT (OUTPATIENT)
Dept: PEDIATRICS | Facility: CLINIC | Age: 85
End: 2025-05-14
Payer: MEDICARE

## 2025-05-14 VITALS
WEIGHT: 123.2 LBS | SYSTOLIC BLOOD PRESSURE: 121 MMHG | BODY MASS INDEX: 24.06 KG/M2 | RESPIRATION RATE: 16 BRPM | OXYGEN SATURATION: 98 % | TEMPERATURE: 97.8 F | HEART RATE: 72 BPM | DIASTOLIC BLOOD PRESSURE: 56 MMHG

## 2025-05-14 DIAGNOSIS — Z90.49 S/P TOTAL COLECTOMY: ICD-10-CM

## 2025-05-14 DIAGNOSIS — N18.4 CKD STAGE 4 SECONDARY TO HYPERTENSION (H): ICD-10-CM

## 2025-05-14 DIAGNOSIS — E86.0 DEHYDRATION: ICD-10-CM

## 2025-05-14 DIAGNOSIS — R26.9 ALTERED GAIT: ICD-10-CM

## 2025-05-14 DIAGNOSIS — K94.19 ALTERED BOWEL ELIMINATION DUE TO INTESTINAL OSTOMY (H): Primary | ICD-10-CM

## 2025-05-14 DIAGNOSIS — M32.9 SYSTEMIC LUPUS ERYTHEMATOSUS, UNSPECIFIED SLE TYPE, UNSPECIFIED ORGAN INVOLVEMENT STATUS (H): ICD-10-CM

## 2025-05-14 DIAGNOSIS — I12.9 CKD STAGE 4 SECONDARY TO HYPERTENSION (H): ICD-10-CM

## 2025-05-14 DIAGNOSIS — Z93.2 ILEOSTOMY STATUS (H): ICD-10-CM

## 2025-05-14 DIAGNOSIS — R25.1 TREMOR: ICD-10-CM

## 2025-05-14 PROCEDURE — 82570 ASSAY OF URINE CREATININE: CPT | Performed by: PEDIATRICS

## 2025-05-14 PROCEDURE — 99214 OFFICE O/P EST MOD 30 MIN: CPT | Performed by: PEDIATRICS

## 2025-05-14 PROCEDURE — 3078F DIAST BP <80 MM HG: CPT | Performed by: PEDIATRICS

## 2025-05-14 PROCEDURE — G2211 COMPLEX E/M VISIT ADD ON: HCPCS | Performed by: PEDIATRICS

## 2025-05-14 PROCEDURE — 82043 UR ALBUMIN QUANTITATIVE: CPT | Performed by: PEDIATRICS

## 2025-05-14 PROCEDURE — 3074F SYST BP LT 130 MM HG: CPT | Performed by: PEDIATRICS

## 2025-05-14 PROCEDURE — 1126F AMNT PAIN NOTED NONE PRSNT: CPT | Performed by: PEDIATRICS

## 2025-05-14 ASSESSMENT — PAIN SCALES - GENERAL: PAINLEVEL_OUTOF10: NO PAIN (0)

## 2025-05-14 NOTE — PROGRESS NOTES
Assessment & Plan       ICD-10-CM    1. 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)  K94.19 Prone to dehydration - getting IVF infusions once weekly and plan to continue      2. 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 Systemic lupus erythematosus, unspecified SLE type, unspecified organ involvement status (H)  M32.9 Follows with rheumatology - no changes       4. CKD stage 4 secondary to hypertension (H)  I12.9 Albumin Random Urine Quantitative with Creat Ratio    N18.4 Albumin Random Urine Quantitative with Creat Ratio    Continue promoting hydration, avoiding nephrotoxins      5. Dehydration  E86.0 See above      6. Tremor  R25.1 MR Brain w/o Contrast    New tremor and gait instability with persistent falling to the left.  Plan MRI to evaluate for CVA, mass in the setting of new neurologic signs.      7. Altered gait  R26.9 MR Brain w/o Contrast      8. Ileostomy status (H)  Z93.2         The longitudinal plan of care for the diagnosis(es)/condition(s) as documented were addressed during this visit. Due to the added complexity in care, I will continue to support Gely in the subsequent management and with ongoing continuity of care.       Subjective   Gely is a 85 year old, presenting for the following health issues:  RECHECK        5/14/2025     1:48 PM   Additional Questions   Roomed by maricel   Accompanied by daughter         5/14/2025     1:48 PM   Patient Reported Additional Medications   Patient reports taking the following new medications na     History of Present Illness       Reason for visit:  Follow up lupus weekly infusions, shakiness    She eats 0-1 servings of fruits and vegetables daily.She consumes 2 sweetened beverage(s) daily.She exercises with enough effort to increase her heart rate 9 or less minutes per day.  She exercises with enough effort to increase her heart rate 3 or less days per week.   She is taking  medications regularly.        CKD 4, s/p total colectomy with ileostomy in place and persist struggles with dehydration impacting energy level, dizziness, kidney function.   Has been working diligently on hydration and benefiting tremendously from IV fluids that she receives at the infusion center once weekly.        Her children and grandchildren assist with home cleaning, transportation, and shopping.   Home is on one level.    More recently, they have noticed a change in gait and persistent tendency to veer to the left when walking.  Needing more assistance with walking longer distances.   New tremor - intention tremor - and chin tremor have occurred.   Noting some coordination changes    Continues to follow with rheumatology and nephrology    Mood is in a good place at present.      Blood pressure well controlled.          Objective    /56   Pulse 72   Temp 97.8  F (36.6  C) (Temporal)   Resp 16   Wt 55.9 kg (123 lb 3.2 oz)   LMP  (LMP Unknown)   SpO2 98%   BMI 24.06 kg/m    Body mass index is 24.06 kg/m .  Wt Readings from Last 4 Encounters:   05/14/25 55.9 kg (123 lb 3.2 oz)   02/12/25 52.7 kg (116 lb 2 oz)   01/13/25 53.1 kg (117 lb 1.6 oz)   10/30/24 53.5 kg (118 lb)       Physical Exam   GENERAL: alert and no distress  EYES: Eyes grossly normal to inspection, PERRL and conjunctivae and sclerae normal  RESP: lungs clear to auscultation - no rales, rhonchi or wheezes  CV: regular rate and rhythm, normal S1 S2, no S3 or S4, no murmur, click or rub, no peripheral edema  NEURO: mentation intact, cranial nerves 2-12 intact, and fine tremor in hands bilateral with holding paper, fine chin tremor  PSYCH: mentation appears normal, affect normal/bright    Recent labs reviewed        Signed Electronically by: Hien Booth MD

## 2025-05-15 ENCOUNTER — RESULTS FOLLOW-UP (OUTPATIENT)
Dept: PEDIATRICS | Facility: CLINIC | Age: 85
End: 2025-05-15

## 2025-05-15 LAB
CREAT UR-MCNC: 132 MG/DL
MICROALBUMIN UR-MCNC: <12 MG/L
MICROALBUMIN/CREAT UR: NORMAL MG/G{CREAT}

## 2025-05-19 ENCOUNTER — HOSPITAL ENCOUNTER (OUTPATIENT)
Dept: MRI IMAGING | Facility: CLINIC | Age: 85
Discharge: HOME | End: 2025-05-19
Attending: PEDIATRICS | Admitting: PEDIATRICS
Payer: MEDICARE

## 2025-05-19 DIAGNOSIS — R25.1 TREMOR: ICD-10-CM

## 2025-05-19 DIAGNOSIS — R26.9 ALTERED GAIT: ICD-10-CM

## 2025-05-19 PROCEDURE — 70551 MRI BRAIN STEM W/O DYE: CPT

## 2025-05-21 ENCOUNTER — INFUSION THERAPY VISIT (OUTPATIENT)
Dept: INFUSION THERAPY | Facility: CLINIC | Age: 85
End: 2025-05-21
Attending: PEDIATRICS
Payer: MEDICARE

## 2025-05-21 VITALS
RESPIRATION RATE: 16 BRPM | HEART RATE: 65 BPM | OXYGEN SATURATION: 100 % | SYSTOLIC BLOOD PRESSURE: 130 MMHG | TEMPERATURE: 97.5 F | DIASTOLIC BLOOD PRESSURE: 63 MMHG

## 2025-05-21 DIAGNOSIS — Z90.49 S/P TOTAL COLECTOMY: ICD-10-CM

## 2025-05-21 DIAGNOSIS — E87.1 HYPONATREMIA: ICD-10-CM

## 2025-05-21 DIAGNOSIS — Z93.2 ILEOSTOMY STATUS (H): ICD-10-CM

## 2025-05-21 DIAGNOSIS — E86.0 DEHYDRATION: ICD-10-CM

## 2025-05-21 DIAGNOSIS — K94.19 ALTERED BOWEL ELIMINATION DUE TO INTESTINAL OSTOMY (H): Primary | ICD-10-CM

## 2025-05-21 DIAGNOSIS — I12.9 CKD STAGE 4 SECONDARY TO HYPERTENSION (H): ICD-10-CM

## 2025-05-21 DIAGNOSIS — N18.4 CKD STAGE 4 SECONDARY TO HYPERTENSION (H): ICD-10-CM

## 2025-05-21 DIAGNOSIS — N18.4 ACUTE RENAL FAILURE SUPERIMPOSED ON STAGE 4 CHRONIC KIDNEY DISEASE, UNSPECIFIED ACUTE RENAL FAILURE TYPE (H): ICD-10-CM

## 2025-05-21 DIAGNOSIS — N17.9 ACUTE RENAL FAILURE SUPERIMPOSED ON STAGE 4 CHRONIC KIDNEY DISEASE, UNSPECIFIED ACUTE RENAL FAILURE TYPE (H): ICD-10-CM

## 2025-05-21 PROCEDURE — 250N000011 HC RX IP 250 OP 636: Performed by: PEDIATRICS

## 2025-05-21 PROCEDURE — 96361 HYDRATE IV INFUSION ADD-ON: CPT

## 2025-05-21 PROCEDURE — 258N000003 HC RX IP 258 OP 636: Performed by: PEDIATRICS

## 2025-05-21 PROCEDURE — 96360 HYDRATION IV INFUSION INIT: CPT

## 2025-05-21 RX ORDER — HEPARIN SODIUM,PORCINE 10 UNIT/ML
5-20 VIAL (ML) INTRAVENOUS DAILY PRN
Status: DISCONTINUED | OUTPATIENT
Start: 2025-05-21 | End: 2025-05-21 | Stop reason: HOSPADM

## 2025-05-21 RX ORDER — DIPHENHYDRAMINE HYDROCHLORIDE 50 MG/ML
50 INJECTION, SOLUTION INTRAMUSCULAR; INTRAVENOUS
Start: 2025-05-22

## 2025-05-21 RX ORDER — HEPARIN SODIUM (PORCINE) LOCK FLUSH IV SOLN 100 UNIT/ML 100 UNIT/ML
5 SOLUTION INTRAVENOUS
Status: DISCONTINUED | OUTPATIENT
Start: 2025-05-21 | End: 2025-05-21 | Stop reason: HOSPADM

## 2025-05-21 RX ORDER — DIPHENHYDRAMINE HYDROCHLORIDE 50 MG/ML
25 INJECTION, SOLUTION INTRAMUSCULAR; INTRAVENOUS
Start: 2025-05-22

## 2025-05-21 RX ORDER — HEPARIN SODIUM,PORCINE 10 UNIT/ML
5-20 VIAL (ML) INTRAVENOUS DAILY PRN
OUTPATIENT
Start: 2025-05-21

## 2025-05-21 RX ORDER — ALBUTEROL SULFATE 90 UG/1
1-2 INHALANT RESPIRATORY (INHALATION)
Start: 2025-05-22

## 2025-05-21 RX ORDER — ALBUTEROL SULFATE 0.83 MG/ML
2.5 SOLUTION RESPIRATORY (INHALATION)
OUTPATIENT
Start: 2025-05-22

## 2025-05-21 RX ORDER — HEPARIN SODIUM (PORCINE) LOCK FLUSH IV SOLN 100 UNIT/ML 100 UNIT/ML
5 SOLUTION INTRAVENOUS
OUTPATIENT
Start: 2025-05-21

## 2025-05-21 RX ORDER — METHYLPREDNISOLONE SODIUM SUCCINATE 40 MG/ML
40 INJECTION INTRAMUSCULAR; INTRAVENOUS
Start: 2025-05-22

## 2025-05-21 RX ORDER — EPINEPHRINE 1 MG/ML
0.3 INJECTION, SOLUTION INTRAMUSCULAR; SUBCUTANEOUS EVERY 5 MIN PRN
OUTPATIENT
Start: 2025-05-22

## 2025-05-21 RX ORDER — MEPERIDINE HYDROCHLORIDE 25 MG/ML
25 INJECTION INTRAMUSCULAR; INTRAVENOUS; SUBCUTANEOUS
OUTPATIENT
Start: 2025-05-22

## 2025-05-21 RX ORDER — HEPARIN SODIUM,PORCINE 10 UNIT/ML
5-20 VIAL (ML) INTRAVENOUS DAILY PRN
Status: CANCELLED | OUTPATIENT
Start: 2025-05-22

## 2025-05-21 RX ADMIN — Medication 5 ML: at 15:01

## 2025-05-21 RX ADMIN — SODIUM CHLORIDE 1000 ML: 9 INJECTION, SOLUTION INTRAVENOUS at 13:01

## 2025-05-21 NOTE — PROGRESS NOTES
Infusion Nursing Note:  Gely Keene presents today for IVF.    Patient seen by provider today: No   present during visit today: Not Applicable.    Note: N/A.    Intravenous Access:  Implanted Port.    Treatment Conditions:  Not Applicable.    Post Infusion Assessment:  Patient tolerated infusion without incident.  Blood return noted pre and post infusion.  Site patent and intact, free from redness, edema or discomfort.  No evidence of extravasations.  Access discontinued per protocol.     Discharge Plan:   Discharge instructions reviewed with: Patient.  Patient and/or family verbalized understanding of discharge instructions and all questions answered.  AVS to patient via [employer].  Patient will return 5/28 for next appointment.   Patient discharged in stable condition accompanied by: self.  Departure Mode: Ambulatory.    Ángel Medina RN

## 2025-05-28 ENCOUNTER — INFUSION THERAPY VISIT (OUTPATIENT)
Dept: INFUSION THERAPY | Facility: CLINIC | Age: 85
End: 2025-05-28
Attending: PEDIATRICS
Payer: MEDICARE

## 2025-05-28 VITALS
RESPIRATION RATE: 18 BRPM | OXYGEN SATURATION: 97 % | SYSTOLIC BLOOD PRESSURE: 125 MMHG | DIASTOLIC BLOOD PRESSURE: 78 MMHG | TEMPERATURE: 97.9 F | HEART RATE: 69 BPM

## 2025-05-28 DIAGNOSIS — Z93.2 ILEOSTOMY STATUS (H): ICD-10-CM

## 2025-05-28 DIAGNOSIS — I12.9 CKD STAGE 4 SECONDARY TO HYPERTENSION (H): ICD-10-CM

## 2025-05-28 DIAGNOSIS — Z90.49 S/P TOTAL COLECTOMY: ICD-10-CM

## 2025-05-28 DIAGNOSIS — E86.0 DEHYDRATION: ICD-10-CM

## 2025-05-28 DIAGNOSIS — K94.19 ALTERED BOWEL ELIMINATION DUE TO INTESTINAL OSTOMY (H): Primary | ICD-10-CM

## 2025-05-28 DIAGNOSIS — N18.4 ACUTE RENAL FAILURE SUPERIMPOSED ON STAGE 4 CHRONIC KIDNEY DISEASE, UNSPECIFIED ACUTE RENAL FAILURE TYPE (H): ICD-10-CM

## 2025-05-28 DIAGNOSIS — E87.1 HYPONATREMIA: ICD-10-CM

## 2025-05-28 DIAGNOSIS — N18.4 CKD STAGE 4 SECONDARY TO HYPERTENSION (H): ICD-10-CM

## 2025-05-28 DIAGNOSIS — N17.9 ACUTE RENAL FAILURE SUPERIMPOSED ON STAGE 4 CHRONIC KIDNEY DISEASE, UNSPECIFIED ACUTE RENAL FAILURE TYPE (H): ICD-10-CM

## 2025-05-28 PROCEDURE — 96361 HYDRATE IV INFUSION ADD-ON: CPT

## 2025-05-28 PROCEDURE — 250N000011 HC RX IP 250 OP 636: Performed by: PEDIATRICS

## 2025-05-28 PROCEDURE — 258N000003 HC RX IP 258 OP 636: Performed by: PEDIATRICS

## 2025-05-28 PROCEDURE — 96360 HYDRATION IV INFUSION INIT: CPT

## 2025-05-28 RX ORDER — MEPERIDINE HYDROCHLORIDE 25 MG/ML
25 INJECTION INTRAMUSCULAR; INTRAVENOUS; SUBCUTANEOUS
OUTPATIENT
Start: 2025-05-29

## 2025-05-28 RX ORDER — DIPHENHYDRAMINE HYDROCHLORIDE 50 MG/ML
50 INJECTION, SOLUTION INTRAMUSCULAR; INTRAVENOUS
Start: 2025-05-29

## 2025-05-28 RX ORDER — HEPARIN SODIUM (PORCINE) LOCK FLUSH IV SOLN 100 UNIT/ML 100 UNIT/ML
5 SOLUTION INTRAVENOUS
OUTPATIENT
Start: 2025-05-28

## 2025-05-28 RX ORDER — EPINEPHRINE 1 MG/ML
0.3 INJECTION, SOLUTION INTRAMUSCULAR; SUBCUTANEOUS EVERY 5 MIN PRN
OUTPATIENT
Start: 2025-05-29

## 2025-05-28 RX ORDER — HEPARIN SODIUM,PORCINE 10 UNIT/ML
5-20 VIAL (ML) INTRAVENOUS DAILY PRN
OUTPATIENT
Start: 2025-05-28

## 2025-05-28 RX ORDER — ALBUTEROL SULFATE 90 UG/1
1-2 INHALANT RESPIRATORY (INHALATION)
Start: 2025-05-29

## 2025-05-28 RX ORDER — METHYLPREDNISOLONE SODIUM SUCCINATE 40 MG/ML
40 INJECTION INTRAMUSCULAR; INTRAVENOUS
Start: 2025-05-29

## 2025-05-28 RX ORDER — ALBUTEROL SULFATE 0.83 MG/ML
2.5 SOLUTION RESPIRATORY (INHALATION)
OUTPATIENT
Start: 2025-05-29

## 2025-05-28 RX ORDER — HEPARIN SODIUM (PORCINE) LOCK FLUSH IV SOLN 100 UNIT/ML 100 UNIT/ML
5 SOLUTION INTRAVENOUS
Status: DISCONTINUED | OUTPATIENT
Start: 2025-05-28 | End: 2025-05-28 | Stop reason: HOSPADM

## 2025-05-28 RX ORDER — DIPHENHYDRAMINE HYDROCHLORIDE 50 MG/ML
25 INJECTION, SOLUTION INTRAMUSCULAR; INTRAVENOUS
Start: 2025-05-29

## 2025-05-28 RX ADMIN — SODIUM CHLORIDE 1000 ML: 9 INJECTION, SOLUTION INTRAVENOUS at 13:01

## 2025-05-28 RX ADMIN — Medication 5 ML: at 15:02

## 2025-05-28 NOTE — PROGRESS NOTES
Infusion Nursing Note:  Gely Keene presents today for IVF.    Patient seen by provider today: No   present during visit today: Not Applicable.    Note: N/A.      Intravenous Access:  Implanted Port.    Treatment Conditions:  Not Applicable.      Post Infusion Assessment:  Patient tolerated infusion without incident.  Blood return noted pre and post infusion.  Site patent and intact, free from redness, edema or discomfort.  No evidence of extravasations.  Access discontinued per protocol.       Discharge Plan:   Discharge instructions reviewed with: Patient.  Patient and/or family verbalized understanding of discharge instructions and all questions answered.  AVS to patient via [employer].  Patient will return 6/4/25 for next appointment.   Patient discharged in stable condition accompanied by: self.  Departure Mode: Ambulatory.      Bouchra Barron RN

## 2025-06-04 ENCOUNTER — INFUSION THERAPY VISIT (OUTPATIENT)
Dept: INFUSION THERAPY | Facility: CLINIC | Age: 85
End: 2025-06-04
Attending: PEDIATRICS
Payer: MEDICARE

## 2025-06-04 VITALS
RESPIRATION RATE: 16 BRPM | HEART RATE: 71 BPM | OXYGEN SATURATION: 99 % | TEMPERATURE: 97 F | DIASTOLIC BLOOD PRESSURE: 74 MMHG | SYSTOLIC BLOOD PRESSURE: 132 MMHG

## 2025-06-04 DIAGNOSIS — Z93.2 ILEOSTOMY STATUS (H): ICD-10-CM

## 2025-06-04 DIAGNOSIS — N18.4 CKD STAGE 4 SECONDARY TO HYPERTENSION (H): ICD-10-CM

## 2025-06-04 DIAGNOSIS — N18.4 ACUTE RENAL FAILURE SUPERIMPOSED ON STAGE 4 CHRONIC KIDNEY DISEASE, UNSPECIFIED ACUTE RENAL FAILURE TYPE (H): ICD-10-CM

## 2025-06-04 DIAGNOSIS — K94.19 ALTERED BOWEL ELIMINATION DUE TO INTESTINAL OSTOMY (H): Primary | ICD-10-CM

## 2025-06-04 DIAGNOSIS — N17.9 ACUTE RENAL FAILURE SUPERIMPOSED ON STAGE 4 CHRONIC KIDNEY DISEASE, UNSPECIFIED ACUTE RENAL FAILURE TYPE (H): ICD-10-CM

## 2025-06-04 DIAGNOSIS — E87.1 HYPONATREMIA: ICD-10-CM

## 2025-06-04 DIAGNOSIS — E86.0 DEHYDRATION: ICD-10-CM

## 2025-06-04 DIAGNOSIS — Z90.49 S/P TOTAL COLECTOMY: ICD-10-CM

## 2025-06-04 DIAGNOSIS — I12.9 CKD STAGE 4 SECONDARY TO HYPERTENSION (H): ICD-10-CM

## 2025-06-04 PROCEDURE — 96360 HYDRATION IV INFUSION INIT: CPT

## 2025-06-04 PROCEDURE — 250N000011 HC RX IP 250 OP 636: Performed by: PEDIATRICS

## 2025-06-04 PROCEDURE — 258N000003 HC RX IP 258 OP 636: Performed by: PEDIATRICS

## 2025-06-04 PROCEDURE — 96361 HYDRATE IV INFUSION ADD-ON: CPT

## 2025-06-04 RX ORDER — HEPARIN SODIUM (PORCINE) LOCK FLUSH IV SOLN 100 UNIT/ML 100 UNIT/ML
5 SOLUTION INTRAVENOUS
OUTPATIENT
Start: 2025-06-04

## 2025-06-04 RX ORDER — MEPERIDINE HYDROCHLORIDE 25 MG/ML
25 INJECTION INTRAMUSCULAR; INTRAVENOUS; SUBCUTANEOUS
OUTPATIENT
Start: 2025-06-05

## 2025-06-04 RX ORDER — EPINEPHRINE 1 MG/ML
0.3 INJECTION, SOLUTION INTRAMUSCULAR; SUBCUTANEOUS EVERY 5 MIN PRN
OUTPATIENT
Start: 2025-06-05

## 2025-06-04 RX ORDER — DIPHENHYDRAMINE HYDROCHLORIDE 50 MG/ML
50 INJECTION, SOLUTION INTRAMUSCULAR; INTRAVENOUS
Start: 2025-06-05

## 2025-06-04 RX ORDER — ALBUTEROL SULFATE 0.83 MG/ML
2.5 SOLUTION RESPIRATORY (INHALATION)
OUTPATIENT
Start: 2025-06-05

## 2025-06-04 RX ORDER — HEPARIN SODIUM (PORCINE) LOCK FLUSH IV SOLN 100 UNIT/ML 100 UNIT/ML
5 SOLUTION INTRAVENOUS
Status: DISCONTINUED | OUTPATIENT
Start: 2025-06-04 | End: 2025-06-04 | Stop reason: HOSPADM

## 2025-06-04 RX ORDER — HEPARIN SODIUM,PORCINE 10 UNIT/ML
5-20 VIAL (ML) INTRAVENOUS DAILY PRN
OUTPATIENT
Start: 2025-06-04

## 2025-06-04 RX ORDER — METHYLPREDNISOLONE SODIUM SUCCINATE 40 MG/ML
40 INJECTION INTRAMUSCULAR; INTRAVENOUS
Start: 2025-06-05

## 2025-06-04 RX ORDER — DIPHENHYDRAMINE HYDROCHLORIDE 50 MG/ML
25 INJECTION, SOLUTION INTRAMUSCULAR; INTRAVENOUS
Start: 2025-06-05

## 2025-06-04 RX ORDER — ALBUTEROL SULFATE 90 UG/1
1-2 INHALANT RESPIRATORY (INHALATION)
Start: 2025-06-05

## 2025-06-04 RX ADMIN — Medication 5 ML: at 15:44

## 2025-06-04 RX ADMIN — SODIUM CHLORIDE 1000 ML: 9 INJECTION, SOLUTION INTRAVENOUS at 13:42

## 2025-06-04 NOTE — PROGRESS NOTES
Infusion Nursing Note:  Gely Keene presents today for IVF.    Patient seen by provider today: No   present during visit today: Not Applicable.    Note: N/A.    Intravenous Access:  Implanted Port.    Treatment Conditions:  Not Applicable.    Post Infusion Assessment:  Patient tolerated infusion without incident.  Blood return noted pre and post infusion.  Site patent and intact, free from redness, edema or discomfort.  No evidence of extravasations.  Access discontinued per protocol.     Discharge Plan:   Discharge instructions reviewed with: Patient.  Patient and/or family verbalized understanding of discharge instructions and all questions answered.  AVS to patient via [employer].  Patient will return 6/11 for next appointment.   Patient discharged in stable condition accompanied by: self.  Departure Mode: Ambulatory.    Ángel Medina RN

## 2025-06-11 ENCOUNTER — INFUSION THERAPY VISIT (OUTPATIENT)
Dept: INFUSION THERAPY | Facility: CLINIC | Age: 85
End: 2025-06-11
Attending: PEDIATRICS
Payer: MEDICARE

## 2025-06-11 VITALS — OXYGEN SATURATION: 97 % | DIASTOLIC BLOOD PRESSURE: 65 MMHG | HEART RATE: 68 BPM | SYSTOLIC BLOOD PRESSURE: 134 MMHG

## 2025-06-11 DIAGNOSIS — K94.19 ALTERED BOWEL ELIMINATION DUE TO INTESTINAL OSTOMY (H): Primary | ICD-10-CM

## 2025-06-11 DIAGNOSIS — N18.4 CKD STAGE 4 SECONDARY TO HYPERTENSION (H): ICD-10-CM

## 2025-06-11 DIAGNOSIS — I12.9 CKD STAGE 4 SECONDARY TO HYPERTENSION (H): ICD-10-CM

## 2025-06-11 DIAGNOSIS — E86.0 DEHYDRATION: ICD-10-CM

## 2025-06-11 DIAGNOSIS — N17.9 ACUTE RENAL FAILURE SUPERIMPOSED ON STAGE 4 CHRONIC KIDNEY DISEASE, UNSPECIFIED ACUTE RENAL FAILURE TYPE (H): ICD-10-CM

## 2025-06-11 DIAGNOSIS — Z93.2 ILEOSTOMY STATUS (H): ICD-10-CM

## 2025-06-11 DIAGNOSIS — Z90.49 S/P TOTAL COLECTOMY: ICD-10-CM

## 2025-06-11 DIAGNOSIS — N18.4 ACUTE RENAL FAILURE SUPERIMPOSED ON STAGE 4 CHRONIC KIDNEY DISEASE, UNSPECIFIED ACUTE RENAL FAILURE TYPE (H): ICD-10-CM

## 2025-06-11 DIAGNOSIS — E87.1 HYPONATREMIA: ICD-10-CM

## 2025-06-11 PROCEDURE — 250N000011 HC RX IP 250 OP 636: Performed by: PEDIATRICS

## 2025-06-11 PROCEDURE — 96361 HYDRATE IV INFUSION ADD-ON: CPT

## 2025-06-11 PROCEDURE — 96360 HYDRATION IV INFUSION INIT: CPT

## 2025-06-11 PROCEDURE — 258N000003 HC RX IP 258 OP 636: Performed by: PEDIATRICS

## 2025-06-11 RX ORDER — MEPERIDINE HYDROCHLORIDE 25 MG/ML
25 INJECTION INTRAMUSCULAR; INTRAVENOUS; SUBCUTANEOUS
OUTPATIENT
Start: 2025-06-12

## 2025-06-11 RX ORDER — ALBUTEROL SULFATE 90 UG/1
1-2 INHALANT RESPIRATORY (INHALATION)
Start: 2025-06-12

## 2025-06-11 RX ORDER — HEPARIN SODIUM,PORCINE 10 UNIT/ML
5-20 VIAL (ML) INTRAVENOUS DAILY PRN
OUTPATIENT
Start: 2025-06-11

## 2025-06-11 RX ORDER — DIPHENHYDRAMINE HYDROCHLORIDE 50 MG/ML
25 INJECTION, SOLUTION INTRAMUSCULAR; INTRAVENOUS
Start: 2025-06-12

## 2025-06-11 RX ORDER — EPINEPHRINE 1 MG/ML
0.3 INJECTION, SOLUTION INTRAMUSCULAR; SUBCUTANEOUS EVERY 5 MIN PRN
OUTPATIENT
Start: 2025-06-12

## 2025-06-11 RX ORDER — METHYLPREDNISOLONE SODIUM SUCCINATE 40 MG/ML
40 INJECTION INTRAMUSCULAR; INTRAVENOUS
Start: 2025-06-12

## 2025-06-11 RX ORDER — DIPHENHYDRAMINE HYDROCHLORIDE 50 MG/ML
50 INJECTION, SOLUTION INTRAMUSCULAR; INTRAVENOUS
Start: 2025-06-12

## 2025-06-11 RX ORDER — ALBUTEROL SULFATE 0.83 MG/ML
2.5 SOLUTION RESPIRATORY (INHALATION)
OUTPATIENT
Start: 2025-06-12

## 2025-06-11 RX ORDER — HEPARIN SODIUM (PORCINE) LOCK FLUSH IV SOLN 100 UNIT/ML 100 UNIT/ML
5 SOLUTION INTRAVENOUS
OUTPATIENT
Start: 2025-06-11

## 2025-06-11 RX ORDER — HEPARIN SODIUM (PORCINE) LOCK FLUSH IV SOLN 100 UNIT/ML 100 UNIT/ML
5 SOLUTION INTRAVENOUS
Status: DISCONTINUED | OUTPATIENT
Start: 2025-06-11 | End: 2025-06-11 | Stop reason: HOSPADM

## 2025-06-11 RX ADMIN — SODIUM CHLORIDE 1000 ML: 9 INJECTION, SOLUTION INTRAVENOUS at 13:26

## 2025-06-11 RX ADMIN — Medication 5 ML: at 15:27

## 2025-06-11 NOTE — PROGRESS NOTES
Infusion Nursing Note:  Gely Keene presents today for weekly IVF.    Patient seen by provider today: No   present during visit today: Not Applicable.    Note: N/A.      Intravenous Access:  Implanted Port.    Treatment Conditions:  Not Applicable.      Post Infusion Assessment:  Patient tolerated infusion without incident.  Blood return noted pre and post infusion.  Site patent and intact, free from redness, edema or discomfort.  No evidence of extravasations.  Access discontinued per protocol.       Discharge Plan:   AVS to patient via MYCHART.  Patient will return 6/18/25 for IVF    Patient discharged in stable condition accompanied by: self.  Departure Mode: Ambulatory.      Gris Fisher RN

## 2025-06-18 ENCOUNTER — INFUSION THERAPY VISIT (OUTPATIENT)
Dept: INFUSION THERAPY | Facility: CLINIC | Age: 85
End: 2025-06-18
Attending: PEDIATRICS
Payer: MEDICARE

## 2025-06-18 VITALS
SYSTOLIC BLOOD PRESSURE: 117 MMHG | TEMPERATURE: 97.3 F | HEART RATE: 70 BPM | OXYGEN SATURATION: 97 % | RESPIRATION RATE: 16 BRPM | DIASTOLIC BLOOD PRESSURE: 57 MMHG

## 2025-06-18 DIAGNOSIS — N17.9 ACUTE RENAL FAILURE SUPERIMPOSED ON STAGE 4 CHRONIC KIDNEY DISEASE, UNSPECIFIED ACUTE RENAL FAILURE TYPE (H): ICD-10-CM

## 2025-06-18 DIAGNOSIS — E87.1 HYPONATREMIA: ICD-10-CM

## 2025-06-18 DIAGNOSIS — Z90.49 S/P TOTAL COLECTOMY: ICD-10-CM

## 2025-06-18 DIAGNOSIS — Z93.2 ILEOSTOMY STATUS (H): ICD-10-CM

## 2025-06-18 DIAGNOSIS — N18.4 ACUTE RENAL FAILURE SUPERIMPOSED ON STAGE 4 CHRONIC KIDNEY DISEASE, UNSPECIFIED ACUTE RENAL FAILURE TYPE (H): ICD-10-CM

## 2025-06-18 DIAGNOSIS — I12.9 CKD STAGE 4 SECONDARY TO HYPERTENSION (H): ICD-10-CM

## 2025-06-18 DIAGNOSIS — E86.0 DEHYDRATION: ICD-10-CM

## 2025-06-18 DIAGNOSIS — K94.19 ALTERED BOWEL ELIMINATION DUE TO INTESTINAL OSTOMY (H): Primary | ICD-10-CM

## 2025-06-18 DIAGNOSIS — N18.4 CKD STAGE 4 SECONDARY TO HYPERTENSION (H): ICD-10-CM

## 2025-06-18 PROCEDURE — 96360 HYDRATION IV INFUSION INIT: CPT

## 2025-06-18 PROCEDURE — 258N000003 HC RX IP 258 OP 636: Performed by: PEDIATRICS

## 2025-06-18 PROCEDURE — 250N000011 HC RX IP 250 OP 636: Performed by: PEDIATRICS

## 2025-06-18 PROCEDURE — 96361 HYDRATE IV INFUSION ADD-ON: CPT

## 2025-06-18 RX ORDER — ALBUTEROL SULFATE 0.83 MG/ML
2.5 SOLUTION RESPIRATORY (INHALATION)
OUTPATIENT
Start: 2025-06-19

## 2025-06-18 RX ORDER — EPINEPHRINE 1 MG/ML
0.3 INJECTION, SOLUTION INTRAMUSCULAR; SUBCUTANEOUS EVERY 5 MIN PRN
OUTPATIENT
Start: 2025-06-19

## 2025-06-18 RX ORDER — HEPARIN SODIUM,PORCINE 10 UNIT/ML
5-20 VIAL (ML) INTRAVENOUS DAILY PRN
OUTPATIENT
Start: 2025-06-18

## 2025-06-18 RX ORDER — DIPHENHYDRAMINE HYDROCHLORIDE 50 MG/ML
25 INJECTION, SOLUTION INTRAMUSCULAR; INTRAVENOUS
Start: 2025-06-19

## 2025-06-18 RX ORDER — MEPERIDINE HYDROCHLORIDE 25 MG/ML
25 INJECTION INTRAMUSCULAR; INTRAVENOUS; SUBCUTANEOUS
OUTPATIENT
Start: 2025-06-19

## 2025-06-18 RX ORDER — ALBUTEROL SULFATE 90 UG/1
1-2 INHALANT RESPIRATORY (INHALATION)
Start: 2025-06-19

## 2025-06-18 RX ORDER — HEPARIN SODIUM (PORCINE) LOCK FLUSH IV SOLN 100 UNIT/ML 100 UNIT/ML
5 SOLUTION INTRAVENOUS
Status: DISCONTINUED | OUTPATIENT
Start: 2025-06-18 | End: 2025-06-18 | Stop reason: HOSPADM

## 2025-06-18 RX ORDER — HEPARIN SODIUM (PORCINE) LOCK FLUSH IV SOLN 100 UNIT/ML 100 UNIT/ML
5 SOLUTION INTRAVENOUS
OUTPATIENT
Start: 2025-06-18

## 2025-06-18 RX ORDER — DIPHENHYDRAMINE HYDROCHLORIDE 50 MG/ML
50 INJECTION, SOLUTION INTRAMUSCULAR; INTRAVENOUS
Start: 2025-06-19

## 2025-06-18 RX ORDER — METHYLPREDNISOLONE SODIUM SUCCINATE 40 MG/ML
40 INJECTION INTRAMUSCULAR; INTRAVENOUS
Start: 2025-06-19

## 2025-06-18 RX ADMIN — Medication 5 ML: at 13:15

## 2025-06-18 RX ADMIN — SODIUM CHLORIDE 1000 ML: 9 INJECTION, SOLUTION INTRAVENOUS at 13:14

## 2025-06-18 NOTE — CONFIDENTIAL NOTE
Infusion Nursing Note:  Gely WANG Liliajane presents today for IV fluids.    Patient seen by provider today: No   present during visit today: Not Applicable.    Note: N/A.      Intravenous Access:  Implanted Port.    Treatment Conditions:  Not Applicable.      Post Infusion Assessment:  Patient tolerated infusion without incident.  Blood return noted pre and post infusion.  Site patent and intact, free from redness, edema or discomfort.  No evidence of extravasations.  Access discontinued per protocol.       Discharge Plan:   AVS to patient via MYCHART.  Patient will return 6/25 for next appointment.   Patient discharged in stable condition accompanied by: self and .  Departure Mode: Ambulatory.      Kulwant Badillo RN

## 2025-06-25 ENCOUNTER — INFUSION THERAPY VISIT (OUTPATIENT)
Dept: INFUSION THERAPY | Facility: CLINIC | Age: 85
End: 2025-06-25
Attending: PEDIATRICS
Payer: MEDICARE

## 2025-06-25 VITALS
DIASTOLIC BLOOD PRESSURE: 73 MMHG | SYSTOLIC BLOOD PRESSURE: 125 MMHG | RESPIRATION RATE: 16 BRPM | TEMPERATURE: 97.9 F | HEART RATE: 75 BPM | OXYGEN SATURATION: 97 %

## 2025-06-25 DIAGNOSIS — Z93.2 ILEOSTOMY STATUS (H): ICD-10-CM

## 2025-06-25 DIAGNOSIS — E87.1 HYPONATREMIA: ICD-10-CM

## 2025-06-25 DIAGNOSIS — M32.9 SYSTEMIC LUPUS ERYTHEMATOSUS (H): ICD-10-CM

## 2025-06-25 DIAGNOSIS — N18.4 ACUTE RENAL FAILURE SUPERIMPOSED ON STAGE 4 CHRONIC KIDNEY DISEASE, UNSPECIFIED ACUTE RENAL FAILURE TYPE (H): ICD-10-CM

## 2025-06-25 DIAGNOSIS — E86.0 DEHYDRATION: ICD-10-CM

## 2025-06-25 DIAGNOSIS — Z90.49 S/P TOTAL COLECTOMY: ICD-10-CM

## 2025-06-25 DIAGNOSIS — N18.4 CKD STAGE 4 SECONDARY TO HYPERTENSION (H): ICD-10-CM

## 2025-06-25 DIAGNOSIS — N17.9 ACUTE RENAL FAILURE SUPERIMPOSED ON STAGE 4 CHRONIC KIDNEY DISEASE, UNSPECIFIED ACUTE RENAL FAILURE TYPE (H): ICD-10-CM

## 2025-06-25 DIAGNOSIS — I12.9 CKD STAGE 4 SECONDARY TO HYPERTENSION (H): ICD-10-CM

## 2025-06-25 DIAGNOSIS — K94.19 ALTERED BOWEL ELIMINATION DUE TO INTESTINAL OSTOMY (H): Primary | ICD-10-CM

## 2025-06-25 LAB
ALBUMIN SERPL BCG-MCNC: 4.1 G/DL (ref 3.5–5.2)
ALT SERPL W P-5'-P-CCNC: 16 U/L (ref 0–50)
AST SERPL W P-5'-P-CCNC: 32 U/L (ref 0–45)
BASOPHILS # BLD AUTO: 0.1 10E3/UL (ref 0–0.2)
BASOPHILS NFR BLD AUTO: 1 %
CREAT SERPL-MCNC: 2.49 MG/DL (ref 0.51–0.95)
CRP SERPL-MCNC: <3 MG/L
EGFRCR SERPLBLD CKD-EPI 2021: 18 ML/MIN/1.73M2
EOSINOPHIL # BLD AUTO: 0.1 10E3/UL (ref 0–0.7)
EOSINOPHIL NFR BLD AUTO: 3 %
ERYTHROCYTE [DISTWIDTH] IN BLOOD BY AUTOMATED COUNT: 14.6 % (ref 10–15)
ERYTHROCYTE [SEDIMENTATION RATE] IN BLOOD BY WESTERGREN METHOD: 23 MM/HR (ref 0–30)
HCT VFR BLD AUTO: 30.1 % (ref 35–47)
HGB BLD-MCNC: 10.3 G/DL (ref 11.7–15.7)
IMM GRANULOCYTES # BLD: 0 10E3/UL
IMM GRANULOCYTES NFR BLD: 1 %
LYMPHOCYTES # BLD AUTO: 0.9 10E3/UL (ref 0.8–5.3)
LYMPHOCYTES NFR BLD AUTO: 22 %
MCH RBC QN AUTO: 32.9 PG (ref 26.5–33)
MCHC RBC AUTO-ENTMCNC: 34.2 G/DL (ref 31.5–36.5)
MCV RBC AUTO: 96 FL (ref 78–100)
MONOCYTES # BLD AUTO: 0.6 10E3/UL (ref 0–1.3)
MONOCYTES NFR BLD AUTO: 14 %
NEUTROPHILS # BLD AUTO: 2.5 10E3/UL (ref 1.6–8.3)
NEUTROPHILS NFR BLD AUTO: 59 %
NRBC # BLD AUTO: 0 10E3/UL
NRBC BLD AUTO-RTO: 0 /100
PLATELET # BLD AUTO: 148 10E3/UL (ref 150–450)
RBC # BLD AUTO: 3.13 10E6/UL (ref 3.8–5.2)
TOTAL PROTEIN SERUM FOR ELP: 6.2 G/DL (ref 6.4–8.3)
WBC # BLD AUTO: 4.2 10E3/UL (ref 4–11)

## 2025-06-25 PROCEDURE — 85004 AUTOMATED DIFF WBC COUNT: CPT | Performed by: INTERNAL MEDICINE

## 2025-06-25 PROCEDURE — 84460 ALANINE AMINO (ALT) (SGPT): CPT | Performed by: INTERNAL MEDICINE

## 2025-06-25 PROCEDURE — 82565 ASSAY OF CREATININE: CPT | Performed by: INTERNAL MEDICINE

## 2025-06-25 PROCEDURE — 84155 ASSAY OF PROTEIN SERUM: CPT | Performed by: INTERNAL MEDICINE

## 2025-06-25 PROCEDURE — 85652 RBC SED RATE AUTOMATED: CPT | Performed by: INTERNAL MEDICINE

## 2025-06-25 PROCEDURE — 258N000003 HC RX IP 258 OP 636: Performed by: PEDIATRICS

## 2025-06-25 PROCEDURE — 84450 TRANSFERASE (AST) (SGOT): CPT | Performed by: INTERNAL MEDICINE

## 2025-06-25 PROCEDURE — 86140 C-REACTIVE PROTEIN: CPT | Performed by: INTERNAL MEDICINE

## 2025-06-25 PROCEDURE — 84165 PROTEIN E-PHORESIS SERUM: CPT | Mod: 26 | Performed by: PATHOLOGY

## 2025-06-25 PROCEDURE — 82040 ASSAY OF SERUM ALBUMIN: CPT | Performed by: INTERNAL MEDICINE

## 2025-06-25 PROCEDURE — 86160 COMPLEMENT ANTIGEN: CPT | Performed by: INTERNAL MEDICINE

## 2025-06-25 PROCEDURE — 250N000011 HC RX IP 250 OP 636: Performed by: PEDIATRICS

## 2025-06-25 PROCEDURE — 84165 PROTEIN E-PHORESIS SERUM: CPT | Mod: TC | Performed by: PATHOLOGY

## 2025-06-25 RX ORDER — HEPARIN SODIUM,PORCINE 10 UNIT/ML
5-20 VIAL (ML) INTRAVENOUS DAILY PRN
OUTPATIENT
Start: 2025-06-25

## 2025-06-25 RX ORDER — ALBUTEROL SULFATE 90 UG/1
1-2 INHALANT RESPIRATORY (INHALATION)
Start: 2025-06-26

## 2025-06-25 RX ORDER — METHYLPREDNISOLONE SODIUM SUCCINATE 40 MG/ML
40 INJECTION INTRAMUSCULAR; INTRAVENOUS
Start: 2025-06-26

## 2025-06-25 RX ORDER — DIPHENHYDRAMINE HYDROCHLORIDE 50 MG/ML
50 INJECTION, SOLUTION INTRAMUSCULAR; INTRAVENOUS
Start: 2025-06-26

## 2025-06-25 RX ORDER — DIPHENHYDRAMINE HYDROCHLORIDE 50 MG/ML
25 INJECTION, SOLUTION INTRAMUSCULAR; INTRAVENOUS
Start: 2025-06-26

## 2025-06-25 RX ORDER — EPINEPHRINE 1 MG/ML
0.3 INJECTION, SOLUTION INTRAMUSCULAR; SUBCUTANEOUS EVERY 5 MIN PRN
OUTPATIENT
Start: 2025-06-26

## 2025-06-25 RX ORDER — MEPERIDINE HYDROCHLORIDE 25 MG/ML
25 INJECTION INTRAMUSCULAR; INTRAVENOUS; SUBCUTANEOUS
OUTPATIENT
Start: 2025-06-26

## 2025-06-25 RX ORDER — HEPARIN SODIUM (PORCINE) LOCK FLUSH IV SOLN 100 UNIT/ML 100 UNIT/ML
5 SOLUTION INTRAVENOUS
Status: DISCONTINUED | OUTPATIENT
Start: 2025-06-25 | End: 2025-06-25 | Stop reason: HOSPADM

## 2025-06-25 RX ORDER — HEPARIN SODIUM (PORCINE) LOCK FLUSH IV SOLN 100 UNIT/ML 100 UNIT/ML
5 SOLUTION INTRAVENOUS
OUTPATIENT
Start: 2025-06-25

## 2025-06-25 RX ORDER — ALBUTEROL SULFATE 0.83 MG/ML
2.5 SOLUTION RESPIRATORY (INHALATION)
OUTPATIENT
Start: 2025-06-26

## 2025-06-25 RX ADMIN — Medication 5 ML: at 15:25

## 2025-06-25 RX ADMIN — SODIUM CHLORIDE 1000 ML: 0.9 INJECTION, SOLUTION INTRAVENOUS at 13:23

## 2025-06-25 NOTE — PROGRESS NOTES
Infusion Nursing Note:  Gely Keene presents today for IVF.    Patient seen by provider today: No   present during visit today: Not Applicable.    Note: Labs drawn for Dr. Tabares per pt request.      Intravenous Access:  Labs drawn without difficulty.  Implanted Port.    Treatment Conditions:  Not Applicable.      Post Infusion Assessment:  Patient tolerated infusion without incident.  Blood return noted pre and post infusion.  Site patent and intact, free from redness, edema or discomfort.  No evidence of extravasations.  Access discontinued per protocol.       Discharge Plan:   Discharge instructions reviewed with: Patient.  Patient and/or family verbalized understanding of discharge instructions and all questions answered.  AVS to patient via [employer].  Patient will return 7/2/25 for next appointment.   Patient discharged in stable condition accompanied by: self.  Departure Mode: Ambulatory.      Bouchra Barron RN

## 2025-06-26 ENCOUNTER — TRANSFERRED RECORDS (OUTPATIENT)
Dept: HEALTH INFORMATION MANAGEMENT | Facility: CLINIC | Age: 85
End: 2025-06-26
Payer: MEDICARE

## 2025-06-26 LAB
ALBUMIN SERPL ELPH-MCNC: 3.8 G/DL (ref 3.7–5.1)
ALPHA1 GLOB SERPL ELPH-MCNC: 0.3 G/DL (ref 0.2–0.4)
ALPHA2 GLOB SERPL ELPH-MCNC: 0.8 G/DL (ref 0.5–0.9)
B-GLOBULIN SERPL ELPH-MCNC: 0.6 G/DL (ref 0.6–1)
C3 SERPL-MCNC: 118 MG/DL (ref 81–157)
C4 SERPL-MCNC: 23 MG/DL (ref 13–39)
GAMMA GLOB SERPL ELPH-MCNC: 0.7 G/DL (ref 0.7–1.6)
LOCATION OF TASK: NORMAL
M PROTEIN SERPL ELPH-MCNC: 0 G/DL
PROT PATTERN SERPL ELPH-IMP: NORMAL

## 2025-06-30 NOTE — TELEPHONE ENCOUNTER
Per Dr Daigle Trazodone 50 two tablet at bed time  dose adjusted to trazodone 100 mg 1.5 tablet at bed time   Updated patient on below.   She will check with her insurance to see if chiro or PT is covered.   Will call back if she agrees.    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

## 2025-07-02 ENCOUNTER — INFUSION THERAPY VISIT (OUTPATIENT)
Dept: INFUSION THERAPY | Facility: CLINIC | Age: 85
End: 2025-07-02
Attending: PEDIATRICS
Payer: MEDICARE

## 2025-07-02 VITALS
HEART RATE: 66 BPM | TEMPERATURE: 98.3 F | RESPIRATION RATE: 20 BRPM | OXYGEN SATURATION: 99 % | SYSTOLIC BLOOD PRESSURE: 125 MMHG | DIASTOLIC BLOOD PRESSURE: 62 MMHG

## 2025-07-02 DIAGNOSIS — I12.9 CKD STAGE 4 SECONDARY TO HYPERTENSION (H): ICD-10-CM

## 2025-07-02 DIAGNOSIS — N17.9 ACUTE RENAL FAILURE SUPERIMPOSED ON STAGE 4 CHRONIC KIDNEY DISEASE, UNSPECIFIED ACUTE RENAL FAILURE TYPE (H): ICD-10-CM

## 2025-07-02 DIAGNOSIS — E87.1 HYPONATREMIA: ICD-10-CM

## 2025-07-02 DIAGNOSIS — N18.4 ACUTE RENAL FAILURE SUPERIMPOSED ON STAGE 4 CHRONIC KIDNEY DISEASE, UNSPECIFIED ACUTE RENAL FAILURE TYPE (H): ICD-10-CM

## 2025-07-02 DIAGNOSIS — E86.0 DEHYDRATION: ICD-10-CM

## 2025-07-02 DIAGNOSIS — K94.19 ALTERED BOWEL ELIMINATION DUE TO INTESTINAL OSTOMY (H): Primary | ICD-10-CM

## 2025-07-02 DIAGNOSIS — Z90.49 S/P TOTAL COLECTOMY: ICD-10-CM

## 2025-07-02 DIAGNOSIS — N18.4 CKD STAGE 4 SECONDARY TO HYPERTENSION (H): ICD-10-CM

## 2025-07-02 DIAGNOSIS — Z93.2 ILEOSTOMY STATUS (H): ICD-10-CM

## 2025-07-02 PROCEDURE — 96360 HYDRATION IV INFUSION INIT: CPT

## 2025-07-02 PROCEDURE — 258N000003 HC RX IP 258 OP 636: Performed by: PEDIATRICS

## 2025-07-02 PROCEDURE — 250N000011 HC RX IP 250 OP 636: Performed by: PEDIATRICS

## 2025-07-02 PROCEDURE — 96361 HYDRATE IV INFUSION ADD-ON: CPT

## 2025-07-02 RX ORDER — DIPHENHYDRAMINE HYDROCHLORIDE 50 MG/ML
25 INJECTION, SOLUTION INTRAMUSCULAR; INTRAVENOUS
Start: 2025-07-03

## 2025-07-02 RX ORDER — HEPARIN SODIUM (PORCINE) LOCK FLUSH IV SOLN 100 UNIT/ML 100 UNIT/ML
5 SOLUTION INTRAVENOUS
OUTPATIENT
Start: 2025-07-02

## 2025-07-02 RX ORDER — MEPERIDINE HYDROCHLORIDE 25 MG/ML
25 INJECTION INTRAMUSCULAR; INTRAVENOUS; SUBCUTANEOUS
OUTPATIENT
Start: 2025-07-03

## 2025-07-02 RX ORDER — METHYLPREDNISOLONE SODIUM SUCCINATE 40 MG/ML
40 INJECTION INTRAMUSCULAR; INTRAVENOUS
Start: 2025-07-03

## 2025-07-02 RX ORDER — HEPARIN SODIUM (PORCINE) LOCK FLUSH IV SOLN 100 UNIT/ML 100 UNIT/ML
5 SOLUTION INTRAVENOUS
Status: DISCONTINUED | OUTPATIENT
Start: 2025-07-02 | End: 2025-07-02 | Stop reason: HOSPADM

## 2025-07-02 RX ORDER — EPINEPHRINE 1 MG/ML
0.3 INJECTION, SOLUTION INTRAMUSCULAR; SUBCUTANEOUS EVERY 5 MIN PRN
OUTPATIENT
Start: 2025-07-03

## 2025-07-02 RX ORDER — ALBUTEROL SULFATE 0.83 MG/ML
2.5 SOLUTION RESPIRATORY (INHALATION)
OUTPATIENT
Start: 2025-07-03

## 2025-07-02 RX ORDER — ALBUTEROL SULFATE 90 UG/1
1-2 INHALANT RESPIRATORY (INHALATION)
Start: 2025-07-03

## 2025-07-02 RX ORDER — DIPHENHYDRAMINE HYDROCHLORIDE 50 MG/ML
50 INJECTION, SOLUTION INTRAMUSCULAR; INTRAVENOUS
Start: 2025-07-03

## 2025-07-02 RX ORDER — HEPARIN SODIUM,PORCINE 10 UNIT/ML
5-20 VIAL (ML) INTRAVENOUS DAILY PRN
OUTPATIENT
Start: 2025-07-02

## 2025-07-02 RX ADMIN — SODIUM CHLORIDE 1000 ML: 0.9 INJECTION, SOLUTION INTRAVENOUS at 13:36

## 2025-07-02 RX ADMIN — Medication 5 ML: at 15:38

## 2025-07-02 NOTE — PROGRESS NOTES
Infusion Nursing Note:  Gely Keene presents today for 1 L IVF over 2 hours.    Patient seen by provider today: No   present during visit today: Not Applicable.    Note: N/A.      Intravenous Access:  Implanted Port.    Treatment Conditions:  Not Applicable.      Post Infusion Assessment:  Patient tolerated infusion without incident.  Blood return noted pre and post infusion.  Site patent and intact, free from redness, edema or discomfort.  No evidence of extravasations.  Access discontinued per protocol.       Discharge Plan:   Discharge instructions reviewed with: Patient.  Patient and/or family verbalized understanding of discharge instructions and all questions answered.  AVS to patient via [employer].  Patient will return 7/9/25 for next appointment.   Patient discharged in stable condition accompanied by: self.  Departure Mode: Ambulatory.      Shana Khan RN

## 2025-07-09 ENCOUNTER — INFUSION THERAPY VISIT (OUTPATIENT)
Dept: INFUSION THERAPY | Facility: CLINIC | Age: 85
End: 2025-07-09
Attending: PEDIATRICS
Payer: MEDICARE

## 2025-07-09 VITALS
HEART RATE: 69 BPM | OXYGEN SATURATION: 99 % | DIASTOLIC BLOOD PRESSURE: 63 MMHG | RESPIRATION RATE: 16 BRPM | SYSTOLIC BLOOD PRESSURE: 126 MMHG | TEMPERATURE: 98.1 F

## 2025-07-09 DIAGNOSIS — Z93.2 ILEOSTOMY STATUS (H): ICD-10-CM

## 2025-07-09 DIAGNOSIS — E87.1 HYPONATREMIA: ICD-10-CM

## 2025-07-09 DIAGNOSIS — K94.19 ALTERED BOWEL ELIMINATION DUE TO INTESTINAL OSTOMY (H): Primary | ICD-10-CM

## 2025-07-09 DIAGNOSIS — N17.9 ACUTE RENAL FAILURE SUPERIMPOSED ON STAGE 4 CHRONIC KIDNEY DISEASE, UNSPECIFIED ACUTE RENAL FAILURE TYPE (H): ICD-10-CM

## 2025-07-09 DIAGNOSIS — N18.4 ACUTE RENAL FAILURE SUPERIMPOSED ON STAGE 4 CHRONIC KIDNEY DISEASE, UNSPECIFIED ACUTE RENAL FAILURE TYPE (H): ICD-10-CM

## 2025-07-09 DIAGNOSIS — N18.4 CKD STAGE 4 SECONDARY TO HYPERTENSION (H): ICD-10-CM

## 2025-07-09 DIAGNOSIS — Z90.49 S/P TOTAL COLECTOMY: ICD-10-CM

## 2025-07-09 DIAGNOSIS — I12.9 CKD STAGE 4 SECONDARY TO HYPERTENSION (H): ICD-10-CM

## 2025-07-09 DIAGNOSIS — E86.0 DEHYDRATION: ICD-10-CM

## 2025-07-09 PROCEDURE — 258N000003 HC RX IP 258 OP 636: Performed by: PEDIATRICS

## 2025-07-09 PROCEDURE — 96360 HYDRATION IV INFUSION INIT: CPT

## 2025-07-09 PROCEDURE — 96361 HYDRATE IV INFUSION ADD-ON: CPT

## 2025-07-09 PROCEDURE — 250N000011 HC RX IP 250 OP 636: Performed by: PEDIATRICS

## 2025-07-09 RX ORDER — DIPHENHYDRAMINE HYDROCHLORIDE 50 MG/ML
25 INJECTION, SOLUTION INTRAMUSCULAR; INTRAVENOUS
Start: 2025-07-10

## 2025-07-09 RX ORDER — METHYLPREDNISOLONE SODIUM SUCCINATE 40 MG/ML
40 INJECTION INTRAMUSCULAR; INTRAVENOUS
Start: 2025-07-10

## 2025-07-09 RX ORDER — HEPARIN SODIUM,PORCINE 10 UNIT/ML
5-20 VIAL (ML) INTRAVENOUS DAILY PRN
OUTPATIENT
Start: 2025-07-09

## 2025-07-09 RX ORDER — HEPARIN SODIUM (PORCINE) LOCK FLUSH IV SOLN 100 UNIT/ML 100 UNIT/ML
5 SOLUTION INTRAVENOUS
Status: DISCONTINUED | OUTPATIENT
Start: 2025-07-09 | End: 2025-07-09 | Stop reason: HOSPADM

## 2025-07-09 RX ORDER — DIPHENHYDRAMINE HYDROCHLORIDE 50 MG/ML
50 INJECTION, SOLUTION INTRAMUSCULAR; INTRAVENOUS
Start: 2025-07-10

## 2025-07-09 RX ORDER — EPINEPHRINE 1 MG/ML
0.3 INJECTION, SOLUTION INTRAMUSCULAR; SUBCUTANEOUS EVERY 5 MIN PRN
OUTPATIENT
Start: 2025-07-10

## 2025-07-09 RX ORDER — ALBUTEROL SULFATE 0.83 MG/ML
2.5 SOLUTION RESPIRATORY (INHALATION)
OUTPATIENT
Start: 2025-07-10

## 2025-07-09 RX ORDER — HEPARIN SODIUM (PORCINE) LOCK FLUSH IV SOLN 100 UNIT/ML 100 UNIT/ML
5 SOLUTION INTRAVENOUS
OUTPATIENT
Start: 2025-07-09

## 2025-07-09 RX ORDER — ALBUTEROL SULFATE 90 UG/1
1-2 INHALANT RESPIRATORY (INHALATION)
Start: 2025-07-10

## 2025-07-09 RX ORDER — MEPERIDINE HYDROCHLORIDE 25 MG/ML
25 INJECTION INTRAMUSCULAR; INTRAVENOUS; SUBCUTANEOUS
OUTPATIENT
Start: 2025-07-10

## 2025-07-09 RX ADMIN — Medication 5 ML: at 15:38

## 2025-07-09 RX ADMIN — SODIUM CHLORIDE 1000 ML: 0.9 INJECTION, SOLUTION INTRAVENOUS at 13:18

## 2025-07-09 NOTE — PROGRESS NOTES
Infusion Nursing Note:    Gely Keene presents today for 1 L IVF over 2 hours.      Patient seen by provider today: No     present during visit today: Not Applicable.     Note: N/A.        Intravenous Access:  Implanted Port.     Treatment Conditions:  Not Applicable.        Post Infusion Assessment:  Patient tolerated infusion without incident.  Blood return noted pre and post infusion.  Site patent and intact, free from redness, edema or discomfort.  No evidence of extravasations.  Access discontinued per protocol.         Discharge Plan:   Discharge instructions reviewed with: Patient.  Patient and/or family verbalized understanding of discharge instructions and all questions answered.  AVS to patient via [employer].  Patient will return 7/16/25 for next appointment.   Patient discharged in stable condition accompanied by: self.  Departure Mode: Ambulatory.    Ashely Win RN on 7/9/2025 at 1:42 PM

## 2025-07-16 ENCOUNTER — INFUSION THERAPY VISIT (OUTPATIENT)
Dept: INFUSION THERAPY | Facility: CLINIC | Age: 85
End: 2025-07-16
Attending: PEDIATRICS
Payer: MEDICARE

## 2025-07-16 VITALS
SYSTOLIC BLOOD PRESSURE: 141 MMHG | DIASTOLIC BLOOD PRESSURE: 65 MMHG | HEART RATE: 68 BPM | TEMPERATURE: 97.3 F | OXYGEN SATURATION: 99 %

## 2025-07-16 DIAGNOSIS — N18.4 ACUTE RENAL FAILURE SUPERIMPOSED ON STAGE 4 CHRONIC KIDNEY DISEASE, UNSPECIFIED ACUTE RENAL FAILURE TYPE (H): ICD-10-CM

## 2025-07-16 DIAGNOSIS — N18.4 CKD STAGE 4 SECONDARY TO HYPERTENSION (H): ICD-10-CM

## 2025-07-16 DIAGNOSIS — E87.1 HYPONATREMIA: ICD-10-CM

## 2025-07-16 DIAGNOSIS — E86.0 DEHYDRATION: ICD-10-CM

## 2025-07-16 DIAGNOSIS — Z93.2 ILEOSTOMY STATUS (H): ICD-10-CM

## 2025-07-16 DIAGNOSIS — I12.9 CKD STAGE 4 SECONDARY TO HYPERTENSION (H): ICD-10-CM

## 2025-07-16 DIAGNOSIS — Z90.49 S/P TOTAL COLECTOMY: ICD-10-CM

## 2025-07-16 DIAGNOSIS — N17.9 ACUTE RENAL FAILURE SUPERIMPOSED ON STAGE 4 CHRONIC KIDNEY DISEASE, UNSPECIFIED ACUTE RENAL FAILURE TYPE (H): ICD-10-CM

## 2025-07-16 DIAGNOSIS — K94.19 ALTERED BOWEL ELIMINATION DUE TO INTESTINAL OSTOMY (H): Primary | ICD-10-CM

## 2025-07-16 PROCEDURE — 96360 HYDRATION IV INFUSION INIT: CPT

## 2025-07-16 PROCEDURE — 96361 HYDRATE IV INFUSION ADD-ON: CPT

## 2025-07-16 PROCEDURE — 258N000003 HC RX IP 258 OP 636: Performed by: PEDIATRICS

## 2025-07-16 PROCEDURE — 250N000011 HC RX IP 250 OP 636: Performed by: PEDIATRICS

## 2025-07-16 RX ORDER — MEPERIDINE HYDROCHLORIDE 25 MG/ML
25 INJECTION INTRAMUSCULAR; INTRAVENOUS; SUBCUTANEOUS
OUTPATIENT
Start: 2025-07-17

## 2025-07-16 RX ORDER — METHYLPREDNISOLONE SODIUM SUCCINATE 40 MG/ML
40 INJECTION INTRAMUSCULAR; INTRAVENOUS
Start: 2025-07-17

## 2025-07-16 RX ORDER — EPINEPHRINE 1 MG/ML
0.3 INJECTION, SOLUTION INTRAMUSCULAR; SUBCUTANEOUS EVERY 5 MIN PRN
OUTPATIENT
Start: 2025-07-17

## 2025-07-16 RX ORDER — ALBUTEROL SULFATE 90 UG/1
1-2 INHALANT RESPIRATORY (INHALATION)
Start: 2025-07-17

## 2025-07-16 RX ORDER — HEPARIN SODIUM (PORCINE) LOCK FLUSH IV SOLN 100 UNIT/ML 100 UNIT/ML
5 SOLUTION INTRAVENOUS
OUTPATIENT
Start: 2025-07-16

## 2025-07-16 RX ORDER — DIPHENHYDRAMINE HYDROCHLORIDE 50 MG/ML
50 INJECTION, SOLUTION INTRAMUSCULAR; INTRAVENOUS
Start: 2025-07-17

## 2025-07-16 RX ORDER — ALBUTEROL SULFATE 0.83 MG/ML
2.5 SOLUTION RESPIRATORY (INHALATION)
OUTPATIENT
Start: 2025-07-17

## 2025-07-16 RX ORDER — DIPHENHYDRAMINE HYDROCHLORIDE 50 MG/ML
25 INJECTION, SOLUTION INTRAMUSCULAR; INTRAVENOUS
Start: 2025-07-17

## 2025-07-16 RX ORDER — HEPARIN SODIUM (PORCINE) LOCK FLUSH IV SOLN 100 UNIT/ML 100 UNIT/ML
5 SOLUTION INTRAVENOUS
Status: DISCONTINUED | OUTPATIENT
Start: 2025-07-16 | End: 2025-07-16 | Stop reason: HOSPADM

## 2025-07-16 RX ORDER — HEPARIN SODIUM,PORCINE 10 UNIT/ML
5-20 VIAL (ML) INTRAVENOUS DAILY PRN
OUTPATIENT
Start: 2025-07-16

## 2025-07-16 RX ADMIN — SODIUM CHLORIDE 1000 ML: 0.9 INJECTION, SOLUTION INTRAVENOUS at 13:02

## 2025-07-16 RX ADMIN — Medication 5 ML: at 15:03

## 2025-07-16 NOTE — PROGRESS NOTES
Infusion Nursing Note:  Gely Keene presents today for IVF.    Patient seen by provider today: No   present during visit today: Not Applicable.    Note: N/A.      Intravenous Access:  Implanted Port.    Treatment Conditions:  Not Applicable.      Post Infusion Assessment:  Patient tolerated infusion without incident.  Blood return noted pre and post infusion.  Site patent and intact, free from redness, edema or discomfort.  No evidence of extravasations.  Access discontinued per protocol.       Discharge Plan:   AVS to patient via MYCHART.  Patient will return 7/23/25 for IVF  Patient discharged in stable condition accompanied by: self.  Departure Mode: Ambulatory.      Gris Fisher RN

## 2025-07-23 ENCOUNTER — INFUSION THERAPY VISIT (OUTPATIENT)
Dept: INFUSION THERAPY | Facility: CLINIC | Age: 85
End: 2025-07-23
Attending: PEDIATRICS
Payer: MEDICARE

## 2025-07-23 DIAGNOSIS — E87.1 HYPONATREMIA: ICD-10-CM

## 2025-07-23 DIAGNOSIS — E86.0 DEHYDRATION: ICD-10-CM

## 2025-07-23 DIAGNOSIS — Z90.49 S/P TOTAL COLECTOMY: ICD-10-CM

## 2025-07-23 DIAGNOSIS — I12.9 CKD STAGE 4 SECONDARY TO HYPERTENSION (H): ICD-10-CM

## 2025-07-23 DIAGNOSIS — N18.4 ACUTE RENAL FAILURE SUPERIMPOSED ON STAGE 4 CHRONIC KIDNEY DISEASE, UNSPECIFIED ACUTE RENAL FAILURE TYPE (H): ICD-10-CM

## 2025-07-23 DIAGNOSIS — K94.19 ALTERED BOWEL ELIMINATION DUE TO INTESTINAL OSTOMY (H): Primary | ICD-10-CM

## 2025-07-23 DIAGNOSIS — N17.9 ACUTE RENAL FAILURE SUPERIMPOSED ON STAGE 4 CHRONIC KIDNEY DISEASE, UNSPECIFIED ACUTE RENAL FAILURE TYPE (H): ICD-10-CM

## 2025-07-23 DIAGNOSIS — Z93.2 ILEOSTOMY STATUS (H): ICD-10-CM

## 2025-07-23 DIAGNOSIS — N18.4 CKD STAGE 4 SECONDARY TO HYPERTENSION (H): ICD-10-CM

## 2025-07-23 PROCEDURE — 96361 HYDRATE IV INFUSION ADD-ON: CPT

## 2025-07-23 PROCEDURE — 258N000003 HC RX IP 258 OP 636: Performed by: PEDIATRICS

## 2025-07-23 PROCEDURE — 250N000011 HC RX IP 250 OP 636: Performed by: PEDIATRICS

## 2025-07-23 PROCEDURE — 96360 HYDRATION IV INFUSION INIT: CPT

## 2025-07-23 RX ORDER — DIPHENHYDRAMINE HYDROCHLORIDE 50 MG/ML
25 INJECTION, SOLUTION INTRAMUSCULAR; INTRAVENOUS
Start: 2025-07-24

## 2025-07-23 RX ORDER — HEPARIN SODIUM,PORCINE 10 UNIT/ML
5-20 VIAL (ML) INTRAVENOUS DAILY PRN
OUTPATIENT
Start: 2025-07-23

## 2025-07-23 RX ORDER — HEPARIN SODIUM (PORCINE) LOCK FLUSH IV SOLN 100 UNIT/ML 100 UNIT/ML
5 SOLUTION INTRAVENOUS
Status: DISCONTINUED | OUTPATIENT
Start: 2025-07-23 | End: 2025-07-23 | Stop reason: HOSPADM

## 2025-07-23 RX ORDER — ALBUTEROL SULFATE 0.83 MG/ML
2.5 SOLUTION RESPIRATORY (INHALATION)
OUTPATIENT
Start: 2025-07-24

## 2025-07-23 RX ORDER — EPINEPHRINE 1 MG/ML
0.3 INJECTION, SOLUTION INTRAMUSCULAR; SUBCUTANEOUS EVERY 5 MIN PRN
OUTPATIENT
Start: 2025-07-24

## 2025-07-23 RX ORDER — HEPARIN SODIUM (PORCINE) LOCK FLUSH IV SOLN 100 UNIT/ML 100 UNIT/ML
5 SOLUTION INTRAVENOUS
OUTPATIENT
Start: 2025-07-23

## 2025-07-23 RX ORDER — METHYLPREDNISOLONE SODIUM SUCCINATE 40 MG/ML
40 INJECTION INTRAMUSCULAR; INTRAVENOUS
Start: 2025-07-24

## 2025-07-23 RX ORDER — MEPERIDINE HYDROCHLORIDE 25 MG/ML
25 INJECTION INTRAMUSCULAR; INTRAVENOUS; SUBCUTANEOUS
OUTPATIENT
Start: 2025-07-24

## 2025-07-23 RX ORDER — ALBUTEROL SULFATE 90 UG/1
1-2 INHALANT RESPIRATORY (INHALATION)
Start: 2025-07-24

## 2025-07-23 RX ORDER — DIPHENHYDRAMINE HYDROCHLORIDE 50 MG/ML
50 INJECTION, SOLUTION INTRAMUSCULAR; INTRAVENOUS
Start: 2025-07-24

## 2025-07-23 RX ADMIN — SODIUM CHLORIDE 1000 ML: 0.9 INJECTION, SOLUTION INTRAVENOUS at 13:25

## 2025-07-23 RX ADMIN — Medication 5 ML: at 15:31

## 2025-07-23 NOTE — CONFIDENTIAL NOTE
Infusion Nursing Note:  Gely Keene presents today for IVF.    Patient seen by provider today: No   present during visit today: Not Applicable.    Note: N/A.    Intravenous Access:  Implanted Port.    Treatment Conditions:  Not Applicable.      Post Infusion Assessment:  Patient tolerated infusion without incident.  Blood return noted pre and post infusion.  Site patent and intact, free from redness, edema or discomfort.  No evidence of extravasations.  Access discontinued per protocol.       Discharge Plan:   AVS to patient via MYCHART.  Patient will return 7/30 for next appointment.   Patient discharged in stable condition accompanied by: self.  Departure Mode: Ambulatory.      Kulwant Badillo RN

## 2025-07-24 ENCOUNTER — HOSPITAL ENCOUNTER (EMERGENCY)
Facility: CLINIC | Age: 85
Discharge: HOME | End: 2025-07-24
Attending: STUDENT IN AN ORGANIZED HEALTH CARE EDUCATION/TRAINING PROGRAM | Admitting: STUDENT IN AN ORGANIZED HEALTH CARE EDUCATION/TRAINING PROGRAM
Payer: MEDICARE

## 2025-07-24 ENCOUNTER — OFFICE VISIT (OUTPATIENT)
Dept: URGENT CARE | Facility: URGENT CARE | Age: 85
End: 2025-07-24
Payer: MEDICARE

## 2025-07-24 VITALS
SYSTOLIC BLOOD PRESSURE: 147 MMHG | DIASTOLIC BLOOD PRESSURE: 65 MMHG | HEART RATE: 79 BPM | RESPIRATION RATE: 16 BRPM | HEIGHT: 61 IN | WEIGHT: 123.68 LBS | TEMPERATURE: 97.6 F | BODY MASS INDEX: 23.35 KG/M2 | OXYGEN SATURATION: 98 %

## 2025-07-24 VITALS
RESPIRATION RATE: 19 BRPM | DIASTOLIC BLOOD PRESSURE: 77 MMHG | WEIGHT: 122.9 LBS | HEART RATE: 89 BPM | OXYGEN SATURATION: 98 % | SYSTOLIC BLOOD PRESSURE: 120 MMHG | BODY MASS INDEX: 24 KG/M2

## 2025-07-24 DIAGNOSIS — E83.42 HYPOMAGNESEMIA: ICD-10-CM

## 2025-07-24 DIAGNOSIS — R79.89 ELEVATED TROPONIN: ICD-10-CM

## 2025-07-24 DIAGNOSIS — I48.92 NEW ONSET ATRIAL FLUTTER (H): Primary | ICD-10-CM

## 2025-07-24 DIAGNOSIS — I48.92 ATRIAL FLUTTER, UNSPECIFIED TYPE (H): ICD-10-CM

## 2025-07-24 DIAGNOSIS — I49.9 IRREGULAR HEART RATE: Primary | ICD-10-CM

## 2025-07-24 DIAGNOSIS — E78.5 HYPERLIPIDEMIA LDL GOAL <160: ICD-10-CM

## 2025-07-24 PROBLEM — D47.2 MONOCLONAL GAMMOPATHY OF UNDETERMINED SIGNIFICANCE (MGUS): Status: ACTIVE | Noted: 2024-01-06

## 2025-07-24 PROBLEM — D50.9 MICROCYTIC NORMOCHROMIC ANEMIA: Status: ACTIVE | Noted: 2024-01-06

## 2025-07-24 PROBLEM — R10.9 NONSPECIFIC ABDOMINAL PAIN: Status: ACTIVE | Noted: 2019-12-05

## 2025-07-24 PROBLEM — N39.0 RECURRENT URINARY TRACT INFECTION: Status: ACTIVE | Noted: 2020-12-18

## 2025-07-24 PROBLEM — Z91.81 HISTORY OF FALLING: Status: ACTIVE | Noted: 2021-11-09

## 2025-07-24 LAB
ALBUMIN SERPL BCG-MCNC: 3.8 G/DL (ref 3.5–5.2)
ALP SERPL-CCNC: 85 U/L (ref 40–150)
ALT SERPL W P-5'-P-CCNC: 10 U/L (ref 0–50)
ANION GAP SERPL CALCULATED.3IONS-SCNC: 14 MMOL/L (ref 7–15)
AST SERPL W P-5'-P-CCNC: 27 U/L (ref 0–45)
BILIRUB DIRECT SERPL-MCNC: 0.24 MG/DL (ref 0–0.3)
BILIRUB SERPL-MCNC: 0.8 MG/DL
BUN SERPL-MCNC: 34.3 MG/DL (ref 8–23)
CALCIUM SERPL-MCNC: 8.7 MG/DL (ref 8.8–10.4)
CHLORIDE SERPL-SCNC: 104 MMOL/L (ref 98–107)
CREAT SERPL-MCNC: 2.62 MG/DL (ref 0.51–0.95)
EGFRCR SERPLBLD CKD-EPI 2021: 17 ML/MIN/1.73M2
ERYTHROCYTE [DISTWIDTH] IN BLOOD BY AUTOMATED COUNT: 14.6 % (ref 10–15)
GLUCOSE SERPL-MCNC: 111 MG/DL (ref 70–99)
HCO3 SERPL-SCNC: 18 MMOL/L (ref 22–29)
HCT VFR BLD AUTO: 32.4 % (ref 35–47)
HGB BLD-MCNC: 10.8 G/DL (ref 11.7–15.7)
HOLD SPECIMEN: NORMAL
HOLD SPECIMEN: NORMAL
MAGNESIUM SERPL-MCNC: 1.6 MG/DL (ref 1.7–2.3)
MCH RBC QN AUTO: 32.9 PG (ref 26.5–33)
MCHC RBC AUTO-ENTMCNC: 33.3 G/DL (ref 31.5–36.5)
MCV RBC AUTO: 99 FL (ref 78–100)
PLATELET # BLD AUTO: 165 10E3/UL (ref 150–450)
POTASSIUM SERPL-SCNC: 4.2 MMOL/L (ref 3.4–5.3)
PROT SERPL-MCNC: 6 G/DL (ref 6.4–8.3)
RBC # BLD AUTO: 3.28 10E6/UL (ref 3.8–5.2)
SODIUM SERPL-SCNC: 136 MMOL/L (ref 135–145)
TROPONIN T SERPL HS-MCNC: 33 NG/L
TROPONIN T SERPL HS-MCNC: 34 NG/L
TSH SERPL DL<=0.005 MIU/L-ACNC: 1.51 UIU/ML (ref 0.3–4.2)
WBC # BLD AUTO: 5.3 10E3/UL (ref 4–11)

## 2025-07-24 PROCEDURE — 83735 ASSAY OF MAGNESIUM: CPT | Performed by: STUDENT IN AN ORGANIZED HEALTH CARE EDUCATION/TRAINING PROGRAM

## 2025-07-24 PROCEDURE — 96365 THER/PROPH/DIAG IV INF INIT: CPT

## 2025-07-24 PROCEDURE — 82040 ASSAY OF SERUM ALBUMIN: CPT | Performed by: STUDENT IN AN ORGANIZED HEALTH CARE EDUCATION/TRAINING PROGRAM

## 2025-07-24 PROCEDURE — 85014 HEMATOCRIT: CPT | Performed by: STUDENT IN AN ORGANIZED HEALTH CARE EDUCATION/TRAINING PROGRAM

## 2025-07-24 PROCEDURE — 99284 EMERGENCY DEPT VISIT MOD MDM: CPT | Mod: 25 | Performed by: STUDENT IN AN ORGANIZED HEALTH CARE EDUCATION/TRAINING PROGRAM

## 2025-07-24 PROCEDURE — 250N000011 HC RX IP 250 OP 636: Performed by: STUDENT IN AN ORGANIZED HEALTH CARE EDUCATION/TRAINING PROGRAM

## 2025-07-24 PROCEDURE — 84484 ASSAY OF TROPONIN QUANT: CPT | Performed by: STUDENT IN AN ORGANIZED HEALTH CARE EDUCATION/TRAINING PROGRAM

## 2025-07-24 PROCEDURE — 84443 ASSAY THYROID STIM HORMONE: CPT | Performed by: STUDENT IN AN ORGANIZED HEALTH CARE EDUCATION/TRAINING PROGRAM

## 2025-07-24 PROCEDURE — 82435 ASSAY OF BLOOD CHLORIDE: CPT | Performed by: STUDENT IN AN ORGANIZED HEALTH CARE EDUCATION/TRAINING PROGRAM

## 2025-07-24 PROCEDURE — 36415 COLL VENOUS BLD VENIPUNCTURE: CPT | Performed by: STUDENT IN AN ORGANIZED HEALTH CARE EDUCATION/TRAINING PROGRAM

## 2025-07-24 RX ORDER — MAGNESIUM SULFATE HEPTAHYDRATE 40 MG/ML
2 INJECTION, SOLUTION INTRAVENOUS ONCE
Status: COMPLETED | OUTPATIENT
Start: 2025-07-24 | End: 2025-07-24

## 2025-07-24 RX ADMIN — MAGNESIUM SULFATE HEPTAHYDRATE 2 G: 40 INJECTION, SOLUTION INTRAVENOUS at 16:59

## 2025-07-24 ASSESSMENT — ACTIVITIES OF DAILY LIVING (ADL)
ADLS_ACUITY_SCORE: 59

## 2025-07-24 ASSESSMENT — COLUMBIA-SUICIDE SEVERITY RATING SCALE - C-SSRS
1. IN THE PAST MONTH, HAVE YOU WISHED YOU WERE DEAD OR WISHED YOU COULD GO TO SLEEP AND NOT WAKE UP?: NO
6. HAVE YOU EVER DONE ANYTHING, STARTED TO DO ANYTHING, OR PREPARED TO DO ANYTHING TO END YOUR LIFE?: NO
2. HAVE YOU ACTUALLY HAD ANY THOUGHTS OF KILLING YOURSELF IN THE PAST MONTH?: NO

## 2025-07-24 NOTE — DISCHARGE INSTRUCTIONS
Thank you for allowing us to evaluate you today.  Follow up with cardiology in 1 week for reevaluation. I have placed a referral for you.   Start taking the apixaban as prescribed for new onset atrial flutter and stroke prevention  Please read the guidance provided with your discharge instructions.  Immediately return to the emergency department with any concerns.

## 2025-07-24 NOTE — ED PROVIDER NOTES
Emergency Department Note      History of Present Illness     Chief Complaint   Abnormal ECG    HPI   Gely Keene is a very pleasant 85 year old female with hypertension, hyperlipidemia, CKD, anxiety, and systemic lupus erythematosus presenting with abnormal ECG. She was seen by her nephrologist this morning and noted to have an irregular heart beat. She was sent to  and had an ECG which showed new onset of atrial flutter. Her spouse notes she is foggy today and yesterday had an episode of heart racing and shortness of breath. No symptoms now. No chest pain or shortness of breath. She is not on blood thinners. No leg pain or swelling. No history of heart problems. No liver disease or alcohol use.    Independent Historian   Daughter and  as detailed above.    Review of External Notes   I personally reviewed notes from the patient's progress note dated today. This provided me with information regarding patient's recent clinical course.     Past Medical History     Medical History and Problem List   Anxiety  Axonal sensorimotor neuropathy   Adjustment insomnia   Acquired absence of other specified parts of digestive tract   BCC  CKD  Chronic cough   Enterocolitis due to Clostridium difficile   GERD  Hypertension  Hyperlipidemia   Ileostomy status   Mumps  Monoclonal gammopathy of unknown significance (MGUS)   Normocytic normochromic anemia   Peritoneal abscess   Prediabetes   Restless legs syndrome  Recurrent urinary tract infection   Renal failure   Systemic lupus erythematosus     Medications   Atorvastatin   Azathioprine   Duloxetine   Gabapentin   Magnesium  Omeprazole   Ondansetron     Surgical History   Appendectomy  Colectomy with colostomy, combined  Cystoscopy  Endoscopic retrograde cholangiopancreatogram X2  Hysterectomy  Insert port vascular access  Laparoscopic cholecystectomy  Laparotomy exploratory  Phacoemulsification clear cornea with standard intraocular lens implant, left,  "right  S/P total colectomy   Tonsillectomy     Physical Exam     Patient Vitals for the past 24 hrs:   BP Temp Temp src Pulse Resp SpO2 Height Weight   07/24/25 1800 (!) 147/65 -- -- 79 -- 98 % -- --   07/24/25 1727 -- -- -- 77 -- 98 % -- --   07/24/25 1726 -- -- -- 78 -- 94 % -- --   07/24/25 1725 -- -- -- 78 -- 92 % -- --   07/24/25 1724 -- -- -- 78 -- 100 % -- --   07/24/25 1723 -- -- -- 92 -- 95 % -- --   07/24/25 1722 -- -- -- 80 -- 94 % -- --   07/24/25 1721 -- -- -- 78 -- 98 % -- --   07/24/25 1720 -- -- -- 84 -- 97 % -- --   07/24/25 1702 -- -- -- 79 -- 99 % -- --   07/24/25 1701 -- -- -- 86 -- 98 % -- --   07/24/25 1700 (!) 163/74 -- -- 73 -- 99 % -- --   07/24/25 1657 (!) 166/78 -- -- 79 16 98 % -- --   07/24/25 1600 96/73 -- -- 68 -- 99 % -- --   07/24/25 1559 -- -- -- 86 -- 97 % -- --   07/24/25 1558 -- -- -- 81 -- 98 % -- --   07/24/25 1556 -- -- -- 82 -- 99 % -- --   07/24/25 1555 -- -- -- 79 -- 99 % -- --   07/24/25 1542 -- -- -- 88 -- 98 % -- --   07/24/25 1541 -- -- -- 85 -- 99 % -- --   07/24/25 1540 (!) 139/91 -- -- 87 -- 99 % -- --   07/24/25 1514 -- -- -- 99 -- 99 % -- --   07/24/25 1513 -- -- -- 103 -- 98 % -- --   07/24/25 1512 136/80 -- -- 111 -- 98 % -- --   07/24/25 1511 -- -- -- 92 -- 99 % -- --   07/24/25 1510 -- -- -- 100 -- 99 % -- --   07/24/25 1509 (!) 142/83 -- -- 104 -- 98 % -- --   07/24/25 1508 -- -- -- 88 -- 95 % -- --   07/24/25 1507 134/88 -- -- 105 -- -- -- --   07/24/25 1452 (!) 142/84 -- -- 97 16 100 % -- --   07/24/25 1451 -- 97.6  F (36.4  C) Temporal -- -- -- -- --   07/24/25 1450 -- -- -- -- -- -- 1.549 m (5' 1\") 56.1 kg (123 lb 10.9 oz)     Physical Exam  Gen: Well-appearing. Appears stated age.   HEENT: Moist mucous membranes. No scleral icterus.  Neck: No JVD.  CV: Regular rate, irregularly irregular rhythm, S1, S2, no murmurs, gallops or rubs. No chest wall tenderness to palpation. 2+ radial pulses equal bilaterally  Pulm: Easy work of breathing, lungs clear to " auscultation bilaterally, no rales, rhonchi, wheeze  Abd: Soft, non-tender  MSK: No edema in bilateral lower extremities. No tenderness or swelling in bilateral lower extremities.  Skin: Warm and dry, no pallor. No jaundice.   Neuro: Alert and oriented to person, place, time and situation  Psych: Cooperative, appropriate affect    Diagnostics     Lab Results   Labs Ordered and Resulted from Time of ED Arrival to Time of ED Departure   CBC WITH PLATELETS (LIMITED OCCURRENCES) - Abnormal       Result Value    WBC Count 5.3      RBC Count 3.28 (*)     Hemoglobin 10.8 (*)     Hematocrit 32.4 (*)     MCV 99      MCH 32.9      MCHC 33.3      RDW 14.6      Platelet Count 165     BASIC METABOLIC PANEL (LIMITED OCCURRENCES) - Abnormal    Sodium 136      Potassium 4.2      Chloride 104      Carbon Dioxide (CO2) 18 (*)     Anion Gap 14      Urea Nitrogen 34.3 (*)     Creatinine 2.62 (*)     GFR Estimate 17 (*)     Calcium 8.7 (*)     Glucose 111 (*)    TROPONIN T, HIGH SENSITIVITY - Abnormal    Troponin T, High Sensitivity 34 (*)    MAGNESIUM (LIMITED OCCURRENCES) - Abnormal    Magnesium 1.6 (*)    TROPONIN T, HIGH SENSITIVITY - Abnormal    Troponin T, High Sensitivity 33 (*)    TSH WITH FREE T4 REFLEX - Normal    TSH 1.51     HEPATIC FUNCTION PANEL (LIMITED OCCURRENCES)     EKG   ECG taken at 1445  Atrial flutter with variable AV block   Nonspecific ST and T wave abnormality    Atrial flutter has replaced sinus bradycardia as compared to prior, dated 11/6/2023.  Rate 100 bpm. [place] interval * ms. QRS duration 78 ms. QT/QTc 344/443 ms. P-R-T axes 87 26 -74.     Independent Interpretation   None    ED Course      Medications Administered   Medications   magnesium sulfate 2 g in 50 mL sterile water intermittent infusion (0 g Intravenous Stopped 7/24/25 7208)     Procedures   Procedures   None performed    Discussion of Management   None    ED Course   ED Course as of 07/24/25 1815   Thu Jul 24, 2025   9605 I obtained the patient's  history and examined as noted above.    1540 Hemoglobin(!): 10.8  Baseline normocytic anemia   1659 I rechecked the patient and explained findings.      Additional Documentation  None    Medical Decision Making / Diagnosis     CMS Diagnoses: None    MIPS   None             MDM   Gely Keene is a 85 year old female presenting with abnormal EKG.  Vital signs are reassuring.  Patient has irregularly irregular heart rate.  We can EKG, it this appears consistent with atrial flutter with variable AV block.  Patient is not tachycardic and heart rate is currently controlled.  I did obtain labs to evaluate for potential etiologies of arrhythmia, including electrolyte derangement, hyperthyroidism, anemia, and also assessed for acute myocardial injury.  Workup appears reassuring.  Patient's magnesium was borderline low so we did give her a bolus of magnesium.  Renal function is at the patient's baseline.  Anemia is also at the patient's baseline.  TSH is within normal limits.  No significant electrolyte derangement.  EKG was obtained and was only mildly elevated with normal range and was stable on repeat, with both figures being lower than the patient's baseline 1 year ago.  No indication for emergent cardioversion given patient is asymptomatic and atrial flutter is of unknown duration and the patient is not anticoagulated.  Patient's [id number] score is 4, HAS-BLED score is 3.  I discussed risks and benefits of anticoagulation and ultimately the patient decided she would like a prescription for apixaban.  I placed an urgent cardiology referral for the patient to be further evaluated next week.  Additional instructions were provided verbally.  Return precautions were provided       Disposition   The patient was discharged.     Diagnosis     ICD-10-CM    1. New onset atrial flutter (H)  I48.92       2. Atrial flutter, unspecified type (H)  I48.92 Adult Cardiology Eval  Referral     CANCELED: Adult  Cardiology Eval [facility] Referral      3. Hypomagnesemia  E83.42       4. Hyperlipidemia LDL goal <160  E78.5       5. Elevated troponin  R79.89          Discharge Medications   Discharge Medication List as of 7/24/2025  6:00 PM        START taking these medications    Details   apixaban ANTICOAGULANT (ELIQUIS) 5 MG tablet Take 1 tablet (5 mg) by mouth 2 times daily., Disp-60 tablet, R-2, E-Prescribe           Scribe Disclosure:  I, Isatu Rilye, am serving as a scribe at 3:07 PM on 7/24/2025 to document services personally performed by Clarke Schroeder MD based on my observations and the provider's statements to me.         Clarke Schroeder MD  07/24/25 5252

## 2025-07-24 NOTE — PATIENT INSTRUCTIONS
Patient seen I [facility] urgent care for feelings of nervousness, some shortness of breath, and heart palpitations since yesterday. ECG in the urgent care shows Atrial flutter- new onset. Patient was advised to be seen in the ED as soon as possible for further workup. Patient declined ambulance and will instead be driven by her daughter.

## 2025-07-24 NOTE — PROGRESS NOTES
Urgent Care Clinic Visit    Chief Complaint   Patient presents with    Heart Problem     Nervous feeling wile doing things, chest started to pound and sat down and continued activity after pounding stopped, also felt short of breath. Back pain as well during episode. Started yesterday. Was seen at neurology this morning and HR was checked and sent pt over to urgent care due to suspected new fib.                 7/24/2025     1:16 PM   Additional Questions   Roomed by Margaret LOPEZ   Accompanied by Von

## 2025-07-24 NOTE — ED NOTES
CC: Sent from  for new-onset Atrial flutter on EKG. Originally presented to  for anxiety, SOB, and heart palpitations (began yesterday).    Pt denies CP. Notes some SOB with exertion or excitement.    Mild headache x3 days - pt associates this with her lupus    Denies N/V/D, Lightheadedness, fever.

## 2025-07-24 NOTE — PROGRESS NOTES
Irregular heart rate    - EKG 12-lead complete w/read - Clinics  - Renal panel (Alb, BUN, Ca, Cl, CO2, Creat, Gluc, Phos, K, Na)  - CBC with platelets and differential    Atrial flutter, unspecified type (H)  Patient Instructions   Patient seen I [facility] urgent care for feelings of nervousness, some shortness of breath, and heart palpitations since yesterday. ECG in the urgent care shows Atrial flutter- new onset. Patient was advised to be seen in the ED as soon as possible for further workup. Patient declined ambulance and will instead be driven by her daughter.       CARY Dsouza [facility]RG[facility]    Subjective   85 year old who presents to clinic today for the following health issues:    Heart Problem       HPI     Nervous feeling wile doing things, chest started to pound and sat down and continued activity after pounding stopped, also felt short of breath. Back pain as well during episode. Started yesterday. Was seen at neurology this morning and HR was checked and sent pt over to urgent care due to suspected new fib. Patient has had some pain in the back. No history of a-fib in the past. Patient has no smoking history.     Review of Systems   Review of Systems   See HPI    Objective        BP: 120/77 Pulse: 89   Resp: 19 SpO2: 98 %       Physical Exam   Physical Exam  Constitutional:       General: She is not in acute distress.     Appearance: Normal appearance. She is normal weight. She is not ill-appearing, toxic-appearing or diaphoretic.   HENT:      Head: Normocephalic and atraumatic.   Cardiovascular:      Rate and Rhythm: Normal rate. Rhythm irregular.      Comments: Patient has irregular pulses and irregular heart sounds.    Pulmonary:      Effort: Pulmonary effort is normal. No respiratory distress.      Breath sounds: No stridor. No wheezing, rhonchi or rales.   Chest:      Chest wall: No tenderness.   Neurological:      General: No focal deficit present.      Mental Status: She is  alert and oriented to person, place, and time. Mental status is at baseline.      Gait: Gait normal.   Psychiatric:         Mood and Affect: Mood normal.         Behavior: Behavior normal.         Thought Content: Thought content normal.         Judgment: Judgment normal.          EKG - Reviewed and interpreted by me Atrial flutter rate 90    All labs that were finalized during the visit were reviewed by me personally. Any pending labs will be reviewed by urgent care staff in the following days. Patient will be notified either via Mychart message or phone call of any pertinent abnormal results. Patient was informed that we will not necessarily reach out if pending lab results are normal.

## 2025-07-25 ENCOUNTER — MYC MEDICAL ADVICE (OUTPATIENT)
Dept: PEDIATRICS | Facility: CLINIC | Age: 85
End: 2025-07-25
Payer: MEDICARE

## 2025-07-25 LAB
ATRIAL RATE - MUSE: 241 BPM
DIASTOLIC BLOOD PRESSURE - MUSE: NORMAL MMHG
INTERPRETATION ECG - MUSE: NORMAL
P AXIS - MUSE: 87 DEGREES
PR INTERVAL - MUSE: NORMAL MS
QRS DURATION - MUSE: 78 MS
QT - MUSE: 344 MS
QTC - MUSE: 443 MS
R AXIS - MUSE: 26 DEGREES
SYSTOLIC BLOOD PRESSURE - MUSE: NORMAL MMHG
T AXIS - MUSE: -74 DEGREES
VENTRICULAR RATE- MUSE: 100 BPM

## 2025-07-28 NOTE — TELEPHONE ENCOUNTER
Please see patient MyChart message as update. ER follow up visit advised?     Kareem CLARK RN 7/28/2025 at 8:26 AM

## 2025-07-29 ENCOUNTER — OFFICE VISIT (OUTPATIENT)
Dept: PEDIATRICS | Facility: CLINIC | Age: 85
End: 2025-07-29
Payer: MEDICARE

## 2025-07-29 VITALS
DIASTOLIC BLOOD PRESSURE: 88 MMHG | WEIGHT: 121.3 LBS | HEIGHT: 61 IN | BODY MASS INDEX: 22.9 KG/M2 | TEMPERATURE: 97.7 F | OXYGEN SATURATION: 97 % | RESPIRATION RATE: 16 BRPM | SYSTOLIC BLOOD PRESSURE: 126 MMHG | HEART RATE: 118 BPM

## 2025-07-29 DIAGNOSIS — Z93.2 ILEOSTOMY STATUS (H): ICD-10-CM

## 2025-07-29 DIAGNOSIS — M32.9 SYSTEMIC LUPUS ERYTHEMATOSUS, UNSPECIFIED SLE TYPE, UNSPECIFIED ORGAN INVOLVEMENT STATUS (H): ICD-10-CM

## 2025-07-29 DIAGNOSIS — I12.9 CKD STAGE 4 SECONDARY TO HYPERTENSION (H): ICD-10-CM

## 2025-07-29 DIAGNOSIS — I48.92 ATRIAL FLUTTER, UNSPECIFIED TYPE (H): Primary | ICD-10-CM

## 2025-07-29 DIAGNOSIS — N18.4 CKD STAGE 4 SECONDARY TO HYPERTENSION (H): ICD-10-CM

## 2025-07-29 PROCEDURE — 93000 ELECTROCARDIOGRAM COMPLETE: CPT | Performed by: PEDIATRICS

## 2025-07-29 PROCEDURE — G2211 COMPLEX E/M VISIT ADD ON: HCPCS | Performed by: PEDIATRICS

## 2025-07-29 PROCEDURE — 99215 OFFICE O/P EST HI 40 MIN: CPT | Performed by: PEDIATRICS

## 2025-07-29 PROCEDURE — 1126F AMNT PAIN NOTED NONE PRSNT: CPT | Performed by: PEDIATRICS

## 2025-07-29 PROCEDURE — 3079F DIAST BP 80-89 MM HG: CPT | Performed by: PEDIATRICS

## 2025-07-29 PROCEDURE — 3074F SYST BP LT 130 MM HG: CPT | Performed by: PEDIATRICS

## 2025-07-29 RX ORDER — CARVEDILOL 6.25 MG/1
6.25 TABLET ORAL 2 TIMES DAILY WITH MEALS
Qty: 60 TABLET | Refills: 11 | Status: SHIPPED | OUTPATIENT
Start: 2025-07-29

## 2025-07-29 ASSESSMENT — PAIN SCALES - GENERAL: PAINLEVEL_OUTOF10: NO PAIN (0)

## 2025-07-29 NOTE — PROGRESS NOTES
Assessment & Plan       ICD-10-CM    1. Atrial flutter, unspecified type (H)  I48.92 apixaban ANTICOAGULANT (ELIQUIS ANTICOAGULANT) 2.5 MG tablet     carvedilol (COREG) 6.25 MG tablet     EKG 12-lead complete w/read - Clinics    Largely asymptomatic since emergency room visit on 7/24.  Has noticed a few times where her pulse increased and was palpable.    She has not yet started anticoagulation, but is open to doing so.    A-fib with elevated heart rate on EKG today.  Discussed starting rate control with Coreg which she has been on historically and tolerated well.  Also plan for renally dosed apixaban, also ordered today.    Urgent cardiology follow-up scheduled for her next week.  Reviewed with her what to expect at that visit.      2. Ileostomy status (H)  Z93.2 Has great difficulty with maintaining hydration, continue routine IV fluid infusions.    With decreased magnesium noted, may need to start providing magnesium IV during these infusion sessions.      3. Systemic lupus erythematosus, unspecified SLE type, unspecified organ involvement status (H)  M32.9 Follows closely with rheumatology, no acute change in symptoms.      4. CKD stage 4 secondary to hypertension (H)  I12.9 Follows closely with nephrology, stable kidney function on most recent lab work.    N18.4         The longitudinal plan of care for the diagnosis(es)/condition(s) as documented were addressed during this visit. Due to the added complexity in care, I will continue to support Gely in the subsequent management and with ongoing continuity of care.     47 minutes spent in chart review, office visit, and documentation on day of service.    MED REC REQUIRED  Post Medication Reconciliation Status: discharge medications reconciled and changed, per note/orders      Subjective   Gely is a 85 year old, presenting for the following health issues:  RECHECK and ER F/U        7/29/2025     1:16 PM   Additional Questions   Roomed by mandy  "  Accompanied by daughter         7/29/2025     1:16 PM   Patient Reported Additional Medications   Patient reports taking the following new medications na     History of Present Illness       Reason for visit:  Er follow up   She is taking medications regularly.          ED/UC Followup:    Facility:  [facility]   Date of visit: 7/24/2025  Reason for visit: Afib  Current Status: jittery no other sx     Patient here with her daughter to follow-up for recent ER visit with a new onset atrial flutter diagnosis.    She has been stable since discharge home.  Has noted that her heart rate has increased a few times.    No new chest pain or difficulty breathing.    Able to stay in her own home and live independently with the help of her children and grandchildren.  Continues to live with her spouse of 63 years.    No recent fevers chills or infections.      Objective    /88   Pulse 118   Temp 97.7  F (36.5  C) (Temporal)   Resp 16   Ht 1.549 m (5' 1\")   Wt 55 kg (121 lb 4.8 oz)   LMP  (LMP Unknown)   SpO2 97%   BMI 22.92 kg/m    Body mass index is 22.92 kg/m .  Physical Exam   GENERAL: alert, no distress, elderly, and fatigued  RESP: lungs clear to auscultation - no rales, rhonchi or wheezes  CV: irregularly irregular rhythm, no murmur, click or rub, peripheral pulses strong, and no peripheral edema  MS: no gross musculoskeletal defects noted, no edema  PSYCH: mentation appears normal, affect normal/bright    EKG - Reviewed and interpreted by me atrial fibrillation, rate 118, normal axis, normal intervals, no acute ST/T changes c/w ischemia    Labs from ER stay reviewed        Signed Electronically by: Hien Booth MD    "

## 2025-07-30 ENCOUNTER — INFUSION THERAPY VISIT (OUTPATIENT)
Dept: INFUSION THERAPY | Facility: CLINIC | Age: 85
End: 2025-07-30
Attending: PEDIATRICS
Payer: MEDICARE

## 2025-07-30 VITALS
SYSTOLIC BLOOD PRESSURE: 116 MMHG | OXYGEN SATURATION: 100 % | RESPIRATION RATE: 14 BRPM | DIASTOLIC BLOOD PRESSURE: 69 MMHG

## 2025-07-30 DIAGNOSIS — N17.9 ACUTE RENAL FAILURE SUPERIMPOSED ON STAGE 4 CHRONIC KIDNEY DISEASE, UNSPECIFIED ACUTE RENAL FAILURE TYPE (H): ICD-10-CM

## 2025-07-30 DIAGNOSIS — Z93.2 ILEOSTOMY STATUS (H): ICD-10-CM

## 2025-07-30 DIAGNOSIS — K94.19 ALTERED BOWEL ELIMINATION DUE TO INTESTINAL OSTOMY (H): Primary | ICD-10-CM

## 2025-07-30 DIAGNOSIS — I12.9 CKD STAGE 4 SECONDARY TO HYPERTENSION (H): ICD-10-CM

## 2025-07-30 DIAGNOSIS — Z90.49 S/P TOTAL COLECTOMY: ICD-10-CM

## 2025-07-30 DIAGNOSIS — N18.4 CKD STAGE 4 SECONDARY TO HYPERTENSION (H): ICD-10-CM

## 2025-07-30 DIAGNOSIS — E86.0 DEHYDRATION: ICD-10-CM

## 2025-07-30 DIAGNOSIS — N18.4 ACUTE RENAL FAILURE SUPERIMPOSED ON STAGE 4 CHRONIC KIDNEY DISEASE, UNSPECIFIED ACUTE RENAL FAILURE TYPE (H): ICD-10-CM

## 2025-07-30 DIAGNOSIS — E87.1 HYPONATREMIA: ICD-10-CM

## 2025-07-30 PROCEDURE — 250N000011 HC RX IP 250 OP 636: Performed by: PEDIATRICS

## 2025-07-30 PROCEDURE — 96360 HYDRATION IV INFUSION INIT: CPT

## 2025-07-30 PROCEDURE — 96361 HYDRATE IV INFUSION ADD-ON: CPT

## 2025-07-30 PROCEDURE — 258N000003 HC RX IP 258 OP 636: Performed by: PEDIATRICS

## 2025-07-30 RX ORDER — METHYLPREDNISOLONE SODIUM SUCCINATE 40 MG/ML
40 INJECTION INTRAMUSCULAR; INTRAVENOUS
Start: 2025-07-31

## 2025-07-30 RX ORDER — ALBUTEROL SULFATE 90 UG/1
1-2 INHALANT RESPIRATORY (INHALATION)
Start: 2025-07-31

## 2025-07-30 RX ORDER — MEPERIDINE HYDROCHLORIDE 25 MG/ML
25 INJECTION INTRAMUSCULAR; INTRAVENOUS; SUBCUTANEOUS
OUTPATIENT
Start: 2025-07-31

## 2025-07-30 RX ORDER — DIPHENHYDRAMINE HYDROCHLORIDE 50 MG/ML
25 INJECTION, SOLUTION INTRAMUSCULAR; INTRAVENOUS
Start: 2025-07-31

## 2025-07-30 RX ORDER — ALBUTEROL SULFATE 0.83 MG/ML
2.5 SOLUTION RESPIRATORY (INHALATION)
OUTPATIENT
Start: 2025-07-31

## 2025-07-30 RX ORDER — DIPHENHYDRAMINE HYDROCHLORIDE 50 MG/ML
50 INJECTION, SOLUTION INTRAMUSCULAR; INTRAVENOUS
Start: 2025-07-31

## 2025-07-30 RX ORDER — HEPARIN SODIUM (PORCINE) LOCK FLUSH IV SOLN 100 UNIT/ML 100 UNIT/ML
5 SOLUTION INTRAVENOUS
Status: DISCONTINUED | OUTPATIENT
Start: 2025-07-30 | End: 2025-07-30 | Stop reason: HOSPADM

## 2025-07-30 RX ORDER — EPINEPHRINE 1 MG/ML
0.3 INJECTION, SOLUTION INTRAMUSCULAR; SUBCUTANEOUS EVERY 5 MIN PRN
OUTPATIENT
Start: 2025-07-31

## 2025-07-30 RX ORDER — HEPARIN SODIUM (PORCINE) LOCK FLUSH IV SOLN 100 UNIT/ML 100 UNIT/ML
5 SOLUTION INTRAVENOUS
OUTPATIENT
Start: 2025-07-30

## 2025-07-30 RX ORDER — HEPARIN SODIUM,PORCINE 10 UNIT/ML
5-20 VIAL (ML) INTRAVENOUS DAILY PRN
OUTPATIENT
Start: 2025-07-30

## 2025-07-30 RX ADMIN — SODIUM CHLORIDE 1000 ML: 0.9 INJECTION, SOLUTION INTRAVENOUS at 13:10

## 2025-07-30 RX ADMIN — Medication 5 ML: at 15:11

## 2025-07-30 NOTE — PROGRESS NOTES
Infusion Nursing Note:  Gely Keene presents today for IVF.    Patient seen by provider today: No   present during visit today: Not Applicable.    Note: Gely reports feeling dizzy for most of the day today, along with weakness/fatigue. Was just diagnosed with AFib last week. Was placed on Eliquis and Coreg, both of which she just started today    BP 91/49 and was in active AFib upon arrival to clinic with HR jumping from 60s to low 100s    After hydration, BP improved to 116/69 but remained in AFib with HR between high 60s-70s. Reports feeling better and denies feelings of lightheadeness    Has cardiology consult next week      Intravenous Access:  Implanted Port.    Treatment Conditions:  Not Applicable.      Post Infusion Assessment:  Patient tolerated infusion without incident.  Blood return noted pre and post infusion.  Site patent and intact, free from redness, edema or discomfort.  No evidence of extravasations.  Access discontinued per protocol.       Discharge Plan:   AVS to patient via MYCHART.  Patient will return 8/6/25 for IVF   Patient discharged in stable condition accompanied by: .  Departure Mode: Ambulatory.      Gris Fisher RN

## 2025-08-05 ENCOUNTER — OFFICE VISIT (OUTPATIENT)
Dept: CARDIOLOGY | Facility: CLINIC | Age: 85
End: 2025-08-05
Payer: MEDICARE

## 2025-08-05 VITALS
HEART RATE: 62 BPM | OXYGEN SATURATION: 98 % | SYSTOLIC BLOOD PRESSURE: 114 MMHG | HEIGHT: 61 IN | DIASTOLIC BLOOD PRESSURE: 68 MMHG | BODY MASS INDEX: 23.5 KG/M2 | WEIGHT: 124.5 LBS

## 2025-08-05 DIAGNOSIS — E78.5 HYPERLIPIDEMIA LDL GOAL <160: ICD-10-CM

## 2025-08-05 DIAGNOSIS — I10 HYPERTENSION GOAL BP (BLOOD PRESSURE) < 140/90: Primary | ICD-10-CM

## 2025-08-05 DIAGNOSIS — I48.92 ATRIAL FLUTTER, UNSPECIFIED TYPE (H): ICD-10-CM

## 2025-08-05 DIAGNOSIS — R73.03 PREDIABETES: ICD-10-CM

## 2025-08-05 DIAGNOSIS — R94.31 ABNORMAL ELECTROCARDIOGRAM (ECG) (EKG): ICD-10-CM

## 2025-08-05 DIAGNOSIS — R73.09 ABNORMAL GLUCOSE: ICD-10-CM

## 2025-08-05 RX ORDER — CARVEDILOL 6.25 MG/1
6.25 TABLET ORAL 2 TIMES DAILY WITH MEALS
Qty: 60 TABLET | Refills: 11 | Status: SHIPPED | OUTPATIENT
Start: 2025-08-05 | End: 2025-08-05

## 2025-08-05 RX ORDER — CARVEDILOL 6.25 MG/1
6.25 TABLET ORAL 2 TIMES DAILY WITH MEALS
Qty: 180 TABLET | Refills: 4 | Status: SHIPPED | OUTPATIENT
Start: 2025-08-05

## 2025-08-06 ENCOUNTER — INFUSION THERAPY VISIT (OUTPATIENT)
Dept: INFUSION THERAPY | Facility: CLINIC | Age: 85
End: 2025-08-06
Attending: PEDIATRICS
Payer: MEDICARE

## 2025-08-06 VITALS
OXYGEN SATURATION: 100 % | DIASTOLIC BLOOD PRESSURE: 63 MMHG | RESPIRATION RATE: 20 BRPM | HEART RATE: 63 BPM | TEMPERATURE: 97.8 F | SYSTOLIC BLOOD PRESSURE: 115 MMHG

## 2025-08-06 DIAGNOSIS — N18.4 CKD STAGE 4 SECONDARY TO HYPERTENSION (H): ICD-10-CM

## 2025-08-06 DIAGNOSIS — E86.0 DEHYDRATION: ICD-10-CM

## 2025-08-06 DIAGNOSIS — I12.9 CKD STAGE 4 SECONDARY TO HYPERTENSION (H): ICD-10-CM

## 2025-08-06 DIAGNOSIS — E87.1 HYPONATREMIA: ICD-10-CM

## 2025-08-06 DIAGNOSIS — Z90.49 S/P TOTAL COLECTOMY: ICD-10-CM

## 2025-08-06 DIAGNOSIS — N18.4 ACUTE RENAL FAILURE SUPERIMPOSED ON STAGE 4 CHRONIC KIDNEY DISEASE, UNSPECIFIED ACUTE RENAL FAILURE TYPE (H): ICD-10-CM

## 2025-08-06 DIAGNOSIS — N17.9 ACUTE RENAL FAILURE SUPERIMPOSED ON STAGE 4 CHRONIC KIDNEY DISEASE, UNSPECIFIED ACUTE RENAL FAILURE TYPE (H): ICD-10-CM

## 2025-08-06 DIAGNOSIS — Z93.2 ILEOSTOMY STATUS (H): ICD-10-CM

## 2025-08-06 DIAGNOSIS — K94.19 ALTERED BOWEL ELIMINATION DUE TO INTESTINAL OSTOMY (H): Primary | ICD-10-CM

## 2025-08-06 PROCEDURE — 96361 HYDRATE IV INFUSION ADD-ON: CPT

## 2025-08-06 PROCEDURE — 250N000011 HC RX IP 250 OP 636: Performed by: PEDIATRICS

## 2025-08-06 PROCEDURE — 96360 HYDRATION IV INFUSION INIT: CPT

## 2025-08-06 PROCEDURE — 258N000003 HC RX IP 258 OP 636: Performed by: PEDIATRICS

## 2025-08-06 RX ORDER — METHYLPREDNISOLONE SODIUM SUCCINATE 40 MG/ML
40 INJECTION INTRAMUSCULAR; INTRAVENOUS
Start: 2025-08-07

## 2025-08-06 RX ORDER — HEPARIN SODIUM (PORCINE) LOCK FLUSH IV SOLN 100 UNIT/ML 100 UNIT/ML
5 SOLUTION INTRAVENOUS
OUTPATIENT
Start: 2025-08-06

## 2025-08-06 RX ORDER — DIPHENHYDRAMINE HYDROCHLORIDE 50 MG/ML
25 INJECTION, SOLUTION INTRAMUSCULAR; INTRAVENOUS
Start: 2025-08-07

## 2025-08-06 RX ORDER — ALBUTEROL SULFATE 0.83 MG/ML
2.5 SOLUTION RESPIRATORY (INHALATION)
OUTPATIENT
Start: 2025-08-07

## 2025-08-06 RX ORDER — HEPARIN SODIUM (PORCINE) LOCK FLUSH IV SOLN 100 UNIT/ML 100 UNIT/ML
5 SOLUTION INTRAVENOUS
Status: DISCONTINUED | OUTPATIENT
Start: 2025-08-06 | End: 2025-08-06 | Stop reason: HOSPADM

## 2025-08-06 RX ORDER — MEPERIDINE HYDROCHLORIDE 25 MG/ML
25 INJECTION INTRAMUSCULAR; INTRAVENOUS; SUBCUTANEOUS
OUTPATIENT
Start: 2025-08-07

## 2025-08-06 RX ORDER — EPINEPHRINE 1 MG/ML
0.3 INJECTION, SOLUTION INTRAMUSCULAR; SUBCUTANEOUS EVERY 5 MIN PRN
OUTPATIENT
Start: 2025-08-07

## 2025-08-06 RX ORDER — ALBUTEROL SULFATE 90 UG/1
1-2 INHALANT RESPIRATORY (INHALATION)
Start: 2025-08-07

## 2025-08-06 RX ORDER — HEPARIN SODIUM,PORCINE 10 UNIT/ML
5-20 VIAL (ML) INTRAVENOUS DAILY PRN
OUTPATIENT
Start: 2025-08-06

## 2025-08-06 RX ORDER — DIPHENHYDRAMINE HYDROCHLORIDE 50 MG/ML
50 INJECTION, SOLUTION INTRAMUSCULAR; INTRAVENOUS
Start: 2025-08-07

## 2025-08-06 RX ADMIN — Medication 5 ML: at 15:40

## 2025-08-06 RX ADMIN — SODIUM CHLORIDE 1000 ML: 0.9 INJECTION, SOLUTION INTRAVENOUS at 13:13

## 2025-08-06 ASSESSMENT — PAIN SCALES - GENERAL: PAINLEVEL_OUTOF10: NO PAIN (0)

## 2025-08-07 ENCOUNTER — DOCUMENTATION ONLY (OUTPATIENT)
Dept: ANTICOAGULATION | Facility: CLINIC | Age: 85
End: 2025-08-07
Payer: MEDICARE

## 2025-08-12 ENCOUNTER — HOSPITAL ENCOUNTER (OUTPATIENT)
Dept: NUCLEAR MEDICINE | Facility: CLINIC | Age: 85
Discharge: HOME | End: 2025-08-12
Attending: INTERNAL MEDICINE
Payer: MEDICARE

## 2025-08-12 ENCOUNTER — HOSPITAL ENCOUNTER (OUTPATIENT)
Dept: CARDIOLOGY | Facility: CLINIC | Age: 85
Discharge: HOME | End: 2025-08-12
Attending: INTERNAL MEDICINE
Payer: MEDICARE

## 2025-08-12 ENCOUNTER — HOSPITAL ENCOUNTER (OUTPATIENT)
Dept: NUCLEAR MEDICINE | Facility: CLINIC | Age: 85
Setting detail: NUCLEAR MEDICINE
Discharge: HOME | End: 2025-08-12
Attending: INTERNAL MEDICINE
Payer: MEDICARE

## 2025-08-12 DIAGNOSIS — R73.09 ABNORMAL GLUCOSE: ICD-10-CM

## 2025-08-12 DIAGNOSIS — R73.03 PREDIABETES: ICD-10-CM

## 2025-08-12 DIAGNOSIS — I48.92 ATRIAL FLUTTER, UNSPECIFIED TYPE (H): ICD-10-CM

## 2025-08-12 DIAGNOSIS — E78.5 HYPERLIPIDEMIA LDL GOAL <160: ICD-10-CM

## 2025-08-12 DIAGNOSIS — R94.31 ABNORMAL ELECTROCARDIOGRAM (ECG) (EKG): ICD-10-CM

## 2025-08-12 DIAGNOSIS — I10 HYPERTENSION GOAL BP (BLOOD PRESSURE) < 140/90: ICD-10-CM

## 2025-08-12 LAB
CV STRESS MAX HR HE: 93
NUC STRESS EJECTION FRACTION: 83 %
RATE PRESSURE PRODUCT: NORMAL
STRESS ECHO BASELINE DIASTOLIC HE: 72
STRESS ECHO BASELINE HR: 59 BPM
STRESS ECHO BASELINE SYSTOLIC BP: 100
STRESS ECHO CALCULATED PERCENT HR: 70 %
STRESS ECHO LAST STRESS DIASTOLIC BP: 60
STRESS ECHO LAST STRESS SYSTOLIC BP: 143
STRESS ECHO TARGET HR: 133

## 2025-08-12 PROCEDURE — 250N000011 HC RX IP 250 OP 636: Performed by: INTERNAL MEDICINE

## 2025-08-12 PROCEDURE — 93017 CV STRESS TEST TRACING ONLY: CPT

## 2025-08-12 PROCEDURE — 78452 HT MUSCLE IMAGE SPECT MULT: CPT

## 2025-08-12 PROCEDURE — A9500 TC99M SESTAMIBI: HCPCS | Performed by: INTERNAL MEDICINE

## 2025-08-12 PROCEDURE — 343N000001 HC RX 343 MED OP 636: Performed by: INTERNAL MEDICINE

## 2025-08-12 RX ORDER — AMINOPHYLLINE 25 MG/ML
50-100 INJECTION, SOLUTION INTRAVENOUS
Status: DISCONTINUED | OUTPATIENT
Start: 2025-08-12 | End: 2025-08-13 | Stop reason: HOSPADM

## 2025-08-12 RX ORDER — REGADENOSON 0.08 MG/ML
INJECTION, SOLUTION INTRAVENOUS
Status: COMPLETED
Start: 2025-08-12 | End: 2025-08-12

## 2025-08-12 RX ORDER — SODIUM CHLORIDE 9 MG/ML
INJECTION, SOLUTION INTRAVENOUS CONTINUOUS PRN
Status: ACTIVE | OUTPATIENT
Start: 2025-08-12 | End: 2025-08-12

## 2025-08-12 RX ORDER — ALBUTEROL SULFATE 90 UG/1
2 INHALANT RESPIRATORY (INHALATION) EVERY 5 MIN PRN
Status: DISCONTINUED | OUTPATIENT
Start: 2025-08-12 | End: 2025-08-13 | Stop reason: HOSPADM

## 2025-08-12 RX ORDER — REGADENOSON 0.08 MG/ML
0.4 INJECTION, SOLUTION INTRAVENOUS ONCE
Status: COMPLETED | OUTPATIENT
Start: 2025-08-12 | End: 2025-08-12

## 2025-08-12 RX ORDER — NITROGLYCERIN 0.4 MG/1
0.4 TABLET SUBLINGUAL EVERY 5 MIN PRN
Status: ACTIVE | OUTPATIENT
Start: 2025-08-12 | End: 2025-08-12

## 2025-08-12 RX ADMIN — TECHNETIUM TC 99M SESTAMIBI 33 MILLICURIE: 1 INJECTION INTRAVENOUS at 14:30

## 2025-08-12 RX ADMIN — TECHNETIUM TC 99M SESTAMIBI 11 MILLICURIE: 1 INJECTION INTRAVENOUS at 13:10

## 2025-08-12 RX ADMIN — REGADENOSON 0.4 MG: 0.08 INJECTION, SOLUTION INTRAVENOUS at 14:41

## 2025-08-13 ENCOUNTER — INFUSION THERAPY VISIT (OUTPATIENT)
Dept: INFUSION THERAPY | Facility: CLINIC | Age: 85
End: 2025-08-13
Attending: PEDIATRICS
Payer: MEDICARE

## 2025-08-13 VITALS
HEART RATE: 61 BPM | OXYGEN SATURATION: 100 % | TEMPERATURE: 97.9 F | DIASTOLIC BLOOD PRESSURE: 74 MMHG | RESPIRATION RATE: 20 BRPM | SYSTOLIC BLOOD PRESSURE: 144 MMHG

## 2025-08-13 DIAGNOSIS — E87.1 HYPONATREMIA: ICD-10-CM

## 2025-08-13 DIAGNOSIS — Z90.49 S/P TOTAL COLECTOMY: ICD-10-CM

## 2025-08-13 DIAGNOSIS — I12.9 CKD STAGE 4 SECONDARY TO HYPERTENSION (H): ICD-10-CM

## 2025-08-13 DIAGNOSIS — Z93.2 ILEOSTOMY STATUS (H): ICD-10-CM

## 2025-08-13 DIAGNOSIS — N18.4 CKD STAGE 4 SECONDARY TO HYPERTENSION (H): ICD-10-CM

## 2025-08-13 DIAGNOSIS — N18.4 ACUTE RENAL FAILURE SUPERIMPOSED ON STAGE 4 CHRONIC KIDNEY DISEASE, UNSPECIFIED ACUTE RENAL FAILURE TYPE (H): ICD-10-CM

## 2025-08-13 DIAGNOSIS — N17.9 ACUTE RENAL FAILURE SUPERIMPOSED ON STAGE 4 CHRONIC KIDNEY DISEASE, UNSPECIFIED ACUTE RENAL FAILURE TYPE (H): ICD-10-CM

## 2025-08-13 DIAGNOSIS — E86.0 DEHYDRATION: ICD-10-CM

## 2025-08-13 DIAGNOSIS — K94.19 ALTERED BOWEL ELIMINATION DUE TO INTESTINAL OSTOMY (H): Primary | ICD-10-CM

## 2025-08-13 PROCEDURE — 258N000003 HC RX IP 258 OP 636: Performed by: PEDIATRICS

## 2025-08-13 PROCEDURE — 250N000011 HC RX IP 250 OP 636: Performed by: PEDIATRICS

## 2025-08-13 PROCEDURE — 96361 HYDRATE IV INFUSION ADD-ON: CPT

## 2025-08-13 PROCEDURE — 96360 HYDRATION IV INFUSION INIT: CPT

## 2025-08-13 RX ORDER — HEPARIN SODIUM,PORCINE 10 UNIT/ML
5-20 VIAL (ML) INTRAVENOUS DAILY PRN
OUTPATIENT
Start: 2025-08-13

## 2025-08-13 RX ORDER — ALBUTEROL SULFATE 90 UG/1
1-2 INHALANT RESPIRATORY (INHALATION)
Start: 2025-08-14

## 2025-08-13 RX ORDER — EPINEPHRINE 1 MG/ML
0.3 INJECTION, SOLUTION INTRAMUSCULAR; SUBCUTANEOUS EVERY 5 MIN PRN
OUTPATIENT
Start: 2025-08-14

## 2025-08-13 RX ORDER — DIPHENHYDRAMINE HYDROCHLORIDE 50 MG/ML
50 INJECTION, SOLUTION INTRAMUSCULAR; INTRAVENOUS
Start: 2025-08-14

## 2025-08-13 RX ORDER — DIPHENHYDRAMINE HYDROCHLORIDE 50 MG/ML
25 INJECTION, SOLUTION INTRAMUSCULAR; INTRAVENOUS
Start: 2025-08-14

## 2025-08-13 RX ORDER — MEPERIDINE HYDROCHLORIDE 25 MG/ML
25 INJECTION INTRAMUSCULAR; INTRAVENOUS; SUBCUTANEOUS
OUTPATIENT
Start: 2025-08-14

## 2025-08-13 RX ORDER — HEPARIN SODIUM (PORCINE) LOCK FLUSH IV SOLN 100 UNIT/ML 100 UNIT/ML
5 SOLUTION INTRAVENOUS
OUTPATIENT
Start: 2025-08-13

## 2025-08-13 RX ORDER — ALBUTEROL SULFATE 0.83 MG/ML
2.5 SOLUTION RESPIRATORY (INHALATION)
OUTPATIENT
Start: 2025-08-14

## 2025-08-13 RX ORDER — HEPARIN SODIUM (PORCINE) LOCK FLUSH IV SOLN 100 UNIT/ML 100 UNIT/ML
5 SOLUTION INTRAVENOUS
Status: DISCONTINUED | OUTPATIENT
Start: 2025-08-13 | End: 2025-08-13 | Stop reason: HOSPADM

## 2025-08-13 RX ORDER — METHYLPREDNISOLONE SODIUM SUCCINATE 40 MG/ML
40 INJECTION INTRAMUSCULAR; INTRAVENOUS
Start: 2025-08-14

## 2025-08-13 RX ADMIN — SODIUM CHLORIDE 1000 ML: 9 INJECTION, SOLUTION INTRAVENOUS at 13:30

## 2025-08-13 RX ADMIN — Medication 5 ML: at 15:30

## 2025-08-13 ASSESSMENT — PAIN SCALES - GENERAL: PAINLEVEL_OUTOF10: NO PAIN (0)

## 2025-08-20 ENCOUNTER — INFUSION THERAPY VISIT (OUTPATIENT)
Dept: INFUSION THERAPY | Facility: CLINIC | Age: 85
End: 2025-08-20
Attending: PEDIATRICS
Payer: MEDICARE

## 2025-08-20 VITALS — DIASTOLIC BLOOD PRESSURE: 59 MMHG | SYSTOLIC BLOOD PRESSURE: 100 MMHG | HEART RATE: 69 BPM | OXYGEN SATURATION: 98 %

## 2025-08-20 DIAGNOSIS — K94.19 ALTERED BOWEL ELIMINATION DUE TO INTESTINAL OSTOMY (H): Primary | ICD-10-CM

## 2025-08-20 DIAGNOSIS — Z93.2 ILEOSTOMY STATUS (H): ICD-10-CM

## 2025-08-20 DIAGNOSIS — Z90.49 S/P TOTAL COLECTOMY: ICD-10-CM

## 2025-08-20 DIAGNOSIS — N18.4 ACUTE RENAL FAILURE SUPERIMPOSED ON STAGE 4 CHRONIC KIDNEY DISEASE, UNSPECIFIED ACUTE RENAL FAILURE TYPE (H): ICD-10-CM

## 2025-08-20 DIAGNOSIS — N18.4 CKD STAGE 4 SECONDARY TO HYPERTENSION (H): ICD-10-CM

## 2025-08-20 DIAGNOSIS — E87.1 HYPONATREMIA: ICD-10-CM

## 2025-08-20 DIAGNOSIS — I12.9 CKD STAGE 4 SECONDARY TO HYPERTENSION (H): ICD-10-CM

## 2025-08-20 DIAGNOSIS — N17.9 ACUTE RENAL FAILURE SUPERIMPOSED ON STAGE 4 CHRONIC KIDNEY DISEASE, UNSPECIFIED ACUTE RENAL FAILURE TYPE (H): ICD-10-CM

## 2025-08-20 DIAGNOSIS — E86.0 DEHYDRATION: ICD-10-CM

## 2025-08-20 PROCEDURE — 258N000003 HC RX IP 258 OP 636: Performed by: PEDIATRICS

## 2025-08-20 PROCEDURE — 250N000011 HC RX IP 250 OP 636: Performed by: PEDIATRICS

## 2025-08-20 PROCEDURE — 96360 HYDRATION IV INFUSION INIT: CPT

## 2025-08-20 PROCEDURE — 96361 HYDRATE IV INFUSION ADD-ON: CPT

## 2025-08-20 RX ORDER — ALBUTEROL SULFATE 90 UG/1
1-2 INHALANT RESPIRATORY (INHALATION)
Start: 2025-08-21

## 2025-08-20 RX ORDER — HEPARIN SODIUM,PORCINE 10 UNIT/ML
5-20 VIAL (ML) INTRAVENOUS DAILY PRN
OUTPATIENT
Start: 2025-08-20

## 2025-08-20 RX ORDER — HEPARIN SODIUM (PORCINE) LOCK FLUSH IV SOLN 100 UNIT/ML 100 UNIT/ML
5 SOLUTION INTRAVENOUS
Status: DISCONTINUED | OUTPATIENT
Start: 2025-08-20 | End: 2025-08-20 | Stop reason: HOSPADM

## 2025-08-20 RX ORDER — ALBUTEROL SULFATE 0.83 MG/ML
2.5 SOLUTION RESPIRATORY (INHALATION)
OUTPATIENT
Start: 2025-08-21

## 2025-08-20 RX ORDER — DIPHENHYDRAMINE HYDROCHLORIDE 50 MG/ML
25 INJECTION, SOLUTION INTRAMUSCULAR; INTRAVENOUS
Start: 2025-08-21

## 2025-08-20 RX ORDER — DIPHENHYDRAMINE HYDROCHLORIDE 50 MG/ML
50 INJECTION, SOLUTION INTRAMUSCULAR; INTRAVENOUS
Start: 2025-08-21

## 2025-08-20 RX ORDER — HEPARIN SODIUM (PORCINE) LOCK FLUSH IV SOLN 100 UNIT/ML 100 UNIT/ML
5 SOLUTION INTRAVENOUS
OUTPATIENT
Start: 2025-08-20

## 2025-08-20 RX ORDER — EPINEPHRINE 1 MG/ML
0.3 INJECTION, SOLUTION INTRAMUSCULAR; SUBCUTANEOUS EVERY 5 MIN PRN
OUTPATIENT
Start: 2025-08-21

## 2025-08-20 RX ORDER — MEPERIDINE HYDROCHLORIDE 25 MG/ML
25 INJECTION INTRAMUSCULAR; INTRAVENOUS; SUBCUTANEOUS
OUTPATIENT
Start: 2025-08-21

## 2025-08-20 RX ORDER — METHYLPREDNISOLONE SODIUM SUCCINATE 40 MG/ML
40 INJECTION INTRAMUSCULAR; INTRAVENOUS
Start: 2025-08-21

## 2025-08-20 RX ADMIN — Medication 5 ML: at 15:22

## 2025-08-20 RX ADMIN — SODIUM CHLORIDE 1000 ML: 9 INJECTION, SOLUTION INTRAVENOUS at 13:09

## 2025-08-23 DIAGNOSIS — M32.9 SYSTEMIC LUPUS ERYTHEMATOSUS, UNSPECIFIED SLE TYPE, UNSPECIFIED ORGAN INVOLVEMENT STATUS (H): ICD-10-CM

## 2025-08-23 DIAGNOSIS — G62.89 AXONAL SENSORIMOTOR NEUROPATHY: ICD-10-CM

## 2025-08-23 DIAGNOSIS — F41.9 ANXIETY: ICD-10-CM

## 2025-08-23 DIAGNOSIS — K21.9 GASTROESOPHAGEAL REFLUX DISEASE: ICD-10-CM

## 2025-08-25 RX ORDER — OMEPRAZOLE 20 MG/1
20 CAPSULE, DELAYED RELEASE ORAL DAILY
Qty: 90 CAPSULE | Refills: 2 | OUTPATIENT
Start: 2025-08-25

## 2025-08-25 RX ORDER — DULOXETIN HYDROCHLORIDE 30 MG/1
30 CAPSULE, DELAYED RELEASE ORAL DAILY
Qty: 90 CAPSULE | Refills: 2 | OUTPATIENT
Start: 2025-08-25

## 2025-08-27 ENCOUNTER — INFUSION THERAPY VISIT (OUTPATIENT)
Dept: INFUSION THERAPY | Facility: CLINIC | Age: 85
End: 2025-08-27
Attending: PEDIATRICS
Payer: MEDICARE

## 2025-08-27 VITALS
HEART RATE: 64 BPM | RESPIRATION RATE: 18 BRPM | SYSTOLIC BLOOD PRESSURE: 127 MMHG | OXYGEN SATURATION: 99 % | TEMPERATURE: 97.9 F | DIASTOLIC BLOOD PRESSURE: 56 MMHG

## 2025-08-27 DIAGNOSIS — E87.1 HYPONATREMIA: ICD-10-CM

## 2025-08-27 DIAGNOSIS — N18.4 ACUTE RENAL FAILURE SUPERIMPOSED ON STAGE 4 CHRONIC KIDNEY DISEASE, UNSPECIFIED ACUTE RENAL FAILURE TYPE (H): ICD-10-CM

## 2025-08-27 DIAGNOSIS — Z90.49 S/P TOTAL COLECTOMY: ICD-10-CM

## 2025-08-27 DIAGNOSIS — K94.19 ALTERED BOWEL ELIMINATION DUE TO INTESTINAL OSTOMY (H): Primary | ICD-10-CM

## 2025-08-27 DIAGNOSIS — E86.0 DEHYDRATION: ICD-10-CM

## 2025-08-27 DIAGNOSIS — Z93.2 ILEOSTOMY STATUS (H): ICD-10-CM

## 2025-08-27 DIAGNOSIS — N17.9 ACUTE RENAL FAILURE SUPERIMPOSED ON STAGE 4 CHRONIC KIDNEY DISEASE, UNSPECIFIED ACUTE RENAL FAILURE TYPE (H): ICD-10-CM

## 2025-08-27 DIAGNOSIS — N18.4 CKD STAGE 4 SECONDARY TO HYPERTENSION (H): ICD-10-CM

## 2025-08-27 DIAGNOSIS — I12.9 CKD STAGE 4 SECONDARY TO HYPERTENSION (H): ICD-10-CM

## 2025-08-27 PROCEDURE — 96360 HYDRATION IV INFUSION INIT: CPT

## 2025-08-27 PROCEDURE — 258N000003 HC RX IP 258 OP 636: Performed by: PEDIATRICS

## 2025-08-27 PROCEDURE — 250N000011 HC RX IP 250 OP 636: Performed by: PEDIATRICS

## 2025-08-27 PROCEDURE — 96361 HYDRATE IV INFUSION ADD-ON: CPT

## 2025-08-27 RX ORDER — ALBUTEROL SULFATE 0.83 MG/ML
2.5 SOLUTION RESPIRATORY (INHALATION)
OUTPATIENT
Start: 2025-08-28

## 2025-08-27 RX ORDER — HEPARIN SODIUM (PORCINE) LOCK FLUSH IV SOLN 100 UNIT/ML 100 UNIT/ML
5 SOLUTION INTRAVENOUS
Status: DISCONTINUED | OUTPATIENT
Start: 2025-08-27 | End: 2025-08-27 | Stop reason: HOSPADM

## 2025-08-27 RX ORDER — METHYLPREDNISOLONE SODIUM SUCCINATE 40 MG/ML
40 INJECTION INTRAMUSCULAR; INTRAVENOUS
Start: 2025-08-28

## 2025-08-27 RX ORDER — EPINEPHRINE 1 MG/ML
0.3 INJECTION, SOLUTION INTRAMUSCULAR; SUBCUTANEOUS EVERY 5 MIN PRN
OUTPATIENT
Start: 2025-08-28

## 2025-08-27 RX ORDER — ALBUTEROL SULFATE 90 UG/1
1-2 INHALANT RESPIRATORY (INHALATION)
Start: 2025-08-28

## 2025-08-27 RX ORDER — HEPARIN SODIUM (PORCINE) LOCK FLUSH IV SOLN 100 UNIT/ML 100 UNIT/ML
5 SOLUTION INTRAVENOUS
OUTPATIENT
Start: 2025-08-27

## 2025-08-27 RX ORDER — HEPARIN SODIUM,PORCINE 10 UNIT/ML
5-20 VIAL (ML) INTRAVENOUS DAILY PRN
OUTPATIENT
Start: 2025-08-27

## 2025-08-27 RX ORDER — MEPERIDINE HYDROCHLORIDE 25 MG/ML
25 INJECTION INTRAMUSCULAR; INTRAVENOUS; SUBCUTANEOUS
OUTPATIENT
Start: 2025-08-28

## 2025-08-27 RX ORDER — DIPHENHYDRAMINE HYDROCHLORIDE 50 MG/ML
50 INJECTION, SOLUTION INTRAMUSCULAR; INTRAVENOUS
Start: 2025-08-28

## 2025-08-27 RX ORDER — DIPHENHYDRAMINE HYDROCHLORIDE 50 MG/ML
25 INJECTION, SOLUTION INTRAMUSCULAR; INTRAVENOUS
Start: 2025-08-28

## 2025-08-27 RX ADMIN — SODIUM CHLORIDE 1000 ML: 0.9 INJECTION, SOLUTION INTRAVENOUS at 13:05

## 2025-08-27 RX ADMIN — Medication 5 ML: at 15:16

## 2025-08-27 ASSESSMENT — PAIN SCALES - GENERAL: PAINLEVEL_OUTOF10: NO PAIN (0)

## 2025-09-03 ENCOUNTER — HOSPITAL ENCOUNTER (OUTPATIENT)
Dept: CARDIOLOGY | Facility: CLINIC | Age: 85
Discharge: HOME | End: 2025-09-03
Attending: INTERNAL MEDICINE
Payer: MEDICARE

## 2025-09-03 ENCOUNTER — INFUSION THERAPY VISIT (OUTPATIENT)
Dept: INFUSION THERAPY | Facility: CLINIC | Age: 85
End: 2025-09-03
Attending: PEDIATRICS
Payer: MEDICARE

## 2025-09-03 VITALS
OXYGEN SATURATION: 100 % | SYSTOLIC BLOOD PRESSURE: 138 MMHG | HEART RATE: 67 BPM | DIASTOLIC BLOOD PRESSURE: 58 MMHG | RESPIRATION RATE: 18 BRPM

## 2025-09-03 DIAGNOSIS — E87.1 HYPONATREMIA: ICD-10-CM

## 2025-09-03 DIAGNOSIS — Z93.2 ILEOSTOMY STATUS (H): ICD-10-CM

## 2025-09-03 DIAGNOSIS — R73.09 ABNORMAL GLUCOSE: ICD-10-CM

## 2025-09-03 DIAGNOSIS — I12.9 CKD STAGE 4 SECONDARY TO HYPERTENSION (H): ICD-10-CM

## 2025-09-03 DIAGNOSIS — E86.0 DEHYDRATION: ICD-10-CM

## 2025-09-03 DIAGNOSIS — K94.19 ALTERED BOWEL ELIMINATION DUE TO INTESTINAL OSTOMY (H): Primary | ICD-10-CM

## 2025-09-03 DIAGNOSIS — E78.5 HYPERLIPIDEMIA LDL GOAL <160: ICD-10-CM

## 2025-09-03 DIAGNOSIS — R73.03 PREDIABETES: ICD-10-CM

## 2025-09-03 DIAGNOSIS — Z90.49 S/P TOTAL COLECTOMY: ICD-10-CM

## 2025-09-03 DIAGNOSIS — I10 HYPERTENSION GOAL BP (BLOOD PRESSURE) < 140/90: ICD-10-CM

## 2025-09-03 DIAGNOSIS — N17.9 ACUTE RENAL FAILURE SUPERIMPOSED ON STAGE 4 CHRONIC KIDNEY DISEASE, UNSPECIFIED ACUTE RENAL FAILURE TYPE (H): ICD-10-CM

## 2025-09-03 DIAGNOSIS — N18.4 CKD STAGE 4 SECONDARY TO HYPERTENSION (H): ICD-10-CM

## 2025-09-03 DIAGNOSIS — N18.4 ACUTE RENAL FAILURE SUPERIMPOSED ON STAGE 4 CHRONIC KIDNEY DISEASE, UNSPECIFIED ACUTE RENAL FAILURE TYPE (H): ICD-10-CM

## 2025-09-03 DIAGNOSIS — I48.92 ATRIAL FLUTTER, UNSPECIFIED TYPE (H): ICD-10-CM

## 2025-09-03 LAB — LVEF ECHO: NORMAL

## 2025-09-03 PROCEDURE — 96360 HYDRATION IV INFUSION INIT: CPT

## 2025-09-03 PROCEDURE — 96361 HYDRATE IV INFUSION ADD-ON: CPT

## 2025-09-03 PROCEDURE — 250N000011 HC RX IP 250 OP 636: Performed by: PEDIATRICS

## 2025-09-03 PROCEDURE — 93306 TTE W/DOPPLER COMPLETE: CPT

## 2025-09-03 PROCEDURE — 258N000003 HC RX IP 258 OP 636: Performed by: PEDIATRICS

## 2025-09-03 RX ORDER — HEPARIN SODIUM,PORCINE 10 UNIT/ML
5-20 VIAL (ML) INTRAVENOUS DAILY PRN
OUTPATIENT
Start: 2025-09-03

## 2025-09-03 RX ORDER — EPINEPHRINE 1 MG/ML
0.3 INJECTION, SOLUTION INTRAMUSCULAR; SUBCUTANEOUS EVERY 5 MIN PRN
OUTPATIENT
Start: 2025-09-04

## 2025-09-03 RX ORDER — METHYLPREDNISOLONE SODIUM SUCCINATE 40 MG/ML
40 INJECTION INTRAMUSCULAR; INTRAVENOUS
Start: 2025-09-04

## 2025-09-03 RX ORDER — HEPARIN SODIUM (PORCINE) LOCK FLUSH IV SOLN 100 UNIT/ML 100 UNIT/ML
5 SOLUTION INTRAVENOUS
Status: DISCONTINUED | OUTPATIENT
Start: 2025-09-03 | End: 2025-09-03 | Stop reason: HOSPADM

## 2025-09-03 RX ORDER — DIPHENHYDRAMINE HYDROCHLORIDE 50 MG/ML
25 INJECTION INTRAMUSCULAR; INTRAVENOUS
Start: 2025-09-04

## 2025-09-03 RX ORDER — ALBUTEROL SULFATE 90 UG/1
1-2 INHALANT RESPIRATORY (INHALATION)
Start: 2025-09-04

## 2025-09-03 RX ORDER — DIPHENHYDRAMINE HYDROCHLORIDE 50 MG/ML
50 INJECTION INTRAMUSCULAR; INTRAVENOUS
Start: 2025-09-04

## 2025-09-03 RX ORDER — HEPARIN SODIUM (PORCINE) LOCK FLUSH IV SOLN 100 UNIT/ML 100 UNIT/ML
5 SOLUTION INTRAVENOUS
OUTPATIENT
Start: 2025-09-03

## 2025-09-03 RX ORDER — MEPERIDINE HYDROCHLORIDE 25 MG/ML
25 INJECTION INTRAMUSCULAR; INTRAVENOUS; SUBCUTANEOUS
OUTPATIENT
Start: 2025-09-04

## 2025-09-03 RX ORDER — ALBUTEROL SULFATE 0.83 MG/ML
2.5 SOLUTION RESPIRATORY (INHALATION)
OUTPATIENT
Start: 2025-09-04

## 2025-09-03 RX ADMIN — SODIUM CHLORIDE 1000 ML: 0.9 INJECTION, SOLUTION INTRAVENOUS at 13:05

## 2025-09-03 RX ADMIN — Medication 5 ML: at 15:51

## (undated) DEVICE — DRAPE LAP TRANSVERSE 29421

## (undated) DEVICE — BAG CLEAR TRASH 1.3M 39X33" P4040C

## (undated) DEVICE — GLOVE PROTEXIS MICRO 6.0  2D73PM60

## (undated) DEVICE — EYE PACK CUSTOM ANTERIOR 30DEG TIP CENTURION PPK6682-04

## (undated) DEVICE — EYE SHIELD PLASTIC

## (undated) DEVICE — KIT ENDO TURNOVER/PROCEDURE W/CLEAN A SCOPE LINERS 103888

## (undated) DEVICE — GLOVE PROTEXIS POWDER FREE SMT 8.5 2D72PT85X

## (undated) DEVICE — LINEN TOWEL PACK X5 5464

## (undated) DEVICE — ENDO FORCEP BX CAPTURA JUMBO SPIKE 2.8MMX230CM G53042

## (undated) DEVICE — GLOVE PROTEXIS MICRO 7.0  2D73PM70

## (undated) DEVICE — EYE TIP IRRIGATION & ASPIRATION POLYMER 35D BENT 8065751511

## (undated) DEVICE — SYR 05ML LL W/O NDL

## (undated) DEVICE — BLADE KNIFE SURG 11 371111

## (undated) DEVICE — ENDO BITE BLOCK ADULT OLYMPUS LATEX FREE MAJ-1632

## (undated) DEVICE — SU DERMABOND ADVANCED .7ML DNX12

## (undated) DEVICE — TRAY PREP DRY SKIN SCRUB 067

## (undated) DEVICE — SYR 30ML LL W/O NDL 302832

## (undated) DEVICE — PREP POVIDONE-IODINE 7.5% SCRUB 4OZ BOTTLE MDS093945

## (undated) DEVICE — SYR 10ML SLIP TIP W/O NDL 303134

## (undated) DEVICE — SU VICRYL 4-0 PS-2 18" UND J496H

## (undated) DEVICE — EYE PACK BVI READYPAK KIT #2

## (undated) DEVICE — TAPE SILICONE KIND REMOVAL 2" 2770S-2

## (undated) DEVICE — ESU GROUND PAD ADULT W/CORD E7507

## (undated) DEVICE — ENDO FORCEP ENDOJAW BIOPSY 2.8MMX160CM FB-220K

## (undated) DEVICE — PACK MINOR CUSTOM RIDGES SBA32RMRMA

## (undated) DEVICE — LINEN TOWEL PACK X10 5473

## (undated) DEVICE — EYE KNIFE SLIT XSTAR VISITEC 2.5MM 45DEG DBL BEVEL 370825

## (undated) DEVICE — LINEN HALF SHEET 5512

## (undated) DEVICE — SU VICRYL 3-0 SH 27" UND J416H

## (undated) DEVICE — GLOVE PROTEXIS MICRO 6.5  2D73PM65

## (undated) DEVICE — EYE SOL BSS 500ML

## (undated) DEVICE — LINEN FULL SHEET 5511

## (undated) DEVICE — DRAPE X-RAY TUBE 00-901169-01-OEC

## (undated) DEVICE — DECANTER BAG 2002S

## (undated) DEVICE — SOL NACL 0.9% INJ 250ML BAG 2B1322Q

## (undated) DEVICE — PACK CATARACT CUSTOM SO DALE SEY32CTFCX

## (undated) DEVICE — GLOVE BIOGEL PI MICRO INDICATOR UNDERGLOVE SZ 8.0 48980

## (undated) DEVICE — PREP POVIDONE IODINE SOLUTION 10% 4OZ BOTTLE 29906-004

## (undated) DEVICE — GLOVE BIOGEL PI MICRO SZ 7.5 48575

## (undated) RX ORDER — LIDOCAINE HYDROCHLORIDE 10 MG/ML
INJECTION, SOLUTION EPIDURAL; INFILTRATION; INTRACAUDAL; PERINEURAL
Status: DISPENSED
Start: 2024-01-26

## (undated) RX ORDER — ONDANSETRON 2 MG/ML
INJECTION INTRAMUSCULAR; INTRAVENOUS
Status: DISPENSED
Start: 2020-01-02

## (undated) RX ORDER — LABETALOL HYDROCHLORIDE 5 MG/ML
INJECTION, SOLUTION INTRAVENOUS
Status: DISPENSED
Start: 2024-01-26

## (undated) RX ORDER — HYDROCODONE BITARTRATE AND ACETAMINOPHEN 5; 325 MG/1; MG/1
TABLET ORAL
Status: DISPENSED
Start: 2024-01-26

## (undated) RX ORDER — DEXAMETHASONE SODIUM PHOSPHATE 4 MG/ML
INJECTION, SOLUTION INTRA-ARTICULAR; INTRALESIONAL; INTRAMUSCULAR; INTRAVENOUS; SOFT TISSUE
Status: DISPENSED
Start: 2024-01-26

## (undated) RX ORDER — FENTANYL CITRATE 50 UG/ML
INJECTION, SOLUTION INTRAMUSCULAR; INTRAVENOUS
Status: DISPENSED
Start: 2023-06-29

## (undated) RX ORDER — FENTANYL CITRATE 50 UG/ML
INJECTION, SOLUTION INTRAMUSCULAR; INTRAVENOUS
Status: DISPENSED
Start: 2020-01-02

## (undated) RX ORDER — PROPOFOL 10 MG/ML
INJECTION, EMULSION INTRAVENOUS
Status: DISPENSED
Start: 2024-01-26

## (undated) RX ORDER — ONDANSETRON 2 MG/ML
INJECTION INTRAMUSCULAR; INTRAVENOUS
Status: DISPENSED
Start: 2024-01-26

## (undated) RX ORDER — CEFAZOLIN SODIUM/WATER 2 G/20 ML
SYRINGE (ML) INTRAVENOUS
Status: DISPENSED
Start: 2024-01-26

## (undated) RX ORDER — BUPIVACAINE HYDROCHLORIDE 5 MG/ML
INJECTION, SOLUTION EPIDURAL; INTRACAUDAL
Status: DISPENSED
Start: 2024-01-26

## (undated) RX ORDER — KETOROLAC TROMETHAMINE 15 MG/ML
INJECTION, SOLUTION INTRAMUSCULAR; INTRAVENOUS
Status: DISPENSED
Start: 2024-01-26

## (undated) RX ORDER — ONDANSETRON 2 MG/ML
INJECTION INTRAMUSCULAR; INTRAVENOUS
Status: DISPENSED
Start: 2017-05-23

## (undated) RX ORDER — FENTANYL CITRATE 50 UG/ML
INJECTION, SOLUTION INTRAMUSCULAR; INTRAVENOUS
Status: DISPENSED
Start: 2024-01-26

## (undated) RX ORDER — HEPARIN SODIUM (PORCINE) LOCK FLUSH IV SOLN 100 UNIT/ML 100 UNIT/ML
SOLUTION INTRAVENOUS
Status: DISPENSED
Start: 2024-01-26